# Patient Record
Sex: FEMALE | Race: WHITE | NOT HISPANIC OR LATINO | Employment: OTHER | URBAN - METROPOLITAN AREA
[De-identification: names, ages, dates, MRNs, and addresses within clinical notes are randomized per-mention and may not be internally consistent; named-entity substitution may affect disease eponyms.]

---

## 2017-01-19 ENCOUNTER — PROCEDURE VISIT (OUTPATIENT)
Dept: OBSTETRICS AND GYNECOLOGY | Facility: CLINIC | Age: 67
End: 2017-01-19

## 2017-01-19 ENCOUNTER — OFFICE VISIT (OUTPATIENT)
Dept: OBSTETRICS AND GYNECOLOGY | Facility: CLINIC | Age: 67
End: 2017-01-19

## 2017-01-19 ENCOUNTER — APPOINTMENT (OUTPATIENT)
Dept: MAMMOGRAPHY | Facility: CLINIC | Age: 67
End: 2017-01-19

## 2017-01-19 VITALS
DIASTOLIC BLOOD PRESSURE: 72 MMHG | HEIGHT: 67 IN | BODY MASS INDEX: 19.34 KG/M2 | WEIGHT: 123.2 LBS | SYSTOLIC BLOOD PRESSURE: 116 MMHG

## 2017-01-19 DIAGNOSIS — Z01.419 GYNECOLOGIC EXAM NORMAL: Primary | ICD-10-CM

## 2017-01-19 DIAGNOSIS — M89.9 DISORDER OF BONE: ICD-10-CM

## 2017-01-19 DIAGNOSIS — M85.80 OSTEOPENIA: ICD-10-CM

## 2017-01-19 DIAGNOSIS — Z12.31 VISIT FOR SCREENING MAMMOGRAM: ICD-10-CM

## 2017-01-19 DIAGNOSIS — Z13.820 SCREENING FOR OSTEOPOROSIS: Primary | ICD-10-CM

## 2017-01-19 DIAGNOSIS — Z78.0 POSTMENOPAUSAL: ICD-10-CM

## 2017-01-19 PROCEDURE — G0202 SCR MAMMO BI INCL CAD: HCPCS | Performed by: NURSE PRACTITIONER

## 2017-01-19 PROCEDURE — G0101 CA SCREEN;PELVIC/BREAST EXAM: HCPCS | Performed by: NURSE PRACTITIONER

## 2017-01-19 PROCEDURE — 77080 DXA BONE DENSITY AXIAL: CPT | Performed by: NURSE PRACTITIONER

## 2017-01-19 RX ORDER — ASPIRIN 81 MG/1
81 TABLET ORAL DAILY
COMMUNITY
End: 2017-05-22

## 2017-01-19 RX ORDER — ZOLEDRONIC ACID 5 MG/100ML
1 INJECTION, SOLUTION INTRAVENOUS ONCE
Status: CANCELLED | OUTPATIENT
Start: 2017-01-19 | End: 2017-01-19

## 2017-01-19 NOTE — MR AVS SNAPSHOT
Rosi Vicente   1/19/2017 8:30 AM   Appointment    Dept Phone:  [phone]   Encounter #:  [id number]    Provider:  FRANCIS RICHARDSON   Department:  [facility] OB GYN                Your Full Care Plan              Your Updated Medication List          This list is accurate as of: 1/19/17  9:59 AM.  Always use your most recent med list.                Alpha-Lipoic Acid 300 MG capsule       aspirin 81 MG EC tablet       calcium carbonate 600 MG tablet   Commonly known as:  OS-CARLOS       cholecalciferol 1000 UNITS tablet   Commonly known as:  VITAMIN D3       ferrous sulfate 325 (65 FE) MG tablet       FLUTICASONE PROPIONATE (INHAL) IN       guaiFENesin 600 MG 12 hr tablet   Commonly known as:  MUCINEX       hydrochlorothiazide 12.5 MG tablet   Commonly known as:  HYDRODIURIL   Take 1 tablet by mouth daily.       lisinopril 20 MG tablet   Commonly known as:  PRINIVIL,ZESTRIL   Take 1 tablet by mouth Daily.       meloxicam 15 MG tablet   Commonly known as:  MOBIC   Take 1 tablet by mouth daily.       MethylPREDNISolone 4 MG tablet   Commonly known as:  MEDROL (JUSTINE)   Take as directed on package instructions.       montelukast 10 MG tablet   Commonly known as:  SINGULAIR       polyethyl glycol-propyl glycol 0.4-0.3 % solution ophthalmic solution   Commonly known as:  SYSTANE       Unable to find       valsartan 320 MG tablet   Commonly known as:  DIOVAN   Take 1 tablet by mouth daily. Instead of losartan       vitamin B-12 1000 MCG tablet   Commonly known as:  CYANOCOBALAMIN               Instructions     None    Patient Instructions History      Upcoming Appointments     Visit Type Date Time Department    BONE DENSITY 1/19/2017  8:30 AM [facility]    MAMMO ISSA 1/19/2017  9:00 AM [facility]    WELLNESS 1/19/2017  9:30 AM [facility]    FOLLOW UP 5/23/2017  8:30 AM [facility]      MyCjulio Signup     Our records indicate that you have an active  [facility] mktg account.    You can view your After Visit Summary by going to [employer] and logging in with your SOAK ([employer]) username and password.  If you don't have a SOAK ([employer]) username and password but a parent or guardian has access to your record, the parent or guardian should login with their own SOAK ([employer]) username and password and access your record to view the After Visit Summary.    If you have questions, you can email [email or web address] or call [phone] to talk to our SOAK ([employer]) staff.  Remember, SOAK ([employer] tool[employer]) is NOT to be used for urgent needs.  For medical emergencies, dial 911.               Other Info from Your Visit           Your Appointments     May 23, 2017  8:30 AM EDT   Follow Up with Dionne Fonseca MD   [facility] (--)    [address]  [facility] [address] [phone]           Arrive 15 minutes prior to appointment.              Allergies     No Known Allergies      Vital Signs     Smoking Status                   Never Smoker

## 2017-01-23 LAB
CONV .: NORMAL
CYTOLOGIST CVX/VAG CYTO: NORMAL
CYTOLOGY CVX/VAG DOC THIN PREP: NORMAL
DX ICD CODE: NORMAL
HIV 1 & 2 AB SER-IMP: NORMAL
OTHER STN SPEC: NORMAL
PATH REPORT.FINAL DX SPEC: NORMAL
STAT OF ADQ CVX/VAG CYTO-IMP: NORMAL

## 2017-01-26 ENCOUNTER — TELEPHONE (OUTPATIENT)
Dept: OBSTETRICS AND GYNECOLOGY | Facility: CLINIC | Age: 67
End: 2017-01-26

## 2017-01-30 DIAGNOSIS — G50.0 TRIGEMINAL NEURALGIA: Primary | ICD-10-CM

## 2017-01-30 RX ORDER — GABAPENTIN 100 MG/1
100 CAPSULE ORAL 3 TIMES DAILY
Qty: 90 CAPSULE | Refills: 6 | Status: SHIPPED | OUTPATIENT
Start: 2017-01-30 | End: 2017-11-03 | Stop reason: SDUPTHER

## 2017-02-17 LAB
25(OH)D3+25(OH)D2 SERPL-MCNC: 33.4 NG/ML (ref 30–100)
ALBUMIN SERPL-MCNC: 4.6 G/DL (ref 3.5–5.2)
ALBUMIN/GLOB SERPL: 2.6 G/DL
ALP SERPL-CCNC: 92 U/L (ref 39–117)
ALT SERPL-CCNC: 19 U/L (ref 1–33)
AST SERPL-CCNC: 20 U/L (ref 1–32)
BILIRUB SERPL-MCNC: 0.2 MG/DL (ref 0.1–1.2)
BUN SERPL-MCNC: 22 MG/DL (ref 8–23)
BUN/CREAT SERPL: 26.5 (ref 7–25)
CALCIUM SERPL-MCNC: 10 MG/DL (ref 8.6–10.5)
CHLORIDE SERPL-SCNC: 93 MMOL/L (ref 98–107)
CO2 SERPL-SCNC: 26.1 MMOL/L (ref 22–29)
CREAT SERPL-MCNC: 0.83 MG/DL (ref 0.57–1)
GLOBULIN SER CALC-MCNC: 1.8 GM/DL
GLUCOSE SERPL-MCNC: 100 MG/DL (ref 65–99)
POTASSIUM SERPL-SCNC: 5.4 MMOL/L (ref 3.5–5.2)
PROT SERPL-MCNC: 6.4 G/DL (ref 6–8.5)
SODIUM SERPL-SCNC: 132 MMOL/L (ref 136–145)

## 2017-02-21 ENCOUNTER — CLINICAL SUPPORT (OUTPATIENT)
Dept: OBSTETRICS AND GYNECOLOGY | Facility: CLINIC | Age: 67
End: 2017-02-21

## 2017-02-21 DIAGNOSIS — Z92.29 HISTORY OF DRUG THERAPY: ICD-10-CM

## 2017-02-21 DIAGNOSIS — M81.0 OSTEOPOROSIS: Primary | ICD-10-CM

## 2017-02-21 PROCEDURE — 96372 THER/PROPH/DIAG INJ SC/IM: CPT | Performed by: NURSE PRACTITIONER

## 2017-02-23 DIAGNOSIS — I10 ESSENTIAL HYPERTENSION: Primary | ICD-10-CM

## 2017-02-24 ENCOUNTER — TELEPHONE (OUTPATIENT)
Dept: INTERNAL MEDICINE | Facility: CLINIC | Age: 67
End: 2017-02-24

## 2017-02-24 NOTE — TELEPHONE ENCOUNTER
----- Message from Tiara Shah sent at 2/24/2017  8:56 AM EST -----  Contact: pt - Dr Fonseca's pt - RE: lab appt  [facility] for pt to return call to office to schedule pt's lab appt.

## 2017-03-20 ENCOUNTER — PATIENT MESSAGE (OUTPATIENT)
Dept: INTERNAL MEDICINE | Facility: CLINIC | Age: 67
End: 2017-03-20

## 2017-03-20 NOTE — TELEPHONE ENCOUNTER
From: Rosi Vicente  To: Dionne Fonseca MD  Sent: 3/20/2017 11:41 AM EDT  Subject: Prescription Question    Hi,  I am using up my remaining separate script for HCTZ 12.5 mg. Have 60 tabs left. Would you call a script for Lisinopril 20mg for 60 tabs to [facility] on [address]?     Having some occasional heartburn issues at . Would you consider calling in a script for omeprazole or what ever med you think appropriate to [facility] as well?     Thanks,  Rosi Vicente

## 2017-03-21 DIAGNOSIS — I10 ESSENTIAL HYPERTENSION: ICD-10-CM

## 2017-03-21 DIAGNOSIS — R12 HEARTBURN: ICD-10-CM

## 2017-03-21 DIAGNOSIS — I10 ESSENTIAL HYPERTENSION: Primary | ICD-10-CM

## 2017-03-21 RX ORDER — LISINOPRIL 20 MG/1
20 TABLET ORAL DAILY
Qty: 60 TABLET | Refills: 1 | Status: SHIPPED | OUTPATIENT
Start: 2017-03-21 | End: 2017-03-21 | Stop reason: SDUPTHER

## 2017-03-21 RX ORDER — OMEPRAZOLE 40 MG/1
40 CAPSULE, DELAYED RELEASE ORAL DAILY
Qty: 30 CAPSULE | Refills: 3 | Status: SHIPPED | OUTPATIENT
Start: 2017-03-21 | End: 2017-05-16 | Stop reason: SDUPTHER

## 2017-03-21 RX ORDER — LISINOPRIL 20 MG/1
20 TABLET ORAL DAILY
Qty: 60 TABLET | Refills: 1 | Status: SHIPPED | OUTPATIENT
Start: 2017-03-21 | End: 2017-05-09 | Stop reason: SDUPTHER

## 2017-03-21 RX ORDER — OMEPRAZOLE 40 MG/1
40 CAPSULE, DELAYED RELEASE ORAL DAILY
Qty: 30 CAPSULE | Refills: 3 | Status: SHIPPED | OUTPATIENT
Start: 2017-03-21 | End: 2017-03-21 | Stop reason: SDUPTHER

## 2017-04-13 DIAGNOSIS — J30.2 SEASONAL ALLERGIC RHINITIS, UNSPECIFIED ALLERGIC RHINITIS TRIGGER: Primary | ICD-10-CM

## 2017-04-13 RX ORDER — MONTELUKAST SODIUM 10 MG/1
10 TABLET ORAL DAILY
Qty: 90 TABLET | Refills: 4 | Status: SHIPPED | OUTPATIENT
Start: 2017-04-13 | End: 2018-05-17 | Stop reason: SDUPTHER

## 2017-04-14 ENCOUNTER — LAB (OUTPATIENT)
Dept: INTERNAL MEDICINE | Facility: CLINIC | Age: 67
End: 2017-04-14

## 2017-04-14 DIAGNOSIS — I10 ESSENTIAL HYPERTENSION: ICD-10-CM

## 2017-04-14 LAB
BUN SERPL-MCNC: 20 MG/DL (ref 8–23)
BUN/CREAT SERPL: 28.2 (ref 7–25)
CALCIUM SERPL-MCNC: 9.7 MG/DL (ref 8.6–10.5)
CHLORIDE SERPL-SCNC: 90 MMOL/L (ref 98–107)
CO2 SERPL-SCNC: 26.1 MMOL/L (ref 22–29)
CREAT SERPL-MCNC: 0.71 MG/DL (ref 0.57–1)
GLUCOSE SERPL-MCNC: 105 MG/DL (ref 65–99)
POTASSIUM SERPL-SCNC: 4.2 MMOL/L (ref 3.5–5.2)
SODIUM SERPL-SCNC: 132 MMOL/L (ref 136–145)

## 2017-05-09 DIAGNOSIS — I10 ESSENTIAL HYPERTENSION: Primary | ICD-10-CM

## 2017-05-09 RX ORDER — LISINOPRIL AND HYDROCHLOROTHIAZIDE 20; 12.5 MG/1; MG/1
1 TABLET ORAL DAILY
Qty: 90 TABLET | Refills: 3 | Status: SHIPPED | OUTPATIENT
Start: 2017-05-09 | End: 2017-09-29 | Stop reason: HOSPADM

## 2017-05-16 DIAGNOSIS — R12 HEARTBURN: ICD-10-CM

## 2017-05-16 RX ORDER — OMEPRAZOLE 40 MG/1
40 CAPSULE, DELAYED RELEASE ORAL DAILY
Qty: 90 CAPSULE | Refills: 3 | Status: SHIPPED | OUTPATIENT
Start: 2017-05-16 | End: 2017-06-20 | Stop reason: SDUPTHER

## 2017-05-22 ENCOUNTER — OFFICE VISIT (OUTPATIENT)
Dept: INTERNAL MEDICINE | Facility: CLINIC | Age: 67
End: 2017-05-22

## 2017-05-22 VITALS
WEIGHT: 123 LBS | HEIGHT: 66 IN | SYSTOLIC BLOOD PRESSURE: 110 MMHG | RESPIRATION RATE: 16 BRPM | DIASTOLIC BLOOD PRESSURE: 60 MMHG | TEMPERATURE: 98.2 F | OXYGEN SATURATION: 98 % | BODY MASS INDEX: 19.77 KG/M2 | HEART RATE: 77 BPM

## 2017-05-22 DIAGNOSIS — J06.9 ACUTE URI: Primary | ICD-10-CM

## 2017-05-22 DIAGNOSIS — J04.0 LARYNGITIS: ICD-10-CM

## 2017-05-22 PROCEDURE — 99213 OFFICE O/P EST LOW 20 MIN: CPT | Performed by: NURSE PRACTITIONER

## 2017-05-22 RX ORDER — AZITHROMYCIN 250 MG/1
TABLET, FILM COATED ORAL
COMMUNITY
Start: 2017-05-19 | End: 2017-06-20

## 2017-05-22 RX ORDER — LORATADINE 10 MG/1
1 CAPSULE, LIQUID FILLED ORAL DAILY
Qty: 7 EACH | Refills: 0
Start: 2017-05-22 | End: 2017-05-29

## 2017-05-22 RX ORDER — BENZONATATE 200 MG/1
200 CAPSULE ORAL 3 TIMES DAILY PRN
Qty: 30 CAPSULE | Refills: 0 | Status: SHIPPED | OUTPATIENT
Start: 2017-05-22 | End: 2017-06-20

## 2017-06-18 ENCOUNTER — PATIENT MESSAGE (OUTPATIENT)
Dept: INTERNAL MEDICINE | Facility: CLINIC | Age: 67
End: 2017-06-18

## 2017-06-19 NOTE — TELEPHONE ENCOUNTER
From: Rosi Vicente  To: Dionne Fonseca MD  Sent: 6/18/2017 8:59 PM EDT  Subject: Non-Urgent Medical Question    Will be seeing you this week, but have three questions for our visit. Meloxicam is working very well for my joints. How long may I safely use this drug?    Have been taking D3 for at least two years prior to coming to your practice. Should I have a level checked to see if still needed?     Could we check my iron level?     Thanks,  Rosi

## 2017-06-20 ENCOUNTER — TELEPHONE (OUTPATIENT)
Dept: INTERNAL MEDICINE | Facility: CLINIC | Age: 67
End: 2017-06-20

## 2017-06-20 ENCOUNTER — OFFICE VISIT (OUTPATIENT)
Dept: INTERNAL MEDICINE | Facility: CLINIC | Age: 67
End: 2017-06-20

## 2017-06-20 VITALS
BODY MASS INDEX: 19.93 KG/M2 | DIASTOLIC BLOOD PRESSURE: 74 MMHG | OXYGEN SATURATION: 97 % | HEIGHT: 66 IN | SYSTOLIC BLOOD PRESSURE: 112 MMHG | HEART RATE: 64 BPM | WEIGHT: 124 LBS

## 2017-06-20 DIAGNOSIS — E78.49 OTHER HYPERLIPIDEMIA: ICD-10-CM

## 2017-06-20 DIAGNOSIS — R12 HEARTBURN: ICD-10-CM

## 2017-06-20 DIAGNOSIS — J30.2 SEASONAL ALLERGIC RHINITIS, UNSPECIFIED ALLERGIC RHINITIS TRIGGER: ICD-10-CM

## 2017-06-20 DIAGNOSIS — I10 ESSENTIAL HYPERTENSION: Primary | ICD-10-CM

## 2017-06-20 DIAGNOSIS — G50.0 TRIGEMINAL NEURALGIA: ICD-10-CM

## 2017-06-20 DIAGNOSIS — E55.9 VITAMIN D DEFICIENCY: ICD-10-CM

## 2017-06-20 PROCEDURE — 99214 OFFICE O/P EST MOD 30 MIN: CPT | Performed by: INTERNAL MEDICINE

## 2017-06-20 RX ORDER — FAMOTIDINE 20 MG/1
20 TABLET, FILM COATED ORAL DAILY
Qty: 90 TABLET | Refills: 3 | Status: SHIPPED | OUTPATIENT
Start: 2017-06-20 | End: 2017-12-06

## 2017-06-20 NOTE — PROGRESS NOTES
"Julio C Vicente is a 67 y.o. female here for   Chief Complaint   Patient presents with   • Hypertension   • Hyperlipidemia   .    Vitals:    06/20/17 0811   BP: 112/74   BP Location: Right arm   Patient Position: Sitting   Cuff Size: Adult   Pulse: 64   SpO2: 97%   Weight: 124 lb (56.2 kg)   Height: 66\" (167.6 cm)       Hypertension   This is a chronic problem. The current episode started more than 1 year ago. The problem is unchanged. The problem is controlled. Pertinent negatives include no anxiety, chest pain, malaise/fatigue, palpitations, peripheral edema or shortness of breath. There is no history of chronic renal disease.   Hyperlipidemia   This is a chronic problem. The current episode started more than 1 year ago. The problem is controlled. Recent lipid tests were reviewed and are normal. She has no history of chronic renal disease, diabetes, hypothyroidism, liver disease or obesity. Pertinent negatives include no chest pain or shortness of breath.        Encounter Diagnoses   Name Primary?   • Essential hypertension Yes   • Other hyperlipidemia    • Seasonal allergic rhinitis, unspecified allergic rhinitis trigger    • Trigeminal neuralgia    • Heartburn    • Vitamin D deficiency        The following portions of the patient's history were reviewed and updated as appropriate: allergies, current medications, past social history and problem list.    Review of Systems   Constitutional: Positive for fatigue. Negative for chills, fever and malaise/fatigue.   HENT: Positive for postnasal drip.    Respiratory: Negative for cough, shortness of breath and wheezing.    Cardiovascular: Negative for chest pain, palpitations and leg swelling.   Allergic/Immunologic: Positive for environmental allergies.   Psychiatric/Behavioral: Negative for dysphoric mood and sleep disturbance. The patient is not nervous/anxious.      Heartburn off & on. She wants to stop prilosec.    Objective   Physical Exam "   Constitutional: She appears well-developed and well-nourished. No distress.   Cardiovascular: Normal rate, regular rhythm and normal heart sounds.    Pulmonary/Chest: No respiratory distress. She has no wheezes. She has no rales. She exhibits no tenderness.   Musculoskeletal: She exhibits no edema.   Psychiatric: She has a normal mood and affect. Her behavior is normal.   Nursing note and vitals reviewed.      Assessment/Plan   Problem List Items Addressed This Visit        Unprioritized    Hypertension - Primary    Allergic rhinitis    Trigeminal neuralgia    Hyperlipidemia    Vitamin D deficiency     D=33 while taking 1,000 units/d.           Other Visit Diagnoses     Heartburn        Relevant Medications    famotidine (PEPCID) 20 MG tablet       HTN stable.   High chol mild - rechk several mos.   Chronic low sodium (from hctz?) - recheck sev mos.   She wants to change prilosec to pepcid - sent in today.  Call if problems.

## 2017-06-20 NOTE — TELEPHONE ENCOUNTER
Contacted patient to inform her that her authorization for release of information for [facility] is at the . She stated she would come in sometime this week to sign it.

## 2017-08-23 ENCOUNTER — CLINICAL SUPPORT (OUTPATIENT)
Dept: OBSTETRICS AND GYNECOLOGY | Facility: CLINIC | Age: 67
End: 2017-08-23

## 2017-08-23 DIAGNOSIS — Z92.29 HISTORY OF DRUG THERAPY: ICD-10-CM

## 2017-08-23 DIAGNOSIS — M81.0 OSTEOPOROSIS: Primary | ICD-10-CM

## 2017-08-23 PROCEDURE — 96372 THER/PROPH/DIAG INJ SC/IM: CPT | Performed by: NURSE PRACTITIONER

## 2017-08-28 ENCOUNTER — TELEPHONE (OUTPATIENT)
Dept: INTERNAL MEDICINE | Facility: CLINIC | Age: 67
End: 2017-08-28

## 2017-08-28 DIAGNOSIS — M25.552 ARTHRALGIA OF LEFT HIP: ICD-10-CM

## 2017-08-28 RX ORDER — MELOXICAM 15 MG/1
15 TABLET ORAL DAILY
Qty: 30 TABLET | Refills: 0 | Status: SHIPPED | OUTPATIENT
Start: 2017-08-28 | End: 2017-09-29 | Stop reason: HOSPADM

## 2017-08-28 RX ORDER — MELOXICAM 15 MG/1
TABLET ORAL
Qty: 30 TABLET | Refills: 6 | Status: SHIPPED | OUTPATIENT
Start: 2017-08-28 | End: 2017-08-28 | Stop reason: SDUPTHER

## 2017-08-28 NOTE — TELEPHONE ENCOUNTER
----- Message from Tiara Shah sent at 8/28/2017 11:25 AM EDT -----  Contact: pt - Dr Fonseca's pt - RE: Rx refill  Pt calling and would like a refill on Rx sent to local pharmacy. Pt informs that mail order will not arrive in time before pt leaves to go out of town. Could you please call in Rx to local pharmacy til mail order arrives? Please advise. Thanks.    meloxicam (MOBIC) 15 MG tablet 30 tablet 6 8/26/2016      Sig - Route: Take 1 tablet by mouth daily. - Oral    [facility] - [phone]  - [phone]  [phone] (Phone)  [phone] (Fax)    Pt # [phone]

## 2017-08-28 NOTE — TELEPHONE ENCOUNTER
----- Message from Ina Anderson sent at 8/28/2017 11:09 AM EDT -----  Contact: Patient  Patient called needing refill on     meloxicam (MOBIC) 15 MG tablet, 90-day supply.  Please advise.     Patient:  [phone]    Pharmacy:  [facility] - [facility] [address] - [phone]  - [phone] FX

## 2017-09-13 ENCOUNTER — APPOINTMENT (OUTPATIENT)
Dept: CT IMAGING | Facility: HOSPITAL | Age: 67
End: 2017-09-13

## 2017-09-13 ENCOUNTER — APPOINTMENT (OUTPATIENT)
Dept: GENERAL RADIOLOGY | Facility: HOSPITAL | Age: 67
End: 2017-09-13

## 2017-09-13 ENCOUNTER — APPOINTMENT (OUTPATIENT)
Dept: MRI IMAGING | Facility: HOSPITAL | Age: 67
End: 2017-09-13

## 2017-09-13 ENCOUNTER — HOSPITAL ENCOUNTER (INPATIENT)
Facility: HOSPITAL | Age: 67
LOS: 5 days | End: 2017-09-18
Attending: EMERGENCY MEDICINE | Admitting: HOSPITALIST

## 2017-09-13 DIAGNOSIS — R13.12 OROPHARYNGEAL DYSPHAGIA: ICD-10-CM

## 2017-09-13 DIAGNOSIS — R26.2 DIFFICULTY WALKING: ICD-10-CM

## 2017-09-13 DIAGNOSIS — I63.9 CEREBROVASCULAR ACCIDENT (CVA), UNSPECIFIED MECHANISM (HCC): Primary | ICD-10-CM

## 2017-09-13 PROBLEM — G45.9 TIA (TRANSIENT ISCHEMIC ATTACK): Status: ACTIVE | Noted: 2017-09-13

## 2017-09-13 LAB
ALBUMIN SERPL-MCNC: 4.7 G/DL (ref 3.5–5.2)
ALBUMIN/GLOB SERPL: 1.9 G/DL
ALP SERPL-CCNC: 68 U/L (ref 39–117)
ALT SERPL W P-5'-P-CCNC: 15 U/L (ref 1–33)
ANION GAP SERPL CALCULATED.3IONS-SCNC: 13.2 MMOL/L
AST SERPL-CCNC: 14 U/L (ref 1–32)
BASOPHILS # BLD AUTO: 0.02 10*3/MM3 (ref 0–0.2)
BASOPHILS NFR BLD AUTO: 0.3 % (ref 0–1.5)
BILIRUB SERPL-MCNC: 0.4 MG/DL (ref 0.1–1.2)
BUN BLD-MCNC: 18 MG/DL (ref 8–23)
BUN/CREAT SERPL: 26.9 (ref 7–25)
CALCIUM SPEC-SCNC: 9.3 MG/DL (ref 8.6–10.5)
CHLORIDE SERPL-SCNC: 93 MMOL/L (ref 98–107)
CHOLEST SERPL-MCNC: 219 MG/DL (ref 0–200)
CO2 SERPL-SCNC: 26.8 MMOL/L (ref 22–29)
CREAT BLD-MCNC: 0.67 MG/DL (ref 0.57–1)
DEPRECATED RDW RBC AUTO: 45.7 FL (ref 37–54)
EOSINOPHIL # BLD AUTO: 0.09 10*3/MM3 (ref 0–0.7)
EOSINOPHIL NFR BLD AUTO: 1.6 % (ref 0.3–6.2)
ERYTHROCYTE [DISTWIDTH] IN BLOOD BY AUTOMATED COUNT: 15 % (ref 11.7–13)
FIBRINOGEN PPP-MCNC: 263 MG/DL (ref 219–464)
GFR SERPL CREATININE-BSD FRML MDRD: 88 ML/MIN/1.73
GLOBULIN UR ELPH-MCNC: 2.5 GM/DL
GLUCOSE BLD-MCNC: 98 MG/DL (ref 65–99)
HBA1C MFR BLD: 5.3 % (ref 4.8–5.6)
HCT VFR BLD AUTO: 42.6 % (ref 35.6–45.5)
HDLC SERPL-MCNC: 66 MG/DL (ref 40–60)
HGB BLD-MCNC: 14.1 G/DL (ref 11.9–15.5)
HOLD SPECIMEN: NORMAL
HOLD SPECIMEN: NORMAL
IMM GRANULOCYTES # BLD: 0 10*3/MM3 (ref 0–0.03)
IMM GRANULOCYTES NFR BLD: 0 % (ref 0–0.5)
INR PPP: 0.95 (ref 0.9–1.1)
LDLC SERPL CALC-MCNC: 134 MG/DL (ref 0–100)
LDLC/HDLC SERPL: 2.03 {RATIO}
LYMPHOCYTES # BLD AUTO: 2.08 10*3/MM3 (ref 0.9–4.8)
LYMPHOCYTES NFR BLD AUTO: 36.4 % (ref 19.6–45.3)
MCH RBC QN AUTO: 27.5 PG (ref 26.9–32)
MCHC RBC AUTO-ENTMCNC: 33.1 G/DL (ref 32.4–36.3)
MCV RBC AUTO: 83 FL (ref 80.5–98.2)
MONOCYTES # BLD AUTO: 0.56 10*3/MM3 (ref 0.2–1.2)
MONOCYTES NFR BLD AUTO: 9.8 % (ref 5–12)
NEUTROPHILS # BLD AUTO: 2.97 10*3/MM3 (ref 1.9–8.1)
NEUTROPHILS NFR BLD AUTO: 51.9 % (ref 42.7–76)
PLATELET # BLD AUTO: 358 10*3/MM3 (ref 140–500)
PMV BLD AUTO: 9.7 FL (ref 6–12)
POTASSIUM BLD-SCNC: 3.9 MMOL/L (ref 3.5–5.2)
PROT SERPL-MCNC: 7.2 G/DL (ref 6–8.5)
PROTHROMBIN TIME: 12.3 SECONDS (ref 11.7–14.2)
RBC # BLD AUTO: 5.13 10*6/MM3 (ref 3.9–5.2)
SODIUM BLD-SCNC: 133 MMOL/L (ref 136–145)
TRIGL SERPL-MCNC: 96 MG/DL (ref 0–150)
VLDLC SERPL-MCNC: 19.2 MG/DL (ref 5–40)
WBC NRBC COR # BLD: 5.72 10*3/MM3 (ref 4.5–10.7)
WHOLE BLOOD HOLD SPECIMEN: NORMAL
WHOLE BLOOD HOLD SPECIMEN: NORMAL

## 2017-09-13 PROCEDURE — 63710000001 ASPIRIN 325 MG TABLET: Performed by: EMERGENCY MEDICINE

## 2017-09-13 PROCEDURE — 97162 PT EVAL MOD COMPLEX 30 MIN: CPT

## 2017-09-13 PROCEDURE — 83036 HEMOGLOBIN GLYCOSYLATED A1C: CPT | Performed by: HOSPITALIST

## 2017-09-13 PROCEDURE — 71010 HC CHEST PA OR AP: CPT

## 2017-09-13 PROCEDURE — 0 GADOBENATE DIMEGLUMINE 529 MG/ML SOLUTION: Performed by: HOSPITALIST

## 2017-09-13 PROCEDURE — 93005 ELECTROCARDIOGRAM TRACING: CPT | Performed by: EMERGENCY MEDICINE

## 2017-09-13 PROCEDURE — 99285 EMERGENCY DEPT VISIT HI MDM: CPT

## 2017-09-13 PROCEDURE — 70549 MR ANGIOGRAPH NECK W/O&W/DYE: CPT

## 2017-09-13 PROCEDURE — A9270 NON-COVERED ITEM OR SERVICE: HCPCS | Performed by: EMERGENCY MEDICINE

## 2017-09-13 PROCEDURE — 85384 FIBRINOGEN ACTIVITY: CPT | Performed by: RADIOLOGY

## 2017-09-13 PROCEDURE — G8987 SELF CARE CURRENT STATUS: HCPCS | Performed by: OCCUPATIONAL THERAPIST

## 2017-09-13 PROCEDURE — A9577 INJ MULTIHANCE: HCPCS | Performed by: HOSPITALIST

## 2017-09-13 PROCEDURE — 70450 CT HEAD/BRAIN W/O DYE: CPT

## 2017-09-13 PROCEDURE — 93010 ELECTROCARDIOGRAM REPORT: CPT | Performed by: INTERNAL MEDICINE

## 2017-09-13 PROCEDURE — 80061 LIPID PANEL: CPT | Performed by: HOSPITALIST

## 2017-09-13 PROCEDURE — 85025 COMPLETE CBC W/AUTO DIFF WBC: CPT | Performed by: EMERGENCY MEDICINE

## 2017-09-13 PROCEDURE — 85610 PROTHROMBIN TIME: CPT | Performed by: EMERGENCY MEDICINE

## 2017-09-13 PROCEDURE — 99223 1ST HOSP IP/OBS HIGH 75: CPT | Performed by: RADIOLOGY

## 2017-09-13 PROCEDURE — 80053 COMPREHEN METABOLIC PANEL: CPT | Performed by: EMERGENCY MEDICINE

## 2017-09-13 PROCEDURE — 70553 MRI BRAIN STEM W/O & W/DYE: CPT

## 2017-09-13 PROCEDURE — 97110 THERAPEUTIC EXERCISES: CPT

## 2017-09-13 PROCEDURE — 70544 MR ANGIOGRAPHY HEAD W/O DYE: CPT

## 2017-09-13 PROCEDURE — G8988 SELF CARE GOAL STATUS: HCPCS | Performed by: OCCUPATIONAL THERAPIST

## 2017-09-13 PROCEDURE — 92610 EVALUATE SWALLOWING FUNCTION: CPT | Performed by: SPEECH-LANGUAGE PATHOLOGIST

## 2017-09-13 PROCEDURE — 97166 OT EVAL MOD COMPLEX 45 MIN: CPT | Performed by: OCCUPATIONAL THERAPIST

## 2017-09-13 RX ORDER — ATORVASTATIN CALCIUM 80 MG/1
80 TABLET, FILM COATED ORAL NIGHTLY
Status: DISCONTINUED | OUTPATIENT
Start: 2017-09-13 | End: 2017-09-18 | Stop reason: HOSPADM

## 2017-09-13 RX ORDER — MONTELUKAST SODIUM 10 MG/1
10 TABLET ORAL DAILY
Status: DISCONTINUED | OUTPATIENT
Start: 2017-09-13 | End: 2017-09-18 | Stop reason: HOSPADM

## 2017-09-13 RX ORDER — CLOPIDOGREL BISULFATE 75 MG/1
75 TABLET ORAL DAILY
Status: DISCONTINUED | OUTPATIENT
Start: 2017-09-14 | End: 2017-09-18 | Stop reason: HOSPADM

## 2017-09-13 RX ORDER — SODIUM CHLORIDE 9 MG/ML
50 INJECTION, SOLUTION INTRAVENOUS CONTINUOUS
Status: DISCONTINUED | OUTPATIENT
Start: 2017-09-13 | End: 2017-09-18 | Stop reason: HOSPADM

## 2017-09-13 RX ORDER — ASPIRIN 325 MG
325 TABLET ORAL DAILY
Status: DISCONTINUED | OUTPATIENT
Start: 2017-09-13 | End: 2017-09-18 | Stop reason: HOSPADM

## 2017-09-13 RX ORDER — DIAZEPAM 5 MG/1
5 TABLET ORAL ONCE AS NEEDED
Status: COMPLETED | OUTPATIENT
Start: 2017-09-13 | End: 2017-09-13

## 2017-09-13 RX ORDER — SODIUM CHLORIDE 0.9 % (FLUSH) 0.9 %
10 SYRINGE (ML) INJECTION AS NEEDED
Status: DISCONTINUED | OUTPATIENT
Start: 2017-09-13 | End: 2017-09-18 | Stop reason: HOSPADM

## 2017-09-13 RX ORDER — SODIUM CHLORIDE 0.9 % (FLUSH) 0.9 %
1-10 SYRINGE (ML) INJECTION AS NEEDED
Status: DISCONTINUED | OUTPATIENT
Start: 2017-09-13 | End: 2017-09-18 | Stop reason: HOSPADM

## 2017-09-13 RX ORDER — ASPIRIN 300 MG/1
300 SUPPOSITORY RECTAL DAILY
Status: DISCONTINUED | OUTPATIENT
Start: 2017-09-13 | End: 2017-09-18 | Stop reason: HOSPADM

## 2017-09-13 RX ORDER — ASPIRIN 325 MG
325 TABLET ORAL ONCE
Status: COMPLETED | OUTPATIENT
Start: 2017-09-13 | End: 2017-09-13

## 2017-09-13 RX ORDER — CLOPIDOGREL BISULFATE 75 MG/1
300 TABLET ORAL ONCE
Status: COMPLETED | OUTPATIENT
Start: 2017-09-13 | End: 2017-09-13

## 2017-09-13 RX ADMIN — CLOPIDOGREL 300 MG: 75 TABLET, FILM COATED ORAL at 17:04

## 2017-09-13 RX ADMIN — DIAZEPAM 5 MG: 5 TABLET ORAL at 11:12

## 2017-09-13 RX ADMIN — ATORVASTATIN CALCIUM 80 MG: 80 TABLET, FILM COATED ORAL at 20:12

## 2017-09-13 RX ADMIN — GADOBENATE DIMEGLUMINE 11 ML: 529 INJECTION, SOLUTION INTRAVENOUS at 13:27

## 2017-09-13 RX ADMIN — SODIUM CHLORIDE 100 ML/HR: 9 INJECTION, SOLUTION INTRAVENOUS at 08:32

## 2017-09-13 RX ADMIN — ASPIRIN 325 MG: 325 TABLET ORAL at 08:26

## 2017-09-13 RX ADMIN — SODIUM CHLORIDE 500 ML: 9 INJECTION, SOLUTION INTRAVENOUS at 16:58

## 2017-09-13 RX ADMIN — MONTELUKAST 10 MG: 10 TABLET, FILM COATED ORAL at 13:56

## 2017-09-13 NOTE — CONSULTS
"DOS: 2017  NAME: Rosi Vicente   : 1950  PCP: Dionne Fonseca MD  CC: Stroke  Referring MD: Benjamin Whyte MD    Neurological Problem and Interval History:  67 y.o. RHW female with a Hx of hypertension and ? hyperlipidemia.  The patient is a retired advanced practice nurse who pays close attention to her blood pressures and is a vegetarian and exercises regularly.  She was in her usual state of excellent health until Tuesday in the morning when walking around she noted heaviness in both legs.  She went to bed that night and at 3 AM when she got up to go to the bathroom she noted more heaviness in the left leg.  It when she woke up this morning she had left arm weakness and slurred speech and came to the hospital.  She feels the symptoms had been stable until she received Valium for an MRI scan and is a little bit worse now.  She denies any other stroke or TIA symptoms.  One year ago she had an episode where she had floaters in the right eye and she thinks she lost vision in a field.  She was seen by an ophthalmologist and they were worried about an embolic event and Center for carotid ultrasound and echocardiogram.  She regained her vision fully.  She had been doing had stands during her intense yoga class and it was very hot at the time.  The pressures are usually in the 120s.  She has been taking dietary supplement called \"Protondin\" contains several herbal extracts.  He does not have headaches but has a headache now.  She has baseline numbness on the left side of the face post trigeminal neurovascular decompressive surgery on the left.  She is double-jointed and has stretchy skin.    Past Medical/Surgical Hx:  Past Medical History:   Diagnosis Date   • Allergic rhinitis    • Anemia    • Bronchitis    • FH: colon cancer    • H/O bone density study    • H/O mammogram    • Hypertension     Benign Essential   • Malaise and fatigue    • Nocturia    • TMJ (temporomandibular joint syndrome)  "   • Trigeminal neuralgia     Surgery at [facility] in 2009 repeat in 2015     Past Surgical History:   Procedure Laterality Date   • AUGMENTATION MAMMAPLASTY     • COLONOSCOPY N/A 01/2015    Repeat Q 5 years due to Fhx of Colon Ca-Dr. Polk   • PAP SMEAR N/A 2013    Dr. Burden     Review of Systems:        A complete review of all systems is negative except as described above.    Medications On Admission  Prescriptions Prior to Admission   Medication Sig Dispense Refill Last Dose   • Alpha-Lipoic Acid 300 MG capsule Take 300 mg by mouth daily.   Taking   • calcium carbonate (OS-CARLOS) 600 MG tablet Take 600 mg by mouth daily.   Taking   • cholecalciferol (VITAMIN D3) 1000 UNITS tablet Take 1,000 Units by mouth Daily.   Taking   • denosumab (PROLIA) 60 MG/ML solution syringe Inject  under the skin 1 (One) Time.   Taking   • FLUTICASONE PROPIONATE, INHAL, IN Inhale 2 puffs daily.   Taking   • guaiFENesin (MUCINEX) 600 MG 12 hr tablet Take 1,200 mg by mouth Daily.   Taking   • lisinopril-hydrochlorothiazide (PRINZIDE,ZESTORETIC) 20-12.5 MG per tablet Take 1 tablet by mouth Daily. 90 tablet 3 Taking   • meloxicam (MOBIC) 15 MG tablet Take 1 tablet by mouth Daily. 30 tablet 0    • montelukast (SINGULAIR) 10 MG tablet Take 1 tablet by mouth Daily. 90 tablet 4 Taking   • polyethyl glycol-propyl glycol (SYSTANE) 0.4-0.3 % solution ophthalmic solution Apply 1 drop to eye every 1 (one) hour as needed.   Taking   • vitamin B-12 (CYANOCOBALAMIN) 1000 MCG tablet Take 1,000 mcg by mouth 2 (two) times a day.   Taking   • famotidine (PEPCID) 20 MG tablet Take 1 tablet by mouth Daily. 90 tablet 3    • ferrous sulfate 325 (65 FE) MG tablet Take 325 mg by mouth daily with breakfast.   Taking   • gabapentin (NEURONTIN) 100 MG capsule Take 1 capsule by mouth 3 (Three) Times a Day. 90 capsule 6 Taking   • LYSINE PO Take 1 tablet by mouth Daily.   Taking     Allergies:  No Known Allergies    Social Hx:  Social History     Social  History   • Marital status: Unknown     Spouse name: N/A   • Number of children: N/A   • Years of education: N/A     Occupational History   • Not on file.     Social History Main Topics   • Smoking status: Never Smoker   • Smokeless tobacco: Never Used   • Alcohol use Yes      Comment: moderate   • Drug use: No   • Sexual activity: Defer     Other Topics Concern   • Not on file     Social History Narrative     Family Hx:  Family History   Problem Relation Age of Onset   • Pancreatic cancer Mother    • Hyperlipidemia Mother    • Colon cancer Father 56   • Liver cancer Brother 40     Review of Imaging (Interpretation of images not reports):  CT brain: No acute stroke or hemorrhage or mass, there is an arachnoid cyst involving the cerebellum and there is a slight bony prominence of the petrous ridge which is unchanged  Carotid ultrasound 6/13/16: No significant stenosis  MRI brain from today: Patchy faint acute stroke in the right corona radiata, flair shows mild periventricular and subcortical white matter disease, SWI sequences are negative, post contrast images are negative  MRA [place]: Normal  MRA of the aortic arch with and without contrast: Bovine origin left common carotid artery, codominant vertebral arteries, no stenosis    Laboratory Results:   Lab Results   Component Value Date    GLUCOSE 98 09/13/2017    CALCIUM 9.3 09/13/2017     (L) 09/13/2017    K 3.9 09/13/2017    CO2 26.8 09/13/2017    CL 93 (L) 09/13/2017    BUN 18 09/13/2017    CREATININE 0.67 09/13/2017    EGFRIFAFRI 99 04/14/2017    EGFRIFNONA 88 09/13/2017    BCR 26.9 (H) 09/13/2017    ANIONGAP 13.2 09/13/2017     Lab Results   Component Value Date    WBC 5.72 09/13/2017    HGB 14.1 09/13/2017    HCT 42.6 09/13/2017    MCV 83.0 09/13/2017     09/13/2017     Lab Results   Component Value Date    LDLCALC 134 (H) 09/13/2017     Lab Results   Component Value Date    HGBA1C 5.30 09/13/2017     Lab Results   Component Value Date  "   INR 0.95 09/13/2017    PROTIME 12.3 09/13/2017   EKG- SR    Physical Examination:  /87  Pulse 61  Temp 97 °F (36.1 °C) (Tympanic)   Resp 15  Ht 66\" (167.6 cm)  Wt 120 lb (54.4 kg)  SpO2 96%  BMI 19.37 kg/m2  General Appearance:   Well developed, well nourished, well groomed, alert, and cooperative.  HEENT: Normocephalic.  Normal fundoscopic exam including normal retina, discs are flat with sharp margins, normal vasculature.  Neck and Spine: Normal range of motion.  Normal alignment. No mass or tenderness. No bruits.  Cardiac: Regular rate and rhythm. No murmurs.  Peripheral Vasculature: Radial and pedal pulses are equal and symmetric. No signs of distal embolization.  Extremities:    No edema or deformities. Normal joint ROM. CHIP hoses and SCD's in place  Skin:    No rashes or birth marks.    Neurological examination:  Higher Integrative  Function: Oriented to time, place and person. Normal registration, recall, attention span and concentration. Normal language including comprehension, spontaneous speech, repetition, reading, writing, naming and vocabulary. No neglect with normal visual-spatial function and construction. Normal fund of knowledge and higher integrative function.  CN II: Pupils are equal, round, and reactive to light. Normal visual acuity and visual fields.    CN III IV VI: Extraocular movements are full without nystagmus.   CN V: Mild decreased sensation in the left V2 distribution otherwise normal facial sensation and strength of muscles of mastication.  CN VII: Mild left facial weakness.  CN VIII:   Auditory acuity is normal.  CN IX & X:   Symmetric palatal movement.  Mild dysarthria  CN XI: Sternocleidomastoid and trapezius are normal.  No weakness.  CN XII:   The tongue is midline.  No atrophy or fasciculations.  Motor: Normal muscle strength, bulk and tone in R upper and lower extremities.  4 out of 5 strength in the left arm and 4+/5 left leg with slowness of fine finger " movements.  No fasciculations, rigidity, spasticity, or abnormal movements.  Reflexes: 2+ in the upper and lower extremities. Plantar responses are flexor on the right and extensor on the left.  Sensation: Normal to light touch, temperature, and proprioception in arms and legs. Normal graphesthesia and no extinction on DSS.  Station and Gait: Unable to get up or walk.    Coordination: Finger to nose test shows no dysmetria.  Rapid alternating movements are normal.  Heel to shin normal.  NIHSS: 4    Diagnoses / Discussion:  67 y.o. who presents with Sx of progressive left hemiparesis and dysarthria.  On examination she has mild the deficits and imaging confirms the presence of a patchy deep right basal ganglionic and corona radiata infarct.  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  Therefore to raise possibility that the symptoms can progress to complete hemiplegia.  Therefore I favor aggressive hydration and measures to maintain patency of the perforators.  If the symptoms do not progress then the overall neurological prognosis is good.  If she worsens despite these measures then she may need to be transferred to the ICU for induced hypertension.  She does have significant hyperlipidemia which needs treatment.    Plan:  Aspirin 325mg  Plavix Load now  Plavix 75mg  Hydrate- bolus 500 cc normal saline now then 200 cc/hr  Neurochecks q2hrs  Permissive hypertension-hold all BP meds  HOB <30 degrees  BR  Check fibrinogen  Lipitor 80mg  Non-pharmacological DVT prophylaxis  TTE  EKG Tele  PT/OT/ST  Stroke Education  Blood pressure control to <130/80 eventually  Goal LDL <70-recommend high dose statins-   Serum glucose < 140     Call 911 for stroke any stroke symptoms    I have discussed the above with the patient and family.  Time spent with patient: 60min    MDM  Reviewed: vitals  Reviewed previous: ultrasound  Interpretation: CT scan, MRI, labs and ECG      Dictated using  Jessica dictation.

## 2017-09-13 NOTE — PLAN OF CARE
Problem: Patient Care Overview (Adult)  Goal: Plan of Care Review    09/13/17 1458   Coping/Psychosocial Response Interventions   Plan Of Care Reviewed With patient   Outcome Evaluation   Outcome Summary/Follow up Plan Pt presents with L sided weakness, decreased AROM and coordination of the LUE. Pt appears to have some L neglect/inattention during functional assessment in bathroom. Pt was indep PTA, may benefit from OT to address LUE and ADLs.         Problem: Inpatient Occupational Therapy  Goal: Transfer Training Goal 1 LTG- OT    09/13/17 1458   Transfer Training OT LTG   Transfer Training OT LTG, Date Established 09/13/17   Transfer Training OT LTG, Time to Achieve 1 wk   Transfer Training OT LTG, Activity Type sit to stand/stand to sit;toilet;shower chair   Transfer Training OT LTG, [place] Level conditional independence;supervision required       Goal: Isolated Movement Goal LTG- OT    09/13/17 1458   Isolated Movement OT LTG   Isolated Movement OT LTG, Date Established 09/13/17   Isolated Movement OT LTG, Time to Achieve 1 wk   Isolated Movement OT LTG, Body Area left scapula;left shoulder;left elbow;left forearm;left wrist;left fingers   Isolated Movement OT LTG, Level WFL;facilitate movement       Goal: Coordination Goal LTG- OT    09/13/17 1458   Coordination OT LTG   Coordination OT LTG, Date Established 09/13/17   Coordination OT LTG, Time to Achieve 1 wk   Coordination OT LTG, Activity Type FM written ex program;GM written ex program;FM task;GM task   Coordination OT LTG, [place] Level independent   Coordination OT LTG, Additional Goal LUE       Goal: ADL Goal LTG- OT    09/13/17 1458   ADL OT LTG   ADL OT LTG, Date Established 09/13/17   ADL OT LTG, Time to Achieve 1 wk   ADL OT LTG, Activity Type ADL skills   ADL OT LTG, [place] Level modified independent;standby assist

## 2017-09-13 NOTE — ED NOTES
Sending pt to room per charge RN- attempted to call report and asked for charge RN on 5P     Shelly Denson RN  09/13/17 4250

## 2017-09-13 NOTE — THERAPY EVALUATION
"Acute Care - Physical Therapy Initial Evaluation  [facility]     Patient Name: Rosi Vicente  : 1950  MRN: [id number]  Today's Date: 2017   Onset of Illness/Injury or Date of Surgery Date: 17  Date of Referral to PT: 17  Referring Physician: Benjamin Cardona      Admit Date: 2017     Visit Dx:    ICD-10-CM ICD-9-CM   1. Cerebrovascular accident (CVA), unspecified mechanism I63.9 434.91   2. Oropharyngeal dysphagia R13.12 787.22   3. Difficulty walking R26.2 719.7     Patient Active Problem List   Diagnosis   • Hypertension   • Allergic rhinitis   • Trigeminal neuralgia   • Hyperlipidemia   • New daily persistent headache   • Osteoarthritis of hip   • Vitamin D deficiency   • Cerebrovascular accident (CVA)     Past Medical History:   Diagnosis Date   • Allergic rhinitis    • Anemia    • Bronchitis    • FH: colon cancer    • H/O bone density study    • H/O mammogram    • Hypertension     Benign Essential   • Malaise and fatigue    • Nocturia    • TMJ (temporomandibular joint syndrome)    • Trigeminal neuralgia     Surgery at [facility] in  repeat in      Past Surgical History:   Procedure Laterality Date   • AUGMENTATION MAMMAPLASTY     • COLONOSCOPY N/A 2015    Repeat Q 5 years due to Fhx of Colon Ca-Dr. Polk   • PAP SMEAR N/A     Dr. Burden          PT ASSESSMENT (last 72 hours)      PT Evaluation       17 1525 17 1449    Rehab Evaluation    Document Type evaluation  -MS evaluation  -SO    Subjective Information agree to therapy;complains of;weakness;fatigue  -MS no complaints;agree to therapy  -SO    Patient Effort, Rehab Treatment good  -MS good  -SO    Symptoms Noted During/After Treatment  none  -SO    Symptoms Noted Comment Pt. reports feeling groggy this PM, which she reports could be result of taking a \"Valium\" late this AM.  No c/o pain.  -MS     General Information    Patient Profile Review  yes  -SO    Onset of Illness/Injury " or Date of Surgery Date 09/13/17  -MS     Referring Physician Benjamin Cardona  -MS Pato  -SO    General Observations  Pt supine in bed, spouse present  -SO    Pertinent History Of Current Problem Pt. admitted with Left sided facial droop; Left sided weakness.  -MS Pt with recent L sided weakness, poss CVA  -SO    Precautions/Limitations fall precautions  -MS fall precautions  -SO    Prior Level of Function independent:  -MS independent:;ADL's  -SO    Equipment Currently Used at Home none  -MS none  -SO    Plans/Goals Discussed With patient and family;agreed upon  -MS patient and family;agreed upon  -SO    Risks Reviewed patient and family:  -MS     Benefits Reviewed patient and family:  -MS     Barriers to Rehab none identified  -MS none identified  -SO    Living Environment    Lives With  spouse  -SO    Living Arrangements  house  -SO    Clinical Impression    Date of Referral to PT 09/13/17  -MS     Criteria for Skilled Therapeutic Interventions Met treatment indicated  -MS     Rehab Potential good, to achieve stated therapy goals  -MS     Pain Assessment    Pain Assessment No/denies pain  -MS No/denies pain  -SO    Vision Assessment/Intervention    Visual Impairment visual impairment, left  -MS visual impairment, left  -SO    Visual Impairment Comment  R visual gaze preference, able to scan to the L but seems to have some mild neglect. Runs into bathroom doorway walking into room on L side as well as a table on the L side.  -SO    Cognitive Assessment/Intervention    Current Cognitive/Communication Assessment  impaired  -SO    Orientation Status oriented to;person;place  -MS oriented x 4  -SO    Follows Commands/Answers Questions 100% of the time;able to follow single-step instructions  -% of the time  -SO    Personal Safety  decreased insight to deficits  -SO    Personal Safety Interventions fall prevention program maintained;gait belt;nonskid shoes/slippers when out of bed;supervised activity   -MS gait belt;fall prevention program maintained  -SO    ROM (Range of Motion)    General ROM --   RUE/LE (WFL's); L UE (Imp. 25%); LLE (WFL's)  -MS upper extremity range of motion deficits identified  -SO    General ROM Detail  RUE WFL; LUE sh 3/4 per pt states needs sh replacement, elbow distally 7/8  -SO    MMT (Manual Muscle Testing)    General MMT Assessment --   RUE/LE (4+/5); LUE (3-/5); LLE (3+/5)  -MS upper extremity strength deficits identified  -SO    General MMT Assessment Detail  RUE 4+/5, LUE 3/5   -SO    Bed Mobility, Assessment/Treatment    Bed Mob, Supine to Sit, Routt contact guard assist  -MS minimum assist (75% patient effort);verbal cues required  -SO    Bed Mob, Sit to Supine, Routt contact guard assist  -MS minimum assist (75% patient effort);contact guard assist;verbal cues required  -SO    Bed Mobility, Comment  Some assist with LLE  -SO    Transfer Assessment/Treatment    Transfers, Sit-Stand Routt contact guard assist  -MS contact guard assist;minimum assist (75% patient effort);verbal cues required  -SO    Transfers, Stand-Sit Routt contact guard assist  -MS contact guard assist;minimum assist (75% patient effort);verbal cues required  -SO    Toilet Transfer, Routt  contact guard assist;minimum assist (75% patient effort);verbal cues required  -SO    Toilet Transfer, Assistive Device  --   Grab bar on R  -SO    Gait Assessment/Treatment    Gait, Routt Level contact guard assist;minimum assist (75% patient effort)  -MS     Gait, Distance (Feet) 150  -MS     Gait, Gait Pattern Analysis swing-through gait  -MS     Gait, Gait Deviations decreased heel strike;narrow base;step length decreased  -MS     Gait, Safety Issues balance decreased during turns;step length decreased  -MS     Gait, Comment Min. assist required for Left and Right environment scanning during gait cycle.  Pt. also presents with intermittent Left foot drag during swing phase of gait  "as well as Left knee mild buckling during L stance phase of gait.  -MS     Motor Skills/Interventions    Additional Documentation  Fine Motor Coordination Training (Group)  -SO    Balance Skills Training    Sitting-Level of Assistance Close supervision  -MS     Sitting-Balance Support Feet supported;Right upper extremity supported;Left upper extremity supported  -MS     Standing-Level of Assistance Contact guard  -MS     Static Standing Balance Support --   Gait belt for support  -MS     Standing-Balance Activities Weight Shift R-L;Weight Shift A-P;Single Limb Stance  -MS     Fine Motor Coordination Training    Detail (Fine Motor Coordination Training)  L hand impairment, finger to nose impaired eyes close and open  -SO    Sensory Assessment/Intervention    Sensory Impairment  --   Appear to be WFL, pt states \"feels heavy\"  -SO    Positioning and Restraints    Pre-Treatment Position in bed  -MS in bed  -SO    Post Treatment Position bed  -MS bed  -SO    In Bed notified nsg;supine;call light within reach;encouraged to call for assist;with family/caregiver  -MS supine;call light within reach;encouraged to call for assist;with family/caregiver  -SO      09/13/17 1200 09/13/17 1142    Rehab Evaluation    Document Type evaluation  -JJ     Subjective Information no complaints  -JJ     Patient Effort, Rehab Treatment good  -JJ     Symptoms Noted During/After Treatment none  -JJ     General Information    Equipment Currently Used at Home  none  -PC    Cognitive Assessment/Intervention    Current Cognitive/Communication Assessment functional  -JJ       09/13/17 0984       Living Environment    Lives With spouse  -PC     Living Arrangements house  -PC     Home Accessibility stairs to enter home  -PC     Number of Stairs to Enter Home 3  -PC     Stair Railings at Home outside, present on right side  -     Type of Financial/Environmental Concern none  -PC     Transportation Available car;family or friend will provide  -PC     "   User Key  (r) = Recorded By, (t) = Taken By, (c) = Cosigned By    Initials Name Provider Type    SUE Corbett, MS CCC-SLP Speech and Language Pathologist    EDY Wilson, OTR Occupational Therapist    MS Antonio Berger, PT Physical Therapist    PC Marleni Paredes, RN Registered Nurse          Physical Therapy Education     Title: PT OT SLP Therapies (Active)     Topic: Physical Therapy (Active)     Point: Mobility training (Done)    Learning Progress Summary    Learner Readiness Method Response Comment Documented by Status   Patient Acceptance E,D VU,NR  MS 09/13/17 1532 Done               Point: Body mechanics (Done)    Learning Progress Summary    Learner Readiness Method Response Comment Documented by Status   Patient Acceptance E,D VU,NR  MS 09/13/17 1532 Done               Point: Precautions (Done)    Learning Progress Summary    Learner Readiness Method Response Comment Documented by Status   Patient Acceptance E,D VU,NR  MS 09/13/17 1532 Done                      User Key     Initials Effective Dates Name Provider Type Discipline    MS 12/01/15 -  Antonio Berger, PT Physical Therapist PT                PT Recommendation and Plan  Anticipated Discharge Disposition: home with assist, home with home health  Planned Therapy Interventions: balance training, bed mobility training, gait training, home exercise program, patient/family education, postural re-education, ROM (Range of Motion), strengthening, transfer training  PT Frequency: daily  Plan of Care Review  Plan Of Care Reviewed With: patient  Outcome Summary/Follow up Plan: Pt. will benefit from skilled inpt. P.T. to address her functional deficits and to assist pt. in regaining her independence with functional mobility.          IP PT Goals       09/13/17 1532          Bed Mobility PT LTG    Bed Mobility PT LTG, Date Established 09/13/17  -MS      Bed Mobility PT LTG, Time to Achieve 5 days  -MS      Bed Mobility PT LTG,  Activity Type all bed mobility  -MS      Bed Mobility PT LTG, Doddridge Level independent  -MS      Transfer Training PT LTG    Transfer Training PT LTG, Date Established 09/13/17  -MS      Transfer Training PT LTG, Time to Achieve 5 days  -MS      Transfer Training PT LTG, Activity Type all transfers  -MS      Transfer Training PT LTG, Doddridge Level independent  -MS      Gait Training PT LTG    Gait Training Goal PT LTG, Date Established 09/13/17  -MS      Gait Training Goal PT LTG, Time to Achieve 5 days  -MS      Gait Training Goal PT LTG, Doddridge Level independent  -MS      Gait Training Goal PT LTG, Distance to Achieve 400 feet  -MS        User Key  (r) = Recorded By, (t) = Taken By, (c) = Cosigned By    Initials Name Provider Type    MS Antonio Berger, PT Physical Therapist                Outcome Measures       09/13/17 1600 09/13/17 1400       How much help from another person do you currently need...    Turning from your back to your side while in flat bed without using bedrails? 3  -MS      Moving from lying on back to sitting on the side of a flat bed without bedrails? 3  -MS      Moving to and from a bed to a chair (including a wheelchair)? 3  -MS      Standing up from a chair using your arms (e.g., wheelchair, bedside chair)? 3  -MS      Climbing 3-5 steps with a railing? 3  -MS      To walk in hospital room? 3  -MS      AM-PAC 6 Clicks Score 18  -MS      How much help from another is currently needed...    Putting on and taking off regular lower body clothing?  2  -SO     Bathing (including washing, rinsing, and drying)  3  -SO     Toileting (which includes using toilet bed pan or urinal)  3  -SO     Putting on and taking off regular upper body clothing  3  -SO     Taking care of personal grooming (such as brushing teeth)  3  -SO     Eating meals  3  -SO     Score  17  -SO     Functional Assessment    Outcome Measure Options AM-PAC 6 Clicks Basic Mobility (PT)  -MS AM-PAC 6 Clicks Daily  Activity (OT)  -SO       User Key  (r) = Recorded By, (t) = Taken By, (c) = Cosigned By    Initials Name Provider Type    SO Jenna Wilson, OTR Occupational Therapist    MS Antonio Berger, PT Physical Therapist           Time Calculation:         PT Charges       09/13/17 1608          Time Calculation    Start Time 1538  -MS      Stop Time 1600  -MS      Time Calculation (min) 22 min  -MS      PT Received On 09/13/17  -MS      PT - Next Appointment 09/14/17  -MS      PT Goal Re-Cert Due Date 09/18/17  -MS        User Key  (r) = Recorded By, (t) = Taken By, (c) = Cosigned By    Initials Name Provider Type    MS Antonio Berger, PT Physical Therapist          Therapy Charges for Today     Code Description Service Date Service Provider Modifiers Qty    93265642760 HC PT EVAL MOD COMPLEXITY 2 9/13/2017 Antonio Berger, PT GP 1    52452031407 HC PT THER PROC EA 15 MIN 9/13/2017 Antonio Berger, PT GP 1          PT G-Codes  Outcome Measure Options: AM-PAC 6 Clicks Basic Mobility (PT)      Antonio Berger, PT  9/13/2017

## 2017-09-13 NOTE — THERAPY EVALUATION
Acute Care - Speech Language Pathology   Swallow Initial Evaluation [facility]     Patient Name: Rosi Vicente  : 1950  MRN: [id number]  Today's Date: 2017               Admit Date: 2017    Visit Dx:     ICD-10-CM ICD-9-CM   1. Cerebrovascular accident (CVA), unspecified mechanism I63.9 434.91   2. Oropharyngeal dysphagia R13.12 787.22     Patient Active Problem List   Diagnosis   • Hypertension   • Allergic rhinitis   • Trigeminal neuralgia   • Hyperlipidemia   • New daily persistent headache   • Osteoarthritis of hip   • Vitamin D deficiency   • TIA (transient ischemic attack)   • Cerebrovascular accident (CVA)     Past Medical History:   Diagnosis Date   • Allergic rhinitis    • Anemia    • Bronchitis    • FH: colon cancer    • H/O bone density study    • H/O mammogram    • Hypertension     Benign Essential   • Malaise and fatigue    • Nocturia    • TMJ (temporomandibular joint syndrome)    • Trigeminal neuralgia     Surgery at [facility] in  repeat in      Past Surgical History:   Procedure Laterality Date   • AUGMENTATION MAMMAPLASTY     • COLONOSCOPY N/A 2015    Repeat Q 5 years due to Fhx of Colon Ca-Dr. Polk   • PAP SMEAR N/A     Dr. Burden     SPEECH-LANGUAGE PATHOLOGY EVALUATION - SWALLOW  Subjective: The patient was seen on this date for a Clinical Swallow evaluation.  Patient was alert and cooperative.    The patient's history is significant for stroke. Pt reported that she was having to concentrate harder to articulate her sounds. Intelligibility was good. Left sided droop noted.  reported that patient had coughed the last 2 times she had been given thins to drink.   Objective: Textures given included thin liquid, nectar thick liquid, honey thick liquid, puree consistency, mechanical soft consistency and regular consistency.  Assessment: Difficulties were noted with thin liquid, characterized by coughing with thins. However, mild throat clearing  was noted with all trials. Swallow was also audible, indicating possible mistiming.  SLP Findings:  Patient presents with mild oropharyngeal dysphagia, without esophageal component.   Recommendations: Diet Textures: nectar thick liquid, regular consistency food.  Medications should be taken whole with thickened liquids. May have ice between meals after oral care, under staff or family supervision and with the recommended strategies for safe swallowing.   Recommended Strategies: Upright for PO and no straw. Oral care before breakfast, after all meals and PRN.  Other Recommended Evaluations: VFSS    Dysphagia therapy is recommended. Rationale: Pending results of VFSS.     SWALLOW EVALUATION (last 72 hours)      Swallow Evaluation       09/13/17 1200                Rehab Evaluation    Document Type evaluation  -JJ        Subjective Information no complaints  -JJ        Patient Effort, Rehab Treatment good  -JJ        Symptoms Noted During/After Treatment none  -JJ        General Information    Patient Profile Review yes  -JJ        Subjective Patient Observations Pleasant and cooperative  -JJ        Pertinent History Of Current Problem Stroke   hx of trigeminal neuralgia  -JJ        Current Diet Limitations no diet restrictions  -JJ        Precautions/Limitations, Vision WNL  -JJ        Precautions/Limitations, Hearing WNL  -JJ        Prior Level of Function- Communication functional in all spheres  -JJ        Prior Level of Function- Swallowing no diet consistency restrictions  -JJ        Plans/Goals Discussed With patient;spouse/S.O.  -JJ        Barriers to Rehab none identified  -JJ        Clinical Impression    Patient's Goals For Discharge eat/drink without coughing/choking  -JJ        Family Goals For Discharge patient able to eat/drink without coughing/choking  -JJ        SLP Swallowing Diagnosis mild dysphagia;oral dysfunction;pharyngeal dysfunction  -JJ        Rehab Potential/Prognosis, Swallowing good, to  achieve stated therapy goals  -        Criteria for Skilled Therapeutic Interventions Met skilled criteria for dysphagia intervention met  -        Therapy Frequency PRN  -JJ        Predicted Duration Therapy Interv (days) until discharge  -J        SLP Diet Recommendation regular textures;nectar/syrup-thick liquids  -J        Recommended Diagnostics VFSS (MBS)  -        Recommended Feeding/Eating Techniques maintain upright posture during/after eating for 30 mins  -JJ        SLP Rec. for Method of Medication Administration meds whole with thickened liquid  -J        Monitor For Signs Of Aspiration cough;gurgly voice;throat clearing;pneumonia  -J        Anticipated Discharge Disposition home  -        Cognitive Assessment/Intervention    Current Cognitive/Communication Assessment functional  -J        Oral Motor Structure and Function    Oral Motor Anatomy and Physiology patient demonstrates anatomy and physiology that is WNL  -J        Dentition Assessment present and adequate  -JJ        Secretion Management WNL/WFL  -JJ        Mucosal Quality moist, healthy  -JJ        Volitional Swallow no difficulties initiating volitional swallow  -JJ        Volitional Cough no difficulties initiating volitional cough  -JJ        Oral Motor Assessment Comment mild left sided droop   -JJ          User Key  (r) = Recorded By, (t) = Taken By, (c) = Cosigned By    Initials Name Effective Dates     Pinky Corbett, MS CCC-SLP 04/13/15 -         EDUCATION  The patient has been educated in the following areas:   Dysphagia (Swallowing Impairment) Modified Diet Instruction.    SLP Recommendation and Plan  SLP Swallowing Diagnosis: mild dysphagia, oral dysfunction, pharyngeal dysfunction  SLP Diet Recommendation: regular textures, nectar/syrup-thick liquids  Recommended Feeding/Eating Techniques: maintain upright posture during/after eating for 30 mins  SLP Rec. for Method of Medication Administration: meds  whole with thickened liquid  Monitor For Signs Of Aspiration: cough, gurgly voice, throat clearing, pneumonia  Recommended Diagnostics: VFSS ([facility])  Criteria for Skilled Therapeutic Interventions Met: skilled criteria for dysphagia intervention met  Anticipated Discharge Disposition: home  Rehab Potential/Prognosis, Swallowing: good, to achieve stated therapy goals  Therapy Frequency: PRN                        IP SLP Goals       09/13/17 1208          Safely Consume Diet    Safely Consume Diet- SLP, Outcome goal ongoing  -JJ        User Key  (r) = Recorded By, (t) = Taken By, (c) = Cosigned By    Initials Name Provider Type    SUE Corbett MS CCC-SLP Speech and Language Pathologist             SLP Outcome Measures (last 72 hours)      SLP Outcome Measures       09/13/17 1200          SLP Outcome Measures    Outcome Measure Used? Adult NOMS  -      FCM Scores    FCM Chosen Swallowing  -      Swallowing FCM Score 4  -J        User Key  (r) = Recorded By, (t) = Taken By, (c) = Cosigned By    Initials Name Effective Dates    SUE Corbett MS CCC-SLP 04/13/15 -            Time Calculation:         Time Calculation- SLP       09/13/17 1210          Time Calculation- SLP    SLP Received On 09/13/17  -J        User Key  (r) = Recorded By, (t) = Taken By, (c) = Cosigned By    Initials Name Provider Type    SUE Corbett MS CCC-SLP Speech and Language Pathologist          Therapy Charges for Today     Code Description Service Date Service Provider Modifiers Qty    07636364032 HC ST EVAL ORAL PHARYNG SWALLOW 3 9/13/2017 Pinky Corbett MS CCC-SLP GN 1               Pinky Corbett MS CCC-SLP  9/13/2017

## 2017-09-13 NOTE — ED NOTES
Dr. Park, ER MD, is aware that pt failed swallow screen due to facial droop. Upon MD assessment, MD feels that pt should receive PO aspirin instead of aspirin suppository. Proceeding with PO aspirin per Jack Bill RN and ER MD Shelly Denson, JONATHAN  09/13/17 0695

## 2017-09-13 NOTE — ED TRIAGE NOTES
Pt to rm 11 in WC from triage per [place] ED Tech.  Pt reports feeling weak with heavy arms yesterday, reports she woke up today with left sided facial droop.   reports he noticed slurred speech this morning.

## 2017-09-13 NOTE — ED PROVIDER NOTES
EMERGENCY DEPARTMENT ENCOUNTER    CHIEF COMPLAINT  Chief Complaint: L sided facial droop  History given by: Pt  History limited by: N/A  Room Number: 11/11  PMD: Dionne Fonseca MD      HPI:  Pt is a 67 y.o. female who presents complaining of L sided facial droop since waking up this morning PTA. Pt states all her limbs felt heavy yesterday. She also c/o of nausea, gate problem, and weakness on the L side, but denies trouble swallowing, SOB, vomiting, double vision, or numbness or tingling.  states her speech is a little mumbled.      Duration:  PTA  Onset: gradual  Timing: constant  Location: L face  Radiation: none  Intensity/Severity: moderate  Progression: unchanged  Associated Symptoms: nausea, gate problems, L sided weakness  Aggravating Factors: none  Alleviating Factors: none  Previous Episodes: No history of previous strokes.  Treatment before arrival: No treatment prior to arrival.     PAST MEDICAL HISTORY  Active Ambulatory Problems     Diagnosis Date Noted   • Hypertension 05/16/2016   • Allergic rhinitis 05/16/2016   • Trigeminal neuralgia 05/16/2016   • Hyperlipidemia 05/16/2016   • New daily persistent headache 06/15/2016   • Osteoarthritis of hip 10/03/2016   • Vitamin D deficiency 06/20/2017     Resolved Ambulatory Problems     Diagnosis Date Noted   • No Resolved Ambulatory Problems     Past Medical History:   Diagnosis Date   • Allergic rhinitis    • Anemia    • Bronchitis    • FH: colon cancer    • H/O bone density study 2013   • H/O mammogram 2013   • Hypertension    • Malaise and fatigue    • Nocturia    • TMJ (temporomandibular joint syndrome)    • Trigeminal neuralgia        PAST SURGICAL HISTORY  Past Surgical History:   Procedure Laterality Date   • AUGMENTATION MAMMAPLASTY     • COLONOSCOPY N/A 01/2015    Repeat Q 5 years due to Fhx of Colon Ca-Dr. Polk   • PAP SMEAR N/A 2013    Dr. Burden       FAMILY HISTORY  Family History   Problem Relation Age of Onset   • Pancreatic  cancer Mother    • Hyperlipidemia Mother    • Colon cancer Father 56   • Liver cancer Brother 40       SOCIAL HISTORY  Social History     Social History   • Marital status: Unknown     Spouse name: N/A   • Number of children: N/A   • Years of education: N/A     Occupational History   • Not on file.     Social History Main Topics   • Smoking status: Never Smoker   • Smokeless tobacco: Never Used   • Alcohol use Yes      Comment: moderate   • Drug use: No   • Sexual activity: Defer     Other Topics Concern   • Not on file     Social History Narrative       ALLERGIES  Review of patient's allergies indicates no known allergies.    REVIEW OF SYSTEMS  Review of Systems   Constitutional: Negative for fever.   HENT: Negative for sore throat.    Eyes: Negative.    Respiratory: Negative for cough and shortness of breath.    Cardiovascular: Negative for chest pain.   Gastrointestinal: Positive for nausea. Negative for abdominal pain, diarrhea and vomiting.   Genitourinary: Negative for dysuria.   Musculoskeletal: Positive for gait problem. Negative for neck pain.   Skin: Negative for rash.   Allergic/Immunologic: Negative.    Neurological: Positive for facial asymmetry (L sided facial droop) and weakness (L sided). Negative for numbness and headaches.   Hematological: Negative.    Psychiatric/Behavioral: Negative.    All other systems reviewed and are negative.      PHYSICAL EXAM  ED Triage Vitals   Temp Heart Rate Resp BP SpO2   -- 09/13/17 0649 09/13/17 0649 09/13/17 0649 09/13/17 0649    68 16 145/94 98 %      Temp src Heart Rate Source Patient Position BP Location FiO2 (%)   -- 09/13/17 0649 09/13/17 0649 09/13/17 0649 --    Monitor Sitting Right arm        Physical Exam   Constitutional: She is oriented to person, place, and time and well-developed, well-nourished, and in no distress. No distress.   HENT:   Head: Normocephalic and atraumatic.   Eyes: EOM are normal. Pupils are equal, round, and reactive to light.   Neck:  Normal range of motion. Neck supple.   Cardiovascular: Normal rate, regular rhythm, normal heart sounds and normal pulses.    Pulmonary/Chest: Effort normal and breath sounds normal. No respiratory distress.   Abdominal: Soft. There is no tenderness. There is no rebound and no guarding.   Musculoskeletal: Normal range of motion. She exhibits no edema.   Neurological: She is alert and oriented to person, place, and time. She has normal sensation. She displays weakness (L side) and facial asymmetry (L sided facial droop).   Skin: Skin is warm and dry. No rash noted.   Psychiatric: Mood and affect normal.   Nursing note and vitals reviewed.      LAB RESULTS  Lab Results (last 24 hours)     Procedure Component Value Units Date/Time    CBC & Differential [[id number]] Collected:  09/13/17 0706    Specimen:  Blood Updated:  09/13/17 0715    Narrative:       The following orders were created for panel order CBC & Differential.  Procedure                               Abnormality         Status                     ---------                               -----------         ------                     CBC Auto Differential[[id number]]        Abnormal            Final result                 Please view results for these tests on the individual orders.    Comprehensive Metabolic Panel [[id number]]  (Abnormal) Collected:  09/13/17 0706    Specimen:  Blood Updated:  09/13/17 0738     Glucose 98 mg/dL      BUN 18 mg/dL      Creatinine 0.67 mg/dL      Sodium 133 (L) mmol/L      Potassium 3.9 mmol/L      Chloride 93 (L) mmol/L      CO2 26.8 mmol/L      Calcium 9.3 mg/dL      Total Protein 7.2 g/dL      Albumin 4.70 g/dL      ALT (SGPT) 15 U/L      AST (SGOT) 14 U/L      Alkaline Phosphatase 68 U/L      Total Bilirubin 0.4 mg/dL      eGFR Non African Amer 88 mL/min/1.73      Globulin 2.5 gm/dL      A/G Ratio 1.9 g/dL      BUN/Creatinine Ratio 26.9 (H)     Anion Gap 13.2 mmol/L     Protime-INR [[id number]]  (Normal) Collected:  09/13/17  0706    Specimen:  Blood Updated:  09/13/17 0723     Protime 12.3 Seconds      INR 0.95    CBC Auto Differential [[id number]]  (Abnormal) Collected:  09/13/17 0706    Specimen:  Blood Updated:  09/13/17 0715     WBC 5.72 10*3/mm3      RBC 5.13 10*6/mm3      Hemoglobin 14.1 g/dL      Hematocrit 42.6 %      MCV 83.0 fL      MCH 27.5 pg      MCHC 33.1 g/dL      RDW 15.0 (H) %      RDW-SD 45.7 fl      MPV 9.7 fL      Platelets 358 10*3/mm3      Neutrophil % 51.9 %      Lymphocyte % 36.4 %      Monocyte % 9.8 %      Eosinophil % 1.6 %      Basophil % 0.3 %      Immature Grans % 0.0 %      Neutrophils, Absolute 2.97 10*3/mm3      Lymphocytes, Absolute 2.08 10*3/mm3      Monocytes, Absolute 0.56 10*3/mm3      Eosinophils, Absolute 0.09 10*3/mm3      Basophils, Absolute 0.02 10*3/mm3      Immature Grans, Absolute 0.00 10*3/mm3           I ordered the above labs and reviewed the results    RADIOLOGY  XR Chest 1 View   Final Result   The lungs are well-expanded and appear free of infiltrates. Calcified  granulomas noted in the right lung. There are a few small calcified left  hilar lymph nodes. The heart is normal in size.     IMPRESSIONS: No evidence of active disease within the chest.     This report was finalized on 9/13/2017 7:46 AM by Dr. Bert Caban MD.   CT Head Without Contrast Stroke Protocol    (Results Pending)        CT head showed nothing acute, and a calcified meningioma on the L, that is unchanged from previous. I ordered the above noted radiological studies. Interpreted by radiologist. Discussed with radiologist (Dr. Pastor). Reviewed by me in PACS.       PROCEDURES  Procedures  Interval: baseline  1a. Level Of Consciousness: 0-->Alert: keenly responsive  1b. LOC Questions: 0-->Answers both questions correctly  1c. LOC Commands: 0-->Performs both tasks correctly  2. Best Gaze: 0-->Normal  3. Visual: 0-->No visual loss  4. Facial Palsy: 1-->Minor paralysis (flattened nasolabial fold, asymmetry on smiling)  5a.  Motor Arm, Left: 0-->No drift: limb holds 90 (or 45) degrees for full 10 secs  5b. Motor Arm, Right: 0-->No drift: limb holds 90 (or 45) degrees for full 10 secs  6a. Motor Leg, Left: 0-->No drift: leg holds 30 degree position for full 5 secs  6b. Motor Leg, Right: 0-->No drift: leg holds 30 degree position for full 5 secs  7. Limb Ataxia: 1-->Present in one limb  8. Sensory: 1-->Mild-to-moderate sensory loss: patient feels pinprick is less sharp or is dull on the affected side: or there is a loss of superficial pain with pinprick, but patient is aware of being touched  9. Best Language: 0-->No aphasia: normal  10. Dysarthria: 1-->Mild-to-moderate dysarthria: patient slurs at least some words and, at worst, can be understood with some difficulty  11. Extinction and Inattention (formerly Neglect): 0-->No abnormality    Total (NIH Stroke Scale): 4    EKG           EKG time: 0712  Rhythm/Rate: NSR, 68 BPM  P waves and PA: normal  QRS, axis: normal   ST and T waves: normal     Interpreted Contemporaneously by me, independently viewed  No prior for comparison.       PROGRESS AND CONSULTS  ED Course     0700  Ordered blood work including CBC and CMP, and EKG, XR chest, and CT head for further evaluation. Ordered IVF for hydration, and continuous pulse oximetry, and cardiac monitoring,.  0701   Initial NIH stroke scale - 4.   0715  Ordered SLP consult.   0756  Placed consult to [facility].   0757   Rechecked pt who is resting comfortably. I informed pt and  her blood work was unremarkable, and CT head does not show any strokes, or nothing new and acute at this time. We discussed plan for admission for further evaluation. Pt understands and agrees with plan. All question addressed.    0801  Ordered  mg.   0816   Discussed pt with Dr. Whyte (hospitalist), who will admit pt.      MEDICAL DECISION MAKING  Results were reviewed/discussed with the patient and they were also made aware of online access. Pt also made  aware that some labs, such as cultures, will not be resulted during ER visit and follow up with PMD is necessary.     MDM  Number of Diagnoses or Management Options     Amount and/or Complexity of Data Reviewed  Clinical lab tests: ordered and reviewed (Unremarkable labs. )  Tests in the radiology section of CPT®: reviewed and ordered (XR chest - showed nothing new an acute.  CT head- showed nothing new and acute.)  Tests in the medicine section of CPT®: reviewed and ordered (See note. )  Discussion of test results with the performing providers: yes (Dr. Pastor (radiologist))  Discuss the patient with other providers: yes (Dr. Whyte (hospitalist). )  Independent visualization of images, tracings, or specimens: yes    Patient Progress  Patient progress: stable         DIAGNOSIS  Final diagnoses:   Cerebrovascular accident (CVA), unspecified mechanism       DISPOSITION  ADMISSION    Discussed treatment plan and reason for admission with pt/family and admitting physician.  Pt/family voiced understanding of the plan for admission for further testing/treatment as needed.         Latest Documented Vital Signs:  As of 8:04 AM  BP- 129/93 HR- 63 Temp- 97.3 °F (36.3 °C) (Tympanic) O2 sat- 98%    --  Documentation assistance provided by juana Reddy for Dr. Nolberto Park MD.  Information recorded by the scribe was done at my direction and has been verified and validated by me.     Nisa Pedroza  09/13/17 0812       Nisa Pedroza  09/13/17 0817       Nolberto Park MD  09/13/17 [id number]

## 2017-09-13 NOTE — PLAN OF CARE
Problem: Patient Care Overview (Adult)  Goal: Plan of Care Review    09/13/17 1532   Coping/Psychosocial Response Interventions   Plan Of Care Reviewed With patient   Outcome Evaluation   Outcome Summary/Follow up Plan Pt. will benefit from skilled inpt. P.T. to address her functional deficits and to assist pt. in regaining her independence with functional mobility.         Problem: Inpatient Physical Therapy  Goal: Bed Mobility Goal LTG- PT    09/13/17 1532   Bed Mobility PT LTG   Bed Mobility PT LTG, Date Established 09/13/17   Bed Mobility PT LTG, Time to Achieve 5 days   Bed Mobility PT LTG, Activity Type all bed mobility   Bed Mobility PT LTG, [place] Level independent       Goal: Transfer Training Goal 1 LTG- PT    09/13/17 1532   Transfer Training PT LTG   Transfer Training PT LTG, Date Established 09/13/17   Transfer Training PT LTG, Time to Achieve 5 days   Transfer Training PT LTG, Activity Type all transfers   Transfer Training PT LTG, [place] Level independent       Goal: Gait Training Goal LTG- PT    09/13/17 1532   Gait Training PT LTG   Gait Training Goal PT LTG, Date Established 09/13/17   Gait Training Goal PT LTG, Time to Achieve 5 days   Gait Training Goal PT LTG, [place] Level independent   Gait Training Goal PT LTG, Distance to Achieve 400 feet

## 2017-09-13 NOTE — PLAN OF CARE
Problem: Inpatient SLP  Goal: Dysphagia- Patient will safely consume diet as per recommendation with no signs/symptoms of aspiration  Outcome: Ongoing (interventions implemented as appropriate)    09/13/17 1208   Safely Consume Diet   Safely Consume Diet- SLP, Outcome goal ongoing

## 2017-09-13 NOTE — PLAN OF CARE
Problem: Patient Care Overview (Adult)  Goal: Plan of Care Review  Outcome: Ongoing (interventions implemented as appropriate)    09/13/17 1621   Coping/Psychosocial Response Interventions   Plan Of Care Reviewed With patient;spouse   Outcome Evaluation   Outcome Summary/Follow up Plan Patient alert and verbal with needs. No facial drrop noted. c/o heaviness to left arm and leg. slight aphasia noted. MRI positive for acute infarct. Neurology at the bedside to eval patient at this time.    Patient Care Overview   Progress no change         Problem: Stroke (Ischemic) (Adult)  Goal: Signs and Symptoms of Listed Potential Problems Will be Absent or Manageable (Stroke)  Outcome: Ongoing (interventions implemented as appropriate)    09/13/17 1621   Stroke (Ischemic)   Problems Assessed (Stroke (Ischemic)/TIA) all   Problems Present (Stroke (Ischemic)/TIA) muscle tone abnormal;eating/swallowing impairment;motor/sensory impairment

## 2017-09-13 NOTE — H&P
Name: Rosi Vicente ADMIT: 2017   : 1950  PCP: Dionne Fonseca MD    MRN: [id number] LOS: 0 days   AGE/SEX: 67 y.o. female  ROOM: P580/1     Chief Complaint   Patient presents with   • Facial Droop       Subjective   Patient is a 67 y.o. female who presents to [facility] with the above chief complaint.    She first noticed symptoms last evening.  Really sure when it started and she was walking around her house looking at where she plans to plant some trees over the next few weeks and she started to notice a heaviness in her arms and legs.  Over the evening progressed worse to heaviness on the left side and then weakness in her left arm.  Her  and her did not notice any facial droop or speech issues prior to bed last evening.  However, when she awoke this morning her  immediately noticed a difference with facial droop and she was having some trouble speaking with difficulty articulating her words.  They came to the hospital immediately.  She is already started to have some improvement in symptoms since arriving to the emergency room.  She's never smoked she exercises regularly.  Her blood pressures fairly stable on present doses of lisinopril and HCTZ.  There is no history of diabetes or diabetes related illness.    History of Present Illness    Past Medical History:   Diagnosis Date   • Allergic rhinitis    • Anemia    • Bronchitis    • FH: colon cancer    • H/O bone density study    • H/O mammogram    • Hypertension     Benign Essential   • Malaise and fatigue    • Nocturia    • TMJ (temporomandibular joint syndrome)    • Trigeminal neuralgia     Surgery at [facility] in  repeat in      Past Surgical History:   Procedure Laterality Date   • AUGMENTATION MAMMAPLASTY     • COLONOSCOPY N/A 2015    Repeat Q 5 years due to Fhx of Colon Ca-Dr. Polk   • PAP SMEAR N/A     Dr. Burden     Family History   Problem Relation Age of Onset   •  Pancreatic cancer Mother    • Hyperlipidemia Mother    • Colon cancer Father 56   • Liver cancer Brother 40     Social History   Substance Use Topics   • Smoking status: Never Smoker   • Smokeless tobacco: Never Used   • Alcohol use Yes      Comment: moderate     Prescriptions Prior to Admission   Medication Sig Dispense Refill Last Dose   • Alpha-Lipoic Acid 300 MG capsule Take 300 mg by mouth daily.   Taking   • calcium carbonate (OS-CARLOS) 600 MG tablet Take 600 mg by mouth daily.   Taking   • cholecalciferol (VITAMIN D3) 1000 UNITS tablet Take 1,000 Units by mouth Daily.   Taking   • denosumab (PROLIA) 60 MG/ML solution syringe Inject  under the skin 1 (One) Time.   Taking   • FLUTICASONE PROPIONATE, INHAL, IN Inhale 2 puffs daily.   Taking   • guaiFENesin (MUCINEX) 600 MG 12 hr tablet Take 1,200 mg by mouth Daily.   Taking   • lisinopril-hydrochlorothiazide (PRINZIDE,ZESTORETIC) 20-12.5 MG per tablet Take 1 tablet by mouth Daily. 90 tablet 3 Taking   • meloxicam (MOBIC) 15 MG tablet Take 1 tablet by mouth Daily. 30 tablet 0    • montelukast (SINGULAIR) 10 MG tablet Take 1 tablet by mouth Daily. 90 tablet 4 Taking   • polyethyl glycol-propyl glycol (SYSTANE) 0.4-0.3 % solution ophthalmic solution Apply 1 drop to eye every 1 (one) hour as needed.   Taking   • vitamin B-12 (CYANOCOBALAMIN) 1000 MCG tablet Take 1,000 mcg by mouth 2 (two) times a day.   Taking   • famotidine (PEPCID) 20 MG tablet Take 1 tablet by mouth Daily. 90 tablet 3    • ferrous sulfate 325 (65 FE) MG tablet Take 325 mg by mouth daily with breakfast.   Taking   • gabapentin (NEURONTIN) 100 MG capsule Take 1 capsule by mouth 3 (Three) Times a Day. 90 capsule 6 Taking   • LYSINE PO Take 1 tablet by mouth Daily.   Taking     Allergies:  Review of patient's allergies indicates no known allergies.    Review of Systems   Constitutional: Negative for chills, fatigue and fever.   HENT: Negative for congestion, rhinorrhea and sore throat.    Eyes:  Negative for photophobia, redness and visual disturbance.   Respiratory: Negative for apnea, cough and wheezing.    Cardiovascular: Negative for chest pain and palpitations.   Gastrointestinal: Negative for abdominal distention, blood in stool and nausea.   Endocrine: Negative for polydipsia, polyphagia and polyuria.   Genitourinary: Negative for difficulty urinating, dysuria and urgency.   Musculoskeletal: Negative for arthralgias, back pain and gait problem.   Allergic/Immunologic: Negative for environmental allergies and immunocompromised state.   Neurological: Positive for facial asymmetry, speech difficulty, weakness and numbness. Negative for dizziness, tremors, seizures, syncope, light-headedness and headaches.   Hematological: Negative for adenopathy. Does not bruise/bleed easily.   Psychiatric/Behavioral: Negative for agitation, behavioral problems and confusion.        Objective    Vital Signs  Temp:  [97 °F (36.1 °C)-97.3 °F (36.3 °C)] 97 °F (36.1 °C)  Heart Rate:  [61-71] 61  Resp:  [15-17] 15  BP: (123-145)/(86-94) 123/87  SpO2:  [94 %-100 %] 96 %  on   ;   O2 Device: room air  Body mass index is 19.37 kg/(m^2).    Physical Exam   Constitutional: She is oriented to person, place, and time.   Neck: Normal range of motion. Neck supple. No JVD present.   Cardiovascular: Normal rate, regular rhythm, normal heart sounds and intact distal pulses.    No murmur heard.  Pulmonary/Chest: Effort normal and breath sounds normal. No respiratory distress. She has no wheezes.   Abdominal: Soft. Bowel sounds are normal.   Musculoskeletal: Normal range of motion.   Neurological: She is alert and oriented to person, place, and time. No sensory deficit.   Noted left-sided facial droop and left upper extremity weakness 4 out of 5 left lower extremity 4 out of 5 right side 5 out of 5 in the left arm is worse than the left leg.   Skin: Skin is warm and dry. No rash noted.   Psychiatric: She has a normal mood and affect. Her  behavior is normal.   Nursing note and vitals reviewed.      Results Review:   I reviewed the patient's new clinical results.    Results from last 7 days  Lab Units 09/13/17  0706   WBC 10*3/mm3 5.72   HEMOGLOBIN g/dL 14.1   PLATELETS 10*3/mm3 358     Results from last 7 days  Lab Units 09/13/17  0706   SODIUM mmol/L 133*   POTASSIUM mmol/L 3.9   CHLORIDE mmol/L 93*   CO2 mmol/L 26.8   BUN mg/dL 18   CREATININE mg/dL 0.67   GLUCOSE mg/dL 98   ALBUMIN g/dL 4.70   BILIRUBIN mg/dL 0.4   ALK PHOS U/L 68   AST (SGOT) U/L 14   ALT (SGPT) U/L 15   Estimated Creatinine Clearance: 57.8 mL/min (by C-G formula based on Cr of 0.67).  Results from last 7 days  Lab Units 09/13/17  0706   INR  0.95     Results from last 7 days  Lab Units 09/13/17  0706   CHOLESTEROL mg/dL 219*   TRIGLYCERIDES mg/dL 96   HDL CHOL mg/dL 66*   LDL CHOL mg/dL 134*       MRI Brain With & Without Contrast   Preliminary Result   Area of acute infarction involving the periventricular white   matter of the right frontal lobe extending inferiorly to the posterior   limb of the internal capsule measuring 13-14 mm in AP dimension and   approximately 5 mm in maximum transverse dimension.       MRA OF THE HEAD WITH AND WITHOUT CONTRAST: There is signal present   within the distal aspect of the vertebral arteries bilaterally. The   vertebral arteries are codominant. The basilar artery and the proximal   aspects of the posterior cerebral arteries appear unremarkable.       There is signal present within the distal aspect of the internal carotid   arteries bilaterally. There is a shallow protuberance measuring   approximately 2 x 2.5 mm in size involving the posterior aspect of the   internal carotid artery on the right. This may represent an infundibulum   at the anterior aspect of the posterior communicating artery. A shallow   aneurysm cannot be entirely excluded. The proximal aspects of the   anterior and middle cerebral arteries appear unremarkable.        IMPRESSION: No evidence of focal high-grade stenosis. Some shallow   conical protuberance arising from the right internal carotid artery   posteriorly. This may represent a small infundibulum. A shallow aneurysm   cannot be entirely excluded. A vessel arising from the apex is not   definitively identified. Further evaluation could be performed with a CT   angiogram of the head.       MRA OF THE NECK WITH AND WITHOUT CONTRAST: There is a bovine   configuration of the great vessels. There is mild irregularity but with   0% stenosis using NASCET criteria involving the carotid bifurcations.   Both vertebral arteries demonstrate signal and appear to be codominant.   There is no evidence of stenosis.       IMPRESSION: No evidence of stenosis.       The information regarding the infarct was called to the patient's nurse   at the time of the dictation. The patient's nurse is to immediately   relay the information to the clinical service.                  MRI Angiogram Head Without Contrast   Preliminary Result   Area of acute infarction involving the periventricular white   matter of the right frontal lobe extending inferiorly to the posterior   limb of the internal capsule measuring 13-14 mm in AP dimension and   approximately 5 mm in maximum transverse dimension.       MRA OF THE HEAD WITH AND WITHOUT CONTRAST: There is signal present   within the distal aspect of the vertebral arteries bilaterally. The   vertebral arteries are codominant. The basilar artery and the proximal   aspects of the posterior cerebral arteries appear unremarkable.       There is signal present within the distal aspect of the internal carotid   arteries bilaterally. There is a shallow protuberance measuring   approximately 2 x 2.5 mm in size involving the posterior aspect of the   internal carotid artery on the right. This may represent an infundibulum   at the anterior aspect of the posterior communicating artery. A shallow   aneurysm cannot be  entirely excluded. The proximal aspects of the   anterior and middle cerebral arteries appear unremarkable.       IMPRESSION: No evidence of focal high-grade stenosis. Some shallow   conical protuberance arising from the right internal carotid artery   posteriorly. This may represent a small infundibulum. A shallow aneurysm   cannot be entirely excluded. A vessel arising from the apex is not   definitively identified. Further evaluation could be performed with a CT   angiogram of the head.       MRA OF THE NECK WITH AND WITHOUT CONTRAST: There is a bovine   configuration of the great vessels. There is mild irregularity but with   0% stenosis using NASCET criteria involving the carotid bifurcations.   Both vertebral arteries demonstrate signal and appear to be codominant.   There is no evidence of stenosis.       IMPRESSION: No evidence of stenosis.       The information regarding the infarct was called to the patient's nurse   at the time of the dictation. The patient's nurse is to immediately   relay the information to the clinical service.                  MRI Angiogram Neck With & Without Contrast   Preliminary Result   Area of acute infarction involving the periventricular white   matter of the right frontal lobe extending inferiorly to the posterior   limb of the internal capsule measuring 13-14 mm in AP dimension and   approximately 5 mm in maximum transverse dimension.       MRA OF THE HEAD WITH AND WITHOUT CONTRAST: There is signal present   within the distal aspect of the vertebral arteries bilaterally. The   vertebral arteries are codominant. The basilar artery and the proximal   aspects of the posterior cerebral arteries appear unremarkable.       There is signal present within the distal aspect of the internal carotid   arteries bilaterally. There is a shallow protuberance measuring   approximately 2 x 2.5 mm in size involving the posterior aspect of the   internal carotid artery on the right. This may  represent an infundibulum   at the anterior aspect of the posterior communicating artery. A shallow   aneurysm cannot be entirely excluded. The proximal aspects of the   anterior and middle cerebral arteries appear unremarkable.       IMPRESSION: No evidence of focal high-grade stenosis. Some shallow   conical protuberance arising from the right internal carotid artery   posteriorly. This may represent a small infundibulum. A shallow aneurysm   cannot be entirely excluded. A vessel arising from the apex is not   definitively identified. Further evaluation could be performed with a CT   angiogram of the head.       MRA OF THE NECK WITH AND WITHOUT CONTRAST: There is a bovine   configuration of the great vessels. There is mild irregularity but with   0% stenosis using NASCET criteria involving the carotid bifurcations.   Both vertebral arteries demonstrate signal and appear to be codominant.   There is no evidence of stenosis.       IMPRESSION: No evidence of stenosis.       The information regarding the infarct was called to the patient's nurse   at the time of the dictation. The patient's nurse is to immediately   relay the information to the clinical service.                  CT Head Without Contrast Stroke Protocol   Preliminary Result   1. No acute process is identified. Further evaluation could be performed   with MRI examination of the brain.   2. An 11 x 5 mm area of calcification related to the anterior aspect of   the petrous ridge on the left, unchanged as compared to 06/15/2016. This   may represent a heavily calcified meningioma, unchanged.       The above information was called to and discussed with Dr. Park.          XR Chest 1 View   Final Result        Assessment/Plan     Active Problems:    Hypertension    Hyperlipidemia    Cerebrovascular accident (CVA)      Assessment & Plan  She's had an acute stroke noted on MRI.  We'll follow-up her echocardiogram.  Her LDL is elevated, and she's been  started on atorvastatin 80mg.  She's also been started on aspirin 325 mg daily.  Her A1c is at goal .  Her blood pressures presently stable.  We'll hold her blood pressure medications today and allow permissive hypertension.  We've also started her on IV fluids.  Neurology has been consulted.  We'll follow-up her echocardiogram, but will likely require transesophageal echo to complete a cardioembolic workup given normal vessels noted on MRA.  We'll monitor on telemetry and make further recommendations and she further progresses.  Physical therapy occupational therapy and speech therapy been consulted to evaluate.      I discussed the patients findings and my recommendations with patient, family and nursing staff.          Benjamin Whyte MD  [facility]  09/13/17  3:11 PM

## 2017-09-13 NOTE — THERAPY EVALUATION
Acute Care - Occupational Therapy Initial Evaluation  [facility]     Patient Name: Rosi Vicente  : 1950  MRN: [id number]  Today's Date: 2017     Date of Referral to OT: 17  Referring Physician: Pato    Admit Date: 2017       ICD-10-CM ICD-9-CM   1. Cerebrovascular accident (CVA), unspecified mechanism I63.9 434.91   2. Oropharyngeal dysphagia R13.12 787.22     Patient Active Problem List   Diagnosis   • Hypertension   • Allergic rhinitis   • Trigeminal neuralgia   • Hyperlipidemia   • New daily persistent headache   • Osteoarthritis of hip   • Vitamin D deficiency   • TIA (transient ischemic attack)   • Cerebrovascular accident (CVA)     Past Medical History:   Diagnosis Date   • Allergic rhinitis    • Anemia    • Bronchitis    • FH: colon cancer    • H/O bone density study    • H/O mammogram    • Hypertension     Benign Essential   • Malaise and fatigue    • Nocturia    • TMJ (temporomandibular joint syndrome)    • Trigeminal neuralgia     Surgery at [facility] in  repeat in      Past Surgical History:   Procedure Laterality Date   • AUGMENTATION MAMMAPLASTY     • COLONOSCOPY N/A 2015    Repeat Q 5 years due to Fhx of Colon Ca-Dr. Polk   • PAP SMEAR N/A     Dr. Burden          OT ASSESSMENT FLOWSHEET (last 72 hours)      OT Evaluation       17 1449 17 1200 17 1142 17 0959       Rehab Evaluation    Document Type evaluation  -SO evaluation  -JJ       Subjective Information no complaints;agree to therapy  -SO no complaints  -JJ       Patient Effort, Rehab Treatment good  -SO good  -JJ       Symptoms Noted During/After Treatment none  -SO none  -JJ       General Information    Patient Profile Review yes  -SO        Referring Physician Pato  -SO        General Observations Pt supine in bed, spouse present  -SO        Pertinent History Of Current Problem Pt with recent L sided weakness, poss CVA  -SO        Precautions/Limitations  fall precautions  -SO        Prior Level of Function independent:;ADL's  -SO        Equipment Currently Used at Home none  -SO  none  -PC      Plans/Goals Discussed With patient and family;agreed upon  -SO        Barriers to Rehab none identified  -SO        Living Environment    Lives With spouse  -SO   spouse  -PC     Living Arrangements house  -SO   house  -PC     Home Accessibility    stairs to enter home  -PC     Number of Stairs to Enter Home    3  -PC     Stair Railings at Home    outside, present on right side  -PC     Type of Financial/Environmental Concern    none  -PC     Transportation Available    car;family or friend will provide  -PC     Clinical Impression    Date of Referral to OT 09/13/17  -SO        Criteria for Skilled Therapeutic Interventions Met yes  -SO        Rehab Potential good, to achieve stated therapy goals  -SO        Therapy Frequency 3-5 times/wk  -SO        Anticipated Discharge Disposition inpatient rehabilitation facility  -SO        Functional Level Prior    Ambulation   0-->independent  -PC      Transferring   0-->independent  -PC      Toileting   0-->independent  -PC      Bathing   0-->independent  -PC      Dressing   0-->independent  -PC      Eating   0-->independent  -PC      Communication   0-->understands/communicates without difficulty  -PC      Swallowing   0-->swallows foods/liquids without difficulty  -PC      Pain Assessment    Pain Assessment No/denies pain  -SO        Vision Assessment/Intervention    Visual Impairment visual impairment, left  -SO        Visual Impairment Comment R visual gaze preference, able to scan to the L but seems to have some mild neglect. Runs into bathroom doorway walking into room on L side as well as a table on the L side.  -SO        Cognitive Assessment/Intervention    Current Cognitive/Communication Assessment impaired  -SO functional  -JJ       Orientation Status oriented x 4  -SO        Follows Commands/Answers Questions 100% of the  time  -SO        Personal Safety decreased insight to deficits  -SO        Personal Safety Interventions gait belt;fall prevention program maintained  -SO        ROM (Range of Motion)    General ROM upper extremity range of motion deficits identified  -SO        General ROM Detail RUE WFL; LUE sh 3/4 per pt states needs sh replacement, elbow distally 7/8  -SO        MMT (Manual Muscle Testing)    General MMT Assessment upper extremity strength deficits identified  -SO        General MMT Assessment Detail RUE 4+/5, LUE 3/5   -SO        Bed Mobility, Assessment/Treatment    Bed Mob, Supine to Sit, [place] minimum assist (75% patient effort);verbal cues required  -SO        Bed Mob, Sit to Supine, [place] minimum assist (75% patient effort);contact guard assist;verbal cues required  -SO        Bed Mobility, Comment Some assist with LLE  -SO        Transfer Assessment/Treatment    Transfers, Sit-Stand [place] contact guard assist;minimum assist (75% patient effort);verbal cues required  -SO        Transfers, Stand-Sit [place] contact guard assist;minimum assist (75% patient effort);verbal cues required  -SO        Toilet Transfer, [place] contact guard assist;minimum assist (75% patient effort);verbal cues required  -SO        Toilet Transfer, Assistive Device --   Grab bar on R  -SO        Functional Mobility    Functional Mobility- Ind. Level contact guard assist;minimum assist (75% patient effort);verbal cues required  -SO        Functional Mobility- Device --   No AD  -SO        Functional Mobility-Distance (Feet) 20  -SO        Functional Mobility- Comment L side neglect  -SO        Lower Body Dressing Assessment/Training    LB Dressing Assess/Train, Clothing Type doffing:;donning:;slipper socks  -SO        LB Dressing Assess/Train, Position sitting;edge of bed  -SO        LB Dressing Assess/Train, [place] minimum assist (75% patient effort)  -SO        LB Dressing Assess/Train,  "Comment Poor 2 pt pinch of L hand  -SO        Motor Skills/Interventions    Additional Documentation Fine Motor Coordination Training (Group)  -SO        Fine Motor Coordination Training    Detail (Fine Motor Coordination Training) L hand impairment, finger to nose impaired eyes close and open  -SO        Sensory Assessment/Intervention    Sensory Impairment --   Appear to be WFL, pt states \"feels heavy\"  -SO        Positioning and Restraints    Pre-Treatment Position in bed  -SO        Post Treatment Position bed  -SO        In Bed supine;call light within reach;encouraged to call for assist;with family/caregiver  -SO          User Key  (r) = Recorded By, (t) = Taken By, (c) = Cosigned By    Initials Name Effective Dates    SUE Corbett, MS CCC-SLP 04/13/15 -     SO Jenna Wilson OTR 04/13/15 -     NIGEL Paredes RN 06/27/16 -            Occupational Therapy Education     Title: PT OT SLP Therapies (Active)     Topic: Occupational Therapy (Active)     Point: ADL training (Done)    Description: Instruct learner(s) on proper safety adaptation and remediation techniques during self care or transfers.   Instruct in proper use of assistive devices.    Learning Progress Summary    Learner Readiness Method Response Comment Documented by Status   Patient Acceptance E VU Encouraged spouse to sit on L side and to have pt visually scan to the L to improve vision. Encouraged use of LUE during functional tasks. SO 09/13/17 1456 Done   Significant Other Acceptance E VU Encouraged spouse to sit on L side and to have pt visually scan to the L to improve vision. Encouraged use of LUE during functional tasks. SO 09/13/17 1456 Done                      User Key     Initials Effective Dates Name Provider Type Discipline    SO 04/13/15 -  Jenna Wilson, OTR Occupational Therapist OT                  OT Recommendation and Plan  Anticipated Discharge Disposition: inpatient rehabilitation " facility  Therapy Frequency: 3-5 times/wk  Plan of Care Review  Plan Of Care Reviewed With: patient  Outcome Summary/Follow up Plan: Pt presents with L sided weakness, decreased AROM and coordination of the LUE. Pt appears to have some L neglect/inattention during functional assessment in bathroom. Pt was indep PTA, may benefit from OT to address LUE and ADLs.          OT Goals       09/13/17 1458          Transfer Training OT LTG    Transfer Training OT LTG, Date Established 09/13/17  -SO      Transfer Training OT LTG, Time to Achieve 1 wk  -SO      Transfer Training OT LTG, Activity Type sit to stand/stand to sit;toilet;shower chair  -SO      Transfer Training OT LTG, [place] Level conditional independence;supervision required  -SO      Isolated Movement OT LTG    Isolated Movement OT LTG, Date Established 09/13/17  -SO      Isolated Movement OT LTG, Time to Achieve 1 wk  -SO      Isolated Movement OT LTG, Body Area left scapula;left shoulder;left elbow;left forearm;left wrist;left fingers  -SO      Isolated Movement OT LTG, Level WFL;facilitate movement  -SO      Coordination OT LTG    Coordination OT LTG, Date Established 09/13/17  -SO      Coordination OT LTG, Time to Achieve 1 wk  -SO      Coordination OT LTG, Activity Type FM written ex program;GM written ex program;FM task;GM task  -SO      Coordination OT LTG, [place] Level independent  -SO      Coordination OT LTG, Additional Goal LUE  -SO      ADL OT LTG    ADL OT LTG, Date Established 09/13/17  -SO      ADL OT LTG, Time to Achieve 1 wk  -SO      ADL OT LTG, Activity Type ADL skills  -SO      ADL OT LTG, [place] Level modified independent;standby assist  -SO        User Key  (r) = Recorded By, (t) = Taken By, (c) = Cosigned By    Initials Name Provider Type    SO Jenna Wilson OTMINGO Occupational Therapist                Outcome Measures       09/13/17 1400          How much help from another is currently needed...    Putting on and  taking off regular lower body clothing? 2  -SO      Bathing (including washing, rinsing, and drying) 3  -SO      Toileting (which includes using toilet bed pan or urinal) 3  -SO      Putting on and taking off regular upper body clothing 3  -SO      Taking care of personal grooming (such as brushing teeth) 3  -SO      Eating meals 3  -SO      Score 17  -SO      Functional Assessment    Outcome Measure Options AM-PAC 6 Clicks Daily Activity (OT)  -SO        User Key  (r) = Recorded By, (t) = Taken By, (c) = Cosigned By    Initials Name Provider Type    SO JEY Mohamud Occupational Therapist          Time Calculation:        Therapy Charges for Today     Code Description Service Date Service Provider Modifiers Qty    59571595555  OT SELFCARE CURRENT 9/13/2017 JEY Mohamud CK 1    [id number]  OT SELFCARE PROJECTED 9/13/2017 JEY Mohamud CJ 1    [id number]  OT EVAL MOD COMPLEXITY 2 9/13/2017 JEY Mohamud 1          OT G-codes  Functional Limitation: Self care  Self Care Current Status (): At least 40 percent but less than 60 percent impaired, limited or restricted  Self Care Goal Status (): At least 20 percent but less than 40 percent impaired, limited or restricted    JEY Mohamud  9/13/2017

## 2017-09-14 ENCOUNTER — APPOINTMENT (OUTPATIENT)
Dept: CARDIOLOGY | Facility: HOSPITAL | Age: 67
End: 2017-09-14
Attending: HOSPITALIST

## 2017-09-14 ENCOUNTER — EPISODE CHANGES (OUTPATIENT)
Dept: CASE MANAGEMENT | Facility: OTHER | Age: 67
End: 2017-09-14

## 2017-09-14 ENCOUNTER — APPOINTMENT (OUTPATIENT)
Dept: GENERAL RADIOLOGY | Facility: HOSPITAL | Age: 67
End: 2017-09-14
Attending: HOSPITALIST

## 2017-09-14 LAB
BH CV ECHO MEAS - ACS: 1.8 CM
BH CV ECHO MEAS - AO MEAN PG (FULL): 1 MMHG
BH CV ECHO MEAS - AO MEAN PG: 2 MMHG
BH CV ECHO MEAS - AO ROOT AREA (BSA CORRECTED): 2.4
BH CV ECHO MEAS - AO ROOT AREA: 11.3 CM^2
BH CV ECHO MEAS - AO ROOT DIAM: 3.8 CM
BH CV ECHO MEAS - AO V2 MAX: 106 CM/SEC
BH CV ECHO MEAS - AO V2 MEAN: 73 CM/SEC
BH CV ECHO MEAS - AO V2 VTI: 27.2 CM
BH CV ECHO MEAS - ASC AORTA: 3.1 CM
BH CV ECHO MEAS - AVA(I,A): 2.3 CM^2
BH CV ECHO MEAS - AVA(I,D): 2.3 CM^2
BH CV ECHO MEAS - BSA(HAYCOCK): 1.6 M^2
BH CV ECHO MEAS - BSA: 1.6 M^2
BH CV ECHO MEAS - BZI_BMI: 19.4 KILOGRAMS/M^2
BH CV ECHO MEAS - BZI_METRIC_HEIGHT: 167.6 CM
BH CV ECHO MEAS - BZI_METRIC_WEIGHT: 54.4 KG
BH CV ECHO MEAS - CONTRAST EF (2CH): 61.7 ML/M^2
BH CV ECHO MEAS - CONTRAST EF 4CH: 63.5 ML/M^2
BH CV ECHO MEAS - EDV(CUBED): 68.9 ML
BH CV ECHO MEAS - EDV(MOD-SP2): 81 ML
BH CV ECHO MEAS - EDV(MOD-SP4): 85 ML
BH CV ECHO MEAS - EDV(TEICH): 74.2 ML
BH CV ECHO MEAS - EF(CUBED): 74.5 %
BH CV ECHO MEAS - EF(MOD-SP2): 61.7 %
BH CV ECHO MEAS - EF(MOD-SP4): 63.5 %
BH CV ECHO MEAS - EF(TEICH): 66.8 %
BH CV ECHO MEAS - ESV(CUBED): 17.6 ML
BH CV ECHO MEAS - ESV(MOD-SP2): 31 ML
BH CV ECHO MEAS - ESV(MOD-SP4): 31 ML
BH CV ECHO MEAS - ESV(TEICH): 24.6 ML
BH CV ECHO MEAS - FS: 36.6 %
BH CV ECHO MEAS - IVS/LVPW: 0.78
BH CV ECHO MEAS - IVSD: 0.7 CM
BH CV ECHO MEAS - LAT PEAK E' VEL: 7 CM/SEC
BH CV ECHO MEAS - LV DIASTOLIC VOL/BSA (35-75): 52.8 ML/M^2
BH CV ECHO MEAS - LV MASS(C)D: 97.3 GRAMS
BH CV ECHO MEAS - LV MASS(C)DI: 60.5 GRAMS/M^2
BH CV ECHO MEAS - LV MEAN PG: 1 MMHG
BH CV ECHO MEAS - LV SYSTOLIC VOL/BSA (12-30): 19.3 ML/M^2
BH CV ECHO MEAS - LV V1 MAX: 84 CM/SEC
BH CV ECHO MEAS - LV V1 MEAN: 55.3 CM/SEC
BH CV ECHO MEAS - LV V1 VTI: 20 CM
BH CV ECHO MEAS - LVIDD: 4.1 CM
BH CV ECHO MEAS - LVIDS: 2.6 CM
BH CV ECHO MEAS - LVLD AP2: 7.1 CM
BH CV ECHO MEAS - LVLD AP4: 6.6 CM
BH CV ECHO MEAS - LVLS AP2: 5.1 CM
BH CV ECHO MEAS - LVLS AP4: 5.6 CM
BH CV ECHO MEAS - LVOT AREA (M): 3.1 CM^2
BH CV ECHO MEAS - LVOT AREA: 3.1 CM^2
BH CV ECHO MEAS - LVOT DIAM: 2 CM
BH CV ECHO MEAS - LVPWD: 0.9 CM
BH CV ECHO MEAS - MED PEAK E' VEL: 7 CM/SEC
BH CV ECHO MEAS - MR MAX PG: 51.6 MMHG
BH CV ECHO MEAS - MR MAX VEL: 359 CM/SEC
BH CV ECHO MEAS - MV A DUR: 0.11 SEC
BH CV ECHO MEAS - MV A MAX VEL: 91.3 CM/SEC
BH CV ECHO MEAS - MV DEC SLOPE: 415 CM/SEC^2
BH CV ECHO MEAS - MV DEC TIME: 0.13 SEC
BH CV ECHO MEAS - MV E MAX VEL: 66.1 CM/SEC
BH CV ECHO MEAS - MV E/A: 0.72
BH CV ECHO MEAS - MV MEAN PG: 1 MMHG
BH CV ECHO MEAS - MV P1/2T MAX VEL: 77.6 CM/SEC
BH CV ECHO MEAS - MV P1/2T: 54.8 MSEC
BH CV ECHO MEAS - MV V2 MEAN: 45.8 CM/SEC
BH CV ECHO MEAS - MV V2 VTI: 23.1 CM
BH CV ECHO MEAS - MVA P1/2T LCG: 2.8 CM^2
BH CV ECHO MEAS - MVA(P1/2T): 4 CM^2
BH CV ECHO MEAS - MVA(VTI): 2.7 CM^2
BH CV ECHO MEAS - PA ACC SLOPE: 5.4 CM/SEC^2
BH CV ECHO MEAS - PA ACC TIME: 0.14 SEC
BH CV ECHO MEAS - PA MAX PG: 2.3 MMHG
BH CV ECHO MEAS - PA PR(ACCEL): 14.2 MMHG
BH CV ECHO MEAS - PA V2 MAX: 76 CM/SEC
BH CV ECHO MEAS - PULM A REVS DUR: 0.11 SEC
BH CV ECHO MEAS - PULM A REVS VEL: 23.2 CM/SEC
BH CV ECHO MEAS - PULM DIAS VEL: 34.1 CM/SEC
BH CV ECHO MEAS - PULM S/D: 1.2
BH CV ECHO MEAS - PULM SYS VEL: 40.5 CM/SEC
BH CV ECHO MEAS - QP/QS: 1.2
BH CV ECHO MEAS - RAP SYSTOLE: 3 MMHG
BH CV ECHO MEAS - RV MEAN PG: 1 MMHG
BH CV ECHO MEAS - RV V1 MEAN: 45.8 CM/SEC
BH CV ECHO MEAS - RV V1 VTI: 18.9 CM
BH CV ECHO MEAS - RVOT AREA: 4.2 CM^2
BH CV ECHO MEAS - RVOT DIAM: 2.3 CM
BH CV ECHO MEAS - RVSP: 23 MMHG
BH CV ECHO MEAS - SI(AO): 191.6 ML/M^2
BH CV ECHO MEAS - SI(CUBED): 31.9 ML/M^2
BH CV ECHO MEAS - SI(LVOT): 39 ML/M^2
BH CV ECHO MEAS - SI(MOD-SP2): 31.1 ML/M^2
BH CV ECHO MEAS - SI(MOD-SP4): 33.5 ML/M^2
BH CV ECHO MEAS - SI(TEICH): 30.8 ML/M^2
BH CV ECHO MEAS - SUP REN AO DIAM: 2.14 CM
BH CV ECHO MEAS - SV(AO): 308.5 ML
BH CV ECHO MEAS - SV(CUBED): 51.3 ML
BH CV ECHO MEAS - SV(LVOT): 62.8 ML
BH CV ECHO MEAS - SV(MOD-SP2): 50 ML
BH CV ECHO MEAS - SV(MOD-SP4): 54 ML
BH CV ECHO MEAS - SV(RVOT): 78.5 ML
BH CV ECHO MEAS - SV(TEICH): 49.6 ML
BH CV ECHO MEAS - TAPSE (>1.6): 2.5 CM2
BH CV ECHO MEAS - TR MAX VEL: 224 CM/SEC
BH CV VAS BP RIGHT ARM: NORMAL MMHG
BH CV XLRA - RV BASE: 2.6 CM
BH CV XLRA - TDI S': 12 CM/SEC
E/E' RATIO: 10
LEFT ATRIUM VOLUME INDEX: 15 ML/M2

## 2017-09-14 PROCEDURE — 74230 X-RAY XM SWLNG FUNCJ C+: CPT

## 2017-09-14 PROCEDURE — 97110 THERAPEUTIC EXERCISES: CPT

## 2017-09-14 PROCEDURE — 93306 TTE W/DOPPLER COMPLETE: CPT | Performed by: INTERNAL MEDICINE

## 2017-09-14 PROCEDURE — 92611 MOTION FLUOROSCOPY/SWALLOW: CPT

## 2017-09-14 PROCEDURE — 93306 TTE W/DOPPLER COMPLETE: CPT

## 2017-09-14 PROCEDURE — 99232 SBSQ HOSP IP/OBS MODERATE 35: CPT | Performed by: NURSE PRACTITIONER

## 2017-09-14 RX ORDER — POLYETHYLENE GLYCOL 3350 17 G/17G
17 POWDER, FOR SOLUTION ORAL DAILY PRN
Status: DISCONTINUED | OUTPATIENT
Start: 2017-09-14 | End: 2017-09-18 | Stop reason: HOSPADM

## 2017-09-14 RX ORDER — ACETAMINOPHEN 325 MG/1
325 TABLET ORAL EVERY 6 HOURS PRN
Status: DISCONTINUED | OUTPATIENT
Start: 2017-09-14 | End: 2017-09-18 | Stop reason: HOSPADM

## 2017-09-14 RX ADMIN — ACETAMINOPHEN 325 MG: 325 TABLET ORAL at 09:42

## 2017-09-14 RX ADMIN — ASPIRIN 325 MG: 325 TABLET ORAL at 08:52

## 2017-09-14 RX ADMIN — SODIUM CHLORIDE 200 ML/HR: 9 INJECTION, SOLUTION INTRAVENOUS at 09:00

## 2017-09-14 RX ADMIN — ACETAMINOPHEN 325 MG: 325 TABLET ORAL at 19:20

## 2017-09-14 RX ADMIN — CLOPIDOGREL 75 MG: 75 TABLET, FILM COATED ORAL at 08:52

## 2017-09-14 RX ADMIN — SODIUM CHLORIDE 200 ML/HR: 9 INJECTION, SOLUTION INTRAVENOUS at 15:26

## 2017-09-14 RX ADMIN — MONTELUKAST 10 MG: 10 TABLET, FILM COATED ORAL at 08:52

## 2017-09-14 RX ADMIN — ATORVASTATIN CALCIUM 80 MG: 80 TABLET, FILM COATED ORAL at 20:52

## 2017-09-14 RX ADMIN — SODIUM CHLORIDE 100 ML/HR: 9 INJECTION, SOLUTION INTRAVENOUS at 23:42

## 2017-09-14 NOTE — MBS/VFSS/FEES
Acute Care - Speech Language Pathology   Swallow Initial Evaluation [facility]     Patient Name: Rosi Vicente  : 1950  MRN: [id number]  Today's Date: 2017  Onset of Illness/Injury or Date of Surgery Date: 17            Admit Date: 2017  SPEECH-LANGUAGE PATHOLOGY EVALUTION - VFSS  Subjective: The patient was seen on this date for a VFSS(Videofluoroscopic Swallowing Study).  Patient was alert and cooperative.    The patient's history is significant for progressive left hemiparesis and dysarthria.   Objective: Risks/benefits were reviewed with the patient, and consent was obtained. The study was completed with SLP present and Radiologist review. The patient was seen in lateral view(s). Textures given included thin liquid, puree consistency, mechanical soft consistency and regular consistency.  Assessment: (P)  Pt demonstrated a mild oropharyngeal dysphagia characterized by shallow penetration of thin liquids via cup. Penetration eliminated by use of chin tuck. Pt able to incorporate chin tuck without cues from clinician. No penetration/aspiration present with puree, mixed or solid consistencies; however inability to transfer dry solid bolus from base of tongue valleculae into esophagus due to weakness. Moderate to severe residue present after three swallows of dry solid consistency requiring liquid wash.   SLP Findings: Patient presents with mild oropharyngeal dysphagia.   Recommendations: Diet Textures: thin liquid, regular consistency food. Medications should be taken whole with thin liquids with chin tuck or puree.   Recommended Strategies: Upright for PO and small bites and sips. Oral care before breakfast, after all meals and PRN.   Dysphagia therapy is recommended. Rationale: Ensure safety of swallow by improving laryngeal closure and elevation.    Visit Dx:     ICD-10-CM ICD-9-CM   1. Cerebrovascular accident (CVA), unspecified mechanism I63.9 434.91   2. Oropharyngeal dysphagia R13.12  787.22   3. Difficulty walking R26.2 719.7     Patient Active Problem List   Diagnosis   • Hypertension   • Allergic rhinitis   • Trigeminal neuralgia   • Hyperlipidemia   • New daily persistent headache   • Osteoarthritis of hip   • Vitamin D deficiency   • Cerebrovascular accident (CVA)     Past Medical History:   Diagnosis Date   • Allergic rhinitis    • Anemia    • Bronchitis    • FH: colon cancer    • H/O bone density study 2013   • H/O mammogram 2013   • Hypertension     Benign Essential   • Malaise and fatigue    • Nocturia    • TMJ (temporomandibular joint syndrome)    • Trigeminal neuralgia     Surgery at [facility] in 2009 repeat in 2015     Past Surgical History:   Procedure Laterality Date   • AUGMENTATION MAMMAPLASTY     • COLONOSCOPY N/A 01/2015    Repeat Q 5 years due to Fhx of Colon Ca-Dr. Polk   • PAP SMEAR N/A 2013    Dr. Burden          SWALLOW EVALUATION (last 72 hours)      Swallow Evaluation       09/14/17 1100 09/13/17 1200             Rehab Evaluation    Document Type (P)  evaluation  -HR evaluation  -JJ       Subjective Information (P)  no complaints  -HR no complaints  -JJ       Patient Effort, Rehab Treatment  good  -JJ       Symptoms Noted During/After Treatment (P)  none  -HR none  -JJ       General Information    Patient Profile Review (P)  yes  -HR yes  -JJ       Subjective Patient Observations  Pleasant and cooperative  -JJ       Pertinent History Of Current Problem  Stroke   hx of trigeminal neuralgia  -JJ       Current Diet Limitations (P)  nectar thick liquids;regular solid  -HR no diet restrictions  -JJ       Precautions/Limitations, Vision  WNL  -JJ       Precautions/Limitations, Hearing  WNL  -JJ       Prior Level of Function- Communication (P)  functional in all spheres  -HR functional in all spheres  -JJ       Prior Level of Function- Swallowing (P)  no diet consistency restrictions  -HR no diet consistency restrictions  -JJ       Plans/Goals Discussed With (P)   patient  -HR patient;spouse/S.O.  -       Barriers to Rehab (P)  none identified  -HR none identified  -       Clinical Impression    Patient's Goals For Discharge (P)  return home;return to all previous roles/activities  -HR eat/drink without coughing/choking  -       Family Goals For Discharge  patient able to eat/drink without coughing/choking  -       SLP Swallowing Diagnosis (P)  mild dysphagia  -HR mild dysphagia;oral dysfunction;pharyngeal dysfunction  -       Rehab Potential/Prognosis, Swallowing (P)  good, to achieve stated therapy goals  -HR good, to achieve stated therapy goals  -       Criteria for Skilled Therapeutic Interventions Met  skilled criteria for dysphagia intervention met  -       Therapy Frequency (P)  PRN  -HR PRN  -       Predicted Duration Therapy Interv (days) (P)  until discharge  -HR until discharge  -       SLP Diet Recommendation (P)  regular textures;thin liquids   with chin tuck  -HR regular textures;nectar/syrup-thick liquids  -JJ       Recommended Diagnostics  VFSS (MBS)  -       Recommended Feeding/Eating Techniques (P)  maintain upright posture during/after eating for 30 mins;small sips/bites;tuck chin during every swallow  -HR maintain upright posture during/after eating for 30 mins  -       SLP Rec. for Method of Medication Administration (P)  meds whole with thin liquid;meds whole in pudding/applesauce   chin tuck must be performed when/if done with liquids  -HR meds whole with thickened liquid  -       Monitor For Signs Of Aspiration (P)  cough;gurgly voice;throat clearing;pneumonia;right lower lobe infiltrates  -HR cough;gurgly voice;throat clearing;pneumonia  -       Anticipated Discharge Disposition  home  -       Pain Assessment    Pain Assessment (P)  No/denies pain  -HR        Cognitive Assessment/Intervention    Current Cognitive/Communication Assessment (P)  functional  -HR functional  -       Oral Motor Structure and Function    Oral  Motor Anatomy and Physiology  patient demonstrates anatomy and physiology that is WNL  -JJ       Dentition Assessment  present and adequate  -JJ       Secretion Management  WNL/WFL  -JJ       Mucosal Quality  moist, healthy  -JJ       Volitional Swallow  no difficulties initiating volitional swallow  -JJ       Volitional Cough  no difficulties initiating volitional cough  -JJ       Oral Motor Assessment Comment (P)  left facial droop corrected with active movement  -HR mild left sided droop   -JJ       SLP Communication to Radiology    Summary Statement (P)  Pt demonstrated a mild oropharyngeal dysphagia characterized by shallow penetration of thin liquids via cup. Penetration eliminated by use of chin tuck. No penetration/aspiration present with puree, mixed or solid consistencies; however inability to transfer dry solid bolus from base of tongue valleculae into esophagus due to weakness. Moderate to severe residue present after three swallows of dry solid consistency required liquid wash.   -HR        Dysphagia Treatment Objectives and Progress    Dysphagia Treatment Objectives (P)  Improve laryngeal closure;Improve laryngeal elevation  -HR        Improve laryngeal closure    To improve laryngeal closure, patient will: (P)  Complete supraglottic swallow;80%;without cues  -HR        Improve laryngeal elevation    To improve laryngeal elevation, patient will: (P)  Complete Mendelsohn maneuver;Complete pitch-glide;80%;without cues  -HR          User Key  (r) = Recorded By, (t) = Taken By, (c) = Cosigned By    Initials Name Effective Dates     Pinky Corbett MS CCC-SLP 04/13/15 -     HR Martha Anderson, Speech Therapy Student 09/06/17 -         EDUCATION  The patient has been educated in the following areas:   Dysphagia (Swallowing Impairment).    SLP Recommendation and Plan  SLP Swallowing Diagnosis: (P) mild dysphagia  SLP Diet Recommendation: (P) regular textures, thin liquids (with chin tuck)  Recommended  Feeding/Eating Techniques: (P) maintain upright posture during/after eating for 30 mins, small sips/bites, tuck chin during every swallow  SLP Rec. for Method of Medication Administration: (P) meds whole with thin liquid, meds whole in pudding/applesauce (chin tuck must be performed when/if done with liquids)  Monitor For Signs Of Aspiration: (P) cough, gurgly voice, throat clearing, pneumonia, right lower lobe infiltrates           Rehab Potential/Prognosis, Swallowing: (P) good, to achieve stated therapy goals  Therapy Frequency: (P) PRN                        IP SLP Goals       09/14/17 1100 09/13/17 1208       Safely Consume Diet    Safely Consume Diet- SLP, Time to Achieve (P)  by discharge  -HR      Safely Consume Diet- SLP, Activity Level (P)  Patient will improve timing of pharyngeal response for safer, more efficient swallow;Patient will improve laryngeal closure for safer swallow;Patient will improve laryngeal elevation for safer, more efficient swallow  -HR      Safely Consume Diet- SLP, Outcome (P)  goal partially met  -HR goal ongoing  -JJ       User Key  (r) = Recorded By, (t) = Taken By, (c) = Cosigned By    Initials Name Provider Type    SUE Corbett MS CCC-SLP Speech and Language Pathologist    HR Martha Anderson, Speech Therapy Student Speech Therapy Student             SLP Outcome Measures (last 72 hours)      SLP Outcome Measures       09/14/17 1100 09/13/17 1200       SLP Outcome Measures    Outcome Measure Used? (P)  Adult NOMS  -HR Adult NOMS  -JJ     FCM Scores    FCM Chosen (P)  Swallowing  -HR Swallowing  -JJ     Swallowing FCM Score (P)  6  -HR 4  -JJ       User Key  (r) = Recorded By, (t) = Taken By, (c) = Cosigned By    Initials Name Effective Dates    SUE Corbett MS CCC-SLP 04/13/15 -     HR Martha Anderson, Speech Therapy Student 09/06/17 -            Time Calculation:         Time Calculation- SLP       09/14/17 1100          Time Calculation- SLP    SLP Start  Time (P)  1100  -HR      SLP Stop Time (P)  1200  -HR      SLP Time Calculation (min) (P)  60 min  -HR        User Key  (r) = Recorded By, (t) = Taken By, (c) = Cosigned By    Initials Name Provider Type    HR Martha Anderson Speech Therapy Student Speech Therapy Student          Therapy Charges for Today     Code Description Service Date Service Provider Modifiers Qty    [id number] HC ST MOTION FLUORO EVAL SWALLOW 4 9/14/2017 Martha Anderson Speech Therapy Student GN 1               Martha Anderson Speech Therapy Student  9/14/2017

## 2017-09-14 NOTE — THERAPY TREATMENT NOTE
Acute Care - Physical Therapy Treatment Note  [facility]     Patient Name: Rosi Vicente  : 1950  MRN: [id number]  Today's Date: 2017  Onset of Illness/Injury or Date of Surgery Date: 17  Date of Referral to PT: 17  Referring Physician: Benjamin Cardona    Admit Date: 2017    Visit Dx:    ICD-10-CM ICD-9-CM   1. Cerebrovascular accident (CVA), unspecified mechanism I63.9 434.91   2. Oropharyngeal dysphagia R13.12 787.22   3. Difficulty walking R26.2 719.7     Patient Active Problem List   Diagnosis   • Hypertension   • Allergic rhinitis   • Trigeminal neuralgia   • Hyperlipidemia   • New daily persistent headache   • Osteoarthritis of hip   • Vitamin D deficiency   • Cerebrovascular accident (CVA)               Adult Rehabilitation Note       17 0900          Rehab Assessment/Intervention    Discipline physical therapy assistant  -CW      Document Type therapy note (daily note)  -CW      Subjective Information agree to therapy;complains of;weakness  -CW      Patient Effort, Rehab Treatment good  -CW      Precautions/Limitations fall precautions  -CW      Recorded by [CW] Krystian Tobar      Vital Signs    O2 Delivery Pre Treatment room air  -CW      Recorded by [CW] Krystian Tobar      Pain Assessment    Pain Assessment No/denies pain  -CW      Recorded by [CW] Krystian Tobar      Vision Assessment/Intervention    Visual Impairment visual impairment, left  -CW      Recorded by [CW] Krystian Tobar      Cognitive Assessment/Intervention    Current Cognitive/Communication Assessment functional  -CW      Orientation Status oriented x 4  -CW      Follows Commands/Answers Questions 100% of the time  -CW      Personal Safety mild impairment;decreased awareness, need for safety;decreased insight to deficits  -CW      Personal Safety Interventions fall prevention program maintained;gait belt;muscle strengthening facilitated;nonskid shoes/slippers when out of bed  -CW       Recorded by [CW] Krystian Tobar      Bed Mobility, Assessment/Treatment    Bed Mob, Supine to Sit, Dickens supervision required  -CW      Bed Mob, Sit to Supine, Dickens supervision required  -CW      Recorded by [CW] Krystian Tobar      Transfer Assessment/Treatment    Transfers, Sit-Stand Dickens stand by assist;contact guard assist  -CW      Transfers, Stand-Sit Dickens stand by assist;contact guard assist  -CW      Transfers, Sit-Stand-Sit, Assist Device rolling walker  -CW      Recorded by [CW] Krystian Tobar      Gait Assessment/Treatment    Gait, Dickens Level contact guard assist  -CW      Gait, Assistive Device rolling walker  -CW      Gait, Distance (Feet) 150  -CW      Gait, Gait Pattern Analysis swing-through gait  -CW      Gait, Gait Deviations katja decreased;decreased heel strike;step length decreased;stride length decreased;narrow base  -CW      Recorded by [CW] Krystian Tobar      Positioning and Restraints    Pre-Treatment Position standing in room  -CW      Post Treatment Position bed  -CW      In Bed notified nsg;supine;call light within reach;encouraged to call for assist;exit alarm on;with family/caregiver;with nsg  -CW      Recorded by [CW] Krystian Tobar        User Key  (r) = Recorded By, (t) = Taken By, (c) = Cosigned By    Initials Name Effective Dates    CW Krystian Tobar 12/13/16 -                 IP PT Goals       09/13/17 1532          Bed Mobility PT LTG    Bed Mobility PT LTG, Date Established 09/13/17  -MS      Bed Mobility PT LTG, Time to Achieve 5 days  -MS      Bed Mobility PT LTG, Activity Type all bed mobility  -MS      Bed Mobility PT LTG, Dickens Level independent  -MS      Transfer Training PT LTG    Transfer Training PT LTG, Date Established 09/13/17  -MS      Transfer Training PT LTG, Time to Achieve 5 days  -MS      Transfer Training PT LTG, Activity Type all transfers  -MS      Transfer Training PT LTG,  Dexter Level independent  -MS      Gait Training PT LTG    Gait Training Goal PT LTG, Date Established 09/13/17  -MS      Gait Training Goal PT LTG, Time to Achieve 5 days  -MS      Gait Training Goal PT LTG, Dexter Level independent  -MS      Gait Training Goal PT LTG, Distance to Achieve 400 feet  -MS        User Key  (r) = Recorded By, (t) = Taken By, (c) = Cosigned By    Initials Name Provider Type    MS Antonio Berger, PT Physical Therapist          Physical Therapy Education     Title: PT OT SLP Therapies (Active)     Topic: Physical Therapy (Active)     Point: Mobility training (Done)    Learning Progress Summary    Learner Readiness Method Response Comment Documented by Status   Patient Acceptance E,TB VU,DU   09/14/17 0919 Done    Acceptance E,D VU,NR  MS 09/13/17 1532 Done               Point: Body mechanics (Done)    Learning Progress Summary    Learner Readiness Method Response Comment Documented by Status   Patient Acceptance E,TB VU,DU   09/14/17 0919 Done    Acceptance E,D VU,NR  MS 09/13/17 1532 Done               Point: Precautions (Done)    Learning Progress Summary    Learner Readiness Method Response Comment Documented by Status   Patient Acceptance E,TB VU,DU   09/14/17 0919 Done    Acceptance E,D VU,NR  MS 09/13/17 1532 Done                      User Key     Initials Effective Dates Name Provider Type Discipline    MS 12/01/15 -  Antonio Berger, PT Physical Therapist PT     12/13/16 -  Krystian Tobar Physical Therapy Assistant PT                    PT Recommendation and Plan  Anticipated Discharge Disposition: home with assist, home with home health  Planned Therapy Interventions: balance training, bed mobility training, gait training, home exercise program, patient/family education, postural re-education, ROM (Range of Motion), strengthening, transfer training  PT Frequency: daily  Plan of Care Review  Plan Of Care Reviewed With: patient  Progress:  improving  Outcome Summary/Follow up Plan: Pt increasing with bed mobility andtransfers to RWX          Outcome Measures       09/14/17 0900 09/13/17 1600 09/13/17 1400    How much help from another person do you currently need...    Turning from your back to your side while in flat bed without using bedrails? 3  -CW 3  -MS     Moving from lying on back to sitting on the side of a flat bed without bedrails? 3  -CW 3  -MS     Moving to and from a bed to a chair (including a wheelchair)? 3  -CW 3  -MS     Standing up from a chair using your arms (e.g., wheelchair, bedside chair)? 3  -CW 3  -MS     Climbing 3-5 steps with a railing? 3  -CW 3  -MS     To walk in hospital room? 3  -CW 3  -MS     AM-PAC 6 Clicks Score 18  -CW 18  -MS     How much help from another is currently needed...    Putting on and taking off regular lower body clothing?   2  -SO    Bathing (including washing, rinsing, and drying)   3  -SO    Toileting (which includes using toilet bed pan or urinal)   3  -SO    Putting on and taking off regular upper body clothing   3  -SO    Taking care of personal grooming (such as brushing teeth)   3  -SO    Eating meals   3  -SO    Score   17  -SO    Functional Assessment    Outcome Measure Options AM-PAC 6 Clicks Basic Mobility (PT)  -CW AM-PAC 6 Clicks Basic Mobility (PT)  -MS AM-PAC 6 Clicks Daily Activity (OT)  -SO      User Key  (r) = Recorded By, (t) = Taken By, (c) = Cosigned By    Initials Name Provider Type    SO Jenna Wilson, OTR Occupational Therapist    MS Antonio Berger, PT Physical Therapist    CW Krystian Tobar Physical Therapy Assistant           Time Calculation:         PT Charges       09/14/17 0920          Time Calculation    Start Time 0852  -CW      Stop Time 0909  -CW      Time Calculation (min) 17 min  -CW      PT Received On 09/14/17  -CW      PT - Next Appointment 09/15/17  -CW        User Key  (r) = Recorded By, (t) = Taken By, (c) = Cosigned By    Initials Name  Provider Type    CW Krystian Tobar Physical Therapy Assistant          Therapy Charges for Today     Code Description Service Date Service Provider Modifiers Qty    11499021731 HC PT THER PROC EA 15 MIN 9/14/2017 Krystian Tobar GP 1    52769509520 HC PT THER SUPP EA 15 MIN 9/14/2017 Krystian Tobar GP 1          PT G-Codes  Outcome Measure Options: AM-PAC 6 Clicks Basic Mobility (PT)    Krystian Tobar  9/14/2017

## 2017-09-14 NOTE — SIGNIFICANT NOTE
09/14/17 1404   Rehab Treatment   Discipline occupational therapist   Treatment Not Performed (per RN pt bedrest with head of bed flat.  Will hold OT today, )   Recommendation   OT - Next Appointment 09/15/17

## 2017-09-14 NOTE — DISCHARGE PLACEMENT REQUEST
"Rosi Sofia (67 y.o. Female)     Date of Birth Social Security Number Address Home Phone MRN    1950  [address] [id number] [phone] [id number]    Church Marital Status          None Unknown       Admission Date Admission Type Admitting Provider Attending Provider Department, Room/Bed    9/13/17 Emergency Benjamin Whyte MD Richards, Stephen J, MD [address], P580/1    Discharge Date Discharge Disposition Discharge Destination                      Attending Provider: Benjamin Whyte MD     Allergies:  No Known Allergies    Isolation:  None   Infection:  None   Code Status:  FULL    Ht:  66\" (167.6 cm)   Wt:  120 lb (54.4 kg)    Admission Cmt:  None   Principal Problem:  None                Active Insurance as of 9/13/2017     Primary Coverage     Payor Plan Insurance Group Employer/Plan Group    MEDICARE MEDICARE A & B      Payor Plan Address Payor Plan Phone Number Effective From Effective To    [address] [phone] 3/1/2014     [place] [address]       Subscriber Name Subscriber Birth Date Member ID       ROSI SOFIA 1950 [id number]           Secondary Coverage     Payor Plan Insurance Group Employer/Plan Group    [id number]     Payor Plan Address Payor Plan Phone Number Effective From Effective To    [address]  11/1/2016     [place] [address]       Subscriber Name Subscriber Birth Date Member ID       ROSI SOFIA 1950 [id number]                 Emergency Contacts      (Rel.) Home Phone Work Phone Mobile Phone    Dunia Mccollum (Other) [phone] -- --    Elena Flores (Spouse) [phone] -- [phone]              "

## 2017-09-14 NOTE — PROGRESS NOTES
LOS: 1 day     Name: Rosi Vicente  Age/Sex: 67 y.o. female  :  1950        PCP: Dionne Fonseca MD    Subjective   She feels like her facial droop is worse today she also feels that her left upper extremity weakness is worse as well.  General: No Fever or Chills, Cardiac: No Chest Pain or Palpitations, Resp: No Cough or SOA, GI: No Nausea, Vomiting, or Diarrhea and Other: No bleeding      aspirin 325 mg Oral Daily   Or      aspirin 300 mg Rectal Daily   atorvastatin 80 mg Oral Nightly   clopidogrel 75 mg Oral Daily   montelukast 10 mg Oral Daily       sodium chloride 200 mL/hr Last Rate: 200 mL/hr (17 1704)       Objective   Vital Signs  Temp:  [97 °F (36.1 °C)-98.1 °F (36.7 °C)] 97.8 °F (36.6 °C)  Heart Rate:  [60-78] 60  Resp:  [15-18] 18  BP: (123-135)/(75-89) 135/84  Body mass index is 19.37 kg/(m^2).  No intake or output data in the 24 hours ending 17 0814    Physical Exam   Constitutional: She is oriented to person, place, and time. She appears well-developed and well-nourished.   HENT:   Head: Normocephalic and atraumatic.   His left-sided facial droop   Eyes: EOM are normal. Pupils are equal, round, and reactive to light.   Neck: Neck supple. No JVD present.   Cardiovascular: Normal rate, regular rhythm and normal heart sounds.    Pulmonary/Chest: Effort normal and breath sounds normal.   Abdominal: Soft. Bowel sounds are normal. She exhibits no distension.   Musculoskeletal: Normal range of motion. She exhibits no edema.   Neurological: She is alert and oriented to person, place, and time.   Left-sided facial droop left upper extremity weakness left lower extremity weakness   Skin: Skin is warm and dry. No rash noted. No erythema.   Psychiatric: She has a normal mood and affect. Her behavior is normal.         Results Review:       I reviewed the patient's new clinical results.    Results from last 7 days  Lab Units 17  0706   WBC 10*3/mm3 5.72   HEMOGLOBIN g/dL 14.1    PLATELETS 10*3/mm3 358     Results from last 7 days  Lab Units 09/13/17  0706   SODIUM mmol/L 133*   POTASSIUM mmol/L 3.9   CHLORIDE mmol/L 93*   CO2 mmol/L 26.8   BUN mg/dL 18   CREATININE mg/dL 0.67   CALCIUM mg/dL 9.3   Estimated Creatinine Clearance: 57.8 mL/min (by C-G formula based on Cr of 0.67).    Results from last 7 days  Lab Units 09/13/17  0706   CHOLESTEROL mg/dL 219*   TRIGLYCERIDES mg/dL 96   HDL CHOL mg/dL 66*   HEMOGLOBIN A1C % 5.30   LDL CHOL mg/dL 134*     CT HEAD W/O CONTRAST  IMPRESSION:  1. No acute process is identified. Further evaluation could be performed  with MRI examination of the brain.  2. An 11 x 5 mm area of calcification related to the anterior aspect of  the petrous ridge on the left, unchanged as compared to 06/15/2016. This  may represent a heavily calcified meningioma, unchanged.    MRI BRAIN WITH CONTRAST   Area of acute infarction involving the periventricular white  matter of the right frontal lobe extending inferiorly to the posterior  limb of the internal capsule measuring 13-14 mm in AP dimension and  approximately 5 mm in maximum transverse dimension.    MRA HEAD WITHOUT AND NECK WITH CONTRAST    No evidence of focal high-grade stenosis. Some shallow  conical protuberance arising from the right internal carotid artery  posteriorly. This may represent a small infundibulum. A shallow aneurysm  cannot be entirely excluded. A vessel arising from the apex is not  definitively identified. Further evaluation could be performed with a CT  angiogram of the head. No evidence of stenosis within the neck    2D ECHOCARDIOGRAM    · Calculated EF = 63.5%  · Left ventricular systolic function is normal.  · Mild tricuspid valve regurgitation is present.  · Estimated right ventricular systolic pressure from tricuspid regurgitation is normal (<35 mmHg).  · Saline test results are negative    Assessment/Plan   Active Problems:    Hypertension    Hyperlipidemia    Cerebrovascular accident  (CVA)      PLAN  - Subjectively she feels like her stroke symptoms are worse today.  Objectively I don't see much difference between yesterday and today other than her speech seems a little more fluent.  I have discussed this with MARTÍNEZ Carlos who will evaluate the patient in a few minutes.  Otherwise plan to continue IV fluids.  - Continue to allow permissive hypertension hold blood pressure medications  - Continue aspirin and statin  - PT OT and speech therapy  - No MAYCOL needed per neurology's recommendations.    Disposition        Benjamin Whyte MD  [facility]  09/14/17  8:14 AM

## 2017-09-14 NOTE — PROGRESS NOTES
LOS: 1 day   Patient Care Team:  Dionne Fonseca MD as PCP - General (Internal Medicine)  Dionne Fonseca MD as PCP - Claims Attributed  Christopher Young MD as Consulting Physician (Otolaryngology)  Antonio Davila MD as Consulting Physician (Orthopedic Surgery)  Manny Sexton MD as Consulting Physician (Dermatology)  Víctor Polk MD as Consulting Physician (Gastroenterology)  Jj Burden MD as Consulting Physician (Gynecology)  Kori Vaughn MD as Consulting Physician (Neurosurgery)  Hermes Anne MD as Consulting Physician (Orthopedic Surgery)    Chief Complaint:    Chief Complaint   Patient presents with   • Facial Droop       Subjective     Interval History:     Patient Complaints: headache slightly worse left facial droop and left side weakness  Patient Denies: sudden new stroke symtpoms  History taken from: patient chart family RN    Objective     Vital Signs  Temp:  [97.8 °F (36.6 °C)-98.1 °F (36.7 °C)] 97.8 °F (36.6 °C)  Heart Rate:  [60-78] 60  Resp:  [18] 18  BP: (126-135)/(75-89) 135/84      Physical Examination:  HEENT: Normocephalic, atraumatic   COR: RRR  Resp: Even and unlabored  Extremities: Equal pulses, non distal embolization    Neurological:   MS: AO. Language normal. No neglect. Higher integrative function normal  CN: left facial droop, dysarthria  Motor: 5/5, normal tone on the right. 4/5 on the left arm and leg and hand  Sensory: Intact  Coordination: slow fine motor movements on the left hand  Results Review:     I reviewed the patient's new clinical results.      Results from last 7 days  Lab Units 09/13/17  0706   HEMOGLOBIN A1C % 5.30       Results from last 7 days  Lab Units 09/13/17  0706   WBC 10*3/mm3 5.72   HEMOGLOBIN g/dL 14.1   HEMATOCRIT % 42.6   PLATELETS 10*3/mm3 358       Results from last 7 days  Lab Units 09/13/17  0706   CHOLESTEROL mg/dL 219*     Lab Results   Component Value Date    LDLCALC 134 (H) 09/13/2017       Results from last 7  days  Lab Units 09/13/17  0706   SODIUM mmol/L 133*   POTASSIUM mmol/L 3.9   CHLORIDE mmol/L 93*   CO2 mmol/L 26.8   BUN mg/dL 18   CREATININE mg/dL 0.67   CALCIUM mg/dL 9.3   BILIRUBIN mg/dL 0.4   ALK PHOS U/L 68   ALT (SGPT) U/L 15   AST (SGOT) U/L 14   GLUCOSE mg/dL 98       Medication Review: reviewed, changes made    Assessment/Plan  Ms. Vicente is a 66yo with HTN and HLD who presented on 9/13 with progressive left hemiparesis and dysarthria. On examination she has mild the deficits and imaging confirms the presence of a patchy deep right basal ganglionic and corona radiata infarct. The patietn feels that her left side symptoms wax and wane, her exam is stable. She needs to remain bed rest, HOB <30 degrees except for meals and keep hydrated. If she worsens we will consider transferring her to ICU for induced HTN and could add omega 3 fatty acids. 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 Therefore, symptoms can progress to complete hemiplegia, keep bed rest. If the symptoms do not progress then the overall neurological prognosis is good. She also needs aggressive risk factor control. Consider keeping HOB up tomorrow and out of bed possibly on Saturday if stable. CCP for D/C planning. Please call with any questions or concerns. Add fish oil daily. Family to bring from home     Plan:   - Tylenol 325mg PO every 6 hours PRN for headache    - continue Aspirin 325mg, Plavix 75mg and Lipitor   - fish oil daily     - Neurochecks every 2 hours   - bed rest   - keep well hydrated   - okay for HOB to be up for meals, otherwise <30 degrees    - Non-pharmacological DVT prophylaxis   - EKG Tele   - PT/OT/ST   - Stroke Education   - Blood pressure control to <130/80   - Goal LDL <70-recommend high dose statins-    - Serum glucose < 140   - CCP for D/C planning  Active Problems:    Hypertension    Hyperlipidemia    Cerebrovascular accident (CVA)      I have discussed the above with  the patient, nurse and family.    Sidra Lakhani, JUAN FRANCISCO  09/14/17  9:15 AM

## 2017-09-14 NOTE — PLAN OF CARE
Problem: Patient Care Overview (Adult)  Goal: Plan of Care Review  Outcome: Ongoing (interventions implemented as appropriate)    09/14/17 9257   Coping/Psychosocial Response Interventions   Plan Of Care Reviewed With patient   Outcome Evaluation   Outcome Summary/Follow up Plan Patient rested quietly throughout the night. showing some anxiety toward diagnosis of stroke. using bedpan w/out difficulty and no c/o pain. fluids infusing. NIH=2. vss, slightly john. will continue to monitor   Patient Care Overview   Progress no change

## 2017-09-14 NOTE — SIGNIFICANT NOTE
09/14/17 1051   Rehab Treatment   Discipline occupational therapist   Treatment Not Performed (off floor to swallow study)   Recommendation   OT - Next Appointment 09/15/17

## 2017-09-14 NOTE — PLAN OF CARE
Problem: Patient Care Overview (Adult)  Goal: Plan of Care Review    09/14/17 0920 09/14/17 1802   Coping/Psychosocial Response Interventions   Plan Of Care Reviewed With patient --    Outcome Evaluation   Outcome Summary/Follow up Plan --  NIH 2. vital signs stable. no c/o nausea or vomiting. PT must remain on bedrest and HOB flat however may elevate for meals only. will continue to monitor.   Patient Care Overview   Progress --  progress toward functional goals is gradual         Problem: Stroke (Ischemic) (Adult)  Goal: Signs and Symptoms of Listed Potential Problems Will be Absent or Manageable (Stroke)  Outcome: Ongoing (interventions implemented as appropriate)    09/13/17 1621   Stroke (Ischemic)   Problems Assessed (Stroke (Ischemic)/TIA) all   Problems Present (Stroke (Ischemic)/TIA) muscle tone abnormal;eating/swallowing impairment;motor/sensory impairment

## 2017-09-14 NOTE — PLAN OF CARE
Problem: Inpatient SLP  Goal: Dysphagia- Patient will safely consume diet as per recommendation with no signs/symptoms of aspiration  Outcome: Ongoing (interventions implemented as appropriate)    09/14/17 1100   Safely Consume Diet   Safely Consume Diet- SLP, Time to Achieve by discharge   Safely Consume Diet- SLP, Activity Level Patient will improve timing of pharyngeal response for safer, more efficient swallow;Patient will improve laryngeal closure for safer swallow;Patient will improve laryngeal elevation for safer, more efficient swallow   Safely Consume Diet- SLP, Outcome goal partially met

## 2017-09-14 NOTE — PLAN OF CARE
Problem: Patient Care Overview (Adult)  Goal: Plan of Care Review  Outcome: Ongoing (interventions implemented as appropriate)    09/14/17 0920   Coping/Psychosocial Response Interventions   Plan Of Care Reviewed With patient   Outcome Evaluation   Outcome Summary/Follow up Plan Pt increasing with bed mobility andtransfers to RWX   Patient Care Overview   Progress improving

## 2017-09-14 NOTE — PROGRESS NOTES
Discharge Planning Assessment  [facility]     Patient Name: Rosi Vicente  MRN: [id number]  Today's Date: 9/14/2017    Admit Date: 9/13/2017          Discharge Needs Assessment       09/14/17 1313    Living Environment    Lives With spouse    Living Arrangements house    Home Accessibility bed and bath on same level;stairs to enter home;stairs (1 railing present)    Number of Stairs to Enter Home 3    Stair Railings at Home outside, present at both sides    Type of Financial/Environmental Concern none    Transportation Available car;family or friend will provide    Living Environment    Provides Primary Care For no one    Quality Of Family Relationships supportive    Able to Return to Prior Living Arrangements yes    Discharge Needs Assessment    Concerns To Be Addressed discharge planning concerns    Equipment Currently Used at Home none    Equipment Needed After Discharge walker, rolling    Discharge Facility/Level Of Care Needs home with home health    Discharge Disposition home healthcare service            Discharge Plan       09/14/17 1314    Case Management/Social Work Plan    Plan Home with assistance from her spouse and [facility] and a walker from [employer].    Patient/Family In Agreement With Plan yes    Additional Comments Met with patient at bedside; explained role of CCP, verified facesheet and discussed discharge planning needs.  The patient plans to return home upon d/c with assistance from her spouse and HH from [facility]- referral made to [facility].  The patient has 3 steps to enter her single strory home, PCP is Dionne Fonseca, pharmacy is [facility] on [address] but denies any trouble remembering to take her medication or with affording her medications.  The patient is unsure who she has used in the past for HH but is agreeable to [facility] and denies any SNF history, was provided with a HH/SNF list.  The patient requested a walker and is agreeable to [facility] to assist (order has  already been faxed to [facility]).  CCP will follow to assist with the patient's d/c home with [facility] and a walker from [facility] and will follow for any additional needs (for example Speech therapy recommendations).  JOSH Rice        Discharge Placement     Facility/Agency Request Status Selected? Address Phone Number Fax Number    [facility] Accepted     [address] [place] [id number] [phone]                Demographic Summary       09/14/17 1307    Referral Information    Admission Type observation    Arrived From home or self-care    Referral Source physician;nursing    Reason For Consult discharge planning    Record Reviewed medical record;history and physical    Primary Care Physician Information    Name Dionne Fonseca MD            Functional Status       09/14/17 1308    Functional Status Current    Ambulation 2-->assistive person    Transferring 2-->assistive person    Toileting 2-->assistive person    Bathing 0-->independent    Dressing 0-->independent    Eating 0-->independent    Communication 0-->understands/communicates without difficulty    Swallowing (if score 2 or more for any item, consult Rehab Services) 0-->swallows foods/liquids without difficulty    Functional Status Prior    Ambulation 0-->independent    Transferring 0-->independent    Toileting 0-->independent    Bathing 0-->independent    Dressing 0-->independent    Eating 0-->independent    Communication 0-->understands/communicates without difficulty    Swallowing 0-->swallows foods/liquids without difficulty    IADL    Medications independent    Meal Preparation assistive person    Housekeeping assistive person    Laundry assistive person    Shopping assistive person    Oral Care independent            Psychosocial     None            Abuse/Neglect     None            Legal     None            Substance Abuse     None            Patient Forms       09/14/17 1307    Patient Forms    Provider  Choice List Delivered    Delivered to Patient    Method of delivery In person          Caitlyn Ponce

## 2017-09-15 LAB
ANION GAP SERPL CALCULATED.3IONS-SCNC: 10.1 MMOL/L
ASA PLATELET INHIBITION: 402 ARU
BUN BLD-MCNC: 6 MG/DL (ref 8–23)
BUN/CREAT SERPL: 14.6 (ref 7–25)
CALCIUM SPEC-SCNC: 7.8 MG/DL (ref 8.6–10.5)
CHLORIDE SERPL-SCNC: 107 MMOL/L (ref 98–107)
CO2 SERPL-SCNC: 22.9 MMOL/L (ref 22–29)
CREAT BLD-MCNC: 0.41 MG/DL (ref 0.57–1)
GFR SERPL CREATININE-BSD FRML MDRD: >150 ML/MIN/1.73
GLUCOSE BLD-MCNC: 86 MG/DL (ref 65–99)
PA ADP PRP-ACNC: 126 PRU (ref 194–418)
POTASSIUM BLD-SCNC: 3.4 MMOL/L (ref 3.5–5.2)
SODIUM BLD-SCNC: 140 MMOL/L (ref 136–145)

## 2017-09-15 PROCEDURE — 99233 SBSQ HOSP IP/OBS HIGH 50: CPT | Performed by: NURSE PRACTITIONER

## 2017-09-15 PROCEDURE — 80048 BASIC METABOLIC PNL TOTAL CA: CPT | Performed by: HOSPITALIST

## 2017-09-15 PROCEDURE — 85576 BLOOD PLATELET AGGREGATION: CPT | Performed by: RADIOLOGY

## 2017-09-15 RX ORDER — POTASSIUM CHLORIDE 750 MG/1
40 CAPSULE, EXTENDED RELEASE ORAL ONCE
Status: COMPLETED | OUTPATIENT
Start: 2017-09-15 | End: 2017-09-15

## 2017-09-15 RX ADMIN — ATORVASTATIN CALCIUM 80 MG: 80 TABLET, FILM COATED ORAL at 21:27

## 2017-09-15 RX ADMIN — SODIUM CHLORIDE 500 ML: 9 INJECTION, SOLUTION INTRAVENOUS at 09:27

## 2017-09-15 RX ADMIN — POTASSIUM CHLORIDE 40 MEQ: 750 CAPSULE, EXTENDED RELEASE ORAL at 16:42

## 2017-09-15 RX ADMIN — ACETAMINOPHEN 325 MG: 325 TABLET ORAL at 05:55

## 2017-09-15 RX ADMIN — CLOPIDOGREL 75 MG: 75 TABLET, FILM COATED ORAL at 09:14

## 2017-09-15 RX ADMIN — MONTELUKAST 10 MG: 10 TABLET, FILM COATED ORAL at 09:13

## 2017-09-15 RX ADMIN — SODIUM CHLORIDE 100 ML/HR: 9 INJECTION, SOLUTION INTRAVENOUS at 15:34

## 2017-09-15 RX ADMIN — ASPIRIN 325 MG: 325 TABLET ORAL at 09:14

## 2017-09-15 NOTE — SIGNIFICANT NOTE
09/15/17 0845   Rehab Treatment   Discipline physical therapy assistant   Treatment Not Performed unable to treat, medical status change  (Pt on strict bed rest per MD.  PT to be held until bed rest lifted)   Recommendation   PT - Next Appointment 09/16/17

## 2017-09-15 NOTE — SIGNIFICANT NOTE
09/15/17 1051   Rehab Treatment   Discipline occupational therapist   Treatment Not Performed (RN states continues on bedrest today due to fluxuating stroke symptoms. )   Recommendation   OT - Next Appointment 09/18/17

## 2017-09-15 NOTE — PROGRESS NOTES
"DOS: 9/15/2017  NAME: Rosi Vicente   : 1950  PCP: Dionne Fonseca MD  Chief Complaint   Patient presents with   • Facial Droop     CC: left side weakness, stroke    Subjective: Patient with some worsening of left sided weakness today per RN. She denies any new stroke/TIA symptoms. He spouse is at bedside.     Interval History  Patient Complaints: worsening left sided weakness  Patient Denies:  New stroke/TIA symptoms  History taken from: patient chart family RN    Objective:  Vital signs: /65 (BP Location: Left arm, Patient Position: Lying)  Pulse 54  Temp 97.9 °F (36.6 °C) (Oral)   Resp 18  Ht 66\" (167.6 cm)  Wt 120 lb (54.4 kg)  SpO2 97%  BMI 19.37 kg/m2      Physical Exam:  GENERAL: NAD  HEENT: Normocephalic, atraumatic   COR: RRR  Resp: Even and unlabored  Extremities: No signs of distal embolization. TEDs and SCDs in place.    Neurological:   MS: AO. Language normal. No neglect. Higher integrative function normal  CN: II-XII normal except for left facial droop and mild dysarthria  Motor: Normal strength and tone on the right. Strength at least 4/5 on right with downward drift. Decreased FFM on the left.  Sensory: Intact  Coordination: Normal    Results Review:     I reviewed the patient's new clinical results.    Current Medications:    aspirin 325 mg Oral Daily   Or      aspirin 300 mg Rectal Daily   atorvastatin 80 mg Oral Nightly   clopidogrel 75 mg Oral Daily   montelukast 10 mg Oral Daily       sodium chloride 100 mL/hr Last Rate: 100 mL/hr (17 2342)       Medications: reviewed.     Laboratory results:  Lab Results   Component Value Date    GLUCOSE 86 09/15/2017    CALCIUM 7.8 (L) 09/15/2017     09/15/2017    K 3.4 (L) 09/15/2017    CO2 22.9 09/15/2017     09/15/2017    BUN 6 (L) 09/15/2017    CREATININE 0.41 (L) 09/15/2017    EGFRIFAFRI 99 2017    EGFRIFNONA >150 09/15/2017    BCR 14.6 09/15/2017    ANIONGAP 10.1 09/15/2017     Lab Results   Component Value " Date    WBC 5.72 09/13/2017    HGB 14.1 09/13/2017    HCT 42.6 09/13/2017    MCV 83.0 09/13/2017     09/13/2017        Results from last 7 days  Lab Units 09/13/17  0706   CHOLESTEROL mg/dL 219*     Lab Results   Component Value Date    INR 0.95 09/13/2017    PROTIME 12.3 09/13/2017     Lab Results   Component Value Date    LDLCALC 134 (H) 09/13/2017     Lab Results   Component Value Date    HGBA1C 5.30 09/13/2017       Review and interpretation of imaging: Per Dr. Villafana,  CT brain: No acute stroke or hemorrhage or mass, there is an arachnoid cyst involving the cerebellum and there is a slight bony prominence of the petrous ridge which is unchanged  Carotid ultrasound 6/13/16: No significant stenosis  MRI brain: Patchy faint acute stroke in the right corona radiata, flair shows mild periventricular and subcortical white matter disease, SWI sequences are negative, post contrast images are negative  MRA [place]: Normal  MRA of the aortic arch with and without contrast: Bovine origin left common carotid artery, codominant vertebral arteries, no stenosis    Impression: Ms. Vicente is a 68yo with HTN and HLD who presented on 9/13 with progressive left hemiparesis and dysarthria. I discussed her imaging with Dr. Villafana which shows a patchy deep right basal ganglionic and corona radiata infarct. 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 Her symptoms have been waxing and waning. She had an episode of diplopia yesterday which has resolved and some increase in left arm weakness today. Her BPs were originally reading lower this AM but likely due to oversized BP cuff, with appropriate size, her BPs are better. She will need continued close neurochecks. Continue IVFs and bedrest for now. If symptoms remain stable, we may be able to gradually increase activity, starting with HOB up today and then OOB in chair tomorrow. If her  symptoms were to worsen, would to again place HOB flat and call.     Plan:  Aspirin 325mg  Plavix 75mg  Lipitor 80 mg  Hydrate  Neurochecks  Non-pharmacological DVT prophylaxis  EKG Tele  PT/OT/ST  Stroke Education  Blood pressure control to <130/80  Goal LDL <70-recommend high dose statins-   Serum glucose < 140      I have discussed the above with Dr. Villafana, RN, patientt and family.  Peri James, JUAN FRANCISCO  09/15/17  1:42 PM

## 2017-09-15 NOTE — PROGRESS NOTES
"DAILY PROGRESS NOTE  [facility]    Patient Identification:  Name: Rosi Vicente  Age: 67 y.o.  Sex: female  :  1950  MRN: [id number]         Primary Care Physician: Dionne Fonseca MD      Subjective  Pt believes the lt sided weakness may be a little worse again today.     Objective:  General Appearance:  Comfortable, well-appearing, in no acute distress and not in pain.    Vital signs: (most recent): Blood pressure 120/77, pulse 54, temperature 99.4 °F (37.4 °C), temperature source Oral, resp. rate 18, height 66\" (167.6 cm), weight 120 lb (54.4 kg), SpO2 94 %, not currently breastfeeding.    Lungs:  Normal respiratory rate and normal effort.  Breath sounds clear to auscultation.    Heart: Normal rate.  Regular rhythm.  S1 normal.    Extremities: There is no dependent edema.    Neurological: Patient is alert and oriented to person, place and time.  (Lt sided facial droop and arm weakness. ).    Skin:  Warm and dry.                Vital signs in last 24 hours:  Temp:  [97.8 °F (36.6 °C)-99.4 °F (37.4 °C)] 99.4 °F (37.4 °C)  Heart Rate:  [53-66] 54  Resp:  [18-20] 18  BP: (107-127)/(65-87) 120/77    Intake/Output:    Intake/Output Summary (Last 24 hours) at 09/15/17 1418  Last data filed at 09/15/17 0230   Gross per 24 hour   Intake             2200 ml   Output              750 ml   Net             1450 ml           Results from last 7 days  Lab Units 17  0706   WBC 10*3/mm3 5.72   HEMOGLOBIN g/dL 14.1   PLATELETS 10*3/mm3 358     Results from last 7 days  Lab Units 09/15/17  0508 17  0706   SODIUM mmol/L 140 133*   POTASSIUM mmol/L 3.4* 3.9   CHLORIDE mmol/L 107 93*   CO2 mmol/L 22.9 26.8   BUN mg/dL 6* 18   CREATININE mg/dL 0.41* 0.67   GLUCOSE mg/dL 86 98   Estimated Creatinine Clearance: 57.8 mL/min (by C-G formula based on Cr of 0.41).  Results from last 7 days  Lab Units 09/15/17  0508 17  0706   CALCIUM mg/dL 7.8* 9.3   ALBUMIN g/dL  --  4.70     Results from last 7 " days  Lab Units 09/13/17  0706   ALBUMIN g/dL 4.70   BILIRUBIN mg/dL 0.4   ALK PHOS U/L 68   AST (SGOT) U/L 14   ALT (SGPT) U/L 15       Assessment:  Principal Problem:    Cerebrovascular accident (CVA)  Active Problems:    Hypertension    Hyperlipidemia        Plan:  Cont hydration, ASA and per Neuro.     Kushal Call MD  9/15/2017  2:18 PM

## 2017-09-15 NOTE — PLAN OF CARE
Problem: Patient Care Overview (Adult)  Goal: Plan of Care Review  Outcome: Ongoing (interventions implemented as appropriate)    Problem: Stroke (Ischemic) (Adult)  Goal: Signs and Symptoms of Listed Potential Problems Will be Absent or Manageable (Stroke)  Outcome: Ongoing (interventions implemented as appropriate)

## 2017-09-15 NOTE — PLAN OF CARE
Problem: Patient Care Overview (Adult)  Goal: Plan of Care Review  Outcome: Ongoing (interventions implemented as appropriate)    09/15/17 0536   Coping/Psychosocial Response Interventions   Plan Of Care Reviewed With patient   Outcome Evaluation   Outcome Summary/Follow up Plan Patient has a NIH score of 2. C/O only of left shoulder dicomfort. Plan in the future to have surgery. Pt is to lay flat except for eating. Continues on bedrest. IVF now AT 100CC/HR. No further visual changes or worsening of headache. this shift.   Patient Care Overview   Progress progress toward functional goals is gradual         Problem: Stroke (Ischemic) (Adult)  Goal: Signs and Symptoms of Listed Potential Problems Will be Absent or Manageable (Stroke)  Outcome: Ongoing (interventions implemented as appropriate)    09/15/17 0536   Stroke (Ischemic)   Problems Assessed (Stroke (Ischemic)/TIA) all   Problems Present (Stroke (Ischemic)/TIA) muscle tone abnormal;eating/swallowing impairment;motor/sensory impairment;situational response

## 2017-09-16 LAB
ANION GAP SERPL CALCULATED.3IONS-SCNC: 8.4 MMOL/L
BUN BLD-MCNC: 8 MG/DL (ref 8–23)
BUN/CREAT SERPL: 17.8 (ref 7–25)
CALCIUM SPEC-SCNC: 8 MG/DL (ref 8.6–10.5)
CHLORIDE SERPL-SCNC: 107 MMOL/L (ref 98–107)
CO2 SERPL-SCNC: 23.6 MMOL/L (ref 22–29)
CREAT BLD-MCNC: 0.45 MG/DL (ref 0.57–1)
GFR SERPL CREATININE-BSD FRML MDRD: 139 ML/MIN/1.73
GLUCOSE BLD-MCNC: 90 MG/DL (ref 65–99)
POTASSIUM BLD-SCNC: 4 MMOL/L (ref 3.5–5.2)
SODIUM BLD-SCNC: 139 MMOL/L (ref 136–145)

## 2017-09-16 PROCEDURE — 97110 THERAPEUTIC EXERCISES: CPT

## 2017-09-16 PROCEDURE — 99232 SBSQ HOSP IP/OBS MODERATE 35: CPT | Performed by: NURSE PRACTITIONER

## 2017-09-16 PROCEDURE — 80048 BASIC METABOLIC PNL TOTAL CA: CPT | Performed by: HOSPITALIST

## 2017-09-16 RX ADMIN — CLOPIDOGREL 75 MG: 75 TABLET, FILM COATED ORAL at 08:39

## 2017-09-16 RX ADMIN — ACETAMINOPHEN 325 MG: 325 TABLET ORAL at 12:00

## 2017-09-16 RX ADMIN — SODIUM CHLORIDE 100 ML/HR: 9 INJECTION, SOLUTION INTRAVENOUS at 20:19

## 2017-09-16 RX ADMIN — MONTELUKAST 10 MG: 10 TABLET, FILM COATED ORAL at 08:39

## 2017-09-16 RX ADMIN — ATORVASTATIN CALCIUM 80 MG: 80 TABLET, FILM COATED ORAL at 20:19

## 2017-09-16 RX ADMIN — ASPIRIN 325 MG: 325 TABLET ORAL at 08:39

## 2017-09-16 RX ADMIN — POLYETHYLENE GLYCOL (3350) 17 G: 17 POWDER, FOR SOLUTION ORAL at 08:39

## 2017-09-16 NOTE — PLAN OF CARE
Problem: Patient Care Overview (Adult)  Goal: Plan of Care Review  Outcome: Ongoing (interventions implemented as appropriate)    09/15/17 1716 09/16/17 0839 09/16/17 1555   Coping/Psychosocial Response Interventions   Plan Of Care Reviewed With --  patient;spouse --    Outcome Evaluation   Outcome Summary/Follow up Plan --  --  NIH 3. vital signs stable. no C/O nausea or vomiting. PT up to chair 1X this shift. PT continues to report lack of appetite. PT is requesting acute rehab assessment.    Patient Care Overview   Progress progress toward functional goals is gradual --  --          Problem: Stroke (Ischemic) (Adult)  Goal: Signs and Symptoms of Listed Potential Problems Will be Absent or Manageable (Stroke)  Outcome: Ongoing (interventions implemented as appropriate)    09/15/17 1716   Stroke (Ischemic)   Problems Assessed (Stroke (Ischemic)/TIA) all   Problems Present (Stroke (Ischemic)/TIA) muscle tone abnormal;communication impairment;eating/swallowing impairment;motor/sensory impairment

## 2017-09-16 NOTE — PROGRESS NOTES
"DAILY PROGRESS NOTE  [facility]    Patient Identification:  Name: Rosi Vicente  Age: 67 y.o.  Sex: female  :  1950  MRN: [id number]         Primary Care Physician: Dionne Fonseca MD      Subjective  No new c/o.  Appears to be tolerating increased activity well.     Objective:  General Appearance:  Comfortable, well-appearing, in no acute distress and not in pain.    Vital signs: (most recent): Blood pressure 143/87, pulse 63, temperature 98.4 °F (36.9 °C), temperature source Oral, resp. rate 18, height 66\" (167.6 cm), weight 120 lb (54.4 kg), SpO2 98 %, not currently breastfeeding.    Lungs:  Normal respiratory rate and normal effort.  Breath sounds clear to auscultation.    Heart: Normal rate.  Regular rhythm.    Extremities: There is no dependent edema.    Neurological: Patient is alert and oriented to person, place and time.  (Significant lt sided weakness.  Fascial asymetry a little improved. ).    Skin:  Warm and dry.                Vital signs in last 24 hours:  Temp:  [97.9 °F (36.6 °C)-98.4 °F (36.9 °C)] 98.4 °F (36.9 °C)  Heart Rate:  [54-63] 63  Resp:  [16-18] 18  BP: (129-148)/(76-98) 143/87    Intake/Output:  No intake or output data in the 24 hours ending 17 1653        Results from last 7 days  Lab Units 17  0706   WBC 10*3/mm3 5.72   HEMOGLOBIN g/dL 14.1   PLATELETS 10*3/mm3 358     Results from last 7 days  Lab Units 17  0438 09/15/17  0508 17  0706   SODIUM mmol/L 139 140 133*   POTASSIUM mmol/L 4.0 3.4* 3.9   CHLORIDE mmol/L 107 107 93*   CO2 mmol/L 23.6 22.9 26.8   BUN mg/dL 8 6* 18   CREATININE mg/dL 0.45* 0.41* 0.67   GLUCOSE mg/dL 90 86 98   Estimated Creatinine Clearance: 57.8 mL/min (by C-G formula based on Cr of 0.45).  Results from last 7 days  Lab Units 17  0438 09/15/17  0508 17  0706   CALCIUM mg/dL 8.0* 7.8* 9.3   ALBUMIN g/dL  --   --  4.70     Results from last 7 days  Lab Units 17  0706   ALBUMIN g/dL 4.70 "   BILIRUBIN mg/dL 0.4   ALK PHOS U/L 68   AST (SGOT) U/L 14   ALT (SGPT) U/L 15       Assessment:  Principal Problem:    Cerebrovascular accident (CVA)  Active Problems:    Hypertension    Hyperlipidemia        Plan:  Wean IVF when OK w Neurology.     Kushal Call MD  9/16/2017  4:53 PM

## 2017-09-16 NOTE — PROGRESS NOTES
"DOS: 2017  NAME: Rosi Vicente   : 1950  PCP: Dionne Fonseca MD  Chief Complaint   Patient presents with   • Facial Droop     CC: Left sided weakness, stroke    Subjective: Still with c/o of left sided \"heaviness\". Patient had not been OOB yet. Her spouse is at bedside.     Interval History  Patient Complaints: Left sided-weakness  Patient Denies:  Headache or new stroke/TIA symptoms   History taken from: patient chart family RN    Objective:  Vital signs: /91 (BP Location: Right arm, Patient Position: Lying)  Pulse 58  Temp 98 °F (36.7 °C) (Oral)   Resp 16  Ht 66\" (167.6 cm)  Wt 120 lb (54.4 kg)  SpO2 93%  BMI 19.37 kg/m2      Physical Exam:  GENERAL: NAD  HEENT: Normocephalic, atraumatic   COR: RRR  Resp: Even and unlabored  Extremities: No signs of distal embolization. TEDs and SCDs in place.    Neurological:   MS: AO. Language normal. No neglect. Higher integrative function normal  CN: II-XII normal except for left facial droop and mild dysarthria  Motor: Normal strength and tone on the right. Left strength at least 4-/5 with downward drift. Decreased hand strength and decreased FFM on left  Sensory: Intact  Coordination: Normal    Results Review:     I reviewed the patient's new clinical results.    Current Medications:    aspirin 325 mg Oral Daily   Or      aspirin 300 mg Rectal Daily   atorvastatin 80 mg Oral Nightly   clopidogrel 75 mg Oral Daily   montelukast 10 mg Oral Daily       sodium chloride 100 mL/hr Last Rate: 100 mL/hr (09/15/17 1534)       Medications: Reviewed    Laboratory results:  Lab Results   Component Value Date    GLUCOSE 90 2017    CALCIUM 8.0 (L) 2017     2017    K 4.0 2017    CO2 23.6 2017     2017    BUN 8 2017    CREATININE 0.45 (L) 2017    EGFRIFAFRI 99 2017    EGFRIFNONA 139 2017    BCR 17.8 2017    ANIONGAP 8.4 2017     Lab Results   Component Value Date    WBC 5.72 " 09/13/2017    HGB 14.1 09/13/2017    HCT 42.6 09/13/2017    MCV 83.0 09/13/2017     09/13/2017        Results from last 7 days  Lab Units 09/13/17  0706   CHOLESTEROL mg/dL 219*     Lab Results   Component Value Date    INR 0.95 09/13/2017    PROTIME 12.3 09/13/2017     Lab Results   Component Value Date    LDLCALC 134 (H) 09/13/2017     Lab Results   Component Value Date    HGBA1C 5.30 09/13/2017       Impression: Ms. Vicente is a 68yo with HTN and HLD who presented on 9/13 with progressive left hemiparesis and dysarthria. I discussed her imaging with Dr. Villafana which shows a patchy deep right basal ganglionic and corona radiata infarct. 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 Her symptoms have been waxing and waning. I discussed at length with patient and spouse.  We will gradually increase activity, starting with HOB up and if tolerates, then OOB. Continue GRADUAL increase in activity as long as she remains neurologically stable. If her symptoms were to worsen, would need to place HOB flat and call.     Plan:  Gradual increase in activity as long as neurologically stable.   Aspirin 325mg  Plavix 75mg  Lipitor 80 mg  Hydrate  Neurochecks  Non-pharmacological DVT prophylaxis  EKG Tele  PT/OT/ST  Stroke Education  Blood pressure control to <130/80  Goal LDL <70-recommend high dose statins-                       Serum glucose < 140    I have discussed the above with Dr. Villafana, patient and family.  Peri James, JUAN FRANCISCO  09/16/17  11:07 AM

## 2017-09-16 NOTE — THERAPY TREATMENT NOTE
Acute Care - Physical Therapy Treatment Note  [facility]     Patient Name: Rosi Vicente  : 1950  MRN: [id number]  Today's Date: 2017  Onset of Illness/Injury or Date of Surgery Date: 17  Date of Referral to PT: 17  Referring Physician: Benjamin Cardona    Admit Date: 2017    Visit Dx:    ICD-10-CM ICD-9-CM   1. Cerebrovascular accident (CVA), unspecified mechanism I63.9 434.91   2. Oropharyngeal dysphagia R13.12 787.22   3. Difficulty walking R26.2 719.7     Patient Active Problem List   Diagnosis   • Hypertension   • Allergic rhinitis   • Trigeminal neuralgia   • Hyperlipidemia   • New daily persistent headache   • Osteoarthritis of hip   • Vitamin D deficiency   • Cerebrovascular accident (CVA)               Adult Rehabilitation Note       17 1600 17 0900       Rehab Assessment/Intervention    Discipline physical therapist  -SA physical therapy assistant  -CW     Document Type therapy note (daily note)  -SA therapy note (daily note)  -CW     Subjective Information agree to therapy;no complaints  -SA agree to therapy;complains of;weakness  -CW     Patient Effort, Rehab Treatment good  -SA good  -CW     Precautions/Limitations  fall precautions  -CW     Recorded by [SA] Sharri Rey, PT [CW] Krystian Tobar     Vital Signs    O2 Delivery Pre Treatment  room air  -CW     Recorded by  [CW] Krystian Tobar     Pain Assessment    Pain Assessment No/denies pain  -SA No/denies pain  -CW     Recorded by [SA] Sharri Rey, PT [CW] Krystian Tobar     Vision Assessment/Intervention    Visual Impairment  visual impairment, left  -CW     Recorded by  [CW] Krystian Tobar     Cognitive Assessment/Intervention    Current Cognitive/Communication Assessment functional  -SA functional  -CW     Orientation Status  oriented x 4  -CW     Follows Commands/Answers Questions 100% of the time;able to follow single-step instructions  -% of the time  -CW     Personal Safety  WNL/WFL  -SA mild impairment;decreased awareness, need for safety;decreased insight to deficits  -CW     Personal Safety Interventions fall prevention program maintained;gait belt;nonskid shoes/slippers when out of bed;supervised activity  -SA fall prevention program maintained;gait belt;muscle strengthening facilitated;nonskid shoes/slippers when out of bed  -CW     Recorded by [SA] Sharri Rey, PT [CW] Krystian Tobar     Bed Mobility, Assessment/Treatment    Bed Mobility, Scoot/Bridge, [place] supervision required  -SA      Bed Mob, Supine to Sit, [place] minimum assist (75% patient effort)  -SA supervision required  -CW     Bed Mob, Sit to Supine, [place] supervision required  -SA supervision required  -CW     Recorded by [SA] Sharri Rey, PT [CW] Krystian Tobar     Transfer Assessment/Treatment    Transfers, Sit-Stand [place] contact guard assist  -SA stand by assist;contact guard assist  -CW     Transfers, Stand-Sit [place] contact guard assist  -SA stand by assist;contact guard assist  -CW     Transfers, Sit-Stand-Sit, Assist Device rolling walker  -SA rolling walker  -CW     Toilet Transfer, [place] contact guard assist  -SA      Toilet Transfer, Assistive Device rolling walker  -SA      Transfer, Comment Pt unable to use LUE 2' to weakness  -SA      Recorded by [SA] Sharri Rey, PT [CW] Krystian Tobar     Gait Assessment/Treatment    Gait, [place] Level minimum assist (75% patient effort)  -SA contact guard assist  -CW     Gait, Assistive Device rolling walker  -SA rolling walker  -CW     Gait, Distance (Feet) 20  -  -CW     Gait, Gait Pattern Analysis swing-to gait  -SA swing-through gait  -CW     Gait, Gait Deviations bilateral:;katja decreased;decreased heel strike;step length decreased  -SA katja decreased;decreased heel strike;step length decreased;stride length decreased;narrow base  -CW     Gait, Safety Issues step length  decreased;weight-shifting ability decreased;balance decreased during turns  -SA      Gait, Impairments strength decreased;impaired balance  -SA      Recorded by [SA] Sharri Rey PT [CW] Krystian Tobar     Therapy Exercises    Bilateral Lower Extremities AROM:;5 reps;ankle pumps/circles;hip flexion;LAQ  -SA      Recorded by [SA] Sharri Rey PT      Positioning and Restraints    Pre-Treatment Position in bed  -SA standing in room  -CW     Post Treatment Position bed  -SA bed  -CW     In Bed notified nsg;fowlers;call light within reach;encouraged to call for assist;exit alarm on;side rails up x2;with family/caregiver  -SA notified nsg;supine;call light within reach;encouraged to call for assist;exit alarm on;with family/caregiver;with nsg  -CW     Recorded by [SA] Sharri Rey PT [CW] Krystian Tobar       User Key  (r) = Recorded By, (t) = Taken By, (c) = Cosigned By    Initials Name Effective Dates    CW Krystian Tobar 12/13/16 -     SA Sharri Rey PT 08/03/17 -                 IP PT Goals       09/13/17 1532          Bed Mobility PT LTG    Bed Mobility PT LTG, Date Established 09/13/17  -MS      Bed Mobility PT LTG, Time to Achieve 5 days  -MS      Bed Mobility PT LTG, Activity Type all bed mobility  -MS      Bed Mobility PT LTG, [place] Level independent  -MS      Transfer Training PT LTG    Transfer Training PT LTG, Date Established 09/13/17  -MS      Transfer Training PT LTG, Time to Achieve 5 days  -MS      Transfer Training PT LTG, Activity Type all transfers  -MS      Transfer Training PT LTG, [place] Level independent  -MS      Gait Training PT LTG    Gait Training Goal PT LTG, Date Established 09/13/17  -MS      Gait Training Goal PT LTG, Time to Achieve 5 days  -MS      Gait Training Goal PT LTG, [place] Level independent  -MS      Gait Training Goal PT LTG, Distance to Achieve 400 feet  -MS        User Key  (r) = Recorded By, (t) = Taken By, (c) = Cosigned By    Initials  Name Provider Type    MS Antonio LICO Ab, PT Physical Therapist          Physical Therapy Education     Title: PT OT SLP Therapies (Active)     Topic: Physical Therapy (Done)     Point: Mobility training (Done)    Learning Progress Summary    Learner Readiness Method Response Comment Documented by Status   Patient Acceptance E VU,NR   09/16/17 1639 Done    Acceptance E,TB VU,DU   09/14/17 0919 Done    Acceptance E,D VU,NR  MS 09/13/17 1532 Done               Point: Home exercise program (Done)    Learning Progress Summary    Learner Readiness Method Response Comment Documented by Status   Patient Acceptance E VU,NR   09/16/17 1639 Done               Point: Body mechanics (Done)    Learning Progress Summary    Learner Readiness Method Response Comment Documented by Status   Patient Acceptance E VU,NR   09/16/17 1639 Done    Acceptance E,TB VU,DU   09/14/17 0919 Done    Acceptance E,D VU,NR  MS 09/13/17 1532 Done               Point: Precautions (Done)    Learning Progress Summary    Learner Readiness Method Response Comment Documented by Status   Patient Acceptance E VU,NR   09/16/17 1639 Done    Acceptance E,TB VU,DU   09/14/17 0919 Done    Acceptance E,D VU,NR  MS 09/13/17 1532 Done                      User Key     Initials Effective Dates Name Provider Type Discipline    MS 12/01/15 -  Antonio Berger, PT Physical Therapist PT     12/13/16 -  Krystian Tobar Physical Therapy Assistant PT     08/03/17 -  Sharri Rey PT Physical Therapist PT                    PT Recommendation and Plan  Anticipated Discharge Disposition: home with assist, home with home health  Planned Therapy Interventions: balance training, bed mobility training, gait training, home exercise program, patient/family education, postural re-education, ROM (Range of Motion), strengthening, transfer training  PT Frequency: daily  Plan of Care Review  Plan Of Care Reviewed With: patient  Outcome Summary/Follow up Plan: Pt  tolerated Tx session well and without complaints. LUE significantly weaker than LLE. Pt required Amparo for ambulation with RWx; required assist with AD managment 2' to LUE weakness. Pt ambulated to bathroom and back. Reported sitting up on EOB and in chair several times today. Will benefit from  skilled PT interventions to address functional mobility deficits.           Outcome Measures       09/16/17 1600 09/14/17 0900       How much help from another person do you currently need...    Turning from your back to your side while in flat bed without using bedrails? 4  -SA 3  -CW     Moving from lying on back to sitting on the side of a flat bed without bedrails? 3  -SA 3  -CW     Moving to and from a bed to a chair (including a wheelchair)? 3  -SA 3  -CW     Standing up from a chair using your arms (e.g., wheelchair, bedside chair)? 3  -SA 3  -CW     Climbing 3-5 steps with a railing? 2  -SA 3  -CW     To walk in hospital room? 3  -SA 3  -CW     AM-PAC 6 Clicks Score 18  -SA 18  -CW     Functional Assessment    Outcome Measure Options AM-PAC 6 Clicks Basic Mobility (PT)  -SA AM-PAC 6 Clicks Basic Mobility (PT)  -CW       User Key  (r) = Recorded By, (t) = Taken By, (c) = Cosigned By    Initials Name Provider Type    BRYCE Tobar Physical Therapy Assistant     Sharri Rey PT Physical Therapist           Time Calculation:         PT Charges       09/16/17 1641          Time Calculation    Start Time 1614  -      Stop Time 1631  -      Time Calculation (min) 17 min  -      PT Received On 09/16/17  -      PT - Next Appointment 09/17/17  -        User Key  (r) = Recorded By, (t) = Taken By, (c) = Cosigned By    Initials Name Provider Type     Sharri Rey PT Physical Therapist          Therapy Charges for Today     Code Description Service Date Service Provider Modifiers Qty    43111544787  PT THER PROC EA 15 MIN 9/16/2017 Sharri Rey PT GP 1          PT G-Codes  Outcome Measure Options: AM-PAC  6 Clicks Basic Mobility (PT)    Sharri Rey, PT  9/16/2017

## 2017-09-16 NOTE — PLAN OF CARE
Problem: Patient Care Overview (Adult)  Goal: Plan of Care Review    09/16/17 8941   Coping/Psychosocial Response Interventions   Plan Of Care Reviewed With patient   Outcome Evaluation   Outcome Summary/Follow up Plan Pt tolerated Tx session well and without complaints. LUE significantly weaker than LLE. Pt required Amparo for ambulation with RWx; required assist with AD managment 2' to LUE weakness. Pt ambulated to bathroom and back. Reported sitting up on EOB and in chair several times today. Will benefit from skilled PT interventions to address functional mobility deficits.

## 2017-09-17 LAB
ANION GAP SERPL CALCULATED.3IONS-SCNC: 10.3 MMOL/L
BUN BLD-MCNC: 5 MG/DL (ref 8–23)
BUN/CREAT SERPL: 11.1 (ref 7–25)
CALCIUM SPEC-SCNC: 8.2 MG/DL (ref 8.6–10.5)
CHLORIDE SERPL-SCNC: 105 MMOL/L (ref 98–107)
CO2 SERPL-SCNC: 24.7 MMOL/L (ref 22–29)
CREAT BLD-MCNC: 0.45 MG/DL (ref 0.57–1)
GFR SERPL CREATININE-BSD FRML MDRD: 139 ML/MIN/1.73
GLUCOSE BLD-MCNC: 91 MG/DL (ref 65–99)
POTASSIUM BLD-SCNC: 3.6 MMOL/L (ref 3.5–5.2)
SODIUM BLD-SCNC: 140 MMOL/L (ref 136–145)

## 2017-09-17 PROCEDURE — 80048 BASIC METABOLIC PNL TOTAL CA: CPT | Performed by: HOSPITALIST

## 2017-09-17 PROCEDURE — 97110 THERAPEUTIC EXERCISES: CPT

## 2017-09-17 PROCEDURE — 99231 SBSQ HOSP IP/OBS SF/LOW 25: CPT | Performed by: NURSE PRACTITIONER

## 2017-09-17 RX ADMIN — MONTELUKAST 10 MG: 10 TABLET, FILM COATED ORAL at 08:20

## 2017-09-17 RX ADMIN — CLOPIDOGREL 75 MG: 75 TABLET, FILM COATED ORAL at 08:20

## 2017-09-17 RX ADMIN — ASPIRIN 325 MG: 325 TABLET ORAL at 08:20

## 2017-09-17 RX ADMIN — ACETAMINOPHEN 325 MG: 325 TABLET ORAL at 22:19

## 2017-09-17 RX ADMIN — ACETAMINOPHEN 325 MG: 325 TABLET ORAL at 17:54

## 2017-09-17 RX ADMIN — ATORVASTATIN CALCIUM 80 MG: 80 TABLET, FILM COATED ORAL at 22:10

## 2017-09-17 NOTE — PLAN OF CARE
Problem: Patient Care Overview (Adult)  Goal: Plan of Care Review  Outcome: Ongoing (interventions implemented as appropriate)    09/17/17 6933   Coping/Psychosocial Response Interventions   Plan Of Care Reviewed With patient   Outcome Evaluation   Outcome Summary/Follow up Plan Pt tolerated treatment well. Sat up on EOB on pt's R side, in which she reported being more difficult than L side. Required min A to attain sitting position from supine. Min A x1 also required for gait training, w/ tech support to manage equipment. VCs used for increasing heel strike on L LE. Ambulates w/ narrow ANDRE, no LOBs noted but decreased balance w/ turns. Pt would benefit from rehab upon d/c for strengthening of L UE/LE and for gait and balance training.   Patient Care Overview   Progress progress toward functional goals as expected

## 2017-09-17 NOTE — PROGRESS NOTES
LOS: 4 days   Patient Care Team:  Dionne Fonseca MD as PCP - General (Internal Medicine)  Dionne Fonseca MD as PCP - Claims Attributed  Christopher Young MD as Consulting Physician (Otolaryngology)  Antonio Davila MD as Consulting Physician (Orthopedic Surgery)  Manny Sexton MD as Consulting Physician (Dermatology)  Víctor Polk MD as Consulting Physician (Gastroenterology)  Jj Burden MD as Consulting Physician (Gynecology)  Kori Vaughn MD as Consulting Physician (Neurosurgery)  Hermes Anne MD as Consulting Physician (Orthopedic Surgery)  Félix Fitzpatrick, JONATHAN as Care Coordinator (Population Health)    Chief Complaint:    Chief Complaint   Patient presents with   • Facial Droop       Subjective     Interval History:     Patient Complaints: intermittent headache, improving left side symptoms  Patient Denies: new S/S of stroke   History taken from: patient chart RN    Objective     Vital Signs  Temp:  [98.3 °F (36.8 °C)-98.9 °F (37.2 °C)] 98.9 °F (37.2 °C)  Heart Rate:  [57-70] 70  Resp:  [16-18] 16  BP: (120-165)/() 120/82      Physical Examination:  HEENT: Normocephalic, atraumatic   COR: RRR  Resp: Even and unlabored  Extremities: Equal pulses, non distal embolization    Neurological:   MS: AO. Language normal. No neglect. Higher integrative function normal  CN: mild left facial droop, mild dysarthria  Motor: 5/5, normal tone on the right. 3/5 on the left arm and 4/5 leg and 3/5 hand  Sensory: Intact  Coordination: slow fine motor movements on the left hand    Results Review:     I reviewed the patient's new clinical results.      Results from last 7 days  Lab Units 09/13/17  0706   HEMOGLOBIN A1C % 5.30       Results from last 7 days  Lab Units 09/13/17  0706   WBC 10*3/mm3 5.72   HEMOGLOBIN g/dL 14.1   HEMATOCRIT % 42.6   PLATELETS 10*3/mm3 358       Results from last 7 days  Lab Units 09/13/17  0706   CHOLESTEROL mg/dL 219*     Lab Results   Component Value  Date    LDLCALC 134 (H) 09/13/2017       Results from last 7 days  Lab Units 09/17/17  0617 09/16/17  0438 09/15/17  0508 09/13/17  0706   SODIUM mmol/L 140 139 140 133*   POTASSIUM mmol/L 3.6 4.0 3.4* 3.9   CHLORIDE mmol/L 105 107 107 93*   CO2 mmol/L 24.7 23.6 22.9 26.8   BUN mg/dL 5* 8 6* 18   CREATININE mg/dL 0.45* 0.45* 0.41* 0.67   CALCIUM mg/dL 8.2* 8.0* 7.8* 9.3   BILIRUBIN mg/dL  --   --   --  0.4   ALK PHOS U/L  --   --   --  68   ALT (SGPT) U/L  --   --   --  15   AST (SGOT) U/L  --   --   --  14   GLUCOSE mg/dL 91 90 86 98       Medication Review: reviewed, changes made    Assessment/Plan  Ms. Vicente is a 66yo with HTN and HLD who presented on 9/13 with progressive left hemiparesis and dysarthria. Imaging confirmed the presence of a patchy deep right basal ganglionic and corona radiata infarct. The patient feels that her left side symptoms wax and wane, her exam is stable. She can work with therapy, consult acute rehab. 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 Therefore, symptoms can progress to complete hemiplegia, keep hydrated along with maximal medical therapy. If the symptoms do not progress then the overall neurological prognosis is good. She also needs aggressive risk factor control. Please call with any questions or concerns.     Plan:   - Tylenol 325mg PO every 6 hours PRN for headache    - continue Aspirin 325mg, Plavix 75mg and Lipitor   - fish oil daily     - Neurochecks every 4 hours   - keep well hydrated, NS 50cc/hr   - Non-pharmacological DVT prophylaxis   - EKG Tele   - PT/OT/ST   - Stroke Education   - Blood pressure control to <130/80   - Goal LDL <70-recommend high dose statins-    - Serum glucose < 140   - CCP for D/C planning, acute rehab   Principal Problem:    Cerebrovascular accident (CVA)  Active Problems:    Hypertension    Hyperlipidemia      I have discussed the above with the patient, nurse     Sidra Lakhani, APRN  09/17/17  11:45  AM

## 2017-09-17 NOTE — THERAPY TREATMENT NOTE
Acute Care - Physical Therapy Treatment Note  [facility]     Patient Name: Rosi Vicente  : 1950  MRN: [id number]  Today's Date: 2017  Onset of Illness/Injury or Date of Surgery Date: 17  Date of Referral to PT: 17  Referring Physician: Benjamin Cardona    Admit Date: 2017    Visit Dx:    ICD-10-CM ICD-9-CM   1. Cerebrovascular accident (CVA), unspecified mechanism I63.9 434.91   2. Oropharyngeal dysphagia R13.12 787.22   3. Difficulty walking R26.2 719.7     Patient Active Problem List   Diagnosis   • Hypertension   • Allergic rhinitis   • Trigeminal neuralgia   • Hyperlipidemia   • New daily persistent headache   • Osteoarthritis of hip   • Vitamin D deficiency   • Cerebrovascular accident (CVA)               Adult Rehabilitation Note       17 1515 17 1600       Rehab Assessment/Intervention    Discipline physical therapy assistant  -SD physical therapist  -SA     Document Type therapy note (daily note)  -SD therapy note (daily note)  -SA     Subjective Information agree to therapy  -SD agree to therapy;no complaints  -SA     Patient Effort, Rehab Treatment good  -SD good  -SA     Precautions/Limitations fall precautions  -SD      Recorded by [SD] Shantell Chen PTA [SA] Sharri Rey, PT     Pain Assessment    Pain Assessment No/denies pain  -SD No/denies pain  -SA     Recorded by [SD] Shantell Chen PTA [SA] Sharri Rey PT     Cognitive Assessment/Intervention    Current Cognitive/Communication Assessment functional  -SD functional  -SA     Orientation Status oriented x 4  -SD      Follows Commands/Answers Questions 100% of the time;able to follow single-step instructions  -% of the time;able to follow single-step instructions  -SA     Personal Safety WNL/WFL  -SD WNL/WFL  -SA     Personal Safety Interventions fall prevention program maintained;gait belt;nonskid shoes/slippers when out of bed  -SD fall prevention program maintained;gait  belt;nonskid shoes/slippers when out of bed;supervised activity  -SA     Recorded by [SD] Shantell Chen PTA [SA] Sharri Rey PT     Bed Mobility, Assessment/Treatment    Bed Mobility, Scoot/Bridge, Seale  supervision required  -SA     Bed Mob, Supine to Sit, [place] contact guard assist;minimum assist (75% patient effort)  -SD minimum assist (75% patient effort)  -SA     Bed Mob, Sit to Supine, [place] not tested   up in chair  -SD supervision required  -SA     Bed Mobility, Safety Issues decreased use of arms for pushing/pulling  -SD      Bed Mobility, Impairments strength decreased  -SD      Bed Mobility, Comment increased difficulty getting OOB on R vs L  -SD      Recorded by [SD] Shantell Chen PTA [SA] Sharri Rey PT     Transfer Assessment/Treatment    Transfers, Sit-Stand [place] contact guard assist;verbal cues required  -SD contact guard assist  -SA     Transfers, Stand-Sit [place] contact guard assist;verbal cues required  -SD contact guard assist  -SA     Transfers, Sit-Stand-Sit, Assist Device rolling walker  -SD rolling walker  -SA     Toilet Transfer, [place]  contact guard assist  -SA     Toilet Transfer, Assistive Device  rolling walker  -SA     Transfer, Impairments strength decreased  -SD      Transfer, Comment VCs for attention to L UE  -SD Pt unable to use LUE 2' to weakness  -SA     Recorded by [SD] Shantell Chen PTA [SA] Sharri Rey PT     Gait Assessment/Treatment    Gait, [place] Level minimum assist (75% patient effort);verbal cues required;1 person + 1 person to manage equipment  -SD minimum assist (75% patient effort)  -SA     Gait, Assistive Device rolling walker  -SD rolling walker  -SA     Gait, Distance (Feet) 50  -SD 20  -SA     Gait, Gait Pattern Analysis  swing-to gait  -     Gait, Gait Deviations left:;decreased heel strike;katja decreased;narrow base;step length decreased;toe-to-floor clearance decreased  -SD  bilateral:;katja decreased;decreased heel strike;step length decreased  -SA     Gait, Safety Issues balance decreased during turns;step length decreased;weight-shifting ability decreased  -SD step length decreased;weight-shifting ability decreased;balance decreased during turns  -SA     Gait, Impairments strength decreased;impaired balance  -SD strength decreased;impaired balance  -SA     Gait, Comment VCs for increasing heel strike on L LE; good posture correction  -SD      Recorded by [SD] Shantell Chen PTA [SA] Sharri Rey PT     Therapy Exercises    Bilateral Lower Extremities  AROM:;5 reps;ankle pumps/circles;hip flexion;LAQ  -SA     Recorded by  [SA] Sharri Rey PT     Positioning and Restraints    Pre-Treatment Position in bed  -SD in bed  -SA     Post Treatment Position chair  -SD bed  -SA     In Bed  notified nsg;fowlers;call light within reach;encouraged to call for assist;exit alarm on;side rails up x2;with family/caregiver  -SA     In Chair reclined;call light within reach;encouraged to call for assist;with family/caregiver;with nsg  -SD      Recorded by [SD] Shantell Chen PTA [SA] Sharri Rey PT       User Key  (r) = Recorded By, (t) = Taken By, (c) = Cosigned By    Initials Name Effective Dates    SD Shantell Chen PTA 01/27/16 -     SA Sharri Rey PT 08/03/17 -                 IP PT Goals       09/13/17 1532          Bed Mobility PT LTG    Bed Mobility PT LTG, Date Established 09/13/17  -MS      Bed Mobility PT LTG, Time to Achieve 5 days  -MS      Bed Mobility PT LTG, Activity Type all bed mobility  -MS      Bed Mobility PT LTG, [place] Level independent  -MS      Transfer Training PT LTG    Transfer Training PT LTG, Date Established 09/13/17  -MS      Transfer Training PT LTG, Time to Achieve 5 days  -MS      Transfer Training PT LTG, Activity Type all transfers  -MS      Transfer Training PT LTG, [place] Level independent  -MS      Gait Training PT LTG    Gait  Training Goal PT LTG, Date Established 09/13/17  -MS      Gait Training Goal PT LTG, Time to Achieve 5 days  -MS      Gait Training Goal PT LTG, [place] Level independent  -MS      Gait Training Goal PT LTG, Distance to Achieve 400 feet  -MS        User Key  (r) = Recorded By, (t) = Taken By, (c) = Cosigned By    Initials Name Provider Type    MS Antonio L Ab, PT Physical Therapist          Physical Therapy Education     Title: PT OT SLP Therapies (Active)     Topic: Physical Therapy (Done)     Point: Mobility training (Done)    Learning Progress Summary    Learner Readiness Method Response Comment Documented by Status   Patient Acceptance E,TB VU,NR  SD 09/17/17 1547 Done    Acceptance E VU,NR   09/16/17 1639 Done    Acceptance E,TB VU,DU   09/14/17 0919 Done    Acceptance E,D VU,NR  MS 09/13/17 1532 Done               Point: Home exercise program (Done)    Learning Progress Summary    Learner Readiness Method Response Comment Documented by Status   Patient Acceptance E,TB VU,NR  SD 09/17/17 1547 Done    Acceptance E VU,NR   09/16/17 1639 Done               Point: Body mechanics (Done)    Learning Progress Summary    Learner Readiness Method Response Comment Documented by Status   Patient Acceptance E,TB VU,NR  SD 09/17/17 1547 Done    Acceptance E VU,NR   09/16/17 1639 Done    Acceptance E,TB VU,DU   09/14/17 0919 Done    Acceptance E,D VU,NR  MS 09/13/17 1532 Done               Point: Precautions (Done)    Learning Progress Summary    Learner Readiness Method Response Comment Documented by Status   Patient Acceptance E,TB VU,NR  SD 09/17/17 1547 Done    Acceptance E VU,NR   09/16/17 1639 Done    Acceptance E,TB VU,DU   09/14/17 0919 Done    Acceptance E,D VU,NR  MS 09/13/17 1532 Done                      User Key     Initials Effective Dates Name Provider Type Discipline    MS 12/01/15 -  Antonio Berger, PT Physical Therapist PT    SD 01/27/16 -  Shantell Chen, PTA Technician PT     CW 12/13/16 -  Krystian Tobar Physical Therapy Assistant PT    SA 08/03/17 -  Sharri Rey PT Physical Therapist PT                    PT Recommendation and Plan  Anticipated Discharge Disposition: home with assist, home with home health  Planned Therapy Interventions: balance training, bed mobility training, gait training, home exercise program, patient/family education, postural re-education, ROM (Range of Motion), strengthening, transfer training  PT Frequency: daily  Plan of Care Review  Plan Of Care Reviewed With: patient  Progress: progress toward functional goals as expected  Outcome Summary/Follow up Plan: Pt tolerated treatment well. Sat up on EOB on pt's R side, in which she reported being more difficult than L side. Required min A to attain sitting position from supine. Min A x1 also required for gait training, w/ tech support to manage equipment. VCs used for increasing heel strike on L LE. Ambulates w/ narrow ANDRE, no LOBs noted but decreased balance w/ turns. Pt would benefit from rehab upon d/c for strengthening of L UE/LE and for gait and balance training.          Outcome Measures       09/17/17 1500 09/16/17 1600       How much help from another person do you currently need...    Turning from your back to your side while in flat bed without using bedrails? 3  -SD 4  -SA     Moving from lying on back to sitting on the side of a flat bed without bedrails? 3  -SD 3  -SA     Moving to and from a bed to a chair (including a wheelchair)? 3  -SD 3  -SA     Standing up from a chair using your arms (e.g., wheelchair, bedside chair)? 3  -SD 3  -SA     Climbing 3-5 steps with a railing? 2  -SD 2  -SA     To walk in hospital room? 3  -SD 3  -SA     AM-PAC 6 Clicks Score 17  -SD 18  -SA     Functional Assessment    Outcome Measure Options AM-PAC 6 Clicks Basic Mobility (PT)  -SD AM-PAC 6 Clicks Basic Mobility (PT)  -SA       User Key  (r) = Recorded By, (t) = Taken By, (c) = Cosigned By    Initials Name  Provider Type    HESHAM Chen PTA Technician    SA Sharri Rey, PT Physical Therapist           Time Calculation:         PT Charges       09/17/17 1553          Time Calculation    Start Time 1515  -SD      Stop Time 1530  -SD      Time Calculation (min) 15 min  -SD      PT Received On 09/17/17  -SD      PT - Next Appointment 09/18/17  -SD        User Key  (r) = Recorded By, (t) = Taken By, (c) = Cosigned By    Initials Name Provider Type    HESHAM Chen PTA Technician          Therapy Charges for Today     Code Description Service Date Service Provider Modifiers Qty    95226780321 HC PT THER PROC EA 15 MIN 9/17/2017 Shantell Chen PTA GP 1    58691570404 HC PT THER SUPP EA 15 MIN 9/17/2017 Shantell Chen PTA GP 1          PT G-Codes  Outcome Measure Options: AM-PAC 6 Clicks Basic Mobility (PT)    Shantell Chen PTA  9/17/2017

## 2017-09-17 NOTE — PROGRESS NOTES
"DAILY PROGRESS NOTE  [facility]    Patient Identification:  Name: Rosi Vicente  Age: 67 y.o.  Sex: female  :  1950  MRN: [id number]         Primary Care Physician: Dionne Fonseca MD      Subjective  No new c/o.  Believes she is feeling a little stronger. Ambulated today.     Objective:  General Appearance:  Comfortable, well-appearing, in no acute distress and not in pain.    Vital signs: (most recent): Blood pressure 120/82, pulse 70, temperature 98.9 °F (37.2 °C), temperature source Oral, resp. rate 16, height 66\" (167.6 cm), weight 120 lb (54.4 kg), SpO2 97 %, not currently breastfeeding.    Lungs:  Normal respiratory rate and normal effort.  Breath sounds clear to auscultation.    Heart: Normal rate.  Regular rhythm.    Extremities: There is no dependent edema.    Neurological: Patient is alert and oriented to person, place and time.  (Lt sided weakness. ).    Skin:  Warm and dry.                Vital signs in last 24 hours:  Temp:  [98.3 °F (36.8 °C)-98.9 °F (37.2 °C)] 98.9 °F (37.2 °C)  Heart Rate:  [57-70] 70  Resp:  [16-18] 16  BP: (120-165)/() 120/82    Intake/Output:    Intake/Output Summary (Last 24 hours) at 17 1556  Last data filed at 17 0841   Gross per 24 hour   Intake             1200 ml   Output                0 ml   Net             1200 ml           Results from last 7 days  Lab Units 17  0706   WBC 10*3/mm3 5.72   HEMOGLOBIN g/dL 14.1   PLATELETS 10*3/mm3 358     Results from last 7 days  Lab Units 17  0617 17  0438 09/15/17  0508 17  0706   SODIUM mmol/L 140 139 140 133*   POTASSIUM mmol/L 3.6 4.0 3.4* 3.9   CHLORIDE mmol/L 105 107 107 93*   CO2 mmol/L 24.7 23.6 22.9 26.8   BUN mg/dL 5* 8 6* 18   CREATININE mg/dL 0.45* 0.45* 0.41* 0.67   GLUCOSE mg/dL 91 90 86 98   Estimated Creatinine Clearance: 57.8 mL/min (by C-G formula based on Cr of 0.45).  Results from last 7 days  Lab Units 17  0617 17  0438 09/15/17  0508 " 09/13/17  0706   CALCIUM mg/dL 8.2* 8.0* 7.8* 9.3   ALBUMIN g/dL  --   --   --  4.70     Results from last 7 days  Lab Units 09/13/17  0706   ALBUMIN g/dL 4.70   BILIRUBIN mg/dL 0.4   ALK PHOS U/L 68   AST (SGOT) U/L 14   ALT (SGPT) U/L 15       Assessment:  Principal Problem:    Cerebrovascular accident (CVA)  Active Problems:    Hypertension    Hyperlipidemia        Plan:  Cont supportive tx, close monitoring.   D/c planning.     Kushal Call MD  9/17/2017  3:56 PM

## 2017-09-17 NOTE — PLAN OF CARE
Problem: Patient Care Overview (Adult)  Goal: Plan of Care Review  Outcome: Ongoing (interventions implemented as appropriate)    09/17/17 1547 09/17/17 1615   Coping/Psychosocial Response Interventions   Plan Of Care Reviewed With patient --    Outcome Evaluation   Outcome Summary/Follow up Plan --  NIH 2. vital signs stable.no C/O of nausea or vomiting. PT up to chair for all meals. Per PT report appettite is improved. will continue to monitor.   Patient Care Overview   Progress progress toward functional goals as expected --          Problem: Stroke (Ischemic) (Adult)  Goal: Signs and Symptoms of Listed Potential Problems Will be Absent or Manageable (Stroke)  Outcome: Ongoing (interventions implemented as appropriate)    09/15/17 1716 09/17/17 1615   Stroke (Ischemic)   Problems Assessed (Stroke (Ischemic)/TIA) all --    Problems Present (Stroke (Ischemic)/TIA) --  muscle tone abnormal;eating/swallowing impairment;motor/sensory impairment

## 2017-09-18 ENCOUNTER — HOSPITAL ENCOUNTER (INPATIENT)
Facility: HOSPITAL | Age: 67
LOS: 11 days | Discharge: HOME | End: 2017-09-29
Attending: PHYSICAL MEDICINE & REHABILITATION | Admitting: PHYSICAL MEDICINE & REHABILITATION

## 2017-09-18 VITALS
RESPIRATION RATE: 16 BRPM | TEMPERATURE: 98 F | SYSTOLIC BLOOD PRESSURE: 150 MMHG | HEIGHT: 66 IN | HEART RATE: 57 BPM | WEIGHT: 120 LBS | BODY MASS INDEX: 19.29 KG/M2 | DIASTOLIC BLOOD PRESSURE: 99 MMHG | OXYGEN SATURATION: 96 %

## 2017-09-18 DIAGNOSIS — R13.12 OROPHARYNGEAL DYSPHAGIA: ICD-10-CM

## 2017-09-18 DIAGNOSIS — E78.5 HYPERLIPIDEMIA, UNSPECIFIED HYPERLIPIDEMIA TYPE: ICD-10-CM

## 2017-09-18 DIAGNOSIS — G81.94 LEFT HEMIPARESIS (HCC): Primary | ICD-10-CM

## 2017-09-18 DIAGNOSIS — R41.89 IMPAIRED COGNITION: ICD-10-CM

## 2017-09-18 LAB
ANION GAP SERPL CALCULATED.3IONS-SCNC: 11.2 MMOL/L
BUN BLD-MCNC: 10 MG/DL (ref 8–23)
BUN/CREAT SERPL: 26.3 (ref 7–25)
CALCIUM SPEC-SCNC: 8.2 MG/DL (ref 8.6–10.5)
CHLORIDE SERPL-SCNC: 103 MMOL/L (ref 98–107)
CO2 SERPL-SCNC: 22.8 MMOL/L (ref 22–29)
CREAT BLD-MCNC: 0.38 MG/DL (ref 0.57–1)
GFR SERPL CREATININE-BSD FRML MDRD: >150 ML/MIN/1.73
GLUCOSE BLD-MCNC: 89 MG/DL (ref 65–99)
POTASSIUM BLD-SCNC: 3.7 MMOL/L (ref 3.5–5.2)
SODIUM BLD-SCNC: 137 MMOL/L (ref 136–145)

## 2017-09-18 PROCEDURE — 80048 BASIC METABOLIC PNL TOTAL CA: CPT | Performed by: HOSPITALIST

## 2017-09-18 PROCEDURE — 99231 SBSQ HOSP IP/OBS SF/LOW 25: CPT | Performed by: NURSE PRACTITIONER

## 2017-09-18 PROCEDURE — 97535 SELF CARE MNGMENT TRAINING: CPT

## 2017-09-18 PROCEDURE — 97530 THERAPEUTIC ACTIVITIES: CPT

## 2017-09-18 PROCEDURE — 97112 NEUROMUSCULAR REEDUCATION: CPT

## 2017-09-18 PROCEDURE — 97110 THERAPEUTIC EXERCISES: CPT

## 2017-09-18 RX ORDER — ASPIRIN 300 MG/1
300 SUPPOSITORY RECTAL DAILY
Status: CANCELLED | OUTPATIENT
Start: 2017-09-19

## 2017-09-18 RX ORDER — ATORVASTATIN CALCIUM 80 MG/1
80 TABLET, FILM COATED ORAL NIGHTLY
Status: CANCELLED | OUTPATIENT
Start: 2017-09-18

## 2017-09-18 RX ORDER — POLYETHYLENE GLYCOL 3350 17 G/17G
17 POWDER, FOR SOLUTION ORAL DAILY PRN
Status: CANCELLED | OUTPATIENT
Start: 2017-09-18

## 2017-09-18 RX ORDER — MONTELUKAST SODIUM 10 MG/1
10 TABLET ORAL DAILY
Status: DISCONTINUED | OUTPATIENT
Start: 2017-09-19 | End: 2017-09-29 | Stop reason: HOSPADM

## 2017-09-18 RX ORDER — ACETAMINOPHEN 325 MG/1
325 TABLET ORAL EVERY 6 HOURS PRN
Status: DISCONTINUED | OUTPATIENT
Start: 2017-09-18 | End: 2017-09-29 | Stop reason: HOSPADM

## 2017-09-18 RX ORDER — POLYETHYLENE GLYCOL 3350 17 G/17G
17 POWDER, FOR SOLUTION ORAL DAILY PRN
Status: DISCONTINUED | OUTPATIENT
Start: 2017-09-18 | End: 2017-09-23 | Stop reason: SDUPTHER

## 2017-09-18 RX ORDER — ASPIRIN 325 MG
325 TABLET ORAL DAILY
Status: DISCONTINUED | OUTPATIENT
Start: 2017-09-19 | End: 2017-09-29 | Stop reason: HOSPADM

## 2017-09-18 RX ORDER — ATORVASTATIN CALCIUM 80 MG/1
80 TABLET, FILM COATED ORAL NIGHTLY
Status: DISCONTINUED | OUTPATIENT
Start: 2017-09-18 | End: 2017-09-29 | Stop reason: HOSPADM

## 2017-09-18 RX ORDER — ACETAMINOPHEN 325 MG/1
325 TABLET ORAL EVERY 6 HOURS PRN
Status: CANCELLED | OUTPATIENT
Start: 2017-09-18

## 2017-09-18 RX ORDER — MONTELUKAST SODIUM 10 MG/1
10 TABLET ORAL DAILY
Status: CANCELLED | OUTPATIENT
Start: 2017-09-19

## 2017-09-18 RX ORDER — CLOPIDOGREL BISULFATE 75 MG/1
75 TABLET ORAL DAILY
Status: CANCELLED | OUTPATIENT
Start: 2017-09-19

## 2017-09-18 RX ORDER — ASPIRIN 300 MG/1
300 SUPPOSITORY RECTAL DAILY
Status: DISCONTINUED | OUTPATIENT
Start: 2017-09-19 | End: 2017-09-18

## 2017-09-18 RX ORDER — CLOPIDOGREL BISULFATE 75 MG/1
75 TABLET ORAL DAILY
Status: DISCONTINUED | OUTPATIENT
Start: 2017-09-19 | End: 2017-09-29 | Stop reason: HOSPADM

## 2017-09-18 RX ORDER — ASPIRIN 325 MG
325 TABLET ORAL DAILY
Status: CANCELLED | OUTPATIENT
Start: 2017-09-19

## 2017-09-18 RX ADMIN — CLOPIDOGREL 75 MG: 75 TABLET, FILM COATED ORAL at 08:49

## 2017-09-18 RX ADMIN — ACETAMINOPHEN 325 MG: 325 TABLET ORAL at 08:49

## 2017-09-18 RX ADMIN — ACETAMINOPHEN 325 MG: 325 TABLET ORAL at 18:18

## 2017-09-18 RX ADMIN — MONTELUKAST 10 MG: 10 TABLET, FILM COATED ORAL at 08:49

## 2017-09-18 RX ADMIN — ASPIRIN 325 MG: 325 TABLET ORAL at 08:49

## 2017-09-18 RX ADMIN — ATORVASTATIN CALCIUM 80 MG: 80 TABLET, FILM COATED ORAL at 20:34

## 2017-09-18 NOTE — NURSING NOTE
Noted dc plan for today. Bed is available. WIll need to have home meds clarified as to what MD wants to continue as well as clarify if  mg is to continue (order not carried over to acute rehab)    Thanks  Ambika Kingsley RN  Admission Nurse

## 2017-09-18 NOTE — PLAN OF CARE
Problem: Inpatient Occupational Therapy  Goal: Transfer Training Goal 1 LTG- OT  Outcome: Revised    09/18/17 1119   Transfer Training OT LTG   Transfer Training OT LTG, Date Established 09/18/17   Transfer Training OT LTG, Time to Achieve 1 wk   Transfer Training OT LTG, Activity Type sit to stand/stand to sit;toilet;bed to chair /chair to bed   Transfer Training OT LTG, [place] Level supervision required   Transfer Training OT LTG, Assist Device walker, rolling   Transfer Training OT LTG, Date Goal Reviewed 09/18/17   Transfer Training OT LTG, Outcome goal revised   Transfer Training OT LTG, Reason Goal Not Met unable to make needed progress       Goal: Isolated Movement Goal LTG- OT  Outcome: Ongoing (interventions implemented as appropriate)    09/18/17 1119   Isolated Movement OT LTG   Isolated Movement OT LTG, Date Established 09/18/17   Isolated Movement OT LTG, Time to Achieve 1 wk   Isolated Movement OT LTG, Body Area left shoulder;left elbow;left forearm;left wrist;left fingers   Isolated Movement OT LTG, Level (7 reps of A/AROM L UE for ability to use as gross assist. )   Isolated Movement OT LTG, Date Goal Reviewed 09/18/17   Isolated Movement OT LTG, Outcome goal revised   Isolated Movement OT LTG, Reason Goal Not Met goals revised this date       Goal: Coordination Goal LTG- OT  Outcome: Revised    09/18/17 1119   Coordination OT LTG   Coordination OT LTG, Date Established 09/18/17   Coordination OT LTG, Time to Achieve 1 wk   Coordination OT LTG, [place] Level (Pt to grasp and release items L hand during ADL with min VC)   Coordination OT LTG, Date Goal Reviewed 09/18/17   Coordination OT LTG, Outcome goal revised   Coordination OT LTG, Reason Goal Not Met goals revised this date       Goal: ADL Goal LTG- OT  Outcome: Revised    09/18/17 1119   ADL OT LTG   ADL OT LTG, Date Established 09/18/17   ADL OT LTG, Time to Achieve 1 wk   ADL OT LTG, Activity Type ADL skills   ADL OT LTG,  [place] Level contact guard;assistive device   ADL OT LTG, Date Goal Reviewed 09/18/17   ADL OT LTG, Outcome goal revised   ADL OT LTG, Reason Goal Not Met unable to make needed progress

## 2017-09-18 NOTE — PLAN OF CARE
Problem: Stroke (Ischemic) (Adult)  Goal: Signs and Symptoms of Listed Potential Problems Will be Absent or Manageable (Stroke)  Outcome: Ongoing (interventions implemented as appropriate)    09/18/17 0800   Stroke (Ischemic)   Problems Assessed (Stroke (Ischemic)/TIA) muscle tone abnormal;motor/sensory impairment   Problems Present (Stroke (Ischemic)/TIA) muscle tone abnormal;motor/sensory impairment

## 2017-09-18 NOTE — THERAPY TREATMENT NOTE
Acute Care - Physical Therapy Treatment Note  [facility]     Patient Name: Rosi Vicente  : 1950  MRN: [id number]  Today's Date: 2017  Onset of Illness/Injury or Date of Surgery Date: 17  Date of Referral to PT: 17  Referring Physician: Benjamin Cardona    Admit Date: 2017    Visit Dx:    ICD-10-CM ICD-9-CM   1. Cerebrovascular accident (CVA), unspecified mechanism I63.9 434.91   2. Oropharyngeal dysphagia R13.12 787.22   3. Difficulty walking R26.2 719.7     Patient Active Problem List   Diagnosis   • Hypertension   • Allergic rhinitis   • Trigeminal neuralgia   • Hyperlipidemia   • New daily persistent headache   • Osteoarthritis of hip   • Vitamin D deficiency   • Cerebrovascular accident (CVA)               Adult Rehabilitation Note       17 0800 17 1515 17 1600    Rehab Assessment/Intervention    Discipline physical therapy assistant  -CW physical therapy assistant  -SD physical therapist  -SA    Document Type therapy note (daily note)  -CW therapy note (daily note)  -SD therapy note (daily note)  -SA    Subjective Information agree to therapy;complains of;weakness;fatigue  -CW agree to therapy  -SD agree to therapy;no complaints  -SA    Patient Effort, Rehab Treatment good  -CW good  -SD good  -SA    Precautions/Limitations fall precautions  -CW fall precautions  -SD     Recorded by [CW] Krystian Tobar [SD] Shantell Chen PTA [SA] Sharri Rey, PT    Vital Signs    O2 Delivery Pre Treatment room air  -CW      Recorded by [CW] Krystian Tobar      Pain Assessment    Pain Assessment No/denies pain  -CW No/denies pain  -SD No/denies pain  -SA    Recorded by [CW] Krystian Tobar [SD] Shantell Chen PTA [SA] Sharri Rey, PT    Cognitive Assessment/Intervention    Current Cognitive/Communication Assessment functional  -CW functional  -SD functional  -SA    Orientation Status oriented x 4  -CW oriented x 4  -SD     Follows Commands/Answers  Questions 100% of the time  -% of the time;able to follow single-step instructions  -% of the time;able to follow single-step instructions  -SA    Personal Safety WNL/WFL  -CW WNL/WFL  -SD WNL/WFL  -SA    Personal Safety Interventions fall prevention program maintained;gait belt;muscle strengthening facilitated;nonskid shoes/slippers when out of bed  -CW fall prevention program maintained;gait belt;nonskid shoes/slippers when out of bed  -SD fall prevention program maintained;gait belt;nonskid shoes/slippers when out of bed;supervised activity  -SA    Recorded by [CW] Krystian Tobar [SD] Shantell Chen PTA [SA] Sharri Rey PT    Bed Mobility, Assessment/Treatment    Bed Mobility, Scoot/Bridge, Campbell   supervision required  -SA    Bed Mob, Supine to Sit, [place] supervision required  -CW contact guard assist;minimum assist (75% patient effort)  -SD minimum assist (75% patient effort)  -SA    Bed Mob, Sit to Supine, [place] supervision required  -CW not tested   up in chair  -SD supervision required  -SA    Bed Mobility, Safety Issues  decreased use of arms for pushing/pulling  -SD     Bed Mobility, Impairments  strength decreased  -SD     Bed Mobility, Comment  increased difficulty getting OOB on R vs L  -SD     Recorded by [CW] Krystian Tobar [SD] Shantell Chen PTA [SA] Sharri Rey PT    Transfer Assessment/Treatment    Transfers, Sit-Stand [place] stand by assist  -CW contact guard assist;verbal cues required  -SD contact guard assist  -SA    Transfers, Stand-Sit [place] stand by assist  -CW contact guard assist;verbal cues required  -SD contact guard assist  -SA    Transfers, Sit-Stand-Sit, Assist Device rolling walker  -CW rolling walker  -SD rolling walker  -SA    Toilet Transfer, Campbell   contact guard assist  -SA    Toilet Transfer, Assistive Device   rolling walker  -SA    Transfer, Impairments  strength decreased  -SD     Transfer, Comment   VCs for attention to L UE  -SD Pt unable to use LUE 2' to weakness  -SA    Recorded by [CW] Krystian Tobar [SD] Shantell Chen PTA [SA] Sharri Rey PT    Gait Assessment/Treatment    Gait, [place] Level contact guard assist  -CW minimum assist (75% patient effort);verbal cues required;1 person + 1 person to manage equipment  -SD minimum assist (75% patient effort)  -SA    Gait, Assistive Device rolling walker  -CW rolling walker  -SD rolling walker  -SA    Gait, Distance (Feet) 100  -CW 50  -SD 20  -SA    Gait, Gait Pattern Analysis   swing-to gait  -SA    Gait, Gait Deviations left:;katja decreased;step length decreased;stride length decreased;decreased heel strike  -CW left:;decreased heel strike;katja decreased;narrow base;step length decreased;toe-to-floor clearance decreased  -SD bilateral:;katja decreased;decreased heel strike;step length decreased  -SA    Gait, Safety Issues step length decreased;balance decreased during turns;sequencing ability decreased  -CW balance decreased during turns;step length decreased;weight-shifting ability decreased  -SD step length decreased;weight-shifting ability decreased;balance decreased during turns  -SA    Gait, Impairments strength decreased;impaired balance  -CW strength decreased;impaired balance  -SD strength decreased;impaired balance  -SA    Gait, Comment Pt requires VC's for safety when amb with RWX for foot place,ent  -CW VCs for increasing heel strike on L LE; good posture correction  -SD     Recorded by [CW] Krystian Tobar [SD] Shantell Chen PTA [SA] Sharri Rey PT    Therapy Exercises    Bilateral Lower Extremities   AROM:;5 reps;ankle pumps/circles;hip flexion;LAQ  -SA    Recorded by   [SA] Sharri Rey PT    Positioning and Restraints    Pre-Treatment Position in bed  -CW in bed  -SD in bed  -SA    Post Treatment Position bed  -CW chair  -SD bed  -SA    In Bed notified nsg;supine;call light within reach;encouraged to call  for assist;with nsg  -CW  notified nsg;fowlers;call light within reach;encouraged to call for assist;exit alarm on;side rails up x2;with family/caregiver  -SA    In Chair  reclined;call light within reach;encouraged to call for assist;with family/caregiver;with nsg  -SD     Recorded by [CW] Krystian Tobar [SD] Shantell Burlesonrafael Chen, PTA [SA] Sharri Rey, PT      User Key  (r) = Recorded By, (t) = Taken By, (c) = Cosigned By    Initials Name Effective Dates    SD Shantell Vanessa Chen, PTA 01/27/16 -     CW Krystian Tobar 12/13/16 -     SA Sharri Rey, PT 08/03/17 -                 IP PT Goals       09/13/17 1532          Bed Mobility PT LTG    Bed Mobility PT LTG, Date Established 09/13/17  -MS      Bed Mobility PT LTG, Time to Achieve 5 days  -MS      Bed Mobility PT LTG, Activity Type all bed mobility  -MS      Bed Mobility PT LTG, [place] Level independent  -MS      Transfer Training PT LTG    Transfer Training PT LTG, Date Established 09/13/17  -MS      Transfer Training PT LTG, Time to Achieve 5 days  -MS      Transfer Training PT LTG, Activity Type all transfers  -MS      Transfer Training PT LTG, [place] Level independent  -MS      Gait Training PT LTG    Gait Training Goal PT LTG, Date Established 09/13/17  -MS      Gait Training Goal PT LTG, Time to Achieve 5 days  -MS      Gait Training Goal PT LTG, [place] Level independent  -MS      Gait Training Goal PT LTG, Distance to Achieve 400 feet  -MS        User Key  (r) = Recorded By, (t) = Taken By, (c) = Cosigned By    Initials Name Provider Type    MS Antonio LICO Ab, PT Physical Therapist          Physical Therapy Education     Title: PT OT SLP Therapies (Done)     Topic: Physical Therapy (Done)     Point: Mobility training (Done)    Learning Progress Summary    Learner Readiness Method Response Comment Documented by Status   Patient Acceptance E,TB VU,NR  SD 09/17/17 1547 Done    Acceptance E VU,NR  SA 09/16/17 1639 Done    Acceptance  E,TB VU,DU   09/14/17 0919 Done    Acceptance E,D VU,NR  MS 09/13/17 1532 Done               Point: Home exercise program (Done)    Learning Progress Summary    Learner Readiness Method Response Comment Documented by Status   Patient Acceptance E,TB VU,NR  SD 09/17/17 1547 Done    Acceptance E VU,NR   09/16/17 1639 Done               Point: Body mechanics (Done)    Learning Progress Summary    Learner Readiness Method Response Comment Documented by Status   Patient Acceptance E,TB VU,NR  SD 09/17/17 1547 Done    Acceptance E VU,NR   09/16/17 1639 Done    Acceptance E,TB VU,DU   09/14/17 0919 Done    Acceptance E,D VU,NR  MS 09/13/17 1532 Done               Point: Precautions (Done)    Learning Progress Summary    Learner Readiness Method Response Comment Documented by Status   Patient Acceptance E,TB VU,NR  SD 09/17/17 1547 Done    Acceptance E VU,NR   09/16/17 1639 Done    Acceptance E,TB VU,DU   09/14/17 0919 Done    Acceptance E,D VU,NR  MS 09/13/17 1532 Done                      User Key     Initials Effective Dates Name Provider Type Discipline    MS 12/01/15 -  Antonio Berger, PT Physical Therapist PT    SD 01/27/16 -  Shantell Chen, PTA Technician PT     12/13/16 -  Krystian Tobar Physical Therapy Assistant PT     08/03/17 -  Sharri Rey, CRUZITO Physical Therapist PT                    PT Recommendation and Plan  Anticipated Discharge Disposition: home with assist, home with home health  Planned Therapy Interventions: balance training, bed mobility training, gait training, home exercise program, patient/family education, postural re-education, ROM (Range of Motion), strengthening, transfer training  PT Frequency: daily  Plan of Care Review  Plan Of Care Reviewed With: patient  Progress: progress toward functional goals as expected  Outcome Summary/Follow up Plan: Pt increasing with bed mobility and transfer to [facility]          Outcome Measures       09/18/17 0800 09/17/17 1500 09/16/17  1600    How much help from another person do you currently need...    Turning from your back to your side while in flat bed without using bedrails? 3  -CW 3  -SD 4  -SA    Moving from lying on back to sitting on the side of a flat bed without bedrails? 3  -CW 3  -SD 3  -SA    Moving to and from a bed to a chair (including a wheelchair)? 3  -CW 3  -SD 3  -SA    Standing up from a chair using your arms (e.g., wheelchair, bedside chair)? 3  -CW 3  -SD 3  -SA    Climbing 3-5 steps with a railing? 2  -CW 2  -SD 2  -SA    To walk in hospital room? 3  -CW 3  -SD 3  -SA    AM-PAC 6 Clicks Score 17  -CW 17  -SD 18  -SA    Functional Assessment    Outcome Measure Options AM-PAC 6 Clicks Basic Mobility (PT)  -CW AM-PAC 6 Clicks Basic Mobility (PT)  -SD AM-PAC 6 Clicks Basic Mobility (PT)  -SA      User Key  (r) = Recorded By, (t) = Taken By, (c) = Cosigned By    Initials Name Provider Type    SD Shantell Chen, PTA Technician    CW Krystian Tobar Physical Therapy Assistant    SA Sharri Rey, PT Physical Therapist           Time Calculation:         PT Charges       09/18/17 0852          Time Calculation    Start Time 0826  -CW      Stop Time 0852  -CW      Time Calculation (min) 26 min  -CW      PT Received On 09/18/17  -CW      PT - Next Appointment 09/19/17  -CW        User Key  (r) = Recorded By, (t) = Taken By, (c) = Cosigned By    Initials Name Provider Type    CW Krystian Tobar Physical Therapy Assistant          Therapy Charges for Today     Code Description Service Date Service Provider Modifiers Qty    85227147679 HC PT THER PROC EA 15 MIN 9/18/2017 Krystian Tobar GP 2    15362863169 HC PT THER SUPP EA 15 MIN 9/18/2017 Krystian Tobar GP 2          PT G-Codes  Outcome Measure Options: AM-PAC 6 Clicks Basic Mobility (PT)    Krystian Tobar  9/18/2017

## 2017-09-18 NOTE — PROGRESS NOTES
Case Management Discharge Note    Final Note: acute rehab at [facility]    Discharge Placement     Facility/Agency Request Status Selected? Address Phone Number Fax Number     [facility] Accepted    [facility] [address] [facility] [address] [phone]     [facility] Accepted     [address] [address] [phone]        Other: Other    Discharge Codes: 02  Discharged/transferred to short-term acute care hospital

## 2017-09-18 NOTE — NURSING NOTE
Reviewed with rehab team. Appropriate for our acute inpatient rehab program. Bed available today if medically ready. Pt asleep and did not awaken to update. Continue to follow. Thanks, Sanya RN rehab admission nurse [phone]

## 2017-09-18 NOTE — PROGRESS NOTES
LOS: 5 days   Patient Care Team:  Dionne Fonseca MD as PCP - General (Internal Medicine)  Dionne Fonseca MD as PCP - Claims Attributed  Christopher Young MD as Consulting Physician (Otolaryngology)  Antonio Davila MD as Consulting Physician (Orthopedic Surgery)  Manny Sexton MD as Consulting Physician (Dermatology)  Víctor Polk MD as Consulting Physician (Gastroenterology)  Jj Burden MD as Consulting Physician (Gynecology)  Kori Vaughn MD as Consulting Physician (Neurosurgery)  Hermes Anne MD as Consulting Physician (Orthopedic Surgery)  Félix Fitzpatrick, JONATHAN as Care Coordinator (Population Health)    Chief Complaint:    Chief Complaint   Patient presents with   • Facial Droop       Subjective     Interval History: no new events. She would like to go to acute rehab     Patient Complaints: intermittent headache, improving left side symptoms  Patient Denies: new S/S of stroke   History taken from: patient chart RN    Objective     Vital Signs  Temp:  [98.4 °F (36.9 °C)-98.6 °F (37 °C)] 98.4 °F (36.9 °C)  Heart Rate:  [55-62] 62  Resp:  [15-16] 16  BP: (141-159)/(83-97) 159/97      Physical Examination:  HEENT: Normocephalic, atraumatic   COR: RRR  Resp: Even and unlabored  Extremities: Equal pulses, non distal embolization    Neurological:   MS: AO. Language normal. No neglect. Higher integrative function normal  CN: mild left facial droop, mild dysarthria  Motor: 5/5, normal tone on the right. 3/5 on the left arm and 4/5 leg and 3/5 hand  Sensory: Intact  Coordination: slow fine motor movements on the left hand    Results Review:     I reviewed the patient's new clinical results.      Results from last 7 days  Lab Units 09/13/17  0706   HEMOGLOBIN A1C % 5.30       Results from last 7 days  Lab Units 09/13/17  0706   WBC 10*3/mm3 5.72   HEMOGLOBIN g/dL 14.1   HEMATOCRIT % 42.6   PLATELETS 10*3/mm3 358       Results from last 7 days  Lab Units 09/13/17  0706   CHOLESTEROL  mg/dL 219*     Lab Results   Component Value Date    LDLCALC 134 (H) 09/13/2017       Results from last 7 days  Lab Units 09/18/17  0425 09/17/17  0617 09/16/17  0438  09/13/17  0706   SODIUM mmol/L 137 140 139  < > 133*   POTASSIUM mmol/L 3.7 3.6 4.0  < > 3.9   CHLORIDE mmol/L 103 105 107  < > 93*   CO2 mmol/L 22.8 24.7 23.6  < > 26.8   BUN mg/dL 10 5* 8  < > 18   CREATININE mg/dL 0.38* 0.45* 0.45*  < > 0.67   CALCIUM mg/dL 8.2* 8.2* 8.0*  < > 9.3   BILIRUBIN mg/dL  --   --   --   --  0.4   ALK PHOS U/L  --   --   --   --  68   ALT (SGPT) U/L  --   --   --   --  15   AST (SGOT) U/L  --   --   --   --  14   GLUCOSE mg/dL 89 91 90  < > 98   < > = values in this interval not displayed.    Medication Review: reviewed, changes made    Assessment/Plan  Ms. Vicente is a 68yo with HTN and HLD who presented on 9/13 with progressive left hemiparesis and dysarthria. Imaging confirmed the presence of a patchy deep right basal ganglionic and corona radiata infarct. The patient feels that her left side symptoms wax and wane, her exam is stable. She can work with therapy, consult acute rehab. 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 If the symptoms do not progress then the overall neurological prognosis is good. Continue DAPT and high dose lipitor. She needs aggressive risk factor control. Please call with any questions or concerns. Okay to transfer to rehab. FU in 3 months.     Plan:   - Tylenol 325mg PO every 6 hours PRN for headache    - continue Aspirin 325mg, Plavix 75mg and Lipitor   - fish oil daily     - Neurochecks every 4 hours   - D/C IVF   - Non-pharmacological DVT prophylaxis   - EKG Tele   - PT/OT/ST   - Stroke Education   - Blood pressure control to <130/80   - Goal LDL <70-recommend high dose statins-    - Serum glucose < 140   - CCP for D/C planning, acute rehab    - okay to transfer to rehab   Principal Problem:    Cerebrovascular accident (CVA)  Active Problems:     Hypertension    Hyperlipidemia      I have discussed the above with the patient, nurse and family      JUAN FRANCISCO Carlos  09/18/17  1:23 PM

## 2017-09-18 NOTE — DISCHARGE SUMMARY
PHYSICIAN DISCHARGE SUMMARY                                                                        [facility]    Patient Identification:  Name: Rosi Vicente  Age: 67 y.o.  Sex: female  :  1950  MRN: [id number]  Primary Care Physician: Dionne Fonseca MD    Admit date: 2017  Discharge date and time: 2017     Discharged Condition: good    Discharge Diagnoses:Principal Problem:    Cerebrovascular accident (CVA)  Active Problems:    Hypertension    Hyperlipidemia         Hospital Course:  Pleasant 67-year-old female presenting with left-sided weakness including face arm and leg.  Please see H&P for full details.  Initial CT scan showed no acute pathology.  Follow-up MRI revealed:  Area of acute infarction involving the periventricular white  matter of the right frontal lobe extending inferiorly to the posterior  limb of the internal capsule measuring 13-14 mm in AP dimension and  approximately 5 mm in maximum transverse dimension.  MRI of the head and neck did show evidence of atherosclerosis but no critical stenosis.  Echocardiogram showed no evidence of any embolic source.  She was started on daily aspirin and hydrated.  Initially there is progression of symptoms.  There is concern that this represented lipohyalinosis.  She is initially put at bedrest with caution not to elevate the head of bed more than 30°.  As her symptoms stabilized activity was very cautiously increase.  She tolerated this well.  The last couple days her been some mild improvement in her weakness.  She is now able to sit upright and stand.  Speech therapy evaluation did show mild aspiration risks and she is tolerating the modified diet well.  At this point she will be transferred to acute rehabilitation for the remainder of her treatment and follow-up.    Consults:     Consults     Date and Time Order Name Status Description    2017 1641  Inpatient Consult to Cardiology      9/13/2017 0815 Inpatient Consult to Neurology Completed     9/13/2017 0756 [facility] (on-call MD unless specified) Completed             Discharge Exam:  Physical Exam   Afebrile vital signs stable.  Well-developed well-nourished female in no apparent distress.  Lungs clear to auscultation good air movement.  Heart regular rate and rhythm.  Extremities no clubbing cyanosis or edema.  Alert oriented conversant cooperative and pleasant.  There is still some facial asymmetry but a notable improvement from just 2 days ago.  There is still significant weakness in the left upper and lower extremity.       Disposition:  Rehab    Patient Instructions:    Rosi Vicente   Home Medication Instructions KIM:[id number]    Printed on:09/18/17 1600   Medication Information                      Alpha-Lipoic Acid 300 MG capsule  Take 300 mg by mouth daily.             calcium carbonate (OS-CARLOS) 600 MG tablet  Take 600 mg by mouth daily.             cholecalciferol (VITAMIN D3) 1000 UNITS tablet  Take 1,000 Units by mouth Daily.             denosumab (PROLIA) 60 MG/ML solution syringe  Inject  under the skin 1 (One) Time.             famotidine (PEPCID) 20 MG tablet  Take 1 tablet by mouth Daily.             ferrous sulfate 325 (65 FE) MG tablet  Take 325 mg by mouth daily with breakfast.             FLUTICASONE PROPIONATE, INHAL, IN  Inhale 2 puffs daily.             gabapentin (NEURONTIN) 100 MG capsule  Take 1 capsule by mouth 3 (Three) Times a Day.             guaiFENesin (MUCINEX) 600 MG 12 hr tablet  Take 1,200 mg by mouth Daily.             lisinopril-hydrochlorothiazide (PRINZIDE,ZESTORETIC) 20-12.5 MG per tablet  Take 1 tablet by mouth Daily.             LYSINE PO  Take 1 tablet by mouth Daily.             meloxicam (MOBIC) 15 MG tablet  Take 1 tablet by mouth Daily.             montelukast (SINGULAIR) 10 MG tablet  Take 1 tablet by mouth Daily.             polyethyl glycol-propyl glycol  (SYSTANE) 0.4-0.3 % solution ophthalmic solution  Apply 1 drop to eye every 1 (one) hour as needed.             vitamin B-12 (CYANOCOBALAMIN) 1000 MCG tablet  Take 1,000 mcg by mouth 2 (two) times a day.               Future Appointments  Date Time Provider Department Center   2/20/2018 8:00 AM MD RADAMES Mahoney None     Follow-up Information     Follow up with [facility] .    Specialty:  Physical Medicine and Rehabilitation    Contact information:    [address] [facility] [address] [phone]        Discharge Order     Start     Ordered    09/18/17 1433  Discharge readmit patient  Once     Expected Discharge Date:  09/18/17    Discharge Disposition:  Rehab Facility or Unit ([facility])        09/18/17 1432                Signed:  Kushal Call MD  9/18/2017  4:00 PM    EMR Dragon/Transcription disclaimer:   Much of this encounter note is an electronic transcription/translation of spoken language to printed text. The electronic translation of spoken language may permit erroneous, or at times, nonsensical words or phrases to be inadvertently transcribed; Although I have reviewed the note for such errors, some may still exist.

## 2017-09-18 NOTE — THERAPY TREATMENT NOTE
"Acute Care - Occupational Therapy Progress Note  [facility]     Patient Name: Rosi Vicente  : 1950  MRN: [id number]  Today's Date: 2017  Onset of Illness/Injury or Date of Surgery Date: 17  Date of Referral to OT: 17  Referring Physician: Benjamin Cardona      Admit Date: 2017    Visit Dx:     ICD-10-CM ICD-9-CM   1. Cerebrovascular accident (CVA), unspecified mechanism I63.9 434.91   2. Oropharyngeal dysphagia R13.12 787.22   3. Difficulty walking R26.2 719.7     Patient Active Problem List   Diagnosis   • Hypertension   • Allergic rhinitis   • Trigeminal neuralgia   • Hyperlipidemia   • New daily persistent headache   • Osteoarthritis of hip   • Vitamin D deficiency   • Cerebrovascular accident (CVA)             Adult Rehabilitation Note       17 1022 17 0800 17 1515    Rehab Assessment/Intervention    Discipline occupational therapist  -LE physical therapy assistant  -CW physical therapy assistant  -SD    Document Type therapy note (daily note)  -LE therapy note (daily note)  -CW therapy note (daily note)  -SD    Subjective Information agree to therapy  -LE agree to therapy;complains of;weakness;fatigue  -CW agree to therapy  -SD    Patient Effort, Rehab Treatment excellent  -LE good  -CW good  -SD    Symptoms Noted During/After Treatment fatigue   fatigue  A/AROM L UE \"this is the hardest thing I've done\"  -LE      Precautions/Limitations fall precautions   per RN and HARIS notes pt ok to be OOB  -LE fall precautions  -CW fall precautions  -SD    Specific Treatment Considerations --   return to room per Rehab Admission request for full ADL  -LE      Recorded by [LE] JEY Guerrero [CW] Krystian Tobar [SD] Shantell Chen, PTA    Vital Signs    O2 Delivery Pre Treatment  room air  -CW     Pre Patient Position Supine  -LE      Intra Patient Position Supine  -LE      Post Patient Position Supine  -LE      Activity Duration --   fatigue with L UE A/AROM  " -LE      Recorded by [LE] JEY Guerrero [CW] Krystian Tobar     Pain Assessment    Pain Assessment No/denies pain   no pain at present. h/o L shld pain-has shld sx planned  -LE No/denies pain  -CW No/denies pain  -SD    Recorded by [LE] JEY Guerrero [CW] Krystian Tobar [SD] Shantell Chen, ARUN    Vision Assessment/Intervention    Visual Impairment --   pt denies blurriness or changes.  correct # of fingers to L  -LE      Recorded by [LE] JEY Guerrero      Cognitive Assessment/Intervention    Current Cognitive/Communication Assessment functional  -LE functional  -CW functional  -SD    Orientation Status oriented x 4  -LE oriented x 4  -CW oriented x 4  -SD    Follows Commands/Answers Questions needs cueing;needs increased time   decrease processing, initation during ADL  -% of the time  -% of the time;able to follow single-step instructions  -SD    Personal Safety decreased awareness, need for safety   pt leans back in chair, puts feet on sink during bath.   -LE WNL/WFL  -CW WNL/WFL  -SD    Personal Safety Interventions gait belt;fall prevention program maintained;nonskid shoes/slippers when out of bed   ed concern for tipping back chair, VC wash feet leg cross  -LE fall prevention program maintained;gait belt;muscle strengthening facilitated;nonskid shoes/slippers when out of bed  -CW fall prevention program maintained;gait belt;nonskid shoes/slippers when out of bed  -SD    Recorded by [LE] JEY Guerrero [CW] Krystian Tobar [SD] Shantell Chen, ARUN    ROM (Range of Motion)    General ROM --   L AROM: min shld shrug,1/5 shld ext, 1/2 elbow flex, cont  -LE      General ROM Detail --   cont 1/3 sup/pron, 1/2 finger flex/ext.    -LE      Recorded by [BETY] JEY Guerrero      MMT (Manual Muscle Testing)    General MMT Assessment --   deferred due to impaired ROM  -LE      Recorded by [LE] JEY Guerrero      Bed Mobility, Assessment/Treatment    Bed Mobility, Assistive  Device --   flat bed. no rail  -LE      Bed Mobility, Roll Left, [place] supervision required  -LE      Bed Mobility, Scoot/Bridge, [place] supervision required   grab headboard  -LE      Bed Mob, Supine to Sit, [place] supervision required  -LE supervision required  -CW contact guard assist;minimum assist (75% patient effort)  -SD    Bed Mob, Sit to Supine, [place] supervision required  -LE supervision required  -CW not tested   up in chair  -SD    Bed Mobility, Safety Issues   decreased use of arms for pushing/pulling  -SD    Bed Mobility, Impairments --   increase processing time   -LE  strength decreased  -SD    Bed Mobility, Comment   increased difficulty getting OOB on R vs L  -SD    Recorded by [LE] Peri Betancourt, OTR [CW] Krystian Tobar [SD] Shantell Chen, ARUN    Transfer Assessment/Treatment    Transfers, Sit-Stand [place] minimum assist (75% patient effort)   first stands CGA, with fatigue requires Min A.  L LE weak  -LE stand by assist  -CW contact guard assist;verbal cues required  -SD    Transfers, Stand-Sit [place] minimum assist (75% patient effort)  -LE stand by assist  -CW contact guard assist;verbal cues required  -SD    Transfers, Sit-Stand-Sit, Assist Device rolling walker  -LE rolling walker  -CW rolling walker  -SD    Toilet Transfer, [place] contact guard assist  -LE      Toilet Transfer, Assistive Device rolling walker  -LE      Transfer, Safety Issues balance decreased during turns;sequencing ability decreased;step length decreased   LOB to left side during stand to wash bottom.   -LE      Transfer, Impairments muscle tone abnormal;ROM decreased;strength decreased;impaired balance;motor control impaired  -LE  strength decreased  -SD    Transfer, Comment --   cues for hand placement, positioning, use of walker  -LE  VCs for attention to L UE  -SD    Recorded by [LE] Peri Betancourt, OTR [CW] Krystian Tobar [SD] Shantell Chen PTA    Gait  Assessment/Treatment    Gait, [place] Level  contact guard assist  -CW minimum assist (75% patient effort);verbal cues required;1 person + 1 person to manage equipment  -SD    Gait, Assistive Device  rolling walker  -CW rolling walker  -SD    Gait, Distance (Feet)  100  -CW 50  -SD    Gait, Gait Deviations  left:;katja decreased;step length decreased;stride length decreased;decreased heel strike  -CW left:;decreased heel strike;katja decreased;narrow base;step length decreased;toe-to-floor clearance decreased  -SD    Gait, Safety Issues  step length decreased;balance decreased during turns;sequencing ability decreased  -CW balance decreased during turns;step length decreased;weight-shifting ability decreased  -SD    Gait, Impairments  strength decreased;impaired balance  -CW strength decreased;impaired balance  -SD    Gait, Comment  Pt requires VC's for safety when amb with RWX for foot place,ent  -CW VCs for increasing heel strike on L LE; good posture correction  -SD    Recorded by  [CW] Krystian Tobar [SD] Shantell Chen PTA    Functional Mobility    Functional Mobility- Ind. Level minimum assist (75% patient effort)   CGA to bathroom, Min A out of bathroom due to fatigue   -LE      Functional Mobility- Device rolling walker  -LE      Functional Mobility-Distance (Feet) 20  -LE      Functional Mobility- Safety Issues balance decreased during turns;sequencing ability decreased;step length decreased  -LE      Recorded by [LE] JEY Guerrero      Upper Body Bathing Assessment/Training    UB Bathing Assess/Train, Position sink side;standing  -LE      UB Bathing Assess/Train, [place] Level minimum assist (75% patient effort);stand by assist;supervision required;set up required   assist wash under R UE (unable reach and grasp w/ L hand)  -LE      UB Bathing Assess/Train, Impairments muscle tone abnormal;decreased flexibility;ROM decreased;strength decreased  -LE      Recorded by [BETY] Peri  JEY Betancourt      Lower Body Bathing Assessment/Training    LB Bathing Assess/Train, Position supported sitting;supported standing;sink side  -LE      LB Bathing Assess/Train, [place] Level minimum assist (75% patient effort)   Min balance support. cues to not lean back in chair  -LE      LB Bathing Assess/Train, Impairments ROM decreased;impaired balance;motor control impaired  -LE      Recorded by [BETY] JEY Guerrero      Upper Body Dressing Assessment/Training    UB Dressing Assess/Train, Clothing Type doffing:;donning:;hospital gown  -LE      UB Dressing Assess/Train, Position supported standing;sink side  -LE      UB Dressing Assess/Train, [place] minimum assist (75% patient effort)   A to thread L UE  -LE      UB Dressing Assess/Train, Impairments muscle tone abnormal;ROM decreased;strength decreased;coordination impaired;motor control impaired  -LE      Recorded by [BETY] JEY Guerrero      Lower Body Dressing Assessment/Training    LB Dressing Assess/Train, Clothing Type doffing:;donning:;pants;shoes   dian/doff shoes & slippers, doff socks. dian underwear/pants  -LE      LB Dressing Assess/Train, Position supported sitting;supported standing  -LE      LB Dressing Assess/Train, [place] moderate assist (50% patient effort)   A thread L LE, L shoe, B laces, Balance support to hike.    -LE      LB Dressing Assess/Train, Impairments muscle tone abnormal;decreased flexibility;ROM decreased;sensation decreased;strength decreased;impaired balance;coordination impaired;motor control impaired  -LE      LB Dressing Assess/Train, Comment --   pt thinks underwear on backward, ed look for tag.    -LE      Recorded by [BETY] JEY Guerrero      Toileting Assessment/Training    Toileting Assess/Train, Position supported standing;sitting  -LE      Toileting Assess/Train, [place] Level contact guard assist   balance support  -LE      Recorded by [BETY] JEY Guerrero      Balance Skills Training     Standing-Level of Assistance --   with fatigue balance worsens.  LOB to left while standing  -LE      Recorded by [BETY] JEY Guerrero      Therapy Exercises    Left Upper Extremity AAROM:;AROM:;5 reps;supine;elbow flexion/extension;hand pumps;pronation/supination;shoulder rolls/shrugs;shoulder extension/flexion;shoulder ER/IR   fatigue w/ 3-5 reps. ed pt to complete on own  -LE      Recorded by [BETY] JEY Guerrero      Fine Motor Coordination Training    Detail (Fine Motor Coordination Training) --   Max diff to grasp, release and transfer remote between hands  -LE      Recorded by [BETY] JEY Guerrero      Neuromuscular Re-education    Neuromuscular Re-Ed Techniques Attention to left side during functional activities;Teaching proper positioning of UE with bed mobility;Placement exercises;Gross motor task- grasp/release  -LE      Recorded by [BETY] JEY Guerrero      Positioning and Restraints    Pre-Treatment Position in bed  -LE in bed  -CW in bed  -SD    Post Treatment Position bed  -LE bed  -CW chair  -SD    In Bed notified nsg;supine;call light within reach;encouraged to call for assist;exit alarm on;with family/caregiver;SCD pump applied;heels elevated   with HARIS nurse  -LE notified nsg;supine;call light within reach;encouraged to call for assist;with nsg  -CW     In Chair   reclined;call light within reach;encouraged to call for assist;with family/caregiver;with nsg  -SD    Recorded by [BETY] JEY Guerrero [CW] Krystian Tobar [SD] Shantell Chen, PTA      09/16/17 1600          Rehab Assessment/Intervention    Discipline physical therapist  -SA      Document Type therapy note (daily note)  -SA      Subjective Information agree to therapy;no complaints  -SA      Patient Effort, Rehab Treatment good  -SA      Recorded by [SA] Sharri Rey, PT      Pain Assessment    Pain Assessment No/denies pain  -SA      Recorded by [SA] Sharri Rey PT      Cognitive Assessment/Intervention    Current  Cognitive/Communication Assessment functional  -SA      Follows Commands/Answers Questions 100% of the time;able to follow single-step instructions  -SA      Personal Safety WNL/WFL  -SA      Personal Safety Interventions fall prevention program maintained;gait belt;nonskid shoes/slippers when out of bed;supervised activity  -SA      Recorded by [SA] Sharri Rey PT      Bed Mobility, Assessment/Treatment    Bed Mobility, Scoot/Bridge, [place] supervision required  -SA      Bed Mob, Supine to Sit, [place] minimum assist (75% patient effort)  -SA      Bed Mob, Sit to Supine, [place] supervision required  -SA      Recorded by [SA] Sharri Rey PT      Transfer Assessment/Treatment    Transfers, Sit-Stand [place] contact guard assist  -SA      Transfers, Stand-Sit [place] contact guard assist  -SA      Transfers, Sit-Stand-Sit, Assist Device rolling walker  -SA      Toilet Transfer, [place] contact guard assist  -SA      Toilet Transfer, Assistive Device rolling walker  -SA      Transfer, Comment Pt unable to use LUE 2' to weakness  -SA      Recorded by [SA] Sharri Rey PT      Gait Assessment/Treatment    Gait, [place] Level minimum assist (75% patient effort)  -SA      Gait, Assistive Device rolling walker  -SA      Gait, Distance (Feet) 20  -SA      Gait, Gait Pattern Analysis swing-to gait  -SA      Gait, Gait Deviations bilateral:;katja decreased;decreased heel strike;step length decreased  -SA      Gait, Safety Issues step length decreased;weight-shifting ability decreased;balance decreased during turns  -SA      Gait, Impairments strength decreased;impaired balance  -SA      Recorded by [SA] Sharri Rey PT      Therapy Exercises    Bilateral Lower Extremities AROM:;5 reps;ankle pumps/circles;hip flexion;LAQ  -SA      Recorded by [SA] Sharri Rey PT      Positioning and Restraints    Pre-Treatment Position in bed  -SA      Post Treatment Position bed  -SA      In Bed  notified nsg;fowlers;call light within reach;encouraged to call for assist;exit alarm on;side rails up x2;with family/caregiver  -SA      Recorded by [SA] Sharri Rey, PT        User Key  (r) = Recorded By, (t) = Taken By, (c) = Cosigned By    Initials Name Effective Dates    BETY Peri Betancourt, OTR 04/13/15 -     SD Shantell Chen, PTA 01/27/16 -     CW Krystian Tobar 12/13/16 -     SA Sharri Rey, PT 08/03/17 -                 OT Goals       09/18/17 1119 09/13/17 1458       Transfer Training OT LTG    Transfer Training OT LTG, Date Established 09/18/17  -LE 09/13/17  -SO     Transfer Training OT LTG, Time to Achieve 1 wk  -LE 1 wk  -SO     Transfer Training OT LTG, Activity Type sit to stand/stand to sit;toilet;bed to chair /chair to bed  -LE sit to stand/stand to sit;toilet;shower chair  -SO     Transfer Training OT LTG, [place] Level supervision required  -LE conditional independence;supervision required  -SO     Transfer Training OT LTG, Assist Device walker, rolling  -LE      Transfer Training OT LTG, Date Goal Reviewed 09/18/17  -LE      Transfer Training OT LTG, Outcome goal revised  -LE      Transfer Training OT LTG, Reason Goal Not Met unable to make needed progress  -LE      Isolated Movement OT LTG    Isolated Movement OT LTG, Date Established 09/18/17  -LE 09/13/17  -SO     Isolated Movement OT LTG, Time to Achieve 1 wk  -LE 1 wk  -SO     Isolated Movement OT LTG, Body Area left shoulder;left elbow;left forearm;left wrist;left fingers  -LE left scapula;left shoulder;left elbow;left forearm;left wrist;left fingers  -SO     Isolated Movement OT LTG, Level --   7 reps of A/AROM L UE for ability to use as gross assist.   -LE WFL;facilitate movement  -SO     Isolated Movement OT LTG, Date Goal Reviewed 09/18/17  -LE      Isolated Movement OT LTG, Outcome goal revised  -LE      Isolated Movement OT LTG, Reason Goal Not Met goals revised this date  -LE      Coordination OT LTG    Coordination OT  LTG, Date Established 09/18/17  -LE 09/13/17  -SO     Coordination OT LTG, Time to Achieve 1 wk  -LE 1 wk  -SO     Coordination OT LTG, Activity Type  FM written ex program;GM written ex program;FM task;GM task  -SO     Coordination OT LTG, [place] Level --   Pt to grasp and release items L hand during ADL with min VC  -LE independent  -SO     Coordination OT LTG, Additional Goal  LUE  -SO     Coordination OT LTG, Date Goal Reviewed 09/18/17  -LE      Coordination OT LTG, Outcome goal revised  -LE      Coordination OT LTG, Reason Goal Not Met goals revised this date  -LE      ADL OT LTG    ADL OT LTG, Date Established 09/18/17  -LE 09/13/17  -SO     ADL OT LTG, Time to Achieve 1 wk  -LE 1 wk  -SO     ADL OT LTG, Activity Type ADL skills  -LE ADL skills  -SO     ADL OT LTG, [place] Level contact guard;assistive device  -LE modified independent;standby assist  -SO     ADL OT LTG, Date Goal Reviewed 09/18/17  -LE      ADL OT LTG, Outcome goal revised  -LE      ADL OT LTG, Reason Goal Not Met unable to make needed progress  -LE        User Key  (r) = Recorded By, (t) = Taken By, (c) = Cosigned By    Initials Name Provider Type    BETY Betancourt, OTR Occupational Therapist    SO Jenna Wilson, OTR Occupational Therapist          Occupational Therapy Education     Title: PT OT SLP Therapies (Done)     Topic: Occupational Therapy (Done)     Point: ADL training (Done)    Description: Instruct learner(s) on proper safety adaptation and remediation techniques during self care or transfers.   Instruct in proper use of assistive devices.    Learning Progress Summary    Learner Readiness Method Response Comment Documented by Status   Patient Acceptance E,TB CATARINO,NURYS  CW 09/18/17 0851 Done    Acceptance E VU Encouraged spouse to sit on L side and to have pt visually scan to the L to improve vision. Encouraged use of LUE during functional tasks. SO 09/13/17 1456 Done   Significant Other Acceptance E VU Encouraged  spouse to sit on L side and to have pt visually scan to the L to improve vision. Encouraged use of LUE during functional tasks. SO 09/13/17 1456 Done               Point: Home exercise program (Done)    Description: Instruct learner(s) on appropriate technique for monitoring, assisting and/or progressing therapeutic exercises/activities.    Learning Progress Summary    Learner Readiness Method Response Comment Documented by Status   Patient Acceptance E,TB VU,NURYS  CW 09/18/17 0851 Done               Point: Precautions (Done)    Description: Instruct learner(s) on prescribed precautions during self-care and functional transfers.    Learning Progress Summary    Learner Readiness Method Response Comment Documented by Status   Patient Acceptance E,TB VU,NURYS  CW 09/18/17 0851 Done               Point: Body mechanics (Done)    Description: Instruct learner(s) on proper positioning and spine alignment during self-care, functional mobility activities and/or exercises.    Learning Progress Summary    Learner Readiness Method Response Comment Documented by Status   Patient Acceptance E,TB VU,NURYS  CW 09/18/17 0851 Done                      User Key     Initials Effective Dates Name Provider Type Discipline    SO 04/13/15 -  Jenna Wilson, OTR Occupational Therapist OT     12/13/16 -  Krystian Tobar Physical Therapy Assistant PT                  OT Recommendation and Plan  Anticipated Discharge Disposition: inpatient rehabilitation facility  Therapy Frequency: 3-5 times/wk  Plan of Care Review  Plan Of Care Reviewed With: patient, spouse  Progress: progress toward functional goals as expected  Outcome Summary/Follow up Plan: Pt demo impaired L UE.  Fatigues with both A/AROM L UE and with walk to bathroom for ADL.  With fatigue pt requires increased assist.  Pt with emerging L grasp but not yet able to use as a gross assist for ADL.          Outcome Measures       09/18/17 1122 09/18/17 1100 09/18/17 0800    How much  help from another person do you currently need...    Turning from your back to your side while in flat bed without using bedrails?   3  -CW    Moving from lying on back to sitting on the side of a flat bed without bedrails?   3  -CW    Moving to and from a bed to a chair (including a wheelchair)?   3  -CW    Standing up from a chair using your arms (e.g., wheelchair, bedside chair)?   3  -CW    Climbing 3-5 steps with a railing?   2  -CW    To walk in hospital room?   3  -CW    AM-PAC 6 Clicks Score   17  -CW    How much help from another is currently needed...    Putting on and taking off regular lower body clothing? 2  -LE      Bathing (including washing, rinsing, and drying) 3  -LE      Toileting (which includes using toilet bed pan or urinal) 3  -LE      Putting on and taking off regular upper body clothing 3  -LE      Taking care of personal grooming (such as brushing teeth) 3  -LE      Eating meals 3  -LE      Score 17  -LE      Functional Assessment    Outcome Measure Options  AM-PAC 6 Clicks Daily Activity (OT)  -LE AM-PAC 6 Clicks Basic Mobility (PT)  -CW      09/17/17 1500 09/16/17 1600       How much help from another person do you currently need...    Turning from your back to your side while in flat bed without using bedrails? 3  -SD 4  -SA     Moving from lying on back to sitting on the side of a flat bed without bedrails? 3  -SD 3  -SA     Moving to and from a bed to a chair (including a wheelchair)? 3  -SD 3  -SA     Standing up from a chair using your arms (e.g., wheelchair, bedside chair)? 3  -SD 3  -SA     Climbing 3-5 steps with a railing? 2  -SD 2  -SA     To walk in hospital room? 3  -SD 3  -SA     AM-PAC 6 Clicks Score 17  -SD 18  -SA     Functional Assessment    Outcome Measure Options AM-PAC 6 Clicks Basic Mobility (PT)  -SD AM-PAC 6 Clicks Basic Mobility (PT)  -SA       User Key  (r) = Recorded By, (t) = Taken By, (c) = Cosigned By    Initials Name Provider Type    JEY Dominguez  Occupational Therapist    HESHAM Chen, PTA Technician    CW Krystian Tobar Physical Therapy Assistant    SA Sharri Rey, PT Physical Therapist           Time Calculation:         Time Calculation- OT       09/18/17 1123 09/18/17 1122       Time Calculation- OT    OT Start Time 1025  -LE 0957  -LE     OT Stop Time 1100  -LE 1017  -LE     OT Time Calculation (min) 35 min  -LE 20 min  -LE     OT Received On  09/18/17  -LE     OT Goal Re-Cert Due Date  09/25/17  -LE       User Key  (r) = Recorded By, (t) = Taken By, (c) = Cosigned By    Initials Name Provider Type    JEY Dominguez Occupational Therapist           Therapy Charges for Today     Code Description Service Date Service Provider Modifiers Qty    09069314011 HC OT SELF CARE/MGMT/TRAIN EA 15 MIN 9/18/2017 JEY Guerrero GO 2    24908903575 HC OT THERAPEUTIC ACT EA 15 MIN 9/18/2017 Peri Betancourt OTR GO 1    28540928549 HC OT NEUROMUSC RE EDUCATION EA 15 MIN 9/18/2017 JEY Guerrero GO 1          OT G-codes  Functional Limitation: Self care  Self Care Current Status (): At least 40 percent but less than 60 percent impaired, limited or restricted  Self Care Goal Status (): At least 20 percent but less than 40 percent impaired, limited or restricted    Peri Betancourt OTR  9/18/2017

## 2017-09-18 NOTE — NURSING NOTE
S/w  regarding PTA meds; he states to only go by his dc med red; ASA was re-ordered. Communicated this to Ambika CASTILLO

## 2017-09-18 NOTE — PLAN OF CARE
Problem: Patient Care Overview (Adult)  Goal: Plan of Care Review  Outcome: Ongoing (interventions implemented as appropriate)    09/18/17 1118   Coping/Psychosocial Response Interventions   Plan Of Care Reviewed With patient;spouse   Outcome Evaluation   Outcome Summary/Follow up Plan Pt demo impaired L UE. Fatigues with both A/AROM L UE and with walk to bathroom for ADL. With fatigue pt requires increased assist. Pt with emerging L grasp but not yet able to use as a gross assist for ADL.    Patient Care Overview   Progress progress toward functional goals as expected

## 2017-09-19 PROBLEM — I63.9 STROKE: Status: ACTIVE | Noted: 2017-09-19

## 2017-09-19 PROCEDURE — 97110 THERAPEUTIC EXERCISES: CPT

## 2017-09-19 PROCEDURE — 97166 OT EVAL MOD COMPLEX 45 MIN: CPT

## 2017-09-19 PROCEDURE — 97112 NEUROMUSCULAR REEDUCATION: CPT

## 2017-09-19 PROCEDURE — 96125 COGNITIVE TEST BY HC PRO: CPT

## 2017-09-19 PROCEDURE — 97161 PT EVAL LOW COMPLEX 20 MIN: CPT

## 2017-09-19 RX ORDER — GABAPENTIN 100 MG/1
100 CAPSULE ORAL NIGHTLY PRN
Status: DISCONTINUED | OUTPATIENT
Start: 2017-09-19 | End: 2017-09-29 | Stop reason: HOSPADM

## 2017-09-19 RX ORDER — MELATONIN
1000 2 TIMES DAILY WITH MEALS
Status: DISCONTINUED | OUTPATIENT
Start: 2017-09-19 | End: 2017-09-29 | Stop reason: HOSPADM

## 2017-09-19 RX ORDER — FAMOTIDINE 20 MG/1
20 TABLET, FILM COATED ORAL DAILY
Status: DISCONTINUED | OUTPATIENT
Start: 2017-09-19 | End: 2017-09-29 | Stop reason: HOSPADM

## 2017-09-19 RX ORDER — CHOLECALCIFEROL (VITAMIN D3) 125 MCG
5 CAPSULE ORAL NIGHTLY PRN
Status: DISCONTINUED | OUTPATIENT
Start: 2017-09-19 | End: 2017-09-29 | Stop reason: HOSPADM

## 2017-09-19 RX ADMIN — ASPIRIN 325 MG: 325 TABLET ORAL at 08:28

## 2017-09-19 RX ADMIN — CLOPIDOGREL 75 MG: 75 TABLET, FILM COATED ORAL at 08:28

## 2017-09-19 RX ADMIN — ACETAMINOPHEN 325 MG: 325 TABLET ORAL at 18:46

## 2017-09-19 RX ADMIN — ATORVASTATIN CALCIUM 80 MG: 80 TABLET, FILM COATED ORAL at 20:23

## 2017-09-19 RX ADMIN — VITAMIN D, TAB 1000IU (100/BT) 1000 UNITS: 25 TAB at 18:46

## 2017-09-19 RX ADMIN — MONTELUKAST 10 MG: 10 TABLET, FILM COATED ORAL at 08:28

## 2017-09-19 RX ADMIN — ACETAMINOPHEN 325 MG: 325 TABLET ORAL at 07:17

## 2017-09-19 RX ADMIN — GABAPENTIN 100 MG: 100 CAPSULE ORAL at 20:23

## 2017-09-19 RX ADMIN — Medication 5 MG: at 21:57

## 2017-09-20 PROCEDURE — 97535 SELF CARE MNGMENT TRAINING: CPT

## 2017-09-20 PROCEDURE — 97112 NEUROMUSCULAR REEDUCATION: CPT

## 2017-09-20 PROCEDURE — 97532: CPT

## 2017-09-20 PROCEDURE — 92526 ORAL FUNCTION THERAPY: CPT

## 2017-09-20 PROCEDURE — 97110 THERAPEUTIC EXERCISES: CPT

## 2017-09-20 RX ADMIN — VITAMIN D, TAB 1000IU (100/BT) 1000 UNITS: 25 TAB at 17:34

## 2017-09-20 RX ADMIN — MONTELUKAST 10 MG: 10 TABLET, FILM COATED ORAL at 09:00

## 2017-09-20 RX ADMIN — Medication 5 MG: at 20:12

## 2017-09-20 RX ADMIN — ASPIRIN 325 MG: 325 TABLET ORAL at 08:59

## 2017-09-20 RX ADMIN — VITAMIN D, TAB 1000IU (100/BT) 1000 UNITS: 25 TAB at 08:59

## 2017-09-20 RX ADMIN — ATORVASTATIN CALCIUM 80 MG: 80 TABLET, FILM COATED ORAL at 20:12

## 2017-09-20 RX ADMIN — FAMOTIDINE 20 MG: 20 TABLET, FILM COATED ORAL at 08:59

## 2017-09-20 RX ADMIN — CLOPIDOGREL 75 MG: 75 TABLET, FILM COATED ORAL at 08:59

## 2017-09-20 RX ADMIN — ACETAMINOPHEN 325 MG: 325 TABLET ORAL at 20:12

## 2017-09-20 RX ADMIN — GABAPENTIN 100 MG: 100 CAPSULE ORAL at 20:12

## 2017-09-21 PROCEDURE — 97110 THERAPEUTIC EXERCISES: CPT

## 2017-09-21 PROCEDURE — 97535 SELF CARE MNGMENT TRAINING: CPT

## 2017-09-21 PROCEDURE — 92526 ORAL FUNCTION THERAPY: CPT | Performed by: SPEECH-LANGUAGE PATHOLOGIST

## 2017-09-21 PROCEDURE — 97532: CPT | Performed by: SPEECH-LANGUAGE PATHOLOGIST

## 2017-09-21 PROCEDURE — 97112 NEUROMUSCULAR REEDUCATION: CPT

## 2017-09-21 RX ORDER — ACETAMINOPHEN 325 MG/1
650 TABLET ORAL EVERY 6 HOURS PRN
Status: DISCONTINUED | OUTPATIENT
Start: 2017-09-21 | End: 2017-09-29 | Stop reason: HOSPADM

## 2017-09-21 RX ADMIN — VITAMIN D, TAB 1000IU (100/BT) 1000 UNITS: 25 TAB at 18:46

## 2017-09-21 RX ADMIN — ATORVASTATIN CALCIUM 80 MG: 80 TABLET, FILM COATED ORAL at 20:02

## 2017-09-21 RX ADMIN — ACETAMINOPHEN 325 MG: 325 TABLET ORAL at 06:43

## 2017-09-21 RX ADMIN — ACETAMINOPHEN 650 MG: 325 TABLET ORAL at 18:45

## 2017-09-21 RX ADMIN — FAMOTIDINE 20 MG: 20 TABLET, FILM COATED ORAL at 08:02

## 2017-09-21 RX ADMIN — CLOPIDOGREL 75 MG: 75 TABLET, FILM COATED ORAL at 08:02

## 2017-09-21 RX ADMIN — POLYETHYLENE GLYCOL 3350 17 G: 17 POWDER, FOR SOLUTION ORAL at 08:02

## 2017-09-21 RX ADMIN — ASPIRIN 325 MG: 325 TABLET ORAL at 08:02

## 2017-09-21 RX ADMIN — VITAMIN D, TAB 1000IU (100/BT) 1000 UNITS: 25 TAB at 08:02

## 2017-09-21 RX ADMIN — Medication 5 MG: at 21:37

## 2017-09-21 RX ADMIN — MONTELUKAST 10 MG: 10 TABLET, FILM COATED ORAL at 08:02

## 2017-09-22 PROCEDURE — 97535 SELF CARE MNGMENT TRAINING: CPT

## 2017-09-22 PROCEDURE — 97532: CPT

## 2017-09-22 PROCEDURE — 97110 THERAPEUTIC EXERCISES: CPT

## 2017-09-22 PROCEDURE — 97112 NEUROMUSCULAR REEDUCATION: CPT

## 2017-09-22 RX ADMIN — CLOPIDOGREL 75 MG: 75 TABLET, FILM COATED ORAL at 07:28

## 2017-09-22 RX ADMIN — VITAMIN D, TAB 1000IU (100/BT) 1000 UNITS: 25 TAB at 17:11

## 2017-09-22 RX ADMIN — ATORVASTATIN CALCIUM 80 MG: 80 TABLET, FILM COATED ORAL at 21:47

## 2017-09-22 RX ADMIN — MONTELUKAST 10 MG: 10 TABLET, FILM COATED ORAL at 07:27

## 2017-09-22 RX ADMIN — FAMOTIDINE 20 MG: 20 TABLET, FILM COATED ORAL at 07:28

## 2017-09-22 RX ADMIN — ASPIRIN 325 MG: 325 TABLET ORAL at 07:28

## 2017-09-22 RX ADMIN — POLYETHYLENE GLYCOL 3350 17 G: 17 POWDER, FOR SOLUTION ORAL at 07:27

## 2017-09-22 RX ADMIN — Medication 5 MG: at 21:47

## 2017-09-22 RX ADMIN — ACETAMINOPHEN 650 MG: 325 TABLET ORAL at 10:37

## 2017-09-22 RX ADMIN — VITAMIN D, TAB 1000IU (100/BT) 1000 UNITS: 25 TAB at 07:28

## 2017-09-23 PROCEDURE — 97110 THERAPEUTIC EXERCISES: CPT

## 2017-09-23 PROCEDURE — 97535 SELF CARE MNGMENT TRAINING: CPT

## 2017-09-23 PROCEDURE — 97532: CPT | Performed by: SPEECH-LANGUAGE PATHOLOGIST

## 2017-09-23 PROCEDURE — 97112 NEUROMUSCULAR REEDUCATION: CPT

## 2017-09-23 RX ORDER — POLYETHYLENE GLYCOL 3350 17 G/17G
17 POWDER, FOR SOLUTION ORAL 2 TIMES DAILY PRN
Status: DISCONTINUED | OUTPATIENT
Start: 2017-09-23 | End: 2017-09-29 | Stop reason: HOSPADM

## 2017-09-23 RX ADMIN — POLYETHYLENE GLYCOL 3350 17 G: 17 POWDER, FOR SOLUTION ORAL at 08:21

## 2017-09-23 RX ADMIN — ACETAMINOPHEN 650 MG: 325 TABLET ORAL at 16:28

## 2017-09-23 RX ADMIN — CLOPIDOGREL 75 MG: 75 TABLET, FILM COATED ORAL at 08:21

## 2017-09-23 RX ADMIN — VITAMIN D, TAB 1000IU (100/BT) 1000 UNITS: 25 TAB at 17:22

## 2017-09-23 RX ADMIN — FAMOTIDINE 20 MG: 20 TABLET, FILM COATED ORAL at 08:21

## 2017-09-23 RX ADMIN — ATORVASTATIN CALCIUM 80 MG: 80 TABLET, FILM COATED ORAL at 22:18

## 2017-09-23 RX ADMIN — MONTELUKAST 10 MG: 10 TABLET, FILM COATED ORAL at 08:21

## 2017-09-23 RX ADMIN — ACETAMINOPHEN 650 MG: 325 TABLET ORAL at 22:20

## 2017-09-23 RX ADMIN — ASPIRIN 325 MG: 325 TABLET ORAL at 08:21

## 2017-09-23 RX ADMIN — POLYETHYLENE GLYCOL 3350 17 G: 17 POWDER, FOR SOLUTION ORAL at 16:29

## 2017-09-23 RX ADMIN — VITAMIN D, TAB 1000IU (100/BT) 1000 UNITS: 25 TAB at 08:21

## 2017-09-23 RX ADMIN — Medication 5 MG: at 22:18

## 2017-09-24 RX ORDER — ALPRAZOLAM 0.5 MG/1
0.25 TABLET ORAL 2 TIMES DAILY PRN
Status: DISCONTINUED | OUTPATIENT
Start: 2017-09-24 | End: 2017-09-29 | Stop reason: HOSPADM

## 2017-09-24 RX ADMIN — ALPRAZOLAM 0.25 MG: 0.5 TABLET ORAL at 16:36

## 2017-09-24 RX ADMIN — POLYETHYLENE GLYCOL 3350 17 G: 17 POWDER, FOR SOLUTION ORAL at 17:51

## 2017-09-24 RX ADMIN — POLYETHYLENE GLYCOL 3350 17 G: 17 POWDER, FOR SOLUTION ORAL at 08:07

## 2017-09-24 RX ADMIN — CLOPIDOGREL 75 MG: 75 TABLET, FILM COATED ORAL at 08:08

## 2017-09-24 RX ADMIN — VITAMIN D, TAB 1000IU (100/BT) 1000 UNITS: 25 TAB at 17:48

## 2017-09-24 RX ADMIN — Medication 5 MG: at 20:34

## 2017-09-24 RX ADMIN — ACETAMINOPHEN 650 MG: 325 TABLET ORAL at 14:23

## 2017-09-24 RX ADMIN — ACETAMINOPHEN 650 MG: 325 TABLET ORAL at 20:34

## 2017-09-24 RX ADMIN — ASPIRIN 325 MG: 325 TABLET ORAL at 08:08

## 2017-09-24 RX ADMIN — ATORVASTATIN CALCIUM 80 MG: 80 TABLET, FILM COATED ORAL at 20:33

## 2017-09-24 RX ADMIN — FAMOTIDINE 20 MG: 20 TABLET, FILM COATED ORAL at 08:07

## 2017-09-24 RX ADMIN — ACETAMINOPHEN 650 MG: 325 TABLET ORAL at 06:27

## 2017-09-24 RX ADMIN — VITAMIN D, TAB 1000IU (100/BT) 1000 UNITS: 25 TAB at 08:08

## 2017-09-24 RX ADMIN — MONTELUKAST 10 MG: 10 TABLET, FILM COATED ORAL at 08:08

## 2017-09-25 PROCEDURE — 97532: CPT

## 2017-09-25 PROCEDURE — 97110 THERAPEUTIC EXERCISES: CPT

## 2017-09-25 RX ADMIN — MONTELUKAST 10 MG: 10 TABLET, FILM COATED ORAL at 08:14

## 2017-09-25 RX ADMIN — POLYETHYLENE GLYCOL 3350 17 G: 17 POWDER, FOR SOLUTION ORAL at 18:12

## 2017-09-25 RX ADMIN — FAMOTIDINE 20 MG: 20 TABLET, FILM COATED ORAL at 08:13

## 2017-09-25 RX ADMIN — ATORVASTATIN CALCIUM 80 MG: 80 TABLET, FILM COATED ORAL at 20:27

## 2017-09-25 RX ADMIN — VITAMIN D, TAB 1000IU (100/BT) 1000 UNITS: 25 TAB at 17:10

## 2017-09-25 RX ADMIN — ACETAMINOPHEN 650 MG: 325 TABLET ORAL at 12:18

## 2017-09-25 RX ADMIN — VITAMIN D, TAB 1000IU (100/BT) 1000 UNITS: 25 TAB at 08:14

## 2017-09-25 RX ADMIN — ASPIRIN 325 MG: 325 TABLET ORAL at 08:14

## 2017-09-25 RX ADMIN — CLOPIDOGREL 75 MG: 75 TABLET, FILM COATED ORAL at 08:14

## 2017-09-25 RX ADMIN — GABAPENTIN 100 MG: 100 CAPSULE ORAL at 20:43

## 2017-09-25 RX ADMIN — ACETAMINOPHEN 650 MG: 325 TABLET ORAL at 03:12

## 2017-09-25 RX ADMIN — POLYETHYLENE GLYCOL 3350 17 G: 17 POWDER, FOR SOLUTION ORAL at 08:14

## 2017-09-25 RX ADMIN — ACETAMINOPHEN 650 MG: 325 TABLET ORAL at 18:11

## 2017-09-26 PROCEDURE — 97112 NEUROMUSCULAR REEDUCATION: CPT

## 2017-09-26 PROCEDURE — 97110 THERAPEUTIC EXERCISES: CPT

## 2017-09-26 PROCEDURE — 97535 SELF CARE MNGMENT TRAINING: CPT

## 2017-09-26 PROCEDURE — 97532: CPT

## 2017-09-26 RX ORDER — CHLORAL HYDRATE 500 MG
1000 CAPSULE ORAL DAILY
Status: DISCONTINUED | OUTPATIENT
Start: 2017-09-26 | End: 2017-09-29 | Stop reason: HOSPADM

## 2017-09-26 RX ORDER — LISINOPRIL 5 MG/1
5 TABLET ORAL
Status: DISCONTINUED | OUTPATIENT
Start: 2017-09-26 | End: 2017-09-29

## 2017-09-26 RX ADMIN — ACETAMINOPHEN 325 MG: 325 TABLET ORAL at 13:45

## 2017-09-26 RX ADMIN — ACETAMINOPHEN 650 MG: 325 TABLET ORAL at 03:21

## 2017-09-26 RX ADMIN — CLOPIDOGREL 75 MG: 75 TABLET, FILM COATED ORAL at 08:31

## 2017-09-26 RX ADMIN — LISINOPRIL 5 MG: 5 TABLET ORAL at 12:33

## 2017-09-26 RX ADMIN — POLYETHYLENE GLYCOL 3350 17 G: 17 POWDER, FOR SOLUTION ORAL at 17:33

## 2017-09-26 RX ADMIN — VITAMIN D, TAB 1000IU (100/BT) 1000 UNITS: 25 TAB at 17:10

## 2017-09-26 RX ADMIN — ATORVASTATIN CALCIUM 80 MG: 80 TABLET, FILM COATED ORAL at 21:13

## 2017-09-26 RX ADMIN — FAMOTIDINE 20 MG: 20 TABLET, FILM COATED ORAL at 08:31

## 2017-09-26 RX ADMIN — ASPIRIN 325 MG: 325 TABLET ORAL at 08:31

## 2017-09-26 RX ADMIN — ACETAMINOPHEN 650 MG: 325 TABLET ORAL at 21:13

## 2017-09-26 RX ADMIN — ALPRAZOLAM 0.25 MG: 0.5 TABLET ORAL at 20:37

## 2017-09-26 RX ADMIN — VITAMIN D, TAB 1000IU (100/BT) 1000 UNITS: 25 TAB at 08:31

## 2017-09-26 RX ADMIN — MONTELUKAST 10 MG: 10 TABLET, FILM COATED ORAL at 08:31

## 2017-09-27 PROCEDURE — 97535 SELF CARE MNGMENT TRAINING: CPT

## 2017-09-27 PROCEDURE — 97112 NEUROMUSCULAR REEDUCATION: CPT

## 2017-09-27 PROCEDURE — 97110 THERAPEUTIC EXERCISES: CPT

## 2017-09-27 PROCEDURE — 97532: CPT

## 2017-09-27 RX ADMIN — MONTELUKAST 10 MG: 10 TABLET, FILM COATED ORAL at 08:24

## 2017-09-27 RX ADMIN — ASPIRIN 325 MG: 325 TABLET ORAL at 08:24

## 2017-09-27 RX ADMIN — POLYETHYLENE GLYCOL 3350 17 G: 17 POWDER, FOR SOLUTION ORAL at 08:23

## 2017-09-27 RX ADMIN — ACETAMINOPHEN 650 MG: 325 TABLET ORAL at 08:24

## 2017-09-27 RX ADMIN — LISINOPRIL 5 MG: 5 TABLET ORAL at 08:24

## 2017-09-27 RX ADMIN — GABAPENTIN 100 MG: 100 CAPSULE ORAL at 23:17

## 2017-09-27 RX ADMIN — Medication 5 MG: at 20:17

## 2017-09-27 RX ADMIN — ATORVASTATIN CALCIUM 80 MG: 80 TABLET, FILM COATED ORAL at 20:17

## 2017-09-27 RX ADMIN — ACETAMINOPHEN 650 MG: 325 TABLET ORAL at 18:42

## 2017-09-27 RX ADMIN — VITAMIN D, TAB 1000IU (100/BT) 1000 UNITS: 25 TAB at 17:02

## 2017-09-27 RX ADMIN — POLYETHYLENE GLYCOL 3350 17 G: 17 POWDER, FOR SOLUTION ORAL at 17:03

## 2017-09-27 RX ADMIN — CLOPIDOGREL 75 MG: 75 TABLET, FILM COATED ORAL at 08:24

## 2017-09-27 RX ADMIN — ACETAMINOPHEN 650 MG: 325 TABLET ORAL at 13:27

## 2017-09-27 RX ADMIN — VITAMIN D, TAB 1000IU (100/BT) 1000 UNITS: 25 TAB at 08:24

## 2017-09-27 RX ADMIN — FAMOTIDINE 20 MG: 20 TABLET, FILM COATED ORAL at 08:24

## 2017-09-28 ENCOUNTER — TRANSCRIBE ORDERS (OUTPATIENT)
Dept: PHYSICAL THERAPY | Facility: HOSPITAL | Age: 67
End: 2017-09-28

## 2017-09-28 DIAGNOSIS — I63.9 CEREBROVASCULAR ACCIDENT (CVA), UNSPECIFIED MECHANISM (HCC): Primary | ICD-10-CM

## 2017-09-28 PROCEDURE — 97112 NEUROMUSCULAR REEDUCATION: CPT

## 2017-09-28 PROCEDURE — 97110 THERAPEUTIC EXERCISES: CPT

## 2017-09-28 PROCEDURE — 97532: CPT

## 2017-09-28 PROCEDURE — 97535 SELF CARE MNGMENT TRAINING: CPT

## 2017-09-28 RX ADMIN — MONTELUKAST 10 MG: 10 TABLET, FILM COATED ORAL at 08:49

## 2017-09-28 RX ADMIN — FAMOTIDINE 20 MG: 20 TABLET, FILM COATED ORAL at 08:49

## 2017-09-28 RX ADMIN — ASPIRIN 325 MG: 325 TABLET ORAL at 08:49

## 2017-09-28 RX ADMIN — ALPRAZOLAM 0.25 MG: 0.5 TABLET ORAL at 19:46

## 2017-09-28 RX ADMIN — ACETAMINOPHEN 650 MG: 325 TABLET ORAL at 03:01

## 2017-09-28 RX ADMIN — VITAMIN D, TAB 1000IU (100/BT) 1000 UNITS: 25 TAB at 08:49

## 2017-09-28 RX ADMIN — LISINOPRIL 5 MG: 5 TABLET ORAL at 08:49

## 2017-09-28 RX ADMIN — VITAMIN D, TAB 1000IU (100/BT) 1000 UNITS: 25 TAB at 20:59

## 2017-09-28 RX ADMIN — ATORVASTATIN CALCIUM 80 MG: 80 TABLET, FILM COATED ORAL at 20:58

## 2017-09-28 RX ADMIN — POLYETHYLENE GLYCOL 3350 17 G: 17 POWDER, FOR SOLUTION ORAL at 08:48

## 2017-09-28 RX ADMIN — ACETAMINOPHEN 650 MG: 325 TABLET ORAL at 08:49

## 2017-09-28 RX ADMIN — CLOPIDOGREL 75 MG: 75 TABLET, FILM COATED ORAL at 08:49

## 2017-09-29 ENCOUNTER — EPISODE CHANGES (OUTPATIENT)
Dept: CASE MANAGEMENT | Facility: OTHER | Age: 67
End: 2017-09-29

## 2017-09-29 VITALS
SYSTOLIC BLOOD PRESSURE: 160 MMHG | HEART RATE: 63 BPM | DIASTOLIC BLOOD PRESSURE: 86 MMHG | WEIGHT: 120.5 LBS | HEIGHT: 66 IN | BODY MASS INDEX: 19.37 KG/M2 | TEMPERATURE: 98.8 F | RESPIRATION RATE: 18 BRPM | OXYGEN SATURATION: 96 %

## 2017-09-29 PROCEDURE — G0008 ADMIN INFLUENZA VIRUS VAC: HCPCS | Performed by: PHYSICAL MEDICINE & REHABILITATION

## 2017-09-29 PROCEDURE — 90661 CCIIV3 VAC ABX FR 0.5 ML IM: CPT | Performed by: PHYSICAL MEDICINE & REHABILITATION

## 2017-09-29 PROCEDURE — 25010000002 INFLUENZA VAC SUBUNIT QUAD 0.5 ML SUSPENSION PREFILLED SYRINGE: Performed by: PHYSICAL MEDICINE & REHABILITATION

## 2017-09-29 PROCEDURE — 97532: CPT

## 2017-09-29 PROCEDURE — 97535 SELF CARE MNGMENT TRAINING: CPT

## 2017-09-29 PROCEDURE — 97112 NEUROMUSCULAR REEDUCATION: CPT

## 2017-09-29 PROCEDURE — 97110 THERAPEUTIC EXERCISES: CPT

## 2017-09-29 RX ORDER — CLOPIDOGREL BISULFATE 75 MG/1
75 TABLET ORAL DAILY
Qty: 30 TABLET | Refills: 2 | Status: SHIPPED | OUTPATIENT
Start: 2017-09-30 | End: 2017-10-27 | Stop reason: SDUPTHER

## 2017-09-29 RX ORDER — CHLORAL HYDRATE 500 MG
1000 CAPSULE ORAL DAILY
Start: 2017-09-30 | End: 2018-02-20

## 2017-09-29 RX ORDER — ATORVASTATIN CALCIUM 80 MG/1
80 TABLET, FILM COATED ORAL NIGHTLY
Qty: 30 TABLET | Refills: 2 | Status: SHIPPED | OUTPATIENT
Start: 2017-09-29 | End: 2017-10-27 | Stop reason: SDUPTHER

## 2017-09-29 RX ORDER — LISINOPRIL 5 MG/1
5 TABLET ORAL ONCE
Status: COMPLETED | OUTPATIENT
Start: 2017-09-29 | End: 2017-09-29

## 2017-09-29 RX ORDER — LISINOPRIL 10 MG/1
10 TABLET ORAL
Qty: 30 TABLET | Refills: 0 | Status: SHIPPED | OUTPATIENT
Start: 2017-09-30 | End: 2017-10-03 | Stop reason: DRUGHIGH

## 2017-09-29 RX ORDER — POLYETHYLENE GLYCOL 3350 17 G/17G
17 POWDER, FOR SOLUTION ORAL 2 TIMES DAILY PRN
Start: 2017-09-29 | End: 2017-11-03

## 2017-09-29 RX ORDER — CHOLECALCIFEROL (VITAMIN D3) 125 MCG
5 CAPSULE ORAL NIGHTLY PRN
Start: 2017-09-29 | End: 2017-11-03

## 2017-09-29 RX ORDER — ALPRAZOLAM 0.25 MG/1
0.25 TABLET ORAL 2 TIMES DAILY PRN
Qty: 10 TABLET | Refills: 0 | Status: SHIPPED | OUTPATIENT
Start: 2017-09-29 | End: 2017-10-04

## 2017-09-29 RX ORDER — ASPIRIN 325 MG
325 TABLET ORAL DAILY
Qty: 30 TABLET | Refills: 2 | Status: SHIPPED | OUTPATIENT
Start: 2017-09-30 | End: 2017-11-03 | Stop reason: SDUPTHER

## 2017-09-29 RX ORDER — ACETAMINOPHEN 325 MG/1
325 TABLET ORAL EVERY 6 HOURS PRN
Start: 2017-09-29 | End: 2017-10-03 | Stop reason: SDUPTHER

## 2017-09-29 RX ORDER — ACETAMINOPHEN 325 MG/1
650 TABLET ORAL EVERY 6 HOURS PRN
Start: 2017-09-29 | End: 2018-05-22 | Stop reason: ALTCHOICE

## 2017-09-29 RX ORDER — LISINOPRIL 10 MG/1
10 TABLET ORAL
Status: DISCONTINUED | OUTPATIENT
Start: 2017-09-30 | End: 2017-09-29 | Stop reason: HOSPADM

## 2017-09-29 RX ADMIN — Medication 1000 MG: at 08:13

## 2017-09-29 RX ADMIN — CLOPIDOGREL 75 MG: 75 TABLET, FILM COATED ORAL at 08:12

## 2017-09-29 RX ADMIN — POLYETHYLENE GLYCOL 3350 17 G: 17 POWDER, FOR SOLUTION ORAL at 08:12

## 2017-09-29 RX ADMIN — ACETAMINOPHEN 650 MG: 325 TABLET ORAL at 03:19

## 2017-09-29 RX ADMIN — LISINOPRIL 5 MG: 5 TABLET ORAL at 10:18

## 2017-09-29 RX ADMIN — MONTELUKAST 10 MG: 10 TABLET, FILM COATED ORAL at 08:12

## 2017-09-29 RX ADMIN — A/SINGAPORE/GP1908/2015 IVR-180 (H1N1) (AN A/MICHIGAN/45/2015-LIKE VIRUS), A/SINGAPORE/GP2050/2015 (H3N2) (AN A/HONG KONG/4801/2014 - LIKE VIRUS), B/UTAH/9/2014 (A B/PHUKET/3073/2013-LIKE VIRUS), B/HONG KONG/259/2010 (A B/BRISBANE/60/08-LIKE VIRUS) 0.5 ML: 15; 15; 15; 15 INJECTION, SUSPENSION INTRAMUSCULAR at 10:23

## 2017-09-29 RX ADMIN — ACETAMINOPHEN 650 MG: 325 TABLET ORAL at 10:18

## 2017-09-29 RX ADMIN — FAMOTIDINE 20 MG: 20 TABLET, FILM COATED ORAL at 08:12

## 2017-09-29 RX ADMIN — VITAMIN D, TAB 1000IU (100/BT) 1000 UNITS: 25 TAB at 08:12

## 2017-09-29 RX ADMIN — LISINOPRIL 5 MG: 5 TABLET ORAL at 08:12

## 2017-09-29 RX ADMIN — ASPIRIN 325 MG: 325 TABLET ORAL at 08:12

## 2017-10-02 ENCOUNTER — HOSPITAL ENCOUNTER (OUTPATIENT)
Dept: PHYSICAL THERAPY | Facility: HOSPITAL | Age: 67
Setting detail: THERAPIES SERIES
Discharge: HOME | End: 2017-10-02
Attending: PHYSICAL MEDICINE & REHABILITATION

## 2017-10-02 ENCOUNTER — HOSPITAL ENCOUNTER (OUTPATIENT)
Dept: OCCUPATIONAL THERAPY | Facility: HOSPITAL | Age: 67
Setting detail: THERAPIES SERIES
Discharge: HOME | End: 2017-10-02

## 2017-10-02 DIAGNOSIS — R26.89 FUNCTIONAL GAIT ABNORMALITY: ICD-10-CM

## 2017-10-02 DIAGNOSIS — I69.354 HEMIPLEGIA AND HEMIPARESIS FOLLOWING CEREBRAL INFARCTION AFFECTING LEFT NON-DOMINANT SIDE (HCC): Primary | ICD-10-CM

## 2017-10-02 DIAGNOSIS — R29.898 LUE WEAKNESS: ICD-10-CM

## 2017-10-02 DIAGNOSIS — IMO0002 LACK OF COORDINATION DUE TO STROKE: ICD-10-CM

## 2017-10-02 PROCEDURE — 97165 OT EVAL LOW COMPLEX 30 MIN: CPT

## 2017-10-02 PROCEDURE — G8978 MOBILITY CURRENT STATUS: HCPCS

## 2017-10-02 PROCEDURE — G8979 MOBILITY GOAL STATUS: HCPCS

## 2017-10-02 PROCEDURE — G8984 CARRY CURRENT STATUS: HCPCS

## 2017-10-02 PROCEDURE — G8985 CARRY GOAL STATUS: HCPCS

## 2017-10-02 PROCEDURE — 97162 PT EVAL MOD COMPLEX 30 MIN: CPT

## 2017-10-02 NOTE — THERAPY EVALUATION
Outpatient Physical Therapy Neuro Initial Evaluation  [facility]     Patient Name: Rosi Vicente  : 1950  MRN: [id number]  Today's Date: 10/2/2017      Visit Date: 10/02/2017    Patient Active Problem List   Diagnosis   • Hypertension   • Allergic rhinitis   • Trigeminal neuralgia   • Hyperlipidemia   • New daily persistent headache   • Osteoarthritis of hip   • Vitamin D deficiency   • Cerebrovascular accident (CVA)   • Stroke        Past Medical History:   Diagnosis Date   • Allergic rhinitis    • Anemia    • Bronchitis    • FH: colon cancer    • H/O bone density study    • H/O mammogram    • Hypertension     Benign Essential   • Malaise and fatigue    • Nocturia    • Stroke 2017   • TMJ (temporomandibular joint syndrome)    • Trigeminal neuralgia     Surgery at [facility] in  repeat in         Past Surgical History:   Procedure Laterality Date   • AUGMENTATION MAMMAPLASTY     • COLONOSCOPY N/A 2015    Repeat Q 5 years due to Fhx of Colon Ca-Dr. Polk   • PAP SMEAR N/A     Dr. Burden         Visit Dx:     ICD-10-CM ICD-9-CM   1. Hemiplegia and hemiparesis following cerebral infarction affecting left non-dominant side I69.354 438.22   2. Functional gait abnormality R26.89 781.2             Patient History       10/02/17 1515 10/02/17 1500       History    Chief Complaint Balance Problems;Difficulty with daily activities;Muscle weakness;Pain  -LB Balance Problems;Difficulty with daily activities;Muscle weakness;Pain  -MR     Type of Pain Shoulder pain  -LB Shoulder pain   left  -MR     Date Current Problem(s) Began 17  -LB 17  -MR     Brief Description of Current Complaint 67-year-old female presenting 2017  with left-sided weakness including face arm and leg. Initial CT scan showed no acute pathology. Follow-up MRI Area of acute infarction involving the periventricular white matter of the right frontal lobe extending inferiorly to the posterior  "limb of the internal capsule.  -LB 67-year-old female presenting Sept 13, 2017  with left-sided weakness including face arm and leg. Initial CT scan showed no acute pathology. Follow-up MRI Area of acute infarction involving the periventricular white matter of the right frontal lobe extending inferiorly to the posterior limb of the internal capsule.  -MR     Previous treatment for THIS PROBLEM Rehabilitation  -LB Rehabilitation  -MR     Onset Date- PT 10/02/2017  -LB      Onset Date- OT  10/2/17  -MR     Patient/Caregiver Goals Return to prior level of function   \"Comfortable walking without assistance (no cane)smooth gt.\"  -LB Improve strength;Improve mobility  -MR     Hand Dominance right-handed  -LB right-handed  -MR     Occupation/sports/leisure activities Pt is a retired nurse. Hobbies include gardening, dancing, travel, jazzercise and yoga.  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 gardening, dancing, travel  -MR     Related/Recent Hospitalizations Yes  -LB Yes  -MR     Date of Hospitalization 09/13/17  -LB 09/13/17  -MR     Pain     Pain Location Shoulder   history of left shoulder pain   -LB Shoulder   left- pt with history of arthritis of L shoulder  -MR     Pain at Present 0  -LB 0  -MR     Fall Risk Assessment    Any falls in the past year: No  -LB No  -MR     Previous Functional Level ADLs;Ambulation;IADLs;Leisure Activities  -LB ADLs;Ambulation;IADLs;Leisure Activities  -MR     Level of Assistance: independent  -LB independent  -MR     Services    Prior Rehab/Home Health Experiences Yes  -LB Yes  -MR     When was the prior experience with Rehab/Home Health Acute inpatient rehab at [facility] 9/18/17-9/29/17  -LB Acute inpatient rehab at [facility] 9/18/17-9/29/17  -MR     Where was the prior experience with Rehab/Home Health [facility]  -[facility]  -MR     Are you currently receiving Home Health services No  -LB No  -MR     Daily Activities    Primary Language English  -LB English  -MR     Are you able to read " "Yes  -LB Yes  -MR     Are you able to write Yes  -LB Yes  -MR     How does patient learn best? Listening  -LB Listening  -MR     Teaching needs identified Home Exercise Program;Falls Prevention;Management of Condition;Home Safety  -LB Home Exercise Program;Management of Condition;Falls Prevention;Home Safety  -MR     Does patient have problems with the following? Panic Attack  -LB Panic Attack  -MR     Pt Participated in POC and Goals Yes  -LB Yes  -MR     Safety    Are you being hurt, hit, or frightened by anyone at home or in your life? No  -LB No  -MR     Are you being neglected by a caregiver No  -LB No  -MR       User Key  (r) = Recorded By, (t) = Taken By, (c) = Cosigned By    Initials Name Provider Type    LB Peri Hernandez, PT Physical Therapist    MR Caitlyn Bobby, OT Occupational Therapist             Hand Therapy (last 24 hours)      Hand Eval       10/02/17 1500          Hand  Strength     Strength Affected Side Bilateral  -MR       Strength Right    Right  Test 1 50  -MR      Right  Test 2 50  -MR      Right  Test 3 48  -MR       Strength Average Right 49.33  -MR       Strength Left    Left  Test 1 5  -MR      Left  Test 2 5  -MR      Left  Test 3 4  -MR       Strength Average Left 4.67  -MR      Pinch Strength    Affected Side Bilateral  -MR      Right Hand Strength - Pinch (lbs)    Lateral 12 lbs  -MR      Left Hand Strength - Pinch (lbs)    Lateral 4 lbs  -MR      Therapy Education    Given HEP  -MR      Program New   FM activities for LUE  -MR      How Provided Verbal  -MR      Provided to Patient  -MR      Level of Understanding Verbalized  -MR        User Key  (r) = Recorded By, (t) = Taken By, (c) = Cosigned By    Initials Name Provider Type    MR Caitlyn Benjamin Diamante, OT Occupational Therapist                PT Neuro       10/02/17 9035          Subjective Comments    Subjective Comments \"Can I walk by myself at home with the cane?\" \"My left leg " "feels heavy.\"   -LB      Precautions and Contraindications    Precautions left shoulder arthritis (planned total shoulder replacement  -LB      Pain Assessment    Pain Assessment No/denies pain  -LB      Home Living    Living Arrangements house  -LB      Home Accessibility stairs to enter home  -LB      Number of Stairs to Enter Home 3  -LB      Stair Railings at Home outside, present on left side  -LB      Home Equipment Rolling walker;Other (Comment)   quad tipped cane   -LB      Living Environment Comment pt's  currently off work and home with patient following shoulder surgery  -LB      Vision-Basic Assessment    Current Vision No visual deficits  -LB      Cognition    Overall Cognitive Status WFL  -LB      Orientation Level Oriented X4  -LB      Safety Judgment Good awareness of safety precautions  -LB      Comments Pt asking if she can independently walk withthe cane at home verses the walker.   -LB      Proprioception    Proprioception intact left LE   -LB      Posture/Observations    Posture/Observations Comments When standing without a device minimal trunk hyperextension noted.  -LB      ROM (Range of Motion)    General ROM upper extremity range of motion deficits identified  -LB      General ROM Detail Bilateral LE's WNL   -LB      Left Shoulder    Flexion AROM Deficit 1/2 range   -LB      MMT (Manual Muscle Testing)    General MMT Assessment upper extremity strength deficits identified;lower extremity strength deficits identified  -LB      General MMT Assessment Detail left  fair   -LB      Left Hip    Hip Flexion Gross Movement (4-/5) good minus  -LB      Hip Extension Gluteus Thomas other (see comments)   maximal resistance in sidelying  -LB      Lower Extremity    Lower Ext Manual Muscle Testing left hip strength deficit  -LB      Lower Ext Manual Muscle Testing Detail left knee 5/5 , ankle inversion, dorsiflexion and eversion 5/5 Left Plantarflexion 3+/5   -LB      Bed Mobility, " Assessment/Treatment    Bed Mobility, Roll Left, [place] independent  -LB      Bed Mobility, Roll Right, [place] independent  -LB      Bed Mob, Supine to Sit, [place] independent  -LB      Bed Mob, Sit to Supine, [place] independent  -LB      Transfers    Transfers, Sit-Stand [place] stand by assist;verbal cues required  -LB      Transfers, Stand-Sit [place] stand by assist  -LB      Transfers, Sit-Stand-Sit, Assist Device other (see comments)   quad tipped cane   -LB      Transfer, Safety Issues sequencing ability decreased  -LB      Transfer, Impairments impaired balance;strength decreased  -LB      Transfer, Comment To all fours with CGA to minimal, to tall kneeling with CGA, to 1/2 kneeling with minimal of 1   -LB      Gait Assessment/Treatment    Gait, [place] Level contact guard assist  -LB      Gait, Assistive Device other (see comments)   quad tipped cane   -LB      Gait, Distance (Feet) 150  -LB      Gait, Gait Deviations left:;weight-shifting ability decreased;decreased heel strike;stride length decreased   L decreased knee ext. at swing & stance, L decreased pushoff  -LB      Gait, Impairments strength decreased;impaired balance  -LB      Gait, Comment Pt ambualted ~ 50 without a device with CGA with decreased wt shift to the left and left lateral trunk flexion at stance.   -LB      Stairs Assessment/Treatment    Number of Stairs 4  -LB      Stairs, Handrail Location none  -LB      Stairs, [place] Level minimum assist (75% patient effort)  -LB      Stairs, Assistive Device other (see comments)   none   -LB      Stairs, Technique Used step over step (descending);step over step (ascending)  -LB      Stairs, Safety Issues loses balance backward  -LB      Stairs, Impairments impaired balance;strength decreased  -LB      Stairs, Comment Pt ascended and descended 4 steps with one rail step over steps with CGA.   -LB      Balance Skills Training    Rhomberg 30 seconds with  eyes opened and closed   -LB      Sharpened Rhomberg 15 seconds with eyes opened   -LB        User Key  (r) = Recorded By, (t) = Taken By, (c) = Cosigned By    Initials Name Provider Type    LAVELL Hernandez, PT Physical Therapist                        Therapy Education       10/02/17 1618          Therapy Education    Education Details Advised pt to continue walking with her walker at home by herself and to have CGA with the cane.  -LB      How Provided Verbal  -LB      Provided to Patient;Caregiver  -LB      Level of Understanding Verbalized  -LB        User Key  (r) = Recorded By, (t) = Taken By, (c) = Cosigned By    Initials Name Provider Type    LAVELL Hernandez, PT Physical Therapist                PT OP Goals       10/02/17 1637 10/02/17 1600    PT Short Term Goals    STG Date to Achieve  11/01/17  -LB    STG 1  Pt to be independent with HEP with emphasis on balance and L LE strengthening.   -LB    STG 2  Pt to score a 46/56 on the VIRK to reduce risk of falls.  -LB    STG 3  Pt to score a 18/24 on the Dynamic Gait Index to reduce risk for falls.  -LB    STG 4  Pt to achieve 1/2 kneeling with CGA.  -LB    STG 5  Pt to ambulate 250' x 2 with quad tipped cane conditional independent.    -LB    STG 6  Pt to ambulate with increased knee extension at swing and stance phase.   -LB    Long Term Goals    LTG Date to Achieve  12/01/17  -LB    LTG 1  Pt to score a 51/56 on the VIRK to reduce risk of falls.  -LB    LTG 2  Pt to score a 22/24 on the Dynamic Gait Index to reduce risk for falls  -LB    LTG 3  Pt to increase strength in the left hip flexors to 4+/5.  -LB    LTG 4  Pt to ambulate 600' x 3 without a device conditional independent on level and unlevel surfaces.    -LB    LTG 5  Pt to ascend and descend 4 steps independently step over steps without a rail.   -LB    LTG 6  Pt to perform floor transfers independently.   -LB    LTG 7  Pt to increase strength in the left plantarflexors to 4/5  -LB    Time  "Calculation    PT Goal Re-Cert Due Date 11/01/17   VIRK and Dynamic Gait Index   -LB 11/01/17  -LB      User Key  (r) = Recorded By, (t) = Taken By, (c) = Cosigned By    Initials Name Provider Type    LAVELL Hernandez, PT Physical Therapist                PT Assessment/Plan       10/02/17 1633 10/02/17 1556    PT Assessment    Functional Limitations Impaired gait;Limitation in home management;Performance in leisure activities  -LB     Impairments Balance;Gait;Muscle strength  -LB     Assessment Comments Pt demonstrating decreased in the left UE and LE. Pt has deficits with transfers, gait and balance as inidcated by the VIRK and the Dynamic Gait Index. Pt has mulitple gati deviations.  -LB     Please refer to paper survey for additional self-reported information Yes  -LB     Rehab Potential Excellent  -LB     Patient/caregiver participated in establishment of treatment plan and goals Yes  -LB     Patient would benefit from skilled therapy intervention Yes  -LB     PT Plan    PT Frequency 2x/week  -LB     Predicted Duration of Therapy Intervention (days/wks) 8 weeks   -LB 10-12 weeks  -MR      User Key  (r) = Recorded By, (t) = Taken By, (c) = Cosigned By    Initials Name Provider Type    LAVELL Hernandez, PT Physical Therapist    MR Caitlyn Bobby, OT Occupational Therapist                   Exercises       10/02/17 1515          Subjective Comments    Subjective Comments \"Can I walk by myself at home with the cane?\" \"My left leg feels heavy.\"   -LB        User Key  (r) = Recorded By, (t) = Taken By, (c) = Cosigned By    Initials Name Provider Type    LAVELL Hernandez, PT Physical Therapist                            Outcome Measures       10/02/17 1600 10/02/17 1500       Virk Balance Scale    Sitting to Standing 3  -LB      Standing Unsupported 4  -LB      Sitting with Back Unsupported but Feet Supported on Floor or on Stool 4  -LB      Standing to Sitting 4  -LB      Transfers 4  -LB      Standing Unsupported " with Eyes Closed 3  -LB      Standing Unsupported with Feet Together 3  -LB      Reaching Forward with Outstretched Arm While Standing 4  -LB       Object From the Floor From a Standing Position 3  -LB      Turning to Look Behind Over Left and Right Shoulders While Standing 4  -LB      Turn 360 Degrees 2  -LB      Place Alternate Foot on Step or Stool While Standing Unsupported 1  -LB      Standing Unsupported with One Foot in Front 2  -LB      Standing on One Leg 1  -LB      Monsalve Total Score 42  -LB      DASH    Open a tight or new jar.  4  -MR     Write  1  -MR     Turn a key  1  -MR     Prepare a meal  3  -MR     Push open a heavy door  3  -MR     Place an object on a shelf above your head  3  -MR     Do heavy household chores (e.g., wash walls, wash floors)  5  -MR     Garden or do yard work  5  -MR     Make a bed  4  -MR     Carry a shopping bag or briefcase  4  -MR     Carry a heavy object (over 10 lbs)  5  -MR     Change a lightbulb overhead  4  -MR     Wash or blow dry your hair  4  -MR     Wash your back  2  -MR     Put on a pullover sweater  2  -MR     Use a knife to cut food  3  -MR     Recreational activities in which require little effort (e.g., cardplaying, knitting, etc.)  4  -MR     Recreational activities in which you take some force or impact through your arm, should or hand (e.g. golf, hammering, tennis, etc.)  4  -MR     Recreational Activities in which you move your arm freely (e.g., frisbee, badminton, etc.)  3  -MR     Manage transportation needs (getting from one place to another)  5  -MR     During the past week, to what extent has your arm, shoulder, or hand problem interfered with your normal social activites with family, friends, neighbors or groups?  4  -MR     During the past week, were you limited in your work or other regular daily activities as a result of your arm, shoulder or hand problem?  4  -MR     Arm, Shoulder, or hand pain  1  -MR     Arm, shoulder or hand pain when  you performed any specific activity  3  -MR     Tingling (pins and needles) in your arm, shoulder, or hand  1  -MR     Weakness in your arm, shoulder or hand  4  -MR     Stiffness in your arm, shoulder or hand  2  -MR     During the past week, how much difficulty have you had sleeping because of the pain in your arm, shoulder or hand?  3  -MR     I feel less capable, less confident or less useful because of my arm, shoulder or hand problem  5  -MR     DASH Sum   96  -MR     Number of Questions Answered  29  -MR     DASH Score  57.76  -MR     Dynamic Gait Index (DGI)    Gait Level Surface 2  -LB      Change in Gait Speed 1  -LB      Gait with Horizontal Head Turns 2  -LB      Gait with Vertical Head Turns 2  -LB      Gait and Pivot Turn 2  -LB      Step Over Obstacle 1  -LB      Step Around Obstacles 2  -LB      Steps 2  -LB      Dynamic Gait Index Score 14  -LB      Dynamic Gait Index Comments with quad tipped cane   -LB      Functional Assessment    Outcome Measure Options Monsalve Balance;Dynamic Gait Index  -LB Disabilities of the Arm, Shoulder, and Hand (DASH)  -MR       User Key  (r) = Recorded By, (t) = Taken By, (c) = Cosigned By    Initials Name Provider Type    LB Peri Hernandez, PT Physical Therapist    MR Caitlyn Bobby, OT Occupational Therapist          Time Calculation:   Start Time: 1440  Stop Time: 1530  Time Calculation (min): 50 min     Therapy Charges for Today     Code Description Service Date Service Provider Modifiers Qty    77819528441 HC PT MOBILITY CURRENT 10/2/2017 Peri Hernandez, PT GP, CJ 1    64382699894 HC PT MOBILITY PROJECTED 10/2/2017 Peri Hernandez, PT GP, CI 1    [id number] HC PT EVAL MOD COMPLEXITY 4 10/2/2017 Peri Hernandez, PT GP 1          PT G-Codes  PT Professional Judgement Used?: Yes  Outcome Measure Options: Monsalve Balance, Dynamic Gait Index  Functional Limitation: Mobility: Walking and moving around  Mobility: Walking and Moving Around Current Status (): At least 20  percent but less than 40 percent impaired, limited or restricted  Mobility: Walking and Moving Around Goal Status (): At least 1 percent but less than 20 percent impaired, limited or restricted         Peri Hernandez, PT  10/2/2017

## 2017-10-02 NOTE — THERAPY EVALUATION
Outpatient Occupational Therapy Neuro Initial Evaluation  [facility]     Patient Name: Rosi Vicente  : 1950  MRN: [id number]  Today's Date: 10/2/2017      Visit Date: 10/02/2017    Patient Active Problem List   Diagnosis   • Hypertension   • Allergic rhinitis   • Trigeminal neuralgia   • Hyperlipidemia   • New daily persistent headache   • Osteoarthritis of hip   • Vitamin D deficiency   • Cerebrovascular accident (CVA)   • Stroke        Past Medical History:   Diagnosis Date   • Allergic rhinitis    • Anemia    • Bronchitis    • FH: colon cancer    • H/O bone density study    • H/O mammogram    • Hypertension     Benign Essential   • Malaise and fatigue    • Nocturia    • Stroke 2017   • TMJ (temporomandibular joint syndrome)    • Trigeminal neuralgia     Surgery at [facility] in  repeat in         Past Surgical History:   Procedure Laterality Date   • AUGMENTATION MAMMAPLASTY     • COLONOSCOPY N/A 2015    Repeat Q 5 years due to Fhx of Colon Ca-Dr. Polk   • PAP SMEAR N/A     Dr. Burden         Visit Dx:      ICD-10-CM ICD-9-CM   1. Hemiplegia and hemiparesis following cerebral infarction affecting left non-dominant side I69.354 438.22   2. LUE weakness R29.898 729.89   3. Lack of coordination due to stroke I63.9 434.91    R27.9 781.3             Patient History       10/02/17 1515 10/02/17 1500       History    Chief Complaint Balance Problems;Difficulty with daily activities;Muscle weakness;Pain  -LB Balance Problems;Difficulty with daily activities;Muscle weakness;Pain  -MR     Type of Pain Shoulder pain  -LB Shoulder pain   left  -MR     Date Current Problem(s) Began 17  -LB 17  -MR     Brief Description of Current Complaint 67-year-old female presenting 2017  with left-sided weakness including face arm and leg. Initial CT scan showed no acute pathology. Follow-up MRI Area of acute infarction involving the periventricular white matter of the  "right frontal lobe extending inferiorly to the posterior limb of the internal capsule.  -LB 67-year-old female presenting Sept 13, 2017  with left-sided weakness including face arm and leg. Initial CT scan showed no acute pathology. Follow-up MRI Area of acute infarction involving the periventricular white matter of the right frontal lobe extending inferiorly to the posterior limb of the internal capsule.  -MR     Previous treatment for THIS PROBLEM Rehabilitation  -LB Rehabilitation  -MR     Onset Date- PT 10/02/2017  -LB      Onset Date- OT  10/2/17  -MR     Patient/Caregiver Goals Return to prior level of function   \"Comfortable walking without assistance (no cane)smooth gt.\"  -LB Improve strength;Improve mobility  -MR     Hand Dominance right-handed  -LB right-handed  -MR     Occupation/sports/leisure activities Pt is a retired nurse. Hobbies include gardening, dancing, travel, jazzercise and yoga.  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 gardening, dancing, travel  -MR     Related/Recent Hospitalizations Yes  -LB Yes  -MR     Date of Hospitalization 09/13/17  -LB 09/13/17  -MR     Pain     Pain Location Shoulder   history of left shoulder pain   -LB Shoulder   left- pt with history of arthritis of L shoulder  -MR     Pain at Present 0  -LB 0  -MR     Fall Risk Assessment    Any falls in the past year: No  -LB No  -MR     Previous Functional Level ADLs;Ambulation;IADLs;Leisure Activities  -LB ADLs;Ambulation;IADLs;Leisure Activities  -MR     Level of Assistance: independent  -LB independent  -MR     Services    Prior Rehab/Home Health Experiences Yes  -LB Yes  -MR     When was the prior experience with Rehab/Home Health Acute inpatient rehab at [facility] 9/18/17-9/29/17  -LB Acute inpatient rehab at [facility] 9/18/17-9/29/17  -MR     Where was the prior experience with Rehab/Home Health [facility]  -[facility]  -MR     Are you currently receiving Home Health services No  -LB No  -MR     Daily Activities    Primary " Language English  -LB English  -MR     Are you able to read Yes  -LB Yes  -MR     Are you able to write Yes  -LB Yes  -MR     How does patient learn best? Listening  -LB Listening  -MR     Teaching needs identified Home Exercise Program;Falls Prevention;Management of Condition;Home Safety  -LB Home Exercise Program;Management of Condition;Falls Prevention;Home Safety  -MR     Does patient have problems with the following? Panic Attack  -LB Panic Attack  -MR     Pt Participated in POC and Goals Yes  -LB Yes  -MR     Safety    Are you being hurt, hit, or frightened by anyone at home or in your life? No  -LB No  -MR     Are you being neglected by a caregiver No  -LB No  -MR       User Key  (r) = Recorded By, (t) = Taken By, (c) = Cosigned By    Initials Name Provider Type    LB Peri Hernandez, PT Physical Therapist    MR Caitlyn Benjamin Diamante, OT Occupational Therapist                OT Neuro       10/02/17 1500          Precautions and Contraindications    Precautions/Limitations fall precautions  -MR      Subjective Pain    Able to rate subjective pain? yes  -MR      Pre-Treatment Pain Level 0  -MR      Home Living    Living Arrangements house  -MR      Home Equipment Rolling walker   quad tipped cane, shower chair  -MR      Living Environment Comment pt's  currently off work and home with patient following shoulder surgery  -MR      Coordination    Coordination Tests 9-Hole Peg;Box and Blocks  -MR      Box and Blocks Left 18 blocks  -MR      Box and Blocks Right 52 blocks  -MR      9-Hole Peg Left 3 min 49 sec  -MR      9-Hole Peg Right 28 sec  -MR      ROM (Range of Motion)    General ROM upper extremity range of motion deficits identified  -MR      Left Shoulder    Flexion AROM Deficit 1/2 range  -MR      ABduction AROM Deficit 1/4 range  -MR      General UE Assessment    ROM RUE ROM was WFL;shoulder, left: UE ROM deficit  -MR      ROM Detail LUE AROM with elbow, forearm, wrist, and hand WFL. Pt with history of  arthritis of L shoulder, limited ROM with shoulder prior to CVA  -MR      MMT (Manual Muscle Testing)    General MMT Assessment upper extremity strength deficits identified  -MR      Upper Extremity    Upper Ext Manual Muscle Testing right UE strength is WFL;left elbow/forearm strength deficit  -MR      Upper Ext Manual Muscle Testing Detail L shoulder not tested due to pain, history of L shoulder impairment  -MR      Left Elbow/Forearm    Elbow Flexion Gross Movement (3+/5) fair plus  -MR      Elbow Extension Gross Movement (3+/5) fair plus  -MR      ADL Assessment/Intervention    ADL's Assessed? Upper Body Bathing;Lower Body Bathing;Upper Body Dressing;Lower Body Dressing;Grooming  -MR      Upper Body Bathing Assessment/Training    UB Bathing Assess/Train, [place] Level minimum assist (75% patient effort)  -MR      Lower Body Bathing Assessment/Training    LB Bathing Assess/Train, [place] Level minimum assist (75% patient effort)  -MR      Upper Body Dressing Assessment/Training    UB Dressing Assess/Train, [place] minimum assist (75% patient effort)  -MR      UB Dressing Assess/Train, Comment assist to fasten bra  -MR      Lower Body Dressing Assessment/Training    LB Dressing Assess/Train, [place] minimum assist (75% patient effort)  -MR      Grooming Assessment/Training    Grooming Assess/Train, Indepen Level minimum assist (75% patient effort)  -MR        User Key  (r) = Recorded By, (t) = Taken By, (c) = Cosigned By    Initials Name Provider Type    MR Caitlyn Benjamin Diamante, OT Occupational Therapist             Hand Therapy (last 24 hours)      Hand Eval       10/02/17 1500          Hand  Strength     Strength Affected Side Bilateral  -MR       Strength Right    Right  Test 1 50  -MR      Right  Test 2 50  -MR      Right  Test 3 48  -MR       Strength Average Right 49.33  -MR       Strength Left    Left  Test 1 5  -MR      Left  Test 2 5  -MR      Left   Test 3 4  -MR       Strength Average Left 4.67  -MR      Pinch Strength    Affected Side Bilateral  -MR      Right Hand Strength - Pinch (lbs)    Lateral 12 lbs  -MR      Left Hand Strength - Pinch (lbs)    Lateral 4 lbs  -MR      Therapy Education    Given HEP  -MR      Program New   FM activities for LUE  -MR      How Provided Verbal  -MR      Provided to Patient  -MR      Level of Understanding Verbalized  -MR        User Key  (r) = Recorded By, (t) = Taken By, (c) = Cosigned By    Initials Name Provider Type    MR Caitlyn Bobby, OT Occupational Therapist                        OT Goals       10/02/17 1500       OT Short Term Goals    STG Date to Achieve 11/16/17  -MR     STG 1 Pt to be independent with HEP.  -MR     STG 2 Pt to increase LUE  strength to > or = 10 lbs for improved functional use of L hand.  -MR     STG 3 Pt to demo increase score on Box and Blocks assessment using LUE to > or = 23 to improved FM coordination with L hand for ADL/IADLs.  -MR     Long Term Goals    LTG Date to Achieve 12/16/17  -MR     LTG 1 Pt to increase LUE  strength to > or = 15 lbs for improved functional use of L hand.  -MR     LTG 2 Pt to complete 9 Hole peg test in < or = 3 minutes to improve FM coordination with L hand for performance of ADL/IADLs.  -MR     LTG 3 Pt to increase strength with LUE elbow to > or = 4/5 for improved functional use of extremity.  -MR     LTG 4 Pt to complete UB/LB dressing with MOD I for increased independence with self care tasks.  -MR     LTG 5 Pt to open containers with MOD I using adaptive strategies as needed for increased independence with functional tasks.  -MR     Time Calculation    OT Goal Re-Cert Due Date 11/01/17  -MR       User Key  (r) = Recorded By, (t) = Taken By, (c) = Cosigned By    Initials Name Provider Type    MR Caitlyn Bobby, OT Occupational Therapist                OT Assessment/Plan       10/02/17 1326       OT Assessment    Functional Limitations  Limitation in home management;Limitations in community activities;Limitations in functional capacity and performance;Performance in leisure activities;Performance in self-care ADL  -MR     Impairments Dexterity;Coordination;Range of motion;Pain;Muscle strength  -MR     Assessment Comments Pt is a 67 year old female who sustained a CVA with resulting L sided weakness on 9/13/17. Pt received inpatient acute rehab, recently discharged home with , currently presents for outpatient evaluation. Prior to CVA pt was independent and active, currently pt requires supervision/assistance from  for completion of ADLs. Pt noted with impaired strength, ROM, and FM skills of LUE. Pt reports history of arthritis/impaired ROM with L shoulder prior to CVA, had planned for shoulder replacement surgery before sustaining CVA. Recommend outpatient OT services to address aforementioned deficits for improved safety and independence with ADL/IADLs.  -MR     Please refer to paper survey for additional self-reported information Yes  -MR     OT Diagnosis impaired functional use of LUE  -MR     OT Rehab Potential Good  -MR     Patient/caregiver participated in establishment of treatment plan and goals Yes  -MR     Patient would benefit from skilled therapy intervention Yes  -MR     OT Plan    OT Frequency 2x/week  -MR     Predicted Duration of Therapy Intervention (days/wks) 10-12 weeks  -MR     Planned CPT's? OT EVAL LOW COMPLEXITY: 48859;OT THER ACT EA 15 MIN: 16827FU;OT THER PROC EA 15 MIN: 69142KX;OT NEUROMUSC RE EDUCATION EA 15 MIN: 95393;OT SELF CARE/MGMT/TRAIN 15 MIN: 71598  -MR     Planned Therapy Interventions (Optional Details) home exercise program;motor coordination training;neuromuscular re-education;patient/family education;ROM (Range of Motion);strengthening;stretching  -MR       User Key  (r) = Recorded By, (t) = Taken By, (c) = Cosigned By    Initials Name Provider Type    MR Caitlyn Bobby, OT Occupational  Therapist                        Outcome Measures       10/02/17 1500          DASH    Open a tight or new jar. 4  -MR      Write 1  -MR      Turn a key 1  -MR      Prepare a meal 3  -MR      Push open a heavy door 3  -MR      Place an object on a shelf above your head 3  -MR      Do heavy household chores (e.g., wash walls, wash floors) 5  -MR      Garden or do yard work 5  -MR      Make a bed 4  -MR      Carry a shopping bag or briefcase 4  -MR      Carry a heavy object (over 10 lbs) 5  -MR      Change a lightbulb overhead 4  -MR      Wash or blow dry your hair 4  -MR      Wash your back 2  -MR      Put on a pullover sweater 2  -MR      Use a knife to cut food 3  -MR      Recreational activities in which require little effort (e.g., cardplaying, knitting, etc.) 4  -MR      Recreational activities in which you take some force or impact through your arm, should or hand (e.g. golf, hammering, tennis, etc.) 4  -MR      Recreational Activities in which you move your arm freely (e.g., frisbee, badminton, etc.) 3  -MR      Manage transportation needs (getting from one place to another) 5  -MR      During the past week, to what extent has your arm, shoulder, or hand problem interfered with your normal social activites with family, friends, neighbors or groups? 4  -MR      During the past week, were you limited in your work or other regular daily activities as a result of your arm, shoulder or hand problem? 4  -MR      Arm, Shoulder, or hand pain 1  -MR      Arm, shoulder or hand pain when you performed any specific activity 3  -MR      Tingling (pins and needles) in your arm, shoulder, or hand 1  -MR      Weakness in your arm, shoulder or hand 4  -MR      Stiffness in your arm, shoulder or hand 2  -MR      During the past week, how much difficulty have you had sleeping because of the pain in your arm, shoulder or hand? 3  -MR      I feel less capable, less confident or less useful because of my arm, shoulder or hand  problem 5  -MR      DASH Sum  96  -MR      Number of Questions Answered 29  -MR      DASH Score 57.76  -MR      Functional Assessment    Outcome Measure Options Disabilities of the Arm, Shoulder, and Hand (DASH)  -MR        User Key  (r) = Recorded By, (t) = Taken By, (c) = Cosigned By    Initials Name Provider Type    MR Caitlyn Bobby OT Occupational Therapist            Time Calculation:   OT Start Time: 1345  OT Stop Time: 1430  OT Time Calculation (min): 45 min     Therapy Charges for Today     Code Description Service Date Service Provider Modifiers Qty    63238644593 HC OT CARRY MOV HAND OBJ CURRENT 10/2/2017 Caitlyn Bobby OT GO, CK 1    87670690118 HC OT CARRY MOV HAND OBJ PROJECTED 10/2/2017 Caitlyn Bobby OT GO, CJ 1    [id number]  OT EVAL LOW COMPLEXITY 3 10/2/2017 Caitlyn Bobby OT GO 1          OT G-codes  OT Professional Judgement Used?: Yes  OT Functional Scales Options: Disabilities of the Arm, Shoulder, and Hand (DASH)  Score: sum=96; score=57.76  Functional Limitation: Carrying, moving and handling objects  Carrying, Moving and Handling Objects Current Status (): At least 40 percent but less than 60 percent impaired, limited or restricted  Carrying, Moving and Handling Objects Goal Status (): At least 20 percent but less than 40 percent impaired, limited or restricted          Caitlyn Bobby OT  10/2/2017

## 2017-10-03 ENCOUNTER — APPOINTMENT (OUTPATIENT)
Dept: OCCUPATIONAL THERAPY | Facility: HOSPITAL | Age: 67
End: 2017-10-03

## 2017-10-03 ENCOUNTER — OFFICE VISIT (OUTPATIENT)
Dept: INTERNAL MEDICINE | Facility: CLINIC | Age: 67
End: 2017-10-03

## 2017-10-03 VITALS
BODY MASS INDEX: 19.44 KG/M2 | SYSTOLIC BLOOD PRESSURE: 140 MMHG | HEIGHT: 66 IN | DIASTOLIC BLOOD PRESSURE: 94 MMHG | HEART RATE: 74 BPM | WEIGHT: 121 LBS | OXYGEN SATURATION: 94 %

## 2017-10-03 DIAGNOSIS — K59.00 CONSTIPATION, UNSPECIFIED CONSTIPATION TYPE: ICD-10-CM

## 2017-10-03 DIAGNOSIS — I63.9 CEREBROVASCULAR ACCIDENT (CVA), UNSPECIFIED MECHANISM (HCC): ICD-10-CM

## 2017-10-03 DIAGNOSIS — I10 ESSENTIAL HYPERTENSION: Primary | ICD-10-CM

## 2017-10-03 PROCEDURE — 99213 OFFICE O/P EST LOW 20 MIN: CPT | Performed by: NURSE PRACTITIONER

## 2017-10-03 RX ORDER — LISINOPRIL AND HYDROCHLOROTHIAZIDE 20; 12.5 MG/1; MG/1
1 TABLET ORAL DAILY
Qty: 30 TABLET | Refills: 1
Start: 2017-10-03 | End: 2018-02-20 | Stop reason: SINTOL

## 2017-10-03 NOTE — PATIENT INSTRUCTIONS
Constipation, Adult  Constipation is when a person has fewer than three bowel movements a week, has difficulty having a bowel movement, or has stools that are dry, hard, or larger than normal. As people grow older, constipation is more common. A low-fiber diet, not taking in enough fluids, and taking certain medicines may make constipation worse.   CAUSES   · Certain medicines, such as antidepressants, pain medicine, iron supplements, antacids, and water pills.    · Certain diseases, such as diabetes, irritable bowel syndrome (IBS), thyroid disease, or depression.    · Not drinking enough water.    · Not eating enough fiber-rich foods.    · Stress or travel.    · Lack of physical activity or exercise.    · Ignoring the urge to have a bowel movement.    · Using laxatives too much.    SIGNS AND SYMPTOMS   · Having fewer than three bowel movements a week.    · Straining to have a bowel movement.    · Having stools that are hard, dry, or larger than normal.    · Feeling full or bloated.    · Pain in the lower abdomen.    · Not feeling relief after having a bowel movement.    DIAGNOSIS   Your health care provider will take a medical history and perform a physical exam. Further testing may be done for severe constipation. Some tests may include:  · A barium enema X-ray to examine your rectum, colon, and, sometimes, your small intestine.    · A sigmoidoscopy to examine your lower colon.    · A colonoscopy to examine your entire colon.  TREATMENT   Treatment will depend on the severity of your constipation and what is causing it. Some dietary treatments include drinking more fluids and eating more fiber-rich foods. Lifestyle treatments may include regular exercise. If these diet and lifestyle recommendations do not help, your health care provider may recommend taking over-the-counter laxative medicines to help you have bowel movements. Prescription medicines may be prescribed if over-the-counter medicines do not work.    HOME CARE INSTRUCTIONS   · Eat foods that have a lot of fiber, such as fruits, vegetables, whole grains, and beans.  · Limit foods high in fat and processed sugars, such as french fries, hamburgers, cookies, candies, and soda.    · A fiber supplement may be added to your diet if you cannot get enough fiber from foods.    · Drink enough fluids to keep your urine clear or pale yellow.    · Exercise regularly or as directed by your health care provider.    · Go to the restroom when you have the urge to go. Do not hold it.    · Only take over-the-counter or prescription medicines as directed by your health care provider. Do not take other medicines for constipation without talking to your health care provider first.    SEEK IMMEDIATE MEDICAL CARE IF:   · You have bright red blood in your stool.    · Your constipation lasts for more than 4 days or gets worse.    · You have abdominal or rectal pain.    · You have thin, pencil-like stools.    · You have unexplained weight loss.  MAKE SURE YOU:   · Understand these instructions.  · Will watch your condition.  · Will get help right away if you are not doing well or get worse.     This information is not intended to replace advice given to you by your health care provider. Make sure you discuss any questions you have with your health care provider.     Document Released: 09/15/2005 Document Revised: 01/08/2016 Document Reviewed: 09/29/2014  "[employer] Interactive Patient Education ©2017 "[employer] Inc.

## 2017-10-03 NOTE — PROGRESS NOTES
Subjective   Rosi Vicente is a 67 y.o. female.     HPI Comments: She was hospitalized for cva several weeks ago. She was restarted on lisinopril 5 mg and tapered to 10 mg daily. She has been checking her bp since discharge on Friday and readings 148/91, 148/97, 144/92, 163/92, 136/87, 145/94. Her goal is less than 130/70.He did increase to 20 mg as of yesterday.     Hypertension   This is a chronic problem. The current episode started more than 1 year ago. The problem is unchanged. The problem is uncontrolled. Associated symptoms include anxiety. Pertinent negatives include no blurred vision, chest pain, headaches, palpitations, peripheral edema, PND or shortness of breath. Past treatments include ACE inhibitors. The current treatment provides moderate improvement. Hypertensive end-organ damage includes CVA.        The following portions of the patient's history were reviewed and updated as appropriate: allergies, current medications and problem list.    Review of Systems   Eyes: Negative for blurred vision.   Respiratory: Negative for shortness of breath.    Cardiovascular: Negative for chest pain, palpitations and PND.   Gastrointestinal: Positive for abdominal distention and constipation. Negative for abdominal pain, diarrhea, nausea and vomiting.        Increase gas    Neurological: Positive for weakness. Negative for dizziness and headaches.       Objective   Physical Exam   Constitutional: She is oriented to person, place, and time. She appears well-developed and well-nourished.   HENT:   Head: Normocephalic.   Nose: Nose normal.   Neck: Carotid bruit is not present. No thyroid mass and no thyromegaly present.   Cardiovascular: Regular rhythm and normal heart sounds.  Exam reveals no S3 and no S4.    No murmur heard.  Pulses:       Dorsalis pedis pulses are 2+ on the right side        Posterior tibial pulses are 2+ on the right side   No pedal edema   Repeat bp left arm 132/82  Repeat bp right arm 142/82    Pulmonary/Chest: Effort normal and breath sounds normal. She has no decreased breath sounds. She has no wheezes. She has no rhonchi. She has no rales.   Musculoskeletal: She exhibits no edema.   Neurological: She is alert and oriented to person, place, and time. Gait (unsteady, using gait belt for assistance) abnormal.   Skin: Skin is warm and dry.   Psychiatric: She has a normal mood and affect.       Assessment/Plan   Rosi was seen today for hypertension.    Diagnoses and all orders for this visit:    Essential hypertension  Comments:  goal of bp left arm 130/70; low sodium diet   Orders:  -     lisinopril-hydrochlorothiazide (ZESTORETIC) 20-12.5 MG per tablet; Take 1 tablet by mouth Daily.    Constipation, unspecified constipation type  Comments:  increased water intake; high fiber diet     Cerebrovascular accident (CVA), unspecified mechanism  Comments:  doing well; current with outpatient rehab     Patient has brought forms to be completed for cancellation for upcoming trip to [facility]. I will complete and return to patient.

## 2017-10-04 ENCOUNTER — PATIENT OUTREACH (OUTPATIENT)
Dept: CASE MANAGEMENT | Facility: OTHER | Age: 67
End: 2017-10-04

## 2017-10-04 NOTE — OUTREACH NOTE
Completed hospital follow-up visit with PCP 10/3/17 and initial evaluation for PT and OT pending with outpatient services at [facility].  No needs or concerns voiced.  Following for health care maintenance needs.

## 2017-10-05 ENCOUNTER — HOSPITAL ENCOUNTER (OUTPATIENT)
Dept: PHYSICAL THERAPY | Facility: HOSPITAL | Age: 67
Setting detail: THERAPIES SERIES
Discharge: HOME | End: 2017-10-05
Attending: PHYSICAL MEDICINE & REHABILITATION

## 2017-10-05 ENCOUNTER — HOSPITAL ENCOUNTER (OUTPATIENT)
Dept: OCCUPATIONAL THERAPY | Facility: HOSPITAL | Age: 67
Setting detail: THERAPIES SERIES
Discharge: HOME | End: 2017-10-05

## 2017-10-05 DIAGNOSIS — R29.898 LUE WEAKNESS: ICD-10-CM

## 2017-10-05 DIAGNOSIS — R26.89 FUNCTIONAL GAIT ABNORMALITY: ICD-10-CM

## 2017-10-05 DIAGNOSIS — I69.354 HEMIPLEGIA AND HEMIPARESIS FOLLOWING CEREBRAL INFARCTION AFFECTING LEFT NON-DOMINANT SIDE (HCC): Primary | ICD-10-CM

## 2017-10-05 DIAGNOSIS — IMO0002 LACK OF COORDINATION DUE TO STROKE: ICD-10-CM

## 2017-10-05 PROCEDURE — 97110 THERAPEUTIC EXERCISES: CPT

## 2017-10-05 PROCEDURE — 97112 NEUROMUSCULAR REEDUCATION: CPT

## 2017-10-05 NOTE — THERAPY TREATMENT NOTE
"    Outpatient Physical Therapy Neuro Treatment Note  [facility]     Patient Name: Rosi Vicente  : 1950  MRN: [id number]  Today's Date: 10/5/2017      Visit Date: 10/05/2017    Visit Dx:    ICD-10-CM ICD-9-CM   1. Hemiplegia and hemiparesis following cerebral infarction affecting left non-dominant side I69.354 438.22   2. Functional gait abnormality R26.89 781.2       Patient Active Problem List   Diagnosis   • Hypertension   • Allergic rhinitis   • Trigeminal neuralgia   • Hyperlipidemia   • New daily persistent headache   • Osteoarthritis of hip   • Vitamin D deficiency   • Cerebrovascular accident (CVA)   • Stroke                 PT Neuro       10/05/17 0845          Subjective Comments    Subjective Comments \"I have been walking around the house without my cane.\" \"Is that OK?\" \"I have used the walker some at night.\"   -LB      Precautions and Contraindications    Precautions left shoulder arthritis (planned total shoulder replacement  -LB      Subjective Pain    Able to rate subjective pain? yes  -LB      Pre-Treatment Pain Level 2  -LB      Post-Treatment Pain Level 2  -LB      Left Ankle/Foot    Ankle PF Gross Movement (3/5) fair  -LB      Right Ankle/Foot    Ankle PF Gross Movement (5/5) normal  -LB      Transfers    Transfer, Comment To tall kneeling with CGA, to 1/2 kneeling minimal UE support.   -LB      Gait Assessment/Treatment    Gait, [place] Level minimum assist (75% patient effort)   tactile cues to elongate on the R prior to L swing   -LB      Gait, Assistive Device other (see comments)   quad tipped cane   -LB      Gait, Distance (Feet) 175   x 4   -LB      Gait, Gait Deviations left:;weight-shifting ability decreased;bilateral:;step length decreased   (L decreased knee ext. at swing & stance, L decreased pushof  -LB      Gait, Safety Issues other (see comments)   Reporting walking at home w/o a cane(had been advised to use  -LB      Gait, Impairments strength decreased;impaired " "balance  -LB      Gait, Comment Pt ambulated with quad tipped cane with supervision.   -LB      Stairs Assessment/Treatment    Number of Stairs 4  -LB      Stairs, Handrail Location left side (ascending)   R descending   -LB      Stairs, [place] Level contact guard assist  -LB      Stairs, Assistive Device --   quad tipped cane descending  -LB      Stairs, Technique Used step to step (descending);step over step (ascending)  -LB      Stairs, Impairments impaired balance;strength decreased  -LB      Stairs, Comment Pt ascended a curb with cane and L LE leading with good control over L LE. Pt descending with cane up on step.    -LB        User Key  (r) = Recorded By, (t) = Taken By, (c) = Cosigned By    Initials Name Provider Type    LAVELL Hernandez PT Physical Therapist                        PT Assessment/Plan       10/05/17 1016       PT Assessment    Assessment Comments Pt reporting she has been walking at home without a device. Pt and  had been advised to use a device for fall prevention and for quality of gait. Reviewed in detail with pt the importance of using a device for safety and quality. Pt responding well to tactile cues to elongate on the R prior to swing.   -LB       User Key  (r) = Recorded By, (t) = Taken By, (c) = Cosigned By    Initials Name Provider Type    LAVELL Hernandez PT Physical Therapist                     Exercises       10/05/17 0845          Subjective Comments    Subjective Comments \"I have been walking around the house without my cane.\" \"Is that OK?\" \"I have used the walker some at night.\"   -LB      Subjective Pain    Able to rate subjective pain? yes  -LB      Pre-Treatment Pain Level 2  -LB      Post-Treatment Pain Level 2  -LB      Exercise 1    Exercise Name 1 bilateral plantarflexion in standing on edge of step with bilateral UE support  -LB      Cueing 1 Verbal  -LB      Reps 1 10  -LB      Additional Comments B plantarflexion with LE in standing with UE support  " -LB      Exercise 2    Exercise Name 2 1/2 bridges with verbal and tactile cues to keep pelvis in neutral   -LB      Cueing 2 Tactile;Verbal  -LB      Reps 2 5   each LE   -LB      Additional Comments HEP   -LB      Exercise 3    Exercise Name 3 bridges with LE on therapeutic ball   -LB      Cueing 3 Tactile;Verbal   minimal cues to keep the ball stable  -LB      Reps 3 5  -LB      Exercise 4    Exercise Name 4 wt shifting to L LE with light UE support while lifting R heel   -LB      Cueing 4 Verbal;Tactile  -LB      Reps 4 5  -LB      Additional Comments HEP   -LB        User Key  (r) = Recorded By, (t) = Taken By, (c) = Cosigned By    Initials Name Provider Type    LAVELL Hernandez, PT Physical Therapist                              PT OP Goals       10/05/17 1018       Time Calculation    PT Goal Re-Cert Due Date 11/01/17  -LB       User Key  (r) = Recorded By, (t) = Taken By, (c) = Cosigned By    Initials Name Provider Type    LAVELL Hernandez, PT Physical Therapist                Therapy Education       10/05/17 1014          Therapy Education    Education Details Explained in detail to pt to use a cane or walker. Explained to pt the importance of walking with proper quality verses distance.   -LB      Given Mobility training;HEP   Curb and stairs with various techniques.   -LB      Program New  -LB      How Provided Verbal;Demonstration;Written  -LB      Provided to Patient  -LB      Level of Understanding Verbalized;Demonstrated  -LB        User Key  (r) = Recorded By, (t) = Taken By, (c) = Cosigned By    Initials Name Provider Type    LAVELL Hernandez, PT Physical Therapist                Outcome Measures       10/02/17 1600 10/02/17 1500       Monsalve Balance Scale    Sitting to Standing 3  -LB      Standing Unsupported 4  -LB      Sitting with Back Unsupported but Feet Supported on Floor or on Stool 4  -LB      Standing to Sitting 4  -LB      Transfers 4  -LB      Standing Unsupported with Eyes Closed 3  -LB       Standing Unsupported with Feet Together 3  -LB      Reaching Forward with Outstretched Arm While Standing 4  -LB       Object From the Floor From a Standing Position 3  -LB      Turning to Look Behind Over Left and Right Shoulders While Standing 4  -LB      Turn 360 Degrees 2  -LB      Place Alternate Foot on Step or Stool While Standing Unsupported 1  -LB      Standing Unsupported with One Foot in Front 2  -LB      Standing on One Leg 1  -LB      Monsalve Total Score 42  -LB      DASH    Open a tight or new jar.  4  -MR     Write  1  -MR     Turn a key  1  -MR     Prepare a meal  3  -MR     Push open a heavy door  3  -MR     Place an object on a shelf above your head  3  -MR     Do heavy household chores (e.g., wash walls, wash floors)  5  -MR     Garden or do yard work  5  -MR     Make a bed  4  -MR     Carry a shopping bag or briefcase  4  -MR     Carry a heavy object (over 10 lbs)  5  -MR     Change a lightbulb overhead  4  -MR     Wash or blow dry your hair  4  -MR     Wash your back  2  -MR     Put on a pullover sweater  2  -MR     Use a knife to cut food  3  -MR     Recreational activities in which require little effort (e.g., cardplaying, knitting, etc.)  4  -MR     Recreational activities in which you take some force or impact through your arm, should or hand (e.g. golf, hammering, tennis, etc.)  4  -MR     Recreational Activities in which you move your arm freely (e.g., frisbee, badminton, etc.)  3  -MR     Manage transportation needs (getting from one place to another)  5  -MR     During the past week, to what extent has your arm, shoulder, or hand problem interfered with your normal social activites with family, friends, neighbors or groups?  4  -MR     During the past week, were you limited in your work or other regular daily activities as a result of your arm, shoulder or hand problem?  4  -MR     Arm, Shoulder, or hand pain  1  -MR     Arm, shoulder or hand pain when you performed any specific  activity  3  -MR     Tingling (pins and needles) in your arm, shoulder, or hand  1  -MR     Weakness in your arm, shoulder or hand  4  -MR     Stiffness in your arm, shoulder or hand  2  -MR     During the past week, how much difficulty have you had sleeping because of the pain in your arm, shoulder or hand?  3  -MR     I feel less capable, less confident or less useful because of my arm, shoulder or hand problem  5  -MR     DASH Sum   96  -MR     Number of Questions Answered  29  -MR     DASH Score  57.76  -MR     Dynamic Gait Index (DGI)    Gait Level Surface 2  -LB      Change in Gait Speed 1  -LB      Gait with Horizontal Head Turns 2  -LB      Gait with Vertical Head Turns 2  -LB      Gait and Pivot Turn 2  -LB      Step Over Obstacle 1  -LB      Step Around Obstacles 2  -LB      Steps 2  -LB      Dynamic Gait Index Score 14  -LB      Dynamic Gait Index Comments with quad tipped cane   -LB      Functional Assessment    Outcome Measure Options Monsalve Balance;Dynamic Gait Index  -LB Disabilities of the Arm, Shoulder, and Hand (DASH)  -MR       User Key  (r) = Recorded By, (t) = Taken By, (c) = Cosigned By    Initials Name Provider Type    LB Peri Hernandez, PT Physical Therapist    MR Caitlyn Bobby, OT Occupational Therapist            Time Calculation:   Start Time: 0845  Stop Time: 0930  Time Calculation (min): 45 min     Therapy Charges for Today     Code Description Service Date Service Provider Modifiers Qty    12186640100 HC PT NEUROMUSC RE EDUCATION EA 15 MIN 10/5/2017 Peri Hernandez, PT GP 3                    Peri Hernandez, PT  10/5/2017

## 2017-10-05 NOTE — THERAPY TREATMENT NOTE
Outpatient Occupational Therapy Neuro Treatment Note  [facility]     Patient Name: Rosi Vicente  : 1950  MRN: [id number]  Today's Date: 10/5/2017       Visit Date: 10/05/2017    Patient Active Problem List   Diagnosis   • Hypertension   • Allergic rhinitis   • Trigeminal neuralgia   • Hyperlipidemia   • New daily persistent headache   • Osteoarthritis of hip   • Vitamin D deficiency   • Cerebrovascular accident (CVA)   • Stroke        Past Medical History:   Diagnosis Date   • Allergic rhinitis    • Anemia    • Bronchitis    • FH: colon cancer    • H/O bone density study    • H/O mammogram    • Hypertension     Benign Essential   • Malaise and fatigue    • Nocturia    • Stroke 2017   • TMJ (temporomandibular joint syndrome)    • Trigeminal neuralgia     Surgery at [facility] in  repeat in         Past Surgical History:   Procedure Laterality Date   • AUGMENTATION MAMMAPLASTY     • COLONOSCOPY N/A 2015    Repeat Q 5 years due to Fhx of Colon Ca-Dr. Polk   • PAP SMEAR N/A     Dr. Burden         Visit Dx:    ICD-10-CM ICD-9-CM   1. Hemiplegia and hemiparesis following cerebral infarction affecting left non-dominant side I69.354 438.22   2. LUE weakness R29.898 729.89   3. Lack of coordination due to stroke I63.9 434.91    R27.9 781.3                         OT Assessment/Plan       10/05/17 1031       OT Assessment    Assessment Comments Pt seen for first treatment session following initial evaluation, motivated and cooperative for participation in all OT interventions.  -MR       User Key  (r) = Recorded By, (t) = Taken By, (c) = Cosigned By    Initials Name Provider Type     Caitlyn Benjamin Diamante, OT Occupational Therapist                    Therapy Education       10/05/17 1031 10/05/17 1014       Therapy Education    Education Details  Explained in detail to pt to use a cane or walker. Explained to pt the importance of walking with proper quality verses distance.   -LB   "   Given HEP  -MR Mobility training;HEP   Curb and stairs with various techniques.   -LB     Program New   theraputty exercises for L hand  -MR New  -LB     How Provided Verbal;Demonstration  -MR Verbal;Demonstration;Written  -LB     Provided to Patient  -MR Patient  -LB     Level of Understanding Teach back education performed  -MR Verbalized;Demonstrated  -LB       User Key  (r) = Recorded By, (t) = Taken By, (c) = Cosigned By    Initials Name Provider Type    LB Peri Hernandez, PT Physical Therapist    MR Caitlyn Bobby, OT Occupational Therapist                    OT Exercises       10/05/17 0900          Subjective Comments    Subjective Comments \"I've been trying things at home with my L hand.\"  -MR      Subjective Pain    Able to rate subjective pain? yes  -MR      Pre-Treatment Pain Level 2  -MR      Subjective Pain Comment L shoulder  -MR      Exercise 1    Exercise Name 1 FM activity to increase coordination for improved functional use of L hand. Using small cubes pt uses pincer and tripod prehension patterns to manipulate cubes and place into pegboard one at a time. OT provides demo and cues for technique, as well as cues to reduce L shoulder hiking during activity.   -MR      Cueing 1 Verbal;Demo  -MR      Exercise 2    Exercise Name 2 UE therapeutic exercises to increase strength and endurance for improved functional use of LUE. Using hand weight pt performs bicep curls, forearm pronation/supination, wrist flexion and extension. OT provides demo and cues for proper technique.  -MR      Cueing 2 Verbal;Demo  -MR      Equipment 2 [place]umbell  -MR      Weights/Plates 2 1  -MR      Sets 2 2  -MR      Reps 2 10  -MR      Exercise 3    Exercise Name 3 Activity to increase coordination and strength with LUE for improved functional use. Using theraputty pt performs gross grasp, in hand manipulation, and various pinches following OT demo/cues.  -MR      Cueing 3 Verbal;Demo  -MR      Equipment 3 Theraputty  -MR   "    Resistance 3 Yellow  -MR        User Key  (r) = Recorded By, (t) = Taken By, (c) = Cosigned By    Initials Name Provider Type    MR Caitlyn Bobby, OT Occupational Therapist                    Outcome Measures       10/02/17 1600 10/02/17 1500       Monsalve Balance Scale    Sitting to Standing 3  -LB      Standing Unsupported 4  -LB      Sitting with Back Unsupported but Feet Supported on Floor or on Stool 4  -LB      Standing to Sitting 4  -LB      Transfers 4  -LB      Standing Unsupported with Eyes Closed 3  -LB      Standing Unsupported with Feet Together 3  -LB      Reaching Forward with Outstretched Arm While Standing 4  -LB       Object From the Floor From a Standing Position 3  -LB      Turning to Look Behind Over Left and Right Shoulders While Standing 4  -LB      Turn 360 Degrees 2  -LB      Place Alternate Foot on Step or Stool While Standing Unsupported 1  -LB      Standing Unsupported with One Foot in Front 2  -LB      Standing on One Leg 1  -LB      Monsalve Total Score 42  -LB      DASH    Open a tight or new jar.  4  -MR     Write  1  -MR     Turn a key  1  -MR     Prepare a meal  3  -MR     Push open a heavy door  3  -MR     Place an object on a shelf above your head  3  -MR     Do heavy household chores (e.g., wash walls, wash floors)  5  -MR     Garden or do yard work  5  -MR     Make a bed  4  -MR     Carry a shopping bag or briefcase  4  -MR     Carry a heavy object (over 10 lbs)  5  -MR     Change a lightbulb overhead  4  -MR     Wash or blow dry your hair  4  -MR     Wash your back  2  -MR     Put on a pullover sweater  2  -MR     Use a knife to cut food  3  -MR     Recreational activities in which require little effort (e.g., cardplaying, knitting, etc.)  4  -MR     Recreational activities in which you take some force or impact through your arm, should or hand (e.g. golf, hammering, tennis, etc.)  4  -MR     Recreational Activities in which you move your arm freely (e.g., frisbee,  regina, etc.)  3  -MR     Manage transportation needs (getting from one place to another)  5  -MR     During the past week, to what extent has your arm, shoulder, or hand problem interfered with your normal social activites with family, friends, neighbors or groups?  4  -MR     During the past week, were you limited in your work or other regular daily activities as a result of your arm, shoulder or hand problem?  4  -MR     Arm, Shoulder, or hand pain  1  -MR     Arm, shoulder or hand pain when you performed any specific activity  3  -MR     Tingling (pins and needles) in your arm, shoulder, or hand  1  -MR     Weakness in your arm, shoulder or hand  4  -MR     Stiffness in your arm, shoulder or hand  2  -MR     During the past week, how much difficulty have you had sleeping because of the pain in your arm, shoulder or hand?  3  -MR     I feel less capable, less confident or less useful because of my arm, shoulder or hand problem  5  -MR     DASH Sum   96  -MR     Number of Questions Answered  29  -MR     DASH Score  57.76  -MR     Dynamic Gait Index (DGI)    Gait Level Surface 2  -LB      Change in Gait Speed 1  -LB      Gait with Horizontal Head Turns 2  -LB      Gait with Vertical Head Turns 2  -LB      Gait and Pivot Turn 2  -LB      Step Over Obstacle 1  -LB      Step Around Obstacles 2  -LB      Steps 2  -LB      Dynamic Gait Index Score 14  -LB      Dynamic Gait Index Comments with quad tipped cane   -LB      Functional Assessment    Outcome Measure Options Monsalve Balance;Dynamic Gait Index  -LB Disabilities of the Arm, Shoulder, and Hand (DASH)  -MR       User Key  (r) = Recorded By, (t) = Taken By, (c) = Cosigned By    Initials Name Provider Type    LB Peri Hernandez, PT Physical Therapist    MR Caitlyn Bobby, OT Occupational Therapist            Time Calculation:   OT Start Time: 0931  OT Stop Time: 1015  OT Time Calculation (min): 44 min     Therapy Charges for Today     Code Description Service Date  Service Provider Modifiers Qty    13682162501 HC OT THER PROC EA 15 MIN 10/5/2017 Caitlyn Bobby OT GO 1    25213604969 HC OT NEUROMUSC RE EDUCATION EA 15 MIN 10/5/2017 Caitlyn Bobby OT GO 2                    Caitlyn Bobby OT  10/5/2017

## 2017-10-06 ENCOUNTER — TRANSCRIBE ORDERS (OUTPATIENT)
Dept: PHYSICAL MEDICINE AND REHAB | Facility: HOSPITAL | Age: 67
End: 2017-10-06

## 2017-10-06 DIAGNOSIS — R41.89 COGNITIVE DEFICITS: Primary | ICD-10-CM

## 2017-10-09 ENCOUNTER — HOSPITAL ENCOUNTER (OUTPATIENT)
Dept: PHYSICAL THERAPY | Facility: HOSPITAL | Age: 67
Setting detail: THERAPIES SERIES
Discharge: HOME | End: 2017-10-09
Attending: PHYSICAL MEDICINE & REHABILITATION

## 2017-10-09 ENCOUNTER — HOSPITAL ENCOUNTER (OUTPATIENT)
Dept: OCCUPATIONAL THERAPY | Facility: HOSPITAL | Age: 67
Setting detail: THERAPIES SERIES
Discharge: HOME | End: 2017-10-09

## 2017-10-09 ENCOUNTER — HOSPITAL ENCOUNTER (OUTPATIENT)
Dept: SPEECH THERAPY | Facility: HOSPITAL | Age: 67
Setting detail: THERAPIES SERIES
Discharge: HOME | End: 2017-10-09

## 2017-10-09 ENCOUNTER — TELEPHONE (OUTPATIENT)
Dept: INTERNAL MEDICINE | Facility: CLINIC | Age: 67
End: 2017-10-09

## 2017-10-09 DIAGNOSIS — I69.319 COGNITIVE DEFICIT FOLLOWING CEREBROVASCULAR ACCIDENT (CVA): Primary | ICD-10-CM

## 2017-10-09 DIAGNOSIS — I10 ESSENTIAL HYPERTENSION: Primary | ICD-10-CM

## 2017-10-09 DIAGNOSIS — I69.328 SPEECH OR LANGUAGE DEFICIT FOLLOWING CEREBROVASCULAR ACCIDENT: ICD-10-CM

## 2017-10-09 DIAGNOSIS — I69.354 HEMIPLEGIA AND HEMIPARESIS FOLLOWING CEREBRAL INFARCTION AFFECTING LEFT NON-DOMINANT SIDE (HCC): Primary | ICD-10-CM

## 2017-10-09 DIAGNOSIS — IMO0002 LACK OF COORDINATION DUE TO STROKE: ICD-10-CM

## 2017-10-09 DIAGNOSIS — R29.898 LUE WEAKNESS: ICD-10-CM

## 2017-10-09 DIAGNOSIS — R26.89 FUNCTIONAL GAIT ABNORMALITY: ICD-10-CM

## 2017-10-09 PROCEDURE — 96125 COGNITIVE TEST BY HC PRO: CPT | Performed by: SPEECH-LANGUAGE PATHOLOGIST

## 2017-10-09 PROCEDURE — G9175 SPEECH LANG GOAL STATUS: HCPCS | Performed by: SPEECH-LANGUAGE PATHOLOGIST

## 2017-10-09 PROCEDURE — G9174 SPEECH LANG CURRENT STATUS: HCPCS | Performed by: SPEECH-LANGUAGE PATHOLOGIST

## 2017-10-09 PROCEDURE — 97112 NEUROMUSCULAR REEDUCATION: CPT

## 2017-10-09 PROCEDURE — 97112 NEUROMUSCULAR REEDUCATION: CPT | Performed by: OCCUPATIONAL THERAPIST

## 2017-10-09 PROCEDURE — 97110 THERAPEUTIC EXERCISES: CPT | Performed by: OCCUPATIONAL THERAPIST

## 2017-10-09 RX ORDER — METOPROLOL TARTRATE 50 MG/1
50 TABLET, FILM COATED ORAL 2 TIMES DAILY
Qty: 60 TABLET | Refills: 4 | Status: SHIPPED | OUTPATIENT
Start: 2017-10-09 | End: 2017-11-03 | Stop reason: SDUPTHER

## 2017-10-09 RX ORDER — METOPROLOL TARTRATE 50 MG/1
50 TABLET, FILM COATED ORAL 2 TIMES DAILY
Qty: 60 TABLET | Refills: 4 | Status: SHIPPED | OUTPATIENT
Start: 2017-10-09 | End: 2017-10-09 | Stop reason: SDUPTHER

## 2017-10-09 NOTE — PROGRESS NOTES
Changed to match Epic documentation.  SECTION GG    Self Care Performance:   Oral Hygiene: [place] sets up or cleans up; patient completes activity. [place]  assists only prior to or following the activity.   Toileting Hygiene: [place] does less than half the effort. [place] lifts, holds  or supports trunk or limbs but provides less than half the effort.   Shower/Bathe Self: [place] does less than half the effort. [place] lifts, holds  or supports trunk or limbs but provides less than half the effort.   Upper Body Dressing: [place] does less than half the effort. [place] lifts, holds  or supports trunk or limbs but provides less than half the effort.   Lower Body Dressing: [place] does less than half the effort. [place] lifts, holds  or supports trunk or limbs but provides less than half the effort.   Putting On/Taking Off Footwear: [place] does less than half the effort. [place]  lifts, holds or supports trunk or limbs but provides less than half the effort.    Self Care Discharge Goals: Branch    Mobility Toilet Transfer Performance: [place] does less than half the effort.  [place] lifts, holds or supports trunk or limbs but provides less than half the  effort.    Mobility Toilet Transfer Discharge Goal: Branch    Signed by: Daryl Jesus RN

## 2017-10-09 NOTE — THERAPY TREATMENT NOTE
Outpatient Occupational Therapy Neuro Treatment Note  [facility]     Patient Name: Rosi Vicente  : 1950  MRN: [id number]  Today's Date: 10/9/2017       Visit Date: 10/09/2017    Patient Active Problem List   Diagnosis   • Hypertension   • Allergic rhinitis   • Trigeminal neuralgia   • Hyperlipidemia   • New daily persistent headache   • Osteoarthritis of hip   • Vitamin D deficiency   • Cerebrovascular accident (CVA)   • Stroke        Past Medical History:   Diagnosis Date   • Allergic rhinitis    • Anemia    • Bronchitis    • FH: colon cancer    • H/O bone density study    • H/O mammogram    • Hypertension     Benign Essential   • Malaise and fatigue    • Nocturia    • Stroke 2017   • TMJ (temporomandibular joint syndrome)    • Trigeminal neuralgia     Surgery at [facility] in  repeat in         Past Surgical History:   Procedure Laterality Date   • AUGMENTATION MAMMAPLASTY     • COLONOSCOPY N/A 2015    Repeat Q 5 years due to Fhx of Colon Ca-Dr. Polk   • PAP SMEAR N/A     Dr. Burden         Visit Dx:    ICD-10-CM ICD-9-CM   1. Hemiplegia and hemiparesis following cerebral infarction affecting left non-dominant side I69.354 438.22   2. LUE weakness R29.898 729.89   3. Lack of coordination due to stroke I63.9 434.91    R27.9 781.3             OT Neuro       10/09/17 1500          Subjective Comments    Subjective Comments No new changes, still complains of same L shoulder pain/crepitis  -SO      Precautions and Contraindications    Precautions/Limitations cardiac precautions  -SO      Subjective Pain    Able to rate subjective pain? yes  -SO      Pre-Treatment Pain Level 3  -SO      Subjective Pain Comment L shoulder during active mvt  -SO        User Key  (r) = Recorded By, (t) = Taken By, (c) = Cosigned By    Initials Name Provider Type    EDY Wilsno, OTR Occupational Therapist                        OT Assessment/Plan       10/09/17 1528       OT  Assessment    Assessment Comments Pt with L shoulder pain during active shoulder flexion. Has scapular weakness. Demo fair L hand in hand manipulation.  -SO       User Key  (r) = Recorded By, (t) = Taken By, (c) = Cosigned By    Initials Name Provider Type    JEY Sandoval Occupational Therapist                            OT Exercises       10/09/17 1500          Exercise 1    Exercise Name 1 1# dowel strengtheing exercise: sh flexion to 90', protraction/retraction of BUE  -SO      Cueing 1 Verbal;Demo  -SO      Equipment 1 Dowel  -SO      Weights/Plates 1 1  -SO      Sets 1 2  -SO      Reps 1 10  -SO      Exercise 2    Exercise Name 2 Scapular strengthening with theraband with elbow flexed and protraciton retraction in sitting position and in standing with elbow straight  -SO      Cueing 2 Verbal;Tactile;Demo  -SO      Equipment 2 Theraband  -SO      Resistance 2 Yellow  -SO      Sets 2 2  -SO      Reps 2 10  -SO      Exercise 3    Exercise Name 3 Pt demo fair L hand in hand manipulation with golf tees and chinese checkers. Pt begins to fatigue and needs rest breaks.  -SO      Cueing 3 Verbal;Demo  -SO      Exercise 4    Exercise Name 4 Theraputty wrist flexion/extension, 2 and 3 pt pinch and lateral pinch. Pt with RUE compensation, cues to decrease apraxia.  -SO      Cueing 4 Verbal;Tactile;Demo  -SO      Equipment 4 Theraputty  -SO      Resistance 4 Yellow  -SO        User Key  (r) = Recorded By, (t) = Taken By, (c) = Cosigned By    Initials Name Provider Type    JEY Sandoval Occupational Therapist                    Time Calculation:   OT Start Time: 1345  OT Stop Time: 1428  OT Time Calculation (min): 43 min     Therapy Charges for Today     Code Description Service Date Service Provider Modifiers Qty    32797097856 HC OT NEUROMUSC RE EDUCATION EA 15 MIN 10/9/2017 JEY Mohamud GO 1    93108072809 HC OT THER PROC EA 15 MIN 10/9/2017 JEY Mohamud  2                    Jenna Wilson, OTR  10/9/2017

## 2017-10-09 NOTE — PROGRESS NOTES
PPS CMG Coordinator  Inpatient Rehabilitation Discharge    Mode of Locomotion: Walking.    Discharge Against Medical Advice:  No.  Discharge Information  Patient Discharged Alive:  Yes  Discharge Destination/Living Setting: Home.  At discharge, the patient was discharged to live (with) (02)  Family / Relatives    Diagnosis for Interruption/Death: ICD    Impairment Group: Stroke: 01.1 Left Body Involvement (Right Brain)    Comorbidities: ICD    Complications: ICD      JOESPH Bladder Accidents: 0  - Accidents.  Bladder Score = 5.  One (1) bladder accident.  JOESPH Bowel Accident: 0  - Accidents.  Bowel Score = 6. Patient has no accidents, but uses a device/medications.      QUALITY INDICATORS  Health Conditions: Fall(s) Since Admission:  No  Section M. Skin Conditions Discharge:  Unhealed Pressure Ulcer(s) at Stage 1 or  Higher:  No    . Current Number of Unhealed Pressure Ulcers  Branch    . Worsening in Pressure Ulcer Status Since Admission:  Branch    . Healed Pressure Ulcer(s):    Number of Healed Stage 1: 0  Number of Healed Stage 2: 0  Number of Healed Stage 3: 0  Number of Healed Stage 4: 0    . Influenza Vaccine - Discharge  Received in this facility for this year's influenza vaccination season:  Yes.    Date Influenza Vaccine Received (if applicable)  09/29/2017    Signed by: Daryl Jesus RN

## 2017-10-09 NOTE — THERAPY EVALUATION
"Outpatient Speech Language Pathology   Adult Speech Language Cognitive Initial Evaluation  [facility]     Patient Name: Rosi Vicente  : 1950  MRN: [id number]  Today's Date: 10/9/2017        Visit Date: 10/09/2017   Patient Active Problem List   Diagnosis   • Hypertension   • Allergic rhinitis   • Trigeminal neuralgia   • Hyperlipidemia   • New daily persistent headache   • Osteoarthritis of hip   • Vitamin D deficiency   • Cerebrovascular accident (CVA)   • Stroke        Past Medical History:   Diagnosis Date   • Allergic rhinitis    • Anemia    • Bronchitis    • FH: colon cancer    • H/O bone density study    • H/O mammogram    • Hypertension     Benign Essential   • Malaise and fatigue    • Nocturia    • Stroke 2017   • TMJ (temporomandibular joint syndrome)    • Trigeminal neuralgia     Surgery at [facility] in  repeat in         Past Surgical History:   Procedure Laterality Date   • AUGMENTATION MAMMAPLASTY     • COLONOSCOPY N/A 2015    Repeat Q 5 years due to Fhx of Colon Ca-Dr. Polk   • PAP SMEAR N/A     Dr. Burden         Visit Dx:    ICD-10-CM ICD-9-CM   1. Cognitive deficit following cerebrovascular accident (CVA) I69.319 438.0   2. Speech or language deficit following cerebrovascular accident I69.328 438.10                 Adult Speech Language - 10/09/17 1300     Background and History    Reason for Referral Mrs Vicente is referred to outpatient speech therapy for cognitive communication impairment following recent hospitalization for acute CVA. Mrs Vicente was hospitalized on 17 with left sided weakness and MRI revealing acute infarct of the right frontal lobe extending to the internal capsule. Patient received inpatient rehab from  to  with speech patient targeting patient's cognitive communication skills.   -KA    Stated Goals Mrs Vicente reported her goals are to improve her thinking skills including processing speed, and not feel like \" I am  in " "a  fog.\" She reports some short term memory difficulty, such as keeping track of her medications and appointments. She also reports it is more difficult for her to \"think when fatigued,\" and is slower to make decisions and solve problems.   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   -KA    Informant for the Evaluation Self  -KA    Standardized Tests    Cognitive/Memory Tests CLQT: Cognitive Linguistic Quick Test  -KA    CLQT (The Cognitive Linguistic Quick Test)    Attention Domain Score 197  -KA    Attention Severity Rating 4: WNL  -KA    Memory Domain Score 170  -KA    Memory Severity Rating 4: WNL  -KA    Executive Function Domain Score 29  -KA    Executive Function Severity Rating 4: WNL  -KA    Language Domain Score 32  -KA    Language Severity Rating 4: WNL  -KA    Visuospatial Domain Score 93  -KA    Visuospatial Severity Rating 4: WNL  -KA    Clock Drawing Total Score 13  -KA    Clock Drawing Severity Rating WNL  -KA    Composite Severity Rating 4.0  -KA    Composite Severity Rating Range 4.0 - 3.5: WNL  -KA    CLQT Comments Overall Mrs Vicente did score WNL under each domain of the CLQT. She did demonstrate mild impairment with self monitoring skills during second maze task and increased processing time to complete task. Increased processing time needed for design generation task. During informal assessment of her cognitive communication skills she demonstrated 100% accuracy with functional math word problems, 100% with delayed recall of three unrelated words, auditory comprehension of detailed multi-paragraph level story=80%, and in deductive reasoning task with following detailed written instructions and utilizing " process of eliminiation skills, she required min cues in order to solve with reduced attention to detail, 70% without cues and 100% with min cues. SLP will continue diagnostic assessment of her higher level cognitive communication skills.   -KA      User Key  (r) = Recorded By, (t) = Taken By, (c) = Cosigned By    Initials Name Provider Type    CARLOTA Alatorre MA,CCC-SLP Speech and Language Pathologist                               OP SLP Education       10/09/17 4967    Education    Barriers to Learning No barriers identified  -    Education Provided Described results of evaluation;Patient expressed understanding of evaluation  -KA    Assessed Learning needs;Learning motivation  -KA    Learning Motivation Strong  -KA    Learning Method Explanation  -    Teaching Response Verbalized understanding  -KA      User Key  (r) = Recorded By, (t) = Taken By, (c) = Cosigned By    Initials Name Effective Dates    CARLOTA Alatorre MA,CCC-SLP 04/13/15 -                 SLP OP Goals       10/09/17 1600       Goal Type Needed    Goal Type Needed Written Language Comprehension;Auditory Comprehension;Executive Function;Probelm Solving  -CARLOTA     Subjective Pain    Able to rate subjective pain? yes  -KA     Pre-Treatment Pain Level 0  -KA     Post-Treatment Pain Level 0  -KA     Written Language Comprehension Goals    Written Language Comprehension LTG's Patient will be able to comprehend written material in social/avocational/work setting  -KA     Patient will be able to comprehend written material in social/avocational/work setting 90%:;without cues  -KA     Status: Patient will be able to comprehend written material in social/avocational/work setting New  -KA     Written Language Comprehension STG's Patient will improve comprehension of written language skills by following three-step written directions;Patient will improve comprehension of written language skills by answering written questions related to home  management/social/work tasks (bills, recipes, tv guide, emails, procedures)  -KA     Patient will improve comprehension of written language skills by following three-step written directions 90%:;without cues  -KA     Status: Patient will improve comprehension of written language skills by following three-step written directions New  -KA     Patient will improve comprehension of written language skills by answering written questions related to home management/social/work tasks (bills, recipes, tv guide, emails, procedures) 90%:;without cues  -KA     Status: Patient will improve comprehension of written language skills by answering written questions related to home management/social/work tasks (bills, recipes, tv guide, emails, procedures) New  -KA     Auditory Comprehension Goals    Auditory Comprehension LTG's Patient will comprehend abstract and complex information presented in all social, avocational, or work settings  -KA     Patient will comprehend abstract and complex information presented in all social, avocational, or work settings 90%:;without cues  -KA     Status: Patient will comprehend abstract and complex information presented in all social, avocational, or work settings New  -KA     Auditory Comprehension STG's Patient will demonstrate comprehension of spoken language by answering questions about a multi paragraph length content;Patient will improve comprehension of spoken language by following Multi-step directions  -KA     Patient will improve comprehension of spoken language by following Multi-step directions 90%:;without cues  -KA     Status: Patient will improve comprehension of spoken language by following Multi-step directions New  -KA     Patient will demonstrate comprehension of spoken language by answering questions about a multi paragraph length content 90%:;without cues  -KA     Status: Patient will demonstrate comprehension of spoken language by answering questions about a multi paragraph  length content New  -KA     Executive Function Goals    Executive Function LTG's Patient will be able to engage in avocational activities requiring high level cognitive skills  -KA     Patient will be able to use high level cognitive skills to allow patient to return to work 90%:;without cues  -KA     Status: Patient will be able to use high level cognitive skills to allow patient to return to work New  -KA     Executive Function STG's Patient will improve executive functioning skills by using self-monitoring strategies during functional tasks;Patient will improve executive functioning skills by using planning strategies prior to beginning tasks  -KA     Patient will improve executive functioning skills by using planning strategies prior to beginning tasks 90%:;without cues  -KA     Status: Patient will improve executive functioning skills by using planning strategies prior to beginning tasks New  -KA     Patient will improve executive functioning skills by using self-monitoring strategies during functional tasks 90%:;without cues  -KA     Status: Patient will improve executive functioning skills by using self-monitoring strategies during functional tasks New  -KA     Problem Solving Goals    Problem Solving LTG's Patient will be able to engage in avocational activities requiring high level cognitive skills  -KA     Patient will be able to engage in avocational activities requiring high level cognitive skills Independently  -KA     Status: Patient will be able to engage in avocational activities requiring high level cognitive skills New  -KA     Problem Solving STG's Patient will improve ability to analyze problems and determine solutions by completing a visual representation/filling in a chart by following  written directions;Patient will improve ability to analyze problems and determine solutions by demonstrating ability to complete household/work management tasks in an organized approach  -KA     Patient will  improve ability to analyze problems and determine solutions by demonstrating ability to complete household/work management tasks in an organized approach 90%:;without cues  -KA     Status: Patient will improve ability to analyze problems and determine solutions by demonstrating ability to complete household/work management tasks in an organized approach New  -KA     Patient will improve ability to analyze problems and determine solutions by completing a visual representation/filling in a chart by following  written directions 90%:;without cues  -KA     Status: Patient will improve ability to analyze problems and determine solutions by completing a visual representation/filling in a chart by following  written directions New  -KA       User Key  (r) = Recorded By, (t) = Taken By, (c) = Cosigned By    Initials Name Provider Type    KA Pranav Alatorre MA,CCC-SLP Speech and Language Pathologist                OP SLP Assessment/Plan - 10/09/17 1626     SLP Assessment    Functional Problems Speech Language- Adult/Cognition  -KA    Impact on Function: Adult Speech Language/Cognition Difficulty participating in avocational activities  -KA    Clinical Impression: Speech Language-Adult/Congnition Mild:;Cognitive Communication Impairment  -KA    Please refer to paper survey for additional self-reported information Yes  -KA    Please refer to items scanned into chart for additional diagnostic informaiton and handouts as provided by clinician Yes  -KA    Prognosis Good (comment)  -KA    Patient/caregiver participated in establishment of treatment plan and goals Yes  -KA    Patient would benefit from skilled therapy intervention Yes  -KA    SLP Plan    Frequency --   twice a week   -KA    Duration --   four weeks   -KA    Planned CPT's? SLP INDIVIDUAL SPEECH THERAPY: 49267  -KA    Expected Duration Therapy Session (min) 45-60 minutes  -KA      User Key  (r) = Recorded By, (t) = Taken By, (c) = Cosigned By    Initials Name Provider  Type    KA Pranav Alatorre MA,CCC-SLP Speech and Language Pathologist             SLP Outcome Measures (last 72 hours)      SLP Outcome Measures       10/09/17 1600          SLP Outcome Measures    Outcome Measure Used? Adult NOMS  -KA      FCM Scores    Problem Solving FCM Score 5  -KA      Reading FCM Score 6  -KA        User Key  (r) = Recorded By, (t) = Taken By, (c) = Cosigned By    Initials Name Effective Dates    CARLOTA Alatorre MA,CCC-SLP 04/13/15 -              Time Calculation:   SLP Start Time: 1245  SLP Stop Time: 1345  SLP Time Calculation (min): 60 min    Therapy Charges for Today     Code Description Service Date Service Provider Modifiers Qty    72470423179 HC ST STD COG PERF TEST PER HOUR 10/9/2017 Pranav Alatorre MA,CCC-SLP GN 1    45517372600 HC ST OTHER FUNCT LIMIT CURRENT 10/9/2017 Pranav Alatorre MA,CCC-SLP GN, CJ 1    49874139224 HC ST OTHER FUNCT LIMIT PROJECTED 10/9/2017 Pranav Alatorre MA,CCC-SLP GN, CI 1          SLP G-Codes  SLP NOMS Used?: Yes  Functional Limitations: Other Speech Language Pathology  Other Speech-Language Pathology Functional Limitation Current Status (): At least 20 percent but less than 40 percent impaired, limited or restricted  Other Speech-Language Pathology Functional Limitation Goal Status (): At least 1 percent but less than 20 percent impaired, limited or restricted        Pranav Alatorre MA,CCC-SLP  10/9/2017

## 2017-10-09 NOTE — PROGRESS NOTES
Changed to match Epic documentation.                         Week 1                       Week 2  Physical Therapy  Individual           -                            210  Occupational Therapy  Individual           -                            315    Signed by: Daryl Jesus RN

## 2017-10-09 NOTE — TELEPHONE ENCOUNTER
I accidentally sent to mail order - pls cancel this - I also sent to correct pharmacy [facility] & informed she &

## 2017-10-09 NOTE — TELEPHONE ENCOUNTER
----- Message from Isabelle Drake sent at 10/9/2017  8:53 AM EDT -----  pts  calling. When pt  was discharged recently from hospital for stroke she was  told  BP target should be 130/70.  BP has been running 140/90.  They spoke with you on Saturday and was given Lisinopril/HCTZ 20/12.5 pt has been taking 2 a day and bp still 140/90.  Pt very concerned.  Please advise    #[phone]

## 2017-10-09 NOTE — THERAPY TREATMENT NOTE
"    Outpatient Physical Therapy Neuro Treatment Note  [facility]     Patient Name: Rosi Vicente  : 1950  MRN: [id number]  Today's Date: 10/9/2017      Visit Date: 10/09/2017    Visit Dx:    ICD-10-CM ICD-9-CM   1. Hemiplegia and hemiparesis following cerebral infarction affecting left non-dominant side I69.354 438.22   2. Functional gait abnormality R26.89 781.2       Patient Active Problem List   Diagnosis   • Hypertension   • Allergic rhinitis   • Trigeminal neuralgia   • Hyperlipidemia   • New daily persistent headache   • Osteoarthritis of hip   • Vitamin D deficiency   • Cerebrovascular accident (CVA)   • Stroke                 PT Neuro       10/09/17 1530          Subjective Comments    Subjective Comments \"I am going to have to learn not to do much before I come to therapy in the afternoon.\" \"I feel like my walking is stiff.\"   -LB      Precautions and Contraindications    Precautions left shoulder arthritis (planned total shoulder replacement  -LB      Subjective Pain    Able to rate subjective pain? yes  -LB      Pre-Treatment Pain Level 2  -LB      Post-Treatment Pain Level 2  -LB      Transfers    Transfers, Sit-Stand Rogers supervision required  -LB      Transfers, Stand-Sit Rogers stand by assist  -LB      Transfers, Sit-Stand-Sit, Assist Device --   quad tipped cane   -LB      Transfer, Safety Issues sequencing ability decreased  -LB      Transfer, Impairments impaired balance;strength decreased  -LB      Gait Assessment/Treatment    Gait, Rogers Level contact guard assist;minimum assist (75% patient effort)  -LB      Gait, Assistive Device other (see comments)  -LB      Gait, Distance (Feet) 100   x 2  -LB      Gait, Gait Deviations left:;weight-shifting ability decreased;other (see comments)   L decreased knee ext. at swing & stance, L decreased pushoff  -LB      Gait, Safety Issues other (see comments)   Pt reporting she has been using wx at night &cane during day  -LB   " "   Gait, Impairments strength decreased;impaired balance  -LB      Gait, Comment Pt ambulated wiht quad tipped cane with minimal tactile cues to wt shift to the left fully prior to swing.   -LB      Stairs Assessment/Treatment    Number of Stairs 4  -LB      Stairs, Handrail Location left side (ascending)   R side descending  -LB      Stairs, [place] Level contact guard assist  -LB      Stairs, Technique Used step to step (ascending);step to step (descending);step over step (descending);step over step (ascending)  -LB      Stairs, Impairments impaired balance;strength decreased  -LB      Balance Skills Training    Static Standing Balance Support Right upper extremity supported  -LB      Standing-Balance Activities Trampoline;Reaching for objects   on tramploine feet together, B plantarflexion, 1 foot forwar  -LB      Gait Balance-Level of Assistance Contact guard  -LB      Gait Balance Support nilesh bar  -LB      Gait Balance Activities side-stepping  -LB        User Key  (r) = Recorded By, (t) = Taken By, (c) = Cosigned By    Initials Name Provider Type    LAVELL Hernandez, PT Physical Therapist                        PT Assessment/Plan       10/09/17 1549       PT Assessment    Assessment Comments Pt reporting she has been using her walke at night and cane during the day as recommended by PT. Pt requires tactile cues to elongate on the R pior to swing and wt shift fully to the left LE.  -LB       User Key  (r) = Recorded By, (t) = Taken By, (c) = Cosigned By    Initials Name Provider Type    LAVELL Hernandez, PT Physical Therapist                     Exercises       10/09/17 2770          Subjective Comments    Subjective Comments \"I am going to have to learn not to do much before I come to therapy in the afternoon.\" \"I feel like my walking is stiff.\"   -LB      Subjective Pain    Able to rate subjective pain? yes  -LB      Pre-Treatment Pain Level 2  -LB      Post-Treatment Pain Level 2  -LB      Exercise 5 " "   Exercise Name 5 Balancing with one foot on the 6\" curb with CGA to minimal of 1   -LB      Cueing 5 Tactile  -LB      Sets 5 1   each LE   -LB      Time (Seconds) 5 45  -LB      Exercise 6    Exercise Name 6 Reciprocal stepping   -LB      Cueing 6 Verbal   strong CGA   -LB      Reps 6 8  -LB      Additional Comments Pt lacks control with the L LE.  -LB      Exercise 7    Exercise Name 7 STANDING in a corner with one foot forward and then feet together  -LB      Cueing 7 Verbal  -LB      Reps 7 2  -LB      Time (Seconds) 7 45  -LB        User Key  (r) = Recorded By, (t) = Taken By, (c) = Cosigned By    Initials Name Provider Type    LAVELL Hernandez PT Physical Therapist                                  Therapy Education       10/09/17 4090          Therapy Education    Given Other (comment)   Discussed with pt that 3 therapies together may fatigue he and pt may need to limit activity prior to therapy sessions.   -LB      How Provided Verbal  -LB      Provided to Patient;Caregiver  -LB      Level of Understanding Verbalized  -LB        User Key  (r) = Recorded By, (t) = Taken By, (c) = Cosigned By    Initials Name Provider Type    LAVELL Hernandez PT Physical Therapist                Time Calculation:   Start Time: 1430  Stop Time: 1510  Time Calculation (min): 40 min     Therapy Charges for Today     Code Description Service Date Service Provider Modifiers Qty    [id number]  PT NEUROMUSC RE EDUCATION EA 15 MIN 10/9/2017 Peri Hernandez PT GP 3                    Peri Hernandez PT  10/9/2017       "

## 2017-10-10 ENCOUNTER — APPOINTMENT (OUTPATIENT)
Dept: OCCUPATIONAL THERAPY | Facility: HOSPITAL | Age: 67
End: 2017-10-10

## 2017-10-11 ENCOUNTER — TELEPHONE (OUTPATIENT)
Dept: NEUROLOGY | Facility: CLINIC | Age: 67
End: 2017-10-11

## 2017-10-12 ENCOUNTER — HOSPITAL ENCOUNTER (OUTPATIENT)
Dept: SPEECH THERAPY | Facility: HOSPITAL | Age: 67
Setting detail: THERAPIES SERIES
Discharge: HOME | End: 2017-10-12

## 2017-10-12 ENCOUNTER — HOSPITAL ENCOUNTER (OUTPATIENT)
Dept: PHYSICAL THERAPY | Facility: HOSPITAL | Age: 67
Setting detail: THERAPIES SERIES
Discharge: HOME | End: 2017-10-12
Attending: PHYSICAL MEDICINE & REHABILITATION

## 2017-10-12 ENCOUNTER — HOSPITAL ENCOUNTER (OUTPATIENT)
Dept: OCCUPATIONAL THERAPY | Facility: HOSPITAL | Age: 67
Setting detail: THERAPIES SERIES
Discharge: HOME | End: 2017-10-12

## 2017-10-12 DIAGNOSIS — I69.354 HEMIPLEGIA AND HEMIPARESIS FOLLOWING CEREBRAL INFARCTION AFFECTING LEFT NON-DOMINANT SIDE (HCC): Primary | ICD-10-CM

## 2017-10-12 DIAGNOSIS — R29.898 LUE WEAKNESS: ICD-10-CM

## 2017-10-12 DIAGNOSIS — R26.89 FUNCTIONAL GAIT ABNORMALITY: ICD-10-CM

## 2017-10-12 DIAGNOSIS — IMO0002 LACK OF COORDINATION DUE TO STROKE: ICD-10-CM

## 2017-10-12 DIAGNOSIS — I69.319 COGNITIVE DEFICIT FOLLOWING CEREBROVASCULAR ACCIDENT (CVA): Primary | ICD-10-CM

## 2017-10-12 DIAGNOSIS — I69.328 SPEECH OR LANGUAGE DEFICIT FOLLOWING CEREBROVASCULAR ACCIDENT: ICD-10-CM

## 2017-10-12 PROCEDURE — 97110 THERAPEUTIC EXERCISES: CPT

## 2017-10-12 PROCEDURE — 92507 TX SP LANG VOICE COMM INDIV: CPT

## 2017-10-12 PROCEDURE — 97112 NEUROMUSCULAR REEDUCATION: CPT

## 2017-10-12 NOTE — THERAPY TREATMENT NOTE
"    Outpatient Physical Therapy Neuro Treatment Note  [facility]     Patient Name: Rosi Vicente  : 1950  MRN: [id number]  Today's Date: 10/12/2017      Visit Date: 10/12/2017    Visit Dx:    ICD-10-CM ICD-9-CM   1. Hemiplegia and hemiparesis following cerebral infarction affecting left non-dominant side I69.354 438.22   2. Functional gait abnormality R26.89 781.2       Patient Active Problem List   Diagnosis   • Hypertension   • Allergic rhinitis   • Trigeminal neuralgia   • Hyperlipidemia   • New daily persistent headache   • Osteoarthritis of hip   • Vitamin D deficiency   • Cerebrovascular accident (CVA)   • Stroke                 PT Neuro       10/12/17 0847          Subjective Comments    Subjective Comments \"I woke up this morning and felt like my self for about an hour and a half.\" \"My  goes back to work early November and I need recommendations what to do when he is gone.\"   -LB      Precautions and Contraindications    Precautions left shoulder arthritis (planned total shoulder replacement  -LB      Subjective Pain    Able to rate subjective pain? yes  -LB      Pre-Treatment Pain Level 4  -LB      Post-Treatment Pain Level 4  -LB      Subjective Pain Comment \"My  is gone to get me some Tylenol for my shoulder.\"  -LB      Bed Mobility, Assessment/Treatment    Bed Mobility, Roll Right, [place] independent  -LB      Bed Mob, Supine to Sit, [place] independent  -LB      Bed Mob, Sit to Supine, [place] independent  -LB      Transfers    Transfers, Sit-Stand [place] conditional independence  -LB      Transfers, Stand-Sit [place] conditional independence  -LB      Transfers, Sit-Stand-Sit, Assist Device other (see comments)   no device or quad tipped cane   -LB      Transfer, Impairments impaired balance;strength decreased  -LB      Gait Assessment/Treatment    Gait, [place] Level contact guard assist;minimum assist (75% patient effort)   minimal cues provided " "to promote wt shift L prior to R swing  -LB      Gait, Assistive Device other (see comments)   no device 50% of session, with quad tipped cane 50%  -LB      Gait, Distance (Feet) 150   x 4   -LB      Gait, Gait Deviations left:;weight-shifting ability decreased;other (see comments)   L decreased knee ext. at swing & stance, L decreased pushoff  -LB      Gait, Safety Issues other (see comments)   Pt reports she will continue to use a device.   -LB      Gait, Impairments strength decreased;impaired balance  -LB      Gait, Comment Tactile cues required without a device. With a cane verbal cues.   -LB      Stairs Assessment/Treatment    Number of Stairs curb   -LB      Stairs, [place] Level contact guard assist  -LB      Stairs, Assistive Device --   quad tipped cane   -LB      Stairs, Impairments impaired balance;strength decreased  -LB      Balance Skills Training    Static Standing Balance Support Right upper extremity supported  -LB      Standing-Balance Activities Compliant surfaces;Trampoline   4\" foam and 1/2 styrofoam roll  -LB        User Key  (r) = Recorded By, (t) = Taken By, (c) = Cosigned By    Initials Name Provider Type    LAVELL Hernandez, PT Physical Therapist                        PT Assessment/Plan       10/12/17 1157       PT Assessment    Assessment Comments Pt asking questions regarding how to manage at home when her  returns to work in [facility] [facility]. Pt receptive to ideas to simplify activities at home. Pt eager to start new exercises.   -LB       User Key  (r) = Recorded By, (t) = Taken By, (c) = Cosigned By    Initials Name Provider Type    LAVELL Hernandez, PT Physical Therapist                     Exercises       10/12/17 2675          Subjective Comments    Subjective Comments \"I woke up this morning and felt like my self for about an hour and a half.\" \"My  goes back to work early November and I need recommendations what to do when he is gone.\"   -LB      Subjective Pain    " "Able to rate subjective pain? yes  -LB      Pre-Treatment Pain Level 4  -LB      Post-Treatment Pain Level 4  -LB      Subjective Pain Comment \"My  is gone to get me some Tylenol for my shoulder.\"  -LB        User Key  (r) = Recorded By, (t) = Taken By, (c) = Cosigned By    Initials Name Provider Type    LAVELL Hernandez, PT Physical Therapist                                  Therapy Education       10/12/17 1154 10/12/17 1008       Therapy Education    Education Details Discussed in detail with pt strategies for safety and simplifcation at home when her  returns to work. Stressed to pt to keep her phone on her at all times and to have meals prepped beforehand. Placement of common items in easy access area.   -LB      Given HEP   L Hip ER/abd in sidelying and hooklying  -LB HEP  -MR     Program Progressed  -LB Reinforced   exercises for LUE using 1 lb hand weight  -MR     How Provided Verbal  -LB Verbal;Demonstration  -MR     Provided to Patient  -LB Patient  -MR     Level of Understanding Verbalized  -LB Teach back education performed  -MR       User Key  (r) = Recorded By, (t) = Taken By, (c) = Cosigned By    Initials Name Provider Type    LAVELL Hernandez, PT Physical Therapist    MR Caitlyn Bobby, OT Occupational Therapist                Time Calculation:   Start Time: 0847  Stop Time: 0930  Time Calculation (min): 43 min     Therapy Charges for Today     Code Description Service Date Service Provider Modifiers Qty    [id number]  PT NEUROMUSC RE EDUCATION EA 15 MIN 10/12/2017 Peri Hernandez, PT GP 3                    Peri Hernandez, PT  10/12/2017       "

## 2017-10-12 NOTE — THERAPY TREATMENT NOTE
"Outpatient Speech Language Pathology   Adult Speech Language Cognitive Treatment Note  [facility]     Patient Name: Rosi Vicente  : 1950  MRN: [id number]  Today's Date: 10/12/2017         Visit Date: 10/12/2017   Patient Active Problem List   Diagnosis   • Hypertension   • Allergic rhinitis   • Trigeminal neuralgia   • Hyperlipidemia   • New daily persistent headache   • Osteoarthritis of hip   • Vitamin D deficiency   • Cerebrovascular accident (CVA)   • Stroke          Visit Dx:    ICD-10-CM ICD-9-CM   1. Cognitive deficit following cerebrovascular accident (CVA) I69.319 438.0   2. Speech or language deficit following cerebrovascular accident I69.328 438.10             Adult Speech Language - 10/09/17 1300     Background and History    Reason for Referral Mrs Vicente is referred to outpatient speech therapy for cognitive communication impairment following recent hospitalization for acute CVA. Mrs Vicente was hospitalized on 17 with left sided weakness and MRI revealing acute infarct of the right frontal lobe extending to the internal capsule. Patient received inpatient rehab from  to  with speech patient targeting patient's cognitive communication skills.   -KA    Stated Goals Mrs Vicente reported her goals are to improve her thinking skills including processing speed, and not feel like \" I am  in a  fog.\" She reports some short term memory difficulty, such as keeping track of her medications and appointments. She also reports it is more difficult for her to \"think when fatigued,\" and is slower to make decisions and solve problems.   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   -KA    Informant for the " Evaluation Self  -    Standardized Tests    Cognitive/Memory Tests CLQT: Cognitive Linguistic Quick Test  -    CLQT (The Cognitive Linguistic Quick Test)    Attention Domain Score 197  -KA    Attention Severity Rating 4: WNL  -KA    Memory Domain Score 170  -KA    Memory Severity Rating 4: WNL  -KA    Executive Function Domain Score 29  -KA    Executive Function Severity Rating 4: WNL  -KA    Language Domain Score 32  -KA    Language Severity Rating 4: WNL  -KA    Visuospatial Domain Score 93  -KA    Visuospatial Severity Rating 4: WNL  -KA    Clock Drawing Total Score 13  -KA    Clock Drawing Severity Rating WNL  -KA    Composite Severity Rating 4.0  -KA    Composite Severity Rating Range 4.0 - 3.5: WNL  -KA    CLQT Comments Overall Mrs Vicente did score WNL under each domain of the CLQT. She did demonstrate mild impairment with self monitoring skills during second maze task and increased processing time to complete task. Increased processing time needed for design generation task. During informal assessment of her cognitive communication skills she demonstrated 100% accuracy with functional math word problems, 100% with delayed recall of three unrelated words, auditory comprehension of detailed multi-paragraph level story=80%, and in deductive reasoning task with following detailed written instructions and utilizing process of eliminiation skills, she required min cues in order to solve with reduced attention to detail, 70% without cues and 100% with min cues. SLP will continue diagnostic assessment of her higher level cognitive communication skills.   -KA      User Key  (r) = Recorded By, (t) = Taken By, (c) = Cosigned By    Initials Name Provider Type    CARLOTA Alatorre MA,CCC-SLP Speech and Language Pathologist                              SLP OP Goals       10/12/17 1100       Subjective Comments    Subjective Comments (P)  Mrs. Vicente was pleasant and cooperative throughout therapy. SLP provided deductive  reasoning puzzle to complete at home. Patient reports using iPad therapy apps at home.  -NS     Subjective Pain    Able to rate subjective pain? (P)  yes  -NS     Pre-Treatment Pain Level (P)  0  -NS     Post-Treatment Pain Level (P)  0  -NS     Written Language Comprehension Goals    Written Language Comprehension LTG's (P)  Patient will be able to comprehend written material in social/avocational/work setting  -NS     Patient will be able to comprehend written material in social/avocational/work setting (P)  90%:;without cues  -NS     Status: Patient will be able to comprehend written material in social/avocational/work setting (P)  Progressing as expected  -NS     Status: Patient will improve comprehension of written language skills by following three-step written directions (P)  Progressing as expected  -NS     Comments: Patient will improve comprehension of written language skills by following three-step written directions (P)  Patient completed detailed written instructions with 80% accuracy without cues, 100% accuracy with min cues.  -NS     Auditory Comprehension Goals    Auditory Comprehension LTG's (P)  Patient will comprehend abstract and complex information presented in all social, avocational, or work settings  -NS     Patient will comprehend abstract and complex information presented in all social, avocational, or work settings (P)  90%:;without cues  -NS     Status: Patient will comprehend abstract and complex information presented in all social, avocational, or work settings (P)  Progressing as expected  -NS     Patient will demonstrate comprehension of spoken language by answering questions about a multi paragraph length content (P)  90%:;without cues  -NS     Status: Patient will demonstrate comprehension of spoken language by answering questions about a multi paragraph length content (P)  Progressing as expected  -NS     Comments: Patient will demonstrate comprehension of spoken language by  answering questions about a multi paragraph length content (P)  Clinician read multi-paragraph article aloud and patient summarized article and answered comprehension questiosn with 90% accuracy with min cues.  -NS     Executive Function Goals    Executive Function LTG's (P)  Patient will be able to engage in avocational activities requiring high level cognitive skills  -NS     Patient will be able to engage in avocational activities requiring high level cognitive skills (P)  90%:;without cues  -NS     Status: Patient will be able to engage in avocational activities requiring high level cognitive skills (P)  Progressing as expected  -NS     Patient will be able to use high level cognitive skills to allow patient to return to work (P)  90%:;without cues  -NS     Status: Patient will be able to use high level cognitive skills to allow patient to return to work (P)  Progressing as expected  -NS     Patient will improve executive functioning skills by using planning strategies prior to beginning tasks (P)  90%:;without cues  -NS     Status: Patient will improve executive functioning skills by using planning strategies prior to beginning tasks (P)  Progressing as expected  -NS     Comments: Patient will improve executive functioning skills by using planning strategies prior to beginning tasks (P)  Patient completed deductive reasoning puzzle, requiring planning and organization with 70% accuracy. Patient required some cues for attention to detail and process of elimination.  -NS     Patient will improve executive functioning skills by using self-monitoring strategies during functional tasks (P)  90%:;without cues  -NS     Status: Patient will improve executive functioning skills by using self-monitoring strategies during functional tasks (P)  Progressing as expected  -NS     Comments: Patient will improve executive functioning skills by using self-monitoring strategies during functional tasks (P)  Patient completed  deductive reasoning puzzle with 70% accuracy, requiring cues to check work for details.  -NS     Problem Solving Goals    Patient will improve ability to analyze problems and determine solutions by completing a visual representation/filling in a chart by following  written directions (P)  90%:;without cues  -NS     Status: Patient will improve ability to analyze problems and determine solutions by completing a visual representation/filling in a chart by following  written directions (P)  Progressing as expected  -NS     Comments: Patient will improve ability to analyze problems and determine solutions by completing a visual representation/filling in a chart by following  written directions (P)  Patient completed deductive reasoning puzzle with 70% accuracy. Some slow processing noted and cues needed for self-monitoring.   -NS       User Key  (r) = Recorded By, (t) = Taken By, (c) = Cosigned By    Initials Name Provider Type    NS Pattie Davis, Speech Therapy Student Speech Therapy Student                     SLP Outcome Measures (last 72 hours)      SLP Outcome Measures       10/09/17 1600          SLP Outcome Measures    Outcome Measure Used? Adult NOMS  -KA      FCM Scores    Problem Solving FCM Score 5  -KA      Reading FCM Score 6  -KA        User Key  (r) = Recorded By, (t) = Taken By, (c) = Cosigned By    Initials Name Effective Dates    KA Pranav Alatorre MA,CCC-SLP 04/13/15 -              Time Calculation:   SLP Start Time: (P) 1015  SLP Stop Time: (P) 1100  SLP Time Calculation (min): (P) 45 min    Therapy Charges for Today     Code Description Service Date Service Provider Modifiers Qty    [id number]  ST TREATMENT SPEECH 3 10/12/2017 Pattie Davis, Speech Therapy Student GN 1                   Pattie Davis Speech Therapy Student  10/12/2017

## 2017-10-12 NOTE — THERAPY TREATMENT NOTE
Outpatient Occupational Therapy Neuro Treatment Note  [facility]     Patient Name: Rosi Vicente  : 1950  MRN: [id number]  Today's Date: 10/12/2017       Visit Date: 10/12/2017    Patient Active Problem List   Diagnosis   • Hypertension   • Allergic rhinitis   • Trigeminal neuralgia   • Hyperlipidemia   • New daily persistent headache   • Osteoarthritis of hip   • Vitamin D deficiency   • Cerebrovascular accident (CVA)   • Stroke        Past Medical History:   Diagnosis Date   • Allergic rhinitis    • Anemia    • Bronchitis    • FH: colon cancer    • H/O bone density study    • H/O mammogram    • Hypertension     Benign Essential   • Malaise and fatigue    • Nocturia    • Stroke 2017   • TMJ (temporomandibular joint syndrome)    • Trigeminal neuralgia     Surgery at [facility] in  repeat in         Past Surgical History:   Procedure Laterality Date   • AUGMENTATION MAMMAPLASTY     • COLONOSCOPY N/A 2015    Repeat Q 5 years due to Fhx of Colon Ca-Dr. Polk   • PAP SMEAR N/A     Dr. Burden         Visit Dx:    ICD-10-CM ICD-9-CM   1. Hemiplegia and hemiparesis following cerebral infarction affecting left non-dominant side I69.354 438.22   2. LUE weakness R29.898 729.89   3. Lack of coordination due to stroke I63.9 434.91    R27.9 781.3                         OT Assessment/Plan       10/12/17 1009       OT Assessment    Assessment Comments Pt able to manipulate and attach small clothespins using L hand this date; LUE movement limited by pain in L shoulder.  -MR       User Key  (r) = Recorded By, (t) = Taken By, (c) = Cosigned By    Initials Name Provider Type     Caitlyn Benjamin Diamante, OT Occupational Therapist                    Therapy Education       10/12/17 1008          Therapy Education    Given HEP  -MR      Program Reinforced   exercises for LUE using 1 lb hand weight  -MR      How Provided Verbal;Demonstration  -MR      Provided to Patient  -MR      Level of  "Understanding Teach back education performed  -MR        User Key  (r) = Recorded By, (t) = Taken By, (c) = Cosigned By    Initials Name Provider Type    MR Caitlyn Bobby, OT Occupational Therapist                    OT Exercises       10/12/17 0900          Subjective Comments    Subjective Comments \"I took some tylenol for my shoulder.\"  -MR      Subjective Pain    Able to rate subjective pain? yes  -MR      Pre-Treatment Pain Level 4  -MR      Subjective Pain Comment L shoulder  -MR      Exercise 1    Exercise Name 1 FM activity to increase coordination for functional use of L hand. Using small clothespins pt attaches to board on tabletop following OT demo/cues. Multiple reps completed attaching and removing clothespins, increased time for completion.  -MR      Cueing 1 Verbal;Demo  -MR      Exercise 2    Exercise Name 2 UE therapeutic exercises to increase strength and endurance for improved functional use of LUE. Using hand weight pt performs bicep curls, forearm pronation/supination, wrist flexion and extension. OT provides demo and cues for proper technique.  -MR      Cueing 2 Verbal;Demo  -MR      Equipment 2 Dumbell  -MR      Weights/Plates 2 1  -MR      Sets 2 2  -MR      Reps 2 15  -MR      Exercise 3    Exercise Name 3  strengthening exercises for LUE. Using digiflex pt performs  strengthening exercises following OT demo/cues.  -MR      Cueing 3 Verbal;Demo  -MR      Equipment 3 Other   digiflex  -MR      Resistance 3 Yellow  -MR        User Key  (r) = Recorded By, (t) = Taken By, (c) = Cosigned By    Initials Name Provider Type    MR Caitlyn Benjamin Diamante, OT Occupational Therapist                    Time Calculation:   OT Start Time: 0931  OT Stop Time: 1015  OT Time Calculation (min): 44 min     Therapy Charges for Today     Code Description Service Date Service Provider Modifiers Qty    [id number]  OT THER PROC EA 15 MIN 10/12/2017 Caitlyn Benjamin Diamante, OT GO 2    [id number]  OT NEUROMUSC " RE EDUCATION EA 15 MIN 10/12/2017 Caitlyn Bobby, OT GO 1                    Caitlyn Bobby, OT  10/12/2017

## 2017-10-13 ENCOUNTER — OFFICE VISIT (OUTPATIENT)
Dept: INTERNAL MEDICINE | Facility: CLINIC | Age: 67
End: 2017-10-13

## 2017-10-13 VITALS
SYSTOLIC BLOOD PRESSURE: 110 MMHG | BODY MASS INDEX: 18.96 KG/M2 | HEART RATE: 68 BPM | OXYGEN SATURATION: 98 % | WEIGHT: 118 LBS | RESPIRATION RATE: 17 BRPM | DIASTOLIC BLOOD PRESSURE: 60 MMHG | TEMPERATURE: 97.5 F | HEIGHT: 66 IN

## 2017-10-13 DIAGNOSIS — I63.9 CEREBROVASCULAR ACCIDENT (CVA), UNSPECIFIED MECHANISM (HCC): ICD-10-CM

## 2017-10-13 DIAGNOSIS — I10 ESSENTIAL HYPERTENSION: Primary | ICD-10-CM

## 2017-10-13 DIAGNOSIS — E78.5 HYPERLIPIDEMIA, UNSPECIFIED HYPERLIPIDEMIA TYPE: ICD-10-CM

## 2017-10-13 DIAGNOSIS — R12 HEARTBURN: ICD-10-CM

## 2017-10-13 DIAGNOSIS — F51.01 PRIMARY INSOMNIA: ICD-10-CM

## 2017-10-13 DIAGNOSIS — E55.9 VITAMIN D DEFICIENCY: ICD-10-CM

## 2017-10-13 PROCEDURE — 99214 OFFICE O/P EST MOD 30 MIN: CPT | Performed by: INTERNAL MEDICINE

## 2017-10-13 NOTE — PROGRESS NOTES
"Subjective   Rosi Vicente is a 67 y.o. female here for   Chief Complaint   Patient presents with   • Hyperlipidemia     3 month follow-up   • Hypertension   • Weight Loss   .    Vitals:    10/13/17 1347 10/13/17 1716   BP: 112/82 110/60   BP Location: Left arm    Patient Position: Sitting    Cuff Size: Adult    Pulse: 68    Resp: 17    Temp: 97.5 °F (36.4 °C)    TempSrc: Temporal Artery     SpO2: 98%    Weight: 118 lb (53.5 kg)    Height: 66\" (167.6 cm)        Body mass index is 19.05 kg/(m^2).    Hyperlipidemia   This is a chronic problem. The current episode started more than 1 year ago. The problem is controlled. Recent lipid tests were reviewed and are normal. Pertinent negatives include no chest pain or shortness of breath.   Hypertension   This is a chronic problem. The current episode started more than 1 year ago. The problem is unchanged. The problem is controlled. Associated symptoms include malaise/fatigue. Pertinent negatives include no chest pain, palpitations or shortness of breath.   Weight Loss   This is a new problem. The current episode started 1 to 4 weeks ago. The problem has been unchanged. Associated symptoms include fatigue and weakness (left arm & leg). Pertinent negatives include no chest pain, chills, coughing or fever.        The following portions of the patient's history were reviewed and updated as appropriate: allergies, current medications, past social history and problem list.    Review of Systems   Constitutional: Positive for fatigue, malaise/fatigue and weight loss. Negative for chills and fever.   Respiratory: Negative for cough, shortness of breath and wheezing.    Cardiovascular: Negative for chest pain, palpitations and leg swelling.   Neurological: Positive for weakness (left arm & leg).   Psychiatric/Behavioral: Positive for sleep disturbance. Negative for dysphoric mood. The patient is not nervous/anxious.        Objective   Physical Exam   Constitutional: She appears " well-developed and well-nourished. No distress.   Cardiovascular: Normal rate, regular rhythm and normal heart sounds.    Pulmonary/Chest: No respiratory distress. She has no wheezes. She has no rales. She exhibits no tenderness.   Musculoskeletal: She exhibits no edema.   Psychiatric: She has a normal mood and affect. Her behavior is normal.   Nursing note and vitals reviewed.    +weakness left arm & leg.    Assessment/Plan   Diagnoses and all orders for this visit:    Essential hypertension  Comments:  too low now - stop metoprolol - need to call if bp not 130/70 (call if high or low)  Orders:  -     Lipoprotein NMR; Future  -     Comprehensive Metabolic Panel; Future    Hyperlipidemia, unspecified hyperlipidemia type  Comments:  need to continue healthy diet & exercise  Orders:  -     Lipoprotein NMR; Future  -     Comprehensive Metabolic Panel; Future    Cerebrovascular accident (CVA), unspecified mechanism  Comments:  left sided weakness - f/u with dr can  Orders:  -     Lipoprotein NMR; Future    Vitamin D deficiency  Comments:  need daily vit d    Heartburn  Comments:  ok with prilosec    Primary insomnia  Comments:  need to increase gabapentin from 100 mg to 200 mg qhs - may increase to 300 mg qhs if needed       She needs home health - she cannot feed herself (cannot cut food,etc), bath or dress self.    Form filled out today for vacation planned for early 2018 - she cannot be in secluded area.

## 2017-10-16 ENCOUNTER — HOSPITAL ENCOUNTER (OUTPATIENT)
Dept: SPEECH THERAPY | Facility: HOSPITAL | Age: 67
Setting detail: THERAPIES SERIES
Discharge: HOME | End: 2017-10-16

## 2017-10-16 ENCOUNTER — TELEPHONE (OUTPATIENT)
Dept: NEUROLOGY | Facility: CLINIC | Age: 67
End: 2017-10-16

## 2017-10-16 ENCOUNTER — HOSPITAL ENCOUNTER (OUTPATIENT)
Dept: PHYSICAL THERAPY | Facility: HOSPITAL | Age: 67
Setting detail: THERAPIES SERIES
Discharge: HOME | End: 2017-10-16
Attending: PHYSICAL MEDICINE & REHABILITATION

## 2017-10-16 ENCOUNTER — HOSPITAL ENCOUNTER (OUTPATIENT)
Dept: OCCUPATIONAL THERAPY | Facility: HOSPITAL | Age: 67
Setting detail: THERAPIES SERIES
Discharge: HOME | End: 2017-10-16

## 2017-10-16 DIAGNOSIS — I69.328 SPEECH OR LANGUAGE DEFICIT FOLLOWING CEREBROVASCULAR ACCIDENT: ICD-10-CM

## 2017-10-16 DIAGNOSIS — I69.354 HEMIPLEGIA AND HEMIPARESIS FOLLOWING CEREBRAL INFARCTION AFFECTING LEFT NON-DOMINANT SIDE (HCC): Primary | ICD-10-CM

## 2017-10-16 DIAGNOSIS — R26.89 FUNCTIONAL GAIT ABNORMALITY: ICD-10-CM

## 2017-10-16 DIAGNOSIS — R29.898 LUE WEAKNESS: ICD-10-CM

## 2017-10-16 DIAGNOSIS — I69.319 COGNITIVE DEFICIT FOLLOWING CEREBROVASCULAR ACCIDENT (CVA): Primary | ICD-10-CM

## 2017-10-16 DIAGNOSIS — IMO0002 LACK OF COORDINATION DUE TO STROKE: ICD-10-CM

## 2017-10-16 PROCEDURE — 97112 NEUROMUSCULAR REEDUCATION: CPT

## 2017-10-16 PROCEDURE — 92507 TX SP LANG VOICE COMM INDIV: CPT

## 2017-10-16 PROCEDURE — 97110 THERAPEUTIC EXERCISES: CPT

## 2017-10-16 NOTE — THERAPY TREATMENT NOTE
Outpatient Occupational Therapy Neuro Treatment Note  Psychiatric     Patient Name: Rosi Vicente  : 1950  MRN: [id number]  Today's Date: 10/16/2017       Visit Date: 10/16/2017    Patient Active Problem List   Diagnosis   • Hypertension   • Allergic rhinitis   • Trigeminal neuralgia   • Hyperlipidemia   • New daily persistent headache   • Osteoarthritis of hip   • Vitamin D deficiency   • Cerebrovascular accident (CVA)   • Stroke   • Heartburn   • Primary insomnia        Past Medical History:   Diagnosis Date   • Allergic rhinitis    • Anemia    • Bronchitis    • FH: colon cancer    • H/O bone density study    • H/O mammogram    • Hypertension     Benign Essential   • Malaise and fatigue    • Nocturia    • Stroke 2017   • TMJ (temporomandibular joint syndrome)    • Trigeminal neuralgia     Surgery at [facility] in  repeat in         Past Surgical History:   Procedure Laterality Date   • AUGMENTATION MAMMAPLASTY     • COLONOSCOPY N/A 2015    Repeat Q 5 years due to Fhx of Colon Ca-Dr. Polk   • PAP SMEAR N/A     Dr. Burden         Visit Dx:    ICD-10-CM ICD-9-CM   1. Hemiplegia and hemiparesis following cerebral infarction affecting left non-dominant side I69.354 438.22   2. LUE weakness R29.898 729.89   3. Lack of coordination due to stroke I63.9 434.91    R27.9 781.3                         OT Assessment/Plan       10/16/17 1402       OT Assessment    Assessment Comments Pt reports she is becoming more independent with functional activities in the home, has been performing more steps of meal preparation versus  completing meal prep.  -MR       User Key  (r) = Recorded By, (t) = Taken By, (c) = Cosigned By    Initials Name Provider Type     Caitlyn Benjamin Diamante, OT Occupational Therapist                    Therapy Education       10/16/17 1432          Therapy Education    Given HEP  -MR      Program Progressed   strengthening for LUE  -MR      How Provided  "Verbal;Demonstration  -MR      Provided to Patient  -MR      Level of Understanding Teach back education performed  -MR        User Key  (r) = Recorded By, (t) = Taken By, (c) = Cosigned By    Initials Name Provider Type    MR Caitlyn Bobby, OT Occupational Therapist                    OT Exercises       10/16/17 1300          Subjective Comments    Subjective Comments \"My  is stepping back and I'm doing more on my own.\"  -MR      Subjective Pain    Able to rate subjective pain? yes  -MR      Pre-Treatment Pain Level 2  -MR      Subjective Pain Comment L shoulder  -MR      Exercise 1    Exercise Name 1 FM activity to increase coordination for improved use of LUE. Using small pegboard pt picks up pegs using L hand one at a time and places into pegholes. Occassionally uses compensatory strategy of positioning pegs in L hand using R hand to assist. Multiple reps completed following OT demo/cues.  -MR      Cueing 1 Verbal;Demo  -MR      Exercise 2    Exercise Name 2 UE therapeutic exercises to increase strength for functional use of LUE. Using hand weight pt performs bicep curls, forearm pronation/supination, wrist flexion/extension. Pt able to tolerate increased resistance during bicep curls this date.  -MR      Cueing 2 Verbal;Demo  -MR      Equipment 2 Dumbell  -MR      Weights/Plates 2 --   1lb, 2lb  -MR      Exercise 3    Exercise Name 3 Activity to increase strength and coordination for improved functional use of L hand. Using yellow theraputty, pt removes small pegs embedded in putty by OT with L hand.   -MR      Cueing 3 Verbal;Demo  -MR      Equipment 3 Theraputty  -MR      Resistance 3 Yellow  -MR        User Key  (r) = Recorded By, (t) = Taken By, (c) = Cosigned By    Initials Name Provider Type    MR Caitlyn Benjamin Diamante, OT Occupational Therapist                    Time Calculation:   OT Start Time: 1345  OT Stop Time: 1430  OT Time Calculation (min): 45 min     Therapy Charges for Today     Code " Description Service Date Service Provider Modifiers Qty    61105239146  OT NEUROMUSC RE EDUCATION EA 15 MIN 10/16/2017 Caitlyn Bobby OT GO 2    [id number] HC OT THER PROC EA 15 MIN 10/16/2017 Caitlyn Bobby OT GO 1                    Caitlyn Bobby OT  10/16/2017

## 2017-10-16 NOTE — TELEPHONE ENCOUNTER
----- Message from JUAN FRANCISCO Carlos sent at 10/16/2017 10:04 AM EDT -----  Contact: [phone]  She needs to discuss this with her PCP. We do not treat sleep disturbance. I would recommend melatonin but as she has tried this she should now discuss it with her PCP  ----- Message -----     From: Kristal Felix MA     Sent: 10/16/2017   9:48 AM       To: JUAN FRANCISCO Carlos        ----- Message -----     From: Jenna Aranda     Sent: 10/16/2017   8:50 AM       To: Kristal Felix MA    The patient is having trouble staying asleep, she also sometimes has trouble getting to sleep. She normally does not have any trouble getting to sleep. The patient has tried melatonin but it didn't keep her asleep. She only sleeps in 2-3 hour intervals. If you could talk to Sidra or Peri and give her a call back. She was seen in the hospital but has not been seen in the office yet.

## 2017-10-16 NOTE — THERAPY TREATMENT NOTE
"    Outpatient Physical Therapy Neuro Treatment Note  [facility]     Patient Name: Rosi Vicente  : 1950  MRN: [id number]  Today's Date: 10/16/2017      Visit Date: 10/16/2017    Visit Dx:    ICD-10-CM ICD-9-CM   1. Hemiplegia and hemiparesis following cerebral infarction affecting left non-dominant side I69.354 438.22   2. Functional gait abnormality R26.89 781.2       Patient Active Problem List   Diagnosis   • Hypertension   • Allergic rhinitis   • Trigeminal neuralgia   • Hyperlipidemia   • New daily persistent headache   • Osteoarthritis of hip   • Vitamin D deficiency   • Cerebrovascular accident (CVA)   • Stroke   • Heartburn   • Primary insomnia                 PT Neuro       10/16/17 1430          Subjective Comments    Subjective Comments \"I have had to walk around some in the house without my cane so I can carry my coffee and food.\" \"I find myself leaving my cane.\" \"I know you told me to use the cane.\" \" I have been using the rolling walker at night.\" \"How much should I push myself?\"  -LB      Precautions and Contraindications    Precautions left shoulder arthritis (planned total shoulder replacement  -LB      Subjective Pain    Able to rate subjective pain? yes  -LB      Transfers    Transfers, Sit-Stand Randall conditional independence  -LB      Transfers, Stand-Sit Randall conditional independence  -LB      Transfers, Sit-Stand-Sit, Assist Device other (see comments)   no device or quad tipped cane  -LB      Transfer, Impairments impaired balance;strength decreased  -LB      Gait Assessment/Treatment    Gait, Randall Level contact guard assist;minimum assist (75% patient effort)   minimal tactile cues to promote wt shift forward & to L LE  -LB      Gait, Assistive Device other (see comments)   no device 50% of session, with quad tipped cane 50%)  -LB      Gait, Distance (Feet) 160   x 4  -LB      Gait, Gait Deviations left:;weight-shifting ability decreased;other (see comments) " "  Minimal L lateral trunk flexion when ambulating w/o a device  -LB      Gait, Safety Issues --   Pt reporting she has been walking w/o the cane in the kitche  -LB      Gait, Impairments strength decreased;impaired balance  -LB      Gait, Comment Pt ambulated ~ 100' x 1 without a device with minimal resistance at the pelvis.   -LB      Balance Skills Training    Standing-Level of Assistance Minimum assistance  -LB      Static Standing Balance Support Right upper extremity supported  -LB      Standing-Balance Activities Rocker board   emphasized full wt shift to the L   -LB      Gait Balance-Level of Assistance Minimum assistance  -LB      Gait Balance Support Right upper extremity supported  -LB      Gait Balance Activities braiding / front;grapevine  -LB        User Key  (r) = Recorded By, (t) = Taken By, (c) = Cosigned By    Initials Name Provider Type    LAVELL Hernandez PT Physical Therapist                        PT Assessment/Plan       10/16/17 1640       PT Assessment    Assessment Comments Pt eager to become more independent at home since her  is returning to work. Pt reporting she has had to carry stuff in her right hand and walk without hercane.  Pt view her gait with and without the cane. Pt demonstrates decreased wt shift to the L and  L lateral trunk flexion when ambulating without a device. Discussed options and advised pt to perform as little as possible ambulation without a device at this time.    -LB       User Key  (r) = Recorded By, (t) = Taken By, (c) = Cosigned By    Initials Name Provider Type    LAVELL Hernandez PT Physical Therapist                     Exercises       10/16/17 1430          Subjective Comments    Subjective Comments \"I have had to walk around some in the house without my cane so I can carry my coffee and food.\" \"I find myself leaving my cane.\" \"I know you told me to use the cane.\" \" I have been using the rolling walker at night.\" \"How much should I push myself?\"  " -LB      Subjective Pain    Able to rate subjective pain? yes  -LB      Exercise 8    Exercise Name 8 TREADMILL at .9 mph  -LB      Cueing 8 Verbal  -LB      Time (Minutes) 8 1  -LB      Time (Seconds) 8 40  -LB      Additional Comments Performed near end of session and pt reported fatigue.   -LB        User Key  (r) = Recorded By, (t) = Taken By, (c) = Cosigned By    Initials Name Provider Type    LAVELL Hernandez, PT Physical Therapist                                  Therapy Education       10/16/17 1637 10/16/17 1432       Therapy Education    Education Details Reviewed with pt gait quality and deviations as pt watched in the mirror. PT and pt comparing gait with and without her cane.   -LB      Given HEP   Instructed in wt shift L while stepping forward and back with her R LE with light UE support.   -LB HEP  -MR     Program  Progressed   strengthening for LUE  -MR     How Provided Verbal  -LB Verbal;Demonstration  -MR     Provided to Patient  -LB Patient  -MR     Level of Understanding Verbalized;Teach back education performed;Demonstrated  -LB Teach back education performed  -MR       User Key  (r) = Recorded By, (t) = Taken By, (c) = Cosigned By    Initials Name Provider Type    LAVELL Hernandez, PT Physical Therapist    MR Caitlyn Bobby, OT Occupational Therapist                Time Calculation:   Start Time: 1430  Stop Time: 1515  Time Calculation (min): 45 min     Therapy Charges for Today     Code Description Service Date Service Provider Modifiers Qty    [id number]  PT NEUROMUSC RE EDUCATION EA 15 MIN 10/16/2017 Peri Hernandez, PT GP 3                    Peri Hernandez, PT  10/16/2017

## 2017-10-16 NOTE — THERAPY TREATMENT NOTE
Outpatient Speech Language Pathology   Adult Speech Language Cognitive Treatment Note  [facility]     Patient Name: Rosi Vicente  : 1950  MRN: [id number]  Today's Date: 10/16/2017         Visit Date: 10/16/2017   Patient Active Problem List   Diagnosis   • Hypertension   • Allergic rhinitis   • Trigeminal neuralgia   • Hyperlipidemia   • New daily persistent headache   • Osteoarthritis of hip   • Vitamin D deficiency   • Cerebrovascular accident (CVA)   • Stroke   • Heartburn   • Primary insomnia          Visit Dx:    ICD-10-CM ICD-9-CM   1. Cognitive deficit following cerebrovascular accident (CVA) I69.319 438.0   2. Speech or language deficit following cerebrovascular accident I69.328 438.10                               SLP OP Goals       10/16/17 1300       Subjective Comments    Subjective Comments (P)  Mrs. Vicente brought back her homework but had difficultly completing it independently. Clinician finished homework with her in therapy and provided additonal puzzle to take home.   -NS     Subjective Pain    Able to rate subjective pain? (P)  yes  -NS     Pre-Treatment Pain Level (P)  0  -NS     Written Language Comprehension Goals    Written Language Comprehension LTG's (P)  Patient will be able to comprehend written material in social/avocational/work setting  -NS     Patient will be able to comprehend written material in social/avocational/work setting (P)  90%:;without cues  -NS     Status: Patient will be able to comprehend written material in social/avocational/work setting (P)  Progressing as expected  -NS     Patient will improve comprehension of written language skills by following three-step written directions (P)  90%:;without cues  -NS     Status: Patient will improve comprehension of written language skills by following three-step written directions (P)  Progressing as expected  -NS     Comments: Patient will improve comprehension of written language skills by following three-step written  directions (P)  Patient completed detailed written instructions with 90% accuracy without cues, 100% accuracy with min cues.  -NS     Patient will improve comprehension of written language skills by answering written questions related to home management/social/work tasks (bills, recipes, tv guide, emails, procedures) (P)  90%:;without cues  -NS     Status: Patient will improve comprehension of written language skills by answering written questions related to home management/social/work tasks (bills, recipes, tv guide, emails, procedures) (P)  Progressing as expected  -NS     Comments: Patient will improve comprehension of written language skills by answering written questions related to home management/social/work tasks (bills, recipes, tv guide, emails, procedures) (P)  Map task completed with 90% accuracy without cues. Inferencing task completed with 100% accuracy, no cues needed. Ipad task involving movie theater schedule completed with 90% accuracy without cues.   -NS     Auditory Comprehension Goals    Auditory Comprehension LTG's (P)  Patient will comprehend abstract and complex information presented in all social, avocational, or work settings  -NS     Patient will comprehend abstract and complex information presented in all social, avocational, or work settings (P)  90%:;without cues  -NS     Status: Patient will comprehend abstract and complex information presented in all social, avocational, or work settings (P)  Progressing as expected  -NS     Patient will improve comprehension of spoken language by following Multi-step directions (P)  90%:;without cues  -NS     Status: Patient will improve comprehension of spoken language by following Multi-step directions (P)  Achieved  -NS     Comments: Patient will improve comprehension of spoken language by following Multi-step directions (P)  Ipad task involving 3-step commands with directional terms= 82% accuracy without cues.   -NS     Patient will demonstrate  comprehension of spoken language by answering questions about a multi paragraph length content (P)  90%:;without cues  -NS     Status: Patient will demonstrate comprehension of spoken language by answering questions about a multi paragraph length content (P)  Progressing as expected  -NS     Comments: Patient will demonstrate comprehension of spoken language by answering questions about a multi paragraph length content (P)  Clinician read multi-paragraph article aloud and patient summarized article and answered comprehension questiosn with 100% accuracy wihout cues. Mrs. Vicente was able to recall many specific details.   -NS     Executive Function Goals    Executive Function LTG's (P)  Patient will be able to engage in avocational activities requiring high level cognitive skills  -NS     Patient will be able to engage in avocational activities requiring high level cognitive skills (P)  90%:;without cues  -NS     Status: Patient will be able to engage in avocational activities requiring high level cognitive skills (P)  Progressing as expected  -NS     Patient will be able to use high level cognitive skills to allow patient to return to work (P)  90%:;without cues  -NS     Status: Patient will be able to use high level cognitive skills to allow patient to return to work (P)  Progressing as expected  -NS     Patient will improve executive functioning skills by using planning strategies prior to beginning tasks (P)  90%:;without cues  -NS     Status: Patient will improve executive functioning skills by using planning strategies prior to beginning tasks (P)  Progressing as expected  -NS     Comments: Patient will improve executive functioning skills by using planning strategies prior to beginning tasks (P)  Patient completed 2 deductive reasoning puzzles, requiring planning and organization with 70% accuracy. Mrs. Vicente took notes independently, but required some cueing for efficient planning.   -NS     Patient will  improve executive functioning skills by using self-monitoring strategies during functional tasks (P)  90%:;without cues  -NS     Status: Patient will improve executive functioning skills by using self-monitoring strategies during functional tasks (P)  Progressing as expected  -NS     Comments: Patient will improve executive functioning skills by using self-monitoring strategies during functional tasks (P)  Patient completed deductive reasoning puzzle with 70% accuracy. Mrs. Vicente overlooked details of puzzle clues and sometimes made decisions that contradicted answers already selected.   -NS     Problem Solving Goals    Problem Solving LTG's (P)  Patient will be able to engage in avocational activities requiring high level cognitive skills  -NS     Patient will be able to engage in avocational activities requiring high level cognitive skills (P)  Independently  -NS     Status: Patient will be able to engage in avocational activities requiring high level cognitive skills (P)  Progressing as expected  -NS     Patient will improve ability to analyze problems and determine solutions by demonstrating ability to complete household/work management tasks in an organized approach (P)  90%:;without cues  -NS     Status: Patient will improve ability to analyze problems and determine solutions by demonstrating ability to complete household/work management tasks in an organized approach (P)  Progressing as expected  -NS     Comments: Patient will improve ability to analyze problems and determine solutions by demonstrating ability to complete household/work management tasks in an organized approach (P)  Map activity completed with 90% accuracy without cues. Inferencing activity completed with 100% accuracy without cues. Movie theater schedule task on ipad completed with 90% accuracy without cues.   -NS     Patient will improve ability to analyze problems and determine solutions by completing a visual representation/filling in a  chart by following  written directions (P)  90%:;without cues  -NS     Status: Patient will improve ability to analyze problems and determine solutions by completing a visual representation/filling in a chart by following  written directions (P)  Progressing as expected  -NS     Comments: Patient will improve ability to analyze problems and determine solutions by completing a visual representation/filling in a chart by following  written directions (P)  Patient completed 2 deductive reasoning puzzles with 70% accuracy. Cues needed for self-monitoring and attention to detail. Some slow processing noted.   -NS       User Key  (r) = Recorded By, (t) = Taken By, (c) = Cosigned By    Initials Name Provider Type    NS Pattie Davis, Speech Therapy Student Speech Therapy Student                         Time Calculation:   SLP Start Time: (P) 1245  SLP Stop Time: (P) 1345  SLP Time Calculation (min): (P) 60 min    Therapy Charges for Today     Code Description Service Date Service Provider Modifiers Qty    [id number]  ST TREATMENT SPEECH 4 10/16/2017 Pattie Davis Speech Therapy Student GN 1                   Pattie Davis Speech Therapy Student  10/16/2017

## 2017-10-17 ENCOUNTER — APPOINTMENT (OUTPATIENT)
Dept: OCCUPATIONAL THERAPY | Facility: HOSPITAL | Age: 67
End: 2017-10-17

## 2017-10-19 ENCOUNTER — HOSPITAL ENCOUNTER (OUTPATIENT)
Dept: PHYSICAL THERAPY | Facility: HOSPITAL | Age: 67
Setting detail: THERAPIES SERIES
Discharge: HOME | End: 2017-10-19
Attending: PHYSICAL MEDICINE & REHABILITATION

## 2017-10-19 ENCOUNTER — HOSPITAL ENCOUNTER (OUTPATIENT)
Dept: SPEECH THERAPY | Facility: HOSPITAL | Age: 67
Setting detail: THERAPIES SERIES
Discharge: HOME | End: 2017-10-19

## 2017-10-19 ENCOUNTER — HOSPITAL ENCOUNTER (OUTPATIENT)
Dept: OCCUPATIONAL THERAPY | Facility: HOSPITAL | Age: 67
Setting detail: THERAPIES SERIES
Discharge: HOME | End: 2017-10-19

## 2017-10-19 DIAGNOSIS — I69.354 HEMIPLEGIA AND HEMIPARESIS FOLLOWING CEREBRAL INFARCTION AFFECTING LEFT NON-DOMINANT SIDE (HCC): Primary | ICD-10-CM

## 2017-10-19 DIAGNOSIS — I69.319 COGNITIVE DEFICIT FOLLOWING CEREBROVASCULAR ACCIDENT (CVA): Primary | ICD-10-CM

## 2017-10-19 DIAGNOSIS — R26.89 FUNCTIONAL GAIT ABNORMALITY: ICD-10-CM

## 2017-10-19 DIAGNOSIS — R29.898 LUE WEAKNESS: ICD-10-CM

## 2017-10-19 DIAGNOSIS — IMO0002 LACK OF COORDINATION DUE TO STROKE: ICD-10-CM

## 2017-10-19 DIAGNOSIS — I69.328 SPEECH OR LANGUAGE DEFICIT FOLLOWING CEREBROVASCULAR ACCIDENT: ICD-10-CM

## 2017-10-19 PROCEDURE — 97112 NEUROMUSCULAR REEDUCATION: CPT

## 2017-10-19 PROCEDURE — 97110 THERAPEUTIC EXERCISES: CPT

## 2017-10-19 PROCEDURE — 92507 TX SP LANG VOICE COMM INDIV: CPT

## 2017-10-19 NOTE — THERAPY TREATMENT NOTE
Outpatient Speech Language Pathology   Adult Speech Language Cognitive Treatment Note  [facility]     Patient Name: Rosi Vicente  : 1950  MRN: [id number]  Today's Date: 10/19/2017         Visit Date: 10/19/2017   Patient Active Problem List   Diagnosis   • Hypertension   • Allergic rhinitis   • Trigeminal neuralgia   • Hyperlipidemia   • New daily persistent headache   • Osteoarthritis of hip   • Vitamin D deficiency   • Cerebrovascular accident (CVA)   • Stroke   • Heartburn   • Primary insomnia          Visit Dx:    ICD-10-CM ICD-9-CM   1. Cognitive deficit following cerebrovascular accident (CVA) I69.319 438.0   2. Speech or language deficit following cerebrovascular accident I69.328 438.10                               SLP OP Goals       10/19/17 1200       Subjective Comments    Subjective Comments (P)  Mrs. Vicente had diffiuclty completing her homework independently. Clinician reviewed the deductive reasoning puzle with her at beginning of session.   -NS     Subjective Pain    Able to rate subjective pain? (P)  yes  -NS     Pre-Treatment Pain Level (P)  0  -NS     Post-Treatment Pain Level (P)  0  -NS     Written Language Comprehension Goals    Written Language Comprehension LTG's (P)  Patient will be able to comprehend written material in social/avocational/work setting  -NS     Patient will be able to comprehend written material in social/avocational/work setting (P)  90%:;without cues  -NS     Status: Patient will be able to comprehend written material in social/avocational/work setting (P)  Progressing as expected  -NS     Patient will improve comprehension of written language skills by answering written questions related to home management/social/work tasks (bills, recipes, tv guide, emails, procedures) (P)  90%:;without cues  -NS     Status: Patient will improve comprehension of written language skills by answering written questions related to home management/social/work tasks (bills, recipes,  tv guide, emails, procedures) (P)  Progressing as expected  -NS     Comments: Patient will improve comprehension of written language skills by answering written questions related to home management/social/work tasks (bills, recipes, tv guide, emails, procedures) (P)  Inferencing task completed with 90% accuracy.  -NS     Executive Function Goals    Executive Function LTG's (P)  Patient will be able to engage in avocational activities requiring high level cognitive skills  -NS     Patient will be able to engage in avocational activities requiring high level cognitive skills (P)  90%:;without cues  -NS     Status: Patient will be able to engage in avocational activities requiring high level cognitive skills (P)  Progressing as expected  -NS     Patient will be able to use high level cognitive skills to allow patient to return to work (P)  90%:;without cues  -NS     Status: Patient will be able to use high level cognitive skills to allow patient to return to work (P)  Progressing as expected  -NS     Patient will improve executive functioning skills by using planning strategies prior to beginning tasks (P)  90%:;without cues  -NS     Status: Patient will improve executive functioning skills by using planning strategies prior to beginning tasks (P)  Progressing as expected  -NS     Comments: Patient will improve executive functioning skills by using planning strategies prior to beginning tasks (P)  Patient completed 2 deductive reasoning puzzles today. She showed improvement in note-taking. Cues still needed throughout. Overall 70% accuracy.  -NS     Patient will improve executive functioning skills by using self-monitoring strategies during functional tasks (P)  90%:;without cues  -NS     Status: Patient will improve executive functioning skills by using self-monitoring strategies during functional tasks (P)  Progressing as expected  -NS     Comments: Patient will improve executive functioning skills by using  self-monitoring strategies during functional tasks (P)  Patient completed deductive reasoning puzzle with 70% accuracy. Mrs. Vicente overlooked details of puzzle clues and sometimes misread important words. Cues for self-monitoring needed throughout.   -NS     Problem Solving Goals    Problem Solving LTG's (P)  Patient will be able to engage in avocational activities requiring high level cognitive skills  -NS     Patient will be able to engage in avocational activities requiring high level cognitive skills (P)  Independently  -NS     Status: Patient will be able to engage in avocational activities requiring high level cognitive skills (P)  Progressing as expected  -NS     Patient will improve ability to analyze problems and determine solutions by completing a visual representation/filling in a chart by following  written directions (P)  90%:;without cues  -NS     Status: Patient will improve ability to analyze problems and determine solutions by completing a visual representation/filling in a chart by following  written directions (P)  Progressing as expected  -NS     Comments: Patient will improve ability to analyze problems and determine solutions by completing a visual representation/filling in a chart by following  written directions (P)  Patient completed 2 deductive reasoning puzzles with 70% accuracy. Cues needed for self-monitoring and attention to detail. Some slow processing noted.   -NS       User Key  (r) = Recorded By, (t) = Taken By, (c) = Cosigned By    Initials Name Provider Type    NS Pattie Davis Speech Therapy Student Speech Therapy Student                         Time Calculation:   SLP Start Time: (P) 1015  SLP Stop Time: (P) 1100  SLP Time Calculation (min): (P) 45 min    Therapy Charges for Today     Code Description Service Date Service Provider Modifiers Qty    [id number] [facility] TREATMENT SPEECH 3 10/19/2017 Pattie Davis, Speech Therapy Student GN 1                   Pattie Davis,  Speech Therapy Student  10/19/2017

## 2017-10-19 NOTE — THERAPY TREATMENT NOTE
"Outpatient Occupational Therapy Neuro Treatment Note  [facility]     Patient Name: Rosi Vicente  : 1950  MRN: [id number]  Today's Date: 10/19/2017       Visit Date: 10/19/2017    Patient Active Problem List   Diagnosis   • Hypertension   • Allergic rhinitis   • Trigeminal neuralgia   • Hyperlipidemia   • New daily persistent headache   • Osteoarthritis of hip   • Vitamin D deficiency   • Cerebrovascular accident (CVA)   • Stroke   • Heartburn   • Primary insomnia        Past Medical History:   Diagnosis Date   • Allergic rhinitis    • Anemia    • Bronchitis    • FH: colon cancer    • H/O bone density study    • H/O mammogram    • Hypertension     Benign Essential   • Malaise and fatigue    • Nocturia    • Stroke 2017   • TMJ (temporomandibular joint syndrome)    • Trigeminal neuralgia     Surgery at [facility] in  repeat in         Past Surgical History:   Procedure Laterality Date   • AUGMENTATION MAMMAPLASTY     • COLONOSCOPY N/A 2015    Repeat Q 5 years due to Fhx of Colon Ca-Dr. Polk   • PAP SMEAR N/A     Dr. Burden         Visit Dx:    ICD-10-CM ICD-9-CM   1. Hemiplegia and hemiparesis following cerebral infarction affecting left non-dominant side I69.354 438.22   2. LUE weakness R29.898 729.89   3. Lack of coordination due to stroke I63.9 434.91    R27.9 781.3                         OT Assessment/Plan       10/19/17 1252       OT Assessment    Assessment Comments Pt reports increased strength with L hand pinch, however states that fine motor movements are still difficult, has been performing FM activities at home per OT's recommendations.  -MR       User Key  (r) = Recorded By, (t) = Taken By, (c) = Cosigned By    Initials Name Provider Type     Caitlyn Benjamin Diamante, OT Occupational Therapist                            OT Exercises       10/19/17 0900          Subjective Comments    Subjective Comments \"I slid out of bed last night, my  was there and he " "helped me up.\"  -MR      Subjective Pain    Able to rate subjective pain? yes  -MR      Pre-Treatment Pain Level 3  -MR      Subjective Pain Comment L shoulder  -MR      Exercise 1    Exercise Name 1 FM activity to increase coordination and dexterity for improved functional use of LUE. Using Boggle cubes pt instructed to manipulate cubes in L hand prior to placing on table to spell out various words. Pt performs multiple reps following OT demo/cues.  -MR      Cueing 1 Verbal;Demo  -MR      Exercise 2    Exercise Name 2 Activity to increase coordination and strength for functional use of LUE. Using resistive pegboard pt performs activity placing and removing pegs following OT demo/cues. Pt performs multiple repetitions with minimal difficulty.  -MR      Cueing 2 Verbal;Demo  -MR      Exercise 3    Exercise Name 3  strengthening exercises to increase strength for functional use of L hand. Using slo foam pt perform gripping exercises following OT demo/cues.  -MR      Cueing 3 Verbal;Demo  -MR      Equipment 3 Other   slo foam  -MR      Resistance 3 Red  -MR        User Key  (r) = Recorded By, (t) = Taken By, (c) = Cosigned By    Initials Name Provider Type    MR Velma BROOKLYN Bobby Occupational Therapist                    Time Calculation:   OT Start Time: 0936  OT Stop Time: 1015  OT Time Calculation (min): 39 min     Therapy Charges for Today     Code Description Service Date Service Provider Modifiers Qty    77913088099  OT THER PROC EA 15 MIN 10/19/2017 Caitlyn Bobby OT GO 1    [id number]  OT NEUROMUSC RE EDUCATION EA 15 MIN 10/19/2017 Caitlyn Bobby OT GO 2                    Caitlyn Bobby OT  10/19/2017  "

## 2017-10-19 NOTE — THERAPY TREATMENT NOTE
"    Outpatient Physical Therapy Neuro Treatment Note  [facility]     Patient Name: oRsi Vicente  : 1950  MRN: [id number]  Today's Date: 10/19/2017      Visit Date: 10/19/2017    Visit Dx:    ICD-10-CM ICD-9-CM   1. Hemiplegia and hemiparesis following cerebral infarction affecting left non-dominant side I69.354 438.22   2. Functional gait abnormality R26.89 781.2       Patient Active Problem List   Diagnosis   • Hypertension   • Allergic rhinitis   • Trigeminal neuralgia   • Hyperlipidemia   • New daily persistent headache   • Osteoarthritis of hip   • Vitamin D deficiency   • Cerebrovascular accident (CVA)   • Stroke   • Heartburn   • Primary insomnia                 PT Neuro       10/19/17 1110          Subjective Comments    Subjective Comments \"I slipped off the side of the bed when reaching up for a pillow. I didn't go all the way down to the floor.\" My  helped me up.\" \"I didn't get hurt.\" \"I haven't been sleeping well.\"   -LB      Precautions and Contraindications    Precautions left shoulder arthritis (planned total shoulder replacement  -LB      Subjective Pain    Able to rate subjective pain? yes  -LB      Pre-Treatment Pain Level 3  -LB      Post-Treatment Pain Level 3  -LB      Bed Mobility, Assessment/Treatment    Bed Mobility, Roll Left, [place] independent  -LB      Bed Mobility, Roll Right, [place] independent  -LB      Bed Mob, Supine to Sit, [place] independent  -LB      Bed Mob, Sit to Supine, [place] independent  -LB      Transfers    Transfers, Sit-Stand [place] conditional independence  -LB      Transfers, Stand-Sit [place] conditional independence  -LB      Transfer, Comment From sidelying to knees and R UE with CGA then to tall kneeling with CGA.and 1/2 kneeling with CGA to minimal then to stand with minimal. (to simulate floor transfers) Advised pt to protect L UE when performing this technique. .  -LB      Gait Assessment/Treatment    Gait, " "[place] Level contact guard assist  -LB      Gait, Assistive Device other (see comments)   no device and quad tipped cane  -LB      Gait, Distance (Feet) 140   x 3 with quad tipped cane, x 1 without a device  -LB      Gait, Gait Deviations left:;weight-shifting ability decreased   slight left lateral trunk flexion  -LB      Gait, Safety Issues other (see comments)   Pt reports she has been using cane except some in kitchen.  -LB      Gait, Impairments strength decreased;impaired balance  -LB      Balance Skills Training    Gait Balance-Level of Assistance Contact guard;Minimum assistance  -LB      Gait Balance Support No upper extremity supported  -LB      Gait Balance Activities grape[place]  -LB        User Key  (r) = Recorded By, (t) = Taken By, (c) = Cosigned By    Initials Name Provider Type    LAVELL Hernandez, PT Physical Therapist                        PT Assessment/Plan       10/19/17 1624       PT Assessment    Assessment Comments Pt reporting she slipped off her bed but not completely to the floor and wasn't injured. Pt was instructed in floor transfers with CGAto minimal of 1. Pt tolerated increased time on the treadmill with good technique.   -LB       User Key  (r) = Recorded By, (t) = Taken By, (c) = Cosigned By    Initials Name Provider Type    LAVELL Hernandez, PT Physical Therapist                     Exercises       10/19/17 1110          Subjective Comments    Subjective Comments \"I slipped off the side of the bed when reaching up for a pillow. I didn't go all the way down to the floor.\" My  helped me up.\" \"I didn't get hurt.\" \"I haven't been sleeping well.\"   -LB      Subjective Pain    Able to rate subjective pain? yes  -LB      Pre-Treatment Pain Level 3  -LB      Post-Treatment Pain Level 3  -LB      Exercise 8    Exercise Name 8 TREADMILL at .9 mph  -LB      Cueing 8 Verbal  -LB      Time (Minutes) 8 5  -LB      Exercise 9    Exercise Name 9 bilateral plantarflexion on end of " incline of ll bars  -LB      Cueing 9 Verbal  -LB      Reps 9 10  -LB      Exercise 10    Exercise Name 10 bilateral plantarflexion with L UE support and > wt shifted to the L   -LB      Cueing 10 Tactile   and verbal   -LB      Reps 10 10  -LB      Exercise 11    Exercise Name 11 L terminal knee extension with L LE off edge of bed   -LB      Cueing 11 Tactile  -LB      Reps 11 8  -LB        User Key  (r) = Recorded By, (t) = Taken By, (c) = Cosigned By    Initials Name Provider Type    LAVELL Hernandez, PT Physical Therapist                                  Therapy Education       10/19/17 1623          Therapy Education    Education Details Reviewed floor transfer technique.  -LB      How Provided Verbal;Demonstration  -LB      Provided to Patient  -LB      Level of Understanding Verbalized;Teach back education performed;Demonstrated  -LB        User Key  (r) = Recorded By, (t) = Taken By, (c) = Cosigned By    Initials Name Provider Type    LVAELL Hernandez PT Physical Therapist                Time Calculation:   Start Time: 1110  Stop Time: 1200  Time Calculation (min): 50 min     Therapy Charges for Today     Code Description Service Date Service Provider Modifiers Qty    [id number]  PT NEUROMUSC RE EDUCATION EA 15 MIN 10/19/2017 Peri Hernandez, PT GP 3                    Peri Hernandez PT  10/19/2017

## 2017-10-23 ENCOUNTER — HOSPITAL ENCOUNTER (OUTPATIENT)
Dept: OCCUPATIONAL THERAPY | Facility: HOSPITAL | Age: 67
Setting detail: THERAPIES SERIES
Discharge: HOME | End: 2017-10-23

## 2017-10-23 ENCOUNTER — HOSPITAL ENCOUNTER (OUTPATIENT)
Dept: PHYSICAL THERAPY | Facility: HOSPITAL | Age: 67
Setting detail: THERAPIES SERIES
Discharge: HOME | End: 2017-10-23
Attending: PHYSICAL MEDICINE & REHABILITATION

## 2017-10-23 ENCOUNTER — HOSPITAL ENCOUNTER (OUTPATIENT)
Dept: SPEECH THERAPY | Facility: HOSPITAL | Age: 67
Setting detail: THERAPIES SERIES
Discharge: HOME | End: 2017-10-23

## 2017-10-23 DIAGNOSIS — I69.328 SPEECH OR LANGUAGE DEFICIT FOLLOWING CEREBROVASCULAR ACCIDENT: ICD-10-CM

## 2017-10-23 DIAGNOSIS — R29.898 LUE WEAKNESS: ICD-10-CM

## 2017-10-23 DIAGNOSIS — I69.319 COGNITIVE DEFICIT FOLLOWING CEREBROVASCULAR ACCIDENT (CVA): Primary | ICD-10-CM

## 2017-10-23 DIAGNOSIS — IMO0002 LACK OF COORDINATION DUE TO STROKE: ICD-10-CM

## 2017-10-23 DIAGNOSIS — I69.354 HEMIPLEGIA AND HEMIPARESIS FOLLOWING CEREBRAL INFARCTION AFFECTING LEFT NON-DOMINANT SIDE (HCC): Primary | ICD-10-CM

## 2017-10-23 DIAGNOSIS — R26.89 FUNCTIONAL GAIT ABNORMALITY: ICD-10-CM

## 2017-10-23 PROCEDURE — 97110 THERAPEUTIC EXERCISES: CPT

## 2017-10-23 PROCEDURE — 97112 NEUROMUSCULAR REEDUCATION: CPT

## 2017-10-23 PROCEDURE — 92507 TX SP LANG VOICE COMM INDIV: CPT

## 2017-10-23 NOTE — THERAPY TREATMENT NOTE
Outpatient Speech Language Pathology   Adult Speech Language Cognitive Treatment Note  [facility]     Patient Name: Rosi Vicente  : 1950  MRN: [id number]  Today's Date: 10/23/2017         Visit Date: 10/23/2017   Patient Active Problem List   Diagnosis   • Hypertension   • Allergic rhinitis   • Trigeminal neuralgia   • Hyperlipidemia   • New daily persistent headache   • Osteoarthritis of hip   • Vitamin D deficiency   • Cerebrovascular accident (CVA)   • Stroke   • Heartburn   • Primary insomnia          Visit Dx:    ICD-10-CM ICD-9-CM   1. Cognitive deficit following cerebrovascular accident (CVA) I69.319 438.0   2. Speech or language deficit following cerebrovascular accident I69.328 438.10                               SLP OP Goals       10/23/17 1300       Subjective Comments    Subjective Comments (P)  Mrs. Vicente was pleasant and cooperative. She misplaced homework this week and was unable to complete it. SLP provided new deductive reasoning worksheet to be completed at home.   -NS     Subjective Pain    Able to rate subjective pain? (P)  yes  -NS     Pre-Treatment Pain Level (P)  0  -NS     Post-Treatment Pain Level (P)  0  -NS     Written Language Comprehension Goals    Written Language Comprehension LTG's (P)  Patient will be able to comprehend written material in social/avocational/work setting  -NS     Patient will be able to comprehend written material in social/avocational/work setting (P)  90%:;without cues  -NS     Status: Patient will be able to comprehend written material in social/avocational/work setting (P)  Progressing as expected  -NS     Patient will improve comprehension of written language skills by answering written questions related to home management/social/work tasks (bills, recipes, tv guide, emails, procedures) (P)  90%:;without cues  -NS     Status: Patient will improve comprehension of written language skills by answering written questions related to home  management/social/work tasks (bills, recipes, tv guide, emails, procedures) (P)  Progressing as expected  -NS     Comments: Patient will improve comprehension of written language skills by answering written questions related to home management/social/work tasks (bills, recipes, tv guide, emails, procedures) (P)  Clinician read paragraph to patient and she completed inferencing questions with with 90% accuracy without cues, 100% with min cue.   -NS     Auditory Comprehension Goals    Auditory Comprehension LTG's (P)  Patient will comprehend abstract and complex information presented in all social, avocational, or work settings  -NS     Patient will comprehend abstract and complex information presented in all social, avocational, or work settings (P)  90%:;without cues  -NS     Status: Patient will comprehend abstract and complex information presented in all social, avocational, or work settings (P)  Progressing as expected  -NS     Patient will improve comprehension of spoken language by following Multi-step directions (P)  90%:;without cues  -NS     Status: Patient will improve comprehension of spoken language by following Multi-step directions (P)  Progressing as expected  -NS     Comments: Patient will improve comprehension of spoken language by following Multi-step directions (P)  Ipad task involving 3-step commands with directioanl terms= 87% accuracy without cues.  -NS     Executive Function Goals    Executive Function LTG's (P)  Patient will be able to engage in avocational activities requiring high level cognitive skills  -NS     Patient will be able to engage in avocational activities requiring high level cognitive skills (P)  90%:;without cues  -NS     Status: Patient will be able to engage in avocational activities requiring high level cognitive skills (P)  Progressing as expected  -NS     Patient will be able to use high level cognitive skills to allow patient to return to work (P)  90%:;without cues  -NS      Status: Patient will be able to use high level cognitive skills to allow patient to return to work (P)  Progressing as expected  -NS     Patient will improve executive functioning skills by using planning strategies prior to beginning tasks (P)  90%:;without cues  -NS     Status: Patient will improve executive functioning skills by using planning strategies prior to beginning tasks (P)  Progressing as expected  -NS     Comments: Patient will improve executive functioning skills by using planning strategies prior to beginning tasks (P)  Patient completed 2 deductive reasoning puzzles using planning strategies with 80% accuracy.   -NS     Patient will improve executive functioning skills by using self-monitoring strategies during functional tasks (P)  90%:;without cues  -NS     Status: Patient will improve executive functioning skills by using self-monitoring strategies during functional tasks (P)  Progressing as expected  -NS     Comments: Patient will improve executive functioning skills by using self-monitoring strategies during functional tasks (P)  Patient completed 2 deductive reasoning puzzles with 80% accuracy using self-monitoring. Patient self-corrected mistakes and required less cueing this date.   -NS     Problem Solving Goals    Problem Solving LTG's (P)  Patient will be able to engage in avocational activities requiring high level cognitive skills  -NS     Patient will be able to engage in avocational activities requiring high level cognitive skills (P)  Independently  -NS     Status: Patient will be able to engage in avocational activities requiring high level cognitive skills (P)  Progressing as expected  -NS     Patient will improve ability to analyze problems and determine solutions by demonstrating ability to complete household/work management tasks in an organized approach (P)  90%:;without cues  -NS     Status: Patient will improve ability to analyze problems and determine solutions by  demonstrating ability to complete household/work management tasks in an organized approach (P)  Progressing as expected  -NS     Comments: Patient will improve ability to analyze problems and determine solutions by demonstrating ability to complete household/work management tasks in an organized approach (P)  Movie theater schedule task on Ipad completed with 90% accuracy without cues. Mrs. Vicente also demonstrated improvement in organization during deductive reasoning puzzles.   -NS     Patient will improve ability to analyze problems and determine solutions by completing a visual representation/filling in a chart by following  written directions (P)  90%:;without cues  -NS     Status: Patient will improve ability to analyze problems and determine solutions by completing a visual representation/filling in a chart by following  written directions (P)  Progressing as expected  -NS     Comments: Patient will improve ability to analyze problems and determine solutions by completing a visual representation/filling in a chart by following  written directions (P)  Patient completed 2 deductive reasoning puzzles with 80% accuracy. Less cueing needed this date with overall improvement noted  -NS       User Key  (r) = Recorded By, (t) = Taken By, (c) = Cosigned By    Initials Name Provider Type    NS Pattie Davis Speech Therapy Student Speech Therapy Student                         Time Calculation:   SLP Start Time: (P) 1245  SLP Stop Time: (P) 1345  SLP Time Calculation (min): (P) 60 min    Therapy Charges for Today     Code Description Service Date Service Provider Modifiers Qty    [id number]  ST TREATMENT SPEECH 4 10/23/2017 Pattie Davis Speech Therapy Student GN 1                   Pattie Davis Speech Therapy Student  10/23/2017

## 2017-10-23 NOTE — THERAPY TREATMENT NOTE
Outpatient Occupational Therapy Neuro Treatment Note  [facility]     Patient Name: Rosi Vicente  : 1950  MRN: [id number]  Today's Date: 10/23/2017       Visit Date: 10/23/2017    Patient Active Problem List   Diagnosis   • Hypertension   • Allergic rhinitis   • Trigeminal neuralgia   • Hyperlipidemia   • New daily persistent headache   • Osteoarthritis of hip   • Vitamin D deficiency   • Cerebrovascular accident (CVA)   • Stroke   • Heartburn   • Primary insomnia        Past Medical History:   Diagnosis Date   • Allergic rhinitis    • Anemia    • Bronchitis    • FH: colon cancer    • H/O bone density study    • H/O mammogram    • Hypertension     Benign Essential   • Malaise and fatigue    • Nocturia    • Stroke 2017   • TMJ (temporomandibular joint syndrome)    • Trigeminal neuralgia     Surgery at [facility] in  repeat in         Past Surgical History:   Procedure Laterality Date   • AUGMENTATION MAMMAPLASTY     • COLONOSCOPY N/A 2015    Repeat Q 5 years due to Fhx of Colon Ca-Dr. Polk   • PAP SMEAR N/A     Dr. Burden         Visit Dx:    ICD-10-CM ICD-9-CM   1. Hemiplegia and hemiparesis following cerebral infarction affecting left non-dominant side I69.354 438.22   2. LUE weakness R29.898 729.89   3. Lack of coordination due to stroke I63.9 434.91    R27.9 781.3                         OT Assessment/Plan       10/23/17 1432       OT Assessment    Assessment Comments Pt able to attach and remove standard clothespins with L hand, unable to previously manage standard clothespins with L hand.  -MR       User Key  (r) = Recorded By, (t) = Taken By, (c) = Cosigned By    Initials Name Provider Type    MR Caitlyn Arriolas Diamante, OT Occupational Therapist                    Therapy Education       10/23/17 1432          Therapy Education    Given HEP  -MR      Program Reinforced  -MR      How Provided Verbal  -MR      Provided to Patient  -MR      Level of Understanding  "Verbalized  -MR        User Key  (r) = Recorded By, (t) = Taken By, (c) = Cosigned By    Initials Name Provider Type    MR Caitlyn Bobby, OT Occupational Therapist                    OT Exercises       10/23/17 1400          Subjective Comments    Subjective Comments \"I had a busy weekend.\"  -MR      Subjective Pain    Able to rate subjective pain? yes  -MR      Pre-Treatment Pain Level 2  -MR      Subjective Pain Comment L shoulder  -MR      Exercise 1    Exercise Name 1 FM activity to increase coordination and dexterity for improved functional use of L hand. Using coins pt picks up coins from table top using pincer grasp and places into coinslot of container.  Pt performs with moderate difficulty, multiple reps completed.  -MR      Cueing 1 Verbal;Demo  -MR      Exercise 2    Exercise Name 2 Therapeutic exercises to increase strength for functional use of LUE. Using theraband pt performs scapular retraction and shoulder extension exercises. OT provides demo and cues for proper technique.  -MR      Cueing 2 Verbal;Demo  -MR      Equipment 2 Theraband  -MR      Resistance 2 Red  -MR      Sets 2 3  -MR      Reps 2 15  -MR      Exercise 3    Exercise Name 3 Activity to increase coordination and strength with L hand for improved functional use. Using clothespins pt attaches to rim of bucket using L hand. Pt then removes clothespins, OT provides demo and cues for proper technique, multiple reps completed.  -MR      Cueing 3 Verbal;Demo  -MR        User Key  (r) = Recorded By, (t) = Taken By, (c) = Cosigned By    Initials Name Provider Type    MR Caitlyn Benjamin Diamante, OT Occupational Therapist                    Time Calculation:   OT Start Time: 1350  OT Stop Time: 1430  OT Time Calculation (min): 40 min     Therapy Charges for Today     Code Description Service Date Service Provider Modifiers Qty    27846153784  OT THER PROC EA 15 MIN 10/23/2017 Caitlyn Benjamin Diamante, OT GO 1    [id number]  OT NEUROMUSC RE EDUCATION " EA 15 MIN 10/23/2017 Caitlyn Bobby, OT GO 2                    Caityln Bobby, OT  10/23/2017

## 2017-10-23 NOTE — THERAPY TREATMENT NOTE
"    Outpatient Physical Therapy Neuro Treatment Note  [facility]     Patient Name: Rosi Vicente  : 1950  MRN: [id number]  Today's Date: 10/23/2017      Visit Date: 10/23/2017    Visit Dx:    ICD-10-CM ICD-9-CM   1. Hemiplegia and hemiparesis following cerebral infarction affecting left non-dominant side I69.354 438.22   2. Functional gait abnormality R26.89 781.2       Patient Active Problem List   Diagnosis   • Hypertension   • Allergic rhinitis   • Trigeminal neuralgia   • Hyperlipidemia   • New daily persistent headache   • Osteoarthritis of hip   • Vitamin D deficiency   • Cerebrovascular accident (CVA)   • Stroke   • Heartburn   • Primary insomnia                 PT Neuro       10/23/17 1440          Subjective Comments    Subjective Comments \"I have been trying to push myself a little. I went to my class reunion.\" \"I am feeling better about my  going back to work. We are going to prepare some meals the night before.\"   -LB      Precautions and Contraindications    Precautions left shoulder arthritis (planned total shoulder replacement  -LB      Subjective Pain    Able to rate subjective pain? yes  -LB      Pre-Treatment Pain Level 3  -LB      Post-Treatment Pain Level 3  -LB      Transfers    Transfers, Sit-Stand [place] conditional independence  -LB      Transfers, Stand-Sit [place] conditional independence  -LB      Transfer, Comment To tall kneeling with CGA , to 1/2 kneeling with CGA. Maintained 1/2 kneeling with CGA for 1   -LB      Gait Assessment/Treatment    Gait, [place] Level contact guard assist  -LB      Gait, Assistive Device other (see comments)   no device and quad tipped cane   -LB      Gait, Distance (Feet) 160   x 3 with quad tipped cane, 80' x 2 w/o a device  -LB      Gait, Gait Deviations left:;weight-shifting ability decreased   slight left lateral trunk flexion,   -LB      Gait, Safety Issues --   Pt reporting she uses the rolling walker at night for safety " " -LB      Gait, Impairments strength decreased;impaired balance  -LB      Gait, Comment Worked with pt on varied katja. Increased tone L UE witha faster katja.  -LB      Balance Skills Training    Standing-Level of Assistance Contact guard  -LB      Static Standing Balance Support No upper extremity supported  -LB      Standing-Balance Activities Weight Shift R-L   with feet ~ 20\" apart simulating a yoga stance   -LB      Gait Balance-Level of Assistance Contact guard  -LB      Gait Balance Support No upper extremity supported  -LB      Gait Balance Activities grapevine  -LB        User Key  (r) = Recorded By, (t) = Taken By, (c) = Cosigned By    Initials Name Provider Type    LAVELL Hernandez PT Physical Therapist                        PT Assessment/Plan       10/23/17 1716       PT Assessment    Assessment Comments Pt demonstrating improved balance with grapevine and 1/2 kneeling. Pt reporting she has been increasing her activity at home. Pt demonstrated increased tone in her L UE when ambulating at an increased katja.   -LB       User Key  (r) = Recorded By, (t) = Taken By, (c) = Cosigned By    Initials Name Provider Type    LAVELL Hernandez, CRUZTIO Physical Therapist                     Exercises       10/23/17 1440          Subjective Comments    Subjective Comments \"I have been trying to push myself a little. I went to my class reunion.\" \"I am feeling better about my  going back to work. We are going to prepare some meals the night before.\"   -LB      Subjective Pain    Able to rate subjective pain? yes  -LB      Pre-Treatment Pain Level 3  -LB      Post-Treatment Pain Level 3  -LB      Exercise 8    Exercise Name 8 TREADMILL at 1.0 mph  -LB      Time (Minutes) 8 5  -LB      Time (Seconds) 8 10  -LB        User Key  (r) = Recorded By, (t) = Taken By, (c) = Cosigned By    Initials Name Provider Type    LAVELL Hernandez PT Physical Therapist                                  Therapy Education       " 10/23/17 1432          Therapy Education    Given HEP  -MR      Program Reinforced  -MR      How Provided Verbal  -MR      Provided to Patient  -MR      Level of Understanding Verbalized  -MR        User Key  (r) = Recorded By, (t) = Taken By, (c) = Cosigned By    Initials Name Provider Type    MR Caitlyn Benjamin Diamante, OT Occupational Therapist                Time Calculation:   Start Time: 1440  Stop Time: 1523  Time Calculation (min): 43 min     Therapy Charges for Today     Code Description Service Date Service Provider Modifiers Qty    94561218085 HC PT NEUROMUSC RE EDUCATION EA 15 MIN 10/23/2017 Peri Hernandez, PT GP 3                    Peri Hernandez, PT  10/23/2017

## 2017-10-24 ENCOUNTER — APPOINTMENT (OUTPATIENT)
Dept: OCCUPATIONAL THERAPY | Facility: HOSPITAL | Age: 67
End: 2017-10-24

## 2017-10-25 ENCOUNTER — LAB (OUTPATIENT)
Dept: INTERNAL MEDICINE | Facility: CLINIC | Age: 67
End: 2017-10-25

## 2017-10-25 DIAGNOSIS — I10 ESSENTIAL HYPERTENSION: Primary | ICD-10-CM

## 2017-10-25 DIAGNOSIS — I63.9 CEREBROVASCULAR ACCIDENT (CVA), UNSPECIFIED MECHANISM (HCC): ICD-10-CM

## 2017-10-25 DIAGNOSIS — R35.0 URINARY FREQUENCY: ICD-10-CM

## 2017-10-25 DIAGNOSIS — E78.5 HYPERLIPIDEMIA, UNSPECIFIED HYPERLIPIDEMIA TYPE: ICD-10-CM

## 2017-10-25 LAB
ALBUMIN SERPL-MCNC: 5.1 G/DL (ref 3.5–5.2)
ALBUMIN/GLOB SERPL: 2.2 G/DL
ALP SERPL-CCNC: 84 U/L (ref 39–117)
ALT SERPL-CCNC: 22 U/L (ref 1–33)
AST SERPL-CCNC: 9 U/L (ref 1–32)
BILIRUB SERPL-MCNC: 0.5 MG/DL (ref 0.1–1.2)
BILIRUB UR QL STRIP: NEGATIVE
BUN SERPL-MCNC: 15 MG/DL (ref 8–23)
BUN/CREAT SERPL: 24.2 (ref 7–25)
CALCIUM SERPL-MCNC: 10.1 MG/DL (ref 8.6–10.5)
CHLORIDE SERPL-SCNC: 92 MMOL/L (ref 98–107)
CLARITY UR: CLEAR
CO2 SERPL-SCNC: 32.7 MMOL/L (ref 22–29)
COLOR UR: YELLOW
CREAT SERPL-MCNC: 0.62 MG/DL (ref 0.57–1)
GLOBULIN SER CALC-MCNC: 2.3 GM/DL
GLUCOSE SERPL-MCNC: 87 MG/DL (ref 65–99)
GLUCOSE UR STRIP-MCNC: NEGATIVE MG/DL
HGB UR QL STRIP.AUTO: NEGATIVE
KETONES UR QL STRIP: NEGATIVE
LEUKOCYTE ESTERASE UR QL STRIP.AUTO: NEGATIVE
NITRITE UR QL STRIP: NEGATIVE
PH UR STRIP.AUTO: 7 [PH] (ref 5–8)
POTASSIUM SERPL-SCNC: 4.7 MMOL/L (ref 3.5–5.2)
PROT SERPL-MCNC: 7.4 G/DL (ref 6–8.5)
PROT UR QL STRIP: NEGATIVE
SODIUM SERPL-SCNC: 134 MMOL/L (ref 136–145)
SP GR UR STRIP: 1.01 (ref 1–1.03)
UROBILINOGEN UR QL STRIP: NORMAL

## 2017-10-25 PROCEDURE — 81003 URINALYSIS AUTO W/O SCOPE: CPT | Performed by: INTERNAL MEDICINE

## 2017-10-26 ENCOUNTER — HOSPITAL ENCOUNTER (OUTPATIENT)
Dept: PHYSICAL THERAPY | Facility: HOSPITAL | Age: 67
Setting detail: THERAPIES SERIES
Discharge: HOME | End: 2017-10-26
Attending: PHYSICAL MEDICINE & REHABILITATION

## 2017-10-26 ENCOUNTER — HOSPITAL ENCOUNTER (OUTPATIENT)
Dept: OCCUPATIONAL THERAPY | Facility: HOSPITAL | Age: 67
Setting detail: THERAPIES SERIES
Discharge: HOME | End: 2017-10-26

## 2017-10-26 ENCOUNTER — HOSPITAL ENCOUNTER (OUTPATIENT)
Dept: SPEECH THERAPY | Facility: HOSPITAL | Age: 67
Setting detail: THERAPIES SERIES
Discharge: HOME | End: 2017-10-26

## 2017-10-26 DIAGNOSIS — I69.354 HEMIPLEGIA AND HEMIPARESIS FOLLOWING CEREBRAL INFARCTION AFFECTING LEFT NON-DOMINANT SIDE (HCC): Primary | ICD-10-CM

## 2017-10-26 DIAGNOSIS — I69.319 COGNITIVE DEFICIT FOLLOWING CEREBROVASCULAR ACCIDENT (CVA): Primary | ICD-10-CM

## 2017-10-26 DIAGNOSIS — I69.328 SPEECH OR LANGUAGE DEFICIT FOLLOWING CEREBROVASCULAR ACCIDENT: ICD-10-CM

## 2017-10-26 DIAGNOSIS — R29.898 LUE WEAKNESS: ICD-10-CM

## 2017-10-26 DIAGNOSIS — R26.89 FUNCTIONAL GAIT ABNORMALITY: ICD-10-CM

## 2017-10-26 DIAGNOSIS — IMO0002 LACK OF COORDINATION DUE TO STROKE: ICD-10-CM

## 2017-10-26 LAB
CHOLEST SERPL-MCNC: 123 MG/DL (ref 100–199)
HDL SERPL-SCNC: 36.1 UMOL/L
HDLC SERPL-MCNC: 48 MG/DL
LDL-P: 704 NMOL/L
LDLC REAL SIZE PAT SERPL: 20.3 NM
LDLC SERPL CALC-MCNC: 58 MG/DL (ref 0–99)
LP-IR SCORE**: <25
SMALL LDL-P: 437 NMOL/L
TRIGL SERPL-MCNC: 86 MG/DL (ref 0–149)

## 2017-10-26 PROCEDURE — 92507 TX SP LANG VOICE COMM INDIV: CPT

## 2017-10-26 PROCEDURE — 97112 NEUROMUSCULAR REEDUCATION: CPT

## 2017-10-26 PROCEDURE — 97110 THERAPEUTIC EXERCISES: CPT

## 2017-10-26 NOTE — THERAPY TREATMENT NOTE
"    Outpatient Physical Therapy Neuro Treatment Note  [facility]     Patient Name: Rosi Vicente  : 1950  MRN: [id number]  Today's Date: 10/26/2017      Visit Date: 10/26/2017    Visit Dx:    ICD-10-CM ICD-9-CM   1. Hemiplegia and hemiparesis following cerebral infarction affecting left non-dominant side I69.354 438.22   2. Functional gait abnormality R26.89 781.2       Patient Active Problem List   Diagnosis   • Hypertension   • Allergic rhinitis   • Trigeminal neuralgia   • Hyperlipidemia   • New daily persistent headache   • Osteoarthritis of hip   • Vitamin D deficiency   • Cerebrovascular accident (CVA)   • Stroke   • Heartburn   • Primary insomnia                 PT Neuro       10/26/17 0845          Subjective Comments    Subjective Comments \"I want you to be picky about my walking.\"   -LB      Precautions and Contraindications    Precautions left shoulder arthritis (planned total shoulder replacement  -LB      Subjective Pain    Able to rate subjective pain? yes  -LB      Pre-Treatment Pain Level 3  -LB      Post-Treatment Pain Level 3  -LB      Subjective Pain Comment \"My left shoulder is always a little painful.\"   -LB      Bed Mobility, Assessment/Treatment    Bed Mob, Supine to Sit, [place] independent  -LB      Bed Mob, Sit to Supine, [place] independent  -LB      Transfers    Transfers, Sit-Stand [place] conditional independence  -LB      Transfers, Stand-Sit [place] conditional independence  -LB      Transfers, Sit-Stand-Sit, Assist Device other (see comments)   no device or quad tipped cane   -LB      Transfer, Impairments impaired balance;strength decreased  -LB      Transfer, Comment Pt consistently places = weight on both LE's.   -LB      Gait Assessment/Treatment    Gait, [place] Level contact guard assist  -LB      Gait, Assistive Device other (see comments)   no device 3 walks  and quad tipped cane 2 walks   -LB      Gait, Distance (Feet) 120   x 5  -LB      " "Gait, Gait Deviations left:;weight-shifting ability decreased   slight left lateral trunk flexion  -LB      Gait, Impairments strength decreased;impaired balance  -LB      Gait, Comment Pt ambulated with various techniques without the cane. ! st technique with pt tapping her R hand on lateral thigh to prevent L lateral trunk flexion. 2nd technique with pt pushing a bumpy ball against abdominals 100' x 2 each technique.    -LB      Stairs Assessment/Treatment    Number of Stairs ramp   -LB      Stairs, [place] Level --   SBA   -LB      Stairs, Assistive Device --   quad tipped cane   -LB      Stairs, Technique Used step over step (ascending);step over step (descending)  -LB      Stairs, Impairments impaired balance;strength decreased  -LB      Balance Skills Training    Standing-Level of Assistance Contact guard  -LB      Static Standing Balance Support Right upper extremity supported;Left upper extremity supported  -LB      Standing-Balance Activities Weight Shift R-L;Single Limb Stance  -LB        User Key  (r) = Recorded By, (t) = Taken By, (c) = Cosigned By    Initials Name Provider Type    LAVELL Hernandez PT Physical Therapist                        PT Assessment/Plan       10/26/17 0994       PT Assessment    Assessment Comments Pt was able to advance to advanced exercises with emphasis on L LE and trunk stability. Pt motivated to work on gait quality to reduce left lateral trunk flexion.   -LB       User Key  (r) = Recorded By, (t) = Taken By, (c) = Cosigned By    Initials Name Provider Type    LAVELL Hernandez PT Physical Therapist                     Exercises       10/26/17 0900 10/26/17 0867       Subjective Comments    Subjective Comments  \"I want you to be picky about my walking.\"   -LB     Subjective Pain    Able to rate subjective pain?  yes  -LB     Pre-Treatment Pain Level  3  -LB     Post-Treatment Pain Level  3  -LB     Subjective Pain Comment  \"My left shoulder is always a little painful.\" "   -LB     Exercise 8    Exercise Name 8 TREADMILL at 1.1 mph  -LB      Cueing 8 Verbal  -LB      Time (Minutes) 8 5  -LB      Time (Seconds) 8 20  -LB      Exercise 12    Exercise Name 12 1/2 bridges with opposite LE extended in SLR with pelvis in neutral  -LB      Cueing 12 Tactile  -LB      Reps 12 5  -LB      Exercise 13    Exercise Name 13 Pelvis in neutral with minimal UE support with with knee flexion each LE x 5  -LB      Cueing 13 Verbal  -LB      Reps 13 5  -LB        User Key  (r) = Recorded By, (t) = Taken By, (c) = Cosigned By    Initials Name Provider Type    LAVELL Hernandez, PT Physical Therapist                                  Therapy Education       10/26/17 0957          Therapy Education    Given HEP   Added 1/2 bridges with opposite LE SLR  -LB      Program Progressed  -LB      How Provided Verbal  -LB      Provided to Patient  -LB      Level of Understanding Verbalized;Demonstrated;Teach back education performed  -LB        User Key  (r) = Recorded By, (t) = Taken By, (c) = Cosigned By    Initials Name Provider Type    LAVELL Hernandez PT Physical Therapist                Time Calculation:   Start Time: 0845  Stop Time: 0930  Time Calculation (min): 45 min     Therapy Charges for Today     Code Description Service Date Service Provider Modifiers Qty    08195176854 HC PT NEUROMUSC RE EDUCATION EA 15 MIN 10/26/2017 Peri Hernandez, PT GP 3                    Peri Hernandez PT  10/26/2017

## 2017-10-26 NOTE — THERAPY TREATMENT NOTE
Outpatient Speech Language Pathology   Adult Speech Language Cognitive Treatment Note  [facility]     Patient Name: Rosi Vicente  : 1950  MRN: [id number]  Today's Date: 10/26/2017         Visit Date: 10/26/2017   Patient Active Problem List   Diagnosis   • Hypertension   • Allergic rhinitis   • Trigeminal neuralgia   • Hyperlipidemia   • New daily persistent headache   • Osteoarthritis of hip   • Vitamin D deficiency   • Cerebrovascular accident (CVA)   • Stroke   • Heartburn   • Primary insomnia          Visit Dx:    ICD-10-CM ICD-9-CM   1. Cognitive deficit following cerebrovascular accident (CVA) I69.319 438.0   2. Speech or language deficit following cerebrovascular accident I69.328 438.10                               SLP OP Goals       10/26/17 1100       Subjective Comments    Subjective Comments (P)  Mrs. Vicente is showing improvement in therapy activities. She is motivated and continues to do tasks for homework.  -NS     Subjective Pain    Able to rate subjective pain? (P)  yes  -NS     Pre-Treatment Pain Level (P)  0  -NS     Post-Treatment Pain Level (P)  0  -NS     Written Language Comprehension Goals    Written Language Comprehension LTG's (P)  Patient will be able to comprehend written material in social/avocational/work setting  -NS     Patient will be able to comprehend written material in social/avocational/work setting (P)  90%:;without cues  -NS     Status: Patient will be able to comprehend written material in social/avocational/work setting (P)  Progressing as expected  -NS     Patient will improve comprehension of written language skills by answering written questions related to home management/social/work tasks (bills, recipes, tv guide, emails, procedures) (P)  90%:;without cues  -NS     Status: Patient will improve comprehension of written language skills by answering written questions related to home management/social/work tasks (bills, recipes, tv guide, emails, procedures) (P)   Progressing as expected  -NS     Comments: Patient will improve comprehension of written language skills by answering written questions related to home management/social/work tasks (bills, recipes, tv guide, emails, procedures) (P)  Inferencing questions completed with 100% accuracy without cues.   -NS     Auditory Comprehension Goals    Auditory Comprehension LTG's (P)  Patient will comprehend abstract and complex information presented in all social, avocational, or work settings  -NS     Patient will comprehend abstract and complex information presented in all social, avocational, or work settings (P)  90%:;without cues  -NS     Status: Patient will comprehend abstract and complex information presented in all social, avocational, or work settings (P)  Progressing as expected  -NS     Patient will improve comprehension of spoken language by following Multi-step directions (P)  90%:;without cues  -NS     Status: Patient will improve comprehension of spoken language by following Multi-step directions (P)  Progressing as expected  -NS     Comments: Patient will improve comprehension of spoken language by following Multi-step directions (P)  Ipad task with 3-step commands completed with 80% accuracy without cues.  -NS     Patient will demonstrate comprehension of spoken language by answering questions about a multi paragraph length content (P)  90%:;without cues  -NS     Status: Patient will demonstrate comprehension of spoken language by answering questions about a multi paragraph length content (P)  Progressing as expected  -NS     Comments: Patient will demonstrate comprehension of spoken language by answering questions about a multi paragraph length content (P)  Clinician read multi-paragraph article and patient summarized and answered comprehension questiosn with 95% accuracy. No cues needed.  -NS     Executive Function Goals    Executive Function LTG's (P)  Patient will be able to engage in avocational activities  requiring high level cognitive skills  -NS     Patient will be able to engage in avocational activities requiring high level cognitive skills (P)  90%:;without cues  -NS     Status: Patient will be able to engage in avocational activities requiring high level cognitive skills (P)  Progressing as expected  -NS     Patient will be able to use high level cognitive skills to allow patient to return to work (P)  90%:;without cues  -NS     Status: Patient will be able to use high level cognitive skills to allow patient to return to work (P)  Progressing as expected  -NS     Patient will improve executive functioning skills by using planning strategies prior to beginning tasks (P)  90%:;without cues  -NS     Status: Patient will improve executive functioning skills by using planning strategies prior to beginning tasks (P)  Progressing as expected  -NS     Comments: Patient will improve executive functioning skills by using planning strategies prior to beginning tasks (P)  Patient completed 2 deductive reasoning puzles with 90% accuracy on aplanning.   -NS     Patient will improve executive functioning skills by using self-monitoring strategies during functional tasks (P)  90%:;without cues  -NS     Status: Patient will improve executive functioning skills by using self-monitoring strategies during functional tasks (P)  Progressing as expected  -NS     Comments: Patient will improve executive functioning skills by using self-monitoring strategies during functional tasks (P)  Patient completed 2 deductive reasoning puzzles, showing improvement in self-monitoring. Patient independently caught errors and checked work with fewer cues.  -NS     Problem Solving Goals    Problem Solving LTG's (P)  Patient will be able to engage in avocational activities requiring high level cognitive skills  -NS     Patient will be able to engage in avocational activities requiring high level cognitive skills (P)  Independently  -NS     Status:  Patient will be able to engage in avocational activities requiring high level cognitive skills (P)  Progressing as expected  -NS     Patient will improve ability to analyze problems and determine solutions by demonstrating ability to complete household/work management tasks in an organized approach (P)  90%:;without cues  -NS     Status: Patient will improve ability to analyze problems and determine solutions by demonstrating ability to complete household/work management tasks in an organized approach (P)  Progressing as expected  -NS     Comments: Patient will improve ability to analyze problems and determine solutions by demonstrating ability to complete household/work management tasks in an organized approach (P)  Inferencing task completed with 100% accuracy. Deductive reasoning puzzles completed with 90% accuracy. Patient required fewer cues for organization.  -NS     Patient will improve ability to analyze problems and determine solutions by completing a visual representation/filling in a chart by following  written directions (P)  90%:;without cues  -NS     Status: Patient will improve ability to analyze problems and determine solutions by completing a visual representation/filling in a chart by following  written directions (P)  Progressing as expected  -NS     Comments: Patient will improve ability to analyze problems and determine solutions by completing a visual representation/filling in a chart by following  written directions (P)  Patient completed 2 deductive raesoning puzzles with 90% accuracy. Clinician cued patient on checking work on first puzzle and patient demonstrated carry over when completing next task.   -NS       User Key  (r) = Recorded By, (t) = Taken By, (c) = Cosigned By    Initials Name Provider Type    MARY Davis, Speech Therapy Student Speech Therapy Student                         Time Calculation:   SLP Start Time: (P) 1015  SLP Stop Time: (P) 1100  SLP Time Calculation  (min): (P) 45 min    Therapy Charges for Today     Code Description Service Date Service Provider Modifiers Qty    07206707272  ST TREATMENT SPEECH 3 10/26/2017 Pattie Davis, Speech Therapy Student GN 1                   Pattie Davis, Speech Therapy Student  10/26/2017

## 2017-10-26 NOTE — THERAPY TREATMENT NOTE
Outpatient Occupational Therapy Neuro Treatment Note  [facility]     Patient Name: Rosi Vicente  : 1950  MRN: [id number]  Today's Date: 10/26/2017       Visit Date: 10/26/2017    Patient Active Problem List   Diagnosis   • Hypertension   • Allergic rhinitis   • Trigeminal neuralgia   • Hyperlipidemia   • New daily persistent headache   • Osteoarthritis of hip   • Vitamin D deficiency   • Cerebrovascular accident (CVA)   • Stroke   • Heartburn   • Primary insomnia        Past Medical History:   Diagnosis Date   • Allergic rhinitis    • Anemia    • Bronchitis    • FH: colon cancer    • H/O bone density study    • H/O mammogram    • Hypertension     Benign Essential   • Malaise and fatigue    • Nocturia    • Stroke 2017   • TMJ (temporomandibular joint syndrome)    • Trigeminal neuralgia     Surgery at [facility] in  repeat in         Past Surgical History:   Procedure Laterality Date   • AUGMENTATION MAMMAPLASTY     • COLONOSCOPY N/A 2015    Repeat Q 5 years due to Fhx of Colon Ca-Dr. Polk   • PAP SMEAR N/A     Dr. Burden         Visit Dx:    ICD-10-CM ICD-9-CM   1. Hemiplegia and hemiparesis following cerebral infarction affecting left non-dominant side I69.354 438.22   2. LUE weakness R29.898 729.89   3. Lack of coordination due to stroke I63.9 434.91    R27.9 781.3                         OT Assessment/Plan       10/26/17 1008       OT Assessment    Assessment Comments Pt remains motivated and cooperative for participation with OT intervention, continue outpatient OT to increase functional use of LUE.  -MR       User Key  (r) = Recorded By, (t) = Taken By, (c) = Cosigned By    Initials Name Provider Type     Velma Diamante, OT Occupational Therapist                    Therapy Education       10/26/17 1009 10/26/17 0957       Therapy Education    Given HEP  -MR HEP   Added 1/2 bridges with opposite LE SLR  -LB     Program Reinforced   theraputty exercises for L  "hand  -MR Progressed  -LB     How Provided Verbal;Demonstration  -MR Verbal  -LB     Provided to Patient  -MR Patient  -LB     Level of Understanding Teach back education performed  -MR Verbalized;Demonstrated;Teach back education performed  -LB       User Key  (r) = Recorded By, (t) = Taken By, (c) = Cosigned By    Initials Name Provider Type    LAVELL Hernandez, PT Physical Therapist    MR Caitlyn Bobby, OT Occupational Therapist                    OT Exercises       10/26/17 0900          Subjective Comments    Subjective Comments \"You push me to do things I don't do at home.\"  -MR      Subjective Pain    Pre-Treatment Pain Level 3  -MR      Subjective Pain Comment L shoulder  -MR      Exercise 1    Exercise Name 1 FM activity to increase coordination and dexterity for improved functional use of LUE. Using tweezers pt picks up beads from table surface following OT demo/cues. Multiple reps completed following OT demo/cues.  -MR      Cueing 1 Verbal;Demo  -MR      Exercise 2    Exercise Name 2 UE therapeutic exercises to increase strength and ROM for functional use of LUE. On tabletop pt performs towel slides with LUE following OT demo/cues. Pt performs slides for shoulder flexion, horizontal alise/adduction, and on diagonal reaching to opposite corners.   -MR      Cueing 2 Verbal;Demo  -MR      Exercise 3    Exercise Name 3 Activity to increase strength and coordination with L hand for improved functional use. Using theraputty pt performs in hand manipulation, gross grasp, and various pinches following OT demo/cues.  -MR      Cueing 3 Verbal;Demo  -MR      Equipment 3 Theraputty  -MR      Resistance 3 Yellow  -MR        User Key  (r) = Recorded By, (t) = Taken By, (c) = Cosigned By    Initials Name Provider Type    MR Caitlyn Bobby, OT Occupational Therapist                    Time Calculation:   OT Start Time: 0932  OT Stop Time: 1015  OT Time Calculation (min): 43 min     Therapy Charges for Today     Code " Description Service Date Service Provider Modifiers Qty    65225715213 HC OT THER PROC EA 15 MIN 10/26/2017 Caitlyn Bobby, OT GO 1    00125990109 HC OT NEUROMUSC RE EDUCATION EA 15 MIN 10/26/2017 Caitlyn Bobby OT GO 2                    Caitlyn Bobby OT  10/26/2017

## 2017-10-27 ENCOUNTER — TELEPHONE (OUTPATIENT)
Dept: INTERNAL MEDICINE | Facility: CLINIC | Age: 67
End: 2017-10-27

## 2017-10-27 DIAGNOSIS — E78.5 HYPERLIPIDEMIA, UNSPECIFIED HYPERLIPIDEMIA TYPE: ICD-10-CM

## 2017-10-27 DIAGNOSIS — I63.9 CEREBROVASCULAR ACCIDENT (CVA), UNSPECIFIED MECHANISM (HCC): Primary | ICD-10-CM

## 2017-10-27 RX ORDER — ATORVASTATIN CALCIUM 80 MG/1
80 TABLET, FILM COATED ORAL NIGHTLY
Qty: 90 TABLET | Refills: 2 | Status: SHIPPED | OUTPATIENT
Start: 2017-10-27 | End: 2017-12-15 | Stop reason: SINTOL

## 2017-10-27 RX ORDER — CLOPIDOGREL BISULFATE 75 MG/1
75 TABLET ORAL DAILY
Qty: 90 TABLET | Refills: 2 | Status: SHIPPED | OUTPATIENT
Start: 2017-10-27 | End: 2018-05-17 | Stop reason: SDUPTHER

## 2017-10-30 ENCOUNTER — APPOINTMENT (OUTPATIENT)
Dept: SPEECH THERAPY | Facility: HOSPITAL | Age: 67
End: 2017-10-30

## 2017-10-30 ENCOUNTER — APPOINTMENT (OUTPATIENT)
Dept: OCCUPATIONAL THERAPY | Facility: HOSPITAL | Age: 67
End: 2017-10-30

## 2017-10-30 ENCOUNTER — APPOINTMENT (OUTPATIENT)
Dept: PHYSICAL THERAPY | Facility: HOSPITAL | Age: 67
End: 2017-10-30
Attending: PHYSICAL MEDICINE & REHABILITATION

## 2017-10-30 ENCOUNTER — TELEPHONE (OUTPATIENT)
Dept: INTERNAL MEDICINE | Facility: CLINIC | Age: 67
End: 2017-10-30

## 2017-10-30 DIAGNOSIS — N39.46 MIXED STRESS AND URGE URINARY INCONTINENCE: Primary | ICD-10-CM

## 2017-10-31 ENCOUNTER — APPOINTMENT (OUTPATIENT)
Dept: OCCUPATIONAL THERAPY | Facility: HOSPITAL | Age: 67
End: 2017-10-31

## 2017-11-02 ENCOUNTER — TELEPHONE (OUTPATIENT)
Dept: INTERNAL MEDICINE | Facility: CLINIC | Age: 67
End: 2017-11-02

## 2017-11-02 ENCOUNTER — HOSPITAL ENCOUNTER (OUTPATIENT)
Dept: PHYSICAL THERAPY | Facility: HOSPITAL | Age: 67
Setting detail: THERAPIES SERIES
Discharge: HOME | End: 2017-11-02
Attending: PHYSICAL MEDICINE & REHABILITATION

## 2017-11-02 ENCOUNTER — HOSPITAL ENCOUNTER (OUTPATIENT)
Dept: OCCUPATIONAL THERAPY | Facility: HOSPITAL | Age: 67
Setting detail: THERAPIES SERIES
Discharge: HOME | End: 2017-11-02

## 2017-11-02 ENCOUNTER — APPOINTMENT (OUTPATIENT)
Dept: SPEECH THERAPY | Facility: HOSPITAL | Age: 67
End: 2017-11-02

## 2017-11-02 DIAGNOSIS — R26.89 FUNCTIONAL GAIT ABNORMALITY: ICD-10-CM

## 2017-11-02 DIAGNOSIS — N39.46 MIXED STRESS AND URGE URINARY INCONTINENCE: ICD-10-CM

## 2017-11-02 DIAGNOSIS — I69.354 HEMIPLEGIA AND HEMIPARESIS FOLLOWING CEREBRAL INFARCTION AFFECTING LEFT NON-DOMINANT SIDE (HCC): Primary | ICD-10-CM

## 2017-11-02 DIAGNOSIS — R29.898 LUE WEAKNESS: ICD-10-CM

## 2017-11-02 DIAGNOSIS — IMO0002 LACK OF COORDINATION DUE TO STROKE: ICD-10-CM

## 2017-11-02 PROCEDURE — G8979 MOBILITY GOAL STATUS: HCPCS

## 2017-11-02 PROCEDURE — 97112 NEUROMUSCULAR REEDUCATION: CPT

## 2017-11-02 PROCEDURE — G8978 MOBILITY CURRENT STATUS: HCPCS

## 2017-11-02 PROCEDURE — 97110 THERAPEUTIC EXERCISES: CPT

## 2017-11-02 NOTE — TELEPHONE ENCOUNTER
Patient would like a prescription of Myrbetriq 25 mg. She would like this sent in to her local [facility] pharmacy.    [address] [place] [address] [place] - [phone]  - [phone]  [phone] (Phone)  [phone] (Fax)         Thank you.

## 2017-11-02 NOTE — THERAPY RE-EVALUATION
"    Outpatient Physical Therapy Neuro Re-Evaluation  [facility]     Patient Name: Rosi Vicente  : 1950  MRN: [id number]  Today's Date: 2017      Visit Date: 2017    Patient Active Problem List   Diagnosis   • Hypertension   • Allergic rhinitis   • Trigeminal neuralgia   • Hyperlipidemia   • New daily persistent headache   • Osteoarthritis of hip   • Vitamin D deficiency   • Cerebrovascular accident (CVA)   • Stroke   • Heartburn   • Primary insomnia        Past Medical History:   Diagnosis Date   • Allergic rhinitis    • Anemia    • Bronchitis    • FH: colon cancer    • H/O bone density study    • H/O mammogram    • Hypertension     Benign Essential   • Malaise and fatigue    • Nocturia    • Stroke 2017   • TMJ (temporomandibular joint syndrome)    • Trigeminal neuralgia     Surgery at [facility] in  repeat in         Past Surgical History:   Procedure Laterality Date   • AUGMENTATION MAMMAPLASTY     • COLONOSCOPY N/A 2015    Repeat Q 5 years due to Fhx of Colon Ca-Dr. Polk   • PAP SMEAR N/A     Dr. Burden         Visit Dx:     ICD-10-CM ICD-9-CM   1. Hemiplegia and hemiparesis following cerebral infarction affecting left non-dominant side I69.354 438.22   2. Functional gait abnormality R26.89 781.2                     PT Neuro       17 0845          Subjective Comments    Subjective Comments \"Want to move forward.\" \"I went to Dr. Kruse's yesterday and he was discouraging. He said it would take 3-6 months to get things back. It hasn't been 3 months yet.\" (pt began to cry) \"I become emotionally labile at times.\"  -LB      Precautions and Contraindications    Precautions left shoulder arthritis (planned total shoulder replacement  -LB      Subjective Pain    Able to rate subjective pain? yes  -LB      Pre-Treatment Pain Level 2  -LB      Post-Treatment Pain Level 2  -LB      Subjective Pain Comment \"My left shoulder always hurts.\"   -LB      Left Hip "    Hip Flexion Gross Movement (4+/5) good plus  -LB      Hip Extension Gluteus Thomas (5/5) normal  -LB      Hip ABduction Gross Movement (4/5) good  -LB      Left Knee    Knee Extension Gross Movement (5/5) normal  -LB      Knee Flexion Gross Movement (5/5) normal  -LB      Left Ankle/Foot    Ankle PF Gross Movement (4-/5) good minus  -LB      Transfers    Transfers, Sit-Stand [place] independent   from armless chair without use of UE's   -LB      Transfers, Stand-Sit [place] independent   to armless chair without use of UE's   -LB      Transfer, Impairments impaired balance;strength decreased  -LB      Transfer, Comment Floor transfers with CGA and verbal cues.   -LB      Gait Assessment/Treatment    Gait, [place] Level stand by assist;contact guard assist  -LB      Gait, Assistive Device other (see comments)   no device  -LB      Gait, Distance (Feet) 150  -LB      Gait, Gait Deviations left:   w/o device slight to min. L lateral trunk flexion at stance  -LB      Gait, Safety Issues --   Reporting using the cane during the day &rolling wx at night  -LB      Gait, Impairments strength decreased;impaired balance  -LB      Gait, Comment Pt ambulates with ~ 200' x 3 with quad tipped  cane conditional independent.   -LB      Stairs Assessment/Treatment    Number of Stairs 4  -LB      Stairs, Handrail Location right side (ascending)  -LB      Stairs, [place] Level conditional independence  -LB      Stairs, Technique Used step over step (ascending);step over step (descending)  -LB      Stairs, Impairments impaired balance;strength decreased  -LB      Stairs, Comment Pt ascended and descended a curb and a ramp with quad tipped cane conditional independent.   -LB      Balance Skills Training    Gait Balance-Level of Assistance Contact guard  -LB      Gait Balance Support No upper extremity supported  -LB      Gait Balance Activities grapevine;stepping over object  -LB      Sharpened [place] 15  seconds with eyes opened  with L foot forward   -LB        User Key  (r) = Recorded By, (t) = Taken By, (c) = Cosigned By    Initials Name Provider Type    LAVELL Hernandez, PT Physical Therapist                        Therapy Education       11/02/17 1138 11/02/17 1014       Therapy Education    Education Details Reviewed with pt the imporved scores on the outcome measures and discussed how her gait quality has improved signifcantly. Stressed to pt to continue to carolyn her cane as best quality noted.   -LB      Given HEP   Added unilateral plantarflexion in stnading with UE support, STanding in a corner with one foot forward and reciprocal stepping with Ue support. .   -LB HEP  -MR     Program  Progressed   therapeutic exercises for LUE using hand weights  -MR     How Provided Verbal;Written  -LB Verbal;Demonstration  -MR     Provided to Patient  -LB Patient  -MR     Level of Understanding Teach back education performed;Verbalized;Demonstrated  -LB Teach back education performed  -MR       User Key  (r) = Recorded By, (t) = Taken By, (c) = Cosigned By    Initials Name Provider Type    LAVELL Hernandez, PT Physical Therapist    MR Caitlyn Bobby, OT Occupational Therapist                PT OP Goals       11/02/17 1100       PT Short Term Goals    STG Date to Achieve 11/16/17  -LB     STG 1 Pt to be independent with HEP with emphasis on balance and L LE strengthening.   -LB     STG 1 Progress Met  -LB     STG 2 Pt to score a 46/56 on the VIRK to reduce risk of falls.  -LB     STG 2 Progress Met  -LB     STG 2 Progress Comments Pt scored a 50/56.   -LB     STG 3 Pt to score a 18/24 on the Dynamic Gait Index to reduce risk for falls.  -LB     STG 3 Progress Met  -LB     STG 3 Progress Comments Pt scored a 19/24.  -LB     STG 4 Pt to achieve 1/2 kneeling with CGA.  -LB     STG 4 Progress Ongoing;Progressing  -LB     STG 5 Pt to ambulate 250' x 2 with quad tipped cane conditional independent.    -LB     STG 5 Progress  Met  -LB     STG 6 Pt to ambulate with increased knee extension at swing and stance phase.   -LB     STG 6 Progress Met  -LB     Long Term Goals    LTG Date to Achieve 12/01/17  -LB     LTG 1 Pt to score a 51/56 on the VIRK to reduce risk of falls.  -LB     LTG 1 Progress Ongoing;Progressing  -LB     LTG 2 Pt to score a 22/24 on the Dynamic Gait Index to reduce risk for falls  -LB     LTG 2 Progress Ongoing;Progressing  -LB     LTG 3 Pt to increase strength in the left hip flexors to 4+/5.  -LB     LTG 3 Progress Ongoing;Progressing  -LB     LTG 4 Pt to ambulate 600' x 3 without a device conditional independent on level and unlevel surfaces.    -LB     LTG 4 Progress Ongoing;Progressing  -LB     LTG 5 Pt to ascend and descend 4 steps independently step over steps without a rail.   -LB     LTG 5 Progress Ongoing;Progressing  -LB     LTG 6 Pt to perform floor transfers independently.   -LB     LTG 6 Progress Ongoing;Progressing  -LB     LTG 6 Progress Comments Pt required CGA and verbal cues.  -LB     LTG 7 Pt to increase strength in the left plantarflexors to 4/5  -LB     LTG 7 Progress Ongoing;Progressing  -LB       User Key  (r) = Recorded By, (t) = Taken By, (c) = Cosigned By    Initials Name Provider Type    LVAELL Hernandez, PT Physical Therapist                PT Assessment/Plan       11/02/17 1143       PT Assessment    Assessment Comments Pt arrived in PT frustrated floowing her appt with Dr. Kruse due to the length of time it may take for improvement. Reassured pt that she has been making steady progress and disucssed her improvements with L LE strength. gait quality and scores on the VIRK and Dynamic Gait. Index. Pt is very eager to begin her Yoga and Jazzercise. Pt would benefit from continued PT.   -LB       User Key  (r) = Recorded By, (t) = Taken By, (c) = Cosigned By    Initials Name Provider Type    LAVELL Hernandez PT Physical Therapist                   Exercises       11/02/17 9464           "Subjective Comments    Subjective Comments \"Want to move forward.\" \"I went to Dr. Kruse's yesterday and he was discouraging. He said it would take 3-6 months to get things back. It hasn't been 3 months yet.\" (pt began to cry) \"I become emotionally labile at times.\"  -LB      Subjective Pain    Able to rate subjective pain? yes  -LB      Pre-Treatment Pain Level 2  -LB      Post-Treatment Pain Level 2  -LB      Subjective Pain Comment \"My left shoulder always hurts.\"   -LB        User Key  (r) = Recorded By, (t) = Taken By, (c) = Cosigned By    Initials Name Provider Type    LB Peri Hernandez PT Physical Therapist                            Outcome Measures       11/02/17 0845          Monsalve Balance Scale    Sitting to Standing 4  -LB      Standing Unsupported 4  -LB      Sitting with Back Unsupported but Feet Supported on Floor or on Stool 4  -LB      Standing to Sitting 4  -LB      Transfers 4  -LB      Standing Unsupported with Eyes Closed 4  -LB      Standing Unsupported with Feet Together 4  -LB      Reaching Forward with Outstretched Arm While Standing 4  -LB       Object From the Floor From a Standing Position 4  -LB      Turning to Look Behind Over Left and Right Shoulders While Standing 4  -LB      Turn 360 Degrees 4  -LB      Place Alternate Foot on Step or Stool While Standing Unsupported 2  -LB      Standing Unsupported with One Foot in Front 3  -LB      Standing on One Leg 1  -LB      Monsalve Total Score 50  -LB      Dynamic Gait Index (DGI)    Gait Level Surface 2  -LB      Change in Gait Speed 2  -LB      Gait with Horizontal Head Turns 2  -LB      Gait with Vertical Head Turns 2  -LB      Gait and Pivot Turn 3  -LB      Step Over Obstacle 3  -LB      Step Around Obstacles 3  -LB      Steps 2  -LB      Dynamic Gait Index Score 19  -LB      Dynamic Gait Index Comments with quad tipped cane   -LB        User Key  (r) = Recorded By, (t) = Taken By, (c) = Cosigned By    Initials Name Provider Type    " LB Peri Hernandez, PT Physical Therapist          Time Calculation:   Start Time: 0845  Stop Time: 0930  Time Calculation (min): 45 min     Therapy Charges for Today     Code Description Service Date Service Provider Modifiers Qty    29498211629 HC PT MOBILITY CURRENT 11/2/2017 Peri Hernandez, PT GP, CI 1    53104353226 HC PT MOBILITY PROJECTED 11/2/2017 Peri Hernandez, PT GP, CI 1    74001822758 HC PT NEUROMUSC RE EDUCATION EA 15 MIN 11/2/2017 Peri Hernandez, PT GP 3          PT G-Codes  PT Professional Judgement Used?: Yes  Outcome Measure Options: [place]alve Balance, Dynamic Gait Index  Mobility: Walking and Moving Around Current Status (): At least 1 percent but less than 20 percent impaired, limited or restricted  Mobility: Walking and Moving Around Goal Status (): At least 1 percent but less than 20 percent impaired, limited or restricted         Peri Hernandez, PT  11/2/2017

## 2017-11-02 NOTE — THERAPY RE-EVALUATION
Outpatient Occupational Therapy Neuro Re-Evaluation  [facility]     Patient Name: Rosi Vicente  : 1950  MRN: [id number]  Today's Date: 2017      Visit Date: 2017    Patient Active Problem List   Diagnosis   • Hypertension   • Allergic rhinitis   • Trigeminal neuralgia   • Hyperlipidemia   • New daily persistent headache   • Osteoarthritis of hip   • Vitamin D deficiency   • Cerebrovascular accident (CVA)   • Stroke   • Heartburn   • Primary insomnia        Past Medical History:   Diagnosis Date   • Allergic rhinitis    • Anemia    • Bronchitis    • FH: colon cancer    • H/O bone density study    • H/O mammogram    • Hypertension     Benign Essential   • Malaise and fatigue    • Nocturia    • Stroke 2017   • TMJ (temporomandibular joint syndrome)    • Trigeminal neuralgia     Surgery at [facility] in  repeat in         Past Surgical History:   Procedure Laterality Date   • AUGMENTATION MAMMAPLASTY     • COLONOSCOPY N/A 2015    Repeat Q 5 years due to Fhx of Colon Ca-Dr. Polk   • PAP SMEAR N/A     Dr. Burden         Visit Dx:      ICD-10-CM ICD-9-CM   1. Hemiplegia and hemiparesis following cerebral infarction affecting left non-dominant side I69.354 438.22   2. LUE weakness R29.898 729.89   3. Lack of coordination due to stroke I63.9 434.91    R27.9 781.3                 OT Neuro       17 1000          Coordination    Box and Blocks Left 25 blocks  -MR      9-Hole Peg Left 50 sec  -MR      ROM (Range of Motion)    General ROM upper extremity range of motion deficits identified  -MR      Left Shoulder    Flexion AROM Deficit 1/2 range  -MR      ABduction AROM Deficit 1/4 range  -MR      General UE Assessment    ROM RUE ROM was WFL  -MR      ROM Detail LUE AROM with elbow, forearm, wrist, and hand WFL. Pt with history of arthritis of L shoulder, limited ROM with shoulder prior to CVA  -MR      MMT (Manual Muscle Testing)    General MMT Assessment upper  extremity strength deficits identified  -MR      Upper Extremity    Upper Ext Manual Muscle Testing right UE strength is WFL  -MR      Upper Ext Manual Muscle Testing Detail L shoulder not tested due to pain, history of L shoulder impairment  -MR      Left Elbow/Forearm    Elbow Flexion Gross Movement (4-/5) good minus  -MR      Elbow Extension Gross Movement (4-/5) good minus  -MR      Upper Body Bathing Assessment/Training    UB Bathing Assess/Train, Prowers Level supervision required  -MR      Lower Body Bathing Assessment/Training    LB Bathing Assess/Train, Prowers Level supervision required  -MR      Upper Body Dressing Assessment/Training    UB Dressing Assess/Train, Prowers minimum assist (75% patient effort)  -MR      Lower Body Dressing Assessment/Training    LB Dressing Assess/Train, Prowers supervision required  -MR      Grooming Assessment/Training    Grooming Assess/Train, Indepen Level minimum assist (75% patient effort)  -MR        User Key  (r) = Recorded By, (t) = Taken By, (c) = Cosigned By    Initials Name Provider Type    MR Caitlyn Bobby, OT Occupational Therapist             Hand Therapy (last 24 hours)      Hand Eval       11/02/17 1014           Strength Left    Left  Test 1 15  -MR      Left  Test 2 14  -MR      Left  Test 3 10  -MR       Strength Average Left 13  -MR      Left Hand Strength - Pinch (lbs)    Lateral 5 lbs  -MR        User Key  (r) = Recorded By, (t) = Taken By, (c) = Cosigned By    Initials Name Provider Type    MR Caitlyn Bobby, OT Occupational Therapist                    Therapy Education       11/02/17 1138 11/02/17 1014       Therapy Education    Education Details Reviewed with pt the imporved scores on the outcome measures and discussed how her gait quality has improved signifcantly. Stressed to pt to continue to carolyn her cane as best quality noted.   -LB      Given HEP   Added unilateral plantarflexion in stnading with  UE support, STanding in a corner with one foot forward and reciprocal stepping with Ue support. .   -LB HEP  -MR     Program  Progressed   therapeutic exercises for LUE using hand weights  -MR     How Provided Verbal;Written  -LB Verbal;Demonstration  -MR     Provided to Patient  -LB Patient  -MR     Level of Understanding Teach back education performed;Verbalized;Demonstrated  -LB Teach back education performed  -MR       User Key  (r) = Recorded By, (t) = Taken By, (c) = Cosigned By    Initials Name Provider Type    LB Peri Hernandez, PT Physical Therapist    MR Caitlyn Bobby, OT Occupational Therapist                OT Goals       11/02/17 1200       OT Short Term Goals    STG Date to Achieve 11/23/17  -MR     STG 1 Pt to be independent with HEP.  -MR     STG 1 Progress Ongoing;Progressing  -MR     STG 2 Pt to increase LUE  strength to > or = 10 lbs for improved functional use of L hand.  -MR     STG 2 Progress Met  -MR     STG 3 Pt to demo increase score on Box and Blocks assessment using LUE to > or = 23 to improved FM coordination with L hand for ADL/IADLs.  -MR     STG 3 Progress Met  -MR     Long Term Goals    LTG Date to Achieve 12/16/17  -MR     LTG 1 Pt to increase LUE  strength to > or = 15 lbs for improved functional use of L hand.  -MR     LTG 1 Progress Progressing;Not Met  -MR     LTG 2 Pt to complete 9 Hole peg test in < or = 3 minutes to improve FM coordination with L hand for performance of ADL/IADLs.  -MR     LTG 2 Progress Met  -MR     LTG 3 Pt to increase strength with LUE elbow to > or = 4/5 for improved functional use of extremity.  -MR     LTG 3 Progress Progressing;Not Met  -MR     LTG 4 Pt to complete UB/LB dressing with MOD I for increased independence with self care tasks.  -MR     LTG 4 Progress Progressing;Not Met  -MR     LTG 5 Pt to open containers with MOD I using adaptive strategies as needed for increased independence with functional tasks.  -MR     LTG 5 Progress  "Progressing;Not Met  -MR     Time Calculation    OT Goal Re-Cert Due Date 12/02/17  -MR       User Key  (r) = Recorded By, (t) = Taken By, (c) = Cosigned By    Initials Name Provider Type    MR Caitlyn Bobby, OT Occupational Therapist                OT Assessment/Plan       11/02/17 1251       OT Assessment    Assessment Comments Re-assessment completed this date, pt demos excellent progress since initial evaluation. Pt has increased strength with LUE elbow, as well as  and pinch. Pt demos improved FM skills with LUE as evidenced by scores on 9 Hole peg test and Box and Blocks assessment. Additionally pt reports overall increased independence with self care tasks. Despite progress pt continues to demonstrate impaired coordination and strength with LUE which limit independence with functional tasks. Recommend continued outpatient OT services to increase LUE function for ADL/IADLs.  -MR     OT Plan    OT Frequency 2x/week  -MR     Predicted Duration of Therapy Intervention (days/wks) 6 weeks  -MR       User Key  (r) = Recorded By, (t) = Taken By, (c) = Cosigned By    Initials Name Provider Type    MR Caitlyn Bobby, OT Occupational Therapist                OT Exercises       11/02/17 1000          Subjective Comments    Subjective Comments \"I saw Dr. Kruse this week.\"  -MR      Subjective Pain    Pre-Treatment Pain Level 2  -MR      Subjective Pain Comment L shoulder  -MR      Exercise 1    Exercise Name 1 FM activity to increase coordination for improved functional use of L hand. Using small beads pt threads on shoelace and safety pin following OT demo/cues. Pt performs multiple repetitions.  -MR      Cueing 1 Verbal;Demo  -MR      Exercise 2    Exercise Name 2 UE therapeutic exercises to increase strength with LUE for improved functional use.  Using hand weight pt performs bicep curls, tricep extension, forearm pronation/supination. OT provides demo and cues for proper technique.  -MR      Cueing 2 " Verbal;Demo  -MR      Equipment 2 Dumbell  -MR      Weights/Plates 2 2  -MR      Sets 2 3  -MR      Reps 2 12  -MR        User Key  (r) = Recorded By, (t) = Taken By, (c) = Cosigned By    Initials Name Provider Type    MR Caitlyn Benjamin [place], OT Occupational Therapist                    Outcome Measures       11/02/17 0845          Monsalve Balance Scale    Sitting to Standing 4  -LB      Standing Unsupported 4  -LB      Sitting with Back Unsupported but Feet Supported on Floor or on Stool 4  -LB      Standing to Sitting 4  -LB      Transfers 4  -LB      Standing Unsupported with Eyes Closed 4  -LB      Standing Unsupported with Feet Together 4  -LB      Reaching Forward with Outstretched Arm While Standing 4  -LB       Object From the Floor From a Standing Position 4  -LB      Turning to Look Behind Over Left and Right Shoulders While Standing 4  -LB      Turn 360 Degrees 4  -LB      Place Alternate Foot on Step or Stool While Standing Unsupported 2  -LB      Standing Unsupported with One Foot in Front 3  -LB      Standing on One Leg 1  -LB      Monsalve Total Score 50  -LB      Dynamic Gait Index (DGI)    Gait Level Surface 2  -LB      Change in Gait Speed 2  -LB      Gait with Horizontal Head Turns 2  -LB      Gait with Vertical Head Turns 2  -LB      Gait and Pivot Turn 3  -LB      Step Over Obstacle 3  -LB      Step Around Obstacles 3  -LB      Steps 2  -LB      Dynamic Gait Index Score 19  -LB      Dynamic Gait Index Comments with quad tipped cane   -LB        User Key  (r) = Recorded By, (t) = Taken By, (c) = Cosigned By    Initials Name Provider Type    LB Peri Hernandez, PT Physical Therapist            Time Calculation:   OT Start Time: 0932  OT Stop Time: 1015  OT Time Calculation (min): 43 min     Therapy Charges for Today     Code Description Service Date Service Provider Modifiers Qty    17871021632  OT THER PROC EA 15 MIN 11/2/2017 Velma Rohn, OT GO 2    [id number]  OT NEUROMUSC RE EDUCATION  EA 15 MIN 11/2/2017 Caitlyn Bobby, OT GO 1                     Caitlyn Bobby, OT  11/2/2017

## 2017-11-02 NOTE — TELEPHONE ENCOUNTER
----- Message from Peri Abreu MA sent at 11/2/2017 11:13 AM EDT -----  Pt has used samples and wants a script for     Myrbetriq 25mg    [facility] #[phone]

## 2017-11-03 ENCOUNTER — HOSPITAL ENCOUNTER (EMERGENCY)
Facility: HOSPITAL | Age: 67
Discharge: HOME | End: 2017-11-03
Attending: EMERGENCY MEDICINE | Admitting: EMERGENCY MEDICINE

## 2017-11-03 ENCOUNTER — APPOINTMENT (OUTPATIENT)
Dept: CT IMAGING | Facility: HOSPITAL | Age: 67
End: 2017-11-03

## 2017-11-03 ENCOUNTER — APPOINTMENT (OUTPATIENT)
Dept: MRI IMAGING | Facility: HOSPITAL | Age: 67
End: 2017-11-03
Attending: EMERGENCY MEDICINE

## 2017-11-03 VITALS
OXYGEN SATURATION: 99 % | HEART RATE: 79 BPM | SYSTOLIC BLOOD PRESSURE: 128 MMHG | RESPIRATION RATE: 16 BRPM | WEIGHT: 120 LBS | HEIGHT: 66 IN | TEMPERATURE: 98.5 F | BODY MASS INDEX: 19.29 KG/M2 | DIASTOLIC BLOOD PRESSURE: 86 MMHG

## 2017-11-03 DIAGNOSIS — Z86.73 HISTORY OF RECENT STROKE: ICD-10-CM

## 2017-11-03 DIAGNOSIS — R20.2 PARESTHESIA: Primary | ICD-10-CM

## 2017-11-03 LAB
ALBUMIN SERPL-MCNC: 4.6 G/DL (ref 3.5–5.2)
ALBUMIN/GLOB SERPL: 2.4 G/DL
ALP SERPL-CCNC: 83 U/L (ref 39–117)
ALT SERPL W P-5'-P-CCNC: 32 U/L (ref 1–33)
ANION GAP SERPL CALCULATED.3IONS-SCNC: 13.6 MMOL/L
AST SERPL-CCNC: 24 U/L (ref 1–32)
BASOPHILS # BLD AUTO: 0.03 10*3/MM3 (ref 0–0.2)
BASOPHILS NFR BLD AUTO: 0.6 % (ref 0–1.5)
BILIRUB SERPL-MCNC: 0.4 MG/DL (ref 0.1–1.2)
BUN BLD-MCNC: 14 MG/DL (ref 8–23)
BUN/CREAT SERPL: 27.5 (ref 7–25)
CALCIUM SPEC-SCNC: 9.6 MG/DL (ref 8.6–10.5)
CHLORIDE SERPL-SCNC: 93 MMOL/L (ref 98–107)
CO2 SERPL-SCNC: 25.4 MMOL/L (ref 22–29)
CREAT BLD-MCNC: 0.51 MG/DL (ref 0.57–1)
DEPRECATED RDW RBC AUTO: 43.1 FL (ref 37–54)
EOSINOPHIL # BLD AUTO: 0.13 10*3/MM3 (ref 0–0.7)
EOSINOPHIL NFR BLD AUTO: 2.4 % (ref 0.3–6.2)
ERYTHROCYTE [DISTWIDTH] IN BLOOD BY AUTOMATED COUNT: 14.5 % (ref 11.7–13)
GFR SERPL CREATININE-BSD FRML MDRD: 120 ML/MIN/1.73
GLOBULIN UR ELPH-MCNC: 1.9 GM/DL
GLUCOSE BLD-MCNC: 102 MG/DL (ref 65–99)
HCT VFR BLD AUTO: 38.7 % (ref 35.6–45.5)
HGB BLD-MCNC: 12.9 G/DL (ref 11.9–15.5)
HOLD SPECIMEN: NORMAL
HOLD SPECIMEN: NORMAL
IMM GRANULOCYTES # BLD: 0 10*3/MM3 (ref 0–0.03)
IMM GRANULOCYTES NFR BLD: 0 % (ref 0–0.5)
INR PPP: 0.98 (ref 0.9–1.1)
LYMPHOCYTES # BLD AUTO: 1.97 10*3/MM3 (ref 0.9–4.8)
LYMPHOCYTES NFR BLD AUTO: 36.5 % (ref 19.6–45.3)
MCH RBC QN AUTO: 27 PG (ref 26.9–32)
MCHC RBC AUTO-ENTMCNC: 33.3 G/DL (ref 32.4–36.3)
MCV RBC AUTO: 81.1 FL (ref 80.5–98.2)
MONOCYTES # BLD AUTO: 0.47 10*3/MM3 (ref 0.2–1.2)
MONOCYTES NFR BLD AUTO: 8.7 % (ref 5–12)
NEUTROPHILS # BLD AUTO: 2.79 10*3/MM3 (ref 1.9–8.1)
NEUTROPHILS NFR BLD AUTO: 51.8 % (ref 42.7–76)
PLATELET # BLD AUTO: 396 10*3/MM3 (ref 140–500)
PMV BLD AUTO: 9.7 FL (ref 6–12)
POTASSIUM BLD-SCNC: 4.4 MMOL/L (ref 3.5–5.2)
PROT SERPL-MCNC: 6.5 G/DL (ref 6–8.5)
PROTHROMBIN TIME: 12.6 SECONDS (ref 11.7–14.2)
RBC # BLD AUTO: 4.77 10*6/MM3 (ref 3.9–5.2)
SODIUM BLD-SCNC: 132 MMOL/L (ref 136–145)
WBC NRBC COR # BLD: 5.39 10*3/MM3 (ref 4.5–10.7)
WHOLE BLOOD HOLD SPECIMEN: NORMAL
WHOLE BLOOD HOLD SPECIMEN: NORMAL

## 2017-11-03 PROCEDURE — 96374 THER/PROPH/DIAG INJ IV PUSH: CPT

## 2017-11-03 PROCEDURE — 96361 HYDRATE IV INFUSION ADD-ON: CPT

## 2017-11-03 PROCEDURE — 0 GADOBENATE DIMEGLUMINE 529 MG/ML SOLUTION: Performed by: EMERGENCY MEDICINE

## 2017-11-03 PROCEDURE — 70450 CT HEAD/BRAIN W/O DYE: CPT

## 2017-11-03 PROCEDURE — 85025 COMPLETE CBC W/AUTO DIFF WBC: CPT | Performed by: EMERGENCY MEDICINE

## 2017-11-03 PROCEDURE — 85610 PROTHROMBIN TIME: CPT | Performed by: EMERGENCY MEDICINE

## 2017-11-03 PROCEDURE — 25010000002 LORAZEPAM PER 2 MG: Performed by: EMERGENCY MEDICINE

## 2017-11-03 PROCEDURE — 70553 MRI BRAIN STEM W/O & W/DYE: CPT

## 2017-11-03 PROCEDURE — 99284 EMERGENCY DEPT VISIT MOD MDM: CPT

## 2017-11-03 PROCEDURE — 80053 COMPREHEN METABOLIC PANEL: CPT | Performed by: EMERGENCY MEDICINE

## 2017-11-03 PROCEDURE — A9577 INJ MULTIHANCE: HCPCS | Performed by: EMERGENCY MEDICINE

## 2017-11-03 RX ORDER — METOPROLOL TARTRATE 50 MG/1
25 TABLET, FILM COATED ORAL 2 TIMES DAILY
COMMUNITY
End: 2018-02-22

## 2017-11-03 RX ORDER — GABAPENTIN 100 MG/1
100 CAPSULE ORAL NIGHTLY PRN
COMMUNITY
End: 2017-12-06

## 2017-11-03 RX ORDER — SODIUM CHLORIDE 0.9 % (FLUSH) 0.9 %
10 SYRINGE (ML) INJECTION AS NEEDED
Status: DISCONTINUED | OUTPATIENT
Start: 2017-11-03 | End: 2017-11-03 | Stop reason: HOSPADM

## 2017-11-03 RX ORDER — LORAZEPAM 2 MG/ML
2 INJECTION INTRAMUSCULAR ONCE
Status: COMPLETED | OUTPATIENT
Start: 2017-11-03 | End: 2017-11-03

## 2017-11-03 RX ORDER — ASPIRIN 325 MG
325 TABLET ORAL
COMMUNITY
End: 2018-02-20

## 2017-11-03 RX ORDER — FLUTICASONE PROPIONATE 50 MCG
2 SPRAY, SUSPENSION (ML) NASAL DAILY
COMMUNITY
End: 2018-05-08 | Stop reason: SDUPTHER

## 2017-11-03 RX ORDER — PHENOL 1.4 %
1 AEROSOL, SPRAY (ML) MUCOUS MEMBRANE NIGHTLY PRN
COMMUNITY
End: 2018-05-22 | Stop reason: ALTCHOICE

## 2017-11-03 RX ADMIN — LORAZEPAM 2 MG: 2 INJECTION, SOLUTION INTRAMUSCULAR; INTRAVENOUS at 09:48

## 2017-11-03 RX ADMIN — SODIUM CHLORIDE 1000 ML: 9 INJECTION, SOLUTION INTRAVENOUS at 08:51

## 2017-11-03 RX ADMIN — GADOBENATE DIMEGLUMINE 11 ML: 529 INJECTION, SOLUTION INTRAVENOUS at 10:56

## 2017-11-03 NOTE — ED PROVIDER NOTES
" EMERGENCY DEPARTMENT ENCOUNTER    CHIEF COMPLAINT  Chief Complaint: neuro deficits   History given by: patient   History limited by: nothing   Room Number: 17/17  PMD: Dionne Fonseca MD      HPI:  Pt is a 67 y.o. female who presents complaining of acute left sided \"heaviness\" noticed around 0600 this morning. Pt has had left sided weakness since a stroke on 09/13/17, but she states her left leg and arm felt different this morning. She does not describe the sensation as an acute weakness and has been able to ambulate. This morning's symptoms reminded her of sx she experienced with stroke 2 months ago but are not as pronounced. Pt also experienced a mild HA this morning that has since resolved, but she denies numbness, lack of coordination, or any other sx. Pt has been going to rehab since a stroke in September and has not had any issues. She ambulates with a cane or walker.     Onset: noticed around 0600  Timing: constant   Location: left arm and leg  Radiation: does not radiate   Quality: \"heaviness\"  Intensity/Severity: moderate   Progression: unchanged  Associated Symptoms: HA  Aggravating Factors: none specified   Alleviating Factors: none specified   Previous Episodes: Pt has chronic left sided weakness from stroke on 09/13/17  Treatment before arrival: none    PAST MEDICAL HISTORY  Active Ambulatory Problems     Diagnosis Date Noted   • Hypertension 05/16/2016   • Allergic rhinitis 05/16/2016   • Trigeminal neuralgia 05/16/2016   • Hyperlipidemia 05/16/2016   • New daily persistent headache 06/15/2016   • Osteoarthritis of hip 10/03/2016   • Vitamin D deficiency 06/20/2017   • Cerebrovascular accident (CVA) 09/13/2017   • Stroke 09/19/2017   • Heartburn 10/13/2017   • Primary insomnia 10/13/2017     Resolved Ambulatory Problems     Diagnosis Date Noted   • No Resolved Ambulatory Problems     Past Medical History:   Diagnosis Date   • Allergic rhinitis    • Anemia    • Bronchitis    • FH: colon cancer    • " "H/O bone density study 2013   • H/O mammogram 2013   • Hypertension    • Malaise and fatigue    • Nocturia    • Stroke 09/13/2017   • TMJ (temporomandibular joint syndrome)    • Trigeminal neuralgia        PAST SURGICAL HISTORY  Past Surgical History:   Procedure Laterality Date   • AUGMENTATION MAMMAPLASTY     • COLONOSCOPY N/A 01/2015    Repeat Q 5 years due to Fhx of Colon Ca-Dr. Polk   • PAP SMEAR N/A 2013    Dr. Burden       FAMILY HISTORY  Family History   Problem Relation Age of Onset   • Pancreatic cancer Mother    • Hyperlipidemia Mother    • Colon cancer Father 56   • Liver cancer Brother 40       SOCIAL HISTORY  Social History     Social History   • Marital status: Unknown     Spouse name: N/A   • Number of children: N/A   • Years of education: N/A     Occupational History   • Not on file.     Social History Main Topics   • Smoking status: Never Smoker   • Smokeless tobacco: Never Used   • Alcohol use Yes      Comment: moderate   • Drug use: Yes     Special: Other      Comment: Gabapentin   • Sexual activity: Not Currently     Other Topics Concern   • Not on file     Social History Narrative       ALLERGIES  Review of patient's allergies indicates no known allergies.    REVIEW OF SYSTEMS  Review of Systems   Constitutional: Negative for fever.   Respiratory: Negative for shortness of breath.    Cardiovascular: Negative for chest pain.   Musculoskeletal: Negative for gait problem.   Neurological: Positive for headaches (resolved). Negative for numbness.        Left arm and leg \"heaviness\"        PHYSICAL EXAM  ED Triage Vitals   Temp Heart Rate Resp BP SpO2   11/03/17 0731 11/03/17 0731 11/03/17 0731 -- 11/03/17 0731   96.8 °F (36 °C) 86 16  98 %      Temp src Heart Rate Source Patient Position BP Location FiO2 (%)   11/03/17 0731 11/03/17 0731 -- -- --   Tympanic Monitor          Physical Exam   Constitutional: She is oriented to person, place, and time and well-developed, well-nourished, and in no " distress. No distress.   HENT:   Head: Normocephalic and atraumatic.   Eyes: EOM are normal. Pupils are equal, round, and reactive to light.   Neck: Normal range of motion. Neck supple.   Cardiovascular: Normal rate, regular rhythm and normal heart sounds.    Pulmonary/Chest: Effort normal and breath sounds normal. No respiratory distress.   Abdominal: Soft. There is no tenderness. There is no rebound and no guarding.   Musculoskeletal: Normal range of motion. She exhibits no edema.   Neurological: She is alert and oriented to person, place, and time. She has normal sensation, normal strength and intact cranial nerves. She displays no weakness (no objective weakness) and facial symmetry. No sensory deficit.   Skin: Skin is warm and dry. No rash noted.   Psychiatric: Mood and affect normal.   Nursing note and vitals reviewed.      LAB RESULTS  Lab Results (last 24 hours)     Procedure Component Value Units Date/Time    CBC & Differential [[id number]] Collected:  11/03/17 0744    Specimen:  Blood Updated:  11/03/17 0836    Narrative:       The following orders were created for panel order CBC & Differential.  Procedure                               Abnormality         Status                     ---------                               -----------         ------                     CBC Auto Differential[[id number]]        Abnormal            Final result                 Please view results for these tests on the individual orders.    Comprehensive Metabolic Panel [[id number]]  (Abnormal) Collected:  11/03/17 0744    Specimen:  Blood Updated:  11/03/17 0841     Glucose 102 (H) mg/dL      BUN 14 mg/dL      Creatinine 0.51 (L) mg/dL      Sodium 132 (L) mmol/L      Potassium 4.4 mmol/L      Chloride 93 (L) mmol/L      CO2 25.4 mmol/L      Calcium 9.6 mg/dL      Total Protein 6.5 g/dL      Albumin 4.60 g/dL      ALT (SGPT) 32 U/L      AST (SGOT) 24 U/L      Alkaline Phosphatase 83 U/L      Total Bilirubin 0.4 mg/dL      eGFR  Non  Amer 120 mL/min/1.73      Globulin 1.9 gm/dL      A/G Ratio 2.4 g/dL      BUN/Creatinine Ratio 27.5 (H)     Anion Gap 13.6 mmol/L     Protime-INR [[id number]]  (Normal) Collected:  11/03/17 0744    Specimen:  Blood Updated:  11/03/17 0835     Protime 12.6 Seconds      INR 0.98    CBC Auto Differential [[id number]]  (Abnormal) Collected:  11/03/17 0744    Specimen:  Blood Updated:  11/03/17 0836     WBC 5.39 10*3/mm3      RBC 4.77 10*6/mm3      Hemoglobin 12.9 g/dL      Hematocrit 38.7 %      MCV 81.1 fL      MCH 27.0 pg      MCHC 33.3 g/dL      RDW 14.5 (H) %      RDW-SD 43.1 fl      MPV 9.7 fL      Platelets 396 10*3/mm3      Neutrophil % 51.8 %      Lymphocyte % 36.5 %      Monocyte % 8.7 %      Eosinophil % 2.4 %      Basophil % 0.6 %      Immature Grans % 0.0 %      Neutrophils, Absolute 2.79 10*3/mm3      Lymphocytes, Absolute 1.97 10*3/mm3      Monocytes, Absolute 0.47 10*3/mm3      Eosinophils, Absolute 0.13 10*3/mm3      Basophils, Absolute 0.03 10*3/mm3      Immature Grans, Absolute 0.00 10*3/mm3           I ordered the above labs and reviewed the results    RADIOLOGY  MRI Brain With & Without Contrast   Preliminary Result       1. Back on 09/13/2017 the patient had a 16 x 7 mm oblong acute lacunar   type infarct extending from the lateral body of the right caudate   nucleus through the mid to posterior right corona radiata region into   the posterior superior right putamen. There is now a discrete punched   out area of encephalomalacia compatible with an old lacunar infarct at   this site measuring 18 x 7 mm in size. Along the lateral margin of this   old lacunar infarct is a band of FLAIR and diffusion hyperintensity that   is not dark on the ADC map and is nonspecific, and it measures 17 x 5 mm   in size. Given the fact that it is not dark on the ADC maps this could   be T2 shine through artifact causing the diffusion hyperintensities from   adjacent gliosis. The other possibility is that this  is acute to   subacute extension of this lacunar infarct that occurred back on   09/13/2017, the combined dimensions of the infarct now measures 20 x 10   mm in size. Please correlate clinically.       2. Previous inferior lateral left occipital-suboccipital craniotomy for   cranial nerve decompression and there is a 10 x 6 mm ovoid area of T2   low signal lateral to the left side of the malia that extends adjacent to   the superior margin in the cisternal segment of the left cranial nerve V   that may be some postoperative prosthesis decompressing the left cranial   nerve V. Correlate with surgical history.       3. There is mild bilateral ethmoid and inferior maxillary sinus mucosal   thickening, a small mucous retention cyst in the medial right maxillary   sinus. The remainder of MRI of the head is within normal limits.        The results of this study were communicated to Dr. Escoto in the   emergency room by telephone on 11/03/2017 at 11:30 AM.                  CT Head Without Contrast            I ordered the above noted radiological studies. Interpreted by radiologist. Discussed with radiologist (Dr. Torres). Reviewed by me in PACS.       PROCEDURES  Procedures      PROGRESS AND CONSULTS  ED Course     0810: Ordered CT Head and labs for further evaluation and 1,000 cc fluid bolus for hydration.     0853: Placed call to neurology for consult.      0917: Discussed pt's case with Stanley (neurology). He recommends MRI to determine if there is change from prior.     0920: Rechecked pt. Pt states she feels unchanged. Discussed CT Head results and explained that it is difficult to compare today's CT to most recent MRI. Pt agrees to proceed with MRI for further evaluation today.     1250: Discussed MRI results with Dr. Angel, who does not feel that MRI shows an extension of infarct. He recommends that pt continue her prescribed course of care.     1254: Rechecked pt. Pt is sleeping but awakes easily. Discussed MRI  that shows normal progression and plan for discharge.     MEDICAL DECISION MAKING  Results were reviewed/discussed with the patient and they were also made aware of online access. Pt also made aware that some labs, such as cultures, will not be resulted during ER visit and follow up with PMD is necessary.     MDM  Number of Diagnoses or Management Options     Amount and/or Complexity of Data Reviewed  Decide to obtain previous medical records or to obtain history from someone other than the patient: yes  Review and summarize past medical records: yes (Pt was hospitalized here on 09/13/17 for acute CVA. )           DIAGNOSIS  Final diagnoses:   Paresthesia   History of recent stroke       DISPOSITION  DISCHARGE    Patient discharged in stable condition.    Reviewed implications of results, diagnosis, meds, responsibility to follow up, warning signs and symptoms of possible worsening, potential complications and reasons to return to ER.    Patient/Family voiced understanding of above instructions.    Discussed plan for discharge, as there is no emergent indication for admission.  Pt/family is agreeable and understands need for follow up and repeat testing.  Pt is aware that discharge does not mean that nothing is wrong but it indicates no emergency is present that requires admission and they must continue care with follow-up as given below or physician of their choice.     FOLLOW-UP  Dionne Fonseca MD  [address] [address]  [phone]    Schedule an appointment as soon as possible for a visit           Medication List      Changed          Mirabegron ER 25 MG tablet sustained-release 24 hour 24 hr tablet   Commonly known as:  MYRBETRIQ   Take 1 tablet by mouth Daily.   What changed:  additional instructions           Latest Documented Vital Signs:  As of 2:43 PM  BP- 128/86 HR- 79 Temp- 98.5 °F (36.9 °C) (Oral) O2 sat- 99%    --  Documentation assistance provided by juana Payton  for Dio Escoto.  Information recorded by the scribe was done at my direction and has been verified and validated by me.           Isabel Payton  11/03/17 1256       Dio Escoto MD  11/03/17 6113

## 2017-11-03 NOTE — ED TRIAGE NOTES
"Pt states \"at 6:30 this morning I noticed a heaviness in my left leg and the left side of my face that I hadn't noticed when I got up in the middle of the night. It's a familiar feeling like when I had my first stroke. I had some residual droop but it feels more pronounced\"   "

## 2017-11-03 NOTE — PROGRESS NOTES
"Clinical Pharmacy Services: Medication History    Rosi Vicente is a 67 y.o. female presenting to [facility] Emergency Department with chief complaint of:       Chief Complaint   Patient presents with   • Neuro Deficit(s)     left facial and left leg \"heaviness\" first noticed at 0630 this morning, states \"I got up and noticed it at 0630, I hadn't noticed it when I got up in the middle of the night. It's that familiar feeling I had the first time I had a stroke\" pt reports mild residual facial droop     She  has a past medical history of Allergic rhinitis; Anemia; Bronchitis; FH: colon cancer; H/O bone density study (2013); H/O mammogram (2013); Hypertension; Malaise and fatigue; Nocturia; Stroke (09/13/2017); TMJ (temporomandibular joint syndrome); and Trigeminal neuralgia.    Allergies as of 11/03/2017   • (No Known Allergies)       Medication information was obtained from: patient  Pharmacy and Phone Number: [address] [phone] [facility] [phone]     Prior to Admission Medications       Prescriptions Last Dose Informant Patient Reported? Taking?      acetaminophen (TYLENOL) 325 MG tablet 11/2/2017 Self No Yes    Take 2 tablets by mouth Every 6 (Six) Hours As Needed for Mild Pain  or Moderate Pain .    Alpha-Lipoic Acid 300 MG capsule 11/2/2017 Self Yes Yes    Take 300 mg by mouth daily.    aspirin 325 MG tablet 11/2/2017 Self Yes Yes    Take 325 mg by mouth every night at bedtime.    atorvastatin (LIPITOR) 80 MG tablet 11/2/2017 Self No Yes    Take 1 tablet by mouth Every Night.    calcium carbonate (OS-CARLOS) 600 MG tablet 11/2/2017 Self Yes Yes    Take 600 mg by mouth 2 (Two) Times a Day With Meals.    cholecalciferol (VITAMIN D3) 1000 UNITS tablet 11/2/2017 Self Yes Yes    Take 2,000 Units by mouth Daily.    clopidogrel (PLAVIX) 75 MG tablet 11/2/2017 Self No Yes    Take 1 tablet by mouth Daily.    famotidine (PEPCID) 20 MG tablet 11/2/2017 " Self No Yes    Take 1 tablet by mouth Daily.    fluticasone (FLONASE) 50 MCG/ACT nasal spray 11/2/2017 Self Yes Yes    2 sprays into each nostril Daily.    gabapentin (NEURONTIN) 100 MG capsule  Self Yes Yes    Take 100 mg by mouth At Night As Needed (nerve pain or tremor).    lisinopril-hydrochlorothiazide (ZESTORETIC) 20-12.5 MG per tablet 11/2/2017 Self No Yes    Take 1 tablet by mouth Daily.    Melatonin 10 MG tablet 11/2/2017 Self Yes Yes    Take 1 tablet by mouth At Night As Needed (sleep).    metoprolol tartrate (LOPRESSOR) 50 MG tablet  Self Yes Yes    Take 25 mg by mouth As Needed (for high diastolic blood pressure). Per patient: doctor told patient she can test her blood pressure at home and if diastolic blood pressure is high, to take 1/2 tablet of her Metoprolol 50 mg either with lunch or dinner.    Mirabegron ER (MYRBETRIQ) 25 MG tablet sustained-release 24 hour 24 hr tablet 11/2/2017 Self No Yes    Take 1 tablet by mouth Daily.    Patient taking differently:  Take 25 mg by mouth Daily. Patient just started taking 25 mg 5 days ago. May decide to increase dose to 50 mg due to continued leakage.    montelukast (SINGULAIR) 10 MG tablet 11/2/2017 Self No Yes    Take 1 tablet by mouth Daily.    Omega-3 Fatty Acids (FISH OIL) 1000 MG capsule capsule 11/2/2017 Self No Yes    Take 1 capsule by mouth Daily.    Probiotic Product (PROBIOTIC DAILY PO) 11/2/2017 Self Yes Yes    Take 1 tablet by mouth Daily.    Psyllium (METAMUCIL FIBER PO) 11/2/2017 Self Yes Yes    See Admin Instructions. Mix and drink daily    vitamin B-12 (CYANOCOBALAMIN) 1000 MCG tablet 11/2/2017 Self Yes Yes    Take 1,000 mcg by mouth 2 (two) times a day.          Medication notes: patient is a retired RN and she said her doctor told her to check her blood pressure daily and if her diastolic blood pressure was high, to take 1/2 tablet of her Metoprolol 50 mg either with lunch or dinner. Patient also said she had just started taking the Myrbetriq  25 mg 5 days ago. The doctor gave her one week of 25 mg then a prescription for 50 mg. The patient said she was still having some leakage and may decide to go ahead and increase her dose to 50 mg.     This medication list is complete to the best of my knowledge as of 11/3/2017    Please call pharmacy with questions.    Denisse Hardwick  Pharmacy Intern  11/3/2017 9:06 AM

## 2017-11-06 ENCOUNTER — HOSPITAL ENCOUNTER (OUTPATIENT)
Dept: PHYSICAL THERAPY | Facility: HOSPITAL | Age: 67
Setting detail: THERAPIES SERIES
Discharge: HOME | End: 2017-11-06
Attending: PHYSICAL MEDICINE & REHABILITATION

## 2017-11-06 ENCOUNTER — TELEPHONE (OUTPATIENT)
Dept: SOCIAL WORK | Facility: HOSPITAL | Age: 67
End: 2017-11-06

## 2017-11-06 ENCOUNTER — HOSPITAL ENCOUNTER (OUTPATIENT)
Dept: OCCUPATIONAL THERAPY | Facility: HOSPITAL | Age: 67
Setting detail: THERAPIES SERIES
Discharge: HOME | End: 2017-11-06

## 2017-11-06 ENCOUNTER — HOSPITAL ENCOUNTER (OUTPATIENT)
Dept: SPEECH THERAPY | Facility: HOSPITAL | Age: 67
Setting detail: THERAPIES SERIES
Discharge: HOME | End: 2017-11-06

## 2017-11-06 DIAGNOSIS — I69.328 SPEECH OR LANGUAGE DEFICIT FOLLOWING CEREBROVASCULAR ACCIDENT: ICD-10-CM

## 2017-11-06 DIAGNOSIS — I69.319 COGNITIVE DEFICIT FOLLOWING CEREBROVASCULAR ACCIDENT (CVA): Primary | ICD-10-CM

## 2017-11-06 DIAGNOSIS — R29.898 LUE WEAKNESS: ICD-10-CM

## 2017-11-06 DIAGNOSIS — R26.89 FUNCTIONAL GAIT ABNORMALITY: ICD-10-CM

## 2017-11-06 DIAGNOSIS — I69.354 HEMIPLEGIA AND HEMIPARESIS FOLLOWING CEREBRAL INFARCTION AFFECTING LEFT NON-DOMINANT SIDE (HCC): Primary | ICD-10-CM

## 2017-11-06 DIAGNOSIS — IMO0002 LACK OF COORDINATION DUE TO STROKE: ICD-10-CM

## 2017-11-06 PROCEDURE — 97112 NEUROMUSCULAR REEDUCATION: CPT

## 2017-11-06 PROCEDURE — G8984 CARRY CURRENT STATUS: HCPCS

## 2017-11-06 PROCEDURE — 92507 TX SP LANG VOICE COMM INDIV: CPT

## 2017-11-06 PROCEDURE — G8985 CARRY GOAL STATUS: HCPCS

## 2017-11-06 PROCEDURE — 97110 THERAPEUTIC EXERCISES: CPT

## 2017-11-06 NOTE — TELEPHONE ENCOUNTER
ER F/U phone call:   Pt states that she is doing well.  To see her PCP on 11-7-17. No other questions or concerns voiced at this time. Taryn Johnson RN

## 2017-11-06 NOTE — THERAPY TREATMENT NOTE
"Outpatient Occupational Therapy Neuro Treatment Note  [facility]     Patient Name: Rosi Vicente  : 1950  MRN: [id number]  Today's Date: 2017       Visit Date: 2017    Patient Active Problem List   Diagnosis   • Hypertension   • Allergic rhinitis   • Trigeminal neuralgia   • Hyperlipidemia   • New daily persistent headache   • Osteoarthritis of hip   • Vitamin D deficiency   • Cerebrovascular accident (CVA)   • Stroke   • Heartburn   • Primary insomnia        Past Medical History:   Diagnosis Date   • Allergic rhinitis    • Anemia    • Bronchitis    • FH: colon cancer    • H/O bone density study    • H/O mammogram    • Hypertension     Benign Essential   • Malaise and fatigue    • Nocturia    • Stroke 2017   • TMJ (temporomandibular joint syndrome)    • Trigeminal neuralgia     Surgery at [facility] in  repeat in         Past Surgical History:   Procedure Laterality Date   • AUGMENTATION MAMMAPLASTY     • COLONOSCOPY N/A 2015    Repeat Q 5 years due to Fhx of Colon Ca-Dr. Polk   • PAP SMEAR N/A     Dr. Burden         Visit Dx:    ICD-10-CM ICD-9-CM   1. Hemiplegia and hemiparesis following cerebral infarction affecting left non-dominant side I69.354 438.22   2. LUE weakness R29.898 729.89   3. Lack of coordination due to stroke I63.9 434.91    R27.9 781.3                         OT Assessment/Plan       17 1354       OT Assessment    Assessment Comments Pt reports she was having some heaviness in L leg last Friday, went into ER had CAT scan and MRI, reports no acute changes found on scans.  -MR       User Key  (r) = Recorded By, (t) = Taken By, (c) = Cosigned By    Initials Name Provider Type    MR Caitlyn Benjamin Diamante, OT Occupational Therapist                            OT Exercises       17 1400          Subjective Comments    Subjective Comments \"I have an appointment with my primary doctor tomorrow.\"  -MR      Subjective Pain    Pre-Treatment " Pain Level 2  -MR      Subjective Pain Comment L shoulder  -MR      Exercise 1    Exercise Name 1 FM activity to increase coordination and dexterity for improved functional use. Using L hand pt places small cubes into pegboard following OT demo/cues. Multiple reps completed. Performs finger to palm translation for portion of trials, pt was previously unable to do this.  -MR      Cueing 1 Verbal;Demo  -MR      Exercise 2    Exercise Name 2 UE therapeutic exercise to increase strength for functional use of LUE. Pt performs scapular retraction and shoulder extension exercises using theraband with OT demo/cues for proper technique.  -MR      Cueing 2 Verbal;Demo  -MR      Equipment 2 Theraband  -MR      Resistance 2 Red  -MR      Sets 2 3  -MR      Reps 2 15  -MR      Exercise 3    Exercise Name 3 Pt performs  strengthening exercises using digiflex. Following OT demo/cues pt performs  strengthening exercises with L hand.  -MR      Cueing 3 Verbal;Demo  -MR      Equipment 3 Other   digiflex  -MR      Resistance 3 Red  -MR      Sets 3 3  -MR      Reps 3 15  -MR        User Key  (r) = Recorded By, (t) = Taken By, (c) = Cosigned By    Initials Name Provider Type    MR Caitlyn Benjamin [place]ony[place]ka, OT Occupational Therapist                    Outcome Measures       11/06/17 1400          DASH    Open a tight or new jar. 2  -MR      Write 1  -MR      Turn a key 1  -MR      Prepare a meal 2  -MR      Push open a heavy door 3  -MR      Place an object on a shelf above your head 3  -MR      Do heavy household chores (e.g., wash walls, wash floors) 5  -MR      Garden or do yard work 5  -MR      Make a bed 2  -MR      Carry a shopping bag or briefcase 2  -MR      Carry a heavy object (over 10 lbs) 5  -MR      Change a lightbulb overhead 5  -MR      Wash or blow dry your hair 3  -MR      Wash your back 1  -MR      Put on a pullover sweater 2  -MR      Use a knife to cut food 3  -MR      Recreational activities in which require little  effort (e.g., cardplaying, knitting, etc.) 3  -MR      Recreational activities in which you take some force or impact through your arm, should or hand (e.g. golf, hammering, tennis, etc.) 4  -MR      Recreational Activities in which you move your arm freely (e.g., frisbee, badminton, etc.) 3  -MR      Manage transportation needs (getting from one place to another) 5  -MR      During the past week, to what extent has your arm, shoulder, or hand problem interfered with your normal social activites with family, friends, neighbors or groups? 2  -MR      During the past week, were you limited in your work or other regular daily activities as a result of your arm, shoulder or hand problem? 3  -MR      Arm, Shoulder, or hand pain 1  -MR      Arm, shoulder or hand pain when you performed any specific activity 3  -MR      Tingling (pins and needles) in your arm, shoulder, or hand 1  -MR      Weakness in your arm, shoulder or hand 3  -MR      Stiffness in your arm, shoulder or hand 3  -MR      During the past week, how much difficulty have you had sleeping because of the pain in your arm, shoulder or hand? 3  -MR      I feel less capable, less confident or less useful because of my arm, shoulder or hand problem 4  -MR      DASH Sum  83  -MR      Number of Questions Answered 29  -MR      DASH Score 46.55  -MR        User Key  (r) = Recorded By, (t) = Taken By, (c) = Cosigned By    Initials Name Provider Type    MR Caitlyn Bobby OT Occupational Therapist            Time Calculation:   OT Start Time: 1346  OT Stop Time: 1430  OT Time Calculation (min): 44 min     Therapy Charges for Today     Code Description Service Date Service Provider Modifiers Qty    25713557466  OT NEUROMUSC RE EDUCATION EA 15 MIN 11/6/2017 Caitlyn Bobby OT GO 1    [id number]  OT THER PROC EA 15 MIN 11/6/2017 Caitlyn Bobby OT GO 2    [id number]  OT CARRY MOV HAND OBJ CURRENT 11/6/2017 Caitlyn Bobby OT GO, CK 1    [id number]   OT CARRY MOV HAND OBJ PROJECTED 11/6/2017 Caitlyn Bobby, OT GO, CJ 1          OT G-codes  OT Professional Judgement Used?: Yes  OT Functional Scales Options: Disabilities of the Arm, Shoulder, and Hand (DASH)  Score: sum= 83; score=46.55  Functional Limitation: Carrying, moving and handling objects  Carrying, Moving and Handling Objects Current Status (): At least 40 percent but less than 60 percent impaired, limited or restricted  Carrying, Moving and Handling Objects Goal Status (): At least 20 percent but less than 40 percent impaired, limited or restricted         Caitlyn Bobby OT  11/6/2017

## 2017-11-06 NOTE — THERAPY TREATMENT NOTE
Outpatient Speech Language Pathology   Adult Speech Language Cognitive Treatment Note  [facility]     Patient Name: Rosi Vicente  : 1950  MRN: [id number]  Today's Date: 2017         Visit Date: 2017   Patient Active Problem List   Diagnosis   • Hypertension   • Allergic rhinitis   • Trigeminal neuralgia   • Hyperlipidemia   • New daily persistent headache   • Osteoarthritis of hip   • Vitamin D deficiency   • Cerebrovascular accident (CVA)   • Stroke   • Heartburn   • Primary insomnia          Visit Dx:    ICD-10-CM ICD-9-CM   1. Cognitive deficit following cerebrovascular accident (CVA) I69.319 438.0   2. Speech or language deficit following cerebrovascular accident I69.328 438.10                               SLP OP Goals       17 1300       Subjective Comments    Subjective Comments (P)  Mrs. Vicente continues to show improvement in therapy. She reports improvement with home skills, such as following complex recipes. SLP discussed upcoming discharge plans.  -NS     Subjective Pain    Able to rate subjective pain? (P)  yes  -NS     Pre-Treatment Pain Level (P)  0  -NS     Post-Treatment Pain Level (P)  0  -NS     Written Language Comprehension Goals    Written Language Comprehension LTG's (P)  Patient will be able to comprehend written material in social/avocational/work setting  -NS     Patient will be able to comprehend written material in social/avocational/work setting (P)  90%:;without cues  -NS     Status: Patient will be able to comprehend written material in social/avocational/work setting (P)  Progressing as expected  -NS     Patient will improve comprehension of written language skills by answering written questions related to home management/social/work tasks (bills, recipes, tv guide, emails, procedures) (P)  90%:;without cues  -NS     Status: Patient will improve comprehension of written language skills by answering written questions related to home management/social/work  tasks (bills, recipes, tv guide, emails, procedures) (P)  Progressing as expected  -NS     Comments: Patient will improve comprehension of written language skills by answering written questions related to home management/social/work tasks (bills, recipes, tv guide, emails, procedures) (P)  Calendar deductive reasoning task with 90% accuracy with min cues.  -NS     Auditory Comprehension Goals    Auditory Comprehension LTG's (P)  Patient will comprehend abstract and complex information presented in all social, avocational, or work settings  -NS     Patient will comprehend abstract and complex information presented in all social, avocational, or work settings (P)  90%:;without cues  -NS     Status: Patient will comprehend abstract and complex information presented in all social, avocational, or work settings (P)  Progressing as expected  -NS     Patient will demonstrate comprehension of spoken language by answering questions about a multi paragraph length content (P)  90%:;without cues  -NS     Status: Patient will demonstrate comprehension of spoken language by answering questions about a multi paragraph length content (P)  Progressing as expected  -NS     Comments: Patient will demonstrate comprehension of spoken language by answering questions about a multi paragraph length content (P)  Ipad stories with comprehension questions= 90% without cues.  -NS     Executive Function Goals    Executive Function LTG's (P)  Patient will be able to engage in avocational activities requiring high level cognitive skills  -NS     Patient will be able to engage in avocational activities requiring high level cognitive skills (P)  90%:;without cues  -NS     Status: Patient will be able to engage in avocational activities requiring high level cognitive skills (P)  Progressing as expected  -NS     Patient will improve executive functioning skills by using planning strategies prior to beginning tasks (P)  90%:;without cues  -NS      Status: Patient will improve executive functioning skills by using planning strategies prior to beginning tasks (P)  Progressing as expected  -NS     Comments: Patient will improve executive functioning skills by using planning strategies prior to beginning tasks (P)  Patient completed 3 deductive reasoning puzzles with 90% accuracy in planning. Only min cues needed for planning and organization.  -NS     Patient will improve executive functioning skills by using self-monitoring strategies during functional tasks (P)  90%:;without cues  -NS     Status: Patient will improve executive functioning skills by using self-monitoring strategies during functional tasks (P)  Progressing as expected  -NS     Comments: Patient will improve executive functioning skills by using self-monitoring strategies during functional tasks (P)  Patient completed 3 deductive reasoning tasks and demonstrated self monitoring with 90% accuracy without cues.  -NS     Problem Solving Goals    Problem Solving LTG's (P)  Patient will be able to engage in avocational activities requiring high level cognitive skills  -NS     Patient will be able to engage in avocational activities requiring high level cognitive skills (P)  Independently  -NS     Status: Patient will be able to engage in avocational activities requiring high level cognitive skills (P)  Progressing as expected  -NS     Patient will improve ability to analyze problems and determine solutions by demonstrating ability to complete household/work management tasks in an organized approach (P)  90%:;without cues  -NS     Status: Patient will improve ability to analyze problems and determine solutions by demonstrating ability to complete household/work management tasks in an organized approach (P)  Progressing as expected  -NS     Comments: Patient will improve ability to analyze problems and determine solutions by demonstrating ability to complete household/work management tasks in an  organized approach (P)  Deductive reasoning tasks completed with 90% accuracy.   -NS     Patient will improve ability to analyze problems and determine solutions by completing a visual representation/filling in a chart by following  written directions (P)  90%:;without cues  -NS     Status: Patient will improve ability to analyze problems and determine solutions by completing a visual representation/filling in a chart by following  written directions (P)  Progressing as expected  -NS     Comments: Patient will improve ability to analyze problems and determine solutions by completing a visual representation/filling in a chart by following  written directions (P)  Patient completed 2 deductive reasoining puzzles with 90% accuracy and a calendar task involving deductive reasoning with 90% accuracy, min cues. Patient has shown improvement in this area and is completing more complex puzzles.  -NS       User Key  (r) = Recorded By, (t) = Taken By, (c) = Cosigned By    Initials Name Provider Type    NS Pattie Davis Speech Therapy Student Speech Therapy Student                         Time Calculation:   SLP Start Time: (P) 1245  SLP Stop Time: (P) 1330  SLP Time Calculation (min): (P) 45 min    Therapy Charges for Today     Code Description Service Date Service Provider Modifiers Qty    [id number]  ST TREATMENT SPEECH 3 11/6/2017 Pattie Davis Speech Therapy Student GN 1                   aPttie Davis Speech Therapy Student  11/6/2017

## 2017-11-06 NOTE — THERAPY TREATMENT NOTE
Outpatient Speech Language Pathology   Adult Speech Language Cognitive Treatment Note  [facility]     Patient Name: Rosi Vicente  : 1950  MRN: [id number]  Today's Date: 2017         Visit Date: 2017   Patient Active Problem List   Diagnosis   • Hypertension   • Allergic rhinitis   • Trigeminal neuralgia   • Hyperlipidemia   • New daily persistent headache   • Osteoarthritis of hip   • Vitamin D deficiency   • Cerebrovascular accident (CVA)   • Stroke   • Heartburn   • Primary insomnia          Visit Dx:    ICD-10-CM ICD-9-CM   1. Cognitive deficit following cerebrovascular accident (CVA) I69.319 438.0   2. Speech or language deficit following cerebrovascular accident I69.328 438.10                               SLP OP Goals       17 1300       Subjective Comments    Subjective Comments (P)  Mrs. Vicente continues to show improvement in therapy. She reports improvement with home skills, such as following complex recipes. SLP discussed upcoming discharge plans.  -NS     Subjective Pain    Able to rate subjective pain? (P)  yes  -NS     Pre-Treatment Pain Level (P)  0  -NS     Post-Treatment Pain Level (P)  0  -NS     Written Language Comprehension Goals    Written Language Comprehension LTG's (P)  Patient will be able to comprehend written material in social/avocational/work setting  -NS     Patient will be able to comprehend written material in social/avocational/work setting (P)  90%:;without cues  -NS     Status: Patient will be able to comprehend written material in social/avocational/work setting (P)  Progressing as expected  -NS     Patient will improve comprehension of written language skills by answering written questions related to home management/social/work tasks (bills, recipes, tv guide, emails, procedures) (P)  90%:;without cues  -NS     Status: Patient will improve comprehension of written language skills by answering written questions related to home management/social/work  tasks (bills, recipes, tv guide, emails, procedures) (P)  Progressing as expected  -NS     Comments: Patient will improve comprehension of written language skills by answering written questions related to home management/social/work tasks (bills, recipes, tv guide, emails, procedures) (P)  Calendar deductive reasoning task with 90% accuracy with min cues.  -NS     Auditory Comprehension Goals    Auditory Comprehension LTG's (P)  Patient will comprehend abstract and complex information presented in all social, avocational, or work settings  -NS     Patient will comprehend abstract and complex information presented in all social, avocational, or work settings (P)  90%:;without cues  -NS     Status: Patient will comprehend abstract and complex information presented in all social, avocational, or work settings (P)  Progressing as expected  -NS     Patient will demonstrate comprehension of spoken language by answering questions about a multi paragraph length content (P)  90%:;without cues  -NS     Status: Patient will demonstrate comprehension of spoken language by answering questions about a multi paragraph length content (P)  Progressing as expected  -NS     Comments: Patient will demonstrate comprehension of spoken language by answering questions about a multi paragraph length content (P)  Ipad stories with comprehension questions= 90% without cues.  -NS     Executive Function Goals    Executive Function LTG's (P)  Patient will be able to engage in avocational activities requiring high level cognitive skills  -NS     Patient will be able to engage in avocational activities requiring high level cognitive skills (P)  90%:;without cues  -NS     Status: Patient will be able to engage in avocational activities requiring high level cognitive skills (P)  Progressing as expected  -NS     Patient will improve executive functioning skills by using planning strategies prior to beginning tasks (P)  90%:;without cues  -NS      Status: Patient will improve executive functioning skills by using planning strategies prior to beginning tasks (P)  Progressing as expected  -NS     Comments: Patient will improve executive functioning skills by using planning strategies prior to beginning tasks (P)  Patient completed 3 deductive reasoning puzzles with 90% accuracy in planning. Only min cues needed for planning and organization.  -NS     Patient will improve executive functioning skills by using self-monitoring strategies during functional tasks (P)  90%:;without cues  -NS     Status: Patient will improve executive functioning skills by using self-monitoring strategies during functional tasks (P)  Progressing as expected  -NS     Comments: Patient will improve executive functioning skills by using self-monitoring strategies during functional tasks (P)  Patient completed 3 deductive reasoning tasks and demonstrated self monitoring with 90% accuracy without cues.  -NS     Problem Solving Goals    Problem Solving LTG's (P)  Patient will be able to engage in avocational activities requiring high level cognitive skills  -NS     Patient will be able to engage in avocational activities requiring high level cognitive skills (P)  Independently  -NS     Status: Patient will be able to engage in avocational activities requiring high level cognitive skills (P)  Progressing as expected  -NS     Patient will improve ability to analyze problems and determine solutions by demonstrating ability to complete household/work management tasks in an organized approach (P)  90%:;without cues  -NS     Status: Patient will improve ability to analyze problems and determine solutions by demonstrating ability to complete household/work management tasks in an organized approach (P)  Progressing as expected  -NS     Comments: Patient will improve ability to analyze problems and determine solutions by demonstrating ability to complete household/work management tasks in an  organized approach (P)  Deductive reasoning tasks completed with 90% accuracy.   -NS     Patient will improve ability to analyze problems and determine solutions by completing a visual representation/filling in a chart by following  written directions (P)  90%:;without cues  -NS     Status: Patient will improve ability to analyze problems and determine solutions by completing a visual representation/filling in a chart by following  written directions (P)  Progressing as expected  -NS     Comments: Patient will improve ability to analyze problems and determine solutions by completing a visual representation/filling in a chart by following  written directions (P)  Patient completed 2 deductive reasoining puzzles with 90% accuracy and a calendar task involving deductive reasoning with 90% accuracy, min cues. Patient has shown improvement in this area and is completing more complex puzzles.  -NS       User Key  (r) = Recorded By, (t) = Taken By, (c) = Cosigned By    Initials Name Provider Type    NS Pattie Davis Speech Therapy Student Speech Therapy Student                         Time Calculation:   SLP Start Time: (P) 1245  SLP Stop Time: (P) 1330  SLP Time Calculation (min): (P) 45 min    Therapy Charges for Today     Code Description Service Date Service Provider Modifiers Qty    48970103418  ST TREATMENT SPEECH 4 11/6/2017 Pattie Davis Speech Therapy Student GN 1                   Pattie Davis Speech Therapy Student  11/6/2017

## 2017-11-06 NOTE — THERAPY TREATMENT NOTE
"    Outpatient Physical Therapy Neuro Treatment Note  [facility]     Patient Name: Rosi Vicente  : 1950  MRN: [id number]  Today's Date: 2017      Visit Date: 2017    Visit Dx:    ICD-10-CM ICD-9-CM   1. Hemiplegia and hemiparesis following cerebral infarction affecting left non-dominant side I69.354 438.22   2. Functional gait abnormality R26.89 781.2       Patient Active Problem List   Diagnosis   • Hypertension   • Allergic rhinitis   • Trigeminal neuralgia   • Hyperlipidemia   • New daily persistent headache   • Osteoarthritis of hip   • Vitamin D deficiency   • Cerebrovascular accident (CVA)   • Stroke   • Heartburn   • Primary insomnia                 PT Neuro       17 [id number]          Subjective Comments    Subjective Comments \"I ended up at the ER on Friday. I had the heaviness in my left leg like I did when I had the stroke. I had been out alot on Thursday. The MRI and CT scan were unchanged.\"   -LB      Precautions and Contraindications    Precautions left shoulder arthritis (planned total shoulder replacement  -LB      Subjective Pain    Able to rate subjective pain? yes  -LB      Pre-Treatment Pain Level 2  -LB      Post-Treatment Pain Level 2  -LB      Subjective Pain Comment left shoulder   -LB      Bed Mobility, Assessment/Treatment    Bed Mobility, Comment deferred   -LB      Transfers    Transfers, Sit-Stand Bracken independent  -LB      Transfers, Stand-Sit Bracken independent  -LB      Gait Assessment/Treatment    Gait, Bracken Level contact guard assist;minimum assist (75% patient effort)   minimal cueing at R shoulder to promote L  trunk elongation   -LB      Gait, Assistive Device other (see comments)   no device   -LB      Gait, Distance (Feet) 120   x 3 with varied speeds  -LB      Gait, Gait Deviations --   w/o device L trunk shortens minimally at stance   -LB      Gait, Impairments strength decreased;impaired balance  -LB      Gait, Comment Pt ambulated " "~ 200' x 2 at  fast katja with tactile cues to weight shift forward over LE's.   -LB      Stairs Assessment/Treatment    Number of Stairs 4  -LB      Stairs, Handrail Location none  -LB      Stairs, Daggett Level supervision required  -LB      Stairs, Assistive Device straight cane  -LB      Stairs, Technique Used step to step (ascending);step to step (descending)  -LB      Stairs, Impairments impaired balance;strength decreased  -LB      Stairs, Comment Pt ascended and descended 4 steps conditional independent with one rail step over step.   -LB      Balance Skills Training    Standing-Level of Assistance Contact guard  -LB      Static Standing Balance Support Right upper extremity supported;Left upper extremity supported  -LB      Standing-Balance Activities Rocker board   side to side with trunk elongation  -LB      Gait Balance-Level of Assistance Contact guard  -LB      Gait Balance Support No upper extremity supported  -LB      Gait Balance Activities grapevine  -LB        User Key  (r) = Recorded By, (t) = Taken By, (c) = Cosigned By    Initials Name Provider Type    LB Peri Hernandez, PT Physical Therapist                        PT Assessment/Plan       11/06/17 1721       PT Assessment    Impairments Gait  -LB     Assessment Comments Pt reporting going to ER on 10/3/17 with increased heaviness in her left LE. Pt reported the MRI and CT scan were unchanged. Pt's functional level was unchanged.  Discussed with pt to avoid too much activity and to take frequent rest breaks. Emphasiszed trunk stability on therapeutic ball during session.  -LB       User Key  (r) = Recorded By, (t) = Taken By, (c) = Cosigned By    Initials Name Provider Type    LAVELL Hernandez, PT Physical Therapist                     Exercises       11/06/17 1437          Subjective Comments    Subjective Comments \"I ended up at the ER on Friday. I had the heaviness in my left leg like I did when I had the stroke. I had been out alot on " "Thursday. The MRI and CT scan were unchanged.\"   -LB      Subjective Pain    Able to rate subjective pain? yes  -LB      Pre-Treatment Pain Level 2  -LB      Post-Treatment Pain Level 2  -LB      Subjective Pain Comment left shoulder   -LB      Exercise 14    Exercise Name 14 Walking on toes 20' x 1 with CGA   -LB      Cueing 14 Verbal  -LB      Exercise 15    Exercise Name 15 Theraopeutic ball- trunk elongation with light R UE support and strong CGA of PT  -LB      Cueing 15 Tactile  -LB      Reps 15 8  -LB      Exercise 16    Exercise Name 16 Therapeutic ball -trunk in neutral and then flexing each hip slightly with R light UE support  -LB      Cueing 16 Tactile  -LB      Reps 16 5   each LE   -LB        User Key  (r) = Recorded By, (t) = Taken By, (c) = Cosigned By    Initials Name Provider Type    LAVELL Hernandez PT Physical Therapist                                  Therapy Education       11/06/17 1720          Therapy Education    Given Mobility training   Stressed to pt the importance of trunk elongation and shortening with ambulation.   -LB      How Provided Verbal;Demonstration  -LB      Provided to Patient  -LB      Level of Understanding Verbalized;Demonstrated  -LB        User Key  (r) = Recorded By, (t) = Taken By, (c) = Cosigned By    Initials Name Provider Type    LAVELL Hernandez PT Physical Therapist                Outcome Measures       11/06/17 1400          DASH    Open a tight or new jar. 2  -MR      Write 1  -MR      Turn a key 1  -MR      Prepare a meal 2  -MR      Push open a heavy door 3  -MR      Place an object on a shelf above your head 3  -MR      Do heavy household chores (e.g., wash walls, wash floors) 5  -MR      Garden or do yard work 5  -MR      Make a bed 2  -MR      Carry a shopping bag or briefcase 2  -MR      Carry a heavy object (over 10 lbs) 5  -MR      Change a lightbulb overhead 5  -MR      Wash or blow dry your hair 3  -MR      Wash your back 1  -MR      Put on a " pullover sweater 2  -MR      Use a knife to cut food 3  -MR      Recreational activities in which require little effort (e.g., cardplaying, knitting, etc.) 3  -MR      Recreational activities in which you take some force or impact through your arm, should or hand (e.g. golf, hammering, tennis, etc.) 4  -MR      Recreational Activities in which you move your arm freely (e.g., frisbee, badminton, etc.) 3  -MR      Manage transportation needs (getting from one place to another) 5  -MR      During the past week, to what extent has your arm, shoulder, or hand problem interfered with your normal social activites with family, friends, neighbors or groups? 2  -MR      During the past week, were you limited in your work or other regular daily activities as a result of your arm, shoulder or hand problem? 3  -MR      Arm, Shoulder, or hand pain 1  -MR      Arm, shoulder or hand pain when you performed any specific activity 3  -MR      Tingling (pins and needles) in your arm, shoulder, or hand 1  -MR      Weakness in your arm, shoulder or hand 3  -MR      Stiffness in your arm, shoulder or hand 3  -MR      During the past week, how much difficulty have you had sleeping because of the pain in your arm, shoulder or hand? 3  -MR      I feel less capable, less confident or less useful because of my arm, shoulder or hand problem 4  -MR      DASH Sum  83  -MR      Number of Questions Answered 29  -MR      DASH Score 46.55  -MR        User Key  (r) = Recorded By, (t) = Taken By, (c) = Cosigned By    Initials Name Provider Type    MR Caitlyn Benjamin Diamante, OT Occupational Therapist            Time Calculation:   Start Time: 1437  Stop Time: 1521  Time Calculation (min): 44 min     Therapy Charges for Today     Code Description Service Date Service Provider Modifiers Qty    64406660053 HC PT NEUROMUSC RE EDUCATION EA 15 MIN 11/6/2017 Peri Hernandez, PT GP 3                    Peri Hernandez, PT  11/6/2017

## 2017-11-06 NOTE — THERAPY TREATMENT NOTE
Outpatient Speech Language Pathology   Adult Speech Language Cognitive Treatment Note  [facility]     Patient Name: Rosi Vicente  : 1950  MRN: [id number]  Today's Date: 2017         Visit Date: 2017   Patient Active Problem List   Diagnosis   • Hypertension   • Allergic rhinitis   • Trigeminal neuralgia   • Hyperlipidemia   • New daily persistent headache   • Osteoarthritis of hip   • Vitamin D deficiency   • Cerebrovascular accident (CVA)   • Stroke   • Heartburn   • Primary insomnia          Visit Dx:    ICD-10-CM ICD-9-CM   1. Cognitive deficit following cerebrovascular accident (CVA) I69.319 438.0   2. Speech or language deficit following cerebrovascular accident I69.328 438.10                               SLP OP Goals       17 1300       Subjective Comments    Subjective Comments (P)  Mrs. Vicente continues to show improvement in therapy. She reports improvement with home skills, such as following complex recipes. SLP discussed upcoming discharge plans.  -NS     Subjective Pain    Able to rate subjective pain? (P)  yes  -NS     Pre-Treatment Pain Level (P)  0  -NS     Post-Treatment Pain Level (P)  0  -NS     Written Language Comprehension Goals    Written Language Comprehension LTG's (P)  Patient will be able to comprehend written material in social/avocational/work setting  -NS     Patient will be able to comprehend written material in social/avocational/work setting (P)  90%:;without cues  -NS     Status: Patient will be able to comprehend written material in social/avocational/work setting (P)  Progressing as expected  -NS     Patient will improve comprehension of written language skills by answering written questions related to home management/social/work tasks (bills, recipes, tv guide, emails, procedures) (P)  90%:;without cues  -NS     Status: Patient will improve comprehension of written language skills by answering written questions related to home management/social/work  tasks (bills, recipes, tv guide, emails, procedures) (P)  Progressing as expected  -NS     Comments: Patient will improve comprehension of written language skills by answering written questions related to home management/social/work tasks (bills, recipes, tv guide, emails, procedures) (P)  Calendar deductive reasoning task with 90% accuracy with min cues.  -NS     Auditory Comprehension Goals    Auditory Comprehension LTG's (P)  Patient will comprehend abstract and complex information presented in all social, avocational, or work settings  -NS     Patient will comprehend abstract and complex information presented in all social, avocational, or work settings (P)  90%:;without cues  -NS     Status: Patient will comprehend abstract and complex information presented in all social, avocational, or work settings (P)  Progressing as expected  -NS     Patient will demonstrate comprehension of spoken language by answering questions about a multi paragraph length content (P)  90%:;without cues  -NS     Status: Patient will demonstrate comprehension of spoken language by answering questions about a multi paragraph length content (P)  Progressing as expected  -NS     Comments: Patient will demonstrate comprehension of spoken language by answering questions about a multi paragraph length content (P)  Ipad stories with comprehension questions= 90% without cues.  -NS     Executive Function Goals    Executive Function LTG's (P)  Patient will be able to engage in avocational activities requiring high level cognitive skills  -NS     Patient will be able to engage in avocational activities requiring high level cognitive skills (P)  90%:;without cues  -NS     Status: Patient will be able to engage in avocational activities requiring high level cognitive skills (P)  Progressing as expected  -NS     Patient will improve executive functioning skills by using planning strategies prior to beginning tasks (P)  90%:;without cues  -NS      Status: Patient will improve executive functioning skills by using planning strategies prior to beginning tasks (P)  Progressing as expected  -NS     Comments: Patient will improve executive functioning skills by using planning strategies prior to beginning tasks (P)  Patient completed 3 deductive reasoning puzzles with 90% accuracy in planning. Only min cues needed for planning and organization.  -NS     Patient will improve executive functioning skills by using self-monitoring strategies during functional tasks (P)  90%:;without cues  -NS     Status: Patient will improve executive functioning skills by using self-monitoring strategies during functional tasks (P)  Progressing as expected  -NS     Comments: Patient will improve executive functioning skills by using self-monitoring strategies during functional tasks (P)  Patient completed 3 deductive reasoning tasks and demonstrated self monitoring with 90% accuracy without cues.  -NS     Problem Solving Goals    Problem Solving LTG's (P)  Patient will be able to engage in avocational activities requiring high level cognitive skills  -NS     Patient will be able to engage in avocational activities requiring high level cognitive skills (P)  Independently  -NS     Status: Patient will be able to engage in avocational activities requiring high level cognitive skills (P)  Progressing as expected  -NS     Patient will improve ability to analyze problems and determine solutions by demonstrating ability to complete household/work management tasks in an organized approach (P)  90%:;without cues  -NS     Status: Patient will improve ability to analyze problems and determine solutions by demonstrating ability to complete household/work management tasks in an organized approach (P)  Progressing as expected  -NS     Comments: Patient will improve ability to analyze problems and determine solutions by demonstrating ability to complete household/work management tasks in an  organized approach (P)  Deductive reasoning tasks completed with 90% accuracy.   -NS     Patient will improve ability to analyze problems and determine solutions by completing a visual representation/filling in a chart by following  written directions (P)  90%:;without cues  -NS     Status: Patient will improve ability to analyze problems and determine solutions by completing a visual representation/filling in a chart by following  written directions (P)  Progressing as expected  -NS     Comments: Patient will improve ability to analyze problems and determine solutions by completing a visual representation/filling in a chart by following  written directions (P)  Patient completed 2 deductive reasoining puzzles with 90% accuracy and a calendar task involving deductive reasoning with 90% accuracy, min cues. Patient has shown improvement in this area and is completing more complex puzzles.  -NS       User Key  (r) = Recorded By, (t) = Taken By, (c) = Cosigned By    Initials Name Provider Type    NS Pattie Davis Speech Therapy Student Speech Therapy Student                         Time Calculation:   SLP Start Time: (P) 1245  SLP Stop Time: (P) 1345  SLP Time Calculation (min): (P) 60 min    Therapy Charges for Today     Code Description Service Date Service Provider Modifiers Qty    [id number]  ST TREATMENT SPEECH 4 11/6/2017 Pattie Davis Speech Therapy Student GN 1                   Pattie Davis Speech Therapy Student  11/6/2017

## 2017-11-07 ENCOUNTER — APPOINTMENT (OUTPATIENT)
Dept: OCCUPATIONAL THERAPY | Facility: HOSPITAL | Age: 67
End: 2017-11-07

## 2017-11-07 ENCOUNTER — OFFICE VISIT (OUTPATIENT)
Dept: INTERNAL MEDICINE | Facility: CLINIC | Age: 67
End: 2017-11-07

## 2017-11-07 VITALS
HEIGHT: 66 IN | DIASTOLIC BLOOD PRESSURE: 70 MMHG | SYSTOLIC BLOOD PRESSURE: 100 MMHG | WEIGHT: 121.2 LBS | BODY MASS INDEX: 19.48 KG/M2

## 2017-11-07 DIAGNOSIS — G89.29 CHRONIC LEFT SHOULDER PAIN: ICD-10-CM

## 2017-11-07 DIAGNOSIS — I10 ESSENTIAL HYPERTENSION: Primary | ICD-10-CM

## 2017-11-07 DIAGNOSIS — Z86.73 HISTORY OF CVA (CEREBROVASCULAR ACCIDENT): ICD-10-CM

## 2017-11-07 DIAGNOSIS — E78.5 HYPERLIPIDEMIA, UNSPECIFIED HYPERLIPIDEMIA TYPE: ICD-10-CM

## 2017-11-07 DIAGNOSIS — M25.512 CHRONIC LEFT SHOULDER PAIN: ICD-10-CM

## 2017-11-07 DIAGNOSIS — R35.0 FREQUENT URINATION: ICD-10-CM

## 2017-11-07 PROCEDURE — 99214 OFFICE O/P EST MOD 30 MIN: CPT | Performed by: INTERNAL MEDICINE

## 2017-11-07 NOTE — PROGRESS NOTES
"Subjective   Rosi Vicente is a 67 y.o. female here for   Chief Complaint   Patient presents with   • Fatigue     4 week follow-up   • Hypertension   • Hyperlipidemia   .    Vitals:    11/07/17 1502   BP: 100/70   BP Location: Left arm   Patient Position: Sitting   Cuff Size: Adult   Weight: 121 lb 3.2 oz (55 kg)   Height: 66\" (167.6 cm)       Body mass index is 19.56 kg/(m^2).    Fatigue   This is a chronic problem. The current episode started 1 to 4 weeks ago. The problem occurs constantly. The problem has been unchanged. Associated symptoms include fatigue. Pertinent negatives include no chest pain, chills, coughing or fever.   Hypertension   This is a chronic problem. The current episode started more than 1 year ago. The problem is unchanged. The problem is controlled. Pertinent negatives include no chest pain, palpitations, peripheral edema or shortness of breath.   Hyperlipidemia   This is a chronic problem. The current episode started more than 1 year ago. The problem is controlled. She has no history of diabetes. Pertinent negatives include no chest pain or shortness of breath.        The following portions of the patient's history were reviewed and updated as appropriate: allergies, current medications, past social history and problem list.    Review of Systems   Constitutional: Positive for fatigue. Negative for chills and fever.   Respiratory: Negative for cough, shortness of breath and wheezing.    Cardiovascular: Negative for chest pain, palpitations and leg swelling.   Psychiatric/Behavioral: Negative for dysphoric mood and sleep disturbance. The patient is not nervous/anxious.      //95    Objective   Physical Exam   Constitutional: She appears well-developed and well-nourished. No distress.   Cardiovascular: Normal rate, regular rhythm and normal heart sounds.    Pulmonary/Chest: No respiratory distress. She has no wheezes. She has no rales. She exhibits no tenderness.   Musculoskeletal: " She exhibits no edema.   Psychiatric: She has a normal mood and affect. Her behavior is normal.   Nursing note and vitals reviewed.      Assessment/Plan   Diagnoses and all orders for this visit:    Essential hypertension  Comments:  stable with lisin/hct & 1/2 toprol (most days) - call if bp too high or low     Hyperlipidemia, unspecified hyperlipidemia type  Comments:  borderline low LDL particle size - continue statin, diet & exercise    History of CVA (cerebrovascular accident)  Comments:  continue PT & OT    Frequent urination  Comments:  +slt better with low dose myrbetriq - trial of increased myrbetriq (from 25mg to 50mg/d) - call if problems    Chronic left shoulder pain  Comments:  no surgery for now - f/u with ortho

## 2017-11-09 ENCOUNTER — HOSPITAL ENCOUNTER (OUTPATIENT)
Dept: PHYSICAL THERAPY | Facility: HOSPITAL | Age: 67
Setting detail: THERAPIES SERIES
Discharge: HOME | End: 2017-11-09
Attending: PHYSICAL MEDICINE & REHABILITATION

## 2017-11-09 ENCOUNTER — HOSPITAL ENCOUNTER (OUTPATIENT)
Dept: SPEECH THERAPY | Facility: HOSPITAL | Age: 67
Setting detail: THERAPIES SERIES
Discharge: HOME | End: 2017-11-09

## 2017-11-09 ENCOUNTER — HOSPITAL ENCOUNTER (OUTPATIENT)
Dept: OCCUPATIONAL THERAPY | Facility: HOSPITAL | Age: 67
Setting detail: THERAPIES SERIES
Discharge: HOME | End: 2017-11-09

## 2017-11-09 DIAGNOSIS — I69.354 HEMIPLEGIA AND HEMIPARESIS FOLLOWING CEREBRAL INFARCTION AFFECTING LEFT NON-DOMINANT SIDE (HCC): Primary | ICD-10-CM

## 2017-11-09 DIAGNOSIS — IMO0002 LACK OF COORDINATION DUE TO STROKE: ICD-10-CM

## 2017-11-09 DIAGNOSIS — I69.328 SPEECH OR LANGUAGE DEFICIT FOLLOWING CEREBROVASCULAR ACCIDENT: ICD-10-CM

## 2017-11-09 DIAGNOSIS — R29.898 LUE WEAKNESS: ICD-10-CM

## 2017-11-09 DIAGNOSIS — I69.319 COGNITIVE DEFICIT FOLLOWING CEREBROVASCULAR ACCIDENT (CVA): Primary | ICD-10-CM

## 2017-11-09 DIAGNOSIS — R26.89 FUNCTIONAL GAIT ABNORMALITY: ICD-10-CM

## 2017-11-09 PROCEDURE — 97112 NEUROMUSCULAR REEDUCATION: CPT

## 2017-11-09 PROCEDURE — 97110 THERAPEUTIC EXERCISES: CPT

## 2017-11-09 PROCEDURE — 92507 TX SP LANG VOICE COMM INDIV: CPT | Performed by: SPEECH-LANGUAGE PATHOLOGIST

## 2017-11-09 PROCEDURE — 92507 TX SP LANG VOICE COMM INDIV: CPT

## 2017-11-09 NOTE — THERAPY TREATMENT NOTE
Outpatient Speech Language Pathology   Adult Speech Language Cognitive Treatment Note  [facility]     Patient Name: Rosi Vicente  : 1950  MRN: [id number]  Today's Date: 2017         Visit Date: 2017   Patient Active Problem List   Diagnosis   • Hypertension   • Allergic rhinitis   • Trigeminal neuralgia   • Hyperlipidemia   • New daily persistent headache   • Osteoarthritis of hip   • Vitamin D deficiency   • History of CVA (cerebrovascular accident)   • Stroke   • Heartburn   • Primary insomnia   • Frequent urination   • Chronic left shoulder pain          Visit Dx:    ICD-10-CM ICD-9-CM   1. Cognitive deficit following cerebrovascular accident (CVA) I69.319 438.0   2. Speech or language deficit following cerebrovascular accident I69.328 438.10                               SLP OP Goals       17 1100       Subjective Comments    Subjective Comments Mrs. Vicente has showed consistent performance. SLP discussed upcoming discharge.  -KA (r) NS (t) KA (c)     Subjective Pain    Able to rate subjective pain? yes  -KA (r) NS (t) KA (c)     Pre-Treatment Pain Level 0  -KA (r) NS (t) KA (c)     Post-Treatment Pain Level 0  -KA (r) NS (t) KA (c)     Written Language Comprehension Goals    Written Language Comprehension LTG's Patient will be able to comprehend written material in social/avocational/work setting  -KA (r) NS (t) KA (c)     Patient will be able to comprehend written material in social/avocational/work setting 90%:;without cues  -KA (r) NS (t) KA (c)     Status: Patient will be able to comprehend written material in social/avocational/work setting Progressing as expected  -KA (r) NS (t) KA (c)     Patient will improve comprehension of written language skills by answering written questions related to home management/social/work tasks (bills, recipes, tv guide, emails, procedures) 90%:;without cues  -KA (r) NS (t) KA (c)     Status: Patient will improve comprehension of written language  skills by answering written questions related to home management/social/work tasks (bills, recipes, tv guide, emails, procedures) Progressing as expected  -KA (r) NS (t) KA (c)     Comments: Patient will improve comprehension of written language skills by answering written questions related to home management/social/work tasks (bills, recipes, tv guide, emails, procedures) Calendar deductive reasoning task with 80% accuracy, some reduced attention to detail. Min-mod cues given.  -KA (r) NS (t) KA (c)     Executive Function Goals    Executive Function LTG's Patient will be able to engage in avocational activities requiring high level cognitive skills  -KA (r) NS (t) KA (c)     Patient will be able to engage in avocational activities requiring high level cognitive skills 90%:;without cues  -KA (r) NS (t) KA (c)     Status: Patient will be able to engage in avocational activities requiring high level cognitive skills Progressing as expected  -KA (r) NS (t) KA (c)     Patient will improve executive functioning skills by using planning strategies prior to beginning tasks 90%:;without cues  -KA (r) NS (t) KA (c)     Status: Patient will improve executive functioning skills by using planning strategies prior to beginning tasks Progressing as expected  -KA (r) NS (t) KA (c)     Comments: Patient will improve executive functioning skills by using planning strategies prior to beginning tasks Patient completed 2 deductive reasoning tasks with 80% accuracy in planning. More cues needed today for attention to detail.  -KA (r) NS (t) KA (c)     Patient will improve executive functioning skills by using self-monitoring strategies during functional tasks 90%:;without cues  -KA (r) NS (t) KA (c)     Status: Patient will improve executive functioning skills by using self-monitoring strategies during functional tasks Progressing as expected  -KA (r) NS (t) KA (c)     Comments: Patient will improve executive functioning skills by using  self-monitoring strategies during functional tasks Patient completed 2 deductive reasoning tasks with some reduced self monitoring this date at 80%. Cues needed to point out errors to patient.  -KA (r) NS (t) KA (c)     Problem Solving Goals    Problem Solving LTG's Patient will be able to engage in avocational activities requiring high level cognitive skills  -KA (r) NS (t) KA (c)     Patient will be able to engage in avocational activities requiring high level cognitive skills Independently  -KA (r) NS (t) KA (c)     Status: Patient will be able to engage in avocational activities requiring high level cognitive skills Progressing as expected  -KA (r) NS (t) KA (c)     Patient will improve ability to analyze problems and determine solutions by demonstrating ability to complete household/work management tasks in an organized approach 90%:;without cues  -KA (r) NS (t) KA (c)     Status: Patient will improve ability to analyze problems and determine solutions by demonstrating ability to complete household/work management tasks in an organized approach Progressing as expected  -KA (r) NS (t) KA (c)     Comments: Patient will improve ability to analyze problems and determine solutions by demonstrating ability to complete household/work management tasks in an organized approach Deductive reasoning tasks completed with 80% accuracy.  -KA (r) NS (t) KA (c)     Patient will improve ability to analyze problems and determine solutions by completing a visual representation/filling in a chart by following  written directions 90%:;without cues  -KA (r) NS (t) KA (c)     Status: Patient will improve ability to analyze problems and determine solutions by completing a visual representation/filling in a chart by following  written directions Progressing as expected  -KA (r) NS (t) KA (c)     Comments: Patient will improve ability to analyze problems and determine solutions by completing a visual representation/filling in a chart by  following  written directions Patient completed deductive reasoning puzzle. Difficulty level of puzzle increased this date. Patient performed at 80%, with some reduced attention of detail. Calendar task completed with 80%, min-mod cues.   -CARLOTA (r) NS (t) CARLOTA (c)       User Key  (r) = Recorded By, (t) = Taken By, (c) = Cosigned By    Initials Name Provider Type    CARLOTA Alatorre MA,CCC-SLP Speech and Language Pathologist    MARY Davis, Speech Therapy Student Speech Therapy Student                         Time Calculation:   SLP Start Time: 1015  SLP Stop Time: 1100  SLP Time Calculation (min): 45 min    Therapy Charges for Today     Code Description Service Date Service Provider Modifiers Qty    [id number]  ST TREATMENT SPEECH 3 11/9/2017 Pranav Alatorre MA,CCC-SLP [place] 1                   Pranav Alatorre MA,CCC-SLP  11/9/2017

## 2017-11-09 NOTE — THERAPY TREATMENT NOTE
Outpatient Speech Language Pathology   Adult Speech Language Cognitive Treatment Note  [facility]     Patient Name: Rosi Vicente  : 1950  MRN: [id number]  Today's Date: 2017         Visit Date: 2017   Patient Active Problem List   Diagnosis   • Hypertension   • Allergic rhinitis   • Trigeminal neuralgia   • Hyperlipidemia   • New daily persistent headache   • Osteoarthritis of hip   • Vitamin D deficiency   • History of CVA (cerebrovascular accident)   • Stroke   • Heartburn   • Primary insomnia   • Frequent urination   • Chronic left shoulder pain          Visit Dx:    ICD-10-CM ICD-9-CM   1. Cognitive deficit following cerebrovascular accident (CVA) I69.319 438.0   2. Speech or language deficit following cerebrovascular accident I69.328 438.10                               SLP OP Goals       17 1100       Subjective Comments    Subjective Comments (P)  Mrs. Vicente has showed consistent performance. SLP discussed upcoming discharge.  -NS     Subjective Pain    Able to rate subjective pain? (P)  yes  -NS     Pre-Treatment Pain Level (P)  0  -NS     Post-Treatment Pain Level (P)  0  -NS     Written Language Comprehension Goals    Written Language Comprehension LTG's (P)  Patient will be able to comprehend written material in social/avocational/work setting  -NS     Patient will be able to comprehend written material in social/avocational/work setting (P)  90%:;without cues  -NS     Status: Patient will be able to comprehend written material in social/avocational/work setting (P)  Progressing as expected  -NS     Patient will improve comprehension of written language skills by answering written questions related to home management/social/work tasks (bills, recipes, tv guide, emails, procedures) (P)  90%:;without cues  -NS     Status: Patient will improve comprehension of written language skills by answering written questions related to home management/social/work tasks (bills, recipes, tv  guide, emails, procedures) (P)  Progressing as expected  -NS     Comments: Patient will improve comprehension of written language skills by answering written questions related to home management/social/work tasks (bills, recipes, tv guide, emails, procedures) (P)  Calendar deductive reasoning task with 80% accuracy, some reduced attention to detail. Min-mod cues given.  -NS     Executive Function Goals    Executive Function LTG's (P)  Patient will be able to engage in avocational activities requiring high level cognitive skills  -NS     Patient will be able to engage in avocational activities requiring high level cognitive skills (P)  90%:;without cues  -NS     Status: Patient will be able to engage in avocational activities requiring high level cognitive skills (P)  Progressing as expected  -NS     Patient will improve executive functioning skills by using planning strategies prior to beginning tasks (P)  90%:;without cues  -NS     Status: Patient will improve executive functioning skills by using planning strategies prior to beginning tasks (P)  Progressing as expected  -NS     Comments: Patient will improve executive functioning skills by using planning strategies prior to beginning tasks (P)  Patient completed 2 deductive reasoning tasks with 80% accuracy in planning. More cues needed today for attention to detail.  -NS     Patient will improve executive functioning skills by using self-monitoring strategies during functional tasks (P)  90%:;without cues  -NS     Status: Patient will improve executive functioning skills by using self-monitoring strategies during functional tasks (P)  Progressing as expected  -NS     Comments: Patient will improve executive functioning skills by using self-monitoring strategies during functional tasks (P)  Patient completed 2 deductive reasoning tasks with some reduced self monitoring this date at 80%. Cues needed to point out errors to patient.  -NS     Problem Solving Goals     Problem Solving LTG's (P)  Patient will be able to engage in avocational activities requiring high level cognitive skills  -NS     Patient will be able to engage in avocational activities requiring high level cognitive skills (P)  Independently  -NS     Status: Patient will be able to engage in avocational activities requiring high level cognitive skills (P)  Progressing as expected  -NS     Patient will improve ability to analyze problems and determine solutions by demonstrating ability to complete household/work management tasks in an organized approach (P)  90%:;without cues  -NS     Status: Patient will improve ability to analyze problems and determine solutions by demonstrating ability to complete household/work management tasks in an organized approach (P)  Progressing as expected  -NS     Comments: Patient will improve ability to analyze problems and determine solutions by demonstrating ability to complete household/work management tasks in an organized approach (P)  Deductive reasoning tasks completed with 80% accuracy.  -NS     Patient will improve ability to analyze problems and determine solutions by completing a visual representation/filling in a chart by following  written directions (P)  90%:;without cues  -NS     Status: Patient will improve ability to analyze problems and determine solutions by completing a visual representation/filling in a chart by following  written directions (P)  Progressing as expected  -NS     Comments: Patient will improve ability to analyze problems and determine solutions by completing a visual representation/filling in a chart by following  written directions (P)  Patient completed deductive reasoning puzzle. Difficulty level of puzzle increased this date. Patient performed at 80%, with some reduced attention of detail. Calendar task completed with 80%, min-mod cues.   -NS       User Key  (r) = Recorded By, (t) = Taken By, (c) = Cosigned By    Initials Name Provider Type     MARY Davis, Speech Therapy Student Speech Therapy Student                         Time Calculation:   SLP Start Time: (P) 1015  SLP Stop Time: (P) 1100  SLP Time Calculation (min): (P) 45 min    Therapy Charges for Today     Code Description Service Date Service Provider Modifiers Qty    [id number]  ST TREATMENT SPEECH 3 11/9/2017 Pattie Davis, Speech Therapy Student [place] 1                   Pattie Davis Speech Therapy Student  11/9/2017

## 2017-11-09 NOTE — THERAPY TREATMENT NOTE
Outpatient Occupational Therapy Neuro Treatment Note  [facility]     Patient Name: Rosi Vicente  : 1950  MRN: [id number]  Today's Date: 2017       Visit Date: 2017    Patient Active Problem List   Diagnosis   • Hypertension   • Allergic rhinitis   • Trigeminal neuralgia   • Hyperlipidemia   • New daily persistent headache   • Osteoarthritis of hip   • Vitamin D deficiency   • History of CVA (cerebrovascular accident)   • Stroke   • Heartburn   • Primary insomnia   • Frequent urination   • Chronic left shoulder pain        Past Medical History:   Diagnosis Date   • Allergic rhinitis    • Anemia    • Bronchitis    • FH: colon cancer    • H/O bone density study    • H/O mammogram    • Hypertension     Benign Essential   • Malaise and fatigue    • Nocturia    • Stroke 2017   • TMJ (temporomandibular joint syndrome)    • Trigeminal neuralgia     Surgery at [facility] in  repeat in         Past Surgical History:   Procedure Laterality Date   • AUGMENTATION MAMMAPLASTY     • COLONOSCOPY N/A 2015    Repeat Q 5 years due to Fhx of Colon Ca-Dr. Polk   • PAP SMEAR N/A     Dr. Burden         Visit Dx:    ICD-10-CM ICD-9-CM   1. Hemiplegia and hemiparesis following cerebral infarction affecting left non-dominant side I69.354 438.22   2. LUE weakness R29.898 729.89   3. Lack of coordination due to stroke I63.9 434.91    R27.9 781.3                         OT Assessment/Plan       17 1004       OT Assessment    Assessment Comments Pt reports increased functional use with LUE, states she has been filling pill box with L hand.   -MR       User Key  (r) = Recorded By, (t) = Taken By, (c) = Cosigned By    Initials Name Provider Type     Caitlyn Benjamin Diamante, OT Occupational Therapist                    Therapy Education       17 1120          Therapy Education    Given HEP  -MR      Program Progressed   theraband exercises for LUE, gave yellow theraband for use at  "home  -MR      How Provided Verbal;Demonstration  -MR      Provided to Patient  -MR      Level of Understanding Teach back education performed  -MR        User Key  (r) = Recorded By, (t) = Taken By, (c) = Cosigned By    Initials Name Provider Type    MR Caitlyn Bobby, OT Occupational Therapist                    OT Exercises       11/09/17 0900          Subjective Comments    Subjective Comments \"My friends have told me my L hand looks less spastic.\"  -MR      Subjective Pain    Pre-Treatment Pain Level 2  -MR      Subjective Pain Comment L shoulder  -MR      Exercise 1    Exercise Name 1 UE therapeutic exercises to increase strength and endurance for functional use of LUE. Using theraband pt performs bicep curls, forward reach, shoulder extension. OT provides demo and cues for proper technique.  -MR      Cueing 1 Verbal;Demo  -MR      Equipment 1 Theraband  -MR      Resistance 1 Yellow  -MR      Sets 1 3  -MR      Reps 1 10  -MR      Exercise 2    Exercise Name 2 FM activity to increase coordination and dexterity for functional use of LUE. Using small beads pt picks up from tabletop and places onto pegboard with LUE. Multiple reps completed, increased time for completion.  -MR      Cueing 2 Verbal;Demo  -MR      Exercise 3    Exercise Name 3 Activity to increase strength and coordination for functional use of L hand. Using clothespins pt attaches and removes from horizontal bar following OT demo/cues. Multiple reps completed.  -MR      Cueing 3 Verbal;Demo  -MR        User Key  (r) = Recorded By, (t) = Taken By, (c) = Cosigned By    Initials Name Provider Type    MR Caitlyn Bobby, OT Occupational Therapist                    Outcome Measures       11/06/17 1400          DASH    Open a tight or new jar. 2  -MR      Write 1  -MR      Turn a key 1  -MR      Prepare a meal 2  -MR      Push open a heavy door 3  -MR      Place an object on a shelf above your head 3  -MR      Do heavy household chores (e.g., wash " walls, wash floors) 5  -MR      Garden or do yard work 5  -MR      Make a bed 2  -MR      Carry a shopping bag or briefcase 2  -MR      Carry a heavy object (over 10 lbs) 5  -MR      Change a lightbulb overhead 5  -MR      Wash or blow dry your hair 3  -MR      Wash your back 1  -MR      Put on a pullover sweater 2  -MR      Use a knife to cut food 3  -MR      Recreational activities in which require little effort (e.g., cardplaying, knitting, etc.) 3  -MR      Recreational activities in which you take some force or impact through your arm, should or hand (e.g. golf, hammering, tennis, etc.) 4  -MR      Recreational Activities in which you move your arm freely (e.g., frisbee, badminton, etc.) 3  -MR      Manage transportation needs (getting from one place to another) 5  -MR      During the past week, to what extent has your arm, shoulder, or hand problem interfered with your normal social activites with family, friends, neighbors or groups? 2  -MR      During the past week, were you limited in your work or other regular daily activities as a result of your arm, shoulder or hand problem? 3  -MR      Arm, Shoulder, or hand pain 1  -MR      Arm, shoulder or hand pain when you performed any specific activity 3  -MR      Tingling (pins and needles) in your arm, shoulder, or hand 1  -MR      Weakness in your arm, shoulder or hand 3  -MR      Stiffness in your arm, shoulder or hand 3  -MR      During the past week, how much difficulty have you had sleeping because of the pain in your arm, shoulder or hand? 3  -MR      I feel less capable, less confident or less useful because of my arm, shoulder or hand problem 4  -MR      DASH Sum  83  -MR      Number of Questions Answered 29  -MR      DASH Score 46.55  -MR        User Key  (r) = Recorded By, (t) = Taken By, (c) = Cosigned By    Initials Name Provider Type     Caitlyn Benjamin Diamante, OT Occupational Therapist            Time Calculation:   OT Start Time: 0932  OT Stop Time:  1015  OT Time Calculation (min): 43 min     Therapy Charges for Today     Code Description Service Date Service Provider Modifiers Qty    [id number]  OT NEUROMUSC RE EDUCATION EA 15 MIN 11/9/2017 Caitlyn Bobby, OT GO 2    [id number] HC OT THER PROC EA 15 MIN 11/9/2017 Caitlyn Bobby OT GO 1                    Caitlyn Bobby OT  11/9/2017

## 2017-11-09 NOTE — THERAPY TREATMENT NOTE
"    Outpatient Physical Therapy Neuro Treatment Note  [facility]     Patient Name: Rosi Vicente  : 1950  MRN: [id number]  Today's Date: 2017      Visit Date: 2017    Visit Dx:    ICD-10-CM ICD-9-CM   1. Hemiplegia and hemiparesis following cerebral infarction affecting left non-dominant side I69.354 438.22   2. Functional gait abnormality R26.89 781.2       Patient Active Problem List   Diagnosis   • Hypertension   • Allergic rhinitis   • Trigeminal neuralgia   • Hyperlipidemia   • New daily persistent headache   • Osteoarthritis of hip   • Vitamin D deficiency   • History of CVA (cerebrovascular accident)   • Stroke   • Heartburn   • Primary insomnia   • Frequent urination   • Chronic left shoulder pain                 PT Neuro       17 0833          Subjective Comments    Subjective Comments \"I feel like I am walking slow.\" (on the treadmill at 1.5 mph) \"Can i use the stationary bike at home?\"  -LB      Transfers    Transfers, Sit-Stand [place] verbal cues required   to place feet evenly   -LB      Transfers, Stand-Sit [place] independent  -LB      Transfers, Sit-Stand-Sit, Assist Device other (see comments);straight cane   or no device   -LB      Transfer, Safety Issues sequencing ability decreased  -LB      Transfer, Impairments strength decreased  -LB      Transfer, Comment Pt coming to stand 3 x with pt placing the right LE behind the left   -LB      Gait Assessment/Treatment    Gait, [place] Level contact guard assist;minimum assist (75% patient effort)   minimal tactile cues to R pelvis ~ 33%   -LB      Gait, Assistive Device other (see comments)   no device   -LB      Gait, Distance (Feet) 150   x 4  -LB      Gait, Gait Deviations other (see comments)   w/o device L trunk shortens minimally at stance )  -LB      Gait, Impairments strength decreased;impaired balance  -LB      Gait, Comment Worked with pt on trunk stability and allowing her UE's to swing lightly.   " "-LB      Stairs Assessment/Treatment    Number of Stairs curb and ramp   -LB      Stairs, Handrail Location none  -LB      Stairs, [place] Level supervision required   SBA   -LB      Stairs, Impairments strength decreased  -LB      Stairs, Comment Pt reporting she is ascending and descending the basement steps at home.   -LB      Balance Skills Training    Standing-Level of Assistance Contact guard  -LB      Static Standing Balance Support No upper extremity supported  -LB      Standing-Balance Activities Tandem Stance;Compliant surfaces  -LB      Gait Balance-Level of Assistance Contact guard  -LB      Gait Balance Support No upper extremity supported  -LB      Gait Balance Activities tandem;[place]  -LB        User Key  (r) = Recorded By, (t) = Taken By, (c) = Cosigned By    Initials Name Provider Type    LAVELL Hernandez, PT Physical Therapist                        PT Assessment/Plan       11/09/17 1129       PT Assessment    Assessment Comments Pt was able to control her left LE with good pushoff and knee flexion on the treadmill at increased speed of 1.5 mph and increased time of 6 minutes.   -LB       User Key  (r) = Recorded By, (t) = Taken By, (c) = Cosigned By    Initials Name Provider Type    LAVELL Hernandez, PT Physical Therapist                     Exercises       11/09/17 0861          Subjective Comments    Subjective Comments \"I feel like I am walking slow.\" (on the treadmill at 1.5 mph) \"Can i use the stationary bike at home?\"  -LB      Subjective Pain    Able to rate subjective pain? yes  -LB      Pre-Treatment Pain Level 1  -LB      Post-Treatment Pain Level 1  -LB      Subjective Pain Comment left shoulder  -LB      Exercise 1    Exercise Name 1 bilateral plantarflexion in standing on incline at end of ll bars   -LB      Cueing 1 Verbal  -LB      Reps 1 15  -LB      Exercise 4    Exercise Name 4 wt shift L LE with trunk in neutral , R knee flexion   -LB      Cueing 4 Tactile;Verbal  -LB   " "   Reps 4 10  -LB      Exercise 5    Exercise Name 5 Balancing with one foot on the 6\" curb with CGA   -LB      Cueing 5 Verbal   to prevent excessive wt shift to the L  -LB      Reps 5 8  -LB      Exercise 6    Exercise Name 6 Reciprocal stepping   -LB      Cueing 6 Verbal   CGA   -LB      Reps 6 12  -LB      Additional Comments good control L LE  -LB      Exercise 7    Exercise Name 7 Walking with bumpy ball pushed against abdominals ~ 80' with CGA   -LB      Cueing 7 Verbal  -LB      Exercise 8    Exercise Name 8 TREADMILL at 1.5 mph  -LB      Cueing 8 Verbal  -LB      Time (Minutes) 8 6  -LB      Time (Seconds) 8 5  -LB      Exercise 14    Exercise Name 14 Walking on toes 30' x 1 with CGA   -LB      Cueing 14 Verbal  -LB        User Key  (r) = Recorded By, (t) = Taken By, (c) = Cosigned By    Initials Name Provider Type    LAVELL Hernandez, PT Physical Therapist                                  Therapy Education       11/09/17 1128 11/09/17 1120       Therapy Education    Education Details Advised pt she would be OK to use her stationary bike for short periods ~ 5-7 minutes.  -LB      Given  HEP  -MR     Program  Progressed   theraband exercises for LUE, gave yellow theraband for use at home  -MR     How Provided Verbal  -LB Verbal;Demonstration  -MR     Provided to Patient  -LB Patient  -MR     Level of Understanding Verbalized  -LB Teach back education performed  -MR       User Key  (r) = Recorded By, (t) = Taken By, (c) = Cosigned By    Initials Name Provider Type    LAVELL Hernandez, PT Physical Therapist    MR Caitlyn Benjamin Diamante, OT Occupational Therapist                Outcome Measures       11/06/17 1400          DASH    Open a tight or new jar. 2  -MR      Write 1  -MR      Turn a key 1  -MR      Prepare a meal 2  -MR      Push open a heavy door 3  -MR      Place an object on a shelf above your head 3  -MR      Do heavy household chores (e.g., wash walls, wash floors) 5  -MR      Garden or do yard work 5  " -MR      Make a bed 2  -MR      Carry a shopping bag or briefcase 2  -MR      Carry a heavy object (over 10 lbs) 5  -MR      Change a lightbulb overhead 5  -MR      Wash or blow dry your hair 3  -MR      Wash your back 1  -MR      Put on a pullover sweater 2  -MR      Use a knife to cut food 3  -MR      Recreational activities in which require little effort (e.g., cardplaying, knitting, etc.) 3  -MR      Recreational activities in which you take some force or impact through your arm, should or hand (e.g. golf, hammering, tennis, etc.) 4  -MR      Recreational Activities in which you move your arm freely (e.g., frisbee, badminton, etc.) 3  -MR      Manage transportation needs (getting from one place to another) 5  -MR      During the past week, to what extent has your arm, shoulder, or hand problem interfered with your normal social activites with family, friends, neighbors or groups? 2  -MR      During the past week, were you limited in your work or other regular daily activities as a result of your arm, shoulder or hand problem? 3  -MR      Arm, Shoulder, or hand pain 1  -MR      Arm, shoulder or hand pain when you performed any specific activity 3  -MR      Tingling (pins and needles) in your arm, shoulder, or hand 1  -MR      Weakness in your arm, shoulder or hand 3  -MR      Stiffness in your arm, shoulder or hand 3  -MR      During the past week, how much difficulty have you had sleeping because of the pain in your arm, shoulder or hand? 3  -MR      I feel less capable, less confident or less useful because of my arm, shoulder or hand problem 4  -MR      DASH Sum  83  -MR      Number of Questions Answered 29  -MR      DASH Score 46.55  -MR        User Key  (r) = Recorded By, (t) = Taken By, (c) = Cosigned By    Initials Name Provider Type    MR Caitlyn Bobby OT Occupational Therapist            Time Calculation:   Start Time: 0845  Stop Time: 0930  Time Calculation (min): 45 min     Therapy Charges for  Today     Code Description Service Date Service Provider Modifiers Qty    16627163644 HC PT NEUROMUSC RE EDUCATION EA 15 MIN 11/9/2017 Peri Hernandez, PT GP 3                    Peri Hernandez, PT  11/9/2017

## 2017-11-13 ENCOUNTER — APPOINTMENT (OUTPATIENT)
Dept: SPEECH THERAPY | Facility: HOSPITAL | Age: 67
End: 2017-11-13

## 2017-11-13 ENCOUNTER — APPOINTMENT (OUTPATIENT)
Dept: PHYSICAL THERAPY | Facility: HOSPITAL | Age: 67
End: 2017-11-13
Attending: PHYSICAL MEDICINE & REHABILITATION

## 2017-11-13 ENCOUNTER — APPOINTMENT (OUTPATIENT)
Dept: OCCUPATIONAL THERAPY | Facility: HOSPITAL | Age: 67
End: 2017-11-13

## 2017-11-14 ENCOUNTER — APPOINTMENT (OUTPATIENT)
Dept: OCCUPATIONAL THERAPY | Facility: HOSPITAL | Age: 67
End: 2017-11-14

## 2017-11-16 ENCOUNTER — HOSPITAL ENCOUNTER (OUTPATIENT)
Dept: OCCUPATIONAL THERAPY | Facility: HOSPITAL | Age: 67
Setting detail: THERAPIES SERIES
Discharge: HOME | End: 2017-11-16

## 2017-11-16 ENCOUNTER — HOSPITAL ENCOUNTER (OUTPATIENT)
Dept: SPEECH THERAPY | Facility: HOSPITAL | Age: 67
Setting detail: THERAPIES SERIES
Discharge: HOME | End: 2017-11-16

## 2017-11-16 ENCOUNTER — HOSPITAL ENCOUNTER (OUTPATIENT)
Dept: PHYSICAL THERAPY | Facility: HOSPITAL | Age: 67
Setting detail: THERAPIES SERIES
Discharge: HOME | End: 2017-11-16
Attending: PHYSICAL MEDICINE & REHABILITATION

## 2017-11-16 DIAGNOSIS — R29.898 LUE WEAKNESS: ICD-10-CM

## 2017-11-16 DIAGNOSIS — R26.89 FUNCTIONAL GAIT ABNORMALITY: ICD-10-CM

## 2017-11-16 DIAGNOSIS — I69.354 HEMIPLEGIA AND HEMIPARESIS FOLLOWING CEREBRAL INFARCTION AFFECTING LEFT NON-DOMINANT SIDE (HCC): Primary | ICD-10-CM

## 2017-11-16 DIAGNOSIS — IMO0002 LACK OF COORDINATION DUE TO STROKE: ICD-10-CM

## 2017-11-16 DIAGNOSIS — I69.319 COGNITIVE DEFICIT FOLLOWING CEREBROVASCULAR ACCIDENT (CVA): Primary | ICD-10-CM

## 2017-11-16 DIAGNOSIS — I69.328 SPEECH OR LANGUAGE DEFICIT FOLLOWING CEREBROVASCULAR ACCIDENT: ICD-10-CM

## 2017-11-16 PROCEDURE — G9175 SPEECH LANG GOAL STATUS: HCPCS | Performed by: SPEECH-LANGUAGE PATHOLOGIST

## 2017-11-16 PROCEDURE — 97112 NEUROMUSCULAR REEDUCATION: CPT

## 2017-11-16 PROCEDURE — 92507 TX SP LANG VOICE COMM INDIV: CPT

## 2017-11-16 PROCEDURE — G9176 SPEECH LANG D/C STATUS: HCPCS | Performed by: SPEECH-LANGUAGE PATHOLOGIST

## 2017-11-16 PROCEDURE — 97110 THERAPEUTIC EXERCISES: CPT

## 2017-11-16 PROCEDURE — 92507 TX SP LANG VOICE COMM INDIV: CPT | Performed by: SPEECH-LANGUAGE PATHOLOGIST

## 2017-11-16 NOTE — THERAPY TREATMENT NOTE
Outpatient Occupational Therapy Neuro Treatment Note  [facility]     Patient Name: Rosi Vicente  : 1950  MRN: [id number]  Today's Date: 2017       Visit Date: 2017    Patient Active Problem List   Diagnosis   • Hypertension   • Allergic rhinitis   • Trigeminal neuralgia   • Hyperlipidemia   • New daily persistent headache   • Osteoarthritis of hip   • Vitamin D deficiency   • History of CVA (cerebrovascular accident)   • Stroke   • Heartburn   • Primary insomnia   • Frequent urination   • Chronic left shoulder pain        Past Medical History:   Diagnosis Date   • Allergic rhinitis    • Anemia    • Bronchitis    • FH: colon cancer    • H/O bone density study    • H/O mammogram    • Hypertension     Benign Essential   • Malaise and fatigue    • Nocturia    • Stroke 2017   • TMJ (temporomandibular joint syndrome)    • Trigeminal neuralgia     Surgery at [facility] in  repeat in         Past Surgical History:   Procedure Laterality Date   • AUGMENTATION MAMMAPLASTY     • COLONOSCOPY N/A 2015    Repeat Q 5 years due to Fhx of Colon Ca-Dr. Polk   • PAP SMEAR N/A     Dr. Burden         Visit Dx:    ICD-10-CM ICD-9-CM   1. Hemiplegia and hemiparesis following cerebral infarction affecting left non-dominant side I69.354 438.22   2. LUE weakness R29.898 729.89   3. Lack of coordination due to stroke I63.9 434.91    R27.9 781.3                         OT Assessment/Plan       17 1021       OT Assessment    Assessment Comments Pt reports pain in L shoulder has been limiting use of LUE (shoulder problems present prior to CVA), is going to look into injection for shoulder to help with pain. Pt demos increased strength with L hand, able to tolerate increased resistance to red theraputty this date.  -MR       User Key  (r) = Recorded By, (t) = Taken By, (c) = Cosigned By    Initials Name Provider Type    MR Caitlyn Bobby, OT Occupational Therapist                "     Therapy Education       11/16/17 1020 11/16/17 1009       Therapy Education    Education Details  Floor transfers perfromed with pt with R UE on a mat and then with PT providing minimal HHA.   -LB     Given HEP  -MR      Program Progressed   pt progressed to red theraputty this date with L hand  -MR      How Provided Verbal;Demonstration  -MR Verbal  -LB     Provided to Patient  -MR Patient  -LB     Level of Understanding Teach back education performed  -MR Verbalized;Demonstrated;Teach back education performed  -LB       User Key  (r) = Recorded By, (t) = Taken By, (c) = Cosigned By    Initials Name Provider Type    LB Peri Hernandez, PT Physical Therapist    MR Caitlyn Bobby, OT Occupational Therapist                    OT Exercises       11/16/17 0900          Subjective Comments    Subjective Comments \"I'm thinking about getting an injection in my shoulder.\"  -MR      Subjective Pain    Pre-Treatment Pain Level 2  -MR      Subjective Pain Comment L shoulder  -MR      Exercise 1    Exercise Name 1 Pt performs activity to increase strength and coordination with L hand for improved functional use. Using red theraputty pt performs in hand manipulation/gross grasp, then removes small beads after embedded by OT  -MR      Cueing 1 Verbal;Demo  -MR      Equipment 1 Theraputty  -MR      Resistance 1 Red  -MR      Exercise 2    Exercise Name 2 UE therapeutic exercise to increase strength for improved use of LUE. Using hand weight pt performs bicep curls, forearm pronation/supination, and wrist flexion/extension.   -MR      Cueing 2 Verbal;Demo  -MR      Equipment 2 [place]  -MR      Weights/Plates 2 --   1#, 2#  -MR      Exercise 3    Exercise Name 3 FM activity to increase coordination for functional use of LUE. Using Purdue pegboard pt assembles small rods, washers, and cylinders following OT demo/cues. Multiple reps completed.  -MR      Cueing 3 Verbal;Demo  -MR        User Key  (r) = Recorded By, (t) = Taken " By, (c) = Cosigned By    Initials Name Provider Type    MR Velma Rohn, OT Occupational Therapist                    Time Calculation:   OT Start Time: 0933  OT Stop Time: 1015  OT Time Calculation (min): 42 min     Therapy Charges for Today     Code Description Service Date Service Provider Modifiers Qty    47044529205  OT THER PROC EA 15 MIN 11/16/2017 Caitlyn Bobby, OT GO 1    [id number]  OT NEUROMUSC RE EDUCATION EA 15 MIN 11/16/2017 Caitlyn Bobby OT GO 2                    Caitlyn Bobby OT  11/16/2017

## 2017-11-16 NOTE — THERAPY TREATMENT NOTE
"    Outpatient Physical Therapy Neuro Treatment Note  [facility]     Patient Name: Rosi Vicente  : 1950  MRN: [id number]  Today's Date: 2017      Visit Date: 2017    Visit Dx:    ICD-10-CM ICD-9-CM   1. Hemiplegia and hemiparesis following cerebral infarction affecting left non-dominant side I69.354 438.22   2. Functional gait abnormality R26.89 781.2       Patient Active Problem List   Diagnosis   • Hypertension   • Allergic rhinitis   • Trigeminal neuralgia   • Hyperlipidemia   • New daily persistent headache   • Osteoarthritis of hip   • Vitamin D deficiency   • History of CVA (cerebrovascular accident)   • Stroke   • Heartburn   • Primary insomnia   • Frequent urination   • Chronic left shoulder pain                 PT Neuro       17 0900          Subjective Comments    Subjective Comments \"I am thinking about possibly getting an injection in my shoulder.\" \"I wasreaching for something under the bed and ended up on the floor. I called my  to come help me up.\" \"It was near the end of the day.\"  -LB      Precautions and Contraindications    Precautions left shoulder arthritis (planned total shoulder replacement  -LB      Subjective Pain    Able to rate subjective pain? yes  -LB      Pre-Treatment Pain Level 3  -LB      Post-Treatment Pain Level 3  -LB      Subjective Pain Comment left shoulder  -LB      Transfers    Transfers, Sit-Stand [place] independent   from armless chair without use of UE's   -LB      Transfers, Stand-Sit [place] independent   to armless chair w/o use of UE's   -LB      Transfer, Impairments strength decreased  -LB      Transfer, Comment Standing to 1/2 kneeling with R UE support on a mat with CGA. 1/2 kneeling to all fours and vice vers with SBA. 1/2 kneeling to standing with minimal HHA or minimal wt on the mat with CGA with good trunk control.   -LB      Gait Assessment/Treatment    Gait, [place] Level contact guard assist;verbal cues " "required;minimum assist (75% patient effort)   minimal tactile cues to the L upper thoracic region ~ 20%   -LB      Gait, Assistive Device other (see comments)   no device  -LB      Gait, Distance (Feet) 175   x 3   -LB      Gait, Gait Deviations other (see comments)   w/o device L trunk shortens minimally at stance   -LB      Gait, Safety Issues other (see comments)   Pt reporting using her cane at night.   -LB      Gait, Impairments strength decreased;impaired balance  -LB      Gait, Comment Pt's lateral trunk flexion noted in the upper thoraicic region.   -LB      Balance Skills Training    Standing-Level of Assistance Contact guard  -LB      Static Standing Balance Support No upper extremity supported  -LB      Standing-Balance Activities Weight Shift A-P  -LB      Gait Balance-Level of Assistance Contact guard  -LB      Gait Balance Support No upper extremity supported  -LB      Gait Balance Activities side-stepping;backwards  -LB        User Key  (r) = Recorded By, (t) = Taken By, (c) = Cosigned By    Initials Name Provider Type    LAVELL Hernandez PT Physical Therapist                        PT Assessment/Plan       11/16/17 1012       PT Assessment    Assessment Comments Pt demonstrating decreased lateral trunk flexion with ambulation without a device. Pt improved with floor transfers.   -LB       User Key  (r) = Recorded By, (t) = Taken By, (c) = Cosigned By    Initials Name Provider Type    LAVELL Hernandez PT Physical Therapist                     Exercises       11/16/17 0900 11/16/17 0850       Subjective Comments    Subjective Comments \"I am thinking about possibly getting an injection in my shoulder.\" \"I wasreaching for something under the bed and ended up on the floor. I called my  to come help me up.\" \"It was near the end of the day.\"  -LB      Subjective Pain    Able to rate subjective pain? yes  -LB      Pre-Treatment Pain Level 3  -LB      Post-Treatment Pain Level 3  -LB      Subjective " "Pain Comment left shoulder  -LB      Exercise 8    Exercise Name 8  TREADMILL at 1.7 mph  -LB     Cueing 8  Verbal  -LB     Time (Minutes) 8  7  -LB     Time (Seconds) 8  10  -LB     Exercise 13    Exercise Name 13  Pelvis in neutral with minimal UE support with with knee flexion each LE   -LB     Cueing 13  Tactile  -LB     Reps 13  10  -LB     Exercise 17    Exercise Name 17  STANDING against a wall with knees flexed ~ 30 degrees with abdominal contraction while lifting the R LE  -LB     Cueing 17  Verbal   to keep the trunk in neutral   -LB     Reps 17  5  -LB     Exercise 18    Exercise Name 18  STANDING with trunk and left hip in neutral with pt stepping up onto 6\" step with R LE with bilateral UE support.  -LB     Cueing 18  Tactile  -LB     Exercise 19    Exercise Name 19  STEPUPS L LE  forward with bilateral UE support   -LB     Cueing 19  Verbal  -LB     Reps 19  10  -LB     Exercise 20    Exercise Name 20  STEPUPS L LE lateral onto 6\" step with bilateral UE support  -LB     Cueing 20  Verbal  -LB     Reps 20  10  -LB       User Key  (r) = Recorded By, (t) = Taken By, (c) = Cosigned By    Initials Name Provider Type    LAVELL Hernandez, PT Physical Therapist                              PT OP Goals       11/16/17 1000       PT Short Term Goals    STG 1 Pt to be independent with HEP with emphasis on balance and L LE strengthening.   -LB     STG 1 Progress Met  -LB     STG 2 Pt to score a 46/56 on the VIRK to reduce risk of falls.  -LB     STG 2 Progress Met  -LB     STG 3 Pt to score a 18/24 on the Dynamic Gait Index to reduce risk for falls.  -LB     STG 3 Progress Met  -LB     STG 4 Pt to achieve 1/2 kneeling with CGA.  -LB     STG 4 Progress Met  -LB     STG 5 Pt to ambulate 250' x 2 with quad tipped cane conditional independent.    -LB     STG 5 Progress Met  -LB     STG 6 Pt to ambulate with increased knee extension at swing and stance phase.   -LB     STG 6 Progress Met  -LB       User Key  (r) = " Recorded By, (t) = Taken By, (c) = Cosigned By    Initials Name Provider Type    LAVELL Hernandez PT Physical Therapist                Therapy Education       11/16/17 1009          Therapy Education    Education Details Floor transfers perfromed with pt with R UE on a mat and then with PT providing minimal HHA.   -LB      How Provided Verbal  -LB      Provided to Patient  -LB      Level of Understanding Verbalized;Demonstrated;Teach back education performed  -LB        User Key  (r) = Recorded By, (t) = Taken By, (c) = Cosigned By    Initials Name Provider Type    LAVELL Hernandez PT Physical Therapist                Time Calculation:   Start Time: 0850  Stop Time: 0930  Time Calculation (min): 40 min     Therapy Charges for Today     Code Description Service Date Service Provider Modifiers Qty    [id number]  PT NEUROMUSC RE EDUCATION EA 15 MIN 11/16/2017 Peri Hernandez PT GP 3                    Peri Hernandez PT  11/16/2017

## 2017-11-16 NOTE — THERAPY DISCHARGE NOTE
Outpatient Speech Language Pathology   Adult Speech Language Cognitive Treatment Note/Discharge Summary  [facility]     Patient Name: Rosi Vicente  : 1950  MRN: [id number]  Today's Date: 2017         Visit Date: 2017   Patient Active Problem List   Diagnosis   • Hypertension   • Allergic rhinitis   • Trigeminal neuralgia   • Hyperlipidemia   • New daily persistent headache   • Osteoarthritis of hip   • Vitamin D deficiency   • History of CVA (cerebrovascular accident)   • Stroke   • Heartburn   • Primary insomnia   • Frequent urination   • Chronic left shoulder pain          Visit Dx:    ICD-10-CM ICD-9-CM   1. Cognitive deficit following cerebrovascular accident (CVA) I69.319 438.0   2. Speech or language deficit following cerebrovascular accident I69.328 438.10                             SLP OP Goals       17 1000       Subjective Comments    Subjective Comments (P)  Mrs. Vicente has shown continued improvement in therapy and has been completing homework assignments independently.   -NS     Subjective Pain    Able to rate subjective pain? (P)  yes  -NS     Pre-Treatment Pain Level (P)  0  -NS     Post-Treatment Pain Level (P)  0  -NS     Written Language Comprehension Goals    Written Language Comprehension LTG's (P)  Patient will be able to comprehend written material in social/avocational/work setting  -NS     Patient will be able to comprehend written material in social/avocational/work setting (P)  90%:;without cues  -NS     Status: Patient will be able to comprehend written material in social/avocational/work setting (P)  Achieved  -NS     Comments: Patient will be able to comprehend written material in social/avocational/work setting (P)  Mrs. Vicente has improved in attention to detail and is able to self correct mistakes and check work during comprehensiotn tasks.   -NS     Patient will improve comprehension of written language skills by following three-step written directions (P)   90%:;without cues  -NS     Status: Patient will improve comprehension of written language skills by following three-step written directions (P)  Achieved  -NS     Comments: Patient will improve comprehension of written language skills by following three-step written directions (P)  Patient able to follow three step written instructions without cues.   -NS     Patient will improve comprehension of written language skills by answering written questions related to home management/social/work tasks (bills, recipes, tv guide, emails, procedures) (P)  90%:;without cues  -NS     Status: Patient will improve comprehension of written language skills by answering written questions related to home management/social/work tasks (bills, recipes, tv guide, emails, procedures) (P)  Achieved  -NS     Comments: Patient will improve comprehension of written language skills by answering written questions related to home management/social/work tasks (bills, recipes, tv guide, emails, procedures) (P)  Mrs. Vicente completed an infrencing task with 90% accuracy without cues, 100% with min cues.   -NS     Auditory Comprehension Goals    Auditory Comprehension LTG's (P)  Patient will comprehend abstract and complex information presented in all social, avocational, or work settings  -NS     Patient will comprehend abstract and complex information presented in all social, avocational, or work settings (P)  90%:;without cues  -NS     Status: Patient will comprehend abstract and complex information presented in all social, avocational, or work settings (P)  Achieved  -NS     Comments: Patient will comprehend abstract and complex information presented in all social, avocational, or work settings (P)  Mrs. Vicente is able to comprehend complex information on deductive reasoning tasks without cues.   -NS     Patient will improve comprehension of spoken language by following Multi-step directions (P)  90%:;without cues  -NS     Status: Patient will  improve comprehension of spoken language by following Multi-step directions (P)  Achieved  -NS     Comments: Patient will improve comprehension of spoken language by following Multi-step directions (P)  Completed ipad task with 3-steps commands with 92% accuracy without cues.   -NS     Patient will demonstrate comprehension of spoken language by answering questions about a multi paragraph length content (P)  90%:;without cues  -NS     Status: Patient will demonstrate comprehension of spoken language by answering questions about a multi paragraph length content (P)  Achieved  -NS     Comments: Patient will demonstrate comprehension of spoken language by answering questions about a multi paragraph length content (P)  Mrs. Vicente has demonstrated ability to comprehend and remember details from multi-paragraph stories.  -NS     Executive Function Goals    Patient will be able to engage in avocational activities requiring high level cognitive skills (P)  90%:;without cues  -NS     Status: Patient will be able to engage in avocational activities requiring high level cognitive skills (P)  Achieved  -NS     Comments: Patient will be able to engage in avocational activities requiring high level cognitive skills (P)  Mrs. Vicente completes high level cognitive tasks with 90% accuracy without cues, including deductive reasoning, problem solving, and inferencing.   -NS     Patient will be able to use high level cognitive skills to allow patient to return to work (P)  90%:;without cues  -NS     Status: Patient will be able to use high level cognitive skills to allow patient to return to work (P)  Achieved  -NS     Comments: Patient will be able to use high level cognitive skills to allow patient to return to work (P)  Mrs. Vicente demonstrates skils needed to participate in work if she choses to return.   -NS     Patient will improve executive functioning skills by using planning strategies prior to beginning tasks (P)  90%:;without  cues  -NS     Status: Patient will improve executive functioning skills by using planning strategies prior to beginning tasks (P)  Achieved  -NS     Comments: Patient will improve executive functioning skills by using planning strategies prior to beginning tasks (P)  Mrs. Vicente completed 2 deductive reasoning tasks with 90% accuracy without cues. Planning strategies used without cueing.  -NS     Patient will improve executive functioning skills by using self-monitoring strategies during functional tasks (P)  90%:;without cues  -NS     Status: Patient will improve executive functioning skills by using self-monitoring strategies during functional tasks (P)  Achieved  -NS     Comments: Patient will improve executive functioning skills by using self-monitoring strategies during functional tasks (P)  Mrs. Vicente has improved in self monitoring skills throughout course of therapy. She checks work indepdently and notices any mistakes. Overall 90% accuracy without cues, 100% with min cues.  -NS     Problem Solving Goals    Problem Solving LTG's (P)  Patient will be able to engage in avocational activities requiring high level cognitive skills  -NS     Patient will be able to engage in avocational activities requiring high level cognitive skills (P)  Independently  -NS     Status: Patient will be able to engage in avocational activities requiring high level cognitive skills (P)  Achieved  -NS     Comments: Patient will be able to engage in avocational activities requiring high level cognitive skills (P)  Patient completes deductive reasoning puzzles with 90% accuracy without cues.  -NS     Patient will improve ability to analyze problems and determine solutions by demonstrating ability to complete household/work management tasks in an organized approach (P)  90%:;without cues  -NS     Status: Patient will improve ability to analyze problems and determine solutions by demonstrating ability to complete household/work management  tasks in an organized approach (P)  Achieved  -NS     Comments: Patient will improve ability to analyze problems and determine solutions by demonstrating ability to complete household/work management tasks in an organized approach (P)  Deductive reasoning tasks and inferencing completed with 90% accuracy without cues.  -NS     Patient will improve ability to analyze problems and determine solutions by completing a visual representation/filling in a chart by following  written directions (P)  90%:;without cues  -NS     Status: Patient will improve ability to analyze problems and determine solutions by completing a visual representation/filling in a chart by following  written directions (P)  Achieved  -NS     Comments: Patient will improve ability to analyze problems and determine solutions by completing a visual representation/filling in a chart by following  written directions (P)  Mrs. Vicente completes complex puzzles with charts at 90% accuracy without cues.   -NS       User Key  (r) = Recorded By, (t) = Taken By, (c) = Cosigned By    Initials Name Provider Type    MARY Davis, Speech Therapy Student Speech Therapy Student                     SLP Outcome Measures (last 72 hours)      SLP Outcome Measures       11/16/17 1100          OTHER    FCM Chosen (P)  Problem Solving;Reading  -NS      Problem Solving FCM Score (P)  7  -NS      Reading FCM Score (P)  7  -NS        User Key  (r) = Recorded By, (t) = Taken By, (c) = Cosigned By    Initials Name Effective Dates    MARY Davis, Speech Therapy Student 08/21/17 -                Time Calculation:   SLP Start Time: (P) 1015  SLP Stop Time: (P) 1100  SLP Time Calculation (min): (P) 45 min    Therapy Charges for Today     Code Description Service Date Service Provider Modifiers Qty    [id number]  ST TREATMENT SPEECH 3 11/16/2017 Pranav Alatorre MA,CCC-SLP SHIMA, GN 1          SLP G-Codes  SLP NOMS Used?: (P) Yes  Functional Limitations: (P) Other Speech  Language Pathology  Other Speech-Language Pathology Functional Limitation Goal Status (): (P) At least 1 percent but less than 20 percent impaired, limited or restricted  Other Speech-Language Pathology Functional Limitation Discharge Status (): (P) At least 1 percent but less than 20 percent impaired, limited or restricted      OP SLP Discharge Summary  Date of Discharge: 11/16/17  Reason for Discharge: All goals achieved  Outcomes Achieved: Able to achieve all goals within established timeline  Discharge Destination: Home with home program      Pranav Alatorre MA,CCC-SLP  11/16/2017

## 2017-11-17 ENCOUNTER — TELEPHONE (OUTPATIENT)
Dept: INTERNAL MEDICINE | Facility: CLINIC | Age: 67
End: 2017-11-17

## 2017-11-17 DIAGNOSIS — K21.9 GASTROESOPHAGEAL REFLUX DISEASE, ESOPHAGITIS PRESENCE NOT SPECIFIED: Primary | ICD-10-CM

## 2017-11-17 RX ORDER — OMEPRAZOLE 40 MG/1
40 CAPSULE, DELAYED RELEASE ORAL DAILY
Qty: 90 CAPSULE | Refills: 1 | Status: SHIPPED | OUTPATIENT
Start: 2017-11-17 | End: 2017-12-06

## 2017-11-17 NOTE — TELEPHONE ENCOUNTER
----- Message from Peri Mijares sent at 11/17/2017  9:19 AM EST -----  Contact: Patient  Patient called requesting Rx for omeprazole 40 mg. States she has been on this in the past.  Please advise.    [facility] - [place], [place] - [address] - [phone]  - [phone] FX    Pt# [phone]

## 2017-11-20 ENCOUNTER — HOSPITAL ENCOUNTER (OUTPATIENT)
Dept: PHYSICAL THERAPY | Facility: HOSPITAL | Age: 67
Setting detail: THERAPIES SERIES
Discharge: HOME | End: 2017-11-20

## 2017-11-20 ENCOUNTER — APPOINTMENT (OUTPATIENT)
Dept: SPEECH THERAPY | Facility: HOSPITAL | Age: 67
End: 2017-11-20

## 2017-11-20 ENCOUNTER — APPOINTMENT (OUTPATIENT)
Dept: OCCUPATIONAL THERAPY | Facility: HOSPITAL | Age: 67
End: 2017-11-20

## 2017-11-20 DIAGNOSIS — I69.354 HEMIPLEGIA AND HEMIPARESIS FOLLOWING CEREBRAL INFARCTION AFFECTING LEFT NON-DOMINANT SIDE (HCC): Primary | ICD-10-CM

## 2017-11-20 DIAGNOSIS — R26.89 FUNCTIONAL GAIT ABNORMALITY: ICD-10-CM

## 2017-11-20 PROCEDURE — 97112 NEUROMUSCULAR REEDUCATION: CPT

## 2017-11-20 NOTE — THERAPY TREATMENT NOTE
"    Outpatient Physical Therapy Neuro Treatment Note  [facility]     Patient Name: Rosi Vicente  : 1950  MRN: [id number]  Today's Date: 2017      Visit Date: 2017    Visit Dx:    ICD-10-CM ICD-9-CM   1. Hemiplegia and hemiparesis following cerebral infarction affecting left non-dominant side I69.354 438.22   2. Functional gait abnormality R26.89 781.2       Patient Active Problem List   Diagnosis   • Hypertension   • Allergic rhinitis   • Trigeminal neuralgia   • Hyperlipidemia   • New daily persistent headache   • Osteoarthritis of hip   • Vitamin D deficiency   • History of CVA (cerebrovascular accident)   • Stroke   • Heartburn   • Primary insomnia   • Frequent urination   • Chronic left shoulder pain                 PT Neuro       17 0993          Subjective Comments    Subjective Comments \"I have 3 questions.\" \"What parameters do I have to meet to drive?\" \"Do i need to keep using the cane?\" \"When can I start Jazzercise?\"  -LB      Precautions and Contraindications    Precautions left shoulder arthritis (planned total shoulder replacement  -LB      Subjective Pain    Able to rate subjective pain? yes  -LB      Pre-Treatment Pain Level 3  -LB      Post-Treatment Pain Level 3  -LB      Transfers    Transfers, Sit-Stand [place] independent  -LB      Transfers, Stand-Sit [place] independent  -LB      Transfers, Sit-Stand-Sit, Assist Device other (see comments)   no device   -LB      Gait Assessment/Treatment    Gait, [place] Level contact guard assist;verbal cues required  -LB      Gait, Assistive Device other (see comments)   no device   -LB      Gait, Distance (Feet) 150   x 3  -LB      Gait, Gait Deviations other (see comments)   w/o device L trunk shortens minimally at stance )  -LB      Gait, Safety Issues other (see comments)   no loss of balance without cane   -LB      Gait, Impairments strength decreased;impaired balance  -LB      Gait, Comment Pt was advised to " "continue with cane except for short distances in the home during daytime hours.   -LB      Stairs Assessment/Treatment    Number of Stairs four 7\" and seven 4\"   -LB      Stairs, Handrail Location none  -LB      Stairs, [place] Level --   SBA to CGA with emphasis on balance   -LB      Stairs, Technique Used step over step (ascending);step over step (descending)  -LB      Stairs, Impairments strength decreased  -LB      Stairs, Comment Pt strongly advised to always use a rail when possible. Pt ascended and descended four 7\" steps with quadtipped cane with supervision.   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 Pt's quality of gait improving but still has slight lateral trunk flexion to the left when ambulating without the cane.   -LB       User Key  (r) = Recorded By, (t) = Taken By, (c) = Cosigned By    Initials Name Provider Type    LAVELL Hernandez, PT Physical Therapist                     Exercises       11/20/17 0926          Subjective Comments    Subjective Comments \"I have 3 questions.\" \"What parameters do I have to meet to drive?\" \"Do i need to keep using the cane?\" \"When can I start Jazzercise?\"  -LB      Subjective Pain    Able to rate subjective pain? yes  -LB      Pre-Treatment Pain Level 3  -LB      Post-Treatment Pain Level 3  -LB      Exercise 9    Exercise Name 9 TRAMPOLINE -unilateral standing with minimal UEsupport   -LB      Cueing " 9 Tactile  -LB      Reps 9 5  -LB      Exercise 10    Exercise Name 10 bilateral plantarflexion over edge of step with bilateral UE support   -LB      Cueing 10 Verbal  -LB      Reps 10 12  -LB      Exercise 11    Exercise Name 11 LE jumping jacks with light UE support  -LB      Cueing 11 Verbal  -LB      Reps 11 8  -LB      Exercise 13    Exercise Name 13 Pelvis in neutral with minimal UE support with with knee flexion each LE   -LB      Cueing 13 Tactile  -LB      Reps 13 12   each LE   -LB      Exercise 14    Exercise Name 14 Walking on toes 30' x 1 with CGA   -LB      Cueing 14 Verbal  -LB      Exercise 15    Exercise Name 15 Therapeutic ball- trunk elongation with light R UE support and strong CGA of PT  -LB      Reps 15 5  -LB      Exercise 16    Exercise Name 16 Therapeutic ball -trunk in neutral and then flexing each hip slightly with R light UE support  -LB      Cueing 16 Tactile  -LB      Reps 16 10  -LB        User Key  (r) = Recorded By, (t) = Taken By, (c) = Cosigned By    Initials Name Provider Type    LAVELL Hernandez PT Physical Therapist                                  Therapy Education       11/20/17 1651          Therapy Education    Education Details Discussed with pt the need to continue with use of the cane to prevent permanent gait deviations. Pt askin several questions regarding returning to jazzercise. Advised pt to start at the lowest level possible and to emphasis core stability.   -LB      Given Mobility training;Other (comment)  -LB      How Provided Verbal  -LB      Provided to Patient  -LB      Level of Understanding Verbalized  -LB        User Key  (r) = Recorded By, (t) = Taken By, (c) = Cosigned By    Initials Name Provider Type    LAVELL Hernandez PT Physical Therapist                Time Calculation:   Start Time: 0941  Stop Time: 1023  Time Calculation (min): 42 min     Therapy Charges for Today     Code Description Service Date Service Provider Modifiers Qty    [id number]   PT NEUROMUSC RE EDUCATION EA 15 MIN 11/20/2017 Peri Hernandez, PT GP 3                    Peri Hernandez, PT  11/20/2017

## 2017-11-20 NOTE — THERAPY TREATMENT NOTE
"    Outpatient Physical Therapy Neuro Treatment Note  [facility]     Patient Name: Rosi Vicente  : 1950  MRN: [id number]  Today's Date: 2017      Visit Date: 2017    Visit Dx:    ICD-10-CM ICD-9-CM   1. Hemiplegia and hemiparesis following cerebral infarction affecting left non-dominant side I69.354 438.22   2. Functional gait abnormality R26.89 781.2       Patient Active Problem List   Diagnosis   • Hypertension   • Allergic rhinitis   • Trigeminal neuralgia   • Hyperlipidemia   • New daily persistent headache   • Osteoarthritis of hip   • Vitamin D deficiency   • History of CVA (cerebrovascular accident)   • Stroke   • Heartburn   • Primary insomnia   • Frequent urination   • Chronic left shoulder pain                 PT Neuro       17 0959          Subjective Comments    Subjective Comments \"I have 3 questions.\" \"What parameters do I have to meet to drive?\" \"Do i need to keep using the cane?\" \"When can I start Jazzercise?\"  -LB      Precautions and Contraindications    Precautions left shoulder arthritis (planned total shoulder replacement  -LB      Subjective Pain    Able to rate subjective pain? yes  -LB      Pre-Treatment Pain Level 3  -LB      Post-Treatment Pain Level 3  -LB      Transfers    Transfers, Sit-Stand [place] independent  -LB      Transfers, Stand-Sit [place] independent  -LB      Transfers, Sit-Stand-Sit, Assist Device other (see comments)   no device   -LB      Gait Assessment/Treatment    Gait, [place] Level contact guard assist;verbal cues required  -LB      Gait, Assistive Device other (see comments)   no device   -LB      Gait, Distance (Feet) 150   x 3  -LB      Gait, Gait Deviations other (see comments)   w/o device L trunk shortens minimally at stance )  -LB      Gait, Safety Issues other (see comments)   no loss of balance without cane   -LB      Gait, Impairments strength decreased;impaired balance  -LB      Gait, Comment Pt was advised to " "continue with cane except for short distances in the home during daytime hours.   -LB      Stairs Assessment/Treatment    Number of Stairs four 7\" and seven 4\"   -LB      Stairs, Handrail Location none  -LB      Stairs, [place] Level --   SBA to CGA with emphasis on balance   -LB      Stairs, Technique Used step over step (ascending);step over step (descending)  -LB      Stairs, Impairments strength decreased  -LB      Stairs, Comment Pt strongly advised to always use a rail when possible. Pt ascended and descended four 7\" steps with quadtipped cane with supervision.   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 Pt's quality of gait improving but still has slight lateral trunk flexion to the left when ambulating without the cane.   -LB       User Key  (r) = Recorded By, (t) = Taken By, (c) = Cosigned By    Initials Name Provider Type    LAVELL Hernandez, PT Physical Therapist                     Exercises       11/20/17 0952          Subjective Comments    Subjective Comments \"I have 3 questions.\" \"What parameters do I have to meet to drive?\" \"Do i need to keep using the cane?\" \"When can I start Jazzercise?\"  -LB      Subjective Pain    Able to rate subjective pain? yes  -LB      Pre-Treatment Pain Level 3  -LB      Post-Treatment Pain Level 3  -LB      Exercise 9    Exercise Name 9 TRAMPOLINE -unilateral standing with minimal UEsupport   -LB      Cueing " 9 Tactile  -LB      Reps 9 5  -LB      Exercise 10    Exercise Name 10 bilateral plantarflexion over edge of step with bilateral UE support   -LB      Cueing 10 Verbal  -LB      Reps 10 12  -LB      Exercise 11    Exercise Name 11 LE jumping jacks with light UE support  -LB      Cueing 11 Verbal  -LB      Reps 11 8  -LB      Exercise 13    Exercise Name 13 Pelvis in neutral with minimal UE support with with knee flexion each LE   -LB      Cueing 13 Tactile  -LB      Reps 13 12   each LE   -LB      Exercise 14    Exercise Name 14 Walking on toes 30' x 1 with CGA   -LB      Cueing 14 Verbal  -LB      Exercise 15    Exercise Name 15 Therapeutic ball- trunk elongation with light R UE support and strong CGA of PT  -LB      Reps 15 5  -LB      Exercise 16    Exercise Name 16 Therapeutic ball -trunk in neutral and then flexing each hip slightly with R light UE support  -LB      Cueing 16 Tactile  -LB      Reps 16 10  -LB        User Key  (r) = Recorded By, (t) = Taken By, (c) = Cosigned By    Initials Name Provider Type    LAVELL Hernandez PT Physical Therapist                                  Therapy Education       11/20/17 1651          Therapy Education    Education Details Discussed with pt the need to continue with use of the cane to prevent permanent gait deviations. Pt askin several questions regarding returning to jazzercise. Advised pt to start at the lowest level possible and to emphasis core stability.   -LB      Given Mobility training;Other (comment)  -LB      How Provided Verbal  -LB      Provided to Patient  -LB      Level of Understanding Verbalized  -LB        User Key  (r) = Recorded By, (t) = Taken By, (c) = Cosigned By    Initials Name Provider Type    LAVELL Hernandez PT Physical Therapist                Time Calculation:   Start Time: 0941  Stop Time: 1023  Time Calculation (min): 42 min     Therapy Charges for Today     Code Description Service Date Service Provider Modifiers Qty    [id number]   PT NEUROMUSC RE EDUCATION EA 15 MIN 11/20/2017 Peri Hernandez, PT KX, GP 3                    Peri Hernandez, PT  11/20/2017

## 2017-11-21 ENCOUNTER — OFFICE VISIT (OUTPATIENT)
Dept: INTERNAL MEDICINE | Facility: CLINIC | Age: 67
End: 2017-11-21

## 2017-11-21 ENCOUNTER — APPOINTMENT (OUTPATIENT)
Dept: OCCUPATIONAL THERAPY | Facility: HOSPITAL | Age: 67
End: 2017-11-21

## 2017-11-21 VITALS
TEMPERATURE: 98.1 F | HEART RATE: 60 BPM | SYSTOLIC BLOOD PRESSURE: 114 MMHG | DIASTOLIC BLOOD PRESSURE: 74 MMHG | OXYGEN SATURATION: 98 % | BODY MASS INDEX: 19.93 KG/M2 | WEIGHT: 124 LBS | HEIGHT: 66 IN

## 2017-11-21 DIAGNOSIS — R35.0 FREQUENT URINATION: ICD-10-CM

## 2017-11-21 DIAGNOSIS — I10 ESSENTIAL HYPERTENSION: Primary | ICD-10-CM

## 2017-11-21 PROCEDURE — 99213 OFFICE O/P EST LOW 20 MIN: CPT | Performed by: NURSE PRACTITIONER

## 2017-11-22 NOTE — PROGRESS NOTES
Subjective   Rosi Vicente is a 67 y.o. female who presents for f/u regarding elevated blood pressure.    HPI Comments: She has noticed a sharp increase in her BP readings since her visit earlier this month, has started Lopressor 25mg and ultimately increased to 50mg due to elevated readings. She reports feeling well with no chest pain or shortness of breath. Denies visual changes, no headaches. She was started on Myrbetrig which has been somewhat helpful for urinary frequency.    Hypertension   This is a chronic problem. The current episode started more than 1 year ago. The problem has been gradually worsening since onset. Pertinent negatives include no chest pain, headaches, palpitations, peripheral edema or shortness of breath. Past treatments include beta blockers, ACE inhibitors and diuretics. The current treatment provides moderate improvement. There are no compliance problems.  Hypertensive end-organ damage includes CVA.        The following portions of the patient's history were reviewed and updated as appropriate: allergies, current medications, past social history and problem list.    Past Medical History:   Diagnosis Date   • Allergic rhinitis    • Anemia    • Bronchitis    • FH: colon cancer    • H/O bone density study 2013   • H/O mammogram 2013   • Hypertension     Benign Essential   • Malaise and fatigue    • Nocturia    • Stroke 09/13/2017   • TMJ (temporomandibular joint syndrome)    • Trigeminal neuralgia     [facility]gery at [facility] in 2009 repeat in 2015         Current Outpatient Prescriptions:   •  acetaminophen (TYLENOL) 325 MG tablet, Take 2 tablets by mouth Every 6 (Six) Hours As Needed for Mild Pain  or Moderate Pain ., Disp: , Rfl:   •  Alpha-Lipoic Acid 300 MG capsule, Take 300 mg by mouth daily., Disp: , Rfl:   •  aspirin 325 MG tablet, Take 325 mg by mouth every night at bedtime., Disp: , Rfl:   •  atorvastatin (LIPITOR) 80 MG tablet, Take 1 tablet by mouth Every Night., Disp: 90 tablet,  Rfl: 2  •  calcium carbonate (OS-CARLOS) 600 MG tablet, Take 600 mg by mouth 2 (Two) Times a Day With Meals., Disp: , Rfl:   •  cholecalciferol (VITAMIN D3) 1000 UNITS tablet, Take 2,000 Units by mouth Daily., Disp: , Rfl:   •  clopidogrel (PLAVIX) 75 MG tablet, Take 1 tablet by mouth Daily., Disp: 90 tablet, Rfl: 2  •  famotidine (PEPCID) 20 MG tablet, Take 1 tablet by mouth Daily., Disp: 90 tablet, Rfl: 3  •  fluticasone (FLONASE) 50 MCG/ACT nasal spray, 2 sprays into each nostril Daily., Disp: , Rfl:   •  gabapentin (NEURONTIN) 100 MG capsule, Take 100 mg by mouth At Night As Needed (nerve pain or tremor)., Disp: , Rfl:   •  lisinopril-hydrochlorothiazide (ZESTORETIC) 20-12.5 MG per tablet, Take 1 tablet by mouth Daily., Disp: 30 tablet, Rfl: 1  •  Melatonin 10 MG tablet, Take 1 tablet by mouth At Night As Needed (sleep)., Disp: , Rfl:   •  metoprolol tartrate (LOPRESSOR) 50 MG tablet, Take 25 mg by mouth 2 (Two) Times a Day. Per patient: doctor told patient she can test her blood pressure at home and if diastolic blood pressure is high, to take 1/2 tablet of her Metoprolol 50 mg either with lunch or dinner. , Disp: , Rfl:   •  montelukast (SINGULAIR) 10 MG tablet, Take 1 tablet by mouth Daily., Disp: 90 tablet, Rfl: 4  •  Omega-3 Fatty Acids (FISH OIL) 1000 MG capsule capsule, Take 1 capsule by mouth Daily., Disp: , Rfl:   •  omeprazole (priLOSEC) 40 MG capsule, Take 1 capsule by mouth Daily., Disp: 90 capsule, Rfl: 1  •  Probiotic Product (PROBIOTIC DAILY PO), Take 1 tablet by mouth Daily., Disp: , Rfl:   •  Psyllium (METAMUCIL FIBER PO), See Admin Instructions. Mix and drink daily, Disp: , Rfl:   •  vitamin B-12 (CYANOCOBALAMIN) 1000 MCG tablet, Take 1,000 mcg by mouth 2 (two) times a day., Disp: , Rfl:     No Known Allergies    Review of Systems   Constitutional: Positive for fatigue. Negative for chills, fever and unexpected weight change.   HENT: Negative for congestion, ear pain, postnasal drip, sinus  "pressure, sore throat and trouble swallowing.    Eyes: Negative for visual disturbance.   Respiratory: Negative for cough, chest tightness, shortness of breath and wheezing.    Cardiovascular: Negative for chest pain, palpitations and leg swelling.   Gastrointestinal: Negative for abdominal pain, blood in stool, nausea and vomiting.   Genitourinary: Positive for frequency and urgency. Negative for dysuria.   Musculoskeletal: Negative for arthralgias and joint swelling.   Skin: Negative for color change.   Neurological: Negative for syncope, weakness and headaches.   Hematological: Does not bruise/bleed easily.       Objective   Vitals:    11/21/17 1435 11/21/17 1457   BP: 122/78 114/74   BP Location: Left arm Right arm   Patient Position: Sitting    Cuff Size: Adult    Pulse: 60    Temp: 98.1 °F (36.7 °C)    TempSrc: Oral    SpO2: 98%    Weight: 124 lb (56.2 kg)    Height: 66\" (167.6 cm)      Physical Exam   Constitutional: She is oriented to person, place, and time. She appears well-developed and well-nourished. No distress.   HENT:   Head: Normocephalic and atraumatic.   Right Ear: External ear normal.   Left Ear: External ear normal.   Eyes: Conjunctivae are normal.   Neck: Normal range of motion. Neck supple.   Cardiovascular: Normal rate, regular rhythm, normal heart sounds and intact distal pulses.  Exam reveals no gallop and no friction rub.    No murmur heard.  Pulmonary/Chest: Effort normal and breath sounds normal. No respiratory distress.   Lymphadenopathy:     She has no cervical adenopathy.   Neurological: She is alert and oriented to person, place, and time.   Skin: Skin is warm and dry. No rash noted. No erythema.   Psychiatric: She has a normal mood and affect. Her behavior is normal.   Nursing note and vitals reviewed.      Assessment/Plan   Rosi was seen today for hypertension.    Diagnoses and all orders for this visit:    Essential hypertension  Comments:  continue Lisinopril HCTZ & change " Metoprolol to 25mg twice a day    Frequent urination  Comments:  rise in BP due to Myrbetriq? hold medication for now and email BP readings in 1-2 weeks

## 2017-11-27 ENCOUNTER — HOSPITAL ENCOUNTER (OUTPATIENT)
Dept: OCCUPATIONAL THERAPY | Facility: HOSPITAL | Age: 67
Setting detail: THERAPIES SERIES
Discharge: HOME | End: 2017-11-27

## 2017-11-27 ENCOUNTER — HOSPITAL ENCOUNTER (OUTPATIENT)
Dept: PHYSICAL THERAPY | Facility: HOSPITAL | Age: 67
Setting detail: THERAPIES SERIES
Discharge: HOME | End: 2017-11-27

## 2017-11-27 ENCOUNTER — APPOINTMENT (OUTPATIENT)
Dept: SPEECH THERAPY | Facility: HOSPITAL | Age: 67
End: 2017-11-27

## 2017-11-27 DIAGNOSIS — IMO0002 LACK OF COORDINATION DUE TO STROKE: ICD-10-CM

## 2017-11-27 DIAGNOSIS — R26.89 FUNCTIONAL GAIT ABNORMALITY: ICD-10-CM

## 2017-11-27 DIAGNOSIS — R29.898 LUE WEAKNESS: ICD-10-CM

## 2017-11-27 DIAGNOSIS — I69.354 HEMIPLEGIA AND HEMIPARESIS FOLLOWING CEREBRAL INFARCTION AFFECTING LEFT NON-DOMINANT SIDE (HCC): Primary | ICD-10-CM

## 2017-11-27 PROCEDURE — 97112 NEUROMUSCULAR REEDUCATION: CPT

## 2017-11-27 PROCEDURE — 97110 THERAPEUTIC EXERCISES: CPT

## 2017-11-27 NOTE — THERAPY TREATMENT NOTE
"    Outpatient Physical Therapy Neuro Treatment Note  [facility]     Patient Name: Rosi Vicente  : 1950  MRN: [id number]  Today's Date: 2017      Visit Date: 2017    Visit Dx:    ICD-10-CM ICD-9-CM   1. Hemiplegia and hemiparesis following cerebral infarction affecting left non-dominant side I69.354 438.22   2. Functional gait abnormality R26.89 781.2       Patient Active Problem List   Diagnosis   • Hypertension   • Allergic rhinitis   • Trigeminal neuralgia   • Hyperlipidemia   • New daily persistent headache   • Osteoarthritis of hip   • Vitamin D deficiency   • History of CVA (cerebrovascular accident)   • Stroke   • Heartburn   • Primary insomnia   • Frequent urination   • Chronic left shoulder pain                 PT Neuro       17 1300          Subjective Comments    Subjective Comments \"I went back to my jazzercise class and it felt great. I didn't do the jumping activities.\" \"I want to practice getting up and down from the floor.\"   -LB      Precautions and Contraindications    Precautions left shoulder arthritis (planned total shoulder replacement  -LB      Subjective Pain    Able to rate subjective pain? yes  -LB      Pre-Treatment Pain Level 2  -LB      Post-Treatment Pain Level 2  -LB      Left Hip    Hip Flexion Gross Movement (4+/5) good plus  -LB      Transfers    Transfers, Sit-Stand Golva independent  -LB      Transfers, Stand-Sit Golva independent  -LB      Transfers, Sit-Stand-Sit, Assist Device other (see comments)   no device without UE support   -LB      Transfer, Safety Issues other (see comments)   w/ floor transfers pt unable to come to stand w/o UE support  -LB      Transfer, Impairments strength decreased  -LB      Transfer, Comment To stand to R 1/2 kneeling and vice versa with R hand on a chair with CGA and verbal cues 1st and 2nd time. 3rd time pt was conditional independent   -LB      Gait Assessment/Treatment    Gait, Golva Level stand " "by assist;minimum assist (75% patient effort)   minimal tactile cues to L gluts at swing & stance ~33%  -LB      Gait, Assistive Device other (see comments)   no device   -LB      Gait, Distance (Feet) 200   x 3   -LB      Gait, Gait Deviations other (see comments)   w/o device L trunk shortens minimally at stance   -LB      Gait, Impairments strength decreased;impaired balance  -LB      Stairs Assessment/Treatment    Number of Stairs 14  -LB      Stairs, Handrail Location left side (ascending)  -LB      Stairs, Hope Level supervision required;verbal cues required   to hold cane up   -LB      Stairs, Technique Used step over step (ascending);step over step (descending)  -LB      Stairs, Impairments strength decreased  -LB      Stairs, Comment 3 steps with cane with supervision (step to step)   -LB      Balance Skills Training    Standing-Level of Assistance Contact guard  -LB      Static Standing Balance Support No upper extremity supported  -LB      Standing-Balance Activities Trampoline   jumping 8 x, unilateral standing & bilateral plantarflexion  -LB        User Key  (r) = Recorded By, (t) = Taken By, (c) = Cosigned By    Initials Name Provider Type    LAVELL Hernandez, PT Physical Therapist                        PT Assessment/Plan       11/27/17 1413       PT Assessment    Assessment Comments Pt was very excited she was able to return to jazzercise class doing only low impact. (pt has been performing jazzercise for over 37 years). Pt was able to perform floor transfers with R UE on a chiar conditional independent.   -LB       User Key  (r) = Recorded By, (t) = Taken By, (c) = Cosigned By    Initials Name Provider Type    LAVELL Hernandez, CRUZITO Physical Therapist                     Exercises       11/27/17 1300          Subjective Comments    Subjective Comments \"I went back to my jazzercise class and it felt great. I didn't do the jumping activities.\" \"I want to practice getting up and down from the " "floor.\"   -LB      Subjective Pain    Able to rate subjective pain? yes  -LB      Pre-Treatment Pain Level 2  -LB      Post-Treatment Pain Level 2  -LB      Exercise 9    Exercise Name 9 TRAMPOLINE -unilateral standing with minimal UEsupport   -LB      Cueing 9 Tactile  -LB      Reps 9 5  -LB        User Key  (r) = Recorded By, (t) = Taken By, (c) = Cosigned By    Initials Name Provider Type    LAVELL Hernandez, PT Physical Therapist                                  Therapy Education       11/27/17 1411          Therapy Education    Given Symptoms/condition management;Mobility training   Floor transfers with one UE on a chair. Advised pt to begin jazzercise at a low impact.   -LB      How Provided Verbal;Demonstration  -LB      Provided to Patient  -LB      Level of Understanding Verbalized;Teach back education performed;Demonstrated  -LB        User Key  (r) = Recorded By, (t) = Taken By, (c) = Cosigned By    Initials Name Provider Type    LAVELL Hernandez PT Physical Therapist                Time Calculation:   Start Time: 1300  Stop Time: 1345  Time Calculation (min): 45 min     Therapy Charges for Today     Code Description Service Date Service Provider Modifiers Qty    86547193440  PT NEUROMUSC RE EDUCATION EA 15 MIN 11/27/2017 Peri Hernandez, PT GP 3                    Peri Hernandez PT  11/27/2017       "

## 2017-11-27 NOTE — THERAPY TREATMENT NOTE
Outpatient Occupational Therapy Neuro Treatment Note  [facility]     Patient Name: Rosi Vicente  : 1950  MRN: [id number]  Today's Date: 2017       Visit Date: 2017    Patient Active Problem List   Diagnosis   • Hypertension   • Allergic rhinitis   • Trigeminal neuralgia   • Hyperlipidemia   • New daily persistent headache   • Osteoarthritis of hip   • Vitamin D deficiency   • History of CVA (cerebrovascular accident)   • Stroke   • Heartburn   • Primary insomnia   • Frequent urination   • Chronic left shoulder pain        Past Medical History:   Diagnosis Date   • Allergic rhinitis    • Anemia    • Bronchitis    • FH: colon cancer    • H/O bone density study    • H/O mammogram    • Hypertension     Benign Essential   • Malaise and fatigue    • Nocturia    • Stroke 2017   • TMJ (temporomandibular joint syndrome)    • Trigeminal neuralgia     Surgery at [facility] in  repeat in         Past Surgical History:   Procedure Laterality Date   • AUGMENTATION MAMMAPLASTY     • COLONOSCOPY N/A 2015    Repeat Q 5 years due to Fhx of Colon Ca-Dr. Polk   • PAP SMEAR N/A     Dr. Burden         Visit Dx:    ICD-10-CM ICD-9-CM   1. Hemiplegia and hemiparesis following cerebral infarction affecting left non-dominant side I69.354 438.22   2. LUE weakness R29.898 729.89   3. Lack of coordination due to stroke I63.9 434.91    R27.9 781.3                         OT Assessment/Plan       17 1457       OT Assessment    Assessment Comments Pt reports increased functional use of LUE, states she has been able to perform more cooking tasks recently. Pt demos improved strength, able to tolerate increased resistance with exercises using digiflex with LUE this date.  -MR       User Key  (r) = Recorded By, (t) = Taken By, (c) = Cosigned By    Initials Name Provider Type     Caitlyn Benjamin Diamante, OT Occupational Therapist                    Therapy Education       17 4161  "11/27/17 1411       Therapy Education    Given Other (comment)  -MR Symptoms/condition management;Mobility training   Floor transfers with one UE on a chair. Advised pt to begin jazzercise at a low impact.   -LB     Program New   Pt given written brochure with information for [facility], as pt with questions regarding this  -MR      How Provided Verbal;Written  -MR Verbal;Demonstration  -LB     Provided to Patient  -MR Patient  -LB     Level of Understanding Verbalized  -MR Verbalized;Teach back education performed;Demonstrated  -LB       User Key  (r) = Recorded By, (t) = Taken By, (c) = Cosigned By    Initials Name Provider Type    LB Peri Hernandez, PT Physical Therapist    MR Caitlyn Bobby, OT Occupational Therapist                    OT Exercises       11/27/17 1400          Subjective Comments    Subjective Comments \"My next goal is driving.\"  -MR      Subjective Pain    Pre-Treatment Pain Level 2  -MR      Subjective Pain Comment L shoulder  -MR      Exercise 1    Exercise Name 1 Pt performs towel slides to increase strength and ROM for improved functional use of LUE. Following OT demo/cues pt performs towel slides in various movement patterns including shoulder flexion, horizontal ab/adduction, and diagonally across table.  -MR      Cueing 1 Verbal;Demo  -MR      Exercise 2    Exercise Name 2 Pt performs activity to increase coordination with LUE for improved functional use. Presented with board of golf tees, pt instructed to balance small rubber balls on tees. After completing first task, pt attempts to balance ping pong balls on golf tees, able to partially complete with moderate difficulty.  -MR      Cueing 2 Verbal;Demo  -MR      Exercise 3    Exercise Name 3  strengthening with LUE. Using digiflex pt performs  strengthening exercises following OT demo/cues.  -MR      Cueing 3 Verbal;Demo  -MR      Equipment 3 Other   digiflex  -MR      Resistance 3 Green  -MR        User " Key  (r) = Recorded By, (t) = Taken By, (c) = Cosigned By    Initials Name Provider Type    MR Caitlyn Bobby, OT Occupational Therapist                    Time Calculation:   OT Start Time: 1350  OT Stop Time: 1432  OT Time Calculation (min): 42 min     Therapy Charges for Today     Code Description Service Date Service Provider Modifiers Qty    54073898138  OT NEUROMUSC RE EDUCATION EA 15 MIN 11/27/2017 Caitlyn Bobby, OT GO 1    [id number]  OT THER PROC EA 15 MIN 11/27/2017 Caitlyn Bobby OT GO 2                    Caitlyn Bobby OT  11/27/2017

## 2017-11-30 ENCOUNTER — HOSPITAL ENCOUNTER (OUTPATIENT)
Dept: PHYSICAL THERAPY | Facility: HOSPITAL | Age: 67
Setting detail: THERAPIES SERIES
Discharge: HOME | End: 2017-11-30

## 2017-11-30 ENCOUNTER — APPOINTMENT (OUTPATIENT)
Dept: SPEECH THERAPY | Facility: HOSPITAL | Age: 67
End: 2017-11-30

## 2017-11-30 ENCOUNTER — HOSPITAL ENCOUNTER (OUTPATIENT)
Dept: OCCUPATIONAL THERAPY | Facility: HOSPITAL | Age: 67
Setting detail: THERAPIES SERIES
Discharge: HOME | End: 2017-11-30

## 2017-11-30 ENCOUNTER — TELEPHONE (OUTPATIENT)
Dept: INTERNAL MEDICINE | Facility: CLINIC | Age: 67
End: 2017-11-30

## 2017-11-30 DIAGNOSIS — R29.898 LUE WEAKNESS: ICD-10-CM

## 2017-11-30 DIAGNOSIS — I69.354 HEMIPLEGIA AND HEMIPARESIS FOLLOWING CEREBRAL INFARCTION AFFECTING LEFT NON-DOMINANT SIDE (HCC): Primary | ICD-10-CM

## 2017-11-30 DIAGNOSIS — R26.89 FUNCTIONAL GAIT ABNORMALITY: ICD-10-CM

## 2017-11-30 DIAGNOSIS — IMO0002 LACK OF COORDINATION DUE TO STROKE: ICD-10-CM

## 2017-11-30 PROCEDURE — 97112 NEUROMUSCULAR REEDUCATION: CPT

## 2017-11-30 PROCEDURE — 97110 THERAPEUTIC EXERCISES: CPT

## 2017-11-30 PROCEDURE — G8978 MOBILITY CURRENT STATUS: HCPCS

## 2017-11-30 PROCEDURE — G8979 MOBILITY GOAL STATUS: HCPCS

## 2017-12-04 ENCOUNTER — APPOINTMENT (OUTPATIENT)
Dept: PHYSICAL THERAPY | Facility: HOSPITAL | Age: 67
End: 2017-12-04

## 2017-12-04 ENCOUNTER — HOSPITAL ENCOUNTER (OUTPATIENT)
Dept: OCCUPATIONAL THERAPY | Facility: HOSPITAL | Age: 67
Setting detail: THERAPIES SERIES
Discharge: HOME | End: 2017-12-04

## 2017-12-04 ENCOUNTER — APPOINTMENT (OUTPATIENT)
Dept: SPEECH THERAPY | Facility: HOSPITAL | Age: 67
End: 2017-12-04

## 2017-12-04 DIAGNOSIS — I69.354 HEMIPLEGIA AND HEMIPARESIS FOLLOWING CEREBRAL INFARCTION AFFECTING LEFT NON-DOMINANT SIDE (HCC): Primary | ICD-10-CM

## 2017-12-04 DIAGNOSIS — IMO0002 LACK OF COORDINATION DUE TO STROKE: ICD-10-CM

## 2017-12-04 DIAGNOSIS — R29.898 LUE WEAKNESS: ICD-10-CM

## 2017-12-04 PROCEDURE — 97110 THERAPEUTIC EXERCISES: CPT

## 2017-12-04 PROCEDURE — 97112 NEUROMUSCULAR REEDUCATION: CPT

## 2017-12-04 NOTE — THERAPY DISCHARGE NOTE
Outpatient Occupational Therapy Neuro Progress Note/Discharge Summary  [facility]     Patient Name: Rosi Vicente  : 1950  MRN: [id number]  Today's Date: 2017      Visit Date: 2017    Patient Active Problem List   Diagnosis   • Hypertension   • Allergic rhinitis   • Trigeminal neuralgia   • Hyperlipidemia   • New daily persistent headache   • Osteoarthritis of hip   • Vitamin D deficiency   • History of CVA (cerebrovascular accident)   • Stroke   • Heartburn   • Primary insomnia   • Frequent urination   • Chronic left shoulder pain        Past Medical History:   Diagnosis Date   • Allergic rhinitis    • Anemia    • Bronchitis    • FH: colon cancer    • H/O bone density study    • H/O mammogram    • Hypertension     Benign Essential   • Malaise and fatigue    • Nocturia    • Stroke 2017   • TMJ (temporomandibular joint syndrome)    • Trigeminal neuralgia     Surgery at [facility] in  repeat in         Past Surgical History:   Procedure Laterality Date   • AUGMENTATION MAMMAPLASTY     • COLONOSCOPY N/A 2015    Repeat Q 5 years due to Fhx of Colon Ca-Dr. Polk   • PAP SMEAR N/A     Dr. Burden         Visit Dx:    ICD-10-CM ICD-9-CM   1. Hemiplegia and hemiparesis following cerebral infarction affecting left non-dominant side I69.354 438.22   2. LUE weakness R29.898 729.89   3. Lack of coordination due to stroke I63.9 434.91    R27.9 781.3             OT Neuro       17 1400          Coordination    Box and Blocks Left 42 blocks  -MR      9-Hole Peg Left 35 sec  -MR      ROM (Range of Motion)    General ROM upper extremity range of motion deficits identified  -MR      Left Shoulder    Flexion AROM Deficit 1/2 range  -MR      ABduction AROM Deficit 1/4 to 1/2 range  -MR      General UE Assessment    ROM RUE ROM was WFL  -MR      ROM Detail LUE AROM with elbow, forearm, wrist, and hand WFL. Pt with history of arthritis of L shoulder, limited ROM with shoulder  prior to CVA  -MR      MMT (Manual Muscle Testing)    General MMT Assessment upper extremity strength deficits identified  -MR      Upper Extremity    Upper Ext Manual Muscle Testing right UE strength is WFL  -MR      Upper Ext Manual Muscle Testing Detail L shoulder not tested due to pain/limited ROM, history of L shoulder impairment  -MR      Left Elbow/Forearm    Elbow Flexion Gross Movement (4/5) good  -MR      Elbow Extension Gross Movement (4/5) good  -MR      Upper Body Bathing Assessment/Training    UB Bathing Assess/Train, [place] Level conditional independence  -MR      Lower Body Bathing Assessment/Training    LB Bathing Assess/Train, [place] Level conditional independence  -MR      Upper Body Dressing Assessment/Training    UB Dressing Assess/Train, [place] conditional independence  -MR      Lower Body Dressing Assessment/Training    LB Dressing Assess/Train, [place] conditional independence  -MR        User Key  (r) = Recorded By, (t) = Taken By, (c) = Cosigned By    Initials Name Provider Type    MR Caitlyn Bobby, OT Occupational Therapist             Hand Therapy (last 24 hours)      Hand Eval       12/04/17 1500           Strength Left    Left  Test 1 26  -MR      Left  Test 2 26  -MR      Left  Test 3 25  -MR       Strength Average Left 25.67  -MR      Left Hand Strength - Pinch (lbs)    Lateral 8 lbs  -MR        User Key  (r) = Recorded By, (t) = Taken By, (c) = Cosigned By    Initials Name Provider Type    MR Caitlyn Bobby, OT Occupational Therapist                    OT Assessment/Plan       12/04/17 1551       OT Assessment    Assessment Comments Re-assessment completed this date, pt continues to demonstrate excellent progress with LUE. Pt has increased LUE  strength as well as LUE elbow strength. Pt demos improved coordination with LUE as evidenced by scores on 9 Hole peg test and Box and Blocks assessments. Pt reports increased independence  "with self care skills. Pt has met all outpatient OT goals and is ready for discharge at this time.  -MR       User Key  (r) = Recorded By, (t) = Taken By, (c) = Cosigned By    Initials Name Provider Type    MR Caitlyn Bobby, OT Occupational Therapist                 OT Goals       12/04/17 1500       OT Short Term Goals    STG Date to Achieve 11/23/17  -MR     STG 1 Pt to be independent with HEP.  -MR     STG 1 Progress Met  -MR     STG 2 Pt to increase LUE  strength to > or = 10 lbs for improved functional use of L hand.  -MR     STG 2 Progress Met  -MR     STG 3 Pt to demo increase score on Box and Blocks assessment using LUE to > or = 23 to improved FM coordination with L hand for ADL/IADLs.  -MR     STG 3 Progress Met  -MR     Long Term Goals    LTG Date to Achieve 12/16/17  -MR     LTG 1 Pt to increase LUE  strength to > or = 15 lbs for improved functional use of L hand.  -MR     LTG 1 Progress Met  -MR     LTG 2 Pt to complete 9 Hole peg test in < or = 3 minutes to improve FM coordination with L hand for performance of ADL/IADLs.  -MR     LTG 2 Progress Met  -MR     LTG 3 Pt to increase strength with LUE elbow to > or = 4/5 for improved functional use of extremity.  -MR     LTG 3 Progress Met  -MR     LTG 4 Pt to complete UB/LB dressing with MOD I for increased independence with self care tasks.  -MR     LTG 4 Progress Met  -MR     LTG 5 Pt to open containers with MOD I using adaptive strategies as needed for increased independence with functional tasks.  -MR     LTG 5 Progress Met  -MR       User Key  (r) = Recorded By, (t) = Taken By, (c) = Cosigned By    Initials Name Provider Type    MR Caitlyn Bobby, OT Occupational Therapist                        OT Exercises       12/04/17 1400          Subjective Comments    Subjective Comments \"I'm scheduled for an injection for my shoulder on the 20th.\"  -MR      Subjective Pain    Pre-Treatment Pain Level 2  -MR      Subjective Pain Comment L " shoulder  -MR      Exercise 1    Exercise Name 1 FM activity to increase coordiantion for functional use of LUE. Using tweezers pt picks up small beads and places onto small pegboard. Multiple reps completed following OT demo/cues.  -MR      Cueing 1 Verbal;Demo  -MR      Exercise 2    Exercise Name 2  strengthening to increase hand strength for functional use of LUE. Using digiflex pt performs  strengthening exercises following OT demo/cues.  -MR      Cueing 2 Verbal;Demo  -MR      Equipment 2 Other   digiflex  -MR      Resistance 2 Green  -MR        User Key  (r) = Recorded By, (t) = Taken By, (c) = Cosigned By    Initials Name Provider Type    MR Caitlyn Bobby, OT Occupational Therapist                    Outcome Measures       12/04/17 1500          DASH    Open a tight or new jar. 1  -MR      Write 1  -MR      Turn a key 1  -MR      Prepare a meal 1  -MR      Push open a heavy door 2  -MR      Place an object on a shelf above your head 2  -MR      Do heavy household chores (e.g., wash walls, wash floors) 3  -MR      Garden or do yard work 3  -MR      Make a bed 2  -MR      Carry a shopping bag or briefcase 1  -MR      Carry a heavy object (over 10 lbs) 3  -MR      Change a lightbulb overhead 3  -MR      Wash or blow dry your hair 2  -MR      Wash your back 1  -MR      Put on a pullover sweater 1  -MR      Use a knife to cut food 2  -MR      Recreational activities in which require little effort (e.g., cardplaying, knitting, etc.) 2  -MR      Recreational activities in which you take some force or impact through your arm, should or hand (e.g. golf, hammering, tennis, etc.) 2  -MR      Recreational Activities in which you move your arm freely (e.g., frisbee, badminton, etc.) 3  -MR      Manage transportation needs (getting from one place to another) 5  -MR      During the past week, to what extent has your arm, shoulder, or hand problem interfered with your normal social activites with family,  friends, neighbors or groups? 1  -MR      During the past week, were you limited in your work or other regular daily activities as a result of your arm, shoulder or hand problem? 1  -MR      Arm, Shoulder, or hand pain 1  -MR      Arm, shoulder or hand pain when you performed any specific activity 3  -MR      Tingling (pins and needles) in your arm, shoulder, or hand 1  -MR      Weakness in your arm, shoulder or hand 2  -MR      Stiffness in your arm, shoulder or hand 2  -MR      During the past week, how much difficulty have you had sleeping because of the pain in your arm, shoulder or hand? 2  -MR      I feel less capable, less confident or less useful because of my arm, shoulder or hand problem 2  -MR      DASH Sum  56  -MR      Number of Questions Answered 29  -MR      DASH Score 23.28  -MR        User Key  (r) = Recorded By, (t) = Taken By, (c) = Cosigned By    Initials Name Provider Type    MR Caitlyn Bobby OT Occupational Therapist            Time Calculation:   OT Start Time: 1348  OT Stop Time: 1430  OT Time Calculation (min): 42 min     Therapy Charges for Today     Code Description Service Date Service Provider Modifiers Qty    14882690845  OT THER PROC EA 15 MIN 12/4/2017 Caitlyn Bobby OT GO 2    [id number] HC OT NEUROMUSC RE EDUCATION EA 15 MIN 12/4/2017 Caitlyn Bobby OT GO 1                OP OT Discharge Summary  Date of Discharge: 12/04/17  Reason for Discharge: All goals achieved  Outcomes Achieved: Able to achieve all goals within established timeline  Discharge Destination: Home with home program        Caitlyn Bobby OT  12/4/2017

## 2017-12-06 ENCOUNTER — OFFICE VISIT (OUTPATIENT)
Dept: NEUROLOGY | Facility: CLINIC | Age: 67
End: 2017-12-06

## 2017-12-06 ENCOUNTER — PATIENT MESSAGE (OUTPATIENT)
Dept: INTERNAL MEDICINE | Facility: CLINIC | Age: 67
End: 2017-12-06

## 2017-12-06 VITALS
BODY MASS INDEX: 20.57 KG/M2 | DIASTOLIC BLOOD PRESSURE: 66 MMHG | WEIGHT: 128 LBS | HEIGHT: 66 IN | SYSTOLIC BLOOD PRESSURE: 122 MMHG

## 2017-12-06 DIAGNOSIS — E78.5 HYPERLIPIDEMIA, UNSPECIFIED HYPERLIPIDEMIA TYPE: ICD-10-CM

## 2017-12-06 DIAGNOSIS — M25.512 CHRONIC LEFT SHOULDER PAIN: ICD-10-CM

## 2017-12-06 DIAGNOSIS — I63.9 CEREBROVASCULAR ACCIDENT (CVA), UNSPECIFIED MECHANISM (HCC): Primary | ICD-10-CM

## 2017-12-06 DIAGNOSIS — G89.29 CHRONIC LEFT SHOULDER PAIN: ICD-10-CM

## 2017-12-06 DIAGNOSIS — I10 ESSENTIAL HYPERTENSION: ICD-10-CM

## 2017-12-06 PROCEDURE — 99213 OFFICE O/P EST LOW 20 MIN: CPT | Performed by: NURSE PRACTITIONER

## 2017-12-06 NOTE — PROGRESS NOTES
DOS: 2017  NAME: Rosi Vicente   : 1950  PCP: Dionne Fonseca MD    Chief Complaint   Patient presents with   • Stroke      Neurological Problem and Interval History:  67 y.o.RHW female with HTN and HLD who presents today for stroke follow-up.     Ms. Vicente presented to [facility] on 17 with progressive left hemiparesis and dysarthria.  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 Kruse yesterday.  k factors.  She had waxing and waning of her symptoms during her hospitalization.  She was eventually discharged to our acute rehabilitation where she has made significant improvement. 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  She is independent of all her daily living activities however reports that she has some difficulty using her left shoulder due to comminution of the stroke and left rotator cuff problems.  She denies any worsening of symptoms or any new stroke/TIA symptoms.  She started driving yesterday. She denies any cognitive changes, anxiety, depression. She takes her BP regularly and states it is well controlled. She has been a pescatarian for years.  She denies smoking. Very rare alcohol use.     Review of Systems:        Review of Systems   Constitutional: Positive for fatigue. Negative for chills and fever.   HENT: Negative for hearing loss, tinnitus and trouble swallowing.    Eyes: Negative for pain, redness, itching and visual disturbance.   Respiratory: Negative for cough, shortness of breath and wheezing.    Cardiovascular: Negative for chest pain, palpitations and leg swelling.   Gastrointestinal: Negative for constipation, diarrhea, nausea and vomiting.   Endocrine: Negative for cold intolerance, heat intolerance and polydipsia.   Genitourinary: Positive for enuresis.  "Negative for decreased urine volume, difficulty urinating and urgency.   Musculoskeletal: Positive for arthralgias. Negative for back pain, gait problem, neck pain and neck stiffness.   Skin: Negative for color change, rash and wound.   Allergic/Immunologic: Positive for environmental allergies. Negative for food allergies and immunocompromised state.   Neurological: Negative for dizziness, tremors, seizures, syncope, facial asymmetry, speech difficulty, weakness, light-headedness, numbness and headaches.   Hematological: Negative for adenopathy. Bruises/bleeds easily.   Psychiatric/Behavioral: Negative for agitation, behavioral problems, confusion, decreased concentration, dysphoric mood, hallucinations, self-injury, sleep disturbance and suicidal ideas. The patient is not nervous/anxious and is not hyperactive.        \"The following portions of the patient's history were reviewed and updated as appropriate: allergies, current medications, past family history, past medical history, past social history, past surgical history and problem list.\"  Review and Interpretation of Imaging: Per Dr. Villafana,  CT brain: No acute stroke or hemorrhage or mass, there is an arachnoid cyst involving the cerebellum and there is a slight bony prominence of the petrous ridge which is unchanged  Carotid ultrasound 6/13/16: No significant stenosis  MRI brain: Patchy faint acute stroke in the right corona radiata, flair shows mild periventricular and subcortical white matter disease, SWI sequences are negative, post contrast images are negative  MRA [place]: Normal  MRA of the aortic arch with and without contrast: Bovine origin left common carotid artery, codominant vertebral arteries, no stenosis    EKG 9/13/17: SR  TTE 9/14/17: Interpretation Summary   · Calculated EF = 63.5%  · Left ventricular systolic function is normal.  · Mild tricuspid valve regurgitation is present.  · Estimated right ventricular systolic pressure " from tricuspid regurgitation is normal (<35 mmHg).  · Saline test results are negative     Laboratory Results:             Lab Results   Component Value Date    HGBA1C 5.30 09/13/2017     Lab Results   Component Value Date    CHOL 219 (H) 09/13/2017    CHOL 195 11/17/2016     Lab Results   Component Value Date    HDL 66 (H) 09/13/2017    HDL 65 11/17/2016     No results found for: LDL  Lab Results   Component Value Date    TRIG 86 10/25/2017    TRIG 96 09/13/2017    TRIG 61 11/17/2016     No components found for: CHOLHDL  No results found for: RPR  Lab Results   Component Value Date    TSH 1.410 11/17/2016     Lab Results   Component Value Date    CCHYVNDB65 1773 (H) 11/17/2016     Lab Results   Component Value Date    LDLCALC 134 (H) 09/13/2017           Ref Range & Units 1mo ago     LDL-P <1000 nmol/L 704   Comments:                           Low                   < 1000                             Moderate         1000 - 1299                             Borderline-High  1300 - 1599                             High             1600 - 2000                             Very High             > 2000   LDL-C 0 - 99 mg/dL 58   Comments:                           Optimal               <  100                             Above optimal     100 -  129                             Borderline        130 -  159                             High              160 -  189                             Very high             >  189   LDL-C is inaccurate if patient is non-fasting.   HDL-C >39 mg/dL 48   Triglycerides 0 - 149 mg/dL 86   Total Cholesterol 100 - 199 mg/dL 123   HDL-P (Total) >=30.5 umol/L 36.1   Small LDL-P <=527 nmol/L 437   LDL Size >20.5 nm 20.3   Comments:  ----------------------------------------------------------               Physical Examination: NIHSS: 1 mRS: 1  General Appearance:   Well developed, well nourished, well groomed, alert, and cooperative.  HEENT: Normocephalic.    Neck and Spine: Normal range of motion.   Normal alignment. No mass or tenderness.   Cardiac: Regular rate and rhythm. No murmurs.  Peripheral Vasculature: Radial pulses are equal and symmetric. No signs of distal embolization.  Extremities:    No edema or deformities. Decreased ROM of left shoulder.   Skin:    No rashes or birth marks.    Neurological examination:  Higher Integrative  Function: Oriented to time, place and person. Normal registration, recall (3/3), attention span and concentration. Normal language including comprehension, spontaneous speech, repetition, reading, writing, naming and vocabulary. No neglect with normal visual-spatial function and construction. Normal fund of knowledge and higher integrative function.  CN II: Pupils are equal, round, and reactive to light. Normal visual acuity and visual fields.    CN III IV VI: Extraocular movements are full without nystagmus.   CN V: Normal facial sensation and strength of muscles of mastication.  CN VII: Facial movements are symmetric. No weakness.  CN VIII:   Auditory acuity is normal.  CN IX & X:   Symmetric palatal movement.  CN XI: Sternocleidomastoid and trapezius are normal.  No weakness.  CN XII:   The tongue is midline.  No atrophy or fasciculations.  Motor: Normal muscle strength, bulk and tone in upper and lower extremities except for mildly decreased strength on the left with orbiting about LUE, strength at least 4/5. LLE at least 4+/5. Some decreased FFM on left.  No fasciculations, rigidity, spasticity, or abnormal movements.  Sensation: Normal to light touch in arms and legs. Normal graphesthesia and no extinction on DSS.  Station and Gait: Normal gait and station.    Coordination: Finger to nose test shows no dysmetria.  Heel to shin normal.    Diagnoses / Discussion:  Ms. 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 She has recently completed rehab and on  "exam she has made significant improvement with only minimal left sided deficits but she feels she is only \"50%\" back to her physical baseline of which she was very active.  She denies any worsening of symptoms or any new stroke/TIA symptoms.  She continues on aspirin and Plavix.  Of note, she is also on Prilosec which interferes with Plavix before she should discontinue.  We discussed possible upcoming surgery in January for her left rotator cuff repair.  I reviewed our general recommendations for elective procedures following stroke and gave her written copy as well.  She voiced understanding. Recommend continued DAPT until January and then monotherapy with ASA or Plavix. Discussed importance of risk factor control for stroke prevention, including BP and cholesterol control.  We discussed signs and symptoms of stroke and the importance of calling 911 if she were to discontinue these.  Follow-up in 9 months, sooner symptoms warrant.    Plan:   Continue DAPT for now --> monotherapy with ASA or Plavix in Jan.   Discontinue omperazole - famotidine okay   Continue to monitor BP regularly and record   Blood pressure control to <130/80   Continue to F/U with PCP for cholesterol surveillance   Goal LDL <70-recommend high dose statins-    Serum glucose < 140     Call 911 for stroke any stroke symptoms   Follow-up in 9 months  Rosi was seen today for stroke.    Diagnoses and all orders for this visit:    Cerebrovascular accident (CVA), unspecified mechanism    Hyperlipidemia, unspecified hyperlipidemia type    Essential hypertension    Chronic left shoulder pain      Coding       "

## 2017-12-07 ENCOUNTER — APPOINTMENT (OUTPATIENT)
Dept: PHYSICAL THERAPY | Facility: HOSPITAL | Age: 67
End: 2017-12-07

## 2017-12-07 ENCOUNTER — APPOINTMENT (OUTPATIENT)
Dept: OCCUPATIONAL THERAPY | Facility: HOSPITAL | Age: 67
End: 2017-12-07

## 2017-12-07 ENCOUNTER — APPOINTMENT (OUTPATIENT)
Dept: SPEECH THERAPY | Facility: HOSPITAL | Age: 67
End: 2017-12-07

## 2017-12-11 ENCOUNTER — APPOINTMENT (OUTPATIENT)
Dept: OCCUPATIONAL THERAPY | Facility: HOSPITAL | Age: 67
End: 2017-12-11

## 2017-12-11 ENCOUNTER — APPOINTMENT (OUTPATIENT)
Dept: PHYSICAL THERAPY | Facility: HOSPITAL | Age: 67
End: 2017-12-11

## 2017-12-11 ENCOUNTER — APPOINTMENT (OUTPATIENT)
Dept: SPEECH THERAPY | Facility: HOSPITAL | Age: 67
End: 2017-12-11

## 2017-12-11 NOTE — TELEPHONE ENCOUNTER
I called and S/W Mr. Vicente concerning his wife, Rosi.  He told me she is doing well.  Fatigue is a big factor and she tends to sleep a lot.  She has PT/OT 2 days a week for 3 hours.  She is not able to drive and her spouse has taken a SEYMOUR to be home with her.  She will F/U with Dr. Fonseca 10/13 and Dr. Kruse 10/30.  Our office making an appt for her and I will call when completed.  Mr. Vicente told me Rosi's BP was an issue when she got home.  Her meds include Lisinopril 10 mg daily, HCTZ 12.5 mg daily, Lopressor 50mg 2 times a day, Lipitor 80mg at night, ASA 325mg daily, Plavix 75mg daily.  We went over S/S of stroke and to call 911 immediately.  mRS 3.  Mr. Vicente told me he is not comfortable leaving her alone yet.  ELMER Garcia RN  
English

## 2017-12-14 ENCOUNTER — APPOINTMENT (OUTPATIENT)
Dept: OCCUPATIONAL THERAPY | Facility: HOSPITAL | Age: 67
End: 2017-12-14

## 2017-12-14 ENCOUNTER — APPOINTMENT (OUTPATIENT)
Dept: PHYSICAL THERAPY | Facility: HOSPITAL | Age: 67
End: 2017-12-14

## 2017-12-14 ENCOUNTER — TELEPHONE (OUTPATIENT)
Dept: INTERNAL MEDICINE | Facility: CLINIC | Age: 67
End: 2017-12-14

## 2017-12-14 ENCOUNTER — APPOINTMENT (OUTPATIENT)
Dept: SPEECH THERAPY | Facility: HOSPITAL | Age: 67
End: 2017-12-14

## 2017-12-14 NOTE — TELEPHONE ENCOUNTER
----- Message from Shanta Ayala sent at 12/14/2017  8:26 AM EST -----  Contact: Pt  Pt would like to speak with MD has been on Lipitor and experiencing joint soreness    Every 3 months pt gets her teeth cleaned with the dentist and they ask that before each visit she take, amoxicillin 1 gram  She is out and has a cleaning in a few weeks.    [place] [address] [address] [facility] [phone] [facility] [phone]     Pt#[phone]

## 2017-12-15 DIAGNOSIS — Z29.8 NEED FOR SUBACUTE BACTERIAL ENDOCARDITIS PROPHYLAXIS: Primary | ICD-10-CM

## 2017-12-15 DIAGNOSIS — E78.49 OTHER HYPERLIPIDEMIA: ICD-10-CM

## 2017-12-15 RX ORDER — AMOXICILLIN 500 MG/1
TABLET, FILM COATED ORAL
Qty: 12 TABLET | Refills: 6 | Status: SHIPPED | OUTPATIENT
Start: 2017-12-15 | End: 2018-03-27

## 2017-12-15 NOTE — TELEPHONE ENCOUNTER
Discussed - stop lipitor.  If muscle pain resolves, then start livalo (sent in) & call if problems.  She had joint replacement & needs amoxil for prophylaxis (sent in today).

## 2017-12-15 NOTE — TELEPHONE ENCOUNTER
From: Rosi Vicente  Sent: 12/15/2017 7:18 AM EST  To: Katelyn [facility]  Subject: RE: Prescription Question    Update as of 12/15: B/p back to great levels 120's over 80's. Taking Metoptolol 50 mg qd.  Thanks  ----- Message -----  From: Dionne Fonseca MD  Sent: 12/6/2017 12:40 PM EST  To: Rosi Vicente  Subject: RE: Prescription Question    Please take metoprolol 3x daily - may increase to 100 mg bid if needed (total = 200 mg daily) - call if pulse lower than 50      ----- Message -----   From: Rosi Vicente   Sent: 12/6/2017 7:59 AM EST   To: Dionne Fonseca MD  Subject: Prescription Question    HI,  Taking lisinopril 20/hctz 12.5qd as before and metoprorol 50 md bid. Diastolic ranges 82-89. systolic 128-160's.  Concerned I am not meeting B/p goals.  What to do?

## 2017-12-18 ENCOUNTER — APPOINTMENT (OUTPATIENT)
Dept: PHYSICAL THERAPY | Facility: HOSPITAL | Age: 67
End: 2017-12-18

## 2017-12-18 ENCOUNTER — APPOINTMENT (OUTPATIENT)
Dept: OCCUPATIONAL THERAPY | Facility: HOSPITAL | Age: 67
End: 2017-12-18

## 2017-12-18 ENCOUNTER — APPOINTMENT (OUTPATIENT)
Dept: SPEECH THERAPY | Facility: HOSPITAL | Age: 67
End: 2017-12-18

## 2017-12-19 ENCOUNTER — TELEPHONE (OUTPATIENT)
Dept: INTERNAL MEDICINE | Facility: CLINIC | Age: 67
End: 2017-12-19

## 2017-12-19 DIAGNOSIS — K21.9 GASTROESOPHAGEAL REFLUX DISEASE, ESOPHAGITIS PRESENCE NOT SPECIFIED: Primary | ICD-10-CM

## 2017-12-19 RX ORDER — PANTOPRAZOLE SODIUM 40 MG/1
40 TABLET, DELAYED RELEASE ORAL DAILY
Qty: 30 TABLET | Refills: 4 | Status: SHIPPED | OUTPATIENT
Start: 2017-12-19 | End: 2017-12-19 | Stop reason: SDUPTHER

## 2017-12-19 RX ORDER — PANTOPRAZOLE SODIUM 40 MG/1
40 TABLET, DELAYED RELEASE ORAL DAILY
Qty: 30 TABLET | Refills: 4 | Status: SHIPPED | OUTPATIENT
Start: 2017-12-19 | End: 2018-02-20

## 2017-12-19 NOTE — TELEPHONE ENCOUNTER
Ms. Vicente ask that she have a prescription sent in to her local [facility] pharmacy of Protonix as the Famotidine and Omeprazole doesn't work well with her taking the Plavix.    Pharmacy: [place] [address] [address] [facility] [phone] [facility] [phone] FX    Please advise.    Thank you,    Sav

## 2017-12-19 NOTE — TELEPHONE ENCOUNTER
----- Message from Shanta Ayala sent at 12/18/2017 12:41 PM EST -----  Contact: pt  Pt went to  medication.   meds need a PA  Pitavastatin Calcium 4 MG tablet    [place] [address] [address] [facility] [phone] [facility] [phone] FX    Pt#[phone]

## 2017-12-19 NOTE — TELEPHONE ENCOUNTER
Prior authorization was approved, a message was left for Ms. Vicente letting her know of this and the pharmacy was informed of this approval as well.

## 2017-12-19 NOTE — TELEPHONE ENCOUNTER
I accidentally sent protonix to mail order - she may cancel this.  I also sent to [facility].  pls see if she wants to try crestor or samples of livalo while waiting for PA on livalo

## 2017-12-19 NOTE — TELEPHONE ENCOUNTER
----- Message from Ina Anderson sent at 12/19/2017 10:19 AM EST -----  Contact: Patient  Patient called.  She is newly post stroke, and needs a statin.  She did not take anything for 3 days and then started Lipitor again which caused her to have joint soreness.  She needs something to be called in, and is wondering if Crestor is a good choice.  Please advise.      Patient:  [phone]    [address]  [address] [facility] [phone] [facility] [phone] FX

## 2017-12-19 NOTE — TELEPHONE ENCOUNTER
----- Message from Ina Anderson sent at 12/19/2017 10:19 AM EST -----  Contact: Patient  Patient called.  She is newly post stroke, and needs a statin.  She did not take anything for 3 days and then started Lipitor again which caused her to have joint soreness.  She needs something to be called in, and is wondering if Crestor is a good choice.  Please advise.      Patient:  [phone]    [address] [facility] [phone] [facility] [phone] FX

## 2017-12-19 NOTE — TELEPHONE ENCOUNTER
Call made to Ms. Vicente to see what was her [employer] member ID in order to start her prior authorization on her Livalo 4 mg.     She stated it was [id number].    I told her that once a decision came back the pharmacy would ne notified as well as her.

## 2017-12-20 ENCOUNTER — TELEPHONE (OUTPATIENT)
Dept: INTERNAL MEDICINE | Facility: CLINIC | Age: 67
End: 2017-12-20

## 2017-12-20 NOTE — TELEPHONE ENCOUNTER
MsReina Jules would like to have a different medication sent in to her pharmacy that is less expensive.       Thank you,    Sav

## 2017-12-20 NOTE — TELEPHONE ENCOUNTER
----- Message from Shanta Ayala sent at 12/20/2017 11:42 AM EST -----  Contact: Pt  Pt would like something less expensive than the livalo.  She states its way too expensive.      [address], [place] [address] [phone] [facility] [phone] FX    PT# [phone]

## 2017-12-21 ENCOUNTER — APPOINTMENT (OUTPATIENT)
Dept: SPEECH THERAPY | Facility: HOSPITAL | Age: 67
End: 2017-12-21

## 2017-12-21 ENCOUNTER — TELEPHONE (OUTPATIENT)
Dept: INTERNAL MEDICINE | Facility: CLINIC | Age: 67
End: 2017-12-21

## 2017-12-21 ENCOUNTER — APPOINTMENT (OUTPATIENT)
Dept: PHYSICAL THERAPY | Facility: HOSPITAL | Age: 67
End: 2017-12-21

## 2017-12-21 ENCOUNTER — APPOINTMENT (OUTPATIENT)
Dept: OCCUPATIONAL THERAPY | Facility: HOSPITAL | Age: 67
End: 2017-12-21

## 2017-12-21 DIAGNOSIS — E78.49 OTHER HYPERLIPIDEMIA: ICD-10-CM

## 2017-12-21 DIAGNOSIS — E78.49 OTHER HYPERLIPIDEMIA: Primary | ICD-10-CM

## 2017-12-21 RX ORDER — PRAVASTATIN SODIUM 20 MG
20 TABLET ORAL DAILY
Qty: 30 TABLET | Refills: 5 | Status: SHIPPED | OUTPATIENT
Start: 2017-12-21 | End: 2017-12-28 | Stop reason: SINTOL

## 2017-12-21 RX ORDER — PRAVASTATIN SODIUM 20 MG
20 TABLET ORAL DAILY
Qty: 30 TABLET | Refills: 5 | Status: SHIPPED | OUTPATIENT
Start: 2017-12-21 | End: 2017-12-21 | Stop reason: SDUPTHER

## 2017-12-21 RX ORDER — PRAVASTATIN SODIUM 20 MG
20 TABLET ORAL DAILY
Qty: 30 TABLET | Refills: 6 | Status: SHIPPED | OUTPATIENT
Start: 2017-12-21 | End: 2017-12-21 | Stop reason: SDUPTHER

## 2017-12-21 NOTE — TELEPHONE ENCOUNTER
Script for Pravastatin sent to local pharmacy.  Pt states she will try this for several weeks, if she has aches states she will pay $100 copay for the Livalo.

## 2017-12-21 NOTE — TELEPHONE ENCOUNTER
----- Message from Ina Anderson sent at 12/21/2017 12:06 PM EST -----  [facility] was called in this morning to mail order for patient that needs to go to local pharmacy.  Patient wants to start medication tonight.  Please advise.      Pharmacy:  [address] [phone] [facility] [phone]

## 2017-12-28 ENCOUNTER — TELEPHONE (OUTPATIENT)
Dept: INTERNAL MEDICINE | Facility: CLINIC | Age: 67
End: 2017-12-28

## 2017-12-28 DIAGNOSIS — E78.49 OTHER HYPERLIPIDEMIA: Primary | ICD-10-CM

## 2017-12-28 NOTE — TELEPHONE ENCOUNTER
----- Message from Tiarayamilka Shah sent at 12/28/2017  8:10 AM EST -----  Contact: pt - Dr Fonseca's pt - RE: Rx / medication  Pt calling and would like a return call regarding statin drug. Pt informs is still have same muscle pains and aches. Pt would like to know if ok to move on to other Rx's and would like to to know if pt should go of prevastatin a couple of days before starting another statin. Could you please call pt to discuss? Please advise. Thanks      [address], [place] [address] [facility] [phone] [facility] [phone] FX    Pt # [phone]

## 2017-12-28 NOTE — TELEPHONE ENCOUNTER
pls give samples of livalo for 1 mo - she will need fasting LP,CMP in 2-3 mos (I also sent in script)

## 2018-01-03 ENCOUNTER — TELEPHONE (OUTPATIENT)
Dept: NEUROLOGY | Facility: CLINIC | Age: 68
End: 2018-01-03

## 2018-01-03 NOTE — TELEPHONE ENCOUNTER
I called and talked at length with Ms. Vicente.  She was concerned about overdoing some activities.  She says she gets so tired and was wondering how much is too much.   How fast should she be progressing.  Her daily routine is very full.  I told her she needs to moderate her activities, and needs to rest in between activities.  ELMER Garcia RN

## 2018-01-08 ENCOUNTER — TELEPHONE (OUTPATIENT)
Dept: INTERNAL MEDICINE | Facility: CLINIC | Age: 68
End: 2018-01-08

## 2018-01-08 NOTE — TELEPHONE ENCOUNTER
Message left on v/m that a PA was approved on 12/19/17 through 12/19/18, and her local [facility] pharmacy was informed of this back at that time. She will have to pay her co-pay amount.

## 2018-01-10 ENCOUNTER — TELEPHONE (OUTPATIENT)
Dept: INTERNAL MEDICINE | Facility: CLINIC | Age: 68
End: 2018-01-10

## 2018-01-10 NOTE — TELEPHONE ENCOUNTER
Call made to Ms. Vicente to inform her that I spoke to a representative at [facility] mail order to check on the status of her Livalo 4 mg being sent out to her. I informed her that they were holding it because they needed clarification if she was taking pravastatin and if they could d/c this prior to Livalo being shipped out. I advised them to d/c the pravastatin as she was not taking this  And to ship out the Lialo 4 mg    She will have this in a few days.     Ms. Vicente stated she understood and thank you for getting this taken care of.

## 2018-01-10 NOTE — TELEPHONE ENCOUNTER
----- Message from Isabelle Drake sent at 1/10/2018  1:47 PM EST -----  Pt having trouble with an Rx for Livalo.  She explains things are going in circles. Pt went to  Rx for Livalo at [facility] and they told her they did not have it and she would need to get it from [facility] mail order.  She called [facility] mail order and they did not have Rx.  Please send Rx to [facility] mail order for Livalo 2mg.  Pt says it works well for her.  She has about 4-5 days left.  Please check to see if we have samples to get her thru until mail order processes and delivers.

## 2018-01-15 DIAGNOSIS — E78.5 HYPERLIPIDEMIA, UNSPECIFIED HYPERLIPIDEMIA TYPE: Primary | ICD-10-CM

## 2018-01-15 DIAGNOSIS — I10 ESSENTIAL HYPERTENSION: ICD-10-CM

## 2018-01-29 ENCOUNTER — TELEPHONE (OUTPATIENT)
Dept: INTERNAL MEDICINE | Facility: CLINIC | Age: 68
End: 2018-01-29

## 2018-01-29 ENCOUNTER — PROCEDURE VISIT (OUTPATIENT)
Dept: OBSTETRICS AND GYNECOLOGY | Facility: CLINIC | Age: 68
End: 2018-01-29

## 2018-01-29 ENCOUNTER — TELEPHONE (OUTPATIENT)
Dept: OBSTETRICS AND GYNECOLOGY | Facility: CLINIC | Age: 68
End: 2018-01-29

## 2018-01-29 DIAGNOSIS — Z13.820 SCREENING FOR OSTEOPOROSIS: Primary | ICD-10-CM

## 2018-01-29 DIAGNOSIS — M85.88 OSTEOPENIA OF SPINE: ICD-10-CM

## 2018-01-29 DIAGNOSIS — E78.5 HYPERLIPIDEMIA LDL GOAL <70: Primary | ICD-10-CM

## 2018-01-29 DIAGNOSIS — M85.88 OSTEOPENIA OF OTHER SITE: ICD-10-CM

## 2018-01-29 DIAGNOSIS — Z78.0 POSTMENOPAUSAL: ICD-10-CM

## 2018-01-29 PROCEDURE — 77080 DXA BONE DENSITY AXIAL: CPT | Performed by: OBSTETRICS & GYNECOLOGY

## 2018-01-29 NOTE — TELEPHONE ENCOUNTER
Future orders already in chart for LP & CMP - come fasting.  I have never heard of prolia causing stroke - she may confirm with her neuro.  pls keep f/u appt here

## 2018-01-29 NOTE — TELEPHONE ENCOUNTER
----- Message from Tiara Shah sent at 1/29/2018  2:37 PM EST -----  Contact: pt - Dr Fonseca's pt- RE: call / labs  Pt calling and would ronit a return call regarding:    Pt would like a consult about doing Prolia again, should pt take, or if this is what caused pt to have stroke?    Pt has appt 02/20/2018 and would like to know if Lipids should be checked. Pt is on new statin Rx and if so, pt would like to have labs drawn prior to appt?       Pt # [phone]

## 2018-01-30 ENCOUNTER — LAB (OUTPATIENT)
Dept: LAB | Facility: HOSPITAL | Age: 68
End: 2018-01-30

## 2018-01-30 DIAGNOSIS — E78.5 HYPERLIPIDEMIA LDL GOAL <70: ICD-10-CM

## 2018-01-30 LAB
CHOLEST SERPL-MCNC: 156 MG/DL (ref 0–200)
HDLC SERPL-MCNC: 58 MG/DL (ref 40–60)
LDLC SERPL CALC-MCNC: 76 MG/DL (ref 0–100)
LDLC/HDLC SERPL: 1.31 {RATIO}
TRIGL SERPL-MCNC: 111 MG/DL (ref 0–150)
VLDLC SERPL-MCNC: 22.2 MG/DL (ref 5–40)

## 2018-01-30 PROCEDURE — 80061 LIPID PANEL: CPT

## 2018-01-30 PROCEDURE — 36415 COLL VENOUS BLD VENIPUNCTURE: CPT

## 2018-01-30 NOTE — TELEPHONE ENCOUNTER
Discussed with patient and both are negative mammogram and the apparent lack of any association between Prolia and strokes.

## 2018-02-01 ENCOUNTER — TELEPHONE (OUTPATIENT)
Dept: NEUROLOGY | Facility: CLINIC | Age: 68
End: 2018-02-01

## 2018-02-01 NOTE — TELEPHONE ENCOUNTER
I called and spoke with Ms. Jules.  I let her know that Peri CHICAS reviewed her recent Lipid Panel and the LDL (bad cholesterol) is slightly over the goal of 40-70.  Peri recommended she increase the Pitivastatin from 2 mg to 4 mg. We will get a repeat Lipid Panel in 6-8 weeks.  ELMER Garcia RN

## 2018-02-01 NOTE — TELEPHONE ENCOUNTER
----- Message from JUAN FRANCISCO Singh sent at 1/31/2018  9:27 PM EST -----  Please notify patient that I have reviewed her most recent lipid panel and that her LDL is 76, just slightly over goal of < 70. I recommend that she increase her pitavastatin from 2 mg to 4 mg followed by another repeat lipid panel in 6-8 weeks. Thanks.

## 2018-02-06 ENCOUNTER — TELEPHONE (OUTPATIENT)
Dept: NEUROLOGY | Facility: CLINIC | Age: 68
End: 2018-02-06

## 2018-02-06 NOTE — TELEPHONE ENCOUNTER
----- Message from JUAN FRANCISCO Singh sent at 2/6/2018  8:15 AM EST -----  Contact: Patient [phone]  It was a list of our general recommendations regarding elective procedures. You can just fax the patient the recommendation sheet. Thanks.   ----- Message -----     From: Kristal Felix MA     Sent: 2/5/2018   1:24 PM       To: JUAN FRANCISCO Singh    Do you have the paper she is talking about by chance?    ----- Message -----     From: Daryl Calix     Sent: 2/5/2018  11:04 AM       To: Kristal Felix MA    Patient is looking for the stroke paper that Peri ANDREW had given her a while back. Please email her that paper as soon as you can.    Email - [email or web address]

## 2018-02-08 ENCOUNTER — LAB (OUTPATIENT)
Dept: INTERNAL MEDICINE | Facility: CLINIC | Age: 68
End: 2018-02-08

## 2018-02-08 DIAGNOSIS — I10 ESSENTIAL HYPERTENSION: ICD-10-CM

## 2018-02-08 DIAGNOSIS — E78.5 HYPERLIPIDEMIA, UNSPECIFIED HYPERLIPIDEMIA TYPE: ICD-10-CM

## 2018-02-08 LAB
ALBUMIN SERPL-MCNC: 5 G/DL (ref 3.5–5.2)
ALBUMIN/GLOB SERPL: 2.6 G/DL
ALP SERPL-CCNC: 64 U/L (ref 39–117)
ALT SERPL-CCNC: 15 U/L (ref 1–33)
AST SERPL-CCNC: 15 U/L (ref 1–32)
BILIRUB SERPL-MCNC: 0.4 MG/DL (ref 0.1–1.2)
BUN SERPL-MCNC: 14 MG/DL (ref 8–23)
BUN/CREAT SERPL: 20.3 (ref 7–25)
CALCIUM SERPL-MCNC: 9.9 MG/DL (ref 8.6–10.5)
CHLORIDE SERPL-SCNC: 97 MMOL/L (ref 98–107)
CO2 SERPL-SCNC: 28.9 MMOL/L (ref 22–29)
CREAT SERPL-MCNC: 0.69 MG/DL (ref 0.57–1)
GLOBULIN SER CALC-MCNC: 1.9 GM/DL
GLUCOSE SERPL-MCNC: 84 MG/DL (ref 65–99)
POTASSIUM SERPL-SCNC: 5.7 MMOL/L (ref 3.5–5.2)
PROT SERPL-MCNC: 6.9 G/DL (ref 6–8.5)
SODIUM SERPL-SCNC: 139 MMOL/L (ref 136–145)

## 2018-02-09 DIAGNOSIS — E87.5 INCREASED POTASSIUM IN THE BLOOD: ICD-10-CM

## 2018-02-09 DIAGNOSIS — E87.5 POTASSIUM (K) EXCESS: Primary | ICD-10-CM

## 2018-02-19 DIAGNOSIS — M81.0 AGE-RELATED OSTEOPOROSIS WITHOUT CURRENT PATHOLOGICAL FRACTURE: Primary | ICD-10-CM

## 2018-02-20 ENCOUNTER — OFFICE VISIT (OUTPATIENT)
Dept: INTERNAL MEDICINE | Facility: CLINIC | Age: 68
End: 2018-02-20

## 2018-02-20 VITALS
HEIGHT: 66 IN | WEIGHT: 122 LBS | BODY MASS INDEX: 19.61 KG/M2 | RESPIRATION RATE: 14 BRPM | DIASTOLIC BLOOD PRESSURE: 72 MMHG | SYSTOLIC BLOOD PRESSURE: 122 MMHG

## 2018-02-20 DIAGNOSIS — R05.9 COUGH: ICD-10-CM

## 2018-02-20 DIAGNOSIS — I10 ESSENTIAL HYPERTENSION: Primary | ICD-10-CM

## 2018-02-20 DIAGNOSIS — E87.5 HIGH POTASSIUM: ICD-10-CM

## 2018-02-20 DIAGNOSIS — E78.5 HYPERLIPIDEMIA, UNSPECIFIED HYPERLIPIDEMIA TYPE: ICD-10-CM

## 2018-02-20 DIAGNOSIS — R35.0 FREQUENT URINATION: ICD-10-CM

## 2018-02-20 DIAGNOSIS — Z86.73 HISTORY OF CVA (CEREBROVASCULAR ACCIDENT): ICD-10-CM

## 2018-02-20 PROCEDURE — 99214 OFFICE O/P EST MOD 30 MIN: CPT | Performed by: INTERNAL MEDICINE

## 2018-02-20 RX ORDER — FAMOTIDINE 20 MG/1
20 TABLET, FILM COATED ORAL DAILY
COMMUNITY
End: 2018-05-29

## 2018-02-20 RX ORDER — LOSARTAN POTASSIUM AND HYDROCHLOROTHIAZIDE 12.5; 1 MG/1; MG/1
1 TABLET ORAL DAILY
Qty: 30 TABLET | Refills: 1 | Status: SHIPPED | OUTPATIENT
Start: 2018-02-20 | End: 2018-04-03 | Stop reason: SDUPTHER

## 2018-02-20 NOTE — PROGRESS NOTES
"Subjective   Rosi Vicente is a 67 y.o. female here for   Chief Complaint   Patient presents with   • Hyperlipidemia   • Hypertension   • Cough     for 3-4 wks   .    Vitals:    02/20/18 0811   BP: 122/72   BP Location: Right arm   Patient Position: Sitting   Cuff Size: Adult   Resp: 14   Weight: 55.3 kg (122 lb)   Height: 167.6 cm (66\")       Body mass index is 19.69 kg/(m^2).    Hyperlipidemia   This is a chronic problem. The current episode started more than 1 year ago. The problem is controlled. Recent lipid tests were reviewed and are normal. She has no history of diabetes. Pertinent negatives include no chest pain or shortness of breath.   Hypertension   This is a chronic problem. The current episode started more than 1 year ago. The problem is unchanged. The problem is controlled. Pertinent negatives include no chest pain, palpitations or shortness of breath.   Cough   This is a chronic problem. The current episode started 1 to 4 weeks ago. The problem has been unchanged. Pertinent negatives include no chest pain, chills, fever, shortness of breath or wheezing.        The following portions of the patient's history were reviewed and updated as appropriate: allergies, current medications, past social history and problem list.    Review of Systems   Constitutional: Positive for fatigue. Negative for chills and fever.   Respiratory: Positive for cough. Negative for shortness of breath and wheezing.    Cardiovascular: Negative for chest pain, palpitations and leg swelling.   Psychiatric/Behavioral: Negative for dysphoric mood and sleep disturbance. The patient is not nervous/anxious.        Objective   Physical Exam   Constitutional: She appears well-developed and well-nourished. No distress.   Cardiovascular: Normal rate, regular rhythm and normal heart sounds.    Pulmonary/Chest: No respiratory distress. She has no wheezes. She has no rales. She exhibits no tenderness.   Musculoskeletal: She exhibits no edema. "   Psychiatric: She has a normal mood and affect. Her behavior is normal.   Nursing note and vitals reviewed.      Assessment/Plan   Diagnoses and all orders for this visit:    Essential hypertension  Comments:  stable - change lisin to losartan - call if bp over 130/80  Orders:  -     losartan-hydrochlorothiazide (HYZAAR) 100-12.5 MG per tablet; Take 1 tablet by mouth Daily. Instead of lisinopril/hctz  -     Comprehensive Metabolic Panel; Future  -     Lipid Panel; Future    Hyperlipidemia, unspecified hyperlipidemia type  Comments:  need diet/ex  Orders:  -     Comprehensive Metabolic Panel; Future  -     Lipid Panel; Future  -     CK; Future    High potassium  Comments:  need rechk  Orders:  -     Comprehensive Metabolic Panel; Future    History of CVA (cerebrovascular accident)  Comments:  continue home PT    Frequent urination  Comments:  off myrbetriq - to see urologist today    Cough  Comments:  likely from lisinopril - change to losartan & call if cough persists    Other orders  -     Probiotic Product (PROBIOTIC ADVANCED PO); Take  by mouth.  -     famotidine (PEPCID) 20 MG tablet; Take 20 mg by mouth Daily.

## 2018-02-21 ENCOUNTER — TELEPHONE (OUTPATIENT)
Dept: INTERNAL MEDICINE | Facility: CLINIC | Age: 68
End: 2018-02-21

## 2018-02-21 DIAGNOSIS — I10 ESSENTIAL HYPERTENSION: Primary | ICD-10-CM

## 2018-02-21 RX ORDER — AMLODIPINE BESYLATE 5 MG/1
5 TABLET ORAL DAILY
Qty: 30 TABLET | Refills: 4 | Status: SHIPPED | OUTPATIENT
Start: 2018-02-21 | End: 2018-02-22 | Stop reason: SDUPTHER

## 2018-02-21 NOTE — TELEPHONE ENCOUNTER
----- Message from Tiara Shah sent at 2/21/2018  8:27 AM EST -----  Contact: pt - Dr Fonseca's pt - RE: med  Pt calling and would like to know if Rx - Levatol. Pt informs is really making pt tired. Pt informs has been increased and would like to know if this is a tributeing factor. If not, can you discuss what pt needs to do at this point? Could you please call pt to discuss? Please advise. Thanks      Pt # [phone]

## 2018-02-21 NOTE — TELEPHONE ENCOUNTER
Discussed - she is only taking 1/2 metoprolol at night.  She will stop this & use amlodipine 5mg qhs instead.  She will use 1/2 amlodipine if bp too low & go up to 10mg daily if bp over 135/85.

## 2018-02-21 NOTE — TELEPHONE ENCOUNTER
pls call - I left message for her to call back - she needs to wean off metoprolol slowly -go from one bid to 1/2 bid for 1 wk, then 1/2 daily for 2 days then stop. Start new bp med (amlodipine) 1/2 daily, then 1 daily as metoprolol decreases - call if bp over 140/90 (will likely need highest dose amlodipine 10mg/d)

## 2018-02-21 NOTE — TELEPHONE ENCOUNTER
Patient would like to speak to you further in regards to a medication she is currently taking that is making her fatigue.    Thank you

## 2018-02-21 NOTE — TELEPHONE ENCOUNTER
----- Message from Shanta Ayala sent at 2/21/2018 12:12 PM EST -----  Contact: pt  Pt returned phone call.      Phone:  M:[phone]

## 2018-02-22 ENCOUNTER — OFFICE VISIT (OUTPATIENT)
Dept: INTERNAL MEDICINE | Facility: CLINIC | Age: 68
End: 2018-02-22

## 2018-02-22 VITALS
HEIGHT: 66 IN | DIASTOLIC BLOOD PRESSURE: 80 MMHG | SYSTOLIC BLOOD PRESSURE: 126 MMHG | WEIGHT: 123 LBS | BODY MASS INDEX: 19.77 KG/M2

## 2018-02-22 DIAGNOSIS — I10 ESSENTIAL HYPERTENSION: ICD-10-CM

## 2018-02-22 DIAGNOSIS — R53.82 CHRONIC FATIGUE: ICD-10-CM

## 2018-02-22 DIAGNOSIS — M79.10 MUSCLE PAIN: Primary | ICD-10-CM

## 2018-02-22 DIAGNOSIS — Z86.73 HISTORY OF CVA (CEREBROVASCULAR ACCIDENT): ICD-10-CM

## 2018-02-22 DIAGNOSIS — E78.5 HYPERLIPIDEMIA, UNSPECIFIED HYPERLIPIDEMIA TYPE: ICD-10-CM

## 2018-02-22 PROCEDURE — 99213 OFFICE O/P EST LOW 20 MIN: CPT | Performed by: INTERNAL MEDICINE

## 2018-02-22 RX ORDER — AMLODIPINE BESYLATE 5 MG/1
5 TABLET ORAL DAILY
Qty: 30 TABLET | Refills: 4 | Status: SHIPPED | OUTPATIENT
Start: 2018-02-22 | End: 2018-04-03 | Stop reason: SDUPTHER

## 2018-02-22 NOTE — PROGRESS NOTES
"Subjective   Rosi Vicente is a 67 y.o. female here for   Chief Complaint   Patient presents with   • Fatigue   • Hypertension   • Muscle Pain   .    Vitals:    02/22/18 1349 02/22/18 1444   BP: 124/72 126/80   BP Location: Left arm    Patient Position: Sitting    Cuff Size: Adult    Weight: 55.8 kg (123 lb)    Height: 167.6 cm (66\")        Body mass index is 19.85 kg/(m^2).    Fatigue   This is a chronic problem. The current episode started more than 1 month ago. The problem occurs constantly. The problem has been gradually worsening. Associated symptoms include fatigue and myalgias. Pertinent negatives include no chest pain, chills, coughing or fever.   Muscle Pain   This is a new problem. The current episode started more than 1 month ago. The problem occurs constantly. The problem has been gradually worsening since onset. The pain is medium. Associated symptoms include fatigue. Pertinent negatives include no chest pain, fever, shortness of breath or wheezing.   Hypertension   This is a chronic problem. The current episode started more than 1 year ago. The problem is unchanged. The problem is controlled. Pertinent negatives include no chest pain, palpitations, peripheral edema or shortness of breath.   Hyperlipidemia   This is a chronic problem. The current episode started more than 1 year ago. The problem is controlled. Recent lipid tests were reviewed and are normal. She has no history of diabetes. Associated symptoms include myalgias. Pertinent negatives include no chest pain or shortness of breath.        The following portions of the patient's history were reviewed and updated as appropriate: allergies, current medications, past social history and problem list.    Review of Systems   Constitutional: Positive for fatigue. Negative for chills and fever.   Respiratory: Negative for cough, shortness of breath and wheezing.    Cardiovascular: Negative for chest pain, palpitations and leg swelling.   Musculoskeletal: " Positive for myalgias.   Psychiatric/Behavioral: Negative for dysphoric mood and sleep disturbance. The patient is not nervous/anxious.        Objective   Physical Exam   Constitutional: She appears well-developed and well-nourished. No distress.   Cardiovascular: Normal rate, regular rhythm and normal heart sounds.    Pulmonary/Chest: No respiratory distress. She has no wheezes. She has no rales. She exhibits no tenderness.   Musculoskeletal: She exhibits no edema.   Psychiatric: She has a normal mood and affect. Her behavior is normal.   Nursing note and vitals reviewed.      Assessment/Plan   Diagnoses and all orders for this visit:    Muscle pain  Comments:  likely from livalo - cut this in half & call if sx persist    Essential hypertension  Comments:  stable- she may stay off low dose toprol & try amlodipine instead (fatigue with toprol?)  Orders:  -     amLODIPine (NORVASC) 5 MG tablet; Take 1 tablet by mouth Daily. Take 1/2 pill daily while decreasing metoprolol to 1/2 bid, then 1daily as stop metoprolol    Hyperlipidemia, unspecified hyperlipidemia type  Comments:  refer to cardiol - trial of repatha b/c can't tolerate statins?  Orders:  -     Ambulatory Referral to Cardiology    Chronic fatigue  Comments:  from livalo? - cut livalo in half daily - ok to stay off toprol    History of CVA (cerebrovascular accident)  Comments:  need to keep LDL less than 70 per neuro, but she cannot tolerate high dose livalo - decr livalo to 2mg/d - refer to cardiology (?repatha)  Orders:  -     Ambulatory Referral to Cardiology

## 2018-02-24 ENCOUNTER — RESULTS ENCOUNTER (OUTPATIENT)
Dept: OBSTETRICS AND GYNECOLOGY | Facility: CLINIC | Age: 68
End: 2018-02-24

## 2018-02-24 DIAGNOSIS — M81.0 AGE-RELATED OSTEOPOROSIS WITHOUT CURRENT PATHOLOGICAL FRACTURE: ICD-10-CM

## 2018-02-26 ENCOUNTER — TELEPHONE (OUTPATIENT)
Dept: INTERNAL MEDICINE | Facility: CLINIC | Age: 68
End: 2018-02-26

## 2018-02-26 DIAGNOSIS — E78.49 OTHER HYPERLIPIDEMIA: Primary | ICD-10-CM

## 2018-02-26 NOTE — TELEPHONE ENCOUNTER
Discussed - she has extreme fatigue (improved in past off statin) - stay off all statins b/c side effects.  pls try to get repatha covered.  She is to get on cancellation list to see cardiologist sooner (appt in 1 mo).  Will need repeat labs including thyroid.

## 2018-02-26 NOTE — TELEPHONE ENCOUNTER
----- Message from Tiara Shah sent at 2/26/2018  1:27 PM EST -----  Contact: pt - Dr Fonseca's pt - RE: Rx  Pt calling and would like a return call regarding pt's Rx - from livalo? - cut livalo in half daily - ok to stay off Toprol. Pt would like to come off this for now as pt is still feeling tired & fatigued. Pt would like to re-start back on higher dosage of Livalo. Could you please call pt to discuss? Please advise. Thanks    from livalo? - cut livalo in half daily - ok to stay off Toprol    Pt would like to know if pt should start on another type of statin? Please advise.     [address] [place], [place] - [address] [facility] [phone] [facility] [phone] FX    Pt - [phone]

## 2018-02-28 DIAGNOSIS — E78.49 OTHER HYPERLIPIDEMIA: ICD-10-CM

## 2018-03-01 ENCOUNTER — TELEPHONE (OUTPATIENT)
Dept: INTERNAL MEDICINE | Facility: CLINIC | Age: 68
End: 2018-03-01

## 2018-03-01 ENCOUNTER — HOSPITAL ENCOUNTER (OUTPATIENT)
Dept: INFUSION THERAPY | Facility: HOSPITAL | Age: 68
Discharge: HOME | End: 2018-03-01
Admitting: NURSE PRACTITIONER

## 2018-03-01 VITALS
SYSTOLIC BLOOD PRESSURE: 128 MMHG | RESPIRATION RATE: 16 BRPM | OXYGEN SATURATION: 99 % | HEART RATE: 70 BPM | HEIGHT: 66 IN | TEMPERATURE: 97.8 F | DIASTOLIC BLOOD PRESSURE: 89 MMHG | BODY MASS INDEX: 19.44 KG/M2 | WEIGHT: 121 LBS

## 2018-03-01 DIAGNOSIS — M81.0 AGE-RELATED OSTEOPOROSIS WITHOUT CURRENT PATHOLOGICAL FRACTURE: ICD-10-CM

## 2018-03-01 PROCEDURE — 96372 THER/PROPH/DIAG INJ SC/IM: CPT

## 2018-03-01 PROCEDURE — 25010000002 DENOSUMAB 60 MG/ML SOLUTION: Performed by: NURSE PRACTITIONER

## 2018-03-01 RX ADMIN — DENOSUMAB 60 MG: 60 INJECTION SUBCUTANEOUS at 11:07

## 2018-03-01 NOTE — PATIENT INSTRUCTIONS
Denosumab injection  What is this medicine?  DENOSUMAB (den oh carolyn mab) slows bone breakdown. Prolia is used to treat osteoporosis in women after menopause and in men. Xgeva is used to treat a high calcium level due to cancer and to prevent bone fractures and other bone problems caused by multiple myeloma or cancer bone metastases. Xgeva is also used to treat giant cell tumor of the bone.  This medicine may be used for other purposes; ask your health care provider or pharmacist if you have questions.  COMMON BRAND NAME(S): Prolia, XGEVA  What should I tell my health care provider before I take this medicine?  They need to know if you have any of these conditions:  -dental disease  -having surgery or tooth extraction  -infection  -kidney disease  -low levels of calcium or Vitamin D in the blood  -malnutrition  -on hemodialysis  -skin conditions or sensitivity  -thyroid or parathyroid disease  -an unusual reaction to denosumab, other medicines, foods, dyes, or preservatives  -pregnant or trying to get pregnant  -breast-feeding  How should I use this medicine?  This medicine is for injection under the skin. It is given by a health care professional in a hospital or clinic setting.  If you are getting Prolia, a special MedGuide will be given to you by the pharmacist with each prescription and refill. Be sure to read this information carefully each time.  For Prolia, talk to your pediatrician regarding the use of this medicine in children. Special care may be needed. For Xgeva, talk to your pediatrician regarding the use of this medicine in children. While this drug may be prescribed for children as young as 13 years for selected conditions, precautions do apply.  Overdosage: If you think you have taken too much of this medicine contact a poison control center or emergency room at once.  NOTE: This medicine is only for you. Do not share this medicine with others.  What if I miss a dose?  It is important not to miss your  dose. Call your doctor or health care professional if you are unable to keep an appointment.  What may interact with this medicine?  Do not take this medicine with any of the following medications:  -other medicines containing denosumab  This medicine may also interact with the following medications:  -medicines that lower your chance of fighting infection  -steroid medicines like prednisone or cortisone  This list may not describe all possible interactions. Give your health care provider a list of all the medicines, herbs, non-prescription drugs, or dietary supplements you use. Also tell them if you smoke, drink alcohol, or use illegal drugs. Some items may interact with your medicine.  What should I watch for while using this medicine?  Visit your doctor or health care professional for regular checks on your progress. Your doctor or health care professional may order blood tests and other tests to see how you are doing.  Call your doctor or health care professional for advice if you get a fever, chills or sore throat, or other symptoms of a cold or flu. Do not treat yourself. This drug may decrease your body's ability to fight infection. Try to avoid being around people who are sick.  You should make sure you get enough calcium and vitamin D while you are taking this medicine, unless your doctor tells you not to. Discuss the foods you eat and the vitamins you take with your health care professional.  See your dentist regularly. Brush and floss your teeth as directed. Before you have any dental work done, tell your dentist you are receiving this medicine.  Do not become pregnant while taking this medicine or for 5 months after stopping it. Talk with your doctor or health care professional about your birth control options while taking this medicine. Women should inform their doctor if they wish to become pregnant or think they might be pregnant. There is a potential for serious side effects to an unborn child. Talk  to your health care professional or pharmacist for more information.  What side effects may I notice from receiving this medicine?  Side effects that you should report to your doctor or health care professional as soon as possible:  -allergic reactions like skin rash, itching or hives, swelling of the face, lips, or tongue  -bone pain  -breathing problems  -dizziness  -jaw pain, especially after dental work  -redness, blistering, peeling of the skin  -signs and symptoms of infection like fever or chills; cough; sore throat; pain or trouble passing urine  -signs of low calcium like fast heartbeat, muscle cramps or muscle pain; pain, tingling, numbness in the hands or feet; seizures  -unusual bleeding or bruising  -unusually weak or tired  Side effects that usually do not require medical attention (report to your doctor or health care professional if they continue or are bothersome):  -constipation  -diarrhea  -headache  -joint pain  -loss of appetite  -muscle pain  -runny nose  -tiredness  -upset stomach  This list may not describe all possible side effects. Call your doctor for medical advice about side effects. You may report side effects to FDA at 1-100-FDA-8009.  Where should I keep my medicine?  This medicine is only given in a clinic, doctor's office, or other health care setting and will not be stored at home.  NOTE: This sheet is a summary. It may not cover all possible information. If you have questions about this medicine, talk to your doctor, pharmacist, or health care provider.  © 2018 [employer] (2018-01-09 19:17:21)

## 2018-03-01 NOTE — TELEPHONE ENCOUNTER
"----- Message from Shanta Ayala sent at 3/1/2018  8:41 AM EST -----  Contact: Pt  Pt would like to speak with MD to see if she should take another \"statin\"    Please advise,      Please call  Pt# [phone]  "

## 2018-03-05 ENCOUNTER — TELEPHONE (OUTPATIENT)
Dept: INTERNAL MEDICINE | Facility: CLINIC | Age: 68
End: 2018-03-05

## 2018-03-05 ENCOUNTER — TELEPHONE (OUTPATIENT)
Dept: NEUROLOGY | Facility: CLINIC | Age: 68
End: 2018-03-05

## 2018-03-05 DIAGNOSIS — I10 ESSENTIAL HYPERTENSION: ICD-10-CM

## 2018-03-05 NOTE — TELEPHONE ENCOUNTER
----- Message from Shanta Ayala sent at 3/5/2018  1:27 PM EST -----  Contact: Leti with [facility]  [facility]armacy     is calling to get clarification on or to change  RX:amLODIPine (NORVASC) 5 MG tablet     Directions need clarification.     Caller#1 [phone]

## 2018-03-05 NOTE — TELEPHONE ENCOUNTER
Rosi Vicente called me.  She told me she is scheduled for shoulder surgery with Dr. Hermes Anne at [facility] on April 17th, 2018.  I called and spoke with Alec GORE for Dr. Anne and I faxed a copy of our recommendations for stroke patients prior to surgery, stressing the need to be off blood thinners the least amount of time possible.  Acknowledgement per fax that it was received.  ELMER Garcia RN

## 2018-03-05 NOTE — TELEPHONE ENCOUNTER
Message left for Ms. Vicente letting her know that her prior authorization for Repatha was approved until 9/1/18 and what the price would be.     I have informed [facility] pharmacy and they stated that the price would be 269.52

## 2018-03-06 NOTE — TELEPHONE ENCOUNTER
Call made to [facility] pharmacy the pharmacist informed me that they had already had medication clarification back on 2/22/18, and she has been given this medication.

## 2018-03-09 ENCOUNTER — TELEPHONE (OUTPATIENT)
Dept: INTERNAL MEDICINE | Facility: CLINIC | Age: 68
End: 2018-03-09

## 2018-03-09 NOTE — TELEPHONE ENCOUNTER
----- Message from Ina Anderson sent at 3/9/2018  9:58 AM EST -----  FYI:  Patient has labs scheduled here on 03/30/2018, but has preop labs scheduled 03/28/2018 at [facility] for total shoulder surgery.  Patient only wanted to be stuck once so I sent Dr. Fonseca's orders to [facility], after speaking with Kori, and asked they send us results.  Thanks.

## 2018-03-26 ENCOUNTER — TELEPHONE (OUTPATIENT)
Dept: NEUROLOGY | Facility: CLINIC | Age: 68
End: 2018-03-26

## 2018-03-26 NOTE — TELEPHONE ENCOUNTER
----- Message from JUAN FRANCISCO Singh sent at 3/25/2018  3:31 PM EDT -----  Contact: pt [phone]  Yes, he event was over 3 months ago, so long as she has not had any recurrence, the usual rules apply. She should be off plavix the minimum amount of time possible (usually no more than 5 days) but her surgeon will likely tell her how long he wants her off it.     ----- Message -----  From: Kristal Felix MA  Sent: 3/23/2018   8:23 AM  To: JUAN FRANCISCO Singh        ----- Message -----  From: Daryl Reese  Sent: 3/21/2018   9:28 AM  To: Kristal Felix MA    Pt is having total shoulder surgery 4/17/18 and needs to know if/when she can stop her Plavix.  She has seen Smita

## 2018-03-26 NOTE — TELEPHONE ENCOUNTER
I s/w Jordana at Dr. Hermes Anne office and let her know recommendations per JUAN FRANCISCO Rossi. I also left patient a voicemail and let her know and if she had any questions to give me a call.

## 2018-03-27 ENCOUNTER — OFFICE VISIT (OUTPATIENT)
Dept: CARDIOLOGY | Facility: CLINIC | Age: 68
End: 2018-03-27

## 2018-03-27 VITALS
HEART RATE: 71 BPM | HEIGHT: 66 IN | WEIGHT: 127.8 LBS | DIASTOLIC BLOOD PRESSURE: 80 MMHG | RESPIRATION RATE: 16 BRPM | SYSTOLIC BLOOD PRESSURE: 128 MMHG | BODY MASS INDEX: 20.54 KG/M2

## 2018-03-27 DIAGNOSIS — E78.5 HYPERLIPIDEMIA, UNSPECIFIED HYPERLIPIDEMIA TYPE: ICD-10-CM

## 2018-03-27 DIAGNOSIS — I10 ESSENTIAL HYPERTENSION: ICD-10-CM

## 2018-03-27 DIAGNOSIS — Z86.73 HISTORY OF CVA (CEREBROVASCULAR ACCIDENT): Primary | ICD-10-CM

## 2018-03-27 DIAGNOSIS — M25.512 CHRONIC LEFT SHOULDER PAIN: ICD-10-CM

## 2018-03-27 DIAGNOSIS — Z01.810 PRE-OPERATIVE CARDIOVASCULAR EXAMINATION: ICD-10-CM

## 2018-03-27 DIAGNOSIS — G89.29 CHRONIC LEFT SHOULDER PAIN: ICD-10-CM

## 2018-03-27 PROCEDURE — 93000 ELECTROCARDIOGRAM COMPLETE: CPT | Performed by: INTERNAL MEDICINE

## 2018-03-27 PROCEDURE — 99204 OFFICE O/P NEW MOD 45 MIN: CPT | Performed by: INTERNAL MEDICINE

## 2018-03-27 RX ORDER — ROSUVASTATIN CALCIUM 5 MG/1
5 TABLET, COATED ORAL DAILY
COMMUNITY
End: 2018-05-22 | Stop reason: SINTOL

## 2018-03-27 NOTE — PROGRESS NOTES
PATIENTINFORMATION    Date of Office Visit: 2018  Encounter Provider: Pinky Gee MD  Place of Service: [facility] CARDIOLOGY  Patient Name: Rosi Vicente  : 1950    Subjective:     Encounter Date:2018      Patient ID: Rosi Vicente is a 68 y.o. female.      History of Present Illness    This is a nice lady who has a history of hypertension and hyperlipidemia.  She has not tolerated Lipitor, pravastatin, or Livalo.  She is currently on Crestor 5 mg a day but says she feels tired and fatigued.  She was prescribed Evolocumab but was worried about the side effects and did not start taking it.  Unfortunately, she suffered a stroke in 2013, which has left her with some residual left-sided weakness.  While hospitalized for the stroke, she had a carotid Doppler, which was normal.  Echocardiogram showed normal left ventricular function with an ejection fraction of 63% and mild tricuspid regurgitation with a normal right ventricular systolic pressure and a normal saline study.  She followed up with Peri James with neurology in 2017.  Her note mentions that she had patchy deep right basal ganglia and corona radiata infarct.  This did not appear to be embolic.  Peri does not mention the need for a transesophageal echocardiogram in her note.  The patient has been feeling well.  She is getting back to Jazzercise.  She denies palpitations, shortness of breath, or chest pain.  She does get a little lightheaded and thinks it is due to her statin.  She does feel fatigued and also attributes that to her statin.  She has to have surgery on her left shoulder and this is planned for a couple of weeks.      Review of Systems   Constitution: Positive for malaise/fatigue. Negative for fever, weight gain and weight loss.   HENT: Negative for ear pain, hearing loss, nosebleeds and sore throat.    Eyes: Negative for double vision, pain, vision loss in left eye and vision loss in  "right eye.   Cardiovascular:        See history of present illness.   Respiratory: Negative for cough, shortness of breath, sleep disturbances due to breathing, snoring and wheezing.    Endocrine: Negative for cold intolerance, heat intolerance and polyuria.   Skin: Negative for itching, poor wound healing and rash.   Musculoskeletal: Negative for joint pain, joint swelling and myalgias.   Gastrointestinal: Negative for abdominal pain, diarrhea, hematochezia, nausea and vomiting.   Genitourinary: Negative for hematuria and hesitancy.   Neurological: Negative for numbness, paresthesias and seizures.   Psychiatric/Behavioral: Negative for depression. The patient is not nervous/anxious.            ECG 12 Lead  Date/Time: 3/27/2018 11:38 AM  Performed by: LEIA WEEKS  Authorized by: LEIA WEEKS   Comparison: compared with previous ECG from 9/13/2018  Similar to previous ECG  Rhythm: sinus rhythm  BPM: 71  Conduction: conduction normal  ST Segments: ST segments normal  Clinical impression: normal ECG               Objective:     /80 (BP Location: Right arm, Patient Position: Sitting, Cuff Size: Adult)   Pulse 71   Resp 16   Ht 167.6 cm (66\")   Wt 58 kg (127 lb 12.8 oz)   BMI 20.63 kg/m²  Body mass index is 20.63 kg/m².     Physical Exam   Constitutional: She appears well-developed.   HENT:   Head: Normocephalic and atraumatic.   Eyes: Conjunctivae and lids are normal. Pupils are equal, round, and reactive to light. Lids are everted and swept, no foreign bodies found.   Neck: Normal range of motion. No JVD present. Carotid bruit is not present. No tracheal deviation present. No thyroid mass present.   Cardiovascular: Normal rate, regular rhythm and normal heart sounds.    Pulses:       Dorsalis pedis pulses are 2+ on the right side, and 2+ on the left side.   Pulmonary/Chest: Effort normal and breath sounds normal.   Abdominal: Normal appearance and bowel sounds are normal.   Musculoskeletal: Normal " range of motion.   Neurological: She is alert. She has normal strength.   Skin: Skin is warm, dry and intact.   Psychiatric: She has a normal mood and affect. Her behavior is normal.   Vitals reviewed.      Lab Review: I reviewed her recent lipid panels      Assessment/Plan:       1. History of stroke.  This was not felt to be embolic.  Transthoracic echocardiogram was unremarkable.    2. Hypertension controlled on her current medications.  3. Hyperlipidemia.  She has tried Lipitor, pravastatin, and Livalo and had symptoms with all of them.  She is currently on Crestor but having symptoms with the Crestor too.  We talked about Coenzyme Q-10.  She was given a prescription for Evolocumab but did not take it because she was concerned about the side effects.  4. I encouraged her to continue the Crestor.  She is thinking about stopping it after surgery to see if she feels any better off of it.  If she does, I suggested red yeast rice, as I do not think any further statin therapy will be tolerated.    5. Preoperative evaluation.  She is low risk for cardiovascular event with her shoulder surgery.  I do not recommend any further studies prior to the shoulder study.      I will see her back as needed.      Orders Placed This Encounter   Procedures   • ECG 12 Lead     This order was created via procedure documentation      Rosi Vicente   [place] Medication Instructions KIM:    Printed on:03/27/18 7664   Medication Information                      acetaminophen (TYLENOL) 325 MG tablet  Take 2 tablets by mouth Every 6 (Six) Hours As Needed for Mild Pain  or Moderate Pain .             amLODIPine (NORVASC) 5 MG tablet  Take 1 tablet by mouth Daily. Take 1/2 pill daily while decreasing metoprolol to 1/2 bid, then 1daily as stop metoprolol             calcium carbonate (OS-CARLOS) 600 MG tablet  Take 600 mg by mouth 2 (Two) Times a Day With Meals.             cholecalciferol (VITAMIN D3) 1000 UNITS tablet  Take 2,000 Units by mouth  Daily.             clopidogrel (PLAVIX) 75 MG tablet  Take 1 tablet by mouth Daily.             famotidine (PEPCID) 20 MG tablet  Take 20 mg by mouth Daily.             fluticasone (FLONASE) 50 MCG/ACT nasal spray  2 sprays into each nostril Daily.             losartan-hydrochlorothiazide (HYZAAR) 100-12.5 MG per tablet  Take 1 tablet by mouth Daily. Instead of lisinopril/hctz             Melatonin 10 MG tablet  Take 1 tablet by mouth At Night As Needed (sleep).             montelukast (SINGULAIR) 10 MG tablet  Take 1 tablet by mouth Daily.             Probiotic Product (PROBIOTIC ADVANCED PO)  Take  by mouth.             rosuvastatin (CRESTOR) 5 MG tablet  Take 5 mg by mouth Daily.             vitamin B-12 (CYANOCOBALAMIN) 1000 MCG tablet  Take 1,000 mcg by mouth 2 (two) times a day.                        Pinky Gee MD  03/27/18  2:04 PM

## 2018-04-02 ENCOUNTER — TELEPHONE (OUTPATIENT)
Dept: INTERNAL MEDICINE | Facility: CLINIC | Age: 68
End: 2018-04-02

## 2018-04-02 NOTE — TELEPHONE ENCOUNTER
----- Message from Sybil Rosenthal sent at 4/2/2018  8:44 AM EDT -----  Contact: Patient  Patient would like to talk to Dr. Fonseca about her statins again, has surgery in 2weeks,, had blood work done at [facility] has question about one of the test.      Patient:[phone]

## 2018-04-03 ENCOUNTER — TELEPHONE (OUTPATIENT)
Dept: INTERNAL MEDICINE | Facility: CLINIC | Age: 68
End: 2018-04-03

## 2018-04-03 DIAGNOSIS — I10 ESSENTIAL HYPERTENSION: ICD-10-CM

## 2018-04-03 RX ORDER — LOSARTAN POTASSIUM AND HYDROCHLOROTHIAZIDE 12.5; 1 MG/1; MG/1
1 TABLET ORAL DAILY
Qty: 90 TABLET | Refills: 1 | Status: SHIPPED | OUTPATIENT
Start: 2018-04-03 | End: 2018-05-22 | Stop reason: ALTCHOICE

## 2018-04-03 RX ORDER — AMLODIPINE BESYLATE 5 MG/1
5 TABLET ORAL DAILY
Qty: 90 TABLET | Refills: 1 | Status: SHIPPED | OUTPATIENT
Start: 2018-04-03 | End: 2018-05-29 | Stop reason: SDUPTHER

## 2018-04-03 NOTE — TELEPHONE ENCOUNTER
----- Message from Sybil Rosenthal sent at 4/3/2018 12:24 PM EDT -----  Contact: Patient  Patient requesting refill on    losartan-hydrochlorothiazide (HYZAAR) 100-12.5 MG per tablet  amLODIPine (NORVASC) 5 MG tablet    Patient:[id number]    Pharmacy:[facility] - [address] - [phone] [facility] [phone] FX

## 2018-04-12 ENCOUNTER — TELEPHONE (OUTPATIENT)
Dept: NEUROLOGY | Facility: CLINIC | Age: 68
End: 2018-04-12

## 2018-04-12 NOTE — TELEPHONE ENCOUNTER
----- Message from JUAN FRANCISCO Singh sent at 4/11/2018 11:23 PM EDT -----  Contact: patient [phone]  Please let patient know that protonix is an acceptable substitute, no real evidence that it interacts with plavix. Omeperazole is the one that evidence shows interacts with plavix. Thanks.   ----- Message -----  From: Kristal Felix MA  Sent: 4/10/2018  12:45 PM  To: JUAN FRANCISCO Singh        ----- Message -----  From: Saadia Salazar  Sent: 4/10/2018  11:07 AM  To: Kristal Felix MA    PT HAS BEEN TAKING PEPCID WOULD PROTONIX BE OKAY? WITH HER STROKE MEDS? PLEASE ADVISE . ITS THIS AN EXCEPTABLE SUBSTITUTE? PLEASE ADVISE

## 2018-05-02 ENCOUNTER — TELEPHONE (OUTPATIENT)
Dept: INTERNAL MEDICINE | Facility: CLINIC | Age: 68
End: 2018-05-02

## 2018-05-02 NOTE — TELEPHONE ENCOUNTER
----- Message from Sahnta Ayala sent at 5/2/2018  8:49 AM EDT -----  Contact: pt  Pt is returning phone call to Sav.    Pt# [phone]

## 2018-05-08 DIAGNOSIS — J30.2 SEASONAL ALLERGIC RHINITIS, UNSPECIFIED CHRONICITY, UNSPECIFIED TRIGGER: Primary | ICD-10-CM

## 2018-05-08 DIAGNOSIS — N32.81 OAB (OVERACTIVE BLADDER): ICD-10-CM

## 2018-05-08 RX ORDER — FLUTICASONE PROPIONATE 50 MCG
2 SPRAY, SUSPENSION (ML) NASAL DAILY
Qty: 3 BOTTLE | Refills: 1 | Status: SHIPPED | OUTPATIENT
Start: 2018-05-08 | End: 2018-05-16 | Stop reason: SDUPTHER

## 2018-05-08 NOTE — TELEPHONE ENCOUNTER
Fax refill request received for refill request on Fluticasone 50 mcg nasal spry and Myrbetriq 50 mg tablet ER from [facility].     Requests were sent in.

## 2018-05-10 ENCOUNTER — LAB (OUTPATIENT)
Dept: INTERNAL MEDICINE | Facility: CLINIC | Age: 68
End: 2018-05-10

## 2018-05-10 DIAGNOSIS — I10 ESSENTIAL HYPERTENSION: ICD-10-CM

## 2018-05-10 DIAGNOSIS — E78.5 HYPERLIPIDEMIA, UNSPECIFIED HYPERLIPIDEMIA TYPE: ICD-10-CM

## 2018-05-10 DIAGNOSIS — E87.5 HIGH POTASSIUM: ICD-10-CM

## 2018-05-10 LAB
ALBUMIN SERPL-MCNC: 4.4 G/DL (ref 3.5–5.2)
ALBUMIN/GLOB SERPL: 1.8 G/DL
ALP SERPL-CCNC: 75 U/L (ref 39–117)
ALT SERPL W P-5'-P-CCNC: 12 U/L (ref 1–33)
ANION GAP SERPL CALCULATED.3IONS-SCNC: 13.6 MMOL/L
AST SERPL-CCNC: 12 U/L (ref 1–32)
BILIRUB SERPL-MCNC: 0.6 MG/DL (ref 0.1–1.2)
BUN BLD-MCNC: 14 MG/DL (ref 8–23)
BUN/CREAT SERPL: 21.9 (ref 7–25)
CALCIUM SPEC-SCNC: 9.7 MG/DL (ref 8.6–10.5)
CHLORIDE SERPL-SCNC: 98 MMOL/L (ref 98–107)
CHOLEST SERPL-MCNC: 164 MG/DL (ref 0–200)
CK SERPL-CCNC: 61 U/L (ref 20–180)
CO2 SERPL-SCNC: 26.4 MMOL/L (ref 22–29)
CREAT BLD-MCNC: 0.64 MG/DL (ref 0.57–1)
GFR SERPL CREATININE-BSD FRML MDRD: 92 ML/MIN/1.73
GLOBULIN UR ELPH-MCNC: 2.5 GM/DL
GLUCOSE BLD-MCNC: 79 MG/DL (ref 65–99)
HDLC SERPL-MCNC: 52 MG/DL (ref 40–60)
LDLC SERPL CALC-MCNC: 97 MG/DL (ref 0–100)
LDLC/HDLC SERPL: 1.86 {RATIO}
POTASSIUM BLD-SCNC: 4.6 MMOL/L (ref 3.5–5.2)
PROT SERPL-MCNC: 6.9 G/DL (ref 6–8.5)
SODIUM BLD-SCNC: 138 MMOL/L (ref 136–145)
TRIGL SERPL-MCNC: 76 MG/DL (ref 0–150)
VLDLC SERPL-MCNC: 15.2 MG/DL (ref 5–40)

## 2018-05-10 PROCEDURE — 80061 LIPID PANEL: CPT | Performed by: INTERNAL MEDICINE

## 2018-05-10 PROCEDURE — 80053 COMPREHEN METABOLIC PANEL: CPT | Performed by: INTERNAL MEDICINE

## 2018-05-11 ENCOUNTER — PATIENT MESSAGE (OUTPATIENT)
Dept: INTERNAL MEDICINE | Facility: CLINIC | Age: 68
End: 2018-05-11

## 2018-05-14 NOTE — TELEPHONE ENCOUNTER
From: Rosi Vicente  Sent: 5/14/2018 11:54 AM EDT  To: [facility]  Subject: RE: Test Results Question    terrific  ----- Message -----  From: Dionne Fonseca MD  Sent: 5/14/2018 8:22 AM EDT  To: Rosi Jules  Subject: RE: Test Results Question  Low dose livalo is a great idea - try this!    ----- Message -----   From: Rosi Yiy   Sent: 5/12/2018 10:13 AM EDT   To: Dionne Fonseca MD  Subject: RE: Test Results Question    Hi,  Cardiologist was pleased with results seen pre op 4/17 blood work. Did not specify number for chol. She suggested I try Red Yeast   Rice for chol post- op. Was on it for last 3 weeks. As you saw, it did not work on LDL or total chol.   The neuro group wanted LDL < 70, but satisfied with previous 76.   Repatha is over $500 / month, even with discount!!! Trying Livalo again. May I take 5mg every other dAY? Currently taking it daily since Friday.Must get that LDL down.   Any other thoughts prior to 5/22 visit?  ----- Message -----  From: Dionne Fonseca MD  Sent: 5/11/2018 4:54 PM EDT  To: Rosi Yiy  Subject: RE: Test Results Question  Your chol was only borderline (LDL not over 100) - did the cardiologist want your chol lower than normal?    ----- Message -----   From: Rosi Vicente   Sent: 5/11/2018 6:35 AM EDT   To: Dionne Fonseca MD  Subject: Test Results Question    Morning,  Last night checked to see if lipid results were available. I was freaked out to see the LDL. Obviously Red Yeast Rice has not been effective post-op. IT was 76 pre-op.   I will start Repatha TODAY and hope for the best.   See you 5/22/18 at 8:45.

## 2018-05-16 DIAGNOSIS — J30.2 SEASONAL ALLERGIC RHINITIS, UNSPECIFIED CHRONICITY, UNSPECIFIED TRIGGER: ICD-10-CM

## 2018-05-16 DIAGNOSIS — N32.81 OAB (OVERACTIVE BLADDER): ICD-10-CM

## 2018-05-16 RX ORDER — FLUTICASONE PROPIONATE 50 MCG
2 SPRAY, SUSPENSION (ML) NASAL DAILY
Qty: 3 BOTTLE | Refills: 1 | Status: SHIPPED | OUTPATIENT
Start: 2018-05-16 | End: 2018-12-27

## 2018-05-17 DIAGNOSIS — I63.9 CEREBROVASCULAR ACCIDENT (CVA), UNSPECIFIED MECHANISM (HCC): ICD-10-CM

## 2018-05-17 RX ORDER — MONTELUKAST SODIUM 10 MG/1
10 TABLET ORAL DAILY
Qty: 90 TABLET | Refills: 1 | Status: SHIPPED | OUTPATIENT
Start: 2018-05-17 | End: 2018-10-12 | Stop reason: SDUPTHER

## 2018-05-17 RX ORDER — CLOPIDOGREL BISULFATE 75 MG/1
75 TABLET ORAL DAILY
Qty: 90 TABLET | Refills: 1 | Status: SHIPPED | OUTPATIENT
Start: 2018-05-17 | End: 2018-10-12 | Stop reason: SDUPTHER

## 2018-05-22 ENCOUNTER — OFFICE VISIT (OUTPATIENT)
Dept: INTERNAL MEDICINE | Facility: CLINIC | Age: 68
End: 2018-05-22

## 2018-05-22 VITALS
DIASTOLIC BLOOD PRESSURE: 82 MMHG | SYSTOLIC BLOOD PRESSURE: 116 MMHG | OXYGEN SATURATION: 94 % | WEIGHT: 120 LBS | HEIGHT: 66 IN | BODY MASS INDEX: 19.29 KG/M2 | HEART RATE: 67 BPM

## 2018-05-22 DIAGNOSIS — E78.49 OTHER HYPERLIPIDEMIA: Primary | ICD-10-CM

## 2018-05-22 DIAGNOSIS — Z86.73 HISTORY OF CVA (CEREBROVASCULAR ACCIDENT): ICD-10-CM

## 2018-05-22 DIAGNOSIS — I10 ESSENTIAL HYPERTENSION: ICD-10-CM

## 2018-05-22 DIAGNOSIS — R53.82 CHRONIC FATIGUE: ICD-10-CM

## 2018-05-22 DIAGNOSIS — E55.9 VITAMIN D DEFICIENCY: ICD-10-CM

## 2018-05-22 PROBLEM — M19.012 OSTEOARTHRITIS OF LEFT SHOULDER: Status: ACTIVE | Noted: 2018-04-17

## 2018-05-22 PROBLEM — M19.012 ARTHRITIS OF LEFT SHOULDER REGION: Status: ACTIVE | Noted: 2018-03-07

## 2018-05-22 PROBLEM — E87.1 HYPONATREMIA: Status: ACTIVE | Noted: 2018-04-19

## 2018-05-22 PROCEDURE — 99214 OFFICE O/P EST MOD 30 MIN: CPT | Performed by: INTERNAL MEDICINE

## 2018-05-22 RX ORDER — IRBESARTAN 300 MG/1
300 TABLET ORAL NIGHTLY
Qty: 30 TABLET | Refills: 6 | Status: SHIPPED | OUTPATIENT
Start: 2018-05-22 | End: 2018-07-17

## 2018-05-22 RX ORDER — CYANOCOBALAMIN (VITAMIN B-12) 2000 MCG
1000 TABLET ORAL DAILY
COMMUNITY
End: 2018-09-26

## 2018-05-22 RX ORDER — LOSARTAN POTASSIUM 100 MG/1
100 TABLET ORAL DAILY
COMMUNITY
Start: 2018-04-19 | End: 2018-05-22 | Stop reason: SDUPTHER

## 2018-05-22 RX ORDER — SENNOSIDES 8.6 MG
TABLET ORAL DAILY
COMMUNITY

## 2018-05-22 RX ORDER — PHENOL 1.4 %
20 AEROSOL, SPRAY (ML) MUCOUS MEMBRANE DAILY
COMMUNITY
End: 2019-06-13 | Stop reason: ALTCHOICE

## 2018-05-22 NOTE — PROGRESS NOTES
"Subjective   Rosi Vicente is a 68 y.o. female here for   Chief Complaint   Patient presents with   • Hyperlipidemia   • Hypertension   • Fatigue   .    Vitals:    05/22/18 0853   BP: 116/82   BP Location: Right arm   Patient Position: Sitting   Cuff Size: Adult   Pulse: 67   SpO2: 94%   Weight: 54.4 kg (120 lb)   Height: 167.6 cm (66\")       Body mass index is 19.37 kg/m².    Hyperlipidemia   This is a chronic problem. The current episode started more than 1 year ago. The problem is controlled. Recent lipid tests were reviewed and are normal. She has no history of diabetes. Pertinent negatives include no chest pain or shortness of breath.   Hypertension   This is a chronic problem. The current episode started more than 1 year ago. The problem is unchanged. The problem is controlled. Pertinent negatives include no chest pain, palpitations or shortness of breath.   Fatigue   This is a recurrent problem. The problem occurs intermittently. The problem has been waxing and waning. Associated symptoms include fatigue (since CVA- worse when taking livalo). Pertinent negatives include no chest pain, chills, coughing or fever.        The following portions of the patient's history were reviewed and updated as appropriate: allergies, current medications, past social history and problem list.    Review of Systems   Constitutional: Positive for fatigue (since CVA- worse when taking livalo). Negative for chills and fever.   Respiratory: Negative for cough, shortness of breath and wheezing.    Cardiovascular: Negative for chest pain, palpitations and leg swelling.   Genitourinary: Positive for frequency (she wants to stop HCTZ).   Psychiatric/Behavioral: Negative for dysphoric mood and sleep disturbance. The patient is not nervous/anxious.        Objective   Physical Exam   Constitutional: She appears well-developed and well-nourished. No distress.   Cardiovascular: Normal rate, regular rhythm and normal heart sounds.  "   Pulmonary/Chest: No respiratory distress. She has no wheezes. She has no rales. She exhibits no tenderness.   Musculoskeletal: She exhibits no edema.   Psychiatric: She has a normal mood and affect. Her behavior is normal.   Nursing note and vitals reviewed.      Assessment/Plan   Diagnoses and all orders for this visit:    Other hyperlipidemia  Comments:  too fatigued with livalo - trial again of otc red yeast rice but a diff brand (no better with 3 wks of red yeast rice in past)  Orders:  -     Comprehensive Metabolic Panel; Future  -     Lipid Panel; Future    History of CVA (cerebrovascular accident)  Comments:  improving    Essential hypertension  Comments:  stable- but she wants to stop HCTZ - will change losartan to irbesartan (stronger) - call if bp over 140/90  Orders:  -     irbesartan (AVAPRO) 300 MG tablet; Take 1 tablet by mouth Every Night. Instead of losartan  -     Comprehensive Metabolic Panel; Future  -     Lipid Panel; Future    Chronic fatigue  Comments:  since CVA - worse with livalo - stop livalo - continue exercise & call if problems    Vitamin D deficiency  Comments:  continue 2,000 units daily & recheck with next lab  Orders:  -     Vitamin D 25 Hydroxy; Future    Other orders  -     Discontinue: losartan (COZAAR) 100 MG tablet; Take 100 mg by mouth Daily.  -     cyanocobalamin (VITAMIN B-12) 2000 MCG tablet tablet; Take 1,000 mcg by mouth Daily.  -     Melatonin 10 MG tablet; Take 20 mg by mouth Daily.  -     Coenzyme Q10 (COQ-10) 400 MG capsule; Take  by mouth Daily.  -     Discontinue: Pitavastatin Calcium (LIVALO) 4 MG tablet; Take  by mouth Every Other Day.  -     Denosumab (PROLIA SC); Inject  under the skin Every 6 (Six) Months.

## 2018-05-29 ENCOUNTER — OFFICE VISIT (OUTPATIENT)
Dept: INTERNAL MEDICINE | Facility: CLINIC | Age: 68
End: 2018-05-29

## 2018-05-29 VITALS
BODY MASS INDEX: 19.69 KG/M2 | WEIGHT: 122 LBS | TEMPERATURE: 98.4 F | DIASTOLIC BLOOD PRESSURE: 82 MMHG | SYSTOLIC BLOOD PRESSURE: 120 MMHG

## 2018-05-29 DIAGNOSIS — I10 ESSENTIAL HYPERTENSION: ICD-10-CM

## 2018-05-29 DIAGNOSIS — L72.3 SEBACEOUS CYST: Primary | ICD-10-CM

## 2018-05-29 PROCEDURE — 99212 OFFICE O/P EST SF 10 MIN: CPT | Performed by: NURSE PRACTITIONER

## 2018-05-29 RX ORDER — PANTOPRAZOLE SODIUM 40 MG/1
40 TABLET, DELAYED RELEASE ORAL DAILY
COMMUNITY
End: 2018-08-28 | Stop reason: SDUPTHER

## 2018-05-29 RX ORDER — AMLODIPINE BESYLATE 5 MG/1
5 TABLET ORAL DAILY
Qty: 30 TABLET | Refills: 0 | Status: SHIPPED | OUTPATIENT
Start: 2018-05-29 | End: 2018-07-17 | Stop reason: SINTOL

## 2018-05-29 RX ORDER — UBIDECARENONE 50 MG
CAPSULE ORAL 2 TIMES DAILY
COMMUNITY
End: 2018-08-06

## 2018-05-29 RX ORDER — AMOXICILLIN 500 MG/1
CAPSULE ORAL
COMMUNITY
Start: 2018-05-26 | End: 2018-09-07

## 2018-05-29 NOTE — PROGRESS NOTES
Subjective   Rosi Vicente is a 68 y.o. female.     Cyst   This is a new problem. The current episode started in the past 7 days. Pertinent negatives include no chest pain, coughing, fatigue or fever. She has tried nothing for the symptoms.        The following portions of the patient's history were reviewed and updated as appropriate: allergies, current medications and problem list.    Review of Systems   Constitutional: Negative for activity change, appetite change, fatigue and fever.   Respiratory: Negative for cough, shortness of breath and wheezing.    Cardiovascular: Negative for chest pain, palpitations and leg swelling.       Objective   Physical Exam   Constitutional: She is oriented to person, place, and time. She appears well-developed and well-nourished.   HENT:   Head: Normocephalic.   Nose: Nose normal.   Cardiovascular: Regular rhythm and normal heart sounds.  Exam reveals no S3 and no S4.    No murmur heard.  Pulmonary/Chest: Effort normal and breath sounds normal. She has no decreased breath sounds. She has no wheezes. She has no rhonchi. She has no rales.   Musculoskeletal: She exhibits no edema.   Neurological: She is alert and oriented to person, place, and time. Gait normal.   Skin: Skin is warm and dry.   sebaceous cyst of right medial back, measuring .05 cm x 0.3 cm, expelled white discharge, no warmth, redness or streaking note.d   Psychiatric: She has a normal mood and affect.       Assessment/Plan   Rosi was seen today for cyst.    Diagnoses and all orders for this visit:    Sebaceous cyst  Comments:  of right back; if returns will remove or refer to derm

## 2018-05-29 NOTE — PATIENT INSTRUCTIONS
Skin Abscess  A skin abscess is an infected area on or under your skin that contains a collection of pus and other material. An abscess may also be called a furuncle, carbuncle, or boil. An abscess can occur in or on almost any part of your body.  Some abscesses break open (rupture) on their own. Most continue to get worse unless they are treated. The infection can spread deeper into the body and eventually into your blood, which can make you feel ill. Treatment usually involves draining the abscess.  What are the causes?  An abscess occurs when germs, often bacteria, pass through your skin and cause an infection. This may be caused by:  · A scrape or cut on your skin.  · A puncture wound through your skin, including a needle injection.  · Blocked oil or sweat glands.  · Blocked and infected hair follicles.  · A cyst that forms beneath your skin (sebaceous cyst) and becomes infected.  What increases the risk?  This condition is more likely to develop in people who:  · Have a weak body defense system (immune system).  · Have diabetes.  · Have dry and irritated skin.  · Get frequent injections or use illegal IV drugs.  · Have a foreign body in a wound, such as a splinter.  · Have problems with their lymph system or veins.  What are the signs or symptoms?  An abscess may start as a painful, firm bump under the skin. Over time, the abscess may get larger or become softer. Pus may appear at the top of the abscess, causing pressure and pain. It may eventually break through the skin and drain. Other symptoms include:  · Redness.  · Warmth.  · Swelling.  · Tenderness.  · A sore on the skin.  How is this diagnosed?  This condition is diagnosed based on your medical history and a physical exam. A sample of pus may be taken from the abscess to find out what is causing the infection and what antibiotics can be used to treat it. You also may have:  · Blood tests to look for signs of infection or spread of an infection to your  blood.  · Imaging studies such as ultrasound, CT scan, or MRI if the abscess is deep.  How is this treated?  Small abscesses that drain on their own may not need treatment. Treatment for an abscess that does not rupture on its own may include:  · Warm compresses applied to the area several times per day.  · Incision and drainage. Your health care provider will make an incision to open the abscess and will remove pus and any foreign body or dead tissue. The incision area may be packed with gauze to keep it open for a few days while it heals.  · Antibiotic medicines to treat infection. For a severe abscess, you may first get antibiotics through an IV and then change to oral antibiotics.  Follow these instructions at home:  Abscess Care   · If you have an abscess that has not drained, place a warm, clean, wet washcloth over the abscess several times a day. Do this as told by your health care provider.  · Follow instructions from your health care provider about how to take care of your abscess. Make sure you:  ¨ Cover the abscess with a bandage (dressing).  ¨ Change your dressing or gauze as told by your health care provider.  ¨ Wash your hands with soap and water before you change the dressing or gauze. If soap and water are not available, use hand .  · Check your abscess every day for signs of a worsening infection. Check for:  ¨ More redness, swelling, or pain.  ¨ More fluid or blood.  ¨ Warmth.  ¨ More pus or a bad smell.  Medicines   · Take over-the-counter and prescription medicines only as told by your health care provider.  · If you were prescribed an antibiotic medicine, take it as told by your health care provider. Do not stop taking the antibiotic even if you start to feel better.  General instructions   · To avoid spreading the infection:  ¨ Do not share personal care items, towels, or hot tubs with others.  ¨ Avoid making skin contact with other people.  · Keep all follow-up visits as told by your  health care provider. This is important.  Contact a health care provider if:  · You have more redness, swelling, or pain around your abscess.  · You have more fluid or blood coming from your abscess.  · Your abscess feels warm to the touch.  · You have more pus or a bad smell coming from your abscess.  · You have a fever.  · You have muscle aches.  · You have chills or a general ill feeling.  Get help right away if:  · You have severe pain.  · You see red streaks on your skin spreading away from the abscess.  This information is not intended to replace advice given to you by your health care provider. Make sure you discuss any questions you have with your health care provider.  Document Released: 09/27/2006 Document Revised: 08/13/2017 Document Reviewed: 10/26/2016  [employer] Interactive Patient Education © 2017 [employer]

## 2018-06-12 ENCOUNTER — PATIENT MESSAGE (OUTPATIENT)
Dept: INTERNAL MEDICINE | Facility: CLINIC | Age: 68
End: 2018-06-12

## 2018-06-12 DIAGNOSIS — I10 BENIGN ESSENTIAL HTN: Primary | ICD-10-CM

## 2018-06-12 NOTE — TELEPHONE ENCOUNTER
From: Rosi Vicente  Sent: 6/12/2018 2:06 PM EDT  To: Katelyn Christianson [facility]  Subject: RE: Prescription Question    awaiting [facility] saying it is ready. Have fair amount of 5mg. Will need more in 2 weeks.  ----- Message -----  From: Dionne Fonseca MD  Sent: 6/12/2018 12:18 PM EDT  To: Rosi Vicente  Subject: RE: Prescription Question  Great - do you have enough of the amlodipine 2.5mg?      ----- Message -----   From: Rosi Vicente   Sent: 6/12/2018 9:02 AM EDT   To: Dionne Fonseca MD  Subject: Prescription Question    Morning Dionne,  YOu are incredible. The Amlodopine is effective. Taking Losartin/HCTZ in am . midday 2.5 Amlodopine and 5mg at HS.   B/p in 120 range and low 80's to high 70's diastolic.   The 5mg tablet would crumble when cut. That's why I asked for 2.5 mg tablet. I am keeping daily dose below max of 10mg.  Thanks. Now if Red Yeast Rice works, would be great.

## 2018-06-14 RX ORDER — AMLODIPINE BESYLATE 2.5 MG/1
2.5 TABLET ORAL DAILY
Qty: 90 TABLET | Refills: 1 | Status: SHIPPED | OUTPATIENT
Start: 2018-06-14 | End: 2018-07-17 | Stop reason: SINTOL

## 2018-07-17 ENCOUNTER — TELEPHONE (OUTPATIENT)
Dept: INTERNAL MEDICINE | Facility: CLINIC | Age: 68
End: 2018-07-17

## 2018-07-17 RX ORDER — METOPROLOL TARTRATE 50 MG/1
50 TABLET, FILM COATED ORAL EVERY 12 HOURS SCHEDULED
Qty: 60 TABLET | Refills: 4 | Status: SHIPPED | OUTPATIENT
Start: 2018-07-17 | End: 2018-09-26 | Stop reason: CLARIF

## 2018-07-17 RX ORDER — LOSARTAN POTASSIUM AND HYDROCHLOROTHIAZIDE 12.5; 1 MG/1; MG/1
1 TABLET ORAL DAILY
Qty: 30 TABLET | Refills: 0
Start: 2018-07-17 | End: 2019-02-18 | Stop reason: SDUPTHER

## 2018-07-17 NOTE — TELEPHONE ENCOUNTER
Discussed - gums are sensitive b/c amlodipine - stop amlodipine - trial of metoprolol - call if problems

## 2018-07-17 NOTE — TELEPHONE ENCOUNTER
----- Message from Shanta Ayala sent at 7/17/2018  8:08 AM EDT -----  Contact: Pt   Pt would like to receive a phone call regarding her   amLODIPine (NORVASC) 2.5 MG tablet    amLODIPine (NORVASC) 5 MG tablet     She doesn't feel its working.  Pt# Phone:  [phone]

## 2018-07-24 ENCOUNTER — LAB (OUTPATIENT)
Dept: INTERNAL MEDICINE | Facility: CLINIC | Age: 68
End: 2018-07-24

## 2018-07-24 DIAGNOSIS — E78.49 OTHER HYPERLIPIDEMIA: ICD-10-CM

## 2018-07-24 DIAGNOSIS — E55.9 VITAMIN D DEFICIENCY: ICD-10-CM

## 2018-07-24 DIAGNOSIS — I10 ESSENTIAL HYPERTENSION: ICD-10-CM

## 2018-07-24 LAB
25(OH)D3 SERPL-MCNC: 33.4 NG/ML (ref 30–100)
ALBUMIN SERPL-MCNC: 4.8 G/DL (ref 3.5–5.2)
ALBUMIN/GLOB SERPL: 2.1 G/DL
ALP SERPL-CCNC: 63 U/L (ref 39–117)
ALT SERPL W P-5'-P-CCNC: 13 U/L (ref 1–33)
ANION GAP SERPL CALCULATED.3IONS-SCNC: 12.1 MMOL/L
AST SERPL-CCNC: 10 U/L (ref 1–32)
BILIRUB SERPL-MCNC: 0.3 MG/DL (ref 0.1–1.2)
BUN BLD-MCNC: 14 MG/DL (ref 8–23)
BUN/CREAT SERPL: 20.3 (ref 7–25)
CALCIUM SPEC-SCNC: 10.1 MG/DL (ref 8.6–10.5)
CHLORIDE SERPL-SCNC: 97 MMOL/L (ref 98–107)
CHOLEST SERPL-MCNC: 185 MG/DL (ref 0–200)
CO2 SERPL-SCNC: 27.9 MMOL/L (ref 22–29)
CREAT BLD-MCNC: 0.69 MG/DL (ref 0.57–1)
GFR SERPL CREATININE-BSD FRML MDRD: 84 ML/MIN/1.73
GLOBULIN UR ELPH-MCNC: 2.3 GM/DL
GLUCOSE BLD-MCNC: 88 MG/DL (ref 65–99)
HDLC SERPL-MCNC: 52 MG/DL (ref 40–60)
LDLC SERPL CALC-MCNC: 119 MG/DL (ref 0–100)
LDLC/HDLC SERPL: 2.29 {RATIO}
POTASSIUM BLD-SCNC: 4.8 MMOL/L (ref 3.5–5.2)
PROT SERPL-MCNC: 7.1 G/DL (ref 6–8.5)
SODIUM BLD-SCNC: 137 MMOL/L (ref 136–145)
TRIGL SERPL-MCNC: 69 MG/DL (ref 0–150)
VLDLC SERPL-MCNC: 13.8 MG/DL (ref 5–40)

## 2018-07-24 PROCEDURE — 82306 VITAMIN D 25 HYDROXY: CPT | Performed by: INTERNAL MEDICINE

## 2018-07-24 PROCEDURE — 80061 LIPID PANEL: CPT | Performed by: INTERNAL MEDICINE

## 2018-07-24 PROCEDURE — 80053 COMPREHEN METABOLIC PANEL: CPT | Performed by: INTERNAL MEDICINE

## 2018-07-24 PROCEDURE — 36415 COLL VENOUS BLD VENIPUNCTURE: CPT | Performed by: INTERNAL MEDICINE

## 2018-07-30 ENCOUNTER — TELEPHONE (OUTPATIENT)
Dept: INTERNAL MEDICINE | Facility: CLINIC | Age: 68
End: 2018-07-30

## 2018-07-30 NOTE — TELEPHONE ENCOUNTER
----- Message from Dionne Fonseca MD sent at 7/30/2018  8:08 AM EDT -----  pls tell her that we will discuss getting labs in future - not now b/c she just had labs last wk  ----- Message -----  From: Abdullahi Morales MA  Sent: 7/26/2018  12:50 PM  To: Dionne Fonseca MD        ----- Message -----  From: Ina Anderson  Sent: 7/26/2018  12:06 PM  To: Abdullahi Morales MA    Patient scheduled for labs 08/06/18.  Need orders please.  Thanks.

## 2018-08-06 ENCOUNTER — OFFICE VISIT (OUTPATIENT)
Dept: INTERNAL MEDICINE | Facility: CLINIC | Age: 68
End: 2018-08-06

## 2018-08-06 ENCOUNTER — TELEPHONE (OUTPATIENT)
Dept: NEUROLOGY | Facility: CLINIC | Age: 68
End: 2018-08-06

## 2018-08-06 VITALS
HEIGHT: 66 IN | SYSTOLIC BLOOD PRESSURE: 124 MMHG | TEMPERATURE: 96.3 F | HEART RATE: 90 BPM | BODY MASS INDEX: 19.67 KG/M2 | WEIGHT: 122.4 LBS | OXYGEN SATURATION: 97 % | RESPIRATION RATE: 17 BRPM | DIASTOLIC BLOOD PRESSURE: 72 MMHG

## 2018-08-06 DIAGNOSIS — E78.49 OTHER HYPERLIPIDEMIA: ICD-10-CM

## 2018-08-06 DIAGNOSIS — Z00.00 MEDICARE ANNUAL WELLNESS VISIT, SUBSEQUENT: ICD-10-CM

## 2018-08-06 DIAGNOSIS — R53.82 CHRONIC FATIGUE: ICD-10-CM

## 2018-08-06 DIAGNOSIS — E55.9 VITAMIN D DEFICIENCY: ICD-10-CM

## 2018-08-06 DIAGNOSIS — I10 ESSENTIAL HYPERTENSION: Primary | ICD-10-CM

## 2018-08-06 DIAGNOSIS — R35.0 FREQUENT URINATION: ICD-10-CM

## 2018-08-06 DIAGNOSIS — Z86.73 HISTORY OF CVA (CEREBROVASCULAR ACCIDENT): ICD-10-CM

## 2018-08-06 PROBLEM — F51.01 PRIMARY INSOMNIA: Status: RESOLVED | Noted: 2017-10-13 | Resolved: 2018-08-06

## 2018-08-06 PROBLEM — E87.1 HYPONATREMIA: Status: RESOLVED | Noted: 2018-04-19 | Resolved: 2018-08-06

## 2018-08-06 PROBLEM — M19.012 OSTEOARTHRITIS OF LEFT SHOULDER: Status: RESOLVED | Noted: 2018-04-17 | Resolved: 2018-08-06

## 2018-08-06 PROBLEM — M19.012 ARTHRITIS OF LEFT SHOULDER REGION: Status: RESOLVED | Noted: 2018-03-07 | Resolved: 2018-08-06

## 2018-08-06 PROCEDURE — G0439 PPPS, SUBSEQ VISIT: HCPCS | Performed by: INTERNAL MEDICINE

## 2018-08-06 PROCEDURE — 99213 OFFICE O/P EST LOW 20 MIN: CPT | Performed by: INTERNAL MEDICINE

## 2018-08-06 RX ORDER — AMLODIPINE BESYLATE 5 MG/1
1 TABLET ORAL DAILY
COMMUNITY
Start: 2018-07-24 | End: 2018-08-28 | Stop reason: SDUPTHER

## 2018-08-06 NOTE — TELEPHONE ENCOUNTER
----- Message from JUAN FRANCISCO Singh sent at 8/6/2018  8:51 AM EDT -----  Contact: [phone]  Just an FYI that I s/w this patient regarding her questions.   ----- Message -----  From: Kristal Felix MA  Sent: 7/26/2018  10:18 AM  To: JUAN FRANCISCO Singh    I let the patient know your recommendations and she will contact her PCP. She stated if you have a moment she would still like to speak with you.    ----- Message -----  From: JUAN FRANCISCO Singh  Sent: 7/25/2018   4:40 PM  To: Kristal Felix MA    Patient can ask her PCP about the new cholesterol lowering medications called PCSK9 inhibitors.  I have not put anyone on these but her PCP should know about them and if patient's fail other statins, insurance supposedly covers this.     ----- Message -----  From: Kristal Felix MA  Sent: 7/25/2018   4:08 PM  To: JUAN FRANCISCO Singh    I called pt and she wanted to know if there was someone in [place], [place] and anyone you know we could refer her to for her cholesterol. She has tried Lipitor, Crestor, red rice and Livalo. She has watched her diet, etc and is very worried because she cannot get her LDL down in June it was 119.     ----- Message -----  From: Daryl Kaplan  Sent: 7/25/2018   8:39 AM  To: Kristal Felix MA    PT CALLING WITH A QUESTION FOR LIZ ABOUT LIPID CONTROL. PLEASE ADVISE AND CALL PT [phone]

## 2018-08-06 NOTE — PROGRESS NOTES
QUICK REFERENCE INFORMATION:  The ABCs of the Annual Wellness Visit    Initial Medicare Wellness Visit    HEALTH RISK ASSESSMENT    1950    Recent Hospitalizations:  Recently treated at the following:  [facility].        Current Medical Providers:  Patient Care Team:  Dionne Fonseca MD as PCP - General (Internal Medicine)  Dionne Fonseca MD as PCP - Claims Attributed  Christopher Young MD as Consulting Physician (Otolaryngology)  Antonio Davila MD as Consulting Physician (Orthopedic Surgery)  Manny Sexton MD as Consulting Physician (Dermatology)  Víctor Polk MD as Consulting Physician (Gastroenterology)  Jj Burden MD as Consulting Physician (Gynecology)  Kori Vaughn MD as Consulting Physician (Neurosurgery)  Hermes Anne MD as Consulting Physician (Orthopedic Surgery)  Félix Fitzpatrick RN as Care Coordinator (Population Health)        Smoking Status:  History   Smoking Status   • Never Smoker   Smokeless Tobacco   • Never Used       Alcohol Consumption:  History   Alcohol Use   • Yes   • 3 Glasses of wine per week     Comment: moderate       Depression Screen:   PHQ-2/PHQ-9 Depression Screening 8/6/2018   Little interest or pleasure in doing things 0   Feeling down, depressed, or hopeless 0   Trouble falling or staying asleep, or sleeping too much 0   Feeling tired or having little energy 0   Poor appetite or overeating 0   Feeling bad about yourself - or that you are a failure or have let yourself or your family down 0   Trouble concentrating on things, such as reading the newspaper or watching television 0   Moving or speaking so slowly that other people could have noticed. Or the opposite - being so fidgety or restless that you have been moving around a lot more than usual 0   Thoughts that you would be better off dead, or of hurting yourself in some way 0   Total Score 0       Health Habits and Functional and Cognitive  Screening:  Functional & Cognitive Status 8/6/2018   Do you have difficulty preparing food and eating? No   Do you have difficulty bathing yourself, getting dressed or grooming yourself? No   Do you have difficulty using the toilet? No   Do you have difficulty moving around from place to place? No   Do you have trouble with steps or getting out of a bed or a chair? No   In the past year have you fallen or experienced a near fall? Yes   Do you need help using the phone?  No   Are you deaf or do you have serious difficulty hearing?  No   Do you need help with transportation? No   Do you need help shopping? No   Do you need help preparing meals?  No   Do you need help with housework?  Yes   Do you need help with laundry? No   Do you need help taking your medications? No   Do you need help managing money? No   Do you ever drive or ride in a car without wearing a seat belt? No   Do you have difficulty concentrating, remembering or making decisions? No           Does the patient have evidence of cognitive impairment? No    Asiprin use counseling: Does not need ASA (and currently is not on it)      Recent Lab Results:    Visual Acuity:  No exam data present    Age-appropriate Screening Schedule:  Refer to the list below for future screening recommendations based on patient's age, sex and/or medical conditions. Orders for these recommended tests are listed in the plan section. The patient has been provided with a written plan.    Health Maintenance   Topic Date Due   • INFLUENZA VACCINE  08/01/2018   • LIPID PANEL  07/24/2019   • MAMMOGRAM  01/29/2020   • DXA SCAN  01/29/2020   • TDAP/TD VACCINES (2 - Td) 12/17/2023   • COLONOSCOPY  01/06/2025   • PNEUMOCOCCAL VACCINES (65+ LOW/MEDIUM RISK)  Completed   • ZOSTER VACCINE  Completed        Subjective   History of Present Illness    Rosi Vicente is a 68 y.o. female who presents for an Annual Wellness Visit.    The following portions of the patient's history were reviewed and  updated as appropriate: allergies, current medications, past family history, past medical history, past social history, past surgical history and problem list.    Outpatient Medications Prior to Visit   Medication Sig Dispense Refill   • amoxicillin (AMOXIL) 500 MG capsule Before teeth cleaning     • calcium carbonate (OS-ACRLOS) 600 MG tablet Take 600 mg by mouth 2 (Two) Times a Day With Meals.     • cholecalciferol (VITAMIN D3) 1000 UNITS tablet Take 2,000 Units by mouth Daily.     • clopidogrel (PLAVIX) 75 MG tablet Take 1 tablet by mouth Daily. 90 tablet 1   • Coenzyme Q10 (COQ-10) 400 MG capsule Take  by mouth Daily.     • cyanocobalamin (VITAMIN B-12) 2000 MCG tablet tablet Take 1,000 mcg by mouth Daily.     • Denosumab (PROLIA SC) Inject  under the skin Every 6 (Six) Months.     • fluticasone (FLONASE) 50 MCG/ACT nasal spray 2 sprays into each nostril Daily. 3 bottle 1   • losartan-hydrochlorothiazide (HYZAAR) 100-12.5 MG per tablet Take 1 tablet by mouth Daily. 30 tablet 0   • Melatonin 10 MG tablet Take 20 mg by mouth Daily.     • metoprolol tartrate (LOPRESSOR) 50 MG tablet Take 1 tablet by mouth Every 12 (Twelve) Hours. 60 tablet 4   • Mirabegron ER (MYRBETRIQ) 50 MG tablet sustained-release 24 hour 24 hr tablet Take 50 mg by mouth Daily. 90 tablet 1   • montelukast (SINGULAIR) 10 MG tablet Take 1 tablet by mouth Daily. 90 tablet 1   • pantoprazole (PROTONIX) 40 MG EC tablet Take 40 mg by mouth Daily.     • Probiotic Product (PROBIOTIC ADVANCED PO) Take  by mouth.     • vitamin B-12 (CYANOCOBALAMIN) 1000 MCG tablet Take 1,000 mcg by mouth 2 (two) times a day.     • Red Yeast Rice 600 MG tablet Take  by mouth 2 (Two) Times a Day.       No facility-administered medications prior to visit.        Patient Active Problem List   Diagnosis   • Hypertension   • Allergic rhinitis   • Trigeminal neuralgia   • Hyperlipidemia   • New daily persistent headache   • Osteoarthritis of hip   • Vitamin D deficiency   •  "History of CVA (cerebrovascular accident)   • Stroke (CMS/[facility])   • Heartburn   • Primary insomnia   • Frequent urination   • Left shoulder pain   • Age-related osteoporosis without current pathological fracture   • Chronic fatigue   • Hyponatremia   • Osteoarthritis of left shoulder   • Arthritis of left shoulder region       Advance Care Planning:  has an advance directive - a copy has been provided and is in file    Identification of Risk Factors:  Risk factors include: incontinence.    Review of Systems    Compared to one year ago, the patient feels her physical health is better.  Compared to one year ago, the patient feels her mental health is better.    Objective     Physical Exam    Vitals:    08/06/18 1405   BP: 124/72   BP Location: Right arm   Patient Position: Sitting   Cuff Size: Adult   Pulse: 90   Resp: 17   Temp: 96.3 °F (35.7 °C)   TempSrc: Temporal Artery    SpO2: 97%   Weight: 55.5 kg (122 lb 6.4 oz)   Height: 166.4 cm (65.5\")   PainSc: 0-No pain       Patient's Body mass index is 20.06 kg/m². BMI is within normal parameters. No follow-up required.      Assessment/Plan   Patient Self-Management and Personalized Health Advice  The patient has been provided with information about: prevention of cardiac or vascular disease and designing advance directives and preventive services including:   · Advance directive, Counseling for cardiovascular disease risk reduction, Diabetes screening, see lab orders, Exercise counseling provided, Fall Risk assessment done, Fall Risk plan of care done, Nutrition counseling provided, Urinary Incontinence assessment done.    Visit Diagnoses:  No diagnosis found.    No orders of the defined types were placed in this encounter.      Outpatient Encounter Prescriptions as of 8/6/2018   Medication Sig Dispense Refill   • amLODIPine (NORVASC) 5 MG tablet Take 1 tablet by mouth Daily.     • amoxicillin (AMOXIL) 500 MG capsule Before teeth cleaning     • calcium carbonate " (OS-CARLOS) 600 MG tablet Take 600 mg by mouth 2 (Two) Times a Day With Meals.     • cholecalciferol (VITAMIN D3) 1000 UNITS tablet Take 2,000 Units by mouth Daily.     • clopidogrel (PLAVIX) 75 MG tablet Take 1 tablet by mouth Daily. 90 tablet 1   • Coenzyme Q10 (COQ-10) 400 MG capsule Take  by mouth Daily.     • cyanocobalamin (VITAMIN B-12) 2000 MCG tablet tablet Take 1,000 mcg by mouth Daily.     • Denosumab (PROLIA SC) Inject  under the skin Every 6 (Six) Months.     • fluticasone (FLONASE) 50 MCG/ACT nasal spray 2 sprays into each nostril Daily. 3 bottle 1   • losartan-hydrochlorothiazide (HYZAAR) 100-12.5 MG per tablet Take 1 tablet by mouth Daily. 30 tablet 0   • Melatonin 10 MG tablet Take 20 mg by mouth Daily.     • metoprolol tartrate (LOPRESSOR) 50 MG tablet Take 1 tablet by mouth Every 12 (Twelve) Hours. 60 tablet 4   • Mirabegron ER (MYRBETRIQ) 50 MG tablet sustained-release 24 hour 24 hr tablet Take 50 mg by mouth Daily. 90 tablet 1   • montelukast (SINGULAIR) 10 MG tablet Take 1 tablet by mouth Daily. 90 tablet 1   • pantoprazole (PROTONIX) 40 MG EC tablet Take 40 mg by mouth Daily.     • Probiotic Product (PROBIOTIC ADVANCED PO) Take  by mouth.     • vitamin B-12 (CYANOCOBALAMIN) 1000 MCG tablet Take 1,000 mcg by mouth 2 (two) times a day.     • [DISCONTINUED] Red Yeast Rice 600 MG tablet Take  by mouth 2 (Two) Times a Day.       No facility-administered encounter medications on file as of 8/6/2018.        Reviewed use of high risk medication in the elderly: not applicable  Reviewed for potential of harmful drug interactions in the elderly: not applicable    Follow Up:  No Follow-up on file.     An After Visit Summary and PPPS with all of these plans were given to the patient.

## 2018-08-06 NOTE — PROGRESS NOTES
"Subjective   Rosi Vicente is a 68 y.o. female here for   Chief Complaint   Patient presents with   • Initial Medicare Wellness   • Hyperlipidemia   • Hypertension   .    Vitals:    08/06/18 1405   BP: 124/72   BP Location: Right arm   Patient Position: Sitting   Cuff Size: Adult   Pulse: 90   Resp: 17   Temp: 96.3 °F (35.7 °C)   TempSrc: Temporal Artery    SpO2: 97%   Weight: 55.5 kg (122 lb 6.4 oz)   Height: 166.4 cm (65.5\")       Body mass index is 20.06 kg/m².    Hyperlipidemia   This is a chronic problem. The current episode started more than 1 year ago. The problem is controlled. Recent lipid tests were reviewed and are normal. She has no history of diabetes. Pertinent negatives include no chest pain or shortness of breath.   Hypertension   This is a chronic problem. The current episode started more than 1 year ago. The problem is unchanged. The problem is controlled. Pertinent negatives include no anxiety, chest pain, palpitations, peripheral edema or shortness of breath.        The following portions of the patient's history were reviewed and updated as appropriate: allergies, current medications, past social history and problem list.    Review of Systems   Constitutional: Positive for fatigue (mild). Negative for chills and fever.   Respiratory: Negative for cough, shortness of breath and wheezing.    Cardiovascular: Negative for chest pain, palpitations and leg swelling.   Psychiatric/Behavioral: Negative for dysphoric mood and sleep disturbance. The patient is not nervous/anxious.      livalo 4 mg caused blurry vision.    Objective   Physical Exam   Constitutional: She appears well-developed and well-nourished. No distress.   Cardiovascular: Normal rate, regular rhythm and normal heart sounds.    Pulmonary/Chest: No respiratory distress. She has no wheezes. She has no rales. She exhibits no tenderness.   Musculoskeletal: She exhibits no edema.   Psychiatric: She has a normal mood and affect. Her behavior is " normal.   Nursing note and vitals reviewed.      Assessment/Plan   Diagnoses and all orders for this visit:    Essential hypertension  Comments:  controlled - call if bp over 130/80    Other hyperlipidemia  Comments:  need LDL less than 70 - ok to try low dose livalo again (1/2 of the 2mg pill) - call if problems  Orders:  -     Comprehensive Metabolic Panel; Future  -     Lipid Panel; Future    Vitamin D deficiency  Comments:  continue daily vit d    Chronic fatigue  Comments:  mild - call if worse    History of CVA (cerebrovascular accident)  Comments:  9/2017 - from high chol (FH) - continue daily exercise    Frequent urination  Comments:  better with myrbetriq    Medicare annual wellness visit, subsequent    Other orders  -     amLODIPine (NORVASC) 5 MG tablet; Take 1 tablet by mouth Daily.  -     pitavastatin calcium (LIVALO) 2 MG tablet tablet; Take 1 tablet by mouth Every Night.       Wellness today.   Need daily strengthening & balance exercises (shown today).   No need for screening for carotid disease (no stenosis per MRA of neck 9/2017).       Need hep A & shingrix vaccines.

## 2018-08-14 ENCOUNTER — TELEPHONE (OUTPATIENT)
Dept: INTERNAL MEDICINE | Facility: CLINIC | Age: 68
End: 2018-08-14

## 2018-08-14 NOTE — TELEPHONE ENCOUNTER
----- Message from Shanta Ayala sent at 8/14/2018  8:44 AM EDT -----  Contact: Pt   Pt would like to stop livalo but start repatha.  She would like to chat with MD.    Pt# [phone]

## 2018-08-15 DIAGNOSIS — M81.0 AGE-RELATED OSTEOPOROSIS WITHOUT CURRENT PATHOLOGICAL FRACTURE: Primary | ICD-10-CM

## 2018-08-15 NOTE — TELEPHONE ENCOUNTER
I spoke to Ms. Vicente and I informed her she was improved for her Repatha until 9/1/18. She stated she forgot and will go and  a refill before the authorization is over.    She stated she will let the Livalo get out of her system first.

## 2018-08-20 ENCOUNTER — RESULTS ENCOUNTER (OUTPATIENT)
Dept: OBSTETRICS AND GYNECOLOGY | Facility: CLINIC | Age: 68
End: 2018-08-20

## 2018-08-20 DIAGNOSIS — M81.0 AGE-RELATED OSTEOPOROSIS WITHOUT CURRENT PATHOLOGICAL FRACTURE: ICD-10-CM

## 2018-08-28 ENCOUNTER — TELEPHONE (OUTPATIENT)
Dept: INTERNAL MEDICINE | Facility: CLINIC | Age: 68
End: 2018-08-28

## 2018-08-28 ENCOUNTER — OFFICE VISIT (OUTPATIENT)
Dept: NEUROLOGY | Facility: CLINIC | Age: 68
End: 2018-08-28

## 2018-08-28 VITALS
DIASTOLIC BLOOD PRESSURE: 76 MMHG | WEIGHT: 121 LBS | HEIGHT: 66 IN | OXYGEN SATURATION: 98 % | SYSTOLIC BLOOD PRESSURE: 128 MMHG | HEART RATE: 73 BPM | BODY MASS INDEX: 19.44 KG/M2

## 2018-08-28 DIAGNOSIS — I63.50 CEREBROVASCULAR ACCIDENT (CVA) DUE TO STENOSIS OF CEREBRAL ARTERY (HCC): Primary | ICD-10-CM

## 2018-08-28 DIAGNOSIS — E78.49 OTHER HYPERLIPIDEMIA: ICD-10-CM

## 2018-08-28 DIAGNOSIS — I10 ESSENTIAL HYPERTENSION: ICD-10-CM

## 2018-08-28 DIAGNOSIS — K21.00 GASTROESOPHAGEAL REFLUX DISEASE WITH ESOPHAGITIS: ICD-10-CM

## 2018-08-28 DIAGNOSIS — Z78.9 STATIN INTOLERANCE: ICD-10-CM

## 2018-08-28 DIAGNOSIS — I10 ESSENTIAL HYPERTENSION: Primary | ICD-10-CM

## 2018-08-28 PROCEDURE — 99214 OFFICE O/P EST MOD 30 MIN: CPT | Performed by: NURSE PRACTITIONER

## 2018-08-28 RX ORDER — AMLODIPINE BESYLATE 5 MG/1
5 TABLET ORAL DAILY
Qty: 90 TABLET | Refills: 2 | Status: SHIPPED | OUTPATIENT
Start: 2018-08-28 | End: 2019-01-04 | Stop reason: SINTOL

## 2018-08-28 RX ORDER — PANTOPRAZOLE SODIUM 40 MG/1
40 TABLET, DELAYED RELEASE ORAL DAILY
Qty: 90 TABLET | Refills: 2 | Status: SHIPPED | OUTPATIENT
Start: 2018-08-28 | End: 2018-08-29 | Stop reason: SDUPTHER

## 2018-08-28 RX ORDER — EVOLOCUMAB 140 MG/ML
INJECTION, SOLUTION SUBCUTANEOUS
COMMUNITY
Start: 2018-08-16 | End: 2018-09-26

## 2018-08-28 NOTE — TELEPHONE ENCOUNTER
----- Message from Shanta Ayala sent at 8/28/2018 10:55 AM EDT -----  Contact: mally chen   Pt is calling for a refill     FOR  pantoprazole (PROTONIX) 40 MG EC tablet  amLODIPine (NORVASC) 5 MG tablet-- not ready until 9-22     Patient requests RX SENT TO   [facility] - [facility] [address] - [phone] [facility] [phone] FX      Please and thank you.

## 2018-08-28 NOTE — PROGRESS NOTES
"DOS: 2018  NAME: Rosi Vicente   : 1950  PCP: Dionne Fonseca MD    Chief Complaint   Patient presents with   • Cerebrovascular Accident      Neurological Problem and Interval History:  68 y.o. RHW female with HTN, HLD and h/o of trigeminal neuralgia who presents today for stroke follow-up. She is unaccompanied.     Ms. Vicente initially presented to [facility] on 17 with progressive left hemiparesis and dysarthria.  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  She had waxing and waning of her symptoms during her hospitalization.  She was eventually discharged to our acute rehabilitation where she has made significant improvement. She was discharged on ASA, Plavix and Lipitor.    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 She discontinued her DAPT therapy in January of this year. She presents today on Plavix, but she is currently not on any statins due to intolerance with trials of multiple agents (Liptior, crestor, pravastatin...). She will be starting Repatha next week. Since her last visit she had left shoulder arthroplasty in 2018 at [facility]. She reports having residual left-sided weakness due to her stroke and that she is about \"75% back to normal\". She continues to get PT every other week for her shoulder. She reports having continued fatigue, balance issues, reduced left shoulder strength (since surgery), She also c/o continued bladders with overactivity. She denies any other changes in her health since her last visit. She denies any vision changes, headaches, veriigo or any new stroke/TIA symptoms. She is back to most of her regular activities but states she has not yet gotten back to riding her bike. She has been doing hypnotherapy with Arabella Puri that she reports has " been helpful. She takes her BP regularly and states it is well-controlled, she continues on amlodipine. She denies smoking, very rare alcohol use.     Review of Systems:        Review of Systems   Constitutional: Positive for fatigue. Negative for activity change and appetite change.   HENT: Negative for facial swelling, hearing loss and trouble swallowing.    Eyes: Negative for photophobia, pain and visual disturbance.   Respiratory: Negative for choking, chest tightness and shortness of breath.    Cardiovascular: Negative for chest pain, palpitations and leg swelling.   Gastrointestinal: Negative for abdominal pain, diarrhea and vomiting.   Endocrine: Negative for polydipsia and polyphagia.   Musculoskeletal: Positive for gait problem. Negative for back pain and neck pain.   Neurological: Positive for weakness (left side ). Negative for dizziness, tremors, seizures, syncope, facial asymmetry, speech difficulty, light-headedness, numbness and headaches.   Hematological: Negative for adenopathy. Bruises/bleeds easily.   Psychiatric/Behavioral: Negative for agitation, behavioral problems, confusion, decreased concentration, dysphoric mood, hallucinations, self-injury, sleep disturbance and suicidal ideas. The patient is not nervous/anxious and is not hyperactive.          Current Outpatient Prescriptions:   •  amLODIPine (NORVASC) 5 MG tablet, Take 1 tablet by mouth Daily., Disp: , Rfl:   •  amoxicillin (AMOXIL) 500 MG capsule, Before teeth cleaning, Disp: , Rfl:   •  calcium carbonate (OS-CARLOS) 600 MG tablet, Take 600 mg by mouth 2 (Two) Times a Day With Meals., Disp: , Rfl:   •  cholecalciferol (VITAMIN D3) 1000 UNITS tablet, Take 2,000 Units by mouth Daily., Disp: , Rfl:   •  clopidogrel (PLAVIX) 75 MG tablet, Take 1 tablet by mouth Daily., Disp: 90 tablet, Rfl: 1  •  Coenzyme Q10 (COQ-10) 400 MG capsule, Take  by mouth Daily., Disp: , Rfl:   •  cyanocobalamin (VITAMIN B-12) 2000 MCG tablet tablet, Take 1,000 mcg  "by mouth Daily., Disp: , Rfl:   •  Denosumab (PROLIA SC), Inject  under the skin Every 6 (Six) Months., Disp: , Rfl:   •  fluticasone (FLONASE) 50 MCG/ACT nasal spray, 2 sprays into each nostril Daily., Disp: 3 bottle, Rfl: 1  •  losartan-hydrochlorothiazide (HYZAAR) 100-12.5 MG per tablet, Take 1 tablet by mouth Daily., Disp: 30 tablet, Rfl: 0  •  Melatonin 10 MG tablet, Take 20 mg by mouth Daily., Disp: , Rfl:   •  metoprolol tartrate (LOPRESSOR) 50 MG tablet, Take 1 tablet by mouth Every 12 (Twelve) Hours., Disp: 60 tablet, Rfl: 4  •  Mirabegron ER (MYRBETRIQ) 50 MG tablet sustained-release 24 hour 24 hr tablet, Take 50 mg by mouth Daily., Disp: 90 tablet, Rfl: 1  •  montelukast (SINGULAIR) 10 MG tablet, Take 1 tablet by mouth Daily., Disp: 90 tablet, Rfl: 1  •  pantoprazole (PROTONIX) 40 MG EC tablet, Take 40 mg by mouth Daily., Disp: , Rfl:   •  Probiotic Product (PROBIOTIC ADVANCED PO), Take  by mouth., Disp: , Rfl:   •  REPATHA SURECLICK 140 MG/ML solution auto-injector, , Disp: , Rfl:   •  vitamin B-12 (CYANOCOBALAMIN) 1000 MCG tablet, Take 1,000 mcg by mouth 2 (two) times a day., Disp: , Rfl:     \"The following portions of the patient's history were reviewed and updated as appropriate: allergies, current medications, past family history, past medical history, past social history, past surgical history and problem list.\"  Review and Interpretation of Imaging:    Laboratory Results:             Lab Results   Component Value Date    WBC 5.39 11/03/2017    HGB 12.9 11/03/2017    HCT 38.7 11/03/2017    MCV 81.1 11/03/2017     11/03/2017     Lab Results   Component Value Date    GLUCOSE 88 07/24/2018    BUN 14 07/24/2018    CREATININE 0.69 07/24/2018    EGFRIFNONA 84 07/24/2018    EGFRIFAFRI 103 02/08/2018    BCR 20.3 07/24/2018    K 4.8 07/24/2018    CO2 27.9 07/24/2018    CALCIUM 10.1 07/24/2018    PROTENTOTREF 6.9 02/08/2018    ALBUMIN 4.80 07/24/2018    LABIL2 2.6 02/08/2018    AST 10 07/24/2018    ALT " 13 07/24/2018       Lab Results   Component Value Date    HGBA1C 5.30 09/13/2017         Lab Results   Component Value Date    CHOL 185 07/24/2018    CHOL 164 05/10/2018    CHOL 156 01/30/2018         Lab Results   Component Value Date    HDL 52 07/24/2018    HDL 52 05/10/2018    HDL 58 01/30/2018         Lab Results   Component Value Date     (H) 07/24/2018    LDL 97 05/10/2018    LDL 76 01/30/2018         Lab Results   Component Value Date    TRIG 69 07/24/2018    TRIG 76 05/10/2018    TRIG 111 01/30/2018     No results found for: RPR  Lab Results   Component Value Date    TSH 1.410 11/17/2016     Lab Results   Component Value Date    ZNANRDKA51 1773 (H) 11/17/2016       Physical Examination: NIHSS: 0 mRS: 0  General Appearance:   Well developed, well nourished, well groomed, alert, and cooperative.  HEENT: Normocephalic.    Neck and Spine: Normal range of motion.    Peripheral Vasculature: Radial pulses are equal and symmetric. No signs of distal embolization.  Extremities:    No edema or deformities. Mildly decreased joint ROM on the left.   Skin:    No rashes or birth marks.    Neurological examination:  Higher Integrative  Function: Oriented to time, place and person. Normal registration, recall (3/3 with 1 clue), attention span and concentration. Normal language including comprehension, spontaneous speech, repetition, reading, writing, naming and vocabulary. No neglect with normal visual-spatial function and construction. Normal fund of knowledge and higher integrative function.  CN II: Pupils are equal, round, and reactive to light. Normal visual acuity and visual fields.    CN III IV VI: Extraocular movements are full without nystagmus.   CN V: Normal facial sensation and strength of muscles of mastication.  CN VII: Facial movements are symmetric. No weakness.  CN VIII:   Auditory acuity is normal.  CN IX & X:   Symmetric palatal movement.  CN XI: Sternocleidomastoid and trapezius are normal.  No  weakness.  CN XII:   The tongue is midline.  No atrophy or fasciculations.  Motor: Normal muscle strength, bulk and tone in upper and lower extremities except for orbiting about left arm.  No fasciculations, rigidity, spasticity, or abnormal movements.  Sensation: Normal to light touch, temperature, and proprioception in arms and legs. Normal graphesthesia and no extinction on DSS.  Station and Gait: Normal gait and station.    Coordination: Finger to nose test shows no dysmetria. Heel to shin normal.    Diagnoses / Discussion:  Ms. Vicente continues to do well following her right hemispheric stroke she suffered in September 2017 and is nearly back to her neurologic baseline with c/o residual fatigue, balance issues and left-side weakness but with a normal neurologic exam. We discussed possible referral for balance training; however, given her elevated baseline physical abilities PT may not add much, she will consider in the future. She is continues on Plavix but is not currently on a statin due to intolerance. Her most recent LDL is elevated and she is going to start Repatha next week. We discussed importance of risk factor control for stroke prevention, including BP and cholesterol control. She will continue to monitor her BP regularly. Continue Plavix. We discussed importance of calling 911 for any signs/symptoms of stroke. Follow-up in one year, sooner if symptoms warrant.     Plan:   Continue Plavix, starting Repatha next week   Monitor BP regularly and record   Blood pressure control to <130/80   Goal LDL <70    Serum glucose < 140   Consider referral to PT for balance training      Call 911 for stroke any stroke symptoms   Follow-up in one year    I spent 30 minutes face to face with patient, with  > 50% spent for counseling and coordination of care  Rosi was seen today for cerebrovascular accident.    Diagnoses and all orders for this visit:    Cerebrovascular accident (CVA) due to stenosis of cerebral artery  (CMS/HCC)    Other hyperlipidemia    Essential hypertension    Statin intolerance        Coding

## 2018-08-28 NOTE — PROGRESS NOTES
Subjective:     Patient ID: Rosi Vicente is a 68 y.o. female.    Cerebrovascular Accident   Associated symptoms include fatigue and weakness (left side). Pertinent negatives include no abdominal pain, chest pain, headaches, nausea, neck pain, numbness or vomiting.     {Common ambulatory SmartLinks:17019}    Review of Systems   Constitutional: Positive for fatigue. Negative for activity change and appetite change.   HENT: Negative for facial swelling, hearing loss and trouble swallowing.    Eyes: Negative for photophobia, pain and visual disturbance.   Respiratory: Negative for choking, chest tightness and shortness of breath.    Cardiovascular: Negative for chest pain, palpitations and leg swelling.   Gastrointestinal: Negative for abdominal pain, nausea and vomiting.   Endocrine: Negative for polydipsia and polyphagia.   Musculoskeletal: Positive for gait problem. Negative for back pain and neck pain.   Neurological: Positive for weakness (left side). Negative for dizziness, tremors, seizures, syncope, facial asymmetry, speech difficulty, light-headedness, numbness and headaches.   Hematological: Negative for adenopathy. Bruises/bleeds easily.   Psychiatric/Behavioral: Negative for agitation, behavioral problems, confusion, decreased concentration, dysphoric mood, hallucinations, self-injury, sleep disturbance and suicidal ideas. The patient is not nervous/anxious and is not hyperactive.         Objective:    Neurologic Exam    Physical Exam    Assessment/Plan:     {Assess/PlanSmartLinks:87868}

## 2018-08-29 ENCOUNTER — TELEPHONE (OUTPATIENT)
Dept: INTERNAL MEDICINE | Facility: CLINIC | Age: 68
End: 2018-08-29

## 2018-08-29 DIAGNOSIS — K21.00 GASTROESOPHAGEAL REFLUX DISEASE WITH ESOPHAGITIS: ICD-10-CM

## 2018-08-29 RX ORDER — PANTOPRAZOLE SODIUM 40 MG/1
40 TABLET, DELAYED RELEASE ORAL DAILY
Qty: 10 TABLET | Refills: 0 | Status: SHIPPED | OUTPATIENT
Start: 2018-08-29 | End: 2019-06-13 | Stop reason: ALTCHOICE

## 2018-08-29 NOTE — TELEPHONE ENCOUNTER
----- Message from Tiara Shah sent at 8/29/2018  8:09 AM EDT -----  Contact: pt - Dr Fonseca's pt - RE: Rx refill  Pt calling and would like a 10 day supply to local pharmacy.    Pt informs also sent thru [employer]. - Pt is also wanting sent to mail order. Could you please check [employer] to see which order that is for? Local or mail order pharmacy?    pantoprazole (PROTONIX) 40 MG EC tablet 90 tablet    [place] [phone] [facility] [phone] FX    Pt # [phone]

## 2018-09-07 ENCOUNTER — OFFICE VISIT (OUTPATIENT)
Dept: INTERNAL MEDICINE | Facility: CLINIC | Age: 68
End: 2018-09-07

## 2018-09-07 VITALS
HEART RATE: 74 BPM | SYSTOLIC BLOOD PRESSURE: 122 MMHG | WEIGHT: 124 LBS | BODY MASS INDEX: 19.93 KG/M2 | DIASTOLIC BLOOD PRESSURE: 74 MMHG | HEIGHT: 66 IN | OXYGEN SATURATION: 99 %

## 2018-09-07 DIAGNOSIS — E78.49 OTHER HYPERLIPIDEMIA: ICD-10-CM

## 2018-09-07 DIAGNOSIS — D48.7 NEOPLASM OF UNCERTAIN BEHAVIOR OF BACK: Primary | ICD-10-CM

## 2018-09-07 PROCEDURE — 99213 OFFICE O/P EST LOW 20 MIN: CPT | Performed by: NURSE PRACTITIONER

## 2018-09-07 NOTE — PROGRESS NOTES
Subjective   Rosi Vicente is a 68 y.o. female.     Cyst   This is a new problem. The current episode started in the past 7 days. The problem has been gradually worsening. Pertinent negatives include no chest pain, coughing, fatigue or fever. Treatments tried: her spouse attempted expel. The treatment provided mild relief.        The following portions of the patient's history were reviewed and updated as appropriate: allergies, current medications, past family history, past medical history, past social history, past surgical history and problem list.    Review of Systems   Constitutional: Negative for activity change, appetite change, fatigue and fever.   Respiratory: Negative for cough, shortness of breath and wheezing.    Cardiovascular: Negative for chest pain, palpitations and leg swelling.   Skin:        Lesion to back        Objective   Physical Exam   Constitutional: She is oriented to person, place, and time. She appears well-developed and well-nourished.   HENT:   Head: Normocephalic.   Nose: Nose normal.   Cardiovascular: Regular rhythm and normal heart sounds.  Exam reveals no S3 and no S4.    No murmur heard.  Pulmonary/Chest: Effort normal and breath sounds normal. She has no decreased breath sounds. She has no wheezes. She has no rhonchi. She has no rales.   Musculoskeletal: She exhibits no edema.   Neurological: She is alert and oriented to person, place, and time. Gait normal.   Skin: Skin is warm and dry.   0.4 cm x 0.3 cm raised lesion with brown surface   Psychiatric: She has a normal mood and affect.       Assessment/Plan   Rosi was seen today for cyst.    Diagnoses and all orders for this visit:    Neoplasm of uncertain behavior of back  Comments:  she will her dermatologist per her request     Other hyperlipidemia  Comments:  started repatha on 9/1/2018 will recheck lipids 4 weeks

## 2018-09-11 ENCOUNTER — APPOINTMENT (OUTPATIENT)
Dept: ONCOLOGY | Facility: HOSPITAL | Age: 68
End: 2018-09-11

## 2018-09-14 ENCOUNTER — TELEPHONE (OUTPATIENT)
Dept: INTERNAL MEDICINE | Facility: CLINIC | Age: 68
End: 2018-09-14

## 2018-09-14 RX ORDER — SOLIFENACIN SUCCINATE 10 MG/1
10 TABLET, FILM COATED ORAL DAILY
Qty: 30 TABLET | Refills: 6 | Status: SHIPPED | OUTPATIENT
Start: 2018-09-14 | End: 2018-09-19

## 2018-09-14 NOTE — TELEPHONE ENCOUNTER
----- Message from Tiara Shah sent at 9/14/2018  3:00 PM EDT -----  Contact: pt - Dr Fonseca's pt - RE: new Rx refill  Pt calling and would like a return call regarding Rx - Mirabegron ER (MYRBETRIQ) 50 MG. She informs that Rx is not working for her. She informs that there is another Rx out there that she would like to try that she has had previously. She informs that it did cause her to have dry mouth but she is willing to have the dry mouth then the bladder licks. Could you please call that to the local pharmay? Please advise. Thanks    She informs that Urologist prescribed Rx not Dr Fonseca.    [address] [phone] [facility] [phone] FX    Pt # [phone]

## 2018-09-17 ENCOUNTER — TELEPHONE (OUTPATIENT)
Dept: NEUROLOGY | Facility: CLINIC | Age: 68
End: 2018-09-17

## 2018-09-17 NOTE — TELEPHONE ENCOUNTER
----- Message from JUAN FRANCISCO Singh sent at 9/14/2018  4:02 PM EDT -----  Contact: [phone]  Just an FYI that I called the patient back to discuss the fact that she is doing well on Repatha.   ----- Message -----  From: Kristal Felix MA  Sent: 9/11/2018   4:23 PM  To: JUAN FRANCISCO Singh        ----- Message -----  From: Nevaeh Hernandez RegSched Rep  Sent: 9/11/2018   4:06 PM  To: Kristal Felix MA    PT CALLING TO TELL LIZ THAT SHE IS DOING WELL WITH REPATHA. PLEASE ADVISE

## 2018-09-19 DIAGNOSIS — N32.81 OAB (OVERACTIVE BLADDER): Primary | ICD-10-CM

## 2018-09-19 RX ORDER — FESOTERODINE FUMARATE 4 MG/1
4 TABLET, EXTENDED RELEASE ORAL
Qty: 60 TABLET | Refills: 2 | Status: SHIPPED | OUTPATIENT
Start: 2018-09-19 | End: 2018-09-21

## 2018-09-19 NOTE — TELEPHONE ENCOUNTER
Vesicare was sent in and its not a covered drug. So Dr. Fonseca approved for Toviaz 4 mg to be sent in Si tablet po QD; may increase to 8 mg (2 tabsdaily if needed)

## 2018-09-19 NOTE — PROGRESS NOTES
Sent in a new rx to patients local pharmacy for Toviaz 4 mg Si tablet po qd;may increase to 8 mg daily per Dr. Fonseca.

## 2018-09-21 ENCOUNTER — TELEPHONE (OUTPATIENT)
Dept: INTERNAL MEDICINE | Facility: CLINIC | Age: 68
End: 2018-09-21

## 2018-09-21 DIAGNOSIS — R35.0 URINARY FREQUENCY: Primary | ICD-10-CM

## 2018-09-21 RX ORDER — OXYBUTYNIN CHLORIDE 10 MG/1
10 TABLET, EXTENDED RELEASE ORAL DAILY
Qty: 30 TABLET | Refills: 12 | Status: SHIPPED | OUTPATIENT
Start: 2018-09-21 | End: 2018-10-03

## 2018-09-21 NOTE — TELEPHONE ENCOUNTER
----- Message from Tiara Shah sent at 9/21/2018 11:06 AM EDT -----  Contact: pt - Dr Fonseca's pt - RE: new Rx refill  Pt calling and would like a return call regarding Rx for OAB. She informs that both Rx's pt was given are both over $100.00. She would like to know if Rx: Oxybutynin. She spoke w/ [employer] and has suggested pt try. Could you please call Rx to pt's pharmacy? Please advise. Thanks    Pt informs is up for any other suggestion if cheaper and pt is willing to try what you feel will work.    [place] [id number] [address] [facility] [phone] [facility] [phone] FX    Pt # [phone]

## 2018-09-25 ENCOUNTER — TELEPHONE (OUTPATIENT)
Dept: OBSTETRICS AND GYNECOLOGY | Facility: CLINIC | Age: 68
End: 2018-09-25

## 2018-09-25 DIAGNOSIS — M81.0 AGE-RELATED OSTEOPOROSIS WITHOUT CURRENT PATHOLOGICAL FRACTURE: Primary | ICD-10-CM

## 2018-09-25 NOTE — TELEPHONE ENCOUNTER
PT CALLED.  HAD 3RD PROLIA INJECTION.  BELIEVES SHE IS HAVING SOME SIDE EFFECTS BUT NOT SURE, AND SHE WOULD LIKE TO SPEAK WITH YOU AT YOUR CONVENIENCE.  [phone]

## 2018-09-25 NOTE — TELEPHONE ENCOUNTER
Patient had a question about some soreness after her last Prolia injection and also wanted to get a bone density done before January for follow-up.

## 2018-09-26 ENCOUNTER — OFFICE VISIT (OUTPATIENT)
Dept: INTERNAL MEDICINE | Facility: CLINIC | Age: 68
End: 2018-09-26

## 2018-09-26 VITALS
BODY MASS INDEX: 19.93 KG/M2 | DIASTOLIC BLOOD PRESSURE: 80 MMHG | HEIGHT: 66 IN | WEIGHT: 124 LBS | SYSTOLIC BLOOD PRESSURE: 118 MMHG

## 2018-09-26 DIAGNOSIS — E78.49 OTHER HYPERLIPIDEMIA: ICD-10-CM

## 2018-09-26 DIAGNOSIS — I10 ESSENTIAL HYPERTENSION: Primary | ICD-10-CM

## 2018-09-26 DIAGNOSIS — N32.81 OVERACTIVE BLADDER: ICD-10-CM

## 2018-09-26 DIAGNOSIS — Z78.9 STATIN INTOLERANCE: ICD-10-CM

## 2018-09-26 DIAGNOSIS — Z86.73 HISTORY OF CVA (CEREBROVASCULAR ACCIDENT): ICD-10-CM

## 2018-09-26 DIAGNOSIS — R53.82 CHRONIC FATIGUE: ICD-10-CM

## 2018-09-26 PROCEDURE — 99214 OFFICE O/P EST MOD 30 MIN: CPT | Performed by: INTERNAL MEDICINE

## 2018-09-26 RX ORDER — ICOSAPENT ETHYL 1000 MG/1
2 CAPSULE ORAL 2 TIMES DAILY WITH MEALS
Qty: 120 CAPSULE | Refills: 12 | Status: SHIPPED | OUTPATIENT
Start: 2018-09-26 | End: 2019-01-15 | Stop reason: SDUPTHER

## 2018-09-26 RX ORDER — CHLORAL HYDRATE 500 MG
1000 CAPSULE ORAL 2 TIMES DAILY WITH MEALS
COMMUNITY
End: 2018-11-27

## 2018-09-26 NOTE — PROGRESS NOTES
"Subjective   Rosi Vicente is a 68 y.o. female here for   Chief Complaint   Patient presents with   • Medication Problem     Having problems with Repatha, injection caused fatigue   • Hyperlipidemia   • Bladder Problem     OAB   .    Vitals:    09/26/18 1031 09/26/18 1330   BP: 118/78 118/80   BP Location: Left arm    Patient Position: Sitting    Cuff Size: Adult    Weight: 56.2 kg (124 lb)    Height: 166.4 cm (65.5\")        Body mass index is 20.32 kg/m².    Hyperlipidemia   This is a chronic problem. The current episode started more than 1 year ago. The problem is controlled. Recent lipid tests were reviewed and are normal. She has no history of diabetes. Pertinent negatives include no chest pain or shortness of breath.   Hypertension   This is a chronic problem. The current episode started more than 1 year ago. The problem is unchanged. The problem is controlled. Pertinent negatives include no chest pain, palpitations or shortness of breath.        The following portions of the patient's history were reviewed and updated as appropriate: allergies, current medications, past social history and problem list.    Review of Systems   Constitutional: Positive for fatigue. Negative for chills and fever.   Respiratory: Negative for cough, shortness of breath and wheezing.    Cardiovascular: Negative for chest pain, palpitations and leg swelling.   Psychiatric/Behavioral: Negative for dysphoric mood and sleep disturbance. The patient is not nervous/anxious.        Objective   Physical Exam   Constitutional: She appears well-developed and well-nourished. No distress.   Cardiovascular: Normal rate, regular rhythm and normal heart sounds.    Pulmonary/Chest: No respiratory distress. She has no wheezes. She has no rales. She exhibits no tenderness.   Musculoskeletal: She exhibits no edema.   Psychiatric: She has a normal mood and affect. Her behavior is normal.   Nursing note and vitals reviewed.      Assessment/Plan   Diagnoses " and all orders for this visit:    Essential hypertension  Comments:  stable - call if bp over 130/80    Other hyperlipidemia  Comments:  trial of vascepa   Orders:  -     icosapent ethyl (VASCEPA) 1 g capsule capsule; Take 2 g by mouth 2 (Two) Times a Day With Meals.    Chronic fatigue  Comments:  stay off repatha & call if sx persist    Statin intolerance  Comments:  trial of vascepa    History of CVA (cerebrovascular accident)  Comments:  continue plavix & bp meds - trial of vascepa - need f/u with neuro  Orders:  -     icosapent ethyl (VASCEPA) 1 g capsule capsule; Take 2 g by mouth 2 (Two) Times a Day With Meals.    Overactive bladder  Comments:  trial of detrol LA    Other orders  -     Omega-3 Fatty Acids (FISH OIL) 1000 MG capsule capsule; Take 1,000 mg by mouth 2 (Two) Times a Day With Meals.

## 2018-09-27 ENCOUNTER — TELEPHONE (OUTPATIENT)
Dept: NEUROLOGY | Facility: CLINIC | Age: 68
End: 2018-09-27

## 2018-09-27 DIAGNOSIS — M81.0 AGE-RELATED OSTEOPOROSIS WITHOUT CURRENT PATHOLOGICAL FRACTURE: Primary | ICD-10-CM

## 2018-09-27 NOTE — TELEPHONE ENCOUNTER
----- Message from JUAN FRANCISCO Singh sent at 9/24/2018  5:12 PM EDT -----  Contact: [phone]  Her most recent LDL that I see was 119, so she really does need to be on some kind of cholesterol lowering medication. Has she failed all the statins (crestor, pitivastatin?)  Not sure what to do if she can't tolerate any of it.   ----- Message -----  From: Kristal Felix MA  Sent: 9/24/2018   3:52 PM  To: JUAN FRANCISCO Singh        ----- Message -----  From: Nevaeh Hernandez RegSched Rep  Sent: 9/24/2018  12:46 PM  To: Kristal Felix MA    PT CALLING BECAUSE SHE TOOK HER SECOND DOSE OF REPATHA, PT SAYS THAT SHE EXPERIENCING SYMPTOMS. SHE SAYS THAT HER LEFT SHOULDER IS VERY SORE. HER MUSCLES ARE WEAK. PT PLANS ON GOING OFF REPATHA AND SPEAKING WITH HER PRIMARY CARE DOCTOR ABOUT GOING OFF OF REPATHA. PLEASE ADVISE.

## 2018-10-02 ENCOUNTER — RESULTS ENCOUNTER (OUTPATIENT)
Dept: OBSTETRICS AND GYNECOLOGY | Facility: CLINIC | Age: 68
End: 2018-10-02

## 2018-10-02 DIAGNOSIS — M81.0 AGE-RELATED OSTEOPOROSIS WITHOUT CURRENT PATHOLOGICAL FRACTURE: ICD-10-CM

## 2018-10-03 ENCOUNTER — TELEPHONE (OUTPATIENT)
Dept: INTERNAL MEDICINE | Facility: CLINIC | Age: 68
End: 2018-10-03

## 2018-10-03 RX ORDER — OXYBUTYNIN CHLORIDE 5 MG/1
5 TABLET ORAL 3 TIMES DAILY
Qty: 270 TABLET | Refills: 2 | Status: SHIPPED | OUTPATIENT
Start: 2018-10-03 | End: 2018-11-27

## 2018-10-03 RX ORDER — OXYBUTYNIN CHLORIDE 5 MG/1
5 TABLET ORAL 3 TIMES DAILY
Qty: 30 TABLET | Refills: 1 | Status: SHIPPED | OUTPATIENT
Start: 2018-10-03 | End: 2018-10-03 | Stop reason: SDUPTHER

## 2018-10-09 ENCOUNTER — EPISODE CHANGES (OUTPATIENT)
Dept: CASE MANAGEMENT | Facility: OTHER | Age: 68
End: 2018-10-09

## 2018-10-12 ENCOUNTER — TELEPHONE (OUTPATIENT)
Dept: NEUROLOGY | Facility: CLINIC | Age: 68
End: 2018-10-12

## 2018-10-12 DIAGNOSIS — I63.9 CEREBROVASCULAR ACCIDENT (CVA), UNSPECIFIED MECHANISM (HCC): ICD-10-CM

## 2018-10-12 DIAGNOSIS — I10 ESSENTIAL HYPERTENSION: ICD-10-CM

## 2018-10-12 RX ORDER — MONTELUKAST SODIUM 10 MG/1
TABLET ORAL
Qty: 90 TABLET | Refills: 1 | Status: SHIPPED | OUTPATIENT
Start: 2018-10-12 | End: 2018-12-27

## 2018-10-12 RX ORDER — CLOPIDOGREL BISULFATE 75 MG/1
TABLET ORAL
Qty: 90 TABLET | Refills: 1 | Status: SHIPPED | OUTPATIENT
Start: 2018-10-12 | End: 2019-06-13 | Stop reason: SDUPTHER

## 2018-10-12 RX ORDER — LOSARTAN POTASSIUM AND HYDROCHLOROTHIAZIDE 12.5; 1 MG/1; MG/1
TABLET ORAL
Qty: 90 TABLET | Refills: 1 | Status: SHIPPED | OUTPATIENT
Start: 2018-10-12 | End: 2018-11-27

## 2018-10-12 NOTE — TELEPHONE ENCOUNTER
----- Message from JUAN FRANCISCO Singh sent at 10/11/2018  6:06 PM EDT -----  Contact: [phone]  Kristal or Kenneth Can we get some more information on her symptoms please. If she is having stroke symptoms she needs to go to ER. Thanks.    ----- Message -----  From: Kristal Feilx MA  Sent: 10/11/2018   8:26 AM  To: JUAN FRANCISCO Singh        ----- Message -----  From: Nevaeh Hernandez RegSched Rep  Sent: 10/10/2018   4:09 PM  To: Kristal Felix MA    PT WOULD LIKE LIZ TO CALL HER ABOUT SOME CHANGES THAT SHE HAS BEEN HAVING. PLEASE ADVISE AND CALL PT -[phone]

## 2018-10-12 NOTE — TELEPHONE ENCOUNTER
I s/w pt and she wanted me to let Peri know that she was just tired but the repatha is leaving her system. She wanted her to know she is seeing a lipidologist and her goals are same as Peri's as for BP and LDL. She will have her send results to Peri as well.

## 2018-11-08 ENCOUNTER — TELEPHONE (OUTPATIENT)
Dept: CARDIOLOGY | Facility: CLINIC | Age: 68
End: 2018-11-08

## 2018-11-08 NOTE — TELEPHONE ENCOUNTER
----- Message from Haim Burroughs MD sent at 11/7/2018  4:36 PM EST -----  I am fine with this.     ----- Message -----  From: Pinky Gee MD  Sent: 11/7/2018   3:41 PM  To: Dio Bolivar MD, Haim Burroughs MD, #    It is ok with me. It is up to WD and CS.  JL    ----- Message -----  From: Caitlyn Dukes  Sent: 11/7/2018   1:11 PM  To: Dio Bolivar MD, Pinky Gee MD, #    Dr Gee,   You saw Ms Vicente on 3/27/18.  Ms Jules was very satisfied with your care. She stated that she has friends that see Dr Bolivar and Dr Burroughs and she would like to switch to one of those providers.  Is that acceptable?  Thank you,  Caitlyn

## 2018-11-20 ENCOUNTER — TELEPHONE (OUTPATIENT)
Dept: NEUROLOGY | Facility: CLINIC | Age: 68
End: 2018-11-20

## 2018-11-20 NOTE — TELEPHONE ENCOUNTER
----- Message from Daryl Kaplan sent at 11/19/2018  3:50 PM EST -----  Contact: [phone]  PT CALLING BECAUSE SHE WOULD LIKE TO SPEAK TO LIZ BECAUSE SHE HAS BEEN NOTICING SOME INBALNACE IN HER GAIT. PLEASE ADVISE AND CALL PT [phone]

## 2018-11-21 NOTE — TELEPHONE ENCOUNTER
Spoke with patient.   She states that her balance has been just a bit off, she says that it is not sudden. I notified her that if she is having stroke symptoms she needs to go to the ER or call 911. She states understanding    She is wondering if she should do vestibular therapy?    She also wants to notify Peri that she has been taking Crestor 10mg weekly because that is all she can tolerate.     She has been going to a lipid specialist and today they have suggested the patient to start on a low dose aspirin along with her plavix.     She wants to know what Peir's thoughts are on starting on aspirin?

## 2018-11-27 ENCOUNTER — OFFICE VISIT (OUTPATIENT)
Dept: CARDIOLOGY | Facility: CLINIC | Age: 68
End: 2018-11-27

## 2018-11-27 VITALS
BODY MASS INDEX: 20.83 KG/M2 | RESPIRATION RATE: 16 BRPM | HEIGHT: 65 IN | WEIGHT: 125 LBS | HEART RATE: 73 BPM | DIASTOLIC BLOOD PRESSURE: 88 MMHG | SYSTOLIC BLOOD PRESSURE: 130 MMHG

## 2018-11-27 DIAGNOSIS — E78.49 OTHER HYPERLIPIDEMIA: Primary | ICD-10-CM

## 2018-11-27 DIAGNOSIS — R93.1 HIGH CORONARY ARTERY CALCIUM SCORE: ICD-10-CM

## 2018-11-27 DIAGNOSIS — I10 ESSENTIAL HYPERTENSION: ICD-10-CM

## 2018-11-27 PROCEDURE — 99214 OFFICE O/P EST MOD 30 MIN: CPT | Performed by: INTERNAL MEDICINE

## 2018-11-27 PROCEDURE — 93000 ELECTROCARDIOGRAM COMPLETE: CPT | Performed by: INTERNAL MEDICINE

## 2018-11-27 RX ORDER — ROSUVASTATIN CALCIUM 10 MG/1
10 TABLET, COATED ORAL WEEKLY
COMMUNITY
End: 2019-03-20

## 2018-11-27 RX ORDER — ASPIRIN 325 MG
325 TABLET ORAL DAILY
COMMUNITY
End: 2019-01-04 | Stop reason: DRUGHIGH

## 2018-11-27 NOTE — PROGRESS NOTES
PATIENTINFORMATION    Date of Office Visit: 2018  Encounter Provider: Pinky Gee MD  Place of Service: [facility] CARDIOLOGY  Patient Name: Rosi Vicente  : 1950    Subjective:     Encounter Date:2018      Patient ID: Rosi Vicente is a 68 y.o. female.      History of Present Illness    This is a lady with history of hypertension and hyperlipidemia.  She has not tolerated Lipitor, pravastatin, or Livalo.  She tells me she tried Repatha and, after the second shot, she had no energy and lost her balance.  She was seeing a nurse practitioner in [place] who did some extensive lipid testing on her.  At that time, she believes she was off all medications.  Her total cholesterol is 149, LDL 85, triglycerides 81, and HDL was 57.  She had a calcium score performed at [facility] on 10/30/2018, which came back with a calcium score total of 200 with most of the calcium being located in the left anterior descending artery with some in the circumflex branch and none in the right coronary artery.  She comes in for further evaluation.  She is active and does Jazzercise about five days a week.  She eats a very heart-healthy diet and has her whole life.  She has not had any exertional symptoms.  She has not noticed chest pain or shortness of breath.  She has been slow to recover from shoulder surgery.      Review of Systems   Constitution: Negative for fever, malaise/fatigue, weight gain and weight loss.   HENT: Negative for ear pain, hearing loss, nosebleeds and sore throat.    Eyes: Negative for double vision, pain, vision loss in left eye and vision loss in right eye.   Cardiovascular:        See history of present illness.   Respiratory: Negative for cough, shortness of breath, sleep disturbances due to breathing, snoring and wheezing.    Endocrine: Negative for cold intolerance, heat intolerance and polyuria.   Skin: Negative for itching, poor wound healing and  "rash.   Musculoskeletal: Negative for joint pain, joint swelling and myalgias.   Gastrointestinal: Negative for abdominal pain, diarrhea, hematochezia, nausea and vomiting.   Genitourinary: Negative for hematuria and hesitancy.   Neurological: Negative for numbness, paresthesias and seizures.   Psychiatric/Behavioral: Negative for depression. The patient is not nervous/anxious.            ECG 12 Lead  Date/Time: 11/27/2018 3:21 PM  Performed by: Pinky Gee MD  Authorized by: Pinky Gee MD   Previous ECG: no previous ECG available  Rhythm: sinus rhythm  BPM: 73  Conduction: conduction normal  ST Segments: ST segments normal  T Waves: T waves normal                 Objective:     /88 (BP Location: Right arm, Patient Position: Sitting, Cuff Size: Adult)   Pulse 73   Resp 16   Ht 165.1 cm (65\")   Wt 56.7 kg (125 lb)   LMP  (LMP Unknown)   BMI 20.80 kg/m²  Body mass index is 20.8 kg/m².     Physical Exam   Constitutional: She appears well-developed.   HENT:   Head: Normocephalic and atraumatic.   Eyes: Conjunctivae and lids are normal. Pupils are equal, round, and reactive to light. Lids are everted and swept, no foreign bodies found.   Neck: Normal range of motion. No JVD present. Carotid bruit is not present. No tracheal deviation present. No thyroid mass present.   Cardiovascular: Normal rate, regular rhythm and normal heart sounds.   Pulses:       Dorsalis pedis pulses are 2+ on the right side, and 2+ on the left side.   Pulmonary/Chest: Effort normal and breath sounds normal.   Abdominal: Normal appearance and bowel sounds are normal.   Musculoskeletal: Normal range of motion.   Neurological: She is alert. She has normal strength.   Skin: Skin is warm, dry and intact.   Psychiatric: She has a normal mood and affect. Her behavior is normal.   Vitals reviewed.            Assessment/Plan:       1. History of stroke.   this was lacunar and she had a normal echocardiogram    2. Hypertension.  " Has had some slightly elevated blood pressures at home.  She is going to try amlodipine 5 mg twice a day in addition to the losartan hydrochlorothiazide she is already taking.  3. Hyperlipidemia.  She has tried Lipitor, pravastatin, and Livalo and had symptoms with all of them.  She did not tolerate Repatha.  So far she is doing okay on Crestor 1 pill a week.  She has been doing this about 3 weeks.  I told her to do it for 6 weeks and if she feels okay on this regimen, try to increase to twice a week.  I told her she could try Q enzyme 10 if she feels like she has been having muscle aches  4. Usually saw her for preoperative evaluation for shoulder surgery.  She tolerated it well from a cardiac standpoint  5. Elevated calcium score 200 Agatston units.  She eats healthy, she exercises, we've got her cholesterol being treated,  6. Get a little more aggressive with her blood pressure.  She is already on aspirin and Plavix. Outside of that, I don't feel like we have any great options to further reduce her risk of heart disease at this time.    Orders Placed This Encounter   Procedures   • ECG 12 Lead     This order was created via procedure documentation        Discharge Medications           Accurate as of 11/27/18  4:16 PM. If you have any questions, ask your nurse or doctor.               Changes to Medications      Instructions Start Date   losartan-hydrochlorothiazide 100-12.5 MG per tablet  Commonly known as:  HYZAAR  What changed:  Another medication with the same name was removed. Continue taking this medication, and follow the directions you see here.  Changed by:  Pinky Gee MD   1 tablet, Oral, Daily         Continue These Medications      Instructions Start Date   amLODIPine 5 MG tablet  Commonly known as:  NORVASC   5 mg, Oral, Daily      aspirin 325 MG tablet   325 mg, Oral, Daily      calcium carbonate 600 MG tablet  Commonly known as:  OS-CARLOS   600 mg, Oral, 2 Times Daily With Meals       cholecalciferol 1000 units tablet  Commonly known as:  VITAMIN D3   2,000 Units, Oral, 2 Times Daily      clopidogrel 75 MG tablet  Commonly known as:  PLAVIX   TAKE 1 TABLET EVERY DAY      CoQ-10 400 MG capsule   Oral, Daily      fluticasone 50 MCG/ACT nasal spray  Commonly known as:  FLONASE   2 sprays, Nasal, Daily      icosapent ethyl 1 g capsule capsule  Commonly known as:  VASCEPA   2 g, Oral, 2 Times Daily With Meals      Melatonin 10 MG tablet   20 mg, Oral, Daily      Mirabegron ER 50 MG tablet sustained-release 24 hour 24 hr tablet  Commonly known as:  MYRBETRIQ   50 mg, Oral, Daily      montelukast 10 MG tablet  Commonly known as:  SINGULAIR   TAKE 1 TABLET EVERY DAY      pantoprazole 40 MG EC tablet  Commonly known as:  PROTONIX   40 mg, Oral, Daily      PROBIOTIC ADVANCED PO   Oral      PROLIA SC   Subcutaneous, Every 6 Months      rosuvastatin 10 MG tablet  Commonly known as:  CRESTOR   10 mg, Oral, Weekly      vitamin B-12 1000 MCG tablet  Commonly known as:  CYANOCOBALAMIN   1,000 mcg, Oral, 2 Times Daily         Stop These Medications    fish oil 1000 MG capsule capsule  Stopped by:  Pinky Gee MD     oxybutynin 5 MG tablet  Commonly known as:  DITROPAN  Stopped by:  MD Pinky Hines MD  11/27/18  4:16 PM

## 2018-11-30 ENCOUNTER — TELEPHONE (OUTPATIENT)
Dept: INTERNAL MEDICINE | Facility: CLINIC | Age: 68
End: 2018-11-30

## 2018-11-30 ENCOUNTER — TELEPHONE (OUTPATIENT)
Dept: NEUROLOGY | Facility: CLINIC | Age: 68
End: 2018-11-30

## 2018-11-30 NOTE — TELEPHONE ENCOUNTER
S/W patient via phone regarding her [place] cardiac testing and also regarding her cholesterol medications. She could not tolerate Repatha and is currently taking Crestor 10 mg weekly. She is also been taking Vascepa. We discussed possible interactions with vascepa and antiplatelet medications (Plavix). She reports that she is also taking ASA with her Plavix. We discussed that from a neurological standpoint, a single antiplatelet medication was sufficient. Will defer to cardiology whether she needs dual antiplatelet therapy. Encouraged her continued aggressive control of stroke risk factors and to call 911 for any new stroke/TIA symptoms.

## 2018-12-17 ENCOUNTER — DOCUMENTATION (OUTPATIENT)
Dept: INTERNAL MEDICINE | Facility: CLINIC | Age: 68
End: 2018-12-17

## 2018-12-17 ENCOUNTER — LAB (OUTPATIENT)
Dept: INTERNAL MEDICINE | Facility: CLINIC | Age: 68
End: 2018-12-17

## 2018-12-17 DIAGNOSIS — E78.49 OTHER HYPERLIPIDEMIA: ICD-10-CM

## 2018-12-17 LAB
ALBUMIN SERPL-MCNC: 4.1 G/DL (ref 3.5–5.2)
ALBUMIN/GLOB SERPL: 1.6 G/DL
ALP SERPL-CCNC: 63 U/L (ref 39–117)
ALT SERPL W P-5'-P-CCNC: 17 U/L (ref 1–33)
ANION GAP SERPL CALCULATED.3IONS-SCNC: 11.4 MMOL/L
AST SERPL-CCNC: 15 U/L (ref 1–32)
BILIRUB SERPL-MCNC: 0.3 MG/DL (ref 0.1–1.2)
BUN BLD-MCNC: 12 MG/DL (ref 8–23)
BUN/CREAT SERPL: 18.5 (ref 7–25)
CALCIUM SPEC-SCNC: 9.4 MG/DL (ref 8.6–10.5)
CHLORIDE SERPL-SCNC: 91 MMOL/L (ref 98–107)
CHOLEST SERPL-MCNC: 118 MG/DL (ref 0–200)
CO2 SERPL-SCNC: 25.6 MMOL/L (ref 22–29)
CREAT BLD-MCNC: 0.65 MG/DL (ref 0.57–1)
GFR SERPL CREATININE-BSD FRML MDRD: 90 ML/MIN/1.73
GLOBULIN UR ELPH-MCNC: 2.6 GM/DL
GLUCOSE BLD-MCNC: 86 MG/DL (ref 65–99)
HDLC SERPL-MCNC: 46 MG/DL (ref 40–60)
LDLC SERPL CALC-MCNC: 62 MG/DL (ref 0–100)
LDLC/HDLC SERPL: 1.34 {RATIO}
POTASSIUM BLD-SCNC: 5.4 MMOL/L (ref 3.5–5.2)
PROT SERPL-MCNC: 6.7 G/DL (ref 6–8.5)
SODIUM BLD-SCNC: 128 MMOL/L (ref 136–145)
TRIGL SERPL-MCNC: 52 MG/DL (ref 0–150)
VLDLC SERPL-MCNC: 10.4 MG/DL (ref 5–40)

## 2018-12-17 PROCEDURE — 80061 LIPID PANEL: CPT | Performed by: INTERNAL MEDICINE

## 2018-12-17 PROCEDURE — 36415 COLL VENOUS BLD VENIPUNCTURE: CPT | Performed by: INTERNAL MEDICINE

## 2018-12-17 PROCEDURE — 80053 COMPREHEN METABOLIC PANEL: CPT | Performed by: INTERNAL MEDICINE

## 2018-12-17 NOTE — PROGRESS NOTES
Patient was in the office today getting labs and stated she needed samples of Vascepa 1 g. Per provider approval from Dr. Fonseca. Patient was given 5 sample boxes of Vascepa 1 g to be taken 4 capsules daily.    Patient was also reminded that she had refills that was sent in to her [facility] mail order pharmacy on 9/26/18.

## 2018-12-26 ENCOUNTER — TELEPHONE (OUTPATIENT)
Dept: INTERNAL MEDICINE | Facility: CLINIC | Age: 68
End: 2018-12-26

## 2018-12-26 NOTE — TELEPHONE ENCOUNTER
Discussed - she can't decrease bp meds, so only stop singulair & myrbetriq - need to see cardiologist again b/c not tolerating bp meds but needing low bp

## 2018-12-26 NOTE — TELEPHONE ENCOUNTER
----- Message from Sybil Rosenthal sent at 12/26/2018  9:32 AM EST -----  Contact: Patient  Patient calling is having issues keeping her B/P down and would like to talk to Dr. Fonseca about this and medications. She is having dry mouth and sores due to amLODIPine (NORVASC) 5 MG tablet and has been to the dentists. Please advise     Patient:[phone]

## 2018-12-27 ENCOUNTER — OFFICE VISIT (OUTPATIENT)
Dept: CARDIOLOGY | Facility: CLINIC | Age: 68
End: 2018-12-27

## 2018-12-27 VITALS
HEART RATE: 68 BPM | HEIGHT: 65 IN | WEIGHT: 124.2 LBS | BODY MASS INDEX: 20.69 KG/M2 | DIASTOLIC BLOOD PRESSURE: 82 MMHG | SYSTOLIC BLOOD PRESSURE: 120 MMHG

## 2018-12-27 DIAGNOSIS — I10 ESSENTIAL HYPERTENSION: Primary | ICD-10-CM

## 2018-12-27 DIAGNOSIS — E78.49 OTHER HYPERLIPIDEMIA: ICD-10-CM

## 2018-12-27 DIAGNOSIS — I63.50 CEREBROVASCULAR ACCIDENT (CVA) DUE TO STENOSIS OF CEREBRAL ARTERY (HCC): ICD-10-CM

## 2018-12-27 DIAGNOSIS — Z78.9 STATIN INTOLERANCE: ICD-10-CM

## 2018-12-27 DIAGNOSIS — R53.82 CHRONIC FATIGUE: ICD-10-CM

## 2018-12-27 PROCEDURE — 99214 OFFICE O/P EST MOD 30 MIN: CPT | Performed by: NURSE PRACTITIONER

## 2018-12-27 RX ORDER — METOPROLOL SUCCINATE 25 MG/1
25 TABLET, EXTENDED RELEASE ORAL
Qty: 30 TABLET | Refills: 0 | Status: SHIPPED | OUTPATIENT
Start: 2018-12-27 | End: 2019-01-04

## 2018-12-27 RX ORDER — NEBIVOLOL 10 MG/1
10 TABLET ORAL DAILY
COMMUNITY
End: 2018-12-27

## 2018-12-27 NOTE — PROGRESS NOTES
Date of Office Visit: 2018  Encounter Provider: Fidelina Edwards, MANAS, APRN  Place of Service: [facility] CARDIOLOGY  Patient Name: Rosi Vicente  :1950      Subjective:     Chief Complaint:  Follow-up, hypertension, hyperlipidemia, elevated coronary artery score.     History of Present Illness:  Rosi Vicente is a 68 y.o. female patient of Dr. Gee.  This is my first time seeing this patient in the office and I have reviewed her records.    Patient has a history of hypertension and hyperlipidemia.  In the past she has not tolerated Lipitor, pravastatin, Livalo, or repatha.  She had a coronary calcium score checked 10/30/18 at [facility] showing a total score of 200, with most of the calcium being in the left anterior descending artery with some in the circumflex branch and none in the RCA.    Patient was last seen in office by Dr. Gee 18.  At this point she was very active doing Jazzercise about 5 days a week.  She was eating a very healthy diet, as she has been doing her whole life.  She denied any exertional symptoms.  She denied any chest pain or shortness of breath.  She had been slow to recover from her shoulder surgery.    Patient had screening tests done 18 showing total cholesterol of 149, LDL of 85, triglycerides of 81, HDL of 57.  CRP was considered order line at 1.2.  MPO and LpPLA2 considered good at 212 and 93 respectively.  Hgb A1C 5.4%.  ProBNP 102.  Cr 0.51 and eGFR 98.  CK 85.  TSH 2.35 (WNL).      Patient had repeat cholesterol panel done 18 through primary care provider.  Total cholesterol was 118, triglycerides 52, HDL 46, LDL 62.     Patient presents to office today to discuss some medication pricing.  She reports that the bystolic is very expensive and not a sustainable option for her, therefore she would like to switch to a different beta blocker.  We will start her on metoprolol XL 25 mg at bedtime.  She also reports  that the vascepa is very expensive and she has spoken with the drug company and can not get it a any reduced prices.  She will see how expensive it is when her supplemental insurance changes in January.  If it is still too expensive then she would like to try a high dose of omega-3s in it's place.  I think this is reasonable if the vascepa can not be afforded (omega 3 fatty acid, 4000mg daily).  I did recommend that if this change were to be made that her cholesterol be rechecked through PCP 3 months after switching.  She will discuss this with neurology and her PCP (who prescribes this) prior to making any changes to see if they are comfortable with this as well.  She will continue to watch heart rate and blood pressure and call the office if either become elevated.  Goal blood pressure 130 or less systolic, and 80 or less diastolic  (100-130/60-80).  Patient reports that she has been feeling slightly more fatigued since her blood pressure has been under better control.  We discussed that it can take several weeks to get use to some lower blood pressure readings.  She will continue to monitor blood pressure daily and keep a log.  She will schedule blood pressure check appointment to make sure that her home cuff is accurate.  She reports that she drinks plenty of water but no an excessive amount.  Her last CMP showed some hyponatremia.  We discussed slight liberalization of her salt intake.  Patient reports that she thinks that this will be easy to do.  We did discuss not going overboard with the salt though.  She'll monitor blood pressure closely.  She reports she has had some blurred vision, and she saw her eye doctor and they did not see an issue.  She also feels like her balance is off slightly, though this has been a more gradual onset.  She denies any strokelike symptoms or acute changes.  She will, 911/go to the ER if any new or worsening symptoms or strokelike symptoms develop.  She will call to schedule  follow-up appointment with neurology to be seen within the next month.  Patient verbalized understanding.  She does report that some of theses symptoms have improved since stopping the myrbetriq (which she did under supervision from urogynecologist).  She denies any chest pain, shortness of breath, shortness of breath with exertion, palpitations, racing heartbeat sensation, edema, syncope, near-syncope, falls, or snoring.  Patient continues to be very active and denies any exertional symptoms or limitation in her exercises or changes from previous exercise tolerance..  She reports that she did try to increase the Crestor to twice a week, however she did not tolerate this.  She will continue with the Crestor once a week.    Patient reports that she would like to come back in about a month for her blood pressure check appointment.  She will bring blood pressure cuff to double check accuracy.        Past Medical History:   Diagnosis Date   • Allergic rhinitis    • Anemia    • Arthritis 2016   • Bronchitis    • CVA (cerebral vascular accident) (CMS/HCC)    • Fatigue    • FH: colon cancer    • H/O bone density study 2013   • H/O mammogram 2013   • Hyperlipidemia    • Hypertension     Benign Essential   • Malaise and fatigue    • Muscle pain    • Muscle pain    • Nocturia    • Osteoporosis    • Stroke (CMS/HCC) 09/13/2017   • TMJ (temporomandibular joint syndrome)    • Trigeminal neuralgia     Surgery at [facility] in 2009 repeat in 2015     Past Surgical History:   Procedure Laterality Date   • ADENOIDECTOMY     • AUGMENTATION MAMMAPLASTY     • BRAIN SURGERY  12/8/2005    MVD   • COLONOSCOPY N/A 01/2015    Repeat Q 5 years due to Fhx of Colon Ca-Dr. Polk   • COSMETIC SURGERY  2015    augmentation   • EXTERNAL EAR SURGERY     • HIP SURGERY     • JOINT REPLACEMENT  10/3/16    left hip replacement   • PAP SMEAR N/A 2013    Dr. Burden   • RHINOPLASTY     • SEPTOPLASTY  2000    SEPTO/RHINOPLASTY POST TRAUMA   • SINUS  SURGERY  1980'S   • TONSILLECTOMY  AGE 5     Outpatient Medications Prior to Visit   Medication Sig Dispense Refill   • ALPHA LIPOIC ACID PO Take  by mouth Daily.     • amLODIPine (NORVASC) 5 MG tablet Take 1 tablet by mouth Daily. 90 tablet 2   • aspirin 325 MG tablet Take 325 mg by mouth Daily.     • calcium carbonate (OS-CARLOS) 600 MG tablet Take 600 mg by mouth 2 (Two) Times a Day With Meals.     • cholecalciferol (VITAMIN D3) 1000 UNITS tablet Take 2,000 Units by mouth 2 (Two) Times a Day.     • clopidogrel (PLAVIX) 75 MG tablet TAKE 1 TABLET EVERY DAY 90 tablet 1   • Coenzyme Q10 (COQ-10) 400 MG capsule Take  by mouth Daily.     • Denosumab (PROLIA SC) Inject  under the skin Every 6 (Six) Months.     • icosapent ethyl (VASCEPA) 1 g capsule capsule Take 2 g by mouth 2 (Two) Times a Day With Meals. 120 capsule 12   • losartan-hydrochlorothiazide (HYZAAR) 100-12.5 MG per tablet Take 1 tablet by mouth Daily. 30 tablet 0   • Melatonin 10 MG tablet Take 20 mg by mouth Daily.     • pantoprazole (PROTONIX) 40 MG EC tablet Take 1 tablet by mouth Daily. 10 tablet 0   • Probiotic Product (PROBIOTIC ADVANCED PO) Take  by mouth Daily.     • rosuvastatin (CRESTOR) 10 MG tablet Take 10 mg by mouth 1 (One) Time Per Week.     • vitamin B-12 (CYANOCOBALAMIN) 1000 MCG tablet 5,000 mcg Daily.     • nebivolol (BYSTOLIC) 10 MG tablet Take 10 mg by mouth Daily.     • fluticasone (FLONASE) 50 MCG/ACT nasal spray 2 sprays into each nostril Daily. 3 bottle 1   • Mirabegron ER (MYRBETRIQ) 50 MG tablet sustained-release 24 hour 24 hr tablet Take 50 mg by mouth Daily.     • montelukast (SINGULAIR) 10 MG tablet TAKE 1 TABLET EVERY DAY 90 tablet 1     No facility-administered medications prior to visit.        Allergies as of 12/27/2018 - Reviewed 12/27/2018   Allergen Reaction Noted   • Lisinopril Other (See Comments) 03/28/2018     Social History     Socioeconomic History   • Marital status:      Spouse name: Not on file   • Number  of children: Not on file   • Years of education: Not on file   • Highest education level: Not on file   Social Needs   • Financial resource strain: Not on file   • Food insecurity - worry: Not on file   • Food insecurity - inability: Not on file   • Transportation needs - medical: Not on file   • Transportation needs - non-medical: Not on file   Occupational History   • Not on file   Tobacco Use   • Smoking status: Never Smoker   • Smokeless tobacco: Never Used   • Tobacco comment: daily caffiene   Substance and Sexual Activity   • Alcohol use: Yes     Types: 3 Glasses of wine per week     Comment: moderate   • Drug use: No   • Sexual activity: Yes     Partners: Male     Birth control/protection: Post-menopausal, None   Other Topics Concern   • Not on file   Social History Narrative   • Not on file     Family History   Problem Relation Age of Onset   • Pancreatic cancer Mother    • Hyperlipidemia Mother    • Cancer Mother            • Hypertension Mother    • Miscarriages / Stillbirths Mother    • Colon cancer Father 56   • Arthritis Father    • Cancer Father            • Hearing loss Father         WAR INJURY   • Vision loss Father         dry glaucoma   • Liver cancer Brother 40   • Cancer Brother                Review of Systems   Constitution: Positive for malaise/fatigue. Negative for chills and fever.   Eyes: Positive for blurred vision. Negative for redness.        Saw eye doctor;  Will follow-up with neurology as well.   Cardiovascular: Negative for chest pain, dyspnea on exertion, leg swelling, near-syncope, palpitations and syncope.   Respiratory: Negative for shortness of breath and snoring.    Hematologic/Lymphatic: Negative for bleeding problem.   Skin: Negative for rash.   Musculoskeletal: Negative for falls.   Gastrointestinal: Negative for jaundice.   Neurological: Positive for light-headedness (intermittent; not new or worsening). Negative for focal weakness, numbness,  "paresthesias and seizures.        Denies stroke-like symptoms.    Psychiatric/Behavioral: Negative for altered mental status.          Objective:     Vitals:    12/27/18 1123   BP: 120/82   BP Location: Right arm   Pulse: 68   Weight: 56.3 kg (124 lb 3.2 oz)   Height: 165.1 cm (65\")     Body mass index is 20.67 kg/m².       PHYSICAL EXAM:  Physical Exam   Constitutional: She is oriented to person, place, and time. She appears well-developed and well-nourished. No distress.   HENT:   Head: Normocephalic and atraumatic.   Eyes: Pupils are equal, round, and reactive to light.   Neck: Neck supple. No JVD present. Carotid bruit is not present. No tracheal deviation present.   Cardiovascular: Normal rate, regular rhythm, normal heart sounds and intact distal pulses. Exam reveals no gallop and no friction rub.   No murmur heard.  Pulses:       Radial pulses are 2+ on the right side, and 2+ on the left side.        Posterior tibial pulses are 2+ on the right side, and 2+ on the left side.   Pulmonary/Chest: Effort normal and breath sounds normal. No respiratory distress. She has no wheezes. She has no rales.   Abdominal: Soft. Bowel sounds are normal. She exhibits no distension. There is no tenderness.   Musculoskeletal: She exhibits no edema, tenderness or deformity.   Neurological: She is alert and oriented to person, place, and time.   Skin: Skin is warm and dry. No rash noted. She is not diaphoretic. No erythema.   Psychiatric: She has a normal mood and affect. Her behavior is normal. Judgment normal.         Procedures  PATIENT DECLINED ECG IN THE OFFICE TODAY.        Assessment:       Diagnosis Plan   1. Essential hypertension     2. Cerebrovascular accident (CVA) due to stenosis of cerebral artery (CMS/HCC)     3. Other hyperlipidemia     4. Statin intolerance     5. Chronic fatigue           Plan:     1. Hypertension: Blood pressure well controlled in office today at 120/82 mmHg.  Patient has blood pressure log with " her in the office today showing systolic blood pressure staying 100s-130/70s-low 80s on average, with 1 lower reading of 93/52 and a couple higher readings of upper 130s/80s.   2. History of stroke: Lacunar stroke and had normal echo.  Patient on aspirin 81mg daily and Plavix.  3. Hyperlipidemia: Patient doing okay on Crestor.  She was started on one pill a week and was told to try to increase it to twice a week if tolerated and try co-Q 10 if muscle aches occurred.  Patient reports that she did not tolerate the twice a week Crestor so she is back to taking it once a week.   4. Elevated coronary calcium score: calcium score performed at [facility] on 10/30/2018, which came back with a calcium score total of 200 with most of the calcium being located in the left anterior descending artery with some in the circumflex branch and none in the right coronary artery.  Patient continues to exercise vigorously without any symptoms or concerns.  She is having a hard time affording the bystolic so we will switch her to metoprolol 25 mg XL daily.  She will monitor blood pressure and heart rate carefully and call with updated readings 2 weeks after stopping the Bystolic and starting the metoprolol, or sooner for any abnormals or symptoms or concerns.  5. Blurry vision: Gradual onset.  Patient reports she saw her eye doctor for this and no issues were seen.  Encouraged her to follow-up with her neurologist, and be seen within the next month.  She will go to the ER if any new, recurrent, worsening symptoms or strokelike symptoms occur.      Follow-up in 3-6 months or sooner if needed for any new, recurrent, or worsening symptoms or other problems/ concerns.              Your medication list           Accurate as of 12/27/18  1:08 PM. If you have any questions, ask your nurse or doctor.               START taking these medications      Instructions Last Dose Given Next Dose Due   metoprolol succinate XL 25 MG 24 hr  tablet  Commonly known as:  TOPROL-XL  Started by:  Fidelina Edwards DNP, APRN      Take 1 tablet by mouth every night at bedtime.          CONTINUE taking these medications      Instructions Last Dose Given Next Dose Due   ALPHA LIPOIC ACID PO      Take  by mouth Daily.       amLODIPine 5 MG tablet  Commonly known as:  NORVASC      Take 1 tablet by mouth Daily.       aspirin 325 MG tablet      Take 325 mg by mouth Daily.       calcium carbonate 600 MG tablet  Commonly known as:  OS-CARLOS      Take 600 mg by mouth 2 (Two) Times a Day With Meals.       cholecalciferol 1000 units tablet  Commonly known as:  VITAMIN D3      Take 2,000 Units by mouth 2 (Two) Times a Day.       clopidogrel 75 MG tablet  Commonly known as:  PLAVIX      TAKE 1 TABLET EVERY DAY       CoQ-10 400 MG capsule      Take  by mouth Daily.       icosapent ethyl 1 g capsule capsule  Commonly known as:  VASCEPA      Take 2 g by mouth 2 (Two) Times a Day With Meals.       losartan-hydrochlorothiazide 100-12.5 MG per tablet  Commonly known as:  HYZAAR      Take 1 tablet by mouth Daily.       Melatonin 10 MG tablet      Take 20 mg by mouth Daily.       pantoprazole 40 MG EC tablet  Commonly known as:  PROTONIX      Take 1 tablet by mouth Daily.       PROBIOTIC ADVANCED PO      Take  by mouth Daily.       PROLIA SC      Inject  under the skin Every 6 (Six) Months.       rosuvastatin 10 MG tablet  Commonly known as:  CRESTOR      Take 10 mg by mouth 1 (One) Time Per Week.       vitamin B-12 1000 MCG tablet  Commonly known as:  CYANOCOBALAMIN      5,000 mcg Daily.          STOP taking these medications    fluticasone 50 MCG/ACT nasal spray  Commonly known as:  FLONASE  Stopped by:  Fidelina Edwards DNP, APRN        Mirabegron ER 50 MG tablet sustained-release 24 hour 24 hr tablet  Commonly known as:  MYRBETRIQ  Stopped by:  Fidelina Edwards DNP, APRN        montelukast 10 MG tablet  Commonly known as:  SINGULAIR  Stopped by:  Fidelina Edwards DNP, APRN         nebivolol 10 MG tablet  Commonly known as:  BYSTOLIC  Stopped by:  Fidelina Edwards DNP, APRN              Where to Get Your Medications      These medications were sent to [facility] [id number] [place] [address] - [phone]  - [phone] FX  [address] [place] [address]    Phone:  [phone]   · metoprolol succinate XL 25 MG 24 hr tablet     I did not stop or change the above medications with the exception of stopping nebivolol and starting metoprolol in it's place due to affordability issues.  Patient's medication list was updated to reflect medications she is currently taking including medication changes made by other providers.           Thanks,    Fidelina Edwards DNP, APRN  12/27/2018           Dictated utilizing Dragon dictation

## 2019-01-03 ENCOUNTER — TELEPHONE (OUTPATIENT)
Dept: CARDIOLOGY | Facility: CLINIC | Age: 69
End: 2019-01-03

## 2019-01-03 NOTE — TELEPHONE ENCOUNTER
Pt calls to report that she has just left the dentist and was informed that she has amlodipine induced gingivitis. She needs to discuss with you an alternative medication.    She also reports that since starting the metoprolol she has become increasingly fatigued. She would like to discuss this with you as well.     You just saw this Pt on 12/27/18      Assessment:        Diagnosis Plan   1. Essential hypertension      2. Cerebrovascular accident (CVA) due to stenosis of cerebral artery (CMS/HCC)      3. Other hyperlipidemia      4. Statin intolerance      5. Chronic fatigue               Plan:      1. Hypertension: Blood pressure well controlled in office today at 120/82 mmHg.  Patient has blood pressure log with her in the office today showing systolic blood pressure staying 100s-130/70s-low 80s on average, with 1 lower reading of 93/52 and a couple higher readings of upper 130s/80s.   2. History of stroke: Lacunar stroke and had normal echo.  Patient on aspirin 81mg daily and Plavix.  3. Hyperlipidemia: Patient doing okay on Crestor.  She was started on one pill a week and was told to try to increase it to twice a week if tolerated and try co-Q 10 if muscle aches occurred.  Patient reports that she did not tolerate the twice a week Crestor so she is back to taking it once a week.   4. Elevated coronary calcium score: calcium score performed at [facility] on 10/30/2018, which came back with a calcium score total of 200 with most of the calcium being located in the left anterior descending artery with some in the circumflex branch and none in the right coronary artery.  Patient continues to exercise vigorously without any symptoms or concerns.  She is having a hard time affording the bystolic so we will switch her to metoprolol 25 mg XL daily.  She will monitor blood pressure and heart rate carefully and call with updated readings 2 weeks after stopping the Bystolic and starting the metoprolol, or  sooner for any abnormals or symptoms or concerns.  5. Blurry vision: Gradual onset.  Patient reports she saw her eye doctor for this and no issues were seen.  Encouraged her to follow-up with her neurologist, and be seen within the next month.  She will go to the ER if any new, recurrent, worsening symptoms or strokelike symptoms occur.        Follow-up in 3-6 months or sooner if needed for any new, recurrent, or worsening symptoms or other problems/ concerns.

## 2019-01-04 ENCOUNTER — OFFICE VISIT (OUTPATIENT)
Dept: INTERNAL MEDICINE | Facility: CLINIC | Age: 69
End: 2019-01-04

## 2019-01-04 VITALS
BODY MASS INDEX: 20.66 KG/M2 | WEIGHT: 124 LBS | HEART RATE: 97 BPM | DIASTOLIC BLOOD PRESSURE: 70 MMHG | HEIGHT: 65 IN | SYSTOLIC BLOOD PRESSURE: 120 MMHG | OXYGEN SATURATION: 97 %

## 2019-01-04 DIAGNOSIS — I10 ESSENTIAL HYPERTENSION: ICD-10-CM

## 2019-01-04 DIAGNOSIS — Z86.73 HISTORY OF CVA (CEREBROVASCULAR ACCIDENT): ICD-10-CM

## 2019-01-04 DIAGNOSIS — H53.9 VISION DISTURBANCE: ICD-10-CM

## 2019-01-04 DIAGNOSIS — R42 INTERMITTENT LIGHTHEADEDNESS: ICD-10-CM

## 2019-01-04 DIAGNOSIS — E55.9 VITAMIN D DEFICIENCY: ICD-10-CM

## 2019-01-04 DIAGNOSIS — E87.1 SODIUM (NA) DEFICIENCY: ICD-10-CM

## 2019-01-04 DIAGNOSIS — E78.49 OTHER HYPERLIPIDEMIA: ICD-10-CM

## 2019-01-04 DIAGNOSIS — R53.82 CHRONIC FATIGUE: Primary | ICD-10-CM

## 2019-01-04 LAB
BUN SERPL-MCNC: 14 MG/DL (ref 8–23)
BUN/CREAT SERPL: 25.5 (ref 7–25)
CALCIUM SERPL-MCNC: 9.9 MG/DL (ref 8.6–10.5)
CHLORIDE SERPL-SCNC: 95 MMOL/L (ref 98–107)
CO2 SERPL-SCNC: 28.9 MMOL/L (ref 22–29)
CREAT SERPL-MCNC: 0.55 MG/DL (ref 0.57–1)
GLUCOSE SERPL-MCNC: 78 MG/DL (ref 65–99)
POTASSIUM SERPL-SCNC: 4.6 MMOL/L (ref 3.5–5.2)
SODIUM SERPL-SCNC: 135 MMOL/L (ref 136–145)

## 2019-01-04 PROCEDURE — 99214 OFFICE O/P EST MOD 30 MIN: CPT | Performed by: INTERNAL MEDICINE

## 2019-01-04 RX ORDER — ASPIRIN 81 MG/1
81 TABLET ORAL DAILY
COMMUNITY
End: 2019-08-07

## 2019-01-04 NOTE — PROGRESS NOTES
"Subjective   Rosi Vicente is a 68 y.o. female here for   Chief Complaint   Patient presents with   • Dizziness   • Blurred Vision   • Hypertension   .    Vitals:    01/04/19 0854 01/04/19 1143   BP: 110/72 120/70   BP Location: Right arm    Patient Position: Sitting    Pulse: 97    SpO2: 97%    Weight: 56.2 kg (124 lb)    Height: 165.1 cm (65\")        Body mass index is 20.63 kg/m².    Dizziness   This is a new problem. The current episode started 1 to 4 weeks ago. The problem occurs intermittently. The problem has been gradually worsening. Associated symptoms include fatigue and weakness (muscles felt weak with crestor - stopped 1 wk ago). Pertinent negatives include no chest pain, chills, coughing, fever, nausea or vomiting. Nothing aggravates the symptoms. She has tried nothing for the symptoms. The treatment provided no relief.   Fatigue   This is a recurrent problem. The current episode started more than 1 month ago. The problem occurs intermittently. The problem has been unchanged. Associated symptoms include fatigue and weakness (muscles felt weak with crestor - stopped 1 wk ago). Pertinent negatives include no chest pain, chills, coughing, fever, nausea or vomiting. Nothing aggravates the symptoms. She has tried nothing for the symptoms.   Eye Problem    Both eyes are affected.This is a recurrent problem. The current episode started more than 1 month ago. The problem occurs intermittently. The problem has been gradually worsening. There was no injury mechanism. The patient is experiencing no pain. There is no known exposure to pink eye. She does not wear contacts. Associated symptoms include blurred vision and weakness (muscles felt weak with crestor - stopped 1 wk ago). Pertinent negatives include no eye discharge, double vision, eye redness, fever, itching, nausea, photophobia, recent URI or vomiting. She has tried nothing for the symptoms.   Hypertension   This is a chronic problem. The current episode " started more than 1 year ago. The problem is unchanged. The problem is controlled. Associated symptoms include blurred vision. Pertinent negatives include no chest pain, palpitations or shortness of breath.        The following portions of the patient's history were reviewed and updated as appropriate: allergies, current medications, past social history and problem list.    Review of Systems   Constitutional: Positive for fatigue. Negative for chills and fever.   Eyes: Positive for blurred vision and visual disturbance (blurred vision). Negative for double vision, photophobia, discharge, redness and itching.   Respiratory: Negative for cough, shortness of breath and wheezing.    Cardiovascular: Negative for chest pain, palpitations and leg swelling.   Gastrointestinal: Negative for nausea and vomiting.   Neurological: Positive for dizziness, weakness (muscles felt weak with crestor - stopped 1 wk ago) and light-headedness.   Psychiatric/Behavioral: Positive for dysphoric mood (feel discouraged about multiple sx). Negative for sleep disturbance. The patient is nervous/anxious.        Objective   Physical Exam   Constitutional: She appears well-developed and well-nourished. No distress.   HENT:   Head: Normocephalic.   Right Ear: External ear normal. Tympanic membrane is bulging. Tympanic membrane is not erythematous.   Left Ear: External ear normal. Tympanic membrane is bulging. Tympanic membrane is not erythematous.   Nose: Right sinus exhibits no frontal sinus tenderness. Left sinus exhibits no frontal sinus tenderness.   Mouth/Throat: Oropharynx is clear and moist. No oropharyngeal exudate.   Neck: Normal range of motion. Neck supple.   Cardiovascular: Normal rate, regular rhythm and normal heart sounds.   Pulmonary/Chest: Effort normal and breath sounds normal. No respiratory distress. She has no wheezes. She has no rales. She exhibits no tenderness.   Musculoskeletal: She exhibits no edema.   Lymphadenopathy:      She has no cervical adenopathy.   Psychiatric: She has a normal mood and affect. Her behavior is normal.   Nursing note and vitals reviewed.      Assessment/Plan   Diagnoses and all orders for this visit:    Chronic fatigue  Comments:  worse for sev wks - from low sodium? - rechk BMP today    Essential hypertension  Comments:  controlled (but stopped amlodipine 1 d ago & stopped bystolic 2 d ago) - need to discuss with cardiol    Other hyperlipidemia  Comments:  continue diet/ex - she stopped crestor b/c she felt muscles were weak - need to discuss with cardiol    Intermittent lightheadedness  Comments:  in spite of stopping bystolic & amlodipine - need to see cardiol & neuro again    Vision disturbance  Comments:  need to keep appt with eye doctor today    Vitamin D deficiency    History of CVA (cerebrovascular accident)  Comments:  no recurrence but need to see cardiol again b/c not tolerating amlodipine & bystolic    Sodium (Na) deficiency  Comments:  rechk today  Orders:  -     Basic Metabolic Panel; Future  -     Basic Metabolic Panel    Other orders  -     aspirin 81 MG EC tablet; Take 81 mg by mouth Daily.

## 2019-01-07 NOTE — TELEPHONE ENCOUNTER
Dr. 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    Her bystolic was too expensive to afford and she was changed to metoprolol xl last visit, however she feels like this is causing increasing fatigue.    I tried to research amlodipine gingivitis and it looks like the calcium channel blocker med class in general has a rare side effect of this gingivitis occurring.  For this reason I was going to try to avoid another calcium channel blocker at this time, if possible.  If not then maybe try cautiously to see if the different calcium channel blocker would cause the same side effect or not.  Not sure if you're familiar with this condition.    At this point I was considering stopping her metoprolol xl (25mg daily), stopping her amlodipine (5mg daily), and starting her on carvedilol, which would provide beta blocker benefits as well as blood pressure control.  I wanted to check with you to see if you had any other ideas.  It looks like she has an allergy to lisinopril.  She is currently on losartan HCTZ 100/12.5 mg.  She has a history of stroke, hypertension, chronic fatigue, and elevated coronary calcium score.        Thanks,  JUAN FRANCISCO Garnica

## 2019-01-08 ENCOUNTER — TELEPHONE (OUTPATIENT)
Dept: INTERNAL MEDICINE | Facility: CLINIC | Age: 69
End: 2019-01-08

## 2019-01-08 NOTE — TELEPHONE ENCOUNTER
A prior authorization form has been received for Livalo 4 mg and a tier exception for Vascepa 1 gm w/ patient to try alternatives.     I have placed the tier excepetion for in Dr. Fonseca's tray for review for alternative medication.

## 2019-01-08 NOTE — TELEPHONE ENCOUNTER
----- Message from Shanta Ayala sent at 1/7/2019  8:32 AM EST -----  Contact: Pt   Pt is calling for a tier change with MD help. [facility] should be calling our office to start forms.    FOR- tier change per MD.  icosapent ethyl (VASCEPA) 1 g capsule capsule      Patient requests RX SENT TO   [facility] - [address] [phone] [facility] [phone] [facility]      Caller# [phone] - thank you for working me in last week.

## 2019-01-14 ENCOUNTER — OFFICE VISIT (OUTPATIENT)
Dept: CARDIOLOGY | Facility: CLINIC | Age: 69
End: 2019-01-14

## 2019-01-14 ENCOUNTER — TELEPHONE (OUTPATIENT)
Dept: CARDIOLOGY | Facility: CLINIC | Age: 69
End: 2019-01-14

## 2019-01-14 VITALS
WEIGHT: 125 LBS | SYSTOLIC BLOOD PRESSURE: 141 MMHG | DIASTOLIC BLOOD PRESSURE: 91 MMHG | HEART RATE: 72 BPM | HEIGHT: 65 IN | BODY MASS INDEX: 20.83 KG/M2

## 2019-01-14 DIAGNOSIS — I10 ESSENTIAL HYPERTENSION: Primary | ICD-10-CM

## 2019-01-14 DIAGNOSIS — R93.1 ELEVATED CORONARY ARTERY CALCIUM SCORE: ICD-10-CM

## 2019-01-14 DIAGNOSIS — R42 INTERMITTENT LIGHTHEADEDNESS: ICD-10-CM

## 2019-01-14 DIAGNOSIS — Z86.73 HISTORY OF CVA (CEREBROVASCULAR ACCIDENT): ICD-10-CM

## 2019-01-14 PROCEDURE — 99213 OFFICE O/P EST LOW 20 MIN: CPT | Performed by: NURSE PRACTITIONER

## 2019-01-14 RX ORDER — METOPROLOL SUCCINATE 25 MG/1
12.5 TABLET, EXTENDED RELEASE ORAL DAILY
Qty: 15 TABLET | Refills: 0 | Status: SHIPPED | OUTPATIENT
Start: 2019-01-14 | End: 2019-01-15 | Stop reason: SDUPTHER

## 2019-01-14 NOTE — TELEPHONE ENCOUNTER
Never tried the metoprolol.     Fatigue with bystolic.    Will notify office if fatigue reocurrs.

## 2019-01-14 NOTE — PROGRESS NOTES
Date of Office Visit: 2019  Encounter Provider: Fidelina Edwards, MANAS, APRN  Place of Service: [facility] CARDIOLOG[facility]  Patient Name: Rosi Vicente  :1950        Subjective:     Chief Complaint:  Follow-up, hypertension, elevated coronary artery calcium score, follow-up on medication changes.      History of Present Illness:  Rosi Vicente is a 68 y.o. female patient of Dr. Gee.    Patient has a history of hypertension and hyperlipidemia.  In the past she has not tolerated Lipitor, pravastatin, Livalo, or repatha.  She had a coronary calcium score checked 10/30/18 at [facility] showing a total score of 200, with most of the calcium being in the left anterior descending artery with some in the circumflex branch and none in the RCA.     Patient was last seen in office by Dr. Gee 18.  At this point she was very active doing Jazzercise about 5 days a week.  She was eating a very healthy diet, as she has been doing her whole life.  She denied any exertional symptoms.  She denied any chest pain or shortness of breath.  She had been slow to recover from her shoulder surgery.     Patient had screening tests done 18 showing total cholesterol of 149, LDL of 85, triglycerides of 81, HDL of 57.  CRP was considered order line at 1.2.  MPO and LpPLA2 considered good at 212 and 93 respectively.  Hgb A1C 5.4%.  ProBNP 102.  Cr 0.51 and eGFR 98.  CK 85.  TSH 2.35 (WNL).       Patient had repeat cholesterol panel done 18 through primary care provider.  Total cholesterol was 118, triglycerides 52, HDL 46, LDL 62.     Patient was seen in office 18 for follow-up visit.  She wanted to switch to a different beta blocker due to bystolic being too expensive.  She was changed to metoprolol XL.  She was considering stopping vascepa if new supplemental insurance would not cover it.  If she would not be able to afford it and recommendation would be to start omega-3  fatty acids, 4000 mg daily.  She would follow-up with PCP within 3 months after switching Chantel check cholesterol.  She was advised to discuss this with primary care and neurology prior to making any changes, to insure that they were comfortable with this if it came to this.  We discussed slight liberalization of salt intake, due to previous hyponatremia.  Patient was very active and denied any exertional symptoms or limitation of exercise or having to decreased exercise for malaise previously tolerated.  She could not tolerate taking Crestor more than once a week.  She denied chest pain, shortness of breath, palpitations, shortness of breath with exertion, racing heartbeat, edema, syncope, near-syncope, falls, or snoring.  She reported some chronic fatigue.  She is instructed to schedule blood pressure check appointment for about one month and bring her blood pressure cuff and log with her at this time.    Patient called the office reporting that her dentist said that she was having amlodipine-induced gingivitis.  She was also having fatigue with the metoprolol.  Plan was to stop metoprolol, stop the amlodipine, with plans to try carvedilol  If BP elevated which would hopefully provide beta blocker effects as well as blood pressure control.  Patient was asked to monitor blood pressure and heart rate, keep a log, and call if systolic blood pressure staying greater than 130 or diastolic greater than 80.      Patient resents to office today for follow-up visit.  Patient reports she never tried the metoprolol, she was having fatigue with the bystolic.  She has been off of the amlodipine and is feeling well.  She is also off of the bystolic and feeling well.  BP has actually been fairly well controlled outside the office since last visit.  Average blood pressure outside the office 127/79 mmHg.  Per neurology, goal for blood pressure is less than 130 systolic.  Blood pressure well controlled in office today at 112/76.   Most blood pressures outside the office 110s-120s/70s-low 80s.  Not all of these blood pressure readings and after taking her medication, some were before.  At this time we are going to start a very low-dose of metoprolol, 12.5 mg daily.  If she has recurrence of any lightheadedness or fatigue then she will notify the office.  If symptoms are not manageable and we will likely stop this medication.  Patient is going to take this at bedtime.  She is going to check blood pressure and heart rate at least one to 2 hours after morning medications.  If blood pressure remains elevated we may need to adjust medications further.  She will call the office in 3 weeks with updated readings.  She denies any chest pain, shortness of breath, shortness of breath with exertion, palpitations, edema, dizziness, syncope, near-syncope, falls, fatigue, abnormal bleeding.    Patient to follow-up with Dr. Gee in 6 months or sooner if needed for any new, recurrent, or worsening symptoms or other problems/concerns.        Past Medical History:   Diagnosis Date   • Allergic rhinitis    • Anemia    • Arthritis 2016   • Bronchitis    • CVA (cerebral vascular accident) (CMS/HCC)    • Fatigue    • FH: colon cancer    • H/O bone density study 2013   • H/O mammogram 2013   • Hyperlipidemia    • Hypertension     Benign Essential   • Malaise and fatigue    • Muscle pain    • Nocturia    • Osteoporosis    • Stroke (CMS/HCC) 09/13/2017   • TMJ (temporomandibular joint syndrome)    • Trigeminal neuralgia     [facility]gery at [facility] in 2009 repeat in 2015     Past Surgical History:   Procedure Laterality Date   • ADENOIDECTOMY     • AUGMENTATION MAMMAPLASTY     • BRAIN SURGERY  12/8/2005    MVD   • COLONOSCOPY N/A 01/2015    Repeat Q 5 years due to Fhx of Colon Ca-Dr. Polk   • COSMETIC SURGERY  2015    augmentation   • EXTERNAL EAR SURGERY     • HIP SURGERY     • JOINT REPLACEMENT  10/3/16    left hip replacement   • PAP SMEAR N/A 2013      [facility]   • RHINOPLASTY     • SEPTOPLASTY  2000    SEPTO/RHINOPLASTY POST TRAUMA   • SINUS SURGERY  1980'S   • TONSILLECTOMY  AGE 5     Outpatient Medications Prior to Visit   Medication Sig Dispense Refill   • ALPHA LIPOIC ACID PO Take  by mouth Daily.     • aspirin 81 MG EC tablet Take 81 mg by mouth Daily.     • calcium carbonate (OS-CARLOS) 600 MG tablet Take 600 mg by mouth 2 (Two) Times a Day With Meals.     • cholecalciferol (VITAMIN D3) 1000 UNITS tablet Take 2,000 Units by mouth 2 (Two) Times a Day.     • clopidogrel (PLAVIX) 75 MG tablet TAKE 1 TABLET EVERY DAY 90 tablet 1   • Coenzyme Q10 (COQ-10) 400 MG capsule Take  by mouth Daily.     • Denosumab (PROLIA SC) Inject  under the skin Every 6 (Six) Months.     • icosapent ethyl (VASCEPA) 1 g capsule capsule Take 2 g by mouth 2 (Two) Times a Day With Meals. 120 capsule 12   • losartan-hydrochlorothiazide (HYZAAR) 100-12.5 MG per tablet Take 1 tablet by mouth Daily. 30 tablet 0   • Melatonin 10 MG tablet Take 20 mg by mouth Daily.     • pantoprazole (PROTONIX) 40 MG EC tablet Take 1 tablet by mouth Daily. 10 tablet 0   • Probiotic Product (PROBIOTIC ADVANCED PO) Take  by mouth Daily.     • rosuvastatin (CRESTOR) 10 MG tablet Take 10 mg by mouth 1 (One) Time Per Week.     • vitamin B-12 (CYANOCOBALAMIN) 1000 MCG tablet 5,000 mcg Daily.       No facility-administered medications prior to visit.        Allergies as of 01/14/2019 - Reviewed 01/14/2019   Allergen Reaction Noted   • Amlodipine Other (See Comments) 01/14/2019   • Bystolic [nebivolol hcl] Other (See Comments) 01/14/2019   • Lisinopril Other (See Comments) 03/28/2018     Social History     Socioeconomic History   • Marital status:      Spouse name: Not on file   • Number of children: Not on file   • Years of education: Not on file   • Highest education level: Not on file   Social Needs   • Financial resource strain: Not on file   • Food insecurity - worry: Not on file   • Food insecurity -  inability: Not on file   • Transportation needs - medical: Not on file   • Transportation needs - non-medical: Not on file   Occupational History   • Not on file   Tobacco Use   • Smoking status: Never Smoker   • Smokeless tobacco: Never Used   • Tobacco comment: daily caffiene   Substance and Sexual Activity   • Alcohol use: Yes     Types: 3 Glasses of wine per week     Comment: moderate   • Drug use: No   • Sexual activity: Yes     Partners: Male     Birth control/protection: Post-menopausal, None   Other Topics Concern   • Not on file   Social History Narrative   • Not on file     Family History   Problem Relation Age of Onset   • Pancreatic cancer Mother    • Hyperlipidemia Mother    • Cancer Mother            • Hypertension Mother    • Miscarriages / Stillbirths Mother    • Colon cancer Father 56   • Arthritis Father    • Cancer Father            • Hearing loss Father         WAR INJURY   • Vision loss Father         dry glaucoma   • Liver cancer Brother 40   • Cancer Brother                Review of Systems   Constitution: Negative for chills, fever, malaise/fatigue, weight gain and weight loss.   HENT: Negative for ear pain, hearing loss, nosebleeds and sore throat.    Eyes: Positive for blurred vision (seeing ophtalmology). Negative for double vision, redness, vision loss in left eye, vision loss in right eye and visual disturbance.   Cardiovascular: Negative for chest pain, dyspnea on exertion, leg swelling, near-syncope, palpitations and syncope.   Respiratory: Negative for cough, hemoptysis, shortness of breath, snoring and wheezing.    Endocrine: Negative for cold intolerance and heat intolerance.   Skin: Negative for itching, rash and suspicious lesions.   Musculoskeletal: Negative for joint pain, joint swelling and myalgias.   Gastrointestinal: Negative for abdominal pain, diarrhea, hematemesis, melena, nausea and vomiting.   Genitourinary: Negative for dysuria, frequency and  "hematuria.   Neurological: Negative for dizziness, headaches, numbness, paresthesias and seizures.   Psychiatric/Behavioral: Negative for altered mental status and depression. The patient is not nervous/anxious.           Objective:     Vitals:    01/14/19 1402 01/14/19 1406   BP: 112/76 141/91   BP Location: Right arm Right arm   Pulse:  72   Weight: 56.7 kg (125 lb)    Height: 165.1 cm (65\")      Body mass index is 20.8 kg/m².      PHYSICAL EXAM:  Physical Exam   Constitutional: She is oriented to person, place, and time. She appears well-developed and well-nourished. No distress.   HENT:   Head: Normocephalic and atraumatic.   Eyes: Pupils are equal, round, and reactive to light.   Neck: Neck supple. No JVD present. Carotid bruit is not present. No tracheal deviation present.   Cardiovascular: Normal rate, regular rhythm, normal heart sounds and intact distal pulses. Exam reveals no gallop and no friction rub.   No murmur heard.  Pulses:       Radial pulses are 2+ on the right side, and 2+ on the left side.        Posterior tibial pulses are 2+ on the right side, and 2+ on the left side.   Pulmonary/Chest: Effort normal and breath sounds normal. No respiratory distress. She has no wheezes. She has no rales.   Abdominal: Soft. Bowel sounds are normal. She exhibits no distension. There is no tenderness.   Musculoskeletal: She exhibits no edema.   Neurological: She is alert and oriented to person, place, and time.   Skin: Skin is warm and dry. She is not diaphoretic.   Psychiatric: She has a normal mood and affect.         Procedures  NO ECG NEEDED TODAY.       Assessment:       Diagnosis Plan   1. Essential hypertension     2. History of CVA (cerebrovascular accident)     3. Intermittent lightheadedness     4. Elevated coronary artery calcium score           Plan:     1. Hypertension: Patient's blood pressure well controlled in office today at 112/76 mmHg.  We checked her blood pressure machine, and it does not " appear to be accurate.  Blood pressure machine was about 20 points higher than our manual reading.  We recommended new blood pressure machine, Omron arm cuff.  At this point we are going to add a very low-dose of metoprolol in the evening.  If she develops any recurrence of fatigue or dizziness she will notify the office.  She is going to monitor blood pressure and heart rate at least one to 2 hours after taking medications in the morning.  She will keep a log and call the office and 3 weeks.  2. Hx calcium channel blocker induced gingivitis: Amlodipine has been stopped.  We will avoid future calcium channel blocker.  Followed by periodontal specialist.  3. Elevated coronary calcium score: We will try another beta blocker to see if patient may be able to tolerate it.  If not then we may have to go without a beta blocker.  We discussed benefits versus risks of beta blocker in coronary artery disease.  Patient verbalized understanding.  She is on board to try the metoprolol and is hoping that she can tolerate it.  Her last cholesterol level was good and she is very active.  No exertional symptoms or activity intolerance.  4. Dyslipidemia: Patient reports that she is going to stay on her Vascepa.  Her last cholesterol was improved, with LDL less than 70.  She reports that she was not able to tolerate the Crestor.  Her PCP took her off of this, per patient.  I recommended that she schedule next available follow-up with neurology.  5. History of dizziness: Resolved after stopping diastolic and amlodipine.  I'm wondering if her blood pressure was getting low, as patient's home machine was not accurate.  I did recommend that patient follow-up with neurology if she has any recurrence of symptoms.    Patient to follow-up Dr. Gee in 6 months or sooner if needed for any new, recurrent, or worsening symptoms or other problems/concerns.           Your medication list           Accurate as of 1/14/19  4:08 PM. If you have any  questions, ask your nurse or doctor.               START taking these medications      Instructions Last Dose Given Next Dose Due   metoprolol succinate XL 25 MG 24 hr tablet  Commonly known as:  TOPROL-XL  Started by:  Fidelina Edwards DNP, JUAN FRANCISCO      Take 0.5 tablets by mouth Daily.          CONTINUE taking these medications      Instructions Last Dose Given Next Dose Due   ALPHA LIPOIC ACID PO      Take  by mouth Daily.       aspirin 81 MG EC tablet      Take 81 mg by mouth Daily.       calcium carbonate 600 MG tablet  Commonly known as:  OS-CARLOS      Take 600 mg by mouth 2 (Two) Times a Day With Meals.       cholecalciferol 1000 units tablet  Commonly known as:  VITAMIN D3      Take 2,000 Units by mouth 2 (Two) Times a Day.       clopidogrel 75 MG tablet  Commonly known as:  PLAVIX      TAKE 1 TABLET EVERY DAY       CoQ-10 400 MG capsule      Take  by mouth Daily.       icosapent ethyl 1 g capsule capsule  Commonly known as:  VASCEPA      Take 2 g by mouth 2 (Two) Times a Day With Meals.       losartan-hydrochlorothiazide 100-12.5 MG per tablet  Commonly known as:  HYZAAR      Take 1 tablet by mouth Daily.       Melatonin 10 MG tablet      Take 20 mg by mouth Daily.       pantoprazole 40 MG EC tablet  Commonly known as:  PROTONIX      Take 1 tablet by mouth Daily.       PROBIOTIC ADVANCED PO      Take  by mouth Daily.       PROLIA SC      Inject  under the skin Every 6 (Six) Months.       rosuvastatin 10 MG tablet  Commonly known as:  CRESTOR      Take 10 mg by mouth 1 (One) Time Per Week.       vitamin B-12 1000 MCG tablet  Commonly known as:  CYANOCOBALAMIN      5,000 mcg Daily.             Where to Get Your Medications      These medications were sent to [facility] [address] [place] [address] [address] [place] [address] AT SEC OF [address] - [phone]  - [phone] [address] [address]    Phone:  [phone]   · metoprolol succinate XL 25 MG 24 hr tablet     The above  medication changes may not have been made by this provider.  Patient's medication list was updated to reflect medications they are currently taking including medication changes made by other providers.          Thanks,    Fidelina Edwards, MANAS, APRN  01/14/2019             Dictated utilizing Dragon dictation

## 2019-01-14 NOTE — TELEPHONE ENCOUNTER
Form that was received from patients insurance for a tier exception was filled out and faxed back to [phone].

## 2019-01-15 ENCOUNTER — TELEPHONE (OUTPATIENT)
Dept: OBSTETRICS AND GYNECOLOGY | Facility: CLINIC | Age: 69
End: 2019-01-15

## 2019-01-15 ENCOUNTER — TELEPHONE (OUTPATIENT)
Dept: INTERNAL MEDICINE | Facility: CLINIC | Age: 69
End: 2019-01-15

## 2019-01-15 DIAGNOSIS — I10 ESSENTIAL HYPERTENSION: ICD-10-CM

## 2019-01-15 DIAGNOSIS — E78.49 OTHER HYPERLIPIDEMIA: ICD-10-CM

## 2019-01-15 DIAGNOSIS — Z78.0 POST-MENOPAUSAL: ICD-10-CM

## 2019-01-15 DIAGNOSIS — M81.0 AGE-RELATED OSTEOPOROSIS WITHOUT CURRENT PATHOLOGICAL FRACTURE: Primary | ICD-10-CM

## 2019-01-15 DIAGNOSIS — Z86.73 HISTORY OF CVA (CEREBROVASCULAR ACCIDENT): ICD-10-CM

## 2019-01-15 RX ORDER — METOPROLOL SUCCINATE 25 MG/1
12.5 TABLET, EXTENDED RELEASE ORAL DAILY
Qty: 15 TABLET | Refills: 0 | Status: SHIPPED | OUTPATIENT
Start: 2019-01-15 | End: 2019-03-20

## 2019-01-15 RX ORDER — ICOSAPENT ETHYL 1000 MG/1
2 CAPSULE ORAL 2 TIMES DAILY WITH MEALS
Qty: 360 CAPSULE | Refills: 1 | Status: SHIPPED | OUTPATIENT
Start: 2019-01-15 | End: 2019-07-12 | Stop reason: SDUPTHER

## 2019-01-15 NOTE — TELEPHONE ENCOUNTER
1/15/19  Pt called - states she [facility] is the incorrect  Pharmacy - should go to [facility]  [phone].  I confirmed with pt. Which pharmacy to use.  She had verbally given [facility] [employer] info, but it should have gone to [facility].  She cancelled the Rx at [facility].  I sent through a new Rx for #30 to [facility] [phone] ph. She does not want any other Rx sent through until she sees how she does on this one./graeme

## 2019-01-15 NOTE — TELEPHONE ENCOUNTER
----- Message from Sybil Rosenthal sent at 1/15/2019  9:40 AM EST -----  Contact: Patient  Patient calling states Her [facility] will be faxing us for a prescription for     icosapent ethyl (VASCEPA) 1 g capsule capsule    She would like a 90 day supply. Please advise    Patient:[phone]    Pharmacy:[facility] - [address] [phone] [facility] [phone]

## 2019-01-18 ENCOUNTER — OFFICE VISIT (OUTPATIENT)
Dept: OBSTETRICS AND GYNECOLOGY | Facility: CLINIC | Age: 69
End: 2019-01-18

## 2019-01-18 VITALS
BODY MASS INDEX: 20.83 KG/M2 | SYSTOLIC BLOOD PRESSURE: 130 MMHG | DIASTOLIC BLOOD PRESSURE: 80 MMHG | HEIGHT: 65 IN | WEIGHT: 125 LBS

## 2019-01-18 DIAGNOSIS — IMO0002 HORMONE DEFICIENCY: Primary | ICD-10-CM

## 2019-01-18 DIAGNOSIS — Z79.890 HORMONE REPLACEMENT THERAPY: ICD-10-CM

## 2019-01-18 PROCEDURE — 99213 OFFICE O/P EST LOW 20 MIN: CPT | Performed by: OBSTETRICS & GYNECOLOGY

## 2019-01-18 NOTE — PROGRESS NOTES
Subjective:    Patient Rosi Vicente is a 68 y.o. female.   Chief Complaint   Patient presents with   • Gynecologic Exam     AE   CC  Patient desires to quit the Prolia the last several bone densities are reviewed and patient has very mild osteopenia now and I think because she can safely stop the Prolia she is to continue her vitamins and calcium and continue doing the light weights with both the arms and walking no other issues on the chief complaint    HPI      The following portions of the patient's history were reviewed and updated as appropriate: allergies, current medications, past family history, past medical history, past social history, past surgical history and problem list.      Review of Systems   Constitutional: Negative.    HENT: Negative.    Eyes: Negative.    Respiratory: Negative.    Cardiovascular: Negative.    Gastrointestinal: Negative for abdominal distention, abdominal pain, anal bleeding, blood in stool, constipation, diarrhea, nausea, rectal pain and vomiting.   Endocrine: Negative for cold intolerance, heat intolerance, polydipsia, polyphagia and polyuria.   Genitourinary: Negative.  Negative for decreased urine volume, dyspareunia, dysuria, enuresis, flank pain, frequency, genital sores, hematuria, menstrual problem, pelvic pain, urgency, vaginal bleeding, vaginal discharge and vaginal pain.   Musculoskeletal: Negative.    Skin: Negative.    Allergic/Immunologic: Negative.    Neurological: Negative.    Hematological: Negative for adenopathy. Does not bruise/bleed easily.   Psychiatric/Behavioral: Negative for agitation, confusion and sleep disturbance. The patient is not nervous/anxious.          Objective:      Physical Exam   Constitutional: She appears well-developed and well-nourished. She is not intubated.   HENT:   Head: Hair is normal.   Nose: Nose normal.   Mouth/Throat: Oropharynx is clear and moist.   Eyes: Conjunctivae are normal.   Neck: Normal carotid pulses and no JVD  present. No tracheal tenderness, no spinous process tenderness and no muscular tenderness present. Carotid bruit is not present. No neck rigidity. No edema, no erythema and normal range of motion present. No thyroid mass and no thyromegaly present.   Cardiovascular: Normal rate, regular rhythm, S1 normal and normal heart sounds. Exam reveals no gallop.   No murmur heard.  Pulmonary/Chest: Effort normal. No accessory muscle usage or stridor. No apnea, no tachypnea and no bradypnea. She is not intubated. No respiratory distress. She has no wheezes. She has no rales. She exhibits no tenderness. Right breast exhibits no inverted nipple, no mass, no nipple discharge, no skin change and no tenderness. Left breast exhibits no inverted nipple, no mass, no nipple discharge, no skin change and no tenderness.   Abdominal: Soft. Bowel sounds are normal. She exhibits no distension and no mass. There is no tenderness. There is no rebound and no guarding. No hernia.   Genitourinary: Vagina normal and uterus normal. Rectal exam shows no external hemorrhoid, no internal hemorrhoid, no fissure, no mass, no tenderness and anal tone normal. There is no rash, tenderness, lesion or injury on the right labia. There is no rash, tenderness, lesion or injury on the left labia. Uterus is not deviated, not enlarged, not fixed and not tender. Cervix exhibits no motion tenderness, no discharge and no friability. Right adnexum displays no mass and no tenderness. Left adnexum displays no mass and no tenderness. No erythema, tenderness or bleeding in the vagina. No foreign body in the vagina. No signs of injury around the vagina. No vaginal discharge found.   Musculoskeletal: She exhibits no edema or tenderness.        Right shoulder: She exhibits no tenderness, no swelling, no pain and no spasm.   Lymphadenopathy:        Head (right side): No submental, no submandibular, no tonsillar, no preauricular, no posterior auricular and no occipital  adenopathy present.        Head (left side): No submental, no submandibular, no tonsillar, no preauricular, no posterior auricular and no occipital adenopathy present.     She has no cervical adenopathy.        Right cervical: No superficial cervical, no deep cervical and no posterior cervical adenopathy present.       Left cervical: No superficial cervical, no deep cervical and no posterior cervical adenopathy present.        Right axillary: No pectoral and no lateral adenopathy present.        Left axillary: No pectoral and no lateral adenopathy present.       Right: No inguinal, no supraclavicular and no epitrochlear adenopathy present.        Left: No inguinal, no supraclavicular and no epitrochlear adenopathy present.   Neurological: No cranial nerve deficit. Coordination normal.   Skin: Skin is warm and dry. No abrasion, no bruising, no burn, no lesion, no petechiae, no purpura and no rash noted. Rash is not macular, not maculopapular, not nodular and not urticarial. No cyanosis or erythema. No pallor. Nails show no clubbing.   Psychiatric: She has a normal mood and affect. Her behavior is normal.         Assessment and Plan: Continue exercises continue vitamin D and calcium does not need Prolia annual exam normal no other issues    Patient has been instructed to perform a self breast exam on a weekly basis, a yearly mammogram, pap smear yearly unless instructed otherwise and bone density every 2 years.  I recommended that the patient not smoke, and discussed smoking cessation when appropriate.     There are no diagnoses linked to this encounter.

## 2019-01-28 ENCOUNTER — TELEPHONE (OUTPATIENT)
Dept: CARDIOLOGY | Facility: CLINIC | Age: 69
End: 2019-01-28

## 2019-01-28 ENCOUNTER — CLINICAL SUPPORT (OUTPATIENT)
Dept: CARDIOLOGY | Facility: CLINIC | Age: 69
End: 2019-01-28

## 2019-01-28 NOTE — PROGRESS NOTES
Informed patient to increase her Metoprolol to 25mg qhs  Patient is to call if her BP are over 130/80  Patient to keep tract of her BP and call us with the readings

## 2019-01-28 NOTE — PROGRESS NOTES
Patient here for a BP follow up   She is taking Metoprolol Succ 25mg 1/2 tablet qhs  And Losartan Potassium/Hctz 100/12.5 1 po q am  /86  Her machine 129/91 pulse 83    BP readings at home   1/17/19 121/74  1/25/19 135/89 pulse 71  1/26/19 135/78 pulse 74 & 132/84 pulse 73  Evening 126/80 pulse 75  1/27/19 132/86 pulse 68  1/28/19 130/82 pulse 70

## 2019-01-28 NOTE — TELEPHONE ENCOUNTER
Patient wanting to know if she can download her BP reading from Bluetooth or somehow into the computer  Please call patient with this information [phone]

## 2019-01-28 NOTE — PROGRESS NOTES
Patient was asked to increase metoprolol XL to 25 mg in the evening.  Was previously taking 12.5mg.  She'll continue to keep a blood pressure and heart rate log and call with updated readings in approximately 2 weeks.  Goal for blood pressure less than 130/80.

## 2019-01-29 NOTE — TELEPHONE ENCOUNTER
LM on pt VM per [facility]EN release. Informed pt that this is not a process that is currently in use at [facility]. She should submit her BP logs via fax (main # given), and call the office with any questions or concerns. Thank you. Shelley CHRISTIANSON RN

## 2019-01-31 DIAGNOSIS — M81.0 AGE-RELATED OSTEOPOROSIS WITHOUT CURRENT PATHOLOGICAL FRACTURE: Primary | ICD-10-CM

## 2019-02-01 ENCOUNTER — TELEPHONE (OUTPATIENT)
Dept: CARDIOLOGY | Facility: CLINIC | Age: 69
End: 2019-02-01

## 2019-02-01 NOTE — TELEPHONE ENCOUNTER
I do not see any evidence showing that metoprolol by itself can cause gingivitis.  I'm not sure if this is contributing with her symptoms.  I think that she should speak with her oral surgeon about this.    If she wants to try to hold it for a couple of weeks that is fine, however I think that within a couple weeks it will probably improve by itself so it may not necessarily mean that the metoprolol was the cause.    If she is going to hold it then she is to watch her heart rate and blood pressure and call us if heart rate gets above 100 or blood pressure greater than 130/80.    I am running out of ideas for medications to manage HR and BP if she calls back with elevated readings then please cc Dr. Gee on the message as well.

## 2019-02-01 NOTE — TELEPHONE ENCOUNTER
2/1/19  Pt left vmsg - patient is asking if she can hold the metoprolol for a two week period.  Since being on it, her gingivitis that had been caused by amlodipine has flared up again causing her gums to swell again and her gum specialist is wanting her to do the trial off to see if it will clear up with the treatments she is getting.  Pt's ph [phone]/graeme

## 2019-02-01 NOTE — TELEPHONE ENCOUNTER
2/1/19  I left [facility] for patient with this information.  Advised her to call us with update if bp/hr increases/graeme    2/5/19  Pt left [facility] - states the mouth sores cleared up about 3 days after and that there is a listing in the rare side effects that mouth sores can occur.  BP readings when on the metoprolol in January was 121/79 with HR of 71.  She had bp readings off the metoprolol of 124/85; 119/79; 126/82; 128/78; and HR 78-80.  Patient's ph [phone]/graeme

## 2019-02-05 ENCOUNTER — RESULTS ENCOUNTER (OUTPATIENT)
Dept: OBSTETRICS AND GYNECOLOGY | Facility: CLINIC | Age: 69
End: 2019-02-05

## 2019-02-05 DIAGNOSIS — M81.0 AGE-RELATED OSTEOPOROSIS WITHOUT CURRENT PATHOLOGICAL FRACTURE: ICD-10-CM

## 2019-02-11 DIAGNOSIS — I10 ESSENTIAL HYPERTENSION: ICD-10-CM

## 2019-02-11 NOTE — TELEPHONE ENCOUNTER
I previously discussed with patient that beta-blocker therapy is recommended, if she can tolerate it, due to elevated coronary artery calcium score.  However she is not able to tolerate beta-blockers due to fatigue and thinking that it causes sores in her mouth.      Just FYI.

## 2019-02-11 NOTE — TELEPHONE ENCOUNTER
2/11/19  I left [facility] [place] asking if she restarted taking the metoprolol after holding for a few days for the sores in her mouth.  She called back and left [facility] [place] states she has not, and will not be restarting the metoprolol as her bp/hr are doing ok on the losartan/hctz.  Of note, there was a request to refill the metoprolol that had been sent through on the refill line - apparently from the pharmacy - confirmed with pt that she did not ask for the refill./graeme

## 2019-02-11 NOTE — TELEPHONE ENCOUNTER
----- Message from Fidelina Edwards, MANAS, APRN sent at 1/28/2019  9:58 AM EST -----  Please call patient for updated blood pressure heart rate readings since increasing metoprolol xl to 25 mg in the evening.

## 2019-02-12 RX ORDER — METOPROLOL SUCCINATE 25 MG/1
12.5 TABLET, EXTENDED RELEASE ORAL DAILY
Qty: 15 TABLET | Refills: 0 | OUTPATIENT
Start: 2019-02-12

## 2019-02-18 ENCOUNTER — PROCEDURE VISIT (OUTPATIENT)
Dept: OBSTETRICS AND GYNECOLOGY | Facility: CLINIC | Age: 69
End: 2019-02-18

## 2019-02-18 ENCOUNTER — APPOINTMENT (OUTPATIENT)
Dept: WOMENS IMAGING | Facility: HOSPITAL | Age: 69
End: 2019-02-18

## 2019-02-18 ENCOUNTER — TELEPHONE (OUTPATIENT)
Dept: INTERNAL MEDICINE | Facility: CLINIC | Age: 69
End: 2019-02-18

## 2019-02-18 DIAGNOSIS — I10 BENIGN ESSENTIAL HTN: Primary | ICD-10-CM

## 2019-02-18 DIAGNOSIS — Z12.31 VISIT FOR SCREENING MAMMOGRAM: Primary | ICD-10-CM

## 2019-02-18 PROCEDURE — 77067 SCR MAMMO BI INCL CAD: CPT | Performed by: RADIOLOGY

## 2019-02-18 PROCEDURE — 77067 SCR MAMMO BI INCL CAD: CPT | Performed by: OBSTETRICS & GYNECOLOGY

## 2019-02-18 RX ORDER — LOSARTAN POTASSIUM AND HYDROCHLOROTHIAZIDE 12.5; 1 MG/1; MG/1
1 TABLET ORAL DAILY
Qty: 90 TABLET | Refills: 0 | Status: SHIPPED | OUTPATIENT
Start: 2019-02-18 | End: 2019-05-17 | Stop reason: SDUPTHER

## 2019-02-18 NOTE — TELEPHONE ENCOUNTER
----- Message from Shanta Ayala sent at 2/18/2019  9:32 AM EST -----  Contact: Pt  Pt is calling for a refill     FOR  losartan-hydrochlorothiazide (HYZAAR) 100-12.5 MG per tablet    Patient requests RX SENT TO   [facility] - [address] [phone] [facility] [phone] FX      Caller# [phone]     Please and thank you.

## 2019-02-21 ENCOUNTER — PROCEDURE VISIT (OUTPATIENT)
Dept: OBSTETRICS AND GYNECOLOGY | Facility: CLINIC | Age: 69
End: 2019-02-21

## 2019-02-21 DIAGNOSIS — Z78.0 POST-MENOPAUSAL: ICD-10-CM

## 2019-02-21 DIAGNOSIS — Z87.39 HISTORY OF OSTEOPOROSIS: ICD-10-CM

## 2019-02-21 DIAGNOSIS — Z79.899 LONG-TERM USE OF HIGH-RISK MEDICATION: Primary | ICD-10-CM

## 2019-02-21 PROCEDURE — 77080 DXA BONE DENSITY AXIAL: CPT | Performed by: OBSTETRICS & GYNECOLOGY

## 2019-02-25 DIAGNOSIS — Z78.0 POST-MENOPAUSAL: ICD-10-CM

## 2019-02-25 DIAGNOSIS — M81.0 AGE-RELATED OSTEOPOROSIS WITHOUT CURRENT PATHOLOGICAL FRACTURE: ICD-10-CM

## 2019-02-27 ENCOUNTER — TELEPHONE (OUTPATIENT)
Dept: INTERNAL MEDICINE | Facility: CLINIC | Age: 69
End: 2019-02-27

## 2019-02-27 NOTE — TELEPHONE ENCOUNTER
----- Message from Kaylin Crandall sent at 2/27/2019  1:03 PM EST -----  Regarding: Mammogram and bone density results  Contact: [phone]  Patient came into the office today wanting to know if Dr. Fonseca would order a 3-D mammogram for her.  Her OB/GYN is retiring. She had a mammogram at [facility] on 2/18/2019. (the report is in chart) She also would like the results of her bone density that was done recently. Dr. Fonseca did not order the bone density.

## 2019-02-27 NOTE — TELEPHONE ENCOUNTER
Ms. Vicente had a mammogram on 2/18/19 along w/ a Bone Density ordered by Dr. Dmitriy Sofia.     She is requesting that you order her another Mammogram w/ tomosynthesis (3-D). Both reports are in her chart for review.     I have called Dr. Sofia's office and confirmed with their office that he will be retiring in June 2019.    Please further advise.    Thank you

## 2019-02-28 NOTE — TELEPHONE ENCOUNTER
She had normal mammo - insurance won't pay for 2nd mammo.  She may see breast surgeon if she has concerns about her breasts (pain or lumps)

## 2019-03-01 ENCOUNTER — TELEPHONE (OUTPATIENT)
Dept: OBSTETRICS AND GYNECOLOGY | Facility: CLINIC | Age: 69
End: 2019-03-01

## 2019-03-01 NOTE — TELEPHONE ENCOUNTER
----- Message from Taryn Zambrano sent at 2/28/2019 11:50 AM EST -----  Result bone density. Pt Ph [phone]

## 2019-03-13 ENCOUNTER — TELEPHONE (OUTPATIENT)
Dept: INTERNAL MEDICINE | Facility: CLINIC | Age: 69
End: 2019-03-13

## 2019-03-13 DIAGNOSIS — E78.49 OTHER HYPERLIPIDEMIA: Primary | ICD-10-CM

## 2019-03-13 DIAGNOSIS — R53.82 CHRONIC FATIGUE: ICD-10-CM

## 2019-03-13 DIAGNOSIS — E55.9 VITAMIN D DEFICIENCY: ICD-10-CM

## 2019-03-13 NOTE — TELEPHONE ENCOUNTER
----- Message from Shanta Ayala sent at 3/13/2019 10:33 AM EDT -----  Contact: Pt   Pt sees cardio 4/5 and Dr Fonseca 4/9.  She wanted to get labs done before her appointments, and also to have vitamin D.    I advised MD might want to see her first but given the situation we are checking.    Pt# [phone]

## 2019-03-18 ENCOUNTER — TELEPHONE (OUTPATIENT)
Dept: INTERNAL MEDICINE | Facility: CLINIC | Age: 69
End: 2019-03-18

## 2019-03-18 DIAGNOSIS — R53.82 CHRONIC FATIGUE: Primary | ICD-10-CM

## 2019-03-18 NOTE — TELEPHONE ENCOUNTER
Patient had to reschedule appointments and make one for lab as did not receive call back. Needs for cardiology appointment. Orders are there but patient states needs B12 and folate as well secondary to being over a year. Appt. Tomorrow morning in lab.

## 2019-03-19 ENCOUNTER — LAB (OUTPATIENT)
Dept: INTERNAL MEDICINE | Facility: CLINIC | Age: 69
End: 2019-03-19

## 2019-03-19 ENCOUNTER — OFFICE VISIT (OUTPATIENT)
Dept: INTERNAL MEDICINE | Facility: CLINIC | Age: 69
End: 2019-03-19

## 2019-03-19 ENCOUNTER — TELEPHONE (OUTPATIENT)
Dept: NEUROLOGY | Facility: CLINIC | Age: 69
End: 2019-03-19

## 2019-03-19 VITALS
SYSTOLIC BLOOD PRESSURE: 120 MMHG | HEART RATE: 67 BPM | BODY MASS INDEX: 21.33 KG/M2 | DIASTOLIC BLOOD PRESSURE: 82 MMHG | WEIGHT: 128 LBS | HEIGHT: 65 IN | TEMPERATURE: 97.9 F | OXYGEN SATURATION: 99 %

## 2019-03-19 DIAGNOSIS — H93.13 TINNITUS OF BOTH EARS: ICD-10-CM

## 2019-03-19 DIAGNOSIS — E78.49 OTHER HYPERLIPIDEMIA: ICD-10-CM

## 2019-03-19 DIAGNOSIS — E55.9 VITAMIN D DEFICIENCY: ICD-10-CM

## 2019-03-19 DIAGNOSIS — R42 LIGHTHEADEDNESS: ICD-10-CM

## 2019-03-19 DIAGNOSIS — D64.9 ANEMIA, UNSPECIFIED TYPE: ICD-10-CM

## 2019-03-19 DIAGNOSIS — R53.1 LEFT-SIDED WEAKNESS: Primary | ICD-10-CM

## 2019-03-19 DIAGNOSIS — R53.82 CHRONIC FATIGUE: ICD-10-CM

## 2019-03-19 LAB
ALBUMIN SERPL-MCNC: 4.2 G/DL (ref 3.5–5.2)
ALBUMIN/GLOB SERPL: 1.8 G/DL
ALP SERPL-CCNC: 59 U/L (ref 39–117)
ALT SERPL W P-5'-P-CCNC: 16 U/L (ref 1–33)
ANION GAP SERPL CALCULATED.3IONS-SCNC: 12.2 MMOL/L
AST SERPL-CCNC: 13 U/L (ref 1–32)
BASOPHILS # BLD AUTO: 0.03 10*3/MM3 (ref 0–0.2)
BASOPHILS NFR BLD AUTO: 0.4 % (ref 0–1.5)
BILIRUB SERPL-MCNC: 0.3 MG/DL (ref 0.2–1.2)
BUN BLD-MCNC: 11 MG/DL (ref 8–23)
BUN/CREAT SERPL: 21.2 (ref 7–25)
CALCIUM SPEC-SCNC: 9.3 MG/DL (ref 8.6–10.5)
CHLORIDE SERPL-SCNC: 91 MMOL/L (ref 98–107)
CHOLEST SERPL-MCNC: 158 MG/DL (ref 0–200)
CO2 SERPL-SCNC: 26.8 MMOL/L (ref 22–29)
CREAT BLD-MCNC: 0.52 MG/DL (ref 0.57–1)
CRP SERPL-MCNC: 0.08 MG/DL (ref 0–0.5)
DEPRECATED RDW RBC AUTO: 42.8 FL (ref 37–54)
EOSINOPHIL # BLD AUTO: 0.07 10*3/MM3 (ref 0–0.4)
EOSINOPHIL NFR BLD AUTO: 1 % (ref 0.3–6.2)
ERYTHROCYTE [DISTWIDTH] IN BLOOD BY AUTOMATED COUNT: 14.7 % (ref 12.3–15.4)
FOLATE SERPL-MCNC: >20 NG/ML (ref 4.78–24.2)
GFR SERPL CREATININE-BSD FRML MDRD: 117 ML/MIN/1.73
GLOBULIN UR ELPH-MCNC: 2.4 GM/DL
GLUCOSE BLD-MCNC: 84 MG/DL (ref 65–99)
HCT VFR BLD AUTO: 34.7 % (ref 34–46.6)
HDLC SERPL-MCNC: 44 MG/DL (ref 40–60)
HGB BLD-MCNC: 11.4 G/DL (ref 12–15.9)
LDLC SERPL CALC-MCNC: 101 MG/DL (ref 0–100)
LDLC/HDLC SERPL: 2.29 {RATIO}
LYMPHOCYTES # BLD AUTO: 1.62 10*3/MM3 (ref 0.7–3.1)
LYMPHOCYTES NFR BLD AUTO: 22.8 % (ref 19.6–45.3)
MCH RBC QN AUTO: 26.7 PG (ref 26.6–33)
MCHC RBC AUTO-ENTMCNC: 32.9 G/DL (ref 31.5–35.7)
MCV RBC AUTO: 81.3 FL (ref 79–97)
MONOCYTES # BLD AUTO: 0.62 10*3/MM3 (ref 0.1–0.9)
MONOCYTES NFR BLD AUTO: 8.7 % (ref 5–12)
NEUTROPHILS # BLD AUTO: 4.75 10*3/MM3 (ref 1.4–7)
NEUTROPHILS NFR BLD AUTO: 67.1 % (ref 42.7–76)
PLATELET # BLD AUTO: 412 10*3/MM3 (ref 140–450)
PMV BLD AUTO: 9.4 FL (ref 6–12)
POTASSIUM BLD-SCNC: 3.7 MMOL/L (ref 3.5–5.2)
PROT SERPL-MCNC: 6.6 G/DL (ref 6–8.5)
RBC # BLD AUTO: 4.27 10*6/MM3 (ref 3.77–5.28)
SODIUM BLD-SCNC: 130 MMOL/L (ref 136–145)
TRIGL SERPL-MCNC: 66 MG/DL (ref 0–150)
VIT B12 SERPL-MCNC: >2000 PG/ML (ref 211–946)
VLDLC SERPL-MCNC: 13.2 MG/DL (ref 5–40)
WBC NRBC COR # BLD: 7.09 10*3/MM3 (ref 3.4–10.8)

## 2019-03-19 PROCEDURE — 85025 COMPLETE CBC W/AUTO DIFF WBC: CPT | Performed by: INTERNAL MEDICINE

## 2019-03-19 PROCEDURE — 80061 LIPID PANEL: CPT | Performed by: INTERNAL MEDICINE

## 2019-03-19 PROCEDURE — 83540 ASSAY OF IRON: CPT | Performed by: INTERNAL MEDICINE

## 2019-03-19 PROCEDURE — 99214 OFFICE O/P EST MOD 30 MIN: CPT | Performed by: NURSE PRACTITIONER

## 2019-03-19 PROCEDURE — 80053 COMPREHEN METABOLIC PANEL: CPT | Performed by: INTERNAL MEDICINE

## 2019-03-19 PROCEDURE — 82728 ASSAY OF FERRITIN: CPT | Performed by: INTERNAL MEDICINE

## 2019-03-19 PROCEDURE — 84466 ASSAY OF TRANSFERRIN: CPT | Performed by: INTERNAL MEDICINE

## 2019-03-19 NOTE — TELEPHONE ENCOUNTER
Called and spoke with patient's . Notified him of all  Dr. Meeks's recommendations.  Per patients , the patient's PCP, Dr. Valles, had ordered an MRI today to be done STAT at [facility]. He will ask the results to be sent to Dr. Fonseca and Dr. Meeks as soon as they are read. As of now he will keep the apt with Peri for tomorrow at 230pm, depending on the results of the MRI.

## 2019-03-19 NOTE — TELEPHONE ENCOUNTER
Patient  recalled stating that the patient had the MRI done and the results will be faxed to our office.  Results faxed, per that impression nothing acute.  Dr. Meeks notified.  Patient to follow up with Peri CHICAS tomorrow at 2:30pm. They are to bring the disc to review imaging for apt.  Patient and  understand to keep the patient hydrated and rest laying down as much as possible.  Patient  states he will try to bring the CD by today.

## 2019-03-19 NOTE — TELEPHONE ENCOUNTER
----- Message from Manny Meeks MD sent at 3/19/2019  9:53 AM EDT -----  Regarding: RE: pt has new symtpoms   In this situation we would usually do an mri. If they insist on doing it in the outpatient setting I would get the mri done tomorrow am then have her keep the appointment with Peri. In the meantime she should hydrate with 2 L of water today and rest laying down as much as possible.  ----- Message -----  From: Brigida Schrader RN  Sent: 3/19/2019   9:37 AM  To: Manny Meeks MD  Subject: pt has new symtpoms                              Dr. Meeks,  This patient is seen by Peri for right hemispheric stroke the patient had September 2017  This patient called stating that she has awoken today with sudden dizziness and left side weakness or heaviness. Patient states that the dizziness is new, she did have some residue left side weakness from the previous stroke but this morning her left side is feeling heavier than before. They are requesting a head ct today and to be seen by peri today or tomorrow.  I advised them to go to the ER for the quickest diagnostic testing and treatment.   They requested to be scheduled tomorrow with Peri , and to ask a provider what they should do.  I scheduled her with Peri tomorrow at 230 per pt request but again suggested not to wait. They again asked me to ask a provider what they should do.  What are you recommendations that I can communicate to the patient?    Thank you

## 2019-03-20 ENCOUNTER — TELEPHONE (OUTPATIENT)
Dept: NEUROLOGY | Facility: CLINIC | Age: 69
End: 2019-03-20

## 2019-03-20 ENCOUNTER — OFFICE VISIT (OUTPATIENT)
Dept: NEUROLOGY | Facility: CLINIC | Age: 69
End: 2019-03-20

## 2019-03-20 VITALS
BODY MASS INDEX: 19.3 KG/M2 | SYSTOLIC BLOOD PRESSURE: 128 MMHG | WEIGHT: 123 LBS | DIASTOLIC BLOOD PRESSURE: 60 MMHG | OXYGEN SATURATION: 93 % | HEIGHT: 67 IN | HEART RATE: 73 BPM

## 2019-03-20 DIAGNOSIS — I69.30 SEQUELAE, POST-STROKE: ICD-10-CM

## 2019-03-20 DIAGNOSIS — I10 ESSENTIAL HYPERTENSION: ICD-10-CM

## 2019-03-20 DIAGNOSIS — R42 LIGHTHEADEDNESS: ICD-10-CM

## 2019-03-20 DIAGNOSIS — R53.1 LEFT-SIDED WEAKNESS: ICD-10-CM

## 2019-03-20 DIAGNOSIS — Z86.73 HISTORY OF CVA (CEREBROVASCULAR ACCIDENT): Primary | ICD-10-CM

## 2019-03-20 DIAGNOSIS — E78.2 MIXED HYPERLIPIDEMIA: ICD-10-CM

## 2019-03-20 DIAGNOSIS — Z78.9 STATIN INTOLERANCE: ICD-10-CM

## 2019-03-20 DIAGNOSIS — H93.13 TINNITUS OF BOTH EARS: ICD-10-CM

## 2019-03-20 LAB
25(OH)D3 SERPL-MCNC: 38.7 NG/ML (ref 30–100)
T4 FREE SERPL-MCNC: NORMAL NG/DL
TSH SERPL-ACNC: 2.86 MIU/ML (ref 0.27–4.2)

## 2019-03-20 PROCEDURE — 99214 OFFICE O/P EST MOD 30 MIN: CPT | Performed by: NURSE PRACTITIONER

## 2019-03-20 NOTE — PROGRESS NOTES
"Subjective   Rosi Vicente is a 69 y.o. female who presents due to lightheadedness and left-sided weakness.    She presents due to lightheadedness which is continuous, sx began approximately 6:00 this morning. She also c/o left-sided \"heaviness\" without numbness/tingling. She does c/o tinnitus with pressure in her left ear.  She has a hx of a patchy deep right basal ganglia and corona radiata infarct noted on MRI 9/13/17. She has been maintained on ASA, Plavix and Lipitor.      Dizziness   This is a new problem. The current episode started today. The problem occurs constantly. The problem has been unchanged. Associated symptoms include arthralgias (chronic left shoulder pain), fatigue ( reports increased over the past few weeks) and weakness. Pertinent negatives include no abdominal pain, chest pain, chills, congestion, coughing, fever, headaches, joint swelling, nausea, numbness, sore throat, urinary symptoms, visual change or vomiting. Nothing aggravates the symptoms. She has tried nothing for the symptoms.        The following portions of the patient's history were reviewed and updated as appropriate: allergies, current medications, past social history and problem list.    Past Medical History:   Diagnosis Date   • Allergic rhinitis    • Anemia    • Arthritis 2016   • Bronchitis    • CVA (cerebral vascular accident) (CMS/[facility]ly [facility])    • Fatigue    • FH: colon cancer    • H/O bone density study 2013   • H/O mammogram 2013   • Hyperlipidemia    • Hypertension     Benign Essential   • Malaise and fatigue    • Muscle pain    • Nocturia    • Osteoporosis    • Stroke (CMS/HCC) 09/13/2017   • TMJ (temporomandibular joint syndrome)    • Trigeminal neuralgia     Surgery at [facility] in 2009 repeat in 2015         Current Outpatient Medications:   •  ALPHA LIPOIC ACID PO, Take  by mouth Daily., Disp: , Rfl:   •  aspirin 81 MG EC tablet, Take 81 mg by mouth Daily., Disp: , Rfl:   •  calcium carbonate (OS-CARLOS) 600 MG " tablet, Take 600 mg by mouth 2 (Two) Times a Day With Meals., Disp: , Rfl:   •  cholecalciferol (VITAMIN D3) 1000 UNITS tablet, Take 2,000 Units by mouth 2 (Two) Times a Day., Disp: , Rfl:   •  clopidogrel (PLAVIX) 75 MG tablet, TAKE 1 TABLET EVERY DAY, Disp: 90 tablet, Rfl: 1  •  Coenzyme Q10 (COQ-10) 400 MG capsule, Take  by mouth Daily., Disp: , Rfl:   •  icosapent ethyl (VASCEPA) 1 g capsule capsule, Take 2 g by mouth 2 (Two) Times a Day With Meals., Disp: 360 capsule, Rfl: 1  •  losartan-hydrochlorothiazide (HYZAAR) 100-12.5 MG per tablet, Take 1 tablet by mouth Daily., Disp: 90 tablet, Rfl: 0  •  Melatonin 10 MG tablet, Take 20 mg by mouth Daily., Disp: , Rfl:   •  metoprolol succinate XL (TOPROL-XL) 25 MG 24 hr tablet, Take 0.5 tablets by mouth Daily., Disp: 15 tablet, Rfl: 0  •  pantoprazole (PROTONIX) 40 MG EC tablet, Take 1 tablet by mouth Daily., Disp: 10 tablet, Rfl: 0  •  Probiotic Product (PROBIOTIC ADVANCED PO), Take  by mouth Daily., Disp: , Rfl:   •  rosuvastatin (CRESTOR) 10 MG tablet, Take 10 mg by mouth 1 (One) Time Per Week., Disp: , Rfl:   •  vitamin B-12 (CYANOCOBALAMIN) 1000 MCG tablet, 5,000 mcg Daily., Disp: , Rfl:     Allergies   Allergen Reactions   • Amlodipine Other (See Comments)     GINGIVITIS- amlodipine induced.    • Bystolic [Nebivolol Hcl] Other (See Comments)     Extreme fatigue, dizziness   • Lisinopril Other (See Comments)     COUGH       Review of Systems   Constitutional: Positive for fatigue ( reports increased over the past few weeks). Negative for chills, fever and unexpected weight change.   HENT: Negative for congestion, ear pain, postnasal drip, sinus pressure, sore throat and trouble swallowing.    Eyes: Negative for visual disturbance.   Respiratory: Negative for cough, chest tightness and wheezing.    Cardiovascular: Negative for chest pain, palpitations and leg swelling.   Gastrointestinal: Negative for abdominal pain, blood in stool, nausea and vomiting.  "  Genitourinary: Negative for dysuria, frequency and urgency.   Musculoskeletal: Positive for arthralgias (chronic left shoulder pain). Negative for joint swelling.   Skin: Negative for color change.   Neurological: Positive for dizziness, weakness and light-headedness. Negative for syncope, numbness and headaches.   Hematological: Does not bruise/bleed easily.       Objective   Vitals:    03/19/19 0832   BP: 120/82   BP Location: Left arm   Patient Position: Sitting   Cuff Size: Adult   Pulse: 67   Temp: 97.9 °F (36.6 °C)   TempSrc: Oral   SpO2: 99%   Weight: 58.1 kg (128 lb)   Height: 165.1 cm (65\")     Physical Exam   Constitutional: She appears well-developed and well-nourished. She is cooperative. She does not have a sickly appearance. She does not appear ill.   HENT:   Head: Normocephalic.   Right Ear: Hearing, tympanic membrane and external ear normal.   Left Ear: Hearing, tympanic membrane and external ear normal.   Nose: Nose normal. No mucosal edema, rhinorrhea, sinus tenderness or nasal deformity. Right sinus exhibits no maxillary sinus tenderness and no frontal sinus tenderness. Left sinus exhibits no maxillary sinus tenderness and no frontal sinus tenderness.   Mouth/Throat: Oropharynx is clear and moist and mucous membranes are normal. Normal dentition.   Eyes: Conjunctivae and lids are normal. Right eye exhibits no discharge and no exudate. Left eye exhibits no discharge and no exudate.   Neck: Trachea normal. Carotid bruit is not present. No edema present. No thyroid mass present.   Cardiovascular: Regular rhythm, normal heart sounds and normal pulses.   No murmur heard.  Pulmonary/Chest: Breath sounds normal. No respiratory distress. She has no decreased breath sounds. She has no wheezes. She has no rhonchi. She has no rales.   Abdominal: Soft. There is no tenderness.   Lymphadenopathy:        Head (right side): No submental, no submandibular, no tonsillar, no preauricular, no posterior auricular " and no occipital adenopathy present.        Head (left side): No submental, no submandibular, no tonsillar, no preauricular, no posterior auricular and no occipital adenopathy present.   Neurological: She is alert. No sensory deficit.   Decreased strength left upper arm (hx shoulder replacement) but otherwise normal strength   Skin: Skin is warm, dry and intact. No cyanosis. Nails show no clubbing.       Assessment/Plan   Rosi was seen today for dizziness.    Diagnoses and all orders for this visit:    Left-sided weakness  -     Cancel: CT head w wo contrast; Future  -     MRI Brain With & Without Contrast; Future  -     C-reactive Protein; Future  -     C-reactive Protein    Lightheadedness  -     Cancel: CT head w wo contrast; Future  -     MRI Brain With & Without Contrast; Future    Tinnitus of both ears  -     Cancel: CT head w wo contrast; Future  -     MRI Brain With & Without Contrast; Future    Consulted with Dr. Fonseca who discussed symptoms with patient as well-regular labs are drawn today. However, due to her symptoms and hx of CVA will send for MRI to further evaluate.   She has an appt with neuro tomorrow but will report to ER with worsening symptoms.

## 2019-03-20 NOTE — TELEPHONE ENCOUNTER
----- Message from Daryl Kaplan sent at 3/20/2019  3:27 PM EDT -----  Contact: [phone]  PT WOULD LIKE TO KNOW HOW FAR SHE CAN PUSH HERSELF IN EXERCISE? PLEASE ADVISE AND CALL PT [phone]

## 2019-03-20 NOTE — PROGRESS NOTES
"DOS: 3/20/2019  NAME: Rosi Vicente   : 1950  PCP: Dionne Fonseca MD    Chief Complaint   Patient presents with   • Cerebrovascular Accident      SUBJECTIVE  Neurological Problem:  69 y.o. RHW female with HTN, HLD and h/o of trigeminal neuralgia and stroke who presents with episode of worsening left-sided weakness. She is unaccompanied.     Interval History:   Ms. Vicente initially presented to [facility] on 17 with progressive left hemiparesis and dysarthria.  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  She had waxing and waning of her symptoms during her hospitalization.  She was eventually discharged to our acute rehabilitation where she has made significant improvement. She was discharged on ASA, Plavix and Lipitor.    She has been seen in follow-up, most recently in 2018 and was about \"75% back to baseline\" at that time with residual left-sided weakness but with a nearly normal neurologic exam.  She had difficulty tolerating statins and did a trial of Repatha which she also had difficulty tolerating.      The patient called our office yesterday reporting sudden dizziness and left-sided weakness/heaviness.  It was recommended that she go to the ER for evaluation however patient declined.  She did see her PCP who ordered an MRI done at [facility] and appointment for today.  I reviewed her MRI with Dr. Don that was negative for any acute findings.  And she reports that her symptoms have improved.  She denies any dizziness, speech difficulty, vision changes, headaches or any new stroke/TIA symptoms.  She does report being fatigued more recently but denies any recent illnesses, fevers, nausea, vomiting.  She did have lab work done by her PCP that was essentially unrevealing; however, does show increase in her LDL.  She reports that her blood pressure is well controlled.  "     Review of Systems:Review of Systems   Constitutional: Positive for fatigue. Negative for activity change, appetite change, chills, diaphoresis, fever and unexpected weight change.   HENT: Positive for tinnitus. Negative for congestion, dental problem, drooling, ear discharge, ear pain, facial swelling, hearing loss, mouth sores, nosebleeds, postnasal drip, rhinorrhea, sinus pressure, sinus pain, sneezing, sore throat, trouble swallowing and voice change.    Eyes: Negative.    Respiratory: Negative.    Cardiovascular: Negative.    Gastrointestinal: Negative.    Endocrine: Negative.    Musculoskeletal: Negative.    Skin: Negative.    Allergic/Immunologic: Negative.    Neurological: Positive for dizziness. Negative for tremors, seizures, syncope, facial asymmetry, speech difficulty, weakness, light-headedness, numbness and headaches.   Psychiatric/Behavioral: Negative.     Above ROS reviewed    Current Medications:   Current Outpatient Medications:   •  ALPHA LIPOIC ACID PO, Take  by mouth Daily., Disp: , Rfl:   •  aspirin 81 MG EC tablet, Take 81 mg by mouth Daily., Disp: , Rfl:   •  calcium carbonate (OS-CARLOS) 600 MG tablet, Take 600 mg by mouth 2 (Two) Times a Day With Meals., Disp: , Rfl:   •  cholecalciferol (VITAMIN D3) 1000 UNITS tablet, Take 2,000 Units by mouth 2 (Two) Times a Day., Disp: , Rfl:   •  clopidogrel (PLAVIX) 75 MG tablet, TAKE 1 TABLET EVERY DAY, Disp: 90 tablet, Rfl: 1  •  Coenzyme Q10 (COQ-10) 400 MG capsule, Take  by mouth Daily., Disp: , Rfl:   •  icosapent ethyl (VASCEPA) 1 g capsule capsule, Take 2 g by mouth 2 (Two) Times a Day With Meals., Disp: 360 capsule, Rfl: 1  •  losartan-hydrochlorothiazide (HYZAAR) 100-12.5 MG per tablet, Take 1 tablet by mouth Daily., Disp: 90 tablet, Rfl: 0  •  Melatonin 10 MG tablet, Take 20 mg by mouth Daily., Disp: , Rfl:   •  pantoprazole (PROTONIX) 40 MG EC tablet, Take 1 tablet by mouth Daily., Disp: 10 tablet, Rfl: 0  •  vitamin B-12 (CYANOCOBALAMIN)  1000 MCG tablet, 5,000 mcg Daily., Disp: , Rfl:     The following portions of the patient's history were reviewed and updated as appropriate: allergies, current medications, past family history, past medical history, past social history, past surgical history and problem list.    OBJECTIVE  Vitals:    03/20/19 1445   BP: 128/60   Pulse: 73   SpO2: 93%       Diagnostics:  MRI brain 3/19/19 (Outside films): DWI negative. Encephalomalacia of right PLIC and caudate from previous stroke.     Laboratory Results:         Lab Results   Component Value Date    WBC 7.09 03/19/2019    HGB 11.4 (L) 03/19/2019    HCT 34.7 03/19/2019    MCV 81.3 03/19/2019     03/19/2019     Lab Results   Component Value Date    GLUCOSE 84 03/19/2019    BUN 11 03/19/2019    CREATININE 0.52 (L) 03/19/2019    EGFRIFNONA 117 03/19/2019    EGFRIFAFRI 133 01/04/2019    BCR 21.2 03/19/2019    K 3.7 03/19/2019    CO2 26.8 03/19/2019    CALCIUM 9.3 03/19/2019    PROTENTOTREF 6.9 02/08/2018    ALBUMIN 4.20 03/19/2019    LABIL2 2.6 02/08/2018    AST 13 03/19/2019    ALT 16 03/19/2019     Lab Results   Component Value Date    HGBA1C 5.30 09/13/2017     Lab Results   Component Value Date    CHOL 158 03/19/2019    CHOL 118 12/17/2018    CHOL 185 07/24/2018     Lab Results   Component Value Date    HDL 44 03/19/2019    HDL 46 12/17/2018    HDL 52 07/24/2018     Lab Results   Component Value Date     (H) 03/19/2019    LDL 62 12/17/2018     (H) 07/24/2018     Lab Results   Component Value Date    TRIG 66 03/19/2019    TRIG 52 12/17/2018    TRIG 69 07/24/2018     No results found for: RPR  Lab Results   Component Value Date    TSH 2.86 03/19/2019     Lab Results   Component Value Date    [id number] >2000 (H) 03/19/2019       Physical Examination:   General Appearance:   Well developed, thin, well groomed, alert, and cooperative.  HEENT: Normocephalic.    Neck and Spine: Normal range of motion.  Normal alignment. No mass or tenderness.    Cardiac: Regular rate and rhythm. No murmurs.  Peripheral Vasculature: Radial pulses are equal and symmetric. No signs of distal embolization.  Extremities:    No edema or deformities. Normal joint ROM.  Skin:    No rashes or birth marks.    Neurological examination:  Higher Integrative  Function: Oriented to time, place and person. Normal registration, attention span and concentration. Normal language including comprehension, spontaneous speech, repetition, reading, writing, naming and vocabulary. No neglect. Normal fund of knowledge and higher integrative function.  CN II: Pupils are equal, round, and reactive to light. Normal visual acuity and visual fields.    CN III IV VI: Extraocular movements are full without nystagmus.   CN V: Normal facial sensation and strength of muscles of mastication.  CN VII: Facial movements are symmetric. No weakness.  CN VIII:   Auditory acuity is normal.  CN IX & X:   Symmetric palatal movement.  CN XI: Sternocleidomastoid and trapezius are normal.  No weakness.  CN XII:   The tongue is midline.  No atrophy or fasciculations.  Motor: Normal muscle strength, bulk and tone in upper and lower extremities.  No fasciculations, rigidity, spasticity, or abnormal movements.  Sensation: Normal to light touch and proprioception in arms and legs.  Station and Gait: Normal gait and station.    Coordination: Finger to nose test shows no dysmetria.  Heel to shin normal.    Impression:  Ms. Vicente presents following an episode of dizziness and increased left-sided weakness yesterday.  I reviewed her MRI done at [facility] and d/w Dr. Meeks and is negative for any acute stroke.  She does have subjective complaints of left side weakness however, her neurologic exam today is essentially normal.  Her symptoms were likely an exacerbation of previous stroke due to her increased fatigue.  We discussed that the symptoms of her stroke may be exacerbated with fatigue or illness.  In these instances  recommend being well-hydrated and laying down.  We did discuss the possibility of low-dose SSRI agent for her anxiety that she plans on discussing with her PCP.  Her CBC, CMP were unremarkable; however, her LDL has increased and recommend at least a low-dose statin.  She states she may try taking something once a week as she has previously not been able to tolerate statins or Repatha.  We discussed the importance of staying well-hydrated.  We discussed at length her personal risk factors for stroke and the importance of risk factor control for stroke prevention, including BP and cholesterol control. She will continue to monitor BP regularly. We discussed importance of calling 911 for any signs/symptoms of stroke.    Plan:     Outside MRI reviewed -- done  Continue current medication regimen  Recommend addition of statin if tolerates  Encouraged to keep well-hydrated  Monitor BP regularly  Secondary stroke prevention: Ideal targets for stroke prevention would be Blood pressure < 130/80; B12 > 500 TSH in normal range and LDL < 70; HbA1c < 6.5 and smoking cessation if applicable.    Call 911 for stroke symptoms  Follow-up in August as previously scheduled.    I spent 25 minutes face to face with patient, with  > 50% spent counseling patient regarding diagnosis, review of diagnostics, personal risk factors for stroke and importance of risk factor control for stroke prevention.     Rosi was seen today for cerebrovascular accident.    Diagnoses and all orders for this visit:    History of CVA (cerebrovascular accident)    Essential hypertension    Mixed hyperlipidemia    Statin intolerance    Sequelae, post-stroke        Coding      Dictated using Dragon

## 2019-03-21 ENCOUNTER — TELEPHONE (OUTPATIENT)
Dept: INTERNAL MEDICINE | Facility: CLINIC | Age: 69
End: 2019-03-21

## 2019-03-21 DIAGNOSIS — D64.9 ANEMIA, UNSPECIFIED TYPE: Primary | ICD-10-CM

## 2019-03-21 LAB
FERRITIN SERPL-MCNC: 8.78 NG/ML (ref 13–150)
IRON 24H UR-MRATE: 67 MCG/DL (ref 37–145)
IRON SATN MFR SERPL: 14 % (ref 20–50)
TIBC SERPL-MCNC: 481 MCG/DL (ref 298–536)
TRANSFERRIN SERPL-MCNC: 323 MG/DL (ref 200–360)

## 2019-03-21 NOTE — TELEPHONE ENCOUNTER
----- Message from Shanta Ayala sent at 3/21/2019  8:29 AM EDT -----  Contact: Pt   Pt would like an order for Gene site testing for an anti depressant.   Suggested by neuro.      Pt# [phone]

## 2019-03-21 NOTE — TELEPHONE ENCOUNTER
I would take it easy the next couple of weeks, in light of her exacerbated stroke symptoms, and then build back up to a normal routine. Thanks.

## 2019-03-21 NOTE — TELEPHONE ENCOUNTER
[facility] does not do the Genesite testing.  That is done by a lab that is not associated with [facility].  [facility] does have Cytochrome P450 2D6 Genotyping .  Test code#712209. It covers several types of Medications.  I have the information, I will bring over to you to look at.      Thanks, Coleman

## 2019-03-21 NOTE — TELEPHONE ENCOUNTER
Called and notified patient of Peri's recommendations. Patient states understanding.    Patient is asking if Peri can write an order for the Gene  site testing for an anti depressant. She states that it was mentioned yesterday in the office visit, and she was requesting it to be ordered.     Thank you

## 2019-03-22 ENCOUNTER — TELEPHONE (OUTPATIENT)
Dept: NEUROLOGY | Facility: CLINIC | Age: 69
End: 2019-03-22

## 2019-03-22 DIAGNOSIS — D50.9 IRON DEFICIENCY ANEMIA, UNSPECIFIED IRON DEFICIENCY ANEMIA TYPE: Primary | ICD-10-CM

## 2019-03-22 RX ORDER — ESCITALOPRAM OXALATE 10 MG/1
10 TABLET ORAL DAILY
Qty: 30 TABLET | Refills: 6 | Status: SHIPPED | OUTPATIENT
Start: 2019-03-22 | End: 2019-04-18 | Stop reason: ALTCHOICE

## 2019-03-22 NOTE — TELEPHONE ENCOUNTER
I spoke to Ms. Vicente and she stated she would like for you to put her on any anti-depressant or if you feel like she need to have the genesite testing she will proceed further with asking her neurologist about how to go about doing this.    Please advise.

## 2019-03-22 NOTE — TELEPHONE ENCOUNTER
----- Message from Marielena Gonzalez sent at 3/22/2019  8:47 AM EDT -----  Contact: [phone]  Rosi would like to cancel her blood testing for now.    She would like a mild depression medication for post stroke depression.  Please call [facility]    If you have any question, please just give her a call

## 2019-03-22 NOTE — TELEPHONE ENCOUNTER
I left message that I would send in the weakest medicine (lexapro) - sent to [facility] in her chart - she may call or make appt for next wk

## 2019-03-24 RX ORDER — ESCITALOPRAM OXALATE 10 MG/1
10 TABLET ORAL DAILY
Qty: 30 TABLET | Refills: 2 | Status: SHIPPED | OUTPATIENT
Start: 2019-03-24 | End: 2019-04-05

## 2019-03-24 NOTE — TELEPHONE ENCOUNTER
I will send in for Lexapro 10 mg, please notify her of risk of increased suicidal thoughts and to call immediately if she were to have any adverse side effects. Also, if she would like to pursue gene sight testing to have her call me and she can come the office for this. Thanks.

## 2019-03-27 ENCOUNTER — TELEPHONE (OUTPATIENT)
Dept: NEUROLOGY | Facility: CLINIC | Age: 69
End: 2019-03-27

## 2019-03-27 NOTE — TELEPHONE ENCOUNTER
"----- Message from JUAN FRANCISCO Singh sent at 3/24/2019  7:42 PM EDT -----  Regarding: RE: Genetics Testing  Contact: [phone]  I'm getting several messages about this same patient and request from different people. One from Marielena earlier said she didn't want to come in for \"blood testing\". Another that she wanted me to send in a script for an antidepressant (which I did, Lexapro).      If she would like to come if for a swab, I can do it at [facility] sometime on Tuesday. If we can schedule her for a short appointment then.     Humberto, can you check at [facility] to see that we have the Gene Sight Testing kits (I [facility] has some).    Kenneth: can you call patient and let her know. Thanks!    ----- Message -----  From: Sarah Quinn MA  Sent: 3/22/2019   4:11 PM  To: JUAN FRANCISCO Singh  Subject: Genetics Testing                                 SHE WOULD LIKE TO COME IN A DO THE CHECK SWAB FOR GENETICS  IN OFFICE AS SOON AS POSSIBLE. AND MOVE FORWARD WITH GETTING A ANTIDEPRESSANT.    "

## 2019-04-02 ENCOUNTER — LAB (OUTPATIENT)
Dept: INTERNAL MEDICINE | Facility: CLINIC | Age: 69
End: 2019-04-02

## 2019-04-02 DIAGNOSIS — D50.9 IRON DEFICIENCY ANEMIA, UNSPECIFIED IRON DEFICIENCY ANEMIA TYPE: Primary | ICD-10-CM

## 2019-04-02 LAB — HEMOCCULT STL QL IA: NEGATIVE

## 2019-04-02 PROCEDURE — 82274 ASSAY TEST FOR BLOOD FECAL: CPT | Performed by: INTERNAL MEDICINE

## 2019-04-05 ENCOUNTER — OFFICE VISIT (OUTPATIENT)
Dept: CARDIOLOGY | Facility: CLINIC | Age: 69
End: 2019-04-05

## 2019-04-05 VITALS
SYSTOLIC BLOOD PRESSURE: 106 MMHG | HEART RATE: 65 BPM | HEIGHT: 65 IN | RESPIRATION RATE: 16 BRPM | WEIGHT: 126 LBS | BODY MASS INDEX: 20.99 KG/M2 | DIASTOLIC BLOOD PRESSURE: 70 MMHG

## 2019-04-05 DIAGNOSIS — Z78.9 STATIN INTOLERANCE: Primary | ICD-10-CM

## 2019-04-05 DIAGNOSIS — Z86.73 HISTORY OF CVA (CEREBROVASCULAR ACCIDENT): ICD-10-CM

## 2019-04-05 DIAGNOSIS — R93.1 ELEVATED CORONARY ARTERY CALCIUM SCORE: ICD-10-CM

## 2019-04-05 DIAGNOSIS — E78.2 MIXED HYPERLIPIDEMIA: ICD-10-CM

## 2019-04-05 DIAGNOSIS — I10 ESSENTIAL HYPERTENSION: ICD-10-CM

## 2019-04-05 PROCEDURE — 93000 ELECTROCARDIOGRAM COMPLETE: CPT | Performed by: INTERNAL MEDICINE

## 2019-04-05 PROCEDURE — 99214 OFFICE O/P EST MOD 30 MIN: CPT | Performed by: INTERNAL MEDICINE

## 2019-04-05 NOTE — PROGRESS NOTES
PATIENTINFORMATION    Date of Office Visit: 2019  Encounter Provider: Pinky Gee MD  Place of Service: [facility] CARDIOLOGY  Patient Name: Rosi Vicente  : 1950    Subjective:     Encounter Date:2019      Patient ID: Rosi Vicente is a 69 y.o. female.      History of Present Illness    This is a lady with history of hypertension and hyperlipidemia.  She has not tolerated Lipitor, pravastatin, or Livalo.  She tells me she tried Repatha and, after the second shot, she had no energy and lost her balance.  She was seeing a nurse practitioner in [place] who did some extensive lipid testing on her.  At that time, she believes she was off all medications.  Her total cholesterol is 149, LDL 85, triglycerides 81, and HDL was 57.  She had a calcium score performed at [facility] on 10/30/2018, which came back with a calcium score total of 200 with most of the calcium being located in the left anterior descending artery with some in the circumflex branch and none in the right coronary artery.      She was sent back in because she was having some issues with blood pressure and intolerance to numerous medications.  She is currently only on losartan and hydrochlorothiazide and her blood pressure is well controlled.  She denies chest pain, shortness of breath, edema, fatigue.  She is still doing Jazzercise.  She has had some blurred vision.  She had an MRI which she reports is okay.  She was started on some Lexapro and is tolerating 5 mg a day.    Review of Systems   Constitution: Negative for fever, malaise/fatigue, weight gain and weight loss.   HENT: Negative for ear pain, hearing loss, nosebleeds and sore throat.    Eyes: Negative for double vision, pain, vision loss in left eye and vision loss in right eye.   Cardiovascular:        See history of present illness.   Respiratory: Negative for cough, shortness of breath, sleep disturbances due to breathing,  "snoring and wheezing.    Endocrine: Negative for cold intolerance, heat intolerance and polyuria.   Skin: Negative for itching, poor wound healing and rash.   Musculoskeletal: Negative for joint pain, joint swelling and myalgias.   Gastrointestinal: Negative for abdominal pain, diarrhea, hematochezia, nausea and vomiting.   Genitourinary: Negative for hematuria and hesitancy.   Neurological: Negative for numbness, paresthesias and seizures.   Psychiatric/Behavioral: Negative for depression. The patient is not nervous/anxious.            ECG 12 Lead  Date/Time: 4/5/2019 12:57 PM  Performed by: Pinky Gee MD  Authorized by: Pinky Gee MD   Comparison: compared with previous ECG from 11/27/2018  Similar to previous ECG  Rhythm: sinus rhythm  BPM: 65  Conduction: conduction normal  ST Segments: ST segments normal  T Waves: T waves normal    Clinical impression: normal ECG               Objective:     /70 (BP Location: Left arm, Patient Position: Sitting, Cuff Size: Adult)   Pulse 65   Resp 16   Ht 165.1 cm (65\")   Wt 57.2 kg (126 lb)   LMP  (LMP Unknown)   BMI 20.97 kg/m²  Body mass index is 20.97 kg/m².     Physical Exam   Constitutional: She appears well-developed.   HENT:   Head: Normocephalic and atraumatic.   Eyes: Conjunctivae and lids are normal. Pupils are equal, round, and reactive to light. Lids are everted and swept, no foreign bodies found.   Neck: Normal range of motion. No JVD present. Carotid bruit is not present. No tracheal deviation present. No thyroid mass present.   Cardiovascular: Normal rate, regular rhythm and normal heart sounds.   Pulses:       Dorsalis pedis pulses are 2+ on the right side, and 2+ on the left side.   Pulmonary/Chest: Effort normal and breath sounds normal.   Abdominal: Normal appearance and bowel sounds are normal.   Musculoskeletal: Normal range of motion.   Neurological: She is alert. She has normal strength.   Skin: Skin is warm, dry and intact. "   Psychiatric: She has a normal mood and affect. Her behavior is normal.   Vitals reviewed.      Lab Review:  I reviewed his lipids.      Assessment/Plan:         1.  Hypertension.  Her blood pressure is well controlled.  She does not really tolerate much in the way of medication.  I am not going to make any adjustments.  It seems like the amlodipine may have been causing some gingivitis.  She thought the metoprolol was doing the same.  She had fatigue on metoprolol and Bystolic.  2.  History of stroke  3.  Hyperlipidemia.  She is intolerant to every statin she is ever tried.  She did not tolerate Repatha.  She is on the Vescepa right now and doing okay with it.  I am going to try her on Livalo 2 mg once a week.  4.  Elevated calcium score.  She eats healthy she exercises.  I am going to try her back on a statin.  She should monitor her blood pressure.  If she starts having symptoms consider stress test versus heart catheterization.    I will see her back in 1 year.    Future Appointments       Provider Department Center    4/9/2019 2:15 PM Dionne Fonseca MD [facility] INTERNAL MEDICINE     8/7/2019 9:00 AM Peri James APRN [facility] NEUROLOGY     4/6/2020 1:40 PM Pinky Gee MD [facility] CARDIOLOGY             Orders Placed This Encounter   Procedures   • ECG 12 Lead     This order was created via procedure documentation        Discharge Medications           Accurate as of 4/5/19  1:37 PM. If you have any questions, ask your nurse or doctor.               Changes to Medications      Instructions Start Date   escitalopram 10 MG tablet  Commonly known as:  LEXAPRO  What changed:  Another medication with the same name was removed. Continue taking this medication, and follow the directions you see here.  Changed by:  Pinky Gee MD   10 mg, Oral, Daily, 1/2 qhs for 2-4 nights, then 1 qhs         Continue These Medications       Instructions Start Date   ALPHA LIPOIC ACID PO   Oral, Daily      aspirin 81 MG EC tablet   81 mg, Oral, Daily      calcium carbonate 600 MG tablet  Commonly known as:  OS-CARLOS   600 mg, Oral, 2 Times Daily With Meals      cholecalciferol 1000 units tablet  Commonly known as:  VITAMIN D3   2,000 Units, Oral, 2 Times Daily      clopidogrel 75 MG tablet  Commonly known as:  PLAVIX   TAKE 1 TABLET EVERY DAY      CoQ-10 400 MG capsule   Oral, Daily      icosapent ethyl 1 g capsule capsule  Commonly known as:  VASCEPA   2 g, Oral, 2 Times Daily With Meals      losartan-hydrochlorothiazide 100-12.5 MG per tablet  Commonly known as:  HYZAAR   1 tablet, Oral, Daily      Melatonin 10 MG tablet   20 mg, Oral, Daily      pantoprazole 40 MG EC tablet  Commonly known as:  PROTONIX   40 mg, Oral, Daily      vitamin B-12 1000 MCG tablet  Commonly known as:  CYANOCOBALAMIN   5,000 mcg, Daily                    Pinky Gee MD  04/05/19  1:37 PM

## 2019-04-09 ENCOUNTER — OFFICE VISIT (OUTPATIENT)
Dept: INTERNAL MEDICINE | Facility: CLINIC | Age: 69
End: 2019-04-09

## 2019-04-09 VITALS
SYSTOLIC BLOOD PRESSURE: 120 MMHG | WEIGHT: 124.6 LBS | HEIGHT: 65 IN | DIASTOLIC BLOOD PRESSURE: 70 MMHG | BODY MASS INDEX: 20.76 KG/M2

## 2019-04-09 DIAGNOSIS — I10 ESSENTIAL HYPERTENSION: Primary | ICD-10-CM

## 2019-04-09 DIAGNOSIS — E78.2 MIXED HYPERLIPIDEMIA: ICD-10-CM

## 2019-04-09 DIAGNOSIS — E61.1 IRON DEFICIENCY: ICD-10-CM

## 2019-04-09 DIAGNOSIS — Z86.73 HISTORY OF CVA (CEREBROVASCULAR ACCIDENT): ICD-10-CM

## 2019-04-09 DIAGNOSIS — H53.9 VISION DISTURBANCE: ICD-10-CM

## 2019-04-09 DIAGNOSIS — R53.82 CHRONIC FATIGUE: ICD-10-CM

## 2019-04-09 DIAGNOSIS — R42 INTERMITTENT LIGHTHEADEDNESS: ICD-10-CM

## 2019-04-09 PROCEDURE — 99214 OFFICE O/P EST MOD 30 MIN: CPT | Performed by: INTERNAL MEDICINE

## 2019-04-09 RX ORDER — FERROUS SULFATE TAB EC 324 MG (65 MG FE EQUIVALENT) 324 (65 FE) MG
324 TABLET DELAYED RESPONSE ORAL
COMMUNITY
End: 2020-01-30

## 2019-04-09 RX ORDER — MONTELUKAST SODIUM 10 MG/1
10 TABLET ORAL DAILY
COMMUNITY
End: 2019-04-09 | Stop reason: SDUPTHER

## 2019-04-09 RX ORDER — MONTELUKAST SODIUM 10 MG/1
10 TABLET ORAL DAILY
Qty: 90 TABLET | Refills: 3 | Status: SHIPPED | OUTPATIENT
Start: 2019-04-09 | End: 2019-09-03

## 2019-04-09 RX ORDER — CYCLOSPORINE 0.5 MG/ML
1 EMULSION OPHTHALMIC 2 TIMES DAILY
COMMUNITY

## 2019-04-09 RX ORDER — VITAMIN B COMPLEX
1 CAPSULE ORAL DAILY
COMMUNITY
End: 2019-06-13 | Stop reason: ALTCHOICE

## 2019-04-09 NOTE — PROGRESS NOTES
"Subjective   Rosi Vicente is a 69 y.o. female here for   Chief Complaint   Patient presents with   • Hyperlipidemia     3 month follow-up   • Hypertension   .    Vitals:    04/09/19 1419   BP: 120/70   BP Location: Right arm   Patient Position: Sitting   Cuff Size: Adult   Weight: 56.5 kg (124 lb 9.6 oz)   Height: 165.1 cm (65\")       Body mass index is 20.73 kg/m².    Hyperlipidemia   This is a chronic problem. The current episode started more than 1 year ago. The problem is controlled. Recent lipid tests were reviewed and are normal. Pertinent negatives include no chest pain or shortness of breath.   Hypertension   This is a chronic problem. The current episode started more than 1 year ago. The problem is unchanged. The problem is controlled. Pertinent negatives include no chest pain, palpitations or shortness of breath.        The following portions of the patient's history were reviewed and updated as appropriate: allergies, current medications, past social history and problem list.    Review of Systems   Constitutional: Positive for fatigue. Negative for chills and fever.   Respiratory: Negative for cough, shortness of breath and wheezing.    Cardiovascular: Negative for chest pain, palpitations and leg swelling.   Neurological: Positive for light-headedness.   Psychiatric/Behavioral: Negative for dysphoric mood and sleep disturbance. The patient is not nervous/anxious.        Objective   Physical Exam   Constitutional: She appears well-developed and well-nourished. No distress.   Cardiovascular: Normal rate, regular rhythm and normal heart sounds.   Pulmonary/Chest: No respiratory distress. She has no wheezes. She has no rales. She exhibits no tenderness.   Musculoskeletal: She exhibits no edema.   Psychiatric: She has a normal mood and affect. Her behavior is normal.   Nursing note and vitals reviewed.    Gait normal.    Assessment/Plan   Diagnoses and all orders for this visit:    Essential " hypertension  Comments:  controlled -call if bp over 130/80  Orders:  -     Comprehensive Metabolic Panel; Future  -     Lipid Panel; Future    Mixed hyperlipidemia  Comments:  taking vascepa 4 daily & 1 livalo weekly  Orders:  -     Comprehensive Metabolic Panel; Future  -     Lipid Panel; Future    Chronic fatigue  Comments:  call if worse    History of CVA (cerebrovascular accident)  Comments:  f/u with neuro & cardiol    Vision disturbance  Comments:  f/u with dr angy sellers    Intermittent lightheadedness  Comments:  not worse - will go to [facility] if worse    Iron deficiency  Comments:  need rechk in 2-3 mos  Orders:  -     CBC Auto Differential; Future  -     Ferritin; Future  -     Iron Profile; Future    Other orders  -     B Complex Vitamins (VITAMIN B COMPLEX) capsule capsule; Take 1 capsule by mouth Daily.  -     Discontinue: montelukast (SINGULAIR) 10 MG tablet; Take 10 mg by mouth Daily.  -     ferrous sulfate 324 (65 Fe) MG tablet delayed-release EC tablet; Take 324 mg by mouth Daily With Breakfast.  -     ALLERGY SERUM SUBLINGUAL DROPS; Place 3 drops under the tongue 1 (One) Time.  -     cycloSPORINE (RESTASIS) 0.05 % ophthalmic emulsion; Apply 1 drop to eye(s) as directed by provider 2 (Two) Times a Day.  -     Probiotic Product (PROBIOTIC-10 PO); Take 1 capsule by mouth Daily.  -     montelukast (SINGULAIR) 10 MG tablet; Take 1 tablet by mouth Daily.

## 2019-04-10 ENCOUNTER — TELEPHONE (OUTPATIENT)
Dept: INTERNAL MEDICINE | Facility: CLINIC | Age: 69
End: 2019-04-10

## 2019-04-10 NOTE — TELEPHONE ENCOUNTER
----- Message from Shanta Ayala sent at 4/9/2019  3:26 PM EDT -----  Contact: pt- jony  Pt calling, spoke to pharm.  You CAN split a livalo tablet.    Pt# [phone]

## 2019-04-12 ENCOUNTER — TELEPHONE (OUTPATIENT)
Dept: NEUROLOGY | Facility: CLINIC | Age: 69
End: 2019-04-12

## 2019-04-12 NOTE — TELEPHONE ENCOUNTER
----- Message from Regina Zhang sent at 4/12/2019  8:05 AM EDT -----  Contact: [phone]  Patient would like to know her swab test results.

## 2019-04-16 NOTE — TELEPHONE ENCOUNTER
She can have the results if they did not send to her. For depression choices that are not good are in Red (fluoxetine, Citalopram). There are many others that she can use in the green column to help guide her PCP choices.

## 2019-04-30 ENCOUNTER — TELEPHONE (OUTPATIENT)
Dept: INTERNAL MEDICINE | Facility: CLINIC | Age: 69
End: 2019-04-30

## 2019-05-01 ENCOUNTER — TELEPHONE (OUTPATIENT)
Dept: NEUROLOGY | Facility: CLINIC | Age: 69
End: 2019-05-01

## 2019-05-01 RX ORDER — SERTRALINE HYDROCHLORIDE 25 MG/1
25 TABLET, FILM COATED ORAL DAILY
Qty: 30 TABLET | Refills: 6 | Status: SHIPPED | OUTPATIENT
Start: 2019-05-01 | End: 2019-11-26 | Stop reason: SDUPTHER

## 2019-05-01 NOTE — TELEPHONE ENCOUNTER
Call made to Ms. Yiy and she stated she would like to have a Sertraline 25mg so she can cut it in half to take one-half tablet (12.5 mg) daily.     Please advise.    Thank you

## 2019-05-01 NOTE — TELEPHONE ENCOUNTER
Is she using zoloft 25mg (1/2 of 50mg pill)? - if so, I can send in 25mg pill or she may cut 50mg pill in half & go up to 50mg daily b/c that is the lowest effective dose

## 2019-05-01 NOTE — TELEPHONE ENCOUNTER
This was already taken care of by the PCP          ----- Message from Daryl Berrios sent at 5/1/2019  8:45 AM EDT -----  Contact: [phone]  PT IS CALLING TO GET HER ZOLOFT REDUCED TO 25MG BECAUSE HER PCP CAN NOT DO IT. SHE WAS WONDERING IF [facility] COULD DO IT TODAY SO SHE CAN HAVE IT FOR TONIGHT. SHE ONLY NEEDS IT FOR ONE MONTH.  PT WANTS IT SENT TO [facility] IF ABLE. PLEASE ADVISE AND CALL PT [phone]

## 2019-05-03 ENCOUNTER — OFFICE VISIT (OUTPATIENT)
Dept: INTERNAL MEDICINE | Facility: CLINIC | Age: 69
End: 2019-05-03

## 2019-05-03 VITALS
OXYGEN SATURATION: 98 % | BODY MASS INDEX: 20.66 KG/M2 | SYSTOLIC BLOOD PRESSURE: 120 MMHG | WEIGHT: 124 LBS | DIASTOLIC BLOOD PRESSURE: 78 MMHG | HEART RATE: 74 BPM | HEIGHT: 65 IN

## 2019-05-03 DIAGNOSIS — F41.9 ANXIETY: ICD-10-CM

## 2019-05-03 DIAGNOSIS — D50.9 IRON DEFICIENCY ANEMIA, UNSPECIFIED IRON DEFICIENCY ANEMIA TYPE: ICD-10-CM

## 2019-05-03 DIAGNOSIS — E78.2 MIXED HYPERLIPIDEMIA: Primary | ICD-10-CM

## 2019-05-03 LAB
BASOPHILS # BLD AUTO: 0.03 10*3/MM3 (ref 0–0.2)
BASOPHILS NFR BLD AUTO: 0.4 % (ref 0–1.5)
DEPRECATED RDW RBC AUTO: 47.1 FL (ref 37–54)
EOSINOPHIL # BLD AUTO: 0.07 10*3/MM3 (ref 0–0.4)
EOSINOPHIL NFR BLD AUTO: 1 % (ref 0.3–6.2)
ERYTHROCYTE [DISTWIDTH] IN BLOOD BY AUTOMATED COUNT: 15.6 % (ref 12.3–15.4)
FERRITIN SERPL-MCNC: 37.8 NG/ML (ref 13–150)
HCT VFR BLD AUTO: 37.8 % (ref 34–46.6)
HGB BLD-MCNC: 12.3 G/DL (ref 12–15.9)
IRON 24H UR-MRATE: 88 MCG/DL (ref 37–145)
IRON SATN MFR SERPL: 20 % (ref 20–50)
LYMPHOCYTES # BLD AUTO: 1.39 10*3/MM3 (ref 0.7–3.1)
LYMPHOCYTES NFR BLD AUTO: 20.6 % (ref 19.6–45.3)
MCH RBC QN AUTO: 26.9 PG (ref 26.6–33)
MCHC RBC AUTO-ENTMCNC: 32.5 G/DL (ref 31.5–35.7)
MCV RBC AUTO: 82.7 FL (ref 79–97)
MONOCYTES # BLD AUTO: 0.55 10*3/MM3 (ref 0.1–0.9)
MONOCYTES NFR BLD AUTO: 8.2 % (ref 5–12)
NEUTROPHILS # BLD AUTO: 4.7 10*3/MM3 (ref 1.7–7)
NEUTROPHILS NFR BLD AUTO: 69.8 % (ref 42.7–76)
PLATELET # BLD AUTO: 395 10*3/MM3 (ref 140–450)
PMV BLD AUTO: 9.5 FL (ref 6–12)
RBC # BLD AUTO: 4.57 10*6/MM3 (ref 3.77–5.28)
TIBC SERPL-MCNC: 434 MCG/DL (ref 298–536)
TRANSFERRIN SERPL-MCNC: 291 MG/DL (ref 200–360)
WBC NRBC COR # BLD: 6.74 10*3/MM3 (ref 3.4–10.8)

## 2019-05-03 PROCEDURE — 84466 ASSAY OF TRANSFERRIN: CPT | Performed by: INTERNAL MEDICINE

## 2019-05-03 PROCEDURE — 85025 COMPLETE CBC W/AUTO DIFF WBC: CPT | Performed by: NURSE PRACTITIONER

## 2019-05-03 PROCEDURE — 82728 ASSAY OF FERRITIN: CPT | Performed by: INTERNAL MEDICINE

## 2019-05-03 PROCEDURE — 83540 ASSAY OF IRON: CPT | Performed by: INTERNAL MEDICINE

## 2019-05-03 PROCEDURE — 36415 COLL VENOUS BLD VENIPUNCTURE: CPT | Performed by: NURSE PRACTITIONER

## 2019-05-03 PROCEDURE — 99214 OFFICE O/P EST MOD 30 MIN: CPT | Performed by: NURSE PRACTITIONER

## 2019-05-03 RX ORDER — EZETIMIBE 10 MG/1
10 TABLET ORAL DAILY
Qty: 30 TABLET | Refills: 1 | Status: SHIPPED | OUTPATIENT
Start: 2019-05-03 | End: 2019-06-13 | Stop reason: ALTCHOICE

## 2019-05-05 NOTE — PROGRESS NOTES
Subjective   Rosi Vicente is a 69 y.o. female who presents for f/u regarding anxiety and hyperlipidemia.    She has started Sertraline 25mg daily and is cutting the tablet in half. She is adjusting to this dose and plans to increase to a full tablet eventually. She believes, however, that this small does has been helpful to her.  She is concerned about her elevated cholesterol with a personal hx of a CVA. She has been intolerant to multiple statins and Repatha.        Anxiety   Presents for follow-up visit. Symptoms include decreased concentration, depressed mood, excessive worry and nervous/anxious behavior. Patient reports no chest pain, nausea or palpitations.       Hyperlipidemia   This is a chronic problem. The current episode started more than 1 year ago. Pertinent negatives include no chest pain. She is currently on no antihyperlipidemic treatment. Compliance problems include medication side effects.         The following portions of the patient's history were reviewed and updated as appropriate: allergies, current medications, past social history and problem list.    Past Medical History:   Diagnosis Date   • Allergic rhinitis    • Anemia    • Arthritis 2016   • Bronchitis    • CVA (cerebral vascular accident) (CMS/HCC)    • Fatigue    • FH: colon cancer    • H/O bone density study 2013   • H/O mammogram 2013   • Hyperlipidemia    • Hypertension     Benign Essential   • Malaise and fatigue    • Muscle pain    • Nocturia    • Osteoporosis    • Stroke (CMS/HCC) 09/13/2017   • TMJ (temporomandibular joint syndrome)    • Trigeminal neuralgia     Surgery at [facility] in 2009 repeat in 2015         Current Outpatient Medications:   •  ALLERGY SERUM SUBLINGUAL DROPS, Place 3 drops under the tongue 1 (One) Time., Disp: , Rfl:   •  ALPHA LIPOIC ACID PO, Take  by mouth Daily., Disp: , Rfl:   •  aspirin 81 MG EC tablet, Take 81 mg by mouth Daily., Disp: , Rfl:   •  B Complex Vitamins (VITAMIN B COMPLEX) capsule  capsule, Take 1 capsule by mouth Daily., Disp: , Rfl:   •  calcium carbonate (OS-CARLOS) 600 MG tablet, Take 600 mg by mouth 2 (Two) Times a Day With Meals., Disp: , Rfl:   •  cholecalciferol (VITAMIN D3) 1000 UNITS tablet, Take 2,000 Units by mouth 2 (Two) Times a Day., Disp: , Rfl:   •  clopidogrel (PLAVIX) 75 MG tablet, TAKE 1 TABLET EVERY DAY, Disp: 90 tablet, Rfl: 1  •  Coenzyme Q10 (COQ-10) 400 MG capsule, Take  by mouth Daily., Disp: , Rfl:   •  cycloSPORINE (RESTASIS) 0.05 % ophthalmic emulsion, Apply 1 drop to eye(s) as directed by provider 2 (Two) Times a Day., Disp: , Rfl:   •  ferrous sulfate 324 (65 Fe) MG tablet delayed-release EC tablet, Take 324 mg by mouth Daily With Breakfast., Disp: , Rfl:   •  icosapent ethyl (VASCEPA) 1 g capsule capsule, Take 2 g by mouth 2 (Two) Times a Day With Meals., Disp: 360 capsule, Rfl: 1  •  losartan-hydrochlorothiazide (HYZAAR) 100-12.5 MG per tablet, Take 1 tablet by mouth Daily., Disp: 90 tablet, Rfl: 0  •  Melatonin 10 MG tablet, Take 20 mg by mouth Daily., Disp: , Rfl:   •  montelukast (SINGULAIR) 10 MG tablet, Take 1 tablet by mouth Daily., Disp: 90 tablet, Rfl: 3  •  pantoprazole (PROTONIX) 40 MG EC tablet, Take 1 tablet by mouth Daily., Disp: 10 tablet, Rfl: 0  •  Probiotic Product (PROBIOTIC-10 PO), Take 1 capsule by mouth Daily., Disp: , Rfl:   •  sertraline (ZOLOFT) 25 MG tablet, Take 1 tablet by mouth Daily. (Patient taking differently: Take 25 mg by mouth Daily. Taking 12.5 mg daily), Disp: 30 tablet, Rfl: 6  •  vitamin B-12 (CYANOCOBALAMIN) 1000 MCG tablet, 5,000 mcg Daily., Disp: , Rfl:   •  ezetimibe (ZETIA) 10 MG tablet, Take 1 tablet by mouth Daily., Disp: 30 tablet, Rfl: 1    Allergies   Allergen Reactions   • Amlodipine Other (See Comments)     GINGIVITIS- amlodipine induced.    • Bystolic [Nebivolol Hcl] Other (See Comments)     Extreme fatigue, dizziness   • Lisinopril Other (See Comments)     COUGH       Review of Systems   Constitutional: Negative  "for chills, fatigue, fever and unexpected weight change.   HENT: Negative for congestion, ear pain, postnasal drip, sinus pressure, sore throat and trouble swallowing.    Eyes: Negative for visual disturbance.   Respiratory: Negative for cough, chest tightness and wheezing.    Cardiovascular: Negative for chest pain, palpitations and leg swelling.   Gastrointestinal: Negative for abdominal pain, blood in stool, nausea and vomiting.   Genitourinary: Negative for dysuria, frequency and urgency.   Musculoskeletal: Negative for arthralgias and joint swelling.   Skin: Negative for color change.   Neurological: Negative for syncope, weakness and headaches.   Hematological: Does not bruise/bleed easily.   Psychiatric/Behavioral: Positive for decreased concentration. The patient is nervous/anxious.        Objective   Vitals:    05/03/19 1134   BP: 120/78   BP Location: Left arm   Patient Position: Sitting   Cuff Size: Adult   Pulse: 74   SpO2: 98%   Weight: 56.2 kg (124 lb)   Height: 165.1 cm (65\")     Physical Exam   Constitutional: She appears well-developed and well-nourished. She is cooperative. She does not have a sickly appearance. She does not appear ill.   HENT:   Head: Normocephalic.   Right Ear: Hearing, tympanic membrane and external ear normal.   Left Ear: Hearing, tympanic membrane and external ear normal.   Nose: Nose normal. No mucosal edema, rhinorrhea, sinus tenderness or nasal deformity. Right sinus exhibits no maxillary sinus tenderness and no frontal sinus tenderness. Left sinus exhibits no maxillary sinus tenderness and no frontal sinus tenderness.   Mouth/Throat: Oropharynx is clear and moist and mucous membranes are normal. Normal dentition.   Eyes: Conjunctivae and lids are normal. Right eye exhibits no discharge and no exudate. Left eye exhibits no discharge and no exudate.   Neck: Trachea normal. Carotid bruit is not present. No edema present. No thyroid mass present.   Cardiovascular: Regular " rhythm, normal heart sounds and normal pulses.   No murmur heard.  Pulmonary/Chest: Breath sounds normal. No respiratory distress. She has no decreased breath sounds. She has no wheezes. She has no rhonchi. She has no rales.   Lymphadenopathy:        Head (right side): No submental, no submandibular, no tonsillar, no preauricular, no posterior auricular and no occipital adenopathy present.        Head (left side): No submental, no submandibular, no tonsillar, no preauricular, no posterior auricular and no occipital adenopathy present.   Neurological: She is alert.   Skin: Skin is warm, dry and intact. No cyanosis. Nails show no clubbing.       Assessment/Plan   Rosi was seen today for follow-up.    Diagnoses and all orders for this visit:    Mixed hyperlipidemia  Comments:  will start Zetia at 1/2 tablet daily and advance as tolerated  Orders:  -     ezetimibe (ZETIA) 10 MG tablet; Take 1 tablet by mouth Daily.    Anxiety  Comments:  continue Sertraline, increase from 1/2 tablet to 1 tablet as tolerated    Iron deficiency anemia, unspecified iron deficiency anemia type  Comments:  recheck levels today  Orders:  -     CBC Auto Differential  -     Iron Profile  -     Ferritin

## 2019-05-08 ENCOUNTER — TELEPHONE (OUTPATIENT)
Dept: NEUROLOGY | Facility: CLINIC | Age: 69
End: 2019-05-08

## 2019-05-08 NOTE — TELEPHONE ENCOUNTER
----- Message from Latonia Butler sent at 5/7/2019  3:15 PM EDT -----  Patient called to try and get Peri's clearance for an outpatient surgery to get implants taken out on May 22nd. A Dr. Hermes Castro is doing the surgery. She said if a letter could be faxed stating she is ok to go ahead with surgery to [facility] at [phone]. Their office number is [phone]. Patient's is [phone].

## 2019-05-12 NOTE — TELEPHONE ENCOUNTER
I left this information with Karen before I left for the [facility] conference, can you please verify that the patient received her clearance. Thanks.

## 2019-05-14 ENCOUNTER — TELEPHONE (OUTPATIENT)
Dept: INTERNAL MEDICINE | Facility: CLINIC | Age: 69
End: 2019-05-14

## 2019-05-14 ENCOUNTER — TELEPHONE (OUTPATIENT)
Dept: CARDIOLOGY | Facility: CLINIC | Age: 69
End: 2019-05-14

## 2019-05-14 NOTE — TELEPHONE ENCOUNTER
Pt needs cardiac clearance through 's office for cosmetic breast in capsulation surgery. They are requesting a letter and recommendations if any cardiac medications need to be discontinued prior to the procedure

## 2019-05-14 NOTE — TELEPHONE ENCOUNTER
----- Message from Tiara Shah sent at 5/14/2019  8:52 AM EDT -----  Contact: pt - Dr Fonseca's pt - RE: medication  Pt calling and would like a return call regarding Rx - ezetimibe (ZETIA) 10 MG tablet she was put on. She informs been on a bit but joints are really sore, more than unusual. She would like to discuss number of BM as well. Could you please call pt to discuss? Please advise. Thanks      Pt # [phone]

## 2019-05-14 NOTE — TELEPHONE ENCOUNTER
Discussed with patient-she is currently taking Zetia 10mg 1/2 tablet every other day and c/o increased shoulder and joint pain. She has also noticed increased loose stools. She will hold Zetia for 2 weeks and monitor symptoms, will restart if sx have not resolved.

## 2019-05-17 DIAGNOSIS — I10 BENIGN ESSENTIAL HTN: ICD-10-CM

## 2019-05-17 RX ORDER — LOSARTAN POTASSIUM AND HYDROCHLOROTHIAZIDE 12.5; 1 MG/1; MG/1
TABLET ORAL
Qty: 90 TABLET | Refills: 1 | Status: SHIPPED | OUTPATIENT
Start: 2019-05-17 | End: 2019-11-25 | Stop reason: ALTCHOICE

## 2019-06-06 ENCOUNTER — TELEPHONE (OUTPATIENT)
Dept: INTERNAL MEDICINE | Facility: CLINIC | Age: 69
End: 2019-06-06

## 2019-06-06 NOTE — TELEPHONE ENCOUNTER
----- Message from Shanta Ayala sent at 6/6/2019 12:54 PM EDT -----  Contact: pt   Pt is stating her  ezetimibe (ZETIA) 10 MG tablet  States dizziness, and diarrhea  Is not working, she has tried alternating.  And cutting pills in half.  Zoloft is working well.    Pt#[phone]

## 2019-06-07 NOTE — TELEPHONE ENCOUNTER
Advised patient of comments and she states she has tried the red yeast rice. She was unsuccessful taking it.  She is requesting a new cardiologist.  Please advise.

## 2019-06-07 NOTE — TELEPHONE ENCOUNTER
Patient was informed per Dr. Fonseca's advice.     She than asked what is she to take?    Please advise.    Thank you

## 2019-06-07 NOTE — TELEPHONE ENCOUNTER
"She may try otc \"red yeast rice\" from [facility] or [facility] - or discuss with cardiologist   "

## 2019-06-10 DIAGNOSIS — E78.49 OTHER HYPERLIPIDEMIA: Primary | ICD-10-CM

## 2019-06-10 RX ORDER — COLESEVELAM 180 1/1
TABLET ORAL
Qty: 45 TABLET | Refills: 0 | Status: SHIPPED | OUTPATIENT
Start: 2019-06-10 | End: 2019-06-13 | Stop reason: ALTCHOICE

## 2019-06-10 NOTE — TELEPHONE ENCOUNTER
If you will place a new referral to 's group then I will inform the patient and send her information to their office. Please advise. Thank you.

## 2019-06-13 ENCOUNTER — TELEPHONE (OUTPATIENT)
Dept: INTERNAL MEDICINE | Facility: CLINIC | Age: 69
End: 2019-06-13

## 2019-06-13 ENCOUNTER — OFFICE VISIT (OUTPATIENT)
Dept: CARDIOLOGY | Facility: CLINIC | Age: 69
End: 2019-06-13

## 2019-06-13 VITALS
DIASTOLIC BLOOD PRESSURE: 78 MMHG | SYSTOLIC BLOOD PRESSURE: 118 MMHG | HEART RATE: 76 BPM | WEIGHT: 121 LBS | BODY MASS INDEX: 18.99 KG/M2 | HEIGHT: 67 IN

## 2019-06-13 DIAGNOSIS — I10 ESSENTIAL HYPERTENSION: Primary | ICD-10-CM

## 2019-06-13 DIAGNOSIS — E78.2 MIXED HYPERLIPIDEMIA: ICD-10-CM

## 2019-06-13 DIAGNOSIS — I63.9 CEREBROVASCULAR ACCIDENT (CVA), UNSPECIFIED MECHANISM (HCC): ICD-10-CM

## 2019-06-13 PROCEDURE — 99214 OFFICE O/P EST MOD 30 MIN: CPT | Performed by: INTERNAL MEDICINE

## 2019-06-13 RX ORDER — RANITIDINE 150 MG/1
150 TABLET ORAL DAILY
COMMUNITY
End: 2020-01-28

## 2019-06-13 RX ORDER — CLOPIDOGREL BISULFATE 75 MG/1
75 TABLET ORAL DAILY
Qty: 90 TABLET | Refills: 1 | Status: SHIPPED | OUTPATIENT
Start: 2019-06-13 | End: 2019-12-23 | Stop reason: SDUPTHER

## 2019-06-13 NOTE — ASSESSMENT & PLAN NOTE
Ms. Vicente has tried numerous statin medications and been intolerant to them.  She is also tried Zetia and was intolerant to this as well.  She has been on Repatha and was not able to tolerate this medication either.  There are not many drug options left.  I did suggest she try Praulent to see if tolerate this medication.  It most likely will cause a similar side effects, but it is worth a shot.  She is willing to attempt the medication.  My office will see if we can obtain the medication at a reasonable cost.  She will of course continue her risk factor modification with diet and exercise

## 2019-06-13 NOTE — TELEPHONE ENCOUNTER
----- Message from Shanta Ayala sent at 6/13/2019 11:11 AM EDT -----  Contact: Pt   Pt is calling for a refill     FOR  clopidogrel (PLAVIX) 75 MG tablet      Patient requests RX SENT TO   [facility] - [place], [place] - [address] AT Portal to Registered [facility] - [phone]  - [phone] FX      Caller# [phone]     Please and thank you.

## 2019-06-13 NOTE — PROGRESS NOTES
Subjective:     Encounter Date:06/13/2019      Patient ID: Rosi Vicnete is a 69 y.o. female.    Chief Complaint:  Chief Complaint   Patient presents with   • Hyperlipidemia   • Hypertension       HPI:  I had the pleasure of seeing Ms. Vicente in the office today.  She is a new patient to me.  I have reviewed her records.  She is a 69-year-old female with a history of essential hypertension, dyslipidemia and history of a lacunar infarct in 2017.    Ms. Vicente is being very diligent in trying to lower her LDL to less than 70.  Her most recent LDL was March 2019.  It was 101.  At the time of her infarct it was 134.  She is tried numerous statins, including atorvastatin, rosuvastatin, Livalo and red yeast rice.  She was intolerant to all.  She also tried Repatha which was very effective but she had intolerable fatigue.  She took just a couple of doses could not tolerate more.  She is also intolerant to Zetia.  She follows a healthy heart diet and she does exercise routinely.    Ms. Vicente is not complained of chest discomfort or shortness of air.  She denies palpitations, dizziness or near syncope.  She does not describe orthopnea or paroxysmal nocturnal dyspnea.  Overall feels well.    I did review an old EKG from 4/5/2019.  She had normal sinus rhythm normal EKG    The following portions of the patient's history were reviewed and updated as appropriate: allergies, current medications, past family history, past medical history, past social history, past surgical history and problem list.    Problem List:  Patient Active Problem List   Diagnosis   • Hypertension   • Allergic rhinitis   • Trigeminal neuralgia   • Hyperlipidemia   • Osteoarthritis of hip   • Vitamin D deficiency   • History of CVA (cerebrovascular accident)   • Stroke (CMS/[facility])   • Heartburn   • Frequent urination   • Left shoulder pain   • Age-related osteoporosis without current pathological fracture   • Chronic fatigue   • Statin intolerance   •  Overactive bladder   • Intermittent lightheadedness   • Vision disturbance   • Elevated coronary artery calcium score   • Sequelae, post-stroke       Past Medical History:  Past Medical History:   Diagnosis Date   • Allergic rhinitis    • Anemia    • Arthritis 2016   • Bronchitis    • CVA (cerebral vascular accident) (CMS/HCC)    • Fatigue    • FH: colon cancer    • H/O bone density study 2013   • H/O mammogram 2013   • Hyperlipidemia    • Hypertension     Benign Essential   • Malaise and fatigue    • Muscle pain    • Nocturia    • Osteoporosis    • Stroke (CMS/HCC) 09/13/2017   • TMJ (temporomandibular joint syndrome)    • Trigeminal neuralgia     Surgery at [facility] in 2009 repeat in 2015       Past Surgical History:  Past Surgical History:   Procedure Laterality Date   • ADENOIDECTOMY     • AUGMENTATION MAMMAPLASTY     • BRAIN SURGERY  12/8/2005    MVD   • COLONOSCOPY N/A 01/2015    Repeat Q 5 years due to Fhx of Colon Ca-Dr. Polk   • COSMETIC SURGERY  2015    augmentation   • EXTERNAL EAR SURGERY     • HIP SURGERY     • JOINT REPLACEMENT  10/3/16    left hip replacement   • PAP SMEAR N/A 2013    Dr. Burden   • RHINOPLASTY     • SEPTOPLASTY  2000    SEPTO/RHINOPLASTY POST TRAUMA   • SINUS SURGERY  1980'S   • TONSILLECTOMY  AGE 5       Social History:  Social History     Socioeconomic History   • Marital status:      Spouse name: Not on file   • Number of children: Not on file   • Years of education: Not on file   • Highest education level: Not on file   Tobacco Use   • Smoking status: Never Smoker   • Smokeless tobacco: Never Used   • Tobacco comment: daily caffiene   Substance and Sexual Activity   • Alcohol use: Yes     Types: 3 Glasses of wine per week     Comment: moderate   • Drug use: No   • Sexual activity: Yes     Partners: Male     Birth control/protection: Post-menopausal, None       Allergies:  Allergies   Allergen Reactions   • Amlodipine Other (See Comments)     GINGIVITIS-  "amlodipine induced.    • Bystolic [Nebivolol Hcl] Other (See Comments)     Extreme fatigue, dizziness   • Lisinopril Other (See Comments)     COUGH           ROS:  Review of Systems   Constitution: Negative for chills, decreased appetite, fever, malaise/fatigue, weight gain and weight loss.   HENT: Negative for congestion, hoarse voice, nosebleeds and sore throat.    Eyes: Negative for blurred vision, double vision and visual disturbance.   Cardiovascular: Negative for chest pain, claudication, dyspnea on exertion, irregular heartbeat, leg swelling, near-syncope, orthopnea, palpitations, paroxysmal nocturnal dyspnea and syncope.   Respiratory: Negative for cough, hemoptysis, shortness of breath, sleep disturbances due to breathing, snoring, sputum production and wheezing.    Endocrine: Negative for cold intolerance, heat intolerance, polydipsia and polyuria.   Hematologic/Lymphatic: Negative for adenopathy and bleeding problem. Does not bruise/bleed easily.   Skin: Negative for flushing, itching, nail changes and rash.   Musculoskeletal: Positive for arthritis. Negative for back pain, joint pain, muscle cramps, muscle weakness, myalgias and neck pain.   Gastrointestinal: Negative for bloating, abdominal pain, anorexia, change in bowel habit, constipation, diarrhea, heartburn, hematemesis, hematochezia, jaundice, melena, nausea and vomiting.   Genitourinary: Negative for dysuria, hematuria and nocturia.   Neurological: Negative for brief paralysis, disturbances in coordination, excessive daytime sleepiness, dizziness, headaches, light-headedness, loss of balance, numbness, paresthesias, seizures and vertigo.        Occasional sense of unsteadiness   Psychiatric/Behavioral: Negative for altered mental status and depression. The patient is not nervous/anxious.    Allergic/Immunologic: Negative for environmental allergies and hives.          Objective:         /78   Pulse 76   Ht 168.9 cm (66.5\")   Wt 54.9 kg " (121 lb)   LMP  (LMP Unknown)   BMI 19.24 kg/m²     Physical Exam   Constitutional: She is oriented to person, place, and time. She appears well-developed and well-nourished. No distress.   HENT:   Head: Normocephalic and atraumatic.   Mouth/Throat: Oropharynx is clear and moist.   Eyes: Conjunctivae and EOM are normal. Pupils are equal, round, and reactive to light. No scleral icterus.   Neck: Normal range of motion. Neck supple. No thyromegaly present.   Cardiovascular: Normal rate, regular rhythm, S1 normal, S2 normal and intact distal pulses.  No extrasystoles are present. PMI is not displaced. Exam reveals no gallop, no S3, no S4, no friction rub and no decreased pulses.   No murmur heard.  Pulses:       Carotid pulses are 2+ on the right side, and 2+ on the left side.       Dorsalis pedis pulses are 2+ on the right side, and 2+ on the left side.        Posterior tibial pulses are 2+ on the right side, and 2+ on the left side.   Pulmonary/Chest: Effort normal and breath sounds normal. No respiratory distress. She has no wheezes. She has no rales.   Abdominal: Soft. Bowel sounds are normal. She exhibits no distension and no mass. There is no tenderness. There is no rebound and no guarding.   Musculoskeletal: Normal range of motion. She exhibits no edema.   Lymphadenopathy:     She has no cervical adenopathy.   Neurological: She is alert and oriented to person, place, and time. Coordination normal.   Skin: Skin is warm and dry. No rash noted. She is not diaphoretic. No pallor.   Psychiatric: She has a normal mood and affect. Her behavior is normal.   Nursing note and vitals reviewed.      In-Office Procedure(s):  Procedures    ASCVD RIsk Score::  The ASCVD Risk score (Paris DC Jr., et al., 2013) failed to calculate for the following reasons:    The patient has a prior MI or stroke diagnosis    Recent Radiology:  Imaging Results (most recent)     None          Lab Review:   Office Visit on 05/03/2019   Component  Date Value   • WBC 05/03/2019 6.74    • RBC 05/03/2019 4.57    • Hemoglobin 05/03/2019 12.3    • Hematocrit 05/03/2019 37.8    • MCV 05/03/2019 82.7    • MCH 05/03/2019 26.9    • MCHC 05/03/2019 32.5    • RDW 05/03/2019 15.6*   • RDW-SD 05/03/2019 47.1    • MPV 05/03/2019 9.5    • Platelets 05/03/2019 395    • Neutrophil % 05/03/2019 69.8    • Lymphocyte % 05/03/2019 20.6    • Monocyte % 05/03/2019 8.2    • Eosinophil % 05/03/2019 1.0    • Basophil % 05/03/2019 0.4    • Neutrophils, Absolute 05/03/2019 4.70    • Lymphocytes, Absolute 05/03/2019 1.39    • Monocytes, Absolute 05/03/2019 0.55    • Eosinophils, Absolute 05/03/2019 0.07    • Basophils, Absolute 05/03/2019 0.03    • Iron 05/03/2019 88    • Iron Saturation 05/03/2019 20    • Transferrin 05/03/2019 291    • TIBC 05/03/2019 434    • Ferritin 05/03/2019 37.80               Invalid input(s): ALKPO4                        Invalid input(s): LDLCALC                  Problems Addressed this Visit        Cardiovascular and Mediastinum    Hypertension - Primary     Hypertension is controlled         Hyperlipidemia     Ms. Vicente has tried numerous statin medications and been intolerant to them.  She is also tried Zetia and was intolerant to this as well.  She has been on Repatha and was not able to tolerate this medication either.  There are not many drug options left.  I did suggest she try Praulent to see if tolerate this medication.  It most likely will cause a similar side effects, but it is worth a shot.  She is willing to attempt the medication.  My office will see if we can obtain the medication at a reasonable cost.  She will of course continue her risk factor modification with diet and exercise               Katarina Leung MD  06/13/19  .

## 2019-07-12 ENCOUNTER — TELEPHONE (OUTPATIENT)
Dept: INTERNAL MEDICINE | Facility: CLINIC | Age: 69
End: 2019-07-12

## 2019-07-12 DIAGNOSIS — Z86.73 HISTORY OF CVA (CEREBROVASCULAR ACCIDENT): ICD-10-CM

## 2019-07-12 DIAGNOSIS — E78.49 OTHER HYPERLIPIDEMIA: ICD-10-CM

## 2019-07-12 RX ORDER — ICOSAPENT ETHYL 1000 MG/1
2 CAPSULE ORAL 2 TIMES DAILY WITH MEALS
Qty: 360 CAPSULE | Refills: 1 | Status: SHIPPED | OUTPATIENT
Start: 2019-07-12 | End: 2019-11-25 | Stop reason: SINTOL

## 2019-07-12 NOTE — TELEPHONE ENCOUNTER
----- Message from Shanta Ayala sent at 7/12/2019  9:17 AM EDT -----  Contact: Pt   Pt is calling for a refill     FOR  icosapent ethyl (VASCEPA) 1 g capsule capsule      Patient requests RX SENT TO   [facility] [place], [place] - [address] AT Portal to Registered [facility] - [phone]  - [phone] FX      Caller# [phone]     Please and thank you.

## 2019-07-17 ENCOUNTER — LAB (OUTPATIENT)
Dept: INTERNAL MEDICINE | Facility: CLINIC | Age: 69
End: 2019-07-17

## 2019-07-17 DIAGNOSIS — I10 ESSENTIAL HYPERTENSION: ICD-10-CM

## 2019-07-17 DIAGNOSIS — E61.1 IRON DEFICIENCY: ICD-10-CM

## 2019-07-17 DIAGNOSIS — E78.2 MIXED HYPERLIPIDEMIA: ICD-10-CM

## 2019-07-17 LAB
ALBUMIN SERPL-MCNC: 4.4 G/DL (ref 3.5–5.2)
ALBUMIN/GLOB SERPL: 1.8 G/DL
ALP SERPL-CCNC: 85 U/L (ref 39–117)
ALT SERPL W P-5'-P-CCNC: 14 U/L (ref 1–33)
ANION GAP SERPL CALCULATED.3IONS-SCNC: 12.5 MMOL/L (ref 5–15)
AST SERPL-CCNC: 15 U/L (ref 1–32)
BASOPHILS # BLD AUTO: 0.03 10*3/MM3 (ref 0–0.2)
BASOPHILS NFR BLD AUTO: 0.6 % (ref 0–1.5)
BILIRUB SERPL-MCNC: 0.4 MG/DL (ref 0.2–1.2)
BUN BLD-MCNC: 10 MG/DL (ref 8–23)
BUN/CREAT SERPL: 16.1 (ref 7–25)
CALCIUM SPEC-SCNC: 10 MG/DL (ref 8.6–10.5)
CHLORIDE SERPL-SCNC: 91 MMOL/L (ref 98–107)
CHOLEST SERPL-MCNC: 152 MG/DL (ref 0–200)
CO2 SERPL-SCNC: 27.5 MMOL/L (ref 22–29)
CREAT BLD-MCNC: 0.62 MG/DL (ref 0.57–1)
DEPRECATED RDW RBC AUTO: 44.8 FL (ref 37–54)
EOSINOPHIL # BLD AUTO: 0.08 10*3/MM3 (ref 0–0.4)
EOSINOPHIL NFR BLD AUTO: 1.7 % (ref 0.3–6.2)
ERYTHROCYTE [DISTWIDTH] IN BLOOD BY AUTOMATED COUNT: 14.7 % (ref 12.3–15.4)
GFR SERPL CREATININE-BSD FRML MDRD: 95 ML/MIN/1.73
GLOBULIN UR ELPH-MCNC: 2.4 GM/DL
GLUCOSE BLD-MCNC: 84 MG/DL (ref 65–99)
HCT VFR BLD AUTO: 38.1 % (ref 34–46.6)
HDLC SERPL-MCNC: 40 MG/DL (ref 40–60)
HGB BLD-MCNC: 12.8 G/DL (ref 12–15.9)
IRON SATN MFR SERPL: 34 % (ref 20–50)
IRON SERPL-MCNC: 134 MCG/DL (ref 37–145)
LDLC SERPL CALC-MCNC: 99 MG/DL (ref 0–100)
LDLC/HDLC SERPL: 2.47 {RATIO}
LOPINAVIR ISLT PHENOTYP: 257 MCG/DL (ref 112–346)
LYMPHOCYTES # BLD AUTO: 1.31 10*3/MM3 (ref 0.7–3.1)
LYMPHOCYTES NFR BLD AUTO: 27.9 % (ref 19.6–45.3)
MCH RBC QN AUTO: 28.1 PG (ref 26.6–33)
MCHC RBC AUTO-ENTMCNC: 33.6 G/DL (ref 31.5–35.7)
MCV RBC AUTO: 83.7 FL (ref 79–97)
MONOCYTES # BLD AUTO: 0.54 10*3/MM3 (ref 0.1–0.9)
MONOCYTES NFR BLD AUTO: 11.5 % (ref 5–12)
NEUTROPHILS # BLD AUTO: 2.74 10*3/MM3 (ref 1.7–7)
NEUTROPHILS NFR BLD AUTO: 58.3 % (ref 42.7–76)
PLATELET # BLD AUTO: 356 10*3/MM3 (ref 140–450)
PMV BLD AUTO: 9.2 FL (ref 6–12)
POTASSIUM BLD-SCNC: 4.1 MMOL/L (ref 3.5–5.2)
PROT SERPL-MCNC: 6.8 G/DL (ref 6–8.5)
RBC # BLD AUTO: 4.55 10*6/MM3 (ref 3.77–5.28)
SODIUM BLD-SCNC: 131 MMOL/L (ref 136–145)
TIBC SERPL-MCNC: 391 MCG/DL
TRIGL SERPL-MCNC: 67 MG/DL (ref 0–150)
VLDLC SERPL-MCNC: 13.4 MG/DL (ref 5–40)
WBC NRBC COR # BLD: 4.7 10*3/MM3 (ref 3.4–10.8)

## 2019-07-17 PROCEDURE — 85025 COMPLETE CBC W/AUTO DIFF WBC: CPT | Performed by: INTERNAL MEDICINE

## 2019-07-17 PROCEDURE — 80053 COMPREHEN METABOLIC PANEL: CPT | Performed by: INTERNAL MEDICINE

## 2019-07-17 PROCEDURE — 80061 LIPID PANEL: CPT | Performed by: INTERNAL MEDICINE

## 2019-07-18 LAB — FERRITIN SERPL-MCNC: 26.4 NG/ML (ref 13–150)

## 2019-08-07 ENCOUNTER — TELEPHONE (OUTPATIENT)
Dept: CARDIOLOGY | Facility: CLINIC | Age: 69
End: 2019-08-07

## 2019-08-07 ENCOUNTER — OFFICE VISIT (OUTPATIENT)
Dept: NEUROLOGY | Facility: CLINIC | Age: 69
End: 2019-08-07

## 2019-08-07 VITALS
DIASTOLIC BLOOD PRESSURE: 76 MMHG | HEIGHT: 67 IN | HEART RATE: 75 BPM | SYSTOLIC BLOOD PRESSURE: 128 MMHG | OXYGEN SATURATION: 96 % | BODY MASS INDEX: 18.57 KG/M2 | WEIGHT: 118.3 LBS | RESPIRATION RATE: 17 BRPM

## 2019-08-07 DIAGNOSIS — Z78.9 STATIN INTOLERANCE: ICD-10-CM

## 2019-08-07 DIAGNOSIS — R20.0 NUMBNESS AND TINGLING OF BOTH LEGS: ICD-10-CM

## 2019-08-07 DIAGNOSIS — E78.5 HYPERLIPIDEMIA LDL GOAL <70: ICD-10-CM

## 2019-08-07 DIAGNOSIS — I63.50 CEREBROVASCULAR ACCIDENT (CVA) DUE TO STENOSIS OF CEREBRAL ARTERY (HCC): Primary | ICD-10-CM

## 2019-08-07 DIAGNOSIS — R20.2 NUMBNESS AND TINGLING OF BOTH LEGS: ICD-10-CM

## 2019-08-07 PROCEDURE — 99214 OFFICE O/P EST MOD 30 MIN: CPT | Performed by: NURSE PRACTITIONER

## 2019-08-07 RX ORDER — CHLORHEXIDINE GLUCONATE 0.12 MG/ML
RINSE ORAL
Refills: 1 | COMMUNITY
Start: 2019-07-11 | End: 2019-11-25

## 2019-08-07 NOTE — PATIENT INSTRUCTIONS
Stroke Prevention  Some medical conditions and behaviors are associated with a higher chance of having a stroke. You can help prevent a stroke by making nutrition, lifestyle, and other changes, including managing any medical conditions you may have.  What nutrition changes can be made?    · Eat healthy foods. You can do this by:  ? Choosing foods high in fiber, such as fresh fruits and vegetables and whole grains.  ? Eating at least 5 or more servings of fruits and vegetables a day. Try to fill half of your plate at each meal with fruits and vegetables.  ? Choosing lean protein foods, such as lean cuts of meat, poultry without skin, fish, tofu, beans, and nuts.  ? Eating low-fat dairy products.  ? Avoiding foods that are high in salt (sodium). This can help lower blood pressure.  ? Avoiding foods that have saturated fat, trans fat, and cholesterol. This can help prevent high cholesterol.  ? Avoiding processed and premade foods.  · Follow your health care provider's specific guidelines for losing weight, controlling high blood pressure (hypertension), lowering high cholesterol, and managing diabetes. These may include:  ? Reducing your daily calorie intake.  ? Limiting your daily sodium intake to 1,500 milligrams (mg).  ? Using only healthy fats for cooking, such as olive oil, canola oil, or sunflower oil.  ? Counting your daily carbohydrate intake.  What lifestyle changes can be made?  · Maintain a healthy weight. Talk to your health care provider about your ideal weight.  · Get at least 30 minutes of moderate physical activity at least 5 days a week. Moderate activity includes brisk walking, biking, and swimming.  · Do not use any products that contain nicotine or tobacco, such as cigarettes and e-cigarettes. If you need help quitting, ask your health care provider. It may also be helpful to avoid exposure to secondhand smoke.  · Limit alcohol intake to no more than 1 drink a day for nonpregnant women and 2 drinks  a day for men. One drink equals 12 oz of beer, 5 oz of wine, or 1½ oz of hard liquor.  · Stop any illegal drug use.  · Avoid taking birth control pills. Talk to your health care provider about the risks of taking birth control pills if:  ? You are over 35 years old.  ? You smoke.  ? You get migraines.  ? You have ever had a blood clot.  What other changes can be made?  · Manage your cholesterol levels.  ? Eating a healthy diet is important for preventing high cholesterol. If cholesterol cannot be managed through diet alone, you may also need to take medicines.  ? Take any prescribed medicines to control your cholesterol as told by your health care provider.  · Manage your diabetes.  ? Eating a healthy diet and exercising regularly are important parts of managing your blood sugar. If your blood sugar cannot be managed through diet and exercise, you may need to take medicines.  ? Take any prescribed medicines to control your diabetes as told by your health care provider.  · Control your hypertension.  ? To reduce your risk of stroke, try to keep your blood pressure below 130/80.  ? Eating a healthy diet and exercising regularly are an important part of controlling your blood pressure. If your blood pressure cannot be managed through diet and exercise, you may need to take medicines.  ? Take any prescribed medicines to control hypertension as told by your health care provider.  ? Ask your health care provider if you should monitor your blood pressure at home.  ? Have your blood pressure checked every year, even if your blood pressure is normal. Blood pressure increases with age and some medical conditions.  · Get evaluated for sleep disorders (sleep apnea). Talk to your health care provider about getting a sleep evaluation if you snore a lot or have excessive sleepiness.  · Take over-the-counter and prescription medicines only as told by your health care provider. Aspirin or blood thinners (antiplatelets or  anticoagulants) may be recommended to reduce your risk of forming blood clots that can lead to stroke.  · Make sure that any other medical conditions you have, such as atrial fibrillation or atherosclerosis, are managed.  What are the warning signs of a stroke?  The warning signs of a stroke can be easily remembered as BEFAST.  · B is for balance. Signs include:  ? Dizziness.  ? Loss of balance or coordination.  ? Sudden trouble walking.  · E is for eyes. Signs include:  ? A sudden change in vision.  ? Trouble seeing.  · F is for face. Signs include:  ? Sudden weakness or numbness of the face.  ? The face or eyelid drooping to one side.  · A is for arms. Signs include:  ? Sudden weakness or numbness of the arm, usually on one side of the body.  · S is for speech. Signs include:  ? Trouble speaking (aphasia).  ? Trouble understanding.  · T is for time.  ? These symptoms may represent a serious problem that is an emergency. Do not wait to see if the symptoms will go away. Get medical help right away. Call your local emergency services (911 in the [place] Do not drive yourself to the hospital.  · Other signs of stroke may include:  ? A sudden, severe headache with no known cause.  ? Nausea or vomiting.  ? Seizure.  Where to find more information  For more information, visit:  · American Stroke Association: www.strokeassociation.org  · National Stroke Association: www.stroke.org  Summary  · You can prevent a stroke by eating healthy, exercising, not smoking, limiting alcohol intake, and managing any medical conditions you may have.  · Do not use any products that contain nicotine or tobacco, such as cigarettes and e-cigarettes. If you need help quitting, ask your health care provider. It may also be helpful to avoid exposure to secondhand smoke.  · Remember BEFAST for warning signs of stroke. Get help right away if you or a loved one has any of these signs.  This information is not intended to replace advice given to you  by your health care provider. Make sure you discuss any questions you have with your health care provider.  Document Released: 01/25/2006 Document Revised: 01/23/2018 Document Reviewed: 01/23/2018  Elsevier Interactive Patient Education © 2019 [employer]vier Inc.

## 2019-08-07 NOTE — TELEPHONE ENCOUNTER
Pt states when she last saw Dr Bray it was discussed her starting Praulent. She is calling to check status. RTRN

## 2019-08-07 NOTE — PROGRESS NOTES
"DOS: 2019  NAME: Rosi Vicente   : 1950  PCP: Dionne Fonseca MD    Chief Complaint   Patient presents with   • Cerebrovascular Accident     5 month, follow up      SUBJECTIVE  Neurological Problem:  69 y.o.  RHW female with HTN, HLD and h/o of trigeminal neuralgia and stroke who presents today for follow-up. She is unaccompanied.     Interval History:   - 2017: She presented to [facility] with progressive left hemiparesis and dysarthria.  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  She had waxing and waning of her symptoms during her hospitalization.  She was eventually discharged to our acute rehabilitation where she has made significant improvement. She was discharged on ASA, Plavix and Lipitor.    - 2019: Complaints of sudden onset dizziness and worsening left-sided weakness/heaviness.  MRI done at [facility] MRI was negative for any acute findings and subsequent neurologic exam was normal.    She presents today and continues on ASA 81 mg, Plavix and vascepa and reports easy bruising but otherwise tolerating medications well.  She has a history of statin intolerance, has tried a trial of Repatha which she also had difficulty tolerating.  She has a list of complaints: \"intermittent tingling in her anterior lower legs that is intermittent, in the feet and shin, feeling \"scattered\" and having a lack of concentration, gum sensitivity (followed by  dentistry and suspected related to trigeminal neuralgia).     She is currently getting PT and described  \"phase III PT\", (at [facility]), she reports progress but does not like she is back to baseline.  She denies any new stroke/TIA symptoms, difficult she denies any dizziness, speech difficulty, vision changes, headaches or new weakness. Denies any other changes in her health since her last visit.  She has been taking zoloft " 12.5 mg with good results.  She states her blood pressure is well controlled.  She follows up with her PCP for routine lab work.  He denies smoking.  Rare alcohol use.     Review of Systems:Review of Systems   Constitutional: Positive for fatigue. Negative for activity change and appetite change.   HENT: Negative for facial swelling, trouble swallowing and voice change.    Eyes: Negative for photophobia, pain and visual disturbance.   Respiratory: Negative for chest tightness, shortness of breath and wheezing.    Cardiovascular: Negative for chest pain, palpitations and leg swelling.   Gastrointestinal: Negative for abdominal pain, constipation, diarrhea, nausea and vomiting.   Endocrine: Negative for polydipsia and polyphagia.   Musculoskeletal: Positive for gait problem (not as stable, working with PT). Negative for back pain, joint swelling and neck pain.   Skin: Negative for rash and wound.   Neurological: Positive for weakness (left sided) and numbness (tingling below the knees, started with the last month). Negative for dizziness, tremors, seizures, syncope, facial asymmetry, speech difficulty, light-headedness and headaches.   Hematological: Bruises/bleeds easily (bruise).   Psychiatric/Behavioral: Positive for decreased concentration (becoming more scattered). Negative for agitation, behavioral problems, confusion, dysphoric mood, hallucinations, self-injury, sleep disturbance and suicidal ideas. The patient is not nervous/anxious and is not hyperactive.     Above ROS reviewed    Current Medications:   Current Outpatient Medications:   •  ALLERGY SERUM SUBLINGUAL DROPS, Place 3 drops under the tongue 1 (One) Time., Disp: , Rfl:   •  calcium carbonate (OS-CARLOS) 600 MG tablet, Take 600 mg by mouth 2 (Two) Times a Day With Meals., Disp: , Rfl:   •  cholecalciferol (VITAMIN D3) 1000 UNITS tablet, Take 2,000 Units by mouth 2 (Two) Times a Day., Disp: , Rfl:   •  clopidogrel (PLAVIX) 75 MG tablet, Take 1 tablet by  mouth Daily., Disp: 90 tablet, Rfl: 1  •  Coenzyme Q10 (COQ-10) 400 MG capsule, Take  by mouth Daily., Disp: , Rfl:   •  cycloSPORINE (RESTASIS) 0.05 % ophthalmic emulsion, Apply 1 drop to eye(s) as directed by provider 2 (Two) Times a Day., Disp: , Rfl:   •  ferrous sulfate 324 (65 Fe) MG tablet delayed-release EC tablet, Take 324 mg by mouth Daily With Breakfast., Disp: , Rfl:   •  icosapent ethyl (VASCEPA) 1 g capsule capsule, Take 2 g by mouth 2 (Two) Times a Day With Meals., Disp: 360 capsule, Rfl: 1  •  losartan-hydrochlorothiazide (HYZAAR) 100-12.5 MG per tablet, TAKE 1 TABLET DAILY, Disp: 90 tablet, Rfl: 1  •  montelukast (SINGULAIR) 10 MG tablet, Take 1 tablet by mouth Daily., Disp: 90 tablet, Rfl: 3  •  Probiotic Product (PROBIOTIC-10 PO), Take 1 capsule by mouth Daily., Disp: , Rfl:   •  raNITIdine (ZANTAC) 150 MG tablet, Take 150 mg by mouth Daily., Disp: , Rfl:   •  sertraline (ZOLOFT) 25 MG tablet, Take 1 tablet by mouth Daily. (Patient taking differently: Take 25 mg by mouth Daily. Taking 12.5 mg daily), Disp: 30 tablet, Rfl: 6  •  vitamin B-12 (CYANOCOBALAMIN) 1000 MCG tablet, 5,000 mcg Daily., Disp: , Rfl:   •  ALPHA LIPOIC ACID PO, Take  by mouth Daily., Disp: , Rfl:   •  chlorhexidine (PERIDEX) 0.12 % solution, USE WITH COTTON BALL TWICE DAILY, Disp: , Rfl: 1    The following portions of the patient's history were reviewed and updated as appropriate: allergies, current medications, past family history, past medical history, past social history, past surgical history and problem list.    OBJECTIVE  Vitals:    08/07/19 0854   BP: 128/76   Pulse: 75   Resp: 17   SpO2: 96%       Diagnostics:    Laboratory Results:         Lab Results   Component Value Date    WBC 4.70 07/17/2019    HGB 12.8 07/17/2019    HCT 38.1 07/17/2019    MCV 83.7 07/17/2019     07/17/2019     Lab Results   Component Value Date    GLUCOSE 84 07/17/2019    BUN 10 07/17/2019    CREATININE 0.62 07/17/2019    EGFRIFNONA 95  07/17/2019    EGFRIFAFRI 133 01/04/2019    BCR 16.1 07/17/2019    K 4.1 07/17/2019    CO2 27.5 07/17/2019    CALCIUM 10.0 07/17/2019    PROTENTOTREF 6.9 02/08/2018    ALBUMIN 4.40 07/17/2019    LABIL2 1.5 03/28/2018    AST 15 07/17/2019    ALT 14 07/17/2019     Lab Results   Component Value Date    HGBA1C 5.30 09/13/2017     Lab Results   Component Value Date    CHOL 152 07/17/2019    CHOL 158 03/19/2019    CHOL 118 12/17/2018     Lab Results   Component Value Date    HDL 40 07/17/2019    HDL 44 03/19/2019    HDL 46 12/17/2018     Lab Results   Component Value Date    LDL 99 07/17/2019     (H) 03/19/2019    LDL 62 12/17/2018     Lab Results   Component Value Date    TRIG 67 07/17/2019    TRIG 66 03/19/2019    TRIG 52 12/17/2018     No results found for: RPR  Lab Results   Component Value Date    TSH 2.86 03/19/2019     Lab Results   Component Value Date    [id number] >2000 (H) 03/19/2019       Physical Examination:   General Appearance:   Well developed, well nourished, well groomed, alert, and cooperative.  HEENT: Normocephalic.    Neck and Spine: Normal range of motion.  Normal alignment. No mass or tenderness.   Cardiac: Regular rate and rhythm.   Peripheral Vasculature: Radial pulses are equal and symmetric. No signs of distal embolization.  Extremities:    No edema or deformities. Normal joint ROM.  Skin:    No rashes or birth marks.  Psychiatric:    Euthymic. Normal affect.    Neurological examination:  Higher Integrative  Function: Oriented to time, place and person. Normal registration, recall (3/3), attention span and concentration. Normal language including comprehension, spontaneous speech, repetition, reading, writing, naming and vocabulary. No neglect with normal visual-spatial function and construction. Normal fund of knowledge and higher integrative function.  CN II: Pupils are equal, round, and reactive to light. Normal visual acuity and visual fields.    CN III IV VI: Extraocular movements  are full without nystagmus.   CN V: Normal strength of muscles of mastication. Mild subjective decreased sensation on the left.  CN VII: Facial movements are symmetric. No weakness.  CN VIII:   Auditory acuity is normal.  CN IX & X:   Symmetric palatal movement.  CN XI: Sternocleidomastoid and trapezius are normal.  No weakness.  CN XII:   The tongue is midline.  No atrophy or fasciculations.  Motor: Normal muscle strength, bulk and tone in upper and lower extremities except for very subtle reduced strength of left hip flexor, 5-/5.  No fasciculations, rigidity, spasticity, or abnormal movements.  Reflexes: 2+ in the upper and lower extremities.   Sensation: Normal to light touch, pinprick, vibration, temperature, and proprioception in arms and legs. Normal graphesthesia and no extinction on DSS.  Station and Gait: Normal gait and station.    Coordination: Finger to nose test shows no dysmetria.  Heel to shin normal.    Impression:  Ms. Vicente is doing well following her hemispheric stroke she suffered in September 2017 and is nearly back to her neurologic baseline with complaints of residual fatigue, balance issues and subtle left-sided weakness; however, her neurologic exam is essentially normal today.  She continues to work with physical therapy.  Regarding her c/o intermittent BLE anterior leg paresthesias, her sensory exam is normal. Will continue to monitor, consider further w/u if progresses. We reviewed her personal risk factors for stroke portance of RF control for stroke prevention including BP and cholesterol control.  Her most recent LDL was at 99, goal less than 70.  She has been intolerant to statins in the past and did not tolerate Repatha.  She will continue to monitor her blood pressure regularly. Recommend that she continue Plavix, okay to d/c ASA.  We reviewed the signs and symptoms of stroke and the importance of calling 911 if she were to develop any of these.  Follow-up in 1 year, sooner if  symptoms warrant.    Plan:     1. Stroke  Okay to D/C ASA, continue Plavix.   Encouraged LDL control to goal < 70, intolerant of statins, repatha  Monitor BP regularly  F/U with PCP for continued cholesterol surveillance  Secondary stroke prevention: Ideal targets for stroke prevention would be Blood pressure < 130/80; B12 > 500 TSH in normal range and LDL < 70; HbA1c < 6.5 and smoking cessation if applicable.  Call 911 for stroke symptoms  Follow-up in one year    2. Intermittent anterior BLE paresthesias  Continue to monitor, consider further w/u if progresses    I spent 30 minutes face to face with patient, with  > 50% spent counseling patient regarding diagnosis, review of diagnostics, personal risk factors for stroke and importance of risk factor control for stroke prevention.   Rosi was seen today for cerebrovascular accident.    Diagnoses and all orders for this visit:    Cerebrovascular accident (CVA) due to stenosis of cerebral artery (CMS/HCC)    Hyperlipidemia LDL goal <70    Statin intolerance    Numbness and tingling of both legs        Coding      Dictated using Dragon

## 2019-09-03 ENCOUNTER — OFFICE VISIT (OUTPATIENT)
Dept: INTERNAL MEDICINE | Facility: CLINIC | Age: 69
End: 2019-09-03

## 2019-09-03 VITALS
HEIGHT: 67 IN | SYSTOLIC BLOOD PRESSURE: 118 MMHG | BODY MASS INDEX: 19.15 KG/M2 | DIASTOLIC BLOOD PRESSURE: 70 MMHG | RESPIRATION RATE: 14 BRPM | WEIGHT: 122 LBS

## 2019-09-03 DIAGNOSIS — I10 ESSENTIAL HYPERTENSION: Primary | ICD-10-CM

## 2019-09-03 DIAGNOSIS — H53.9 VISION DISTURBANCE: ICD-10-CM

## 2019-09-03 DIAGNOSIS — Z86.73 HISTORY OF CVA (CEREBROVASCULAR ACCIDENT): ICD-10-CM

## 2019-09-03 DIAGNOSIS — I69.30 SEQUELAE, POST-STROKE: ICD-10-CM

## 2019-09-03 DIAGNOSIS — E78.5 HYPERLIPIDEMIA LDL GOAL <70: ICD-10-CM

## 2019-09-03 PROCEDURE — 99213 OFFICE O/P EST LOW 20 MIN: CPT | Performed by: INTERNAL MEDICINE

## 2019-09-03 NOTE — PROGRESS NOTES
"Subjective   Rosi Vicente is a 69 y.o. female here for   Chief Complaint   Patient presents with   • Hypertension     4 month check up    • Hyperlipidemia   • Fatigue   • Fall     fell 2 days ago   .    Vitals:    09/03/19 1541   BP: 118/70   BP Location: Left arm   Patient Position: Sitting   Cuff Size: Adult   Resp: 14   Weight: 55.3 kg (122 lb)   Height: 168.9 cm (66.5\")       Body mass index is 19.4 kg/m².    Hypertension   This is a chronic problem. The current episode started more than 1 year ago. The problem is unchanged. The problem is controlled. Pertinent negatives include no chest pain, palpitations or shortness of breath.   Fall   The accident occurred 2 days ago. The fall occurred while walking. She fell from a height of 1 to 2 ft. She landed on concrete. The point of impact was the face. Pertinent negatives include no fever.   Hyperlipidemia   This is a chronic problem. The current episode started more than 1 year ago. The problem is controlled. Recent lipid tests were reviewed and are normal. She has no history of diabetes. Pertinent negatives include no chest pain or shortness of breath.        The following portions of the patient's history were reviewed and updated as appropriate: allergies, current medications, past social history and problem list.    Review of Systems   Constitutional: Positive for fatigue. Negative for chills and fever.   Eyes: Positive for visual disturbance (dry eyes).   Respiratory: Negative for cough, shortness of breath and wheezing.    Cardiovascular: Negative for chest pain, palpitations and leg swelling.   Neurological: Positive for weakness (left arm & leg since cva 2017).   Psychiatric/Behavioral: Negative for dysphoric mood and sleep disturbance. The patient is not nervous/anxious.        Objective   Physical Exam   Constitutional: She appears well-developed and well-nourished. No distress.   Cardiovascular: Normal rate, regular rhythm and normal heart sounds. "   Pulmonary/Chest: No respiratory distress. She has no wheezes. She has no rales. She exhibits no tenderness.   Musculoskeletal: She exhibits no edema.   Psychiatric: She has a normal mood and affect. Her behavior is normal.   Nursing note and vitals reviewed.    +ecchymosis around left eye.    Assessment/Plan   Diagnoses and all orders for this visit:    Essential hypertension  Comments:  controlled - need f/u with cardiologist    Hyperlipidemia LDL goal <70  Comments:  Follow up with cardiologist (new medicine to be started)    History of CVA (cerebrovascular accident)  Comments:  f/u with neuro     Vision disturbance  Comments:  f/u with opthal dr. angy sellers    Sequelae, post-stroke  Comments:  Follow-up with neurology.

## 2019-09-11 ENCOUNTER — TELEPHONE (OUTPATIENT)
Dept: CARDIOLOGY | Facility: CLINIC | Age: 69
End: 2019-09-11

## 2019-09-11 DIAGNOSIS — I10 BENIGN ESSENTIAL HTN: ICD-10-CM

## 2019-09-11 NOTE — TELEPHONE ENCOUNTER
I would continue Vascepa for now.  The Praulent is primarily for LDL.  Will see her next lipid profile and reassess

## 2019-09-11 NOTE — TELEPHONE ENCOUNTER
PT WANTED TO SAY THANKS FOR HELPING WITH PRALUENT    BUT IS WONDERING IF OK TO STOP THE VASCEPA NOW THAT SHE'S ON PRALUENT?     PT CB# [phone]

## 2019-09-12 RX ORDER — LOSARTAN POTASSIUM AND HYDROCHLOROTHIAZIDE 12.5; 1 MG/1; MG/1
TABLET ORAL
Qty: 90 TABLET | Refills: 1 | Status: SHIPPED | OUTPATIENT
Start: 2019-09-12 | End: 2019-11-25 | Stop reason: ALTCHOICE

## 2019-09-23 ENCOUNTER — TELEPHONE (OUTPATIENT)
Dept: CARDIOLOGY | Facility: CLINIC | Age: 69
End: 2019-09-23

## 2019-09-23 NOTE — TELEPHONE ENCOUNTER
Can dc the fish oil.  She can decrease the Praulent to once monthly, especially til we get her labs back

## 2019-09-23 NOTE — TELEPHONE ENCOUNTER
Mrs Vicente is getting blood work drawn @ dr Fonseca in November. She taking Praulent with no issues,  Pays $284.00 month even with meeting deductible. Can she reduce it to once a month.  She is taking a expensive fish oil can she stop taking it.  She wants to know if a blood test would be effective after being on medication for one month or does she need to be on medication longer to get true results. Unsure if she should make an appointment. Requesting a call back.    [phone]

## 2019-10-03 ENCOUNTER — TELEPHONE (OUTPATIENT)
Dept: INTERNAL MEDICINE | Facility: CLINIC | Age: 69
End: 2019-10-03

## 2019-10-03 RX ORDER — VALSARTAN AND HYDROCHLOROTHIAZIDE 160; 12.5 MG/1; MG/1
1 TABLET, FILM COATED ORAL DAILY
Qty: 30 TABLET | Refills: 11 | Status: SHIPPED | OUTPATIENT
Start: 2019-10-03 | End: 2019-10-03 | Stop reason: SDUPTHER

## 2019-10-03 RX ORDER — VALSARTAN AND HYDROCHLOROTHIAZIDE 160; 12.5 MG/1; MG/1
1 TABLET, FILM COATED ORAL DAILY
Qty: 90 TABLET | Refills: 3 | Status: SHIPPED | OUTPATIENT
Start: 2019-10-03 | End: 2019-11-25

## 2019-10-03 NOTE — TELEPHONE ENCOUNTER
[facility] has called stating losartan-HCTZ has been on back order and requesting a possible alternative   Ref# [id number]  [employer] [phone]

## 2019-10-04 NOTE — TELEPHONE ENCOUNTER
Ms. Vicente stated that her cardiologist has the Valsartan on hold as she is on the praluent. She said she is sorry for the confusion.

## 2019-10-29 ENCOUNTER — TELEPHONE (OUTPATIENT)
Dept: INTERNAL MEDICINE | Facility: CLINIC | Age: 69
End: 2019-10-29

## 2019-10-29 RX ORDER — GABAPENTIN 100 MG/1
100 CAPSULE ORAL 3 TIMES DAILY
Qty: 90 CAPSULE | Refills: 5 | OUTPATIENT
Start: 2019-10-29 | End: 2020-09-04

## 2019-10-29 NOTE — TELEPHONE ENCOUNTER
Pt wants to move up her lab appt from 12/3/19 to mid November.  Is this ok?    Secondly she used to take gabapentin - for breakthru pain....has some at home but it has . Asking for a new RX of same medication.      Pt confirms the [facility]  Need to call pt?  Cell [phone]

## 2019-10-29 NOTE — TELEPHONE ENCOUNTER
Ok to move appt up if openings.  pls call in refill of gabapentin - what dose was working for her?

## 2019-11-13 DIAGNOSIS — E78.5 HYPERLIPIDEMIA LDL GOAL <70: ICD-10-CM

## 2019-11-13 DIAGNOSIS — I10 ESSENTIAL HYPERTENSION: Primary | ICD-10-CM

## 2019-11-14 ENCOUNTER — LAB (OUTPATIENT)
Dept: INTERNAL MEDICINE | Facility: CLINIC | Age: 69
End: 2019-11-14

## 2019-11-14 DIAGNOSIS — I10 ESSENTIAL HYPERTENSION: ICD-10-CM

## 2019-11-14 DIAGNOSIS — E78.5 HYPERLIPIDEMIA LDL GOAL <70: ICD-10-CM

## 2019-11-15 LAB
ALBUMIN SERPL-MCNC: 4.2 G/DL (ref 3.5–4.8)
ALBUMIN/GLOB SERPL: 1.8 {RATIO} (ref 1.2–2.2)
ALP SERPL-CCNC: 98 IU/L (ref 39–117)
ALT SERPL-CCNC: 16 IU/L (ref 0–32)
AST SERPL-CCNC: 18 IU/L (ref 0–40)
BILIRUB SERPL-MCNC: 0.3 MG/DL (ref 0–1.2)
BUN SERPL-MCNC: 13 MG/DL (ref 8–27)
BUN/CREAT SERPL: 18 (ref 12–28)
CALCIUM SERPL-MCNC: 9.6 MG/DL (ref 8.7–10.3)
CHLORIDE SERPL-SCNC: 95 MMOL/L (ref 96–106)
CHOLEST SERPL-MCNC: 133 MG/DL (ref 100–199)
CO2 SERPL-SCNC: 20 MMOL/L (ref 20–29)
CREAT SERPL-MCNC: 0.71 MG/DL (ref 0.57–1)
GLOBULIN SER CALC-MCNC: 2.4 G/DL (ref 1.5–4.5)
GLUCOSE SERPL-MCNC: 81 MG/DL (ref 65–99)
HDLC SERPL-MCNC: 49 MG/DL
LDLC SERPL CALC-MCNC: 66 MG/DL (ref 0–99)
POTASSIUM SERPL-SCNC: 4.4 MMOL/L (ref 3.5–5.2)
PROT SERPL-MCNC: 6.6 G/DL (ref 6–8.5)
SODIUM SERPL-SCNC: 133 MMOL/L (ref 134–144)
TRIGL SERPL-MCNC: 91 MG/DL (ref 0–149)
VLDLC SERPL-MCNC: 18 MG/DL (ref 5–40)

## 2019-11-25 ENCOUNTER — OFFICE VISIT (OUTPATIENT)
Dept: CARDIOLOGY | Facility: CLINIC | Age: 69
End: 2019-11-25

## 2019-11-25 VITALS
HEART RATE: 89 BPM | DIASTOLIC BLOOD PRESSURE: 68 MMHG | BODY MASS INDEX: 19.62 KG/M2 | HEIGHT: 67 IN | SYSTOLIC BLOOD PRESSURE: 102 MMHG | WEIGHT: 125 LBS

## 2019-11-25 DIAGNOSIS — I63.9 CEREBROVASCULAR ACCIDENT (CVA), UNSPECIFIED MECHANISM (HCC): Primary | ICD-10-CM

## 2019-11-25 DIAGNOSIS — E78.5 HYPERLIPIDEMIA LDL GOAL <70: ICD-10-CM

## 2019-11-25 DIAGNOSIS — I10 ESSENTIAL HYPERTENSION: ICD-10-CM

## 2019-11-25 PROCEDURE — 99212 OFFICE O/P EST SF 10 MIN: CPT | Performed by: INTERNAL MEDICINE

## 2019-11-25 RX ORDER — LOSARTAN POTASSIUM AND HYDROCHLOROTHIAZIDE 12.5; 1 MG/1; MG/1
1 TABLET ORAL DAILY
Qty: 90 TABLET | Refills: 3 | Status: SHIPPED | OUTPATIENT
Start: 2019-11-25 | End: 2020-02-25 | Stop reason: SDUPTHER

## 2019-11-25 RX ORDER — LOSARTAN POTASSIUM AND HYDROCHLOROTHIAZIDE 12.5; 5 MG/1; MG/1
2 TABLET ORAL DAILY
Qty: 180 TABLET | Refills: 3 | Status: CANCELLED | OUTPATIENT
Start: 2019-11-25

## 2019-11-25 NOTE — PROGRESS NOTES
Subjective:     Encounter Date:11/25/2019      Patient ID: Rosi Vicente is a 69 y.o. female.    Chief Complaint:  Chief Complaint   Patient presents with   • Hypertension       HPI:  History of Present Illness  I saw Ms. Vicente in the office today.  She is a 69-year-old female with a history of essential hypertension, dyslipidemia and a history of vascular accident in 2017.  She has been trying to lower her LDL to less than 70.  In March it was 101.  At the time of her infarct it was 134.  She is tried pretty much all of the statins and is intolerant to the medication.  She also tried Repatha and now Praulent, but is intolerant to those medications as well.  Her current LDL was 66 I am sure that this is a reflection of her use of Praluent.  She is also intolerant to Zetia.  She does exercise and follow a healthy heart diet.    Ms. Vicente has no specific complaints.  She did bring in a booklet where she had her LDL broken down to molecule size.  I have actually referred her to Dr. Justin Jacobson for in-depth evaluation of her lipids.  She would also like to see a cardiologist whose primary orientation is preventative health.  I have referred her to [facility], Dr. Evan Solares.    The following portions of the patient's history were reviewed and updated as appropriate: allergies, current medications, past family history, past medical history, past social history, past surgical history and problem list.    Problem List:  Patient Active Problem List   Diagnosis   • Hypertension   • Allergic rhinitis   • Trigeminal neuralgia   • Hyperlipidemia LDL goal <70   • Osteoarthritis of hip   • Vitamin D deficiency   • History of CVA (cerebrovascular accident)   • Stroke (CMS/[facility])   • Heartburn   • Frequent urination   • Left shoulder pain   • Age-related osteoporosis without current pathological fracture   • Chronic fatigue   • Statin intolerance   • Overactive bladder   • Intermittent lightheadedness   • Vision  disturbance   • Elevated coronary artery calcium score   • Sequelae, post-stroke   • Numbness and tingling of both legs       Past Medical History:  Past Medical History:   Diagnosis Date   • Allergic rhinitis    • Anemia    • Arthritis 2016   • Bronchitis    • CVA (cerebral vascular accident) (CMS/HCC)    • Fatigue    • FH: colon cancer    • H/O bone density study 2013   • H/O mammogram 2013   • Hyperlipidemia    • Hypertension     Benign Essential   • Malaise and fatigue    • Muscle pain    • Nocturia    • Osteoporosis    • Stroke (CMS/HCC) 09/13/2017   • TMJ (temporomandibular joint syndrome)    • Trigeminal neuralgia     Surgery at [facility] in 2009 repeat in 2015       Past Surgical History:  Past Surgical History:   Procedure Laterality Date   • ADENOIDECTOMY     • AUGMENTATION MAMMAPLASTY     • BRAIN SURGERY  12/8/2005    MVD   • COLONOSCOPY N/A 01/2015    Repeat Q 5 years due to Fhx of Colon Ca-Dr. Polk   • COSMETIC SURGERY  2015    augmentation   • EXTERNAL EAR SURGERY     • HIP SURGERY     • JOINT REPLACEMENT  10/3/16    left hip replacement   • PAP SMEAR N/A 2013    Dr. Burden   • RHINOPLASTY     • SEPTOPLASTY  2000    SEPTO/RHINOPLASTY POST TRAUMA   • SINUS SURGERY  1980'S   • TONSILLECTOMY  AGE 5       Social History:  Social History     Socioeconomic History   • Marital status:      Spouse name: Not on file   • Number of children: Not on file   • Years of education: Not on file   • Highest education level: Not on file   Tobacco Use   • Smoking status: Never Smoker   • Smokeless tobacco: Never Used   • Tobacco comment: daily caffiene   Substance and Sexual Activity   • Alcohol use: Yes     Types: 3 Glasses of wine per week     Comment: moderate   • Drug use: No   • Sexual activity: Yes     Partners: Male     Birth control/protection: Post-menopausal, None       Allergies:  Allergies   Allergen Reactions   • Amlodipine Other (See Comments)     GINGIVITIS- amlodipine induced.    •  Bystolic [Nebivolol Hcl] Other (See Comments)     Extreme fatigue, dizziness   • Lisinopril Other (See Comments)     COUGH       Immunizations:  Immunization History   Administered Date(s) Administered   • Flu Mist 09/01/2015   • Fluzone High Dose =>65 Years ([employer] ONLY) 09/01/2015, 10/19/2016, 10/01/2018   • Hepatitis A 04/24/2018   • Influenza Quad Vaccine (Inpatient) 09/29/2017   • Influenza, Unspecified 10/02/2018   • Pneumococcal Conjugate 13-Valent (PCV13) 08/17/2015   • Shingrix 04/24/2018, 07/30/2018   • Tdap 12/17/2013       ROS:  Review of Systems   Constitution: Negative for chills, decreased appetite, fever, malaise/fatigue, weight gain and weight loss.   HENT: Negative for congestion, hoarse voice, nosebleeds and sore throat.    Eyes: Negative for blurred vision, double vision and visual disturbance.   Cardiovascular: Negative for chest pain, claudication, dyspnea on exertion, irregular heartbeat, leg swelling, near-syncope, orthopnea, palpitations, paroxysmal nocturnal dyspnea and syncope.   Respiratory: Negative for cough, hemoptysis, shortness of breath, sleep disturbances due to breathing, snoring, sputum production and wheezing.    Endocrine: Negative for cold intolerance, heat intolerance, polydipsia and polyuria.   Hematologic/Lymphatic: Negative for adenopathy and bleeding problem. Does not bruise/bleed easily.   Skin: Negative for flushing, itching, nail changes and rash.   Musculoskeletal: Positive for arthritis. Negative for back pain, joint pain, muscle cramps, muscle weakness, myalgias and neck pain.   Gastrointestinal: Negative for bloating, abdominal pain, anorexia, change in bowel habit, constipation, diarrhea, heartburn, hematemesis, hematochezia, jaundice, melena, nausea and vomiting.   Genitourinary: Negative for dysuria, hematuria and nocturia.   Neurological: Negative for brief paralysis, disturbances in coordination, excessive daytime sleepiness, dizziness, headaches,  "light-headedness, loss of balance, numbness, paresthesias, seizures and vertigo.        Occasional sense of unsteadiness   Psychiatric/Behavioral: Negative for altered mental status and depression. The patient is not nervous/anxious.    Allergic/Immunologic: Negative for environmental allergies and hives.          Objective:         /68   Pulse 89   Ht 168.9 cm (66.5\")   Wt 56.7 kg (125 lb)   LMP  (LMP Unknown)   BMI 19.87 kg/m²     Physical Exam   Constitutional: She is oriented to person, place, and time. She appears well-developed and well-nourished. No distress.   HENT:   Head: Normocephalic and atraumatic.   Mouth/Throat: Oropharynx is clear and moist.   Eyes: Conjunctivae and EOM are normal. Pupils are equal, round, and reactive to light. No scleral icterus.   Neck: Normal range of motion. Neck supple. No thyromegaly present.   Cardiovascular: Normal rate, regular rhythm, S1 normal, S2 normal and intact distal pulses.  No extrasystoles are present. PMI is not displaced. Exam reveals no gallop, no S3, no S4, no friction rub and no decreased pulses.   No murmur heard.  Pulses:       Carotid pulses are 2+ on the right side, and 2+ on the left side.       Dorsalis pedis pulses are 2+ on the right side, and 2+ on the left side.        Posterior tibial pulses are 2+ on the right side, and 2+ on the left side.   Pulmonary/Chest: Effort normal and breath sounds normal. No respiratory distress. She has no wheezes. She has no rales.   Abdominal: Soft. Bowel sounds are normal. She exhibits no distension and no mass. There is no tenderness. There is no rebound and no guarding.   Musculoskeletal: Normal range of motion. She exhibits no edema.   Lymphadenopathy:     She has no cervical adenopathy.   Neurological: She is alert and oriented to person, place, and time. Coordination normal.   Skin: Skin is warm and dry. No rash noted. She is not diaphoretic. No pallor.   Psychiatric: She has a normal mood and affect. " Her behavior is normal.   Nursing note and vitals reviewed.      In-Office Procedure(s):  Procedures    ASCVD RIsk Score::  The ASCVD Risk score (Charles Townmarsha MENARD Jr., et al., 2013) failed to calculate for the following reasons:    The patient has a prior MI or stroke diagnosis    Recent Radiology:  Imaging Results (Most Recent)     None          Lab Review:   not applicable             Assessment:          Diagnosis Plan   1. Cerebrovascular accident (CVA), unspecified mechanism (CMS/HCC)     2. Essential hypertension     3. Hyperlipidemia LDL goal <70            Plan:      I have referred her to Dr.Harold Jacobson for specific discussion regarding her LDL  She will see Dr. Evan Mccrary's for preventative cardiology, in-depth discussions  I did discuss a loop recorder since she did have a stroke with the source unidentified.  She will think about this      Level of Care:                 Katarina Leung MD  11/25/19  .

## 2019-11-26 RX ORDER — SERTRALINE HYDROCHLORIDE 25 MG/1
TABLET, FILM COATED ORAL
Qty: 30 TABLET | Refills: 6 | Status: SHIPPED | OUTPATIENT
Start: 2019-11-26 | End: 2020-04-21 | Stop reason: SDUPTHER

## 2019-12-23 ENCOUNTER — TELEPHONE (OUTPATIENT)
Dept: INTERNAL MEDICINE | Facility: CLINIC | Age: 69
End: 2019-12-23

## 2019-12-23 DIAGNOSIS — I63.9 CEREBROVASCULAR ACCIDENT (CVA), UNSPECIFIED MECHANISM (HCC): ICD-10-CM

## 2019-12-23 RX ORDER — CLOPIDOGREL BISULFATE 75 MG/1
75 TABLET ORAL DAILY
Qty: 90 TABLET | Refills: 0 | Status: SHIPPED | OUTPATIENT
Start: 2019-12-23 | End: 2020-01-28

## 2019-12-23 NOTE — TELEPHONE ENCOUNTER
Patient requested a refill on herclopidogrel (PLAVIX) 75 MG tablet medication be sent to her local [facility] pharm that is in her chart. She states that she is out of medication and would like this sent in before the holidays, if there is any questions or concerns the patient may be reached at [phone]

## 2020-01-13 ENCOUNTER — TELEPHONE (OUTPATIENT)
Dept: CARDIOLOGY | Facility: CLINIC | Age: 70
End: 2020-01-13

## 2020-01-13 NOTE — TELEPHONE ENCOUNTER
MILES    PT IS GOING TO SEE [facility] TODAY AND WILL FOLLOW UP AT NEXT APPNT.    PT CB # [phone]

## 2020-01-20 ENCOUNTER — TELEPHONE (OUTPATIENT)
Dept: NEUROLOGY | Facility: CLINIC | Age: 70
End: 2020-01-20

## 2020-01-21 DIAGNOSIS — Z86.73 HISTORY OF CVA (CEREBROVASCULAR ACCIDENT): Primary | ICD-10-CM

## 2020-01-24 ENCOUNTER — TELEPHONE (OUTPATIENT)
Dept: GASTROENTEROLOGY | Facility: CLINIC | Age: 70
End: 2020-01-24

## 2020-01-24 NOTE — TELEPHONE ENCOUNTER
Patient contacted Dr. Polk to schedule endoscopic evaluation.  She has had change in medical condition and he recommends a follow-up office discuss how to proceed/possible noninvasive monitoring versus endoscopic evaluation.    Please arrange follow-up office visit per Dr Polk request. She call CC last and it is ok to schedule with CC is patient is agreeable. Jalil

## 2020-01-27 ENCOUNTER — TELEPHONE (OUTPATIENT)
Dept: INTERNAL MEDICINE | Facility: CLINIC | Age: 70
End: 2020-01-27

## 2020-01-27 DIAGNOSIS — Z12.31 ENCOUNTER FOR SCREENING MAMMOGRAM FOR BREAST CANCER: ICD-10-CM

## 2020-01-27 DIAGNOSIS — Z78.0 MENOPAUSE: Primary | ICD-10-CM

## 2020-01-27 NOTE — TELEPHONE ENCOUNTER
----- Message from Alondra Castaneda sent at 1/27/2020 10:23 AM EST -----  Contact: pt  My chart message from patient. Please ask Dr Fonseca to place orders in patient chart. Thanks    my mammogram and bone density are due after 2/18.   Would like a 3-d mammogram

## 2020-01-28 ENCOUNTER — OFFICE VISIT (OUTPATIENT)
Dept: GASTROENTEROLOGY | Facility: CLINIC | Age: 70
End: 2020-01-28

## 2020-01-28 ENCOUNTER — PREP FOR SURGERY (OUTPATIENT)
Dept: OTHER | Facility: HOSPITAL | Age: 70
End: 2020-01-28

## 2020-01-28 VITALS
DIASTOLIC BLOOD PRESSURE: 64 MMHG | WEIGHT: 124 LBS | HEIGHT: 67 IN | OXYGEN SATURATION: 95 % | BODY MASS INDEX: 19.46 KG/M2 | SYSTOLIC BLOOD PRESSURE: 106 MMHG | TEMPERATURE: 99 F | HEART RATE: 73 BPM

## 2020-01-28 DIAGNOSIS — Z80.0 FAMILY HISTORY OF COLON CANCER: ICD-10-CM

## 2020-01-28 DIAGNOSIS — K22.70 BARRETT'S ESOPHAGUS WITHOUT DYSPLASIA: Primary | ICD-10-CM

## 2020-01-28 DIAGNOSIS — Z86.010 HISTORY OF COLON POLYPS: ICD-10-CM

## 2020-01-28 DIAGNOSIS — K21.9 GASTROESOPHAGEAL REFLUX DISEASE WITHOUT ESOPHAGITIS: ICD-10-CM

## 2020-01-28 PROCEDURE — 99214 OFFICE O/P EST MOD 30 MIN: CPT | Performed by: NURSE PRACTITIONER

## 2020-01-28 RX ORDER — ALIROCUMAB 75 MG/ML
INJECTION, SOLUTION SUBCUTANEOUS
COMMUNITY
Start: 2019-10-28 | End: 2020-08-26

## 2020-01-28 NOTE — PROGRESS NOTES
Chief Complaint   Patient presents with   • Discuss Scopes   Fonseca's esophagus, colon polyps    HPI  Patient is a 69-year-old female who presents today for follow-up.  She has a history of Fonseca's esophagus and colon polyps.  Her last colonoscopy was January 2015.  On this exam, she had 1 polyp and internal hemorrhoids.  She has a history of a tortuous colon.  Her father had colon cancer.    Patient presents today to discuss EGD and colonoscopy.  She is due for surveillance of Fonseca's esophagus and colon polyps.    She had a lacunar stroke September 2017, she is currently on aspirin for prevention.  She reports that she is working to discontinue Plavix with her neurologist.  Since her stroke, she had a breast reduction and shoulder replacement.  She reports that she tolerated the surgical procedures however she did have an ileus after the shoulder replacement.  She reports that she quickly improved from this after starting laxatives.    Today in the office, she reports she is feeling well.  She has been taking famotidine for GERD.  She has had symptoms for years.  She reports symptoms are controlled with famotidine.  She occasionally has breakthrough symptoms at night if she eats late.  She denies any dysphasia, nausea or vomiting.    She denies any abdominal pain or cramping.  Reports regular daily bowel movements with no blood in the stool or dark tarry stools.    She has questions about probiotics at today's office visit.      Review of Systems   Constitutional: Negative for appetite change, chills, diaphoresis, fatigue, fever and unexpected weight change.   HENT: Negative for dental problem, mouth sores, rhinorrhea, sore throat and voice change.    Eyes: Negative for pain, redness and visual disturbance.   Respiratory: Negative for cough, chest tightness and wheezing.    Cardiovascular: Negative for chest pain, palpitations and leg swelling.   Endocrine: Negative for cold intolerance, heat intolerance,  polydipsia, polyphagia and polyuria.   Genitourinary: Positive for frequency. Negative for dysuria, hematuria and urgency.   Musculoskeletal: Negative for arthralgias, back pain, joint swelling, myalgias and neck pain.   Skin: Negative for rash.   Allergic/Immunologic: Positive for environmental allergies. Negative for food allergies and immunocompromised state.   Neurological: Positive for weakness. Negative for dizziness, seizures, numbness and headaches.   Hematological: Bruises/bleeds easily.   Psychiatric/Behavioral: Negative for sleep disturbance. The patient is not nervous/anxious.         Problem List:    Patient Active Problem List   Diagnosis   • Hypertension   • Allergic rhinitis   • Trigeminal neuralgia   • Hyperlipidemia LDL goal <70   • Osteoarthritis of hip   • Vitamin D deficiency   • History of CVA (cerebrovascular accident)   • Stroke (CMS/HCC)   • Heartburn   • Frequent urination   • Left shoulder pain   • Age-related osteoporosis without current pathological fracture   • Chronic fatigue   • Statin intolerance   • Overactive bladder   • Intermittent lightheadedness   • Vision disturbance   • Elevated coronary artery calcium score   • Sequelae, post-stroke   • Numbness and tingling of both legs       Medical History:    Past Medical History:   Diagnosis Date   • Allergic rhinitis    • Anemia    • Arthritis 2016   • Bronchitis    • CVA (cerebral vascular accident) (CMS/HCC)    • Fatigue    • FH: colon cancer    • H/O bone density study 2013   • H/O mammogram 2013   • Hyperlipidemia    • Hypertension     Benign Essential   • Malaise and fatigue    • Muscle pain    • Nocturia    • Osteoporosis    • Stroke (CMS/HCC) 09/13/2017   • TMJ (temporomandibular joint syndrome)    • Trigeminal neuralgia     Surgery at [facility] in 2009 repeat in 2015        Social History:    Social History     Socioeconomic History   • Marital status:      Spouse name: Not on file   • Number of children: Not on  file   • Years of education: Not on file   • Highest education level: Not on file   Tobacco Use   • Smoking status: Never Smoker   • Smokeless tobacco: Never Used   • Tobacco comment: daily caffiene   Substance and Sexual Activity   • Alcohol use: Yes     Types: 3 Glasses of wine per week     Comment: moderate   • Drug use: No   • Sexual activity: Yes     Partners: Male     Birth control/protection: Post-menopausal, None       Family History:   Family History   Problem Relation Age of Onset   • Pancreatic cancer Mother    • Hyperlipidemia Mother    • Cancer Mother            • Hypertension Mother    • Miscarriages / Stillbirths Mother    • Colon cancer Father 56   • Arthritis Father    • Cancer Father            • Hearing loss Father         WAR INJURY   • Vision loss Father         dry glaucoma   • Liver cancer Brother 40   • Cancer Brother                Surgical History:   Past Surgical History:   Procedure Laterality Date   • ADENOIDECTOMY     • AUGMENTATION MAMMAPLASTY     • BRAIN SURGERY  2005    MVD   • COLONOSCOPY N/A 2015    Repeat Q 5 years due to Fhx of Colon Ca-Dr. Polk   • COSMETIC SURGERY      augmentation   • EXTERNAL EAR SURGERY     • HIP SURGERY     • JOINT REPLACEMENT  10/3/16    left hip replacement   • PAP SMEAR N/A     Dr. Burden   • RHINOPLASTY     • SEPTOPLASTY      SEPTO/RHINOPLASTY POST TRAUMA   • SINUS SURGERY     • TONSILLECTOMY  AGE 5         Current Outpatient Medications:   •  ALPHA LIPOIC ACID PO, Take  by mouth Daily., Disp: , Rfl:   •  cholecalciferol (VITAMIN D3) 1000 UNITS tablet, Take 2,000 Units by mouth 2 (Two) Times a Day., Disp: , Rfl:   •  Coenzyme Q10 (COQ-10) 400 MG capsule, Take  by mouth Daily., Disp: , Rfl:   •  cycloSPORINE (RESTASIS) 0.05 % ophthalmic emulsion, Apply 1 drop to eye(s) as directed by provider 2 (Two) Times a Day., Disp: , Rfl:   •  ferrous sulfate 324 (65 Fe) MG tablet delayed-release EC tablet, Take  324 mg by mouth Daily With Breakfast., Disp: , Rfl:   •  gabapentin (NEURONTIN) 100 MG capsule, Take 1 capsule by mouth 3 (Three) Times a Day. (Patient taking differently: Take 100 mg by mouth 3 (Three) Times a Day. prn), Disp: 90 capsule, Rfl: 5  •  losartan-hydrochlorothiazide (HYZAAR) 100-12.5 MG per tablet, Take 1 tablet by mouth Daily., Disp: 90 tablet, Rfl: 3  •  PRALUENT 75 MG/ML solution auto-injector, , Disp: , Rfl:   •  Probiotic Product (PROBIOTIC-10 PO), Take 1 capsule by mouth Daily., Disp: , Rfl:   •  sertraline (ZOLOFT) 25 MG tablet, TAKE 1 TABLET BY MOUTH EVERY DAY, Disp: 30 tablet, Rfl: 6  •  SUPER B COMPLEX/C PO, Take  by mouth., Disp: , Rfl:   •  vitamin B-12 (CYANOCOBALAMIN) 1000 MCG tablet, 5,000 mcg Daily., Disp: , Rfl:     Allergies:  Bystolic [nebivolol hcl]; Amlodipine; and Lisinopril    Vitals:    01/28/20 0953   BP: 106/64   Pulse: 73   Temp: 99 °F (37.2 °C)   SpO2: 95%       Physical Exam   Constitutional: She is oriented to person, place, and time. She appears well-developed and well-nourished. No distress.   HENT:   Head: Normocephalic and atraumatic.   Pulmonary/Chest: Effort normal. No respiratory distress.   Neurological: She is alert and oriented to person, place, and time.   Skin: Skin is dry.   Normal color   Psychiatric: She has a normal mood and affect. Thought content normal.   Vitals reviewed.      Assessment/ Plan  Diagnoses and all orders for this visit:    Fonseca's esophagus without dysplasia    Gastroesophageal reflux disease without esophagitis    Family history of colon cancer    History of colon polyps    Other orders  -     PRALUENT 75 MG/ML solution auto-injector        No follow-ups on file.      Discussion:  Risks and benefits of EGD and colonoscopy were discussed at today's office visit.  Discussed with patient that due to comorbidities, blood thinners, and tortuous colon, I do feel she is at an increased risk.  Discussed alternative methodologies for screening  including Cologuard and CT colonography.      After our discussion, patient would like to proceed with the testing as recommended by myself and Dr. Polk and is interested in EGD and colonoscopy for direct visualization.  Overall, she has improved since her stroke and is currently in good health.  Discussed case with Dr. Polk, we will plan to proceed with EGD and colonoscopy.  If colonoscopy is difficult and unable to be completed to the cecum, may need to consider CT colonography for complete evaluation.    Prior records were reviewed at today's office visit as summarized in HPI.

## 2020-01-30 ENCOUNTER — OFFICE VISIT (OUTPATIENT)
Dept: INTERNAL MEDICINE | Facility: CLINIC | Age: 70
End: 2020-01-30

## 2020-01-30 VITALS
SYSTOLIC BLOOD PRESSURE: 110 MMHG | HEART RATE: 90 BPM | OXYGEN SATURATION: 98 % | WEIGHT: 124 LBS | DIASTOLIC BLOOD PRESSURE: 66 MMHG | HEIGHT: 67 IN | BODY MASS INDEX: 19.46 KG/M2 | TEMPERATURE: 99 F

## 2020-01-30 DIAGNOSIS — J06.9 ACUTE URI: Primary | ICD-10-CM

## 2020-01-30 PROCEDURE — 99213 OFFICE O/P EST LOW 20 MIN: CPT | Performed by: NURSE PRACTITIONER

## 2020-01-30 RX ORDER — AZITHROMYCIN 250 MG/1
TABLET, FILM COATED ORAL
Qty: 6 TABLET | Refills: 0 | Status: SHIPPED | OUTPATIENT
Start: 2020-01-30 | End: 2020-02-25

## 2020-01-30 RX ORDER — ASPIRIN 81 MG/1
81 TABLET ORAL DAILY
COMMUNITY

## 2020-01-30 RX ORDER — GUAIFENESIN 600 MG/1
600 TABLET, EXTENDED RELEASE ORAL 2 TIMES DAILY
Qty: 14 TABLET | Refills: 0
Start: 2020-01-30 | End: 2020-02-06

## 2020-01-30 NOTE — PATIENT INSTRUCTIONS
"Upper Respiratory Infection, Adult  An upper respiratory infection (URI) affects the nose, throat, and upper air passages. URIs are caused by germs (viruses). The most common type of URI is often called \"the common cold.\"  Medicines cannot cure URIs, but you can do things at home to relieve your symptoms. URIs usually get better within 7-10 days.  Follow these instructions at home:  Activity  · Rest as needed.  · If you have a fever, stay home from work or school until your fever is gone, or until your doctor says you may return to work or school.  ? You should stay home until you cannot spread the infection anymore (you are not contagious).  ? Your doctor may have you wear a face mask so you have less risk of spreading the infection.  Relieving symptoms  · Gargle with a salt-water mixture 3-4 times a day or as needed. To make a salt-water mixture, completely dissolve ½-1 tsp of salt in 1 cup of warm water.  · Use a cool-mist humidifier to add moisture to the air. This can help you breathe more easily.  Eating and drinking    · Drink enough fluid to keep your pee (urine) pale yellow.  · Eat soups and other clear broths.  General instructions    · Take over-the-counter and prescription medicines only as told by your doctor. These include cold medicines, fever reducers, and cough suppressants.  · Do not use any products that contain nicotine or tobacco. These include cigarettes and e-cigarettes. If you need help quitting, ask your doctor.  · Avoid being where people are smoking (avoid secondhand smoke).  · Make sure you get regular shots and get the flu shot every year.  · Keep all follow-up visits as told by your doctor. This is important.  How to avoid spreading infection to others    · Wash your hands often with soap and water. If you do not have soap and water, use hand .  · Avoid touching your mouth, face, eyes, or nose.  · Cough or sneeze into a tissue or your sleeve or elbow. Do not cough or sneeze " "into your hand or into the air.  Contact a doctor if:  · You are getting worse, not better.  · You have any of these:  ? A fever.  ? Chills.  ? Brown or red mucus in your nose.  ? Yellow or brown fluid (discharge)coming from your nose.  ? Pain in your face, especially when you bend forward.  ? Swollen neck glands.  ? Pain with swallowing.  ? White areas in the back of your throat.  Get help right away if:  · You have shortness of breath that gets worse.  · You have very bad or constant:  ? Headache.  ? Ear pain.  ? Pain in your forehead, behind your eyes, and over your cheekbones (sinus pain).  ? Chest pain.  · You have long-lasting (chronic) lung disease along with any of these:  ? Wheezing.  ? Long-lasting cough.  ? Coughing up blood.  ? A change in your usual mucus.  · You have a stiff neck.  · You have changes in your:  ? Vision.  ? Hearing.  ? Thinking.  ? Mood.  Summary  · An upper respiratory infection (URI) is caused by a germ called a virus. The most common type of URI is often called \"the common cold.\"  · URIs usually get better within 7-10 days.  · Take over-the-counter and prescription medicines only as told by your doctor.  This information is not intended to replace advice given to you by your health care provider. Make sure you discuss any questions you have with your health care provider.  Document Released: 06/05/2009 Document Revised: 08/10/2018 Document Reviewed: 08/10/2018  [employer] Interactive Patient Education © 2019 [employer]      Upper Respiratory Infection, Adult  An upper respiratory infection (URI) affects the nose, throat, and upper air passages. URIs are caused by germs (viruses). The most common type of URI is often called \"the common cold.\"  Medicines cannot cure URIs, but you can do things at home to relieve your symptoms. URIs usually get better within 7-10 days.  Follow these instructions at home:  Activity  · Rest as needed.  · If you have a fever, stay home from work or school " until your fever is gone, or until your doctor says you may return to work or school.  ? You should stay home until you cannot spread the infection anymore (you are not contagious).  ? Your doctor may have you wear a face mask so you have less risk of spreading the infection.  Relieving symptoms  · Gargle with a salt-water mixture 3-4 times a day or as needed. To make a salt-water mixture, completely dissolve ½-1 tsp of salt in 1 cup of warm water.  · Use a cool-mist humidifier to add moisture to the air. This can help you breathe more easily.  Eating and drinking    · Drink enough fluid to keep your pee (urine) pale yellow.  · Eat soups and other clear broths.  General instructions    · Take over-the-counter and prescription medicines only as told by your doctor. These include cold medicines, fever reducers, and cough suppressants.  · Do not use any products that contain nicotine or tobacco. These include cigarettes and e-cigarettes. If you need help quitting, ask your doctor.  · Avoid being where people are smoking (avoid secondhand smoke).  · Make sure you get regular shots and get the flu shot every year.  · Keep all follow-up visits as told by your doctor. This is important.  How to avoid spreading infection to others    · Wash your hands often with soap and water. If you do not have soap and water, use hand .  · Avoid touching your mouth, face, eyes, or nose.  · Cough or sneeze into a tissue or your sleeve or elbow. Do not cough or sneeze into your hand or into the air.  Contact a doctor if:  · You are getting worse, not better.  · You have any of these:  ? A fever.  ? Chills.  ? Brown or red mucus in your nose.  ? Yellow or brown fluid (discharge)coming from your nose.  ? Pain in your face, especially when you bend forward.  ? Swollen neck glands.  ? Pain with swallowing.  ? White areas in the back of your throat.  Get help right away if:  · You have shortness of breath that gets worse.  · You have  "very bad or constant:  ? Headache.  ? Ear pain.  ? Pain in your forehead, behind your eyes, and over your cheekbones (sinus pain).  ? Chest pain.  · You have long-lasting (chronic) lung disease along with any of these:  ? Wheezing.  ? Long-lasting cough.  ? Coughing up blood.  ? A change in your usual mucus.  · You have a stiff neck.  · You have changes in your:  ? Vision.  ? Hearing.  ? Thinking.  ? Mood.  Summary  · An upper respiratory infection (URI) is caused by a germ called a virus. The most common type of URI is often called \"the common cold.\"  · URIs usually get better within 7-10 days.  · Take over-the-counter and prescription medicines only as told by your doctor.  This information is not intended to replace advice given to you by your health care provider. Make sure you discuss any questions you have with your health care provider.  Document Released: 06/05/2009 Document Revised: 08/10/2018 Document Reviewed: 08/10/2018  [employer] Interactive Patient Education © 2019 [employer]      "

## 2020-01-30 NOTE — PROGRESS NOTES
Subjective   Rosi Vicente is a 69 y.o. female.     No recent air travel. Her spouse had flu shot. She did receive are flu shot. She is due to travel [place] next week.     URI    This is a new problem. The current episode started 1 to 4 weeks ago. The problem has been gradually worsening. There has been no fever. Associated symptoms include congestion, coughing, rhinorrhea, sinus pain, sneezing and wheezing (worst at night time ). Pertinent negatives include no abdominal pain, chest pain, diarrhea, ear pain, headaches, nausea, neck pain, plugged ear sensation, sore throat or vomiting. Treatments tried: robitussin dm and cough drops  The treatment provided moderate relief.        The following portions of the patient's history were reviewed and updated as appropriate: allergies, current medications, past social history and problem list.    Review of Systems   Constitutional: Negative for appetite change, chills, fatigue and fever.   HENT: Positive for congestion, postnasal drip, rhinorrhea, sinus pain and sneezing. Negative for ear discharge, ear pain, facial swelling, hearing loss, sinus pressure, sore throat and tinnitus.    Respiratory: Positive for cough and wheezing (worst at night time ). Negative for chest tightness and shortness of breath.    Cardiovascular: Negative for chest pain, palpitations and leg swelling.   Gastrointestinal: Negative for abdominal pain, diarrhea, nausea and vomiting.   Musculoskeletal: Negative for neck pain and neck stiffness.   Allergic/Immunologic: Positive for environmental allergies.   Neurological: Negative for headaches.   Hematological: Negative for adenopathy.       Objective   Physical Exam   Constitutional: She appears well-developed and well-nourished. She is cooperative. She does not have a sickly appearance. She does not appear ill.   HENT:   Head: Normocephalic.   Right Ear: Hearing, tympanic membrane and external ear normal. No drainage, swelling or tenderness. No  mastoid tenderness. Tympanic membrane is not injected, not scarred, not erythematous and not bulging. Tympanic membrane mobility is normal. No middle ear effusion. No decreased hearing is noted.   Left Ear: Hearing and external ear normal. No drainage, swelling or tenderness. No foreign bodies. Tympanic membrane is bulging. Tympanic membrane is not injected and not erythematous. Tympanic membrane mobility is normal.  No middle ear effusion. No decreased hearing is noted.   Nose: Mucosal edema and rhinorrhea present. No sinus tenderness or nasal deformity. Right sinus exhibits no maxillary sinus tenderness and no frontal sinus tenderness. Left sinus exhibits no maxillary sinus tenderness and no frontal sinus tenderness.   Mouth/Throat: Mucous membranes are normal. Normal dentition. Posterior oropharyngeal erythema present. No tonsillar exudate.   Air bubbles in left ear    Eyes: Pupils are equal, round, and reactive to light. Conjunctivae and lids are normal. Right eye exhibits no discharge and no exudate. Left eye exhibits no discharge and no exudate.   Neck: Trachea normal and normal range of motion. No edema present. No thyroid mass and no thyromegaly present.   Cardiovascular: Regular rhythm, normal heart sounds and normal pulses.   No murmur heard.  Pulmonary/Chest: Breath sounds normal. No respiratory distress. She has no decreased breath sounds. She has no wheezes. She has no rhonchi. She has no rales.   Moist cough    Lymphadenopathy:        Head (right side): No submental, no submandibular, no tonsillar, no preauricular, no posterior auricular and no occipital adenopathy present.        Head (left side): No submental, no submandibular, no tonsillar, no preauricular, no posterior auricular and no occipital adenopathy present.     She has no cervical adenopathy.   Neurological: She is alert.   Skin: Skin is warm, dry and intact. No cyanosis. Nails show no clubbing.       Assessment/Plan   Rosi was seen today  for cough and nasal congestion.    Diagnoses and all orders for this visit:    Acute URI  Comments:  restart flonase, push fluids   Orders:  -     guaiFENesin (MUCINEX) 600 MG 12 hr tablet; Take 1 tablet by mouth 2 (Two) Times a Day for 7 days.  -     azithromycin (ZITHROMAX Z-JUSTINE) 250 MG tablet; Take 2 tablets the first day, then 1 tablet daily for 4 days.    She will return for worsening of symptoms.

## 2020-02-03 ENCOUNTER — TELEPHONE (OUTPATIENT)
Dept: GASTROENTEROLOGY | Facility: CLINIC | Age: 70
End: 2020-02-03

## 2020-02-04 ENCOUNTER — RESULTS ENCOUNTER (OUTPATIENT)
Dept: NEUROLOGY | Facility: CLINIC | Age: 70
End: 2020-02-04

## 2020-02-04 DIAGNOSIS — Z86.73 HISTORY OF CVA (CEREBROVASCULAR ACCIDENT): ICD-10-CM

## 2020-02-04 NOTE — TELEPHONE ENCOUNTER
Recommendations for EGD and Colonoscopy to follow up on her Barretts and PMH Colon Polyps were made on last visit.  We are requesting clearance for patient to come off ASA and/or Plavix for procedures.      Please advise

## 2020-02-05 NOTE — TELEPHONE ENCOUNTER
JO ANN James has placed a letter in pt's chart indicating clearance. If there is somewhere the letter needs faxed to please let me know.     Thank you.

## 2020-02-10 ENCOUNTER — APPOINTMENT (OUTPATIENT)
Dept: LAB | Facility: HOSPITAL | Age: 70
End: 2020-02-10

## 2020-02-10 LAB — ASA PLATELET INHIBITION: 483 ARU

## 2020-02-10 PROCEDURE — 85576 BLOOD PLATELET AGGREGATION: CPT | Performed by: NURSE PRACTITIONER

## 2020-02-10 PROCEDURE — 36415 COLL VENOUS BLD VENIPUNCTURE: CPT | Performed by: NURSE PRACTITIONER

## 2020-02-11 ENCOUNTER — TELEPHONE (OUTPATIENT)
Dept: NEUROLOGY | Facility: CLINIC | Age: 70
End: 2020-02-11

## 2020-02-18 ENCOUNTER — TELEPHONE (OUTPATIENT)
Dept: GASTROENTEROLOGY | Facility: CLINIC | Age: 70
End: 2020-02-18

## 2020-02-20 ENCOUNTER — TELEPHONE (OUTPATIENT)
Dept: INTERNAL MEDICINE | Facility: CLINIC | Age: 70
End: 2020-02-20

## 2020-02-20 NOTE — TELEPHONE ENCOUNTER
PT CALLED SAYS SHE WOULD LIKE AN ORDER PREPARED FOR REFERRAL TO [facility] DUE TO OVERACTIVE BLADDER, THAT DR SALAZAR IS AWARE OF.     [facility] FAX: [phone]    PT CALLBACK [phone], PLEASE CALL IF ANY QUESTIONS OR PROBLEMS WITH THIS REQUEST.

## 2020-02-21 DIAGNOSIS — R35.0 FREQUENT URINATION: Primary | ICD-10-CM

## 2020-02-25 DIAGNOSIS — I10 ESSENTIAL HYPERTENSION: Primary | ICD-10-CM

## 2020-02-25 RX ORDER — LOSARTAN POTASSIUM AND HYDROCHLOROTHIAZIDE 12.5; 1 MG/1; MG/1
1 TABLET ORAL DAILY
Qty: 90 TABLET | Refills: 1 | Status: SHIPPED | OUTPATIENT
Start: 2020-02-25 | End: 2020-12-02

## 2020-02-25 NOTE — TELEPHONE ENCOUNTER
PT CALLED TO REQUEST A REFILL ON HER losartan-hydrochlorothiazide MEDICATION AND SHE WOULD LIKE FOR THIS TO BE SENT TO THE [facility] IN HER CHART. PT CAN BE REACHED [phone] IF THERE IS ANY QUESTIONS OR CONCERNS.

## 2020-02-25 NOTE — TELEPHONE ENCOUNTER
MILES-----THE PATIENT CALLED BACK TO INFORM DR SALAZAR AND HER NURSE THAT SHE IS USING THE [facility] ON [address] THAT IS IN HER CHART.

## 2020-02-25 NOTE — TELEPHONE ENCOUNTER
Call made to patient to see what [facility] she would like to have her losartan-hydrochlorothiazide (HYZAAR) 100-12.5 MG sent into as [facility] a local [facility] and a [facility] mail order.    Order is pending until informed of correct pharmacy.

## 2020-03-03 ENCOUNTER — TRANSCRIBE ORDERS (OUTPATIENT)
Dept: ADMINISTRATIVE | Facility: HOSPITAL | Age: 70
End: 2020-03-03

## 2020-03-03 ENCOUNTER — OFFICE VISIT (OUTPATIENT)
Dept: INTERNAL MEDICINE | Facility: CLINIC | Age: 70
End: 2020-03-03

## 2020-03-03 VITALS
HEIGHT: 67 IN | BODY MASS INDEX: 19.84 KG/M2 | OXYGEN SATURATION: 98 % | SYSTOLIC BLOOD PRESSURE: 114 MMHG | WEIGHT: 126.4 LBS | DIASTOLIC BLOOD PRESSURE: 76 MMHG | HEART RATE: 66 BPM

## 2020-03-03 DIAGNOSIS — Z86.73 HISTORY OF CVA (CEREBROVASCULAR ACCIDENT): ICD-10-CM

## 2020-03-03 DIAGNOSIS — E78.5 HYPERLIPIDEMIA LDL GOAL <70: ICD-10-CM

## 2020-03-03 DIAGNOSIS — Z13.6 ENCOUNTER FOR SCREENING FOR VASCULAR DISEASE: Primary | ICD-10-CM

## 2020-03-03 DIAGNOSIS — L98.9 SKIN LESION: ICD-10-CM

## 2020-03-03 DIAGNOSIS — N32.81 OVERACTIVE BLADDER: ICD-10-CM

## 2020-03-03 DIAGNOSIS — G50.0 TRIGEMINAL NEURALGIA: ICD-10-CM

## 2020-03-03 DIAGNOSIS — Z00.00 MEDICARE ANNUAL WELLNESS VISIT, SUBSEQUENT: Primary | ICD-10-CM

## 2020-03-03 DIAGNOSIS — I10 ESSENTIAL HYPERTENSION: ICD-10-CM

## 2020-03-03 DIAGNOSIS — R35.0 FREQUENT URINATION: ICD-10-CM

## 2020-03-03 DIAGNOSIS — H04.123 DRY EYE SYNDROME OF BOTH EYES: ICD-10-CM

## 2020-03-03 DIAGNOSIS — H53.9 VISION DISTURBANCE: ICD-10-CM

## 2020-03-03 PROCEDURE — 99213 OFFICE O/P EST LOW 20 MIN: CPT | Performed by: INTERNAL MEDICINE

## 2020-03-03 PROCEDURE — G0439 PPPS, SUBSEQ VISIT: HCPCS | Performed by: INTERNAL MEDICINE

## 2020-03-03 RX ORDER — FLUTICASONE PROPIONATE 50 MCG
2 SPRAY, SUSPENSION (ML) NASAL DAILY
COMMUNITY
Start: 2020-02-26 | End: 2021-08-31 | Stop reason: SDUPTHER

## 2020-03-03 NOTE — PATIENT INSTRUCTIONS
Medicare Wellness  Personal Prevention Plan of Service     Date of Office Visit:  2020  Encounter Provider:  Dionne Fonseca MD  Place of Service:  [facility]  Patient Name: Rosi Vicente  :  1950    As part of the Medicare Wellness portion of your visit today, we are providing you with this personalized preventive plan of services (PPPS). This plan is based upon recommendations of the [place] Preventive Services Task Force (USPSTF) and the Advisory Committee on Immunization Practices (ACIP).    This lists the preventive care services that should be considered, and provides dates of when you are due. Items listed as completed are up-to-date and do not require any further intervention.    Health Maintenance   Topic Date Due   • Pneumococcal Vaccine Once at 65 Years Old  2014   • LIPID PANEL  2020   • MAMMOGRAM  2021   • MEDICARE ANNUAL WELLNESS  2021   • DXA SCAN  2021   • TDAP/TD VACCINES (2 - Td) 2023   • COLONOSCOPY  2025   • HEPATITIS C SCREENING  Completed   • INFLUENZA VACCINE  Completed   • ZOSTER VACCINE  Completed       No orders of the defined types were placed in this encounter.      No follow-ups on file.

## 2020-03-03 NOTE — PROGRESS NOTES
The ABCs of the Annual Wellness Visit  Subsequent Medicare Wellness Visit    Chief Complaint   Patient presents with   • Medicare Wellness-subsequent       Subjective   History of Present Illness:  Rosi Vicente is a 69 y.o. female who presents for a Subsequent Medicare Wellness Visit.    HEALTH RISK ASSESSMENT    Recent Hospitalizations:  No hospitalization(s) within the last year.    Current Medical Providers:  Patient Care Team:  Dionne Fonseca MD as PCP - General (Internal Medicine)  Hermes Anne MD as PCP - Claims Attributed  Christopher Young MD as Consulting Physician (Otolaryngology)  Antonio Davila MD as Consulting Physician (Orthopedic Surgery)  Manny Sexton MD as Consulting Physician (Dermatology)  Víctor Polk MD as Consulting Physician (Gastroenterology)  Kori Vaughn MD as Consulting Physician (Neurosurgery)  Hermes Anne MD as Consulting Physician (Orthopedic Surgery)  Peri James APRN as Nurse Practitioner (Neurology)  Gallo Shields MD as Consulting Physician (Ophthalmology)  Yuri Lay DPM as Consulting Physician (Podiatry)  Katarina Leung MD as Consulting Physician (Cardiology)    Smoking Status:  Social History     Tobacco Use   Smoking Status Never Smoker   Smokeless Tobacco Never Used   Tobacco Comment    daily caffiene       Alcohol Consumption:  Social History     Substance and Sexual Activity   Alcohol Use Yes   • Types: 3 Glasses of wine per week    Comment: moderate       Depression Screen:   PHQ-2/PHQ-9 Depression Screening 4/9/2019   Little interest or pleasure in doing things 0   Feeling down, depressed, or hopeless 0   Trouble falling or staying asleep, or sleeping too much -   Feeling tired or having little energy -   Poor appetite or overeating -   Feeling bad about yourself - or that you are a failure or have let yourself or your family down -   Trouble concentrating on things, such as reading the newspaper or  watching television -   Moving or speaking so slowly that other people could have noticed. Or the opposite - being so fidgety or restless that you have been moving around a lot more than usual -   Thoughts that you would be better off dead, or of hurting yourself in some way -   Total Score 0       Fall Risk Screen:  CH[facility] Fall Risk Assessment has not been completed.    Health Habits and Functional and Cognitive Screening:  Functional & Cognitive Status 3/3/2020   Do you have difficulty preparing food and eating? No   Do you have difficulty bathing yourself, getting dressed or grooming yourself? -   Do you have difficulty using the toilet? -   Do you have difficulty moving around from place to place? -   Do you have trouble with steps or getting out of a bed or a chair? -   Do you need help using the phone?  -   Are you deaf or do you have serious difficulty hearing?  -   Do you need help with transportation? -   Do you need help shopping? -   Do you need help preparing meals?  -   Do you need help with housework?  -   Do you need help with laundry? -   Do you need help taking your medications? -   Do you need help managing money? -   Do you ever drive or ride in a car without wearing a seat belt? -   Do you have difficulty concentrating, remembering or making decisions? -         Does the patient have evidence of cognitive impairment? No    Asprin use counseling:Taking ASA appropriately as indicated    Age-appropriate Screening Schedule:  Refer to the list below for future screening recommendations based on patient's age, sex and/or medical conditions. Orders for these recommended tests are listed in the plan section. The patient has been provided with a written plan.    Health Maintenance   Topic Date Due   • LIPID PANEL  11/14/2020   • MAMMOGRAM  02/18/2021   • DXA SCAN  06/03/2021   • TDAP/TD VACCINES (2 - Td) 12/17/2023   • COLONOSCOPY  01/06/2025   • INFLUENZA VACCINE  Completed   • ZOSTER VACCINE  Completed           The following portions of the patient's history were reviewed and updated as appropriate: allergies, current medications, past family history, past medical history, past social history, past surgical history and problem list.    Outpatient Medications Prior to Visit   Medication Sig Dispense Refill   • ALPHA LIPOIC ACID PO Take  by mouth Daily.     • aspirin 81 MG EC tablet Take 81 mg by mouth Daily.     • cholecalciferol (VITAMIN D3) 1000 UNITS tablet Take 2,000 Units by mouth 2 (Two) Times a Day.     • Coenzyme Q10 (COQ-10) 400 MG capsule Take  by mouth Daily.     • cycloSPORINE (RESTASIS) 0.05 % ophthalmic emulsion Apply 1 drop to eye(s) as directed by provider 2 (Two) Times a Day.     • fluticasone (FLONASE) 50 MCG/ACT nasal spray 2 sprays into the nostril(s) as directed by provider Daily.     • gabapentin (NEURONTIN) 100 MG capsule Take 1 capsule by mouth 3 (Three) Times a Day. (Patient taking differently: Take 100 mg by mouth 3 (Three) Times a Day. prn) 90 capsule 5   • Icosapent Ethyl (VASCEPA PO) Take 2,000 mg by mouth 2 (Two) Times a Day.     • losartan-hydrochlorothiazide (HYZAAR) 100-12.5 MG per tablet Take 1 tablet by mouth Daily. 90 tablet 1   • PRALUENT 75 MG/ML solution auto-injector      • Probiotic Product (PROBIOTIC-10 PO) Take 1 capsule by mouth Daily.     • sertraline (ZOLOFT) 25 MG tablet TAKE 1 TABLET BY MOUTH EVERY DAY (Patient taking differently: 12.5 mg.) 30 tablet 6   • SUPER B COMPLEX/C PO Take  by mouth.     • vitamin B-12 (CYANOCOBALAMIN) 1000 MCG tablet 5,000 mcg Daily.       No facility-administered medications prior to visit.        Patient Active Problem List   Diagnosis   • Hypertension   • Allergic rhinitis   • Trigeminal neuralgia   • Hyperlipidemia LDL goal <70   • Osteoarthritis of hip   • Vitamin D deficiency   • History of CVA (cerebrovascular accident)   • Stroke (CMS/HCC)   • Heartburn   • Frequent urination   • Left shoulder pain   • Age-related osteoporosis  "without current pathological fracture   • Chronic fatigue   • Statin intolerance   • Overactive bladder   • Intermittent lightheadedness   • Vision disturbance   • Elevated coronary artery calcium score   • Sequelae, post-stroke   • Numbness and tingling of both legs   • Dry eye syndrome of both eyes       Advanced Care Planning:  ACP discussion was held with the patient during this visit. Patient has an advance directive (not in EMR), copy requested.    Review of Systems   Constitutional: Negative for chills, fatigue and fever.   Eyes: Positive for visual disturbance (blurred vision from dry eyes).   Respiratory: Negative for cough, shortness of breath and wheezing.    Cardiovascular: Negative for chest pain, palpitations and leg swelling.   Genitourinary: Positive for difficulty urinating (goint to PT for urinary incontinence), enuresis and urgency. Negative for dysuria and hematuria.   Musculoskeletal: Positive for arthralgias (hands & right shoulder pain).   Psychiatric/Behavioral: Negative for dysphoric mood and sleep disturbance. The patient is not nervous/anxious.        Compared to one year ago, the patient feels her physical health is the same.  Compared to one year ago, the patient feels her mental health is the same.    Reviewed chart for potential of high risk medication in the elderly: yes  Reviewed chart for potential of harmful drug interactions in the elderly:yes    Objective         Vitals:    03/03/20 1417   BP: 114/76   BP Location: Left arm   Patient Position: Sitting   Cuff Size: Adult   Pulse: 66   SpO2: 98%   Weight: 57.3 kg (126 lb 6.4 oz)   Height: 168.9 cm (66.5\")       Body mass index is 20.1 kg/m².  Discussed the patient's BMI with her. The BMI is in the acceptable range.    Physical Exam   Constitutional: She appears well-developed and well-nourished. No distress.   Cardiovascular: Normal rate, regular rhythm and normal heart sounds.   Pulmonary/Chest: No respiratory distress. She has no " wheezes. She has no rales. She exhibits no tenderness.   Musculoskeletal: She exhibits no edema, tenderness or deformity.   Neurological: She is alert. No cranial nerve deficit.   Psychiatric: She has a normal mood and affect. Her behavior is normal.   Nursing note and vitals reviewed.            Assessment/Plan   Medicare Risks and Personalized Health Plan  CMS Preventative Services Quick Reference  Abdominal Aortic Aneurysm Screening  Advance Directive Discussion  Breast Cancer/Mammogram Screening  Cardiovascular risk  Osteoprorosis Risk  Urinary Incontinence    The above risks/problems have been discussed with the patient.  Pertinent information has been shared with the patient in the After Visit Summary.  Follow up plans and orders are seen below in the Assessment/Plan Section.    Diagnoses and all orders for this visit:    1. Medicare annual wellness visit, subsequent (Primary)    2. Skin lesion  Comments:  refer to derm  Orders:  -     Ambulatory Referral to Dermatology    3. Overactive bladder    4. History of CVA (cerebrovascular accident)  Comments:  f/u with neuro    5. Vision disturbance  Comments:  f/u with opthal    6. Frequent urination  Comments:  continue PT    7. Trigeminal neuralgia  Comments:  ok with occ gabapentin    8. Essential hypertension  Comments:  controlled - call if bp over 130/80    9. Hyperlipidemia LDL goal <70  Comments:  she wants to change vascepa to otc fish oil    10. Dry eye syndrome of both eyes  Comments:  f/u with opthal      Follow Up:  Return in about 6 months (around 9/3/2020) for Recheck.     An After Visit Summary and PPPS were given to the patient.        Need screening for AAA & carotid disease.  Information given today.   Need daily strengthening & balance exercises (shown today).     She had pneu 23 sev yrs ago - I need report.

## 2020-03-03 NOTE — PROGRESS NOTES
"Julio C Vicente is a 69 y.o. female here for   Chief Complaint   Patient presents with   • Medicare Wellness-subsequent   .    Vitals:    03/03/20 1417   BP: 114/76   BP Location: Left arm   Patient Position: Sitting   Cuff Size: Adult   Pulse: 66   SpO2: 98%   Weight: 57.3 kg (126 lb 6.4 oz)   Height: 168.9 cm (66.5\")       Body mass index is 20.1 kg/m².    History of Present Illness     The following portions of the patient's history were reviewed and updated as appropriate: allergies, current medications, past social history and problem list.    Review of Systems    Objective   Physical Exam    Assessment/Plan   There are no diagnoses linked to this encounter.       "

## 2020-03-06 ENCOUNTER — HOSPITAL ENCOUNTER (OUTPATIENT)
Dept: MAMMOGRAPHY | Facility: HOSPITAL | Age: 70
Discharge: HOME | End: 2020-03-06

## 2020-03-06 ENCOUNTER — HOSPITAL ENCOUNTER (OUTPATIENT)
Dept: BONE DENSITY | Facility: HOSPITAL | Age: 70
Discharge: HOME | End: 2020-03-06
Admitting: INTERNAL MEDICINE

## 2020-03-06 DIAGNOSIS — Z78.0 MENOPAUSE: ICD-10-CM

## 2020-03-06 DIAGNOSIS — Z12.31 ENCOUNTER FOR SCREENING MAMMOGRAM FOR BREAST CANCER: ICD-10-CM

## 2020-03-06 PROCEDURE — 77080 DXA BONE DENSITY AXIAL: CPT

## 2020-03-06 PROCEDURE — 77067 SCR MAMMO BI INCL CAD: CPT

## 2020-03-06 PROCEDURE — 77063 BREAST TOMOSYNTHESIS BI: CPT

## 2020-03-12 ENCOUNTER — LAB REQUISITION (OUTPATIENT)
Dept: LAB | Facility: HOSPITAL | Age: 70
End: 2020-03-12

## 2020-03-12 ENCOUNTER — OUTSIDE FACILITY SERVICE (OUTPATIENT)
Dept: GASTROENTEROLOGY | Facility: CLINIC | Age: 70
End: 2020-03-12

## 2020-03-12 DIAGNOSIS — K56.50 INTESTINAL OR PERITONEAL ADHESIONS WITH OBSTRUCTION (POSTOPERATIVE) (POSTINFECTION) (HCC): Primary | ICD-10-CM

## 2020-03-12 DIAGNOSIS — Z80.0 FAMILY HISTORY OF MALIGNANT NEOPLASM OF DIGESTIVE ORGANS: ICD-10-CM

## 2020-03-12 DIAGNOSIS — Z12.11 ENCOUNTER FOR SCREENING FOR MALIGNANT NEOPLASM OF COLON: Primary | ICD-10-CM

## 2020-03-12 DIAGNOSIS — K63.5 HYPERPLASTIC POLYP OF INTESTINE: ICD-10-CM

## 2020-03-12 PROCEDURE — 43239 EGD BIOPSY SINGLE/MULTIPLE: CPT | Performed by: INTERNAL MEDICINE

## 2020-03-12 PROCEDURE — G0105 COLORECTAL SCRN; HI RISK IND: HCPCS | Performed by: INTERNAL MEDICINE

## 2020-03-12 PROCEDURE — 88305 TISSUE EXAM BY PATHOLOGIST: CPT | Performed by: INTERNAL MEDICINE

## 2020-03-12 PROCEDURE — 88104 CYTOPATH FL NONGYN SMEARS: CPT | Performed by: INTERNAL MEDICINE

## 2020-03-13 LAB
CYTO UR: NORMAL
LAB AP CASE REPORT: NORMAL
LAB AP CASE REPORT: NORMAL
LAB AP CLINICAL INFORMATION: NORMAL
PATH REPORT.FINAL DX SPEC: NORMAL
PATH REPORT.FINAL DX SPEC: NORMAL
PATH REPORT.GROSS SPEC: NORMAL
PATH REPORT.GROSS SPEC: NORMAL

## 2020-03-15 DIAGNOSIS — I63.9 CEREBROVASCULAR ACCIDENT (CVA), UNSPECIFIED MECHANISM (HCC): ICD-10-CM

## 2020-03-16 ENCOUNTER — APPOINTMENT (OUTPATIENT)
Dept: CARDIOLOGY | Facility: HOSPITAL | Age: 70
End: 2020-03-16

## 2020-03-16 RX ORDER — CLOPIDOGREL BISULFATE 75 MG/1
TABLET ORAL
Qty: 90 TABLET | Refills: 0 | OUTPATIENT
Start: 2020-03-16

## 2020-03-18 ENCOUNTER — TELEPHONE (OUTPATIENT)
Dept: GASTROENTEROLOGY | Facility: CLINIC | Age: 70
End: 2020-03-18

## 2020-03-18 NOTE — TELEPHONE ENCOUNTER
Patient c/o 2 to 3 loose stools per day.  She reports the gas and bloating sx are better now.  She plans to continue IB Guard and may switch brands when she runs out.  She does not wish to reschedule the virtual CT at this time.  She will call us back when she is ready to schedule.  Informed patient she can take Imodium prn.  She does not wish to get stools studies at this time but will call us back if the sx persist.   Informed patient Cologedwardrd is not an option for her because she has a history of colon polyps and we would need to do the   Virtual CT anyway if the test is positive.

## 2020-03-25 ENCOUNTER — TELEPHONE (OUTPATIENT)
Dept: GASTROENTEROLOGY | Facility: CLINIC | Age: 70
End: 2020-03-25

## 2020-03-25 NOTE — TELEPHONE ENCOUNTER
----- Message from Víctor Polk MD sent at 3/25/2020 12:08 PM EDT -----  Please call patient. She is texting me regarding stool changes.  Monday she texted me they were normal and now texting me otherwise    Please ask patient what is going on

## 2020-03-25 NOTE — TELEPHONE ENCOUNTER
Spoke with pt, states that she is having about 2-3 BM daily. Saturday was her first formed stool. Yesterday she started having dairrhea stools again. 2-3 semi-formed, then last night 3 diarrhea with one being explosive diarrhea. Had one BM this morning semi-formed. Took 2 imodium this morning and the last of the IBGard - states that this is very expensive.     Scheduled an video visit with you tomorrow. Offered stool studies but no response.

## 2020-03-26 ENCOUNTER — TELEMEDICINE (OUTPATIENT)
Dept: GASTROENTEROLOGY | Facility: CLINIC | Age: 70
End: 2020-03-26

## 2020-03-26 DIAGNOSIS — K58.0 IRRITABLE BOWEL SYNDROME WITH DIARRHEA: ICD-10-CM

## 2020-03-26 DIAGNOSIS — R19.7 DIARRHEA, UNSPECIFIED TYPE: Primary | ICD-10-CM

## 2020-03-26 DIAGNOSIS — R14.0 BLOATING: ICD-10-CM

## 2020-03-26 PROCEDURE — 99212 OFFICE O/P EST SF 10 MIN: CPT | Performed by: NURSE PRACTITIONER

## 2020-03-26 RX ORDER — OMEPRAZOLE 20 MG/1
20 CAPSULE, DELAYED RELEASE ORAL DAILY
COMMUNITY
Start: 2020-03-16 | End: 2020-09-04

## 2020-03-26 NOTE — PROGRESS NOTES
Chief Complaint   Patient presents with   • Diarrhea       HPI  Patient is a 70-year-old female who presents today for evaluation.  She presents today with concerns about diarrhea.  She has a history of Fonseca's esophagus and colon polyps.  This encounter was completed via telephone.  It was attempted to be completed via [employer] video visit however patient was unable to access the video component of the application and contacted the help desk but this issue was unable to be resolved.  She requested a telephone call for assistance.    She had an EGD for surveillance of Fonseca's esophagus March 2020.  She had white plaques in the esophagus, LA grade a esophagitis, and mucosal changes suspicious for short segment Fonseca's esophagus as well as a 2 cm hiatal hernia.  Biopsies were benign, negative for dysplasia.  Negative for H. pylori.  Colonoscopy March 2020 with poor bowel prep, procedure was aborted at the hepatic flexure.  There were internal hemorrhoids present.  CT colonography had been recommended for further evaluation.    Patient reports she experienced diarrhea for around 1 week after her colonoscopy. She reports she had 2-3 loose urgent bowel movements per day. She reports she tried imodium and noted some brief improvement, but then the diarrhea returned. She has also tried IB Vasyl with no improvement.    Associated with the diarrhea, she has had increased flatulence and bloating,    She reports she had difficulty prepping for her colonoscopy and did not feel she was cleaned out well.    She denies abdominal pain, denies rectal bleeding.      Review of Systems   Constitutional: Negative for fatigue and fever.   Respiratory: Negative for cough.    Gastrointestinal: Negative for abdominal pain and blood in stool.       Problem List:    Patient Active Problem List   Diagnosis   • Hypertension   • Allergic rhinitis   • Trigeminal neuralgia   • Hyperlipidemia LDL goal <70   • Osteoarthritis of hip   • Vitamin D  deficiency   • History of CVA (cerebrovascular accident)   • Stroke (CMS/[facility])   • Heartburn   • Frequent urination   • Left shoulder pain   • Age-related osteoporosis without current pathological fracture   • Chronic fatigue   • Statin intolerance   • Overactive bladder   • Intermittent lightheadedness   • Vision disturbance   • Elevated coronary artery calcium score   • Sequelae, post-stroke   • Numbness and tingling of both legs   • Dry eye syndrome of both eyes       Medical History:    Past Medical History:   Diagnosis Date   • Allergic rhinitis    • Anemia    • Arthritis    • Bronchitis    • CVA (cerebral vascular accident) (CMS/[facility])    • Fatigue    • FH: colon cancer    • H/O bone density study    • H/O mammogram    • Hyperlipidemia    • Hypertension     Benign Essential   • Malaise and fatigue    • Muscle pain    • Nocturia    • Osteoporosis    • Stroke (CMS/HCC) 2017   • TMJ (temporomandibular joint syndrome)    • Trigeminal neuralgia     Surgery at [facility] in  repeat in         Social History:    Social History     Socioeconomic History   • Marital status:      Spouse name: Not on file   • Number of children: Not on file   • Years of education: Not on file   • Highest education level: Not on file   Tobacco Use   • Smoking status: Never Smoker   • Smokeless tobacco: Never Used   • Tobacco comment: daily caffiene   Substance and Sexual Activity   • Alcohol use: Yes     Types: 3 Glasses of wine per week     Comment: moderate   • Drug use: No   • Sexual activity: Yes     Partners: Male     Birth control/protection: Post-menopausal, None       Family History:   Family History   Problem Relation Age of Onset   • Pancreatic cancer Mother    • Hyperlipidemia Mother    • Cancer Mother            • Hypertension Mother    • Miscarriages / Stillbirths Mother    • Colon cancer Father 56   • Arthritis Father    • Cancer Father            • Hearing loss Father          WAR INJURY   • Vision loss Father         dry glaucoma   • Liver cancer Brother 40   • Cancer Brother                Surgical History:   Past Surgical History:   Procedure Laterality Date   • ADENOIDECTOMY     • AUGMENTATION MAMMAPLASTY     • BRAIN SURGERY  2005    MVD   • COLONOSCOPY N/A 2015    Repeat Q 5 years due to Fhx of Colon Ca-Dr. Polk   • COSMETIC SURGERY      augmentation   • EXTERNAL EAR SURGERY     • HIP SURGERY     • JOINT REPLACEMENT  10/3/16    left hip replacement   • PAP SMEAR N/A     Dr. Burden   • RHINOPLASTY     • SEPTOPLASTY      SEPTO/RHINOPLASTY POST TRAUMA   • SINUS SURGERY     • TONSILLECTOMY  AGE 5         Current Outpatient Medications:   •  ALPHA LIPOIC ACID PO, Take  by mouth Daily., Disp: , Rfl:   •  aspirin 81 MG EC tablet, Take 81 mg by mouth Daily., Disp: , Rfl:   •  cholecalciferol (VITAMIN D3) 1000 UNITS tablet, Take 2,000 Units by mouth 2 (Two) Times a Day., Disp: , Rfl:   •  Coenzyme Q10 (COQ-10) 400 MG capsule, Take  by mouth Daily., Disp: , Rfl:   •  cycloSPORINE (RESTASIS) 0.05 % ophthalmic emulsion, Apply 1 drop to eye(s) as directed by provider 2 (Two) Times a Day., Disp: , Rfl:   •  fluticasone (FLONASE) 50 MCG/ACT nasal spray, 2 sprays into the nostril(s) as directed by provider Daily., Disp: , Rfl:   •  gabapentin (NEURONTIN) 100 MG capsule, Take 1 capsule by mouth 3 (Three) Times a Day. (Patient taking differently: Take 100 mg by mouth 3 (Three) Times a Day. prn), Disp: 90 capsule, Rfl: 5  •  Icosapent Ethyl (VASCEPA PO), Take 2,000 mg by mouth 2 (Two) Times a Day., Disp: , Rfl:   •  losartan-hydrochlorothiazide (HYZAAR) 100-12.5 MG per tablet, Take 1 tablet by mouth Daily., Disp: 90 tablet, Rfl: 1  •  PRALUENT 75 MG/ML solution auto-injector, , Disp: , Rfl:   •  Probiotic Product (PROBIOTIC-10 PO), Take 1 capsule by mouth Daily., Disp: , Rfl:   •  riFAXIMin (Xifaxan) 550 MG tablet, Take 1 tablet by mouth 3 (Three) Times a Day for  14 days., Disp: 42 tablet, Rfl: 0  •  sertraline (ZOLOFT) 25 MG tablet, TAKE 1 TABLET BY MOUTH EVERY DAY (Patient taking differently: 12.5 mg.), Disp: 30 tablet, Rfl: 6  •  SUPER B COMPLEX/C PO, Take  by mouth., Disp: , Rfl:   •  vitamin B-12 (CYANOCOBALAMIN) 1000 MCG tablet, 5,000 mcg Daily., Disp: , Rfl:     Allergies:  Bystolic [nebivolol hcl]; Amlodipine; and Lisinopril    The following portions of the patient's history were reviewed and updated as appropriate: allergies, current medications, past family history, past medical history, past social history, past surgical history and problem list.        Assessment/ Plan  Rosi was seen today for diarrhea.    Diagnoses and all orders for this visit:    Diarrhea, unspecified type    Irritable bowel syndrome with diarrhea    Other orders  -     riFAXIMin (Xifaxan) 550 MG tablet; Take 1 tablet by mouth 3 (Three) Times a Day for 14 days.    For diarrhea, increase probiotic to 1 capsule twice daily for 1 to 2 weeks.    For diarrhea and to help regulate bowel movements, recommend starting a daily fiber supplement, Metamucil.    For diarrhea, if symptoms do not improve with probiotics and Metamucil, recommend treatment with Xifaxan.  Prescription was sent to pharmacy to attempt to obtain insurance coverage.    Please contact the office next week to provide an update and we can provide further recommendations if needed.    Return if symptoms worsen or fail to improve.        Discussion:  Discussed with patient today diarrhea may be secondary to alteration in gut microbiota after completing bowel prep for her recent colonoscopy.  Also consideration for irritable bowel syndrome.  Recommended increasing her probiotic to twice daily dosing for the next 1 to 2 weeks and also recommended fiber supplement to help promote more regular bowel movements and help soak up liquid and stool to make stool more formed with her ongoing diarrhea.  If this does not help, we will provide a  course of Xifaxan.  A prescription was sent to her pharmacy date to attempt to obtain Tourette's coverage.  If insurance does not cover this, we will attempt to obtain samples for patient.  She will contact her office next week with an update.    This visit was completed as a telemedicine video visit due to COVID-19 pandemic.

## 2020-03-26 NOTE — PATIENT INSTRUCTIONS
For diarrhea, increase probiotic to 1 capsule twice daily for 1 to 2 weeks.    For diarrhea and to help regulate bowel movements, recommend starting a daily fiber supplement, Metamucil.    For diarrhea, if symptoms do not improve with probiotics and Metamucil, recommend treatment with Xifaxan.  Prescription was sent to pharmacy to attempt to obtain insurance coverage.    Please contact the office next week to provide an update and we can provide further recommendations if needed.

## 2020-04-11 DIAGNOSIS — I63.9 CEREBROVASCULAR ACCIDENT (CVA), UNSPECIFIED MECHANISM (HCC): ICD-10-CM

## 2020-04-13 RX ORDER — CLOPIDOGREL BISULFATE 75 MG/1
TABLET ORAL
Qty: 90 TABLET | Refills: 1 | Status: SHIPPED | OUTPATIENT
Start: 2020-04-13 | End: 2020-08-26

## 2020-04-21 RX ORDER — SERTRALINE HYDROCHLORIDE 25 MG/1
25 TABLET, FILM COATED ORAL DAILY
Qty: 90 TABLET | Refills: 0 | Status: SHIPPED | OUTPATIENT
Start: 2020-04-21 | End: 2020-11-09

## 2020-05-08 ENCOUNTER — TELEMEDICINE (OUTPATIENT)
Dept: GASTROENTEROLOGY | Facility: CLINIC | Age: 70
End: 2020-05-08

## 2020-05-08 DIAGNOSIS — R14.3 EXCESSIVE GAS: ICD-10-CM

## 2020-05-08 DIAGNOSIS — R19.7 DIARRHEA, UNSPECIFIED TYPE: Primary | ICD-10-CM

## 2020-05-08 PROBLEM — R42 INTERMITTENT LIGHTHEADEDNESS: Status: RESOLVED | Noted: 2019-01-04 | Resolved: 2020-05-08

## 2020-05-08 PROBLEM — R20.0 NUMBNESS AND TINGLING OF BOTH LEGS: Status: RESOLVED | Noted: 2019-08-07 | Resolved: 2020-05-08

## 2020-05-08 PROBLEM — R93.1 ELEVATED CORONARY ARTERY CALCIUM SCORE: Status: RESOLVED | Noted: 2019-01-14 | Resolved: 2020-05-08

## 2020-05-08 PROBLEM — I63.9 STROKE (HCC): Status: RESOLVED | Noted: 2017-09-19 | Resolved: 2020-05-08

## 2020-05-08 PROBLEM — Z78.9 STATIN INTOLERANCE: Status: RESOLVED | Noted: 2018-08-28 | Resolved: 2020-05-08

## 2020-05-08 PROBLEM — M25.512 LEFT SHOULDER PAIN: Status: RESOLVED | Noted: 2017-11-07 | Resolved: 2020-05-08

## 2020-05-08 PROBLEM — R20.2 NUMBNESS AND TINGLING OF BOTH LEGS: Status: RESOLVED | Noted: 2019-08-07 | Resolved: 2020-05-08

## 2020-05-08 PROCEDURE — 99214 OFFICE O/P EST MOD 30 MIN: CPT | Performed by: NURSE PRACTITIONER

## 2020-05-08 RX ORDER — DICYCLOMINE HYDROCHLORIDE 10 MG/1
10 CAPSULE ORAL 2 TIMES DAILY PRN
Qty: 60 CAPSULE | Refills: 2 | Status: SHIPPED | OUTPATIENT
Start: 2020-05-08 | End: 2020-09-04

## 2020-05-08 NOTE — PROGRESS NOTES
Chief Complaint   Patient presents with   • Diarrhea       HPI  Patient is a 70-year-old female who presents today for follow-up.  She has a history of Fonseca's esophagus and colon polyps.  At her last office visit, she had been experiencing diarrhea and fecal urgency.  She was advised to increase her daily probiotic to twice daily use and start a daily fiber supplement.    Patient presents today for follow-up.  She reports she is experiencing persistent issues with diarrhea.  She reports she will have diarrhea, generally with 2-4 bowel movements per day clustered in the morning.  She describes the diarrhea as being explosive and accompanied by a large amount of gas.  She will then go 1 to 2 days without having a bowel movement and then will have the diarrhea again.    She denies any abdominal pain or rectal bleeding.  Reports her weight is stable.  Denies fever, nausea, or vomiting.    She has not been taking fiber supplement regularly.  She was given a prescription for Xifaxan at her last office visit however her insurance would not cover this affordably.  She tried IBgard with no significant improvement and this was also cost prohibitive.    CT colonography had been recommended to follow-up after recent incomplete colonoscopy.  Patient questions if she could do the Cologuard test instead, she is hesitant to go through a bowel preparation again.    You have chosen to receive care through a telehealth visit.  Do you consent to use a video/audio connection for your medical care today? Yes      Review of Systems   Constitutional: Negative for fever and unexpected weight change.   Gastrointestinal: Positive for diarrhea.       Problem List:    Patient Active Problem List   Diagnosis   • Hypertension   • Allergic rhinitis   • Hyperlipidemia LDL goal <70   • Osteoarthritis of hip   • Vitamin D deficiency   • History of CVA (cerebrovascular accident)   • Heartburn   • Frequent urination   • Age-related osteoporosis  without current pathological fracture   • Chronic fatigue   • Overactive bladder   • Vision disturbance   • Sequelae, post-stroke   • Dry eye syndrome of both eyes       Medical History:    Past Medical History:   Diagnosis Date   • Allergic rhinitis    • Anemia    • Arthritis    • Bronchitis    • CVA (cerebral vascular accident) (CMS/HCC)    • Elevated coronary artery calcium score    • Fatigue    • FH: colon cancer    • GERD (gastroesophageal reflux disease) occasionally   • H/O bone density study    • H/O mammogram    • Hyperlipidemia    • Hypertension     Benign Essential   • Malaise and fatigue    • Muscle pain    • Nocturia    • Osteoporosis    • Stroke (CMS/HCC) 2017   • TMJ (temporomandibular joint syndrome)    • Trigeminal neuralgia     [facility] in  repeat in         Social History:    Social History     Socioeconomic History   • Marital status:      Spouse name: Not on file   • Number of children: Not on file   • Years of education: Not on file   • Highest education level: Not on file   Tobacco Use   • Smoking status: Never Smoker   • Smokeless tobacco: Never Used   • Tobacco comment: daily caffiene   Substance and Sexual Activity   • Alcohol use: Yes     Alcohol/week: 3.0 standard drinks     Types: 3 Glasses of wine per week     Comment: moderate   • Drug use: No   • Sexual activity: Yes     Partners: Male     Birth control/protection: Post-menopausal, None       Family History:   Family History   Problem Relation Age of Onset   • Pancreatic cancer Mother    • Hyperlipidemia Mother    • Cancer Mother            • Hypertension Mother    • Miscarriages / Stillbirths Mother    • Colon cancer Father 56   • Arthritis Father    • Cancer Father            • Hearing loss Father         WAR INJURY   • Vision loss Father         dry glaucoma   • Liver cancer Brother 40   • Cancer Brother                Surgical History:   Past Surgical  History:   Procedure Laterality Date   • ADENOIDECTOMY     • AUGMENTATION MAMMAPLASTY     • BRAIN SURGERY  12/8/2005    MVD   • COLONOSCOPY N/A 01/2015    Repeat Q 5 years due to Fhx of Colon Ca-Dr. Polk   • COSMETIC SURGERY  2015    augmentation   • EXTERNAL EAR SURGERY     • HIP SURGERY     • JOINT REPLACEMENT  10/3/16    left hip replacement   • PAP SMEAR N/A 2013    Dr. Burden   • RHINOPLASTY     • SEPTOPLASTY  2000    SEPTO/RHINOPLASTY POST TRAUMA   • SINUS SURGERY  1980'S   • TONSILLECTOMY  AGE 5         Current Outpatient Medications:   •  ALPHA LIPOIC ACID PO, Take  by mouth Daily., Disp: , Rfl:   •  aspirin 81 MG EC tablet, Take 81 mg by mouth Daily., Disp: , Rfl:   •  cholecalciferol (VITAMIN D3) 1000 UNITS tablet, Take 2,000 Units by mouth 2 (Two) Times a Day., Disp: , Rfl:   •  clopidogrel (PLAVIX) 75 MG tablet, TAKE 1 TABLET BY MOUTH EVERY DAY, Disp: 90 tablet, Rfl: 1  •  Coenzyme Q10 (COQ-10) 400 MG capsule, Take  by mouth Daily., Disp: , Rfl:   •  cycloSPORINE (RESTASIS) 0.05 % ophthalmic emulsion, Apply 1 drop to eye(s) as directed by provider 2 (Two) Times a Day., Disp: , Rfl:   •  fluticasone (FLONASE) 50 MCG/ACT nasal spray, 2 sprays into the nostril(s) as directed by provider Daily., Disp: , Rfl:   •  gabapentin (NEURONTIN) 100 MG capsule, Take 1 capsule by mouth 3 (Three) Times a Day. (Patient taking differently: Take 100 mg by mouth 3 (Three) Times a Day. prn), Disp: 90 capsule, Rfl: 5  •  Icosapent Ethyl (VASCEPA PO), Take 2,000 mg by mouth 2 (Two) Times a Day., Disp: , Rfl:   •  losartan-hydrochlorothiazide (HYZAAR) 100-12.5 MG per tablet, Take 1 tablet by mouth Daily., Disp: 90 tablet, Rfl: 1  •  omeprazole (priLOSEC) 20 MG capsule, Take 20 mg by mouth Daily., Disp: , Rfl:   •  PRALUENT 75 MG/ML solution auto-injector, , Disp: , Rfl:   •  Probiotic Product (PROBIOTIC-10 PO), Take 1 capsule by mouth Daily., Disp: , Rfl:   •  sertraline (ZOLOFT) 25 MG tablet, Take 1 tablet by mouth  Daily., Disp: 90 tablet, Rfl: 0  •  SUPER B COMPLEX/C PO, Take  by mouth., Disp: , Rfl:   •  vitamin B-12 (CYANOCOBALAMIN) 1000 MCG tablet, 5,000 mcg Daily., Disp: , Rfl:     Allergies:  Bystolic [nebivolol hcl] and Lisinopril    The following portions of the patient's history were reviewed and updated as appropriate: allergies, current medications, past family history, past medical history, past social history, past surgical history and problem list.    Physical Exam   Constitutional: She appears well-developed and well-nourished. No distress.   Pulmonary/Chest: No respiratory distress.   Skin: No pallor.       Assessment/ Plan  Rosi was seen today for diarrhea.    Diagnoses and all orders for this visit:    Diarrhea, unspecified type    Excessive gas     For diarrhea, recommend using a daily fiber supplement, Metamucil, to help add bulk and form to the stool and promote regular bowel movements.    For diarrhea, begin trial of dicyclomine 10 mg capsules.  Take 1 capsule daily at bedtime.  If this has not helped after 1 week, increase to 1 capsule twice daily.    Will review Cologuard versus CT colonography with Dr. Polk and contact patient with further recommendations    We will attempt to obtain samples of Xifaxan and contact patient when these are available.    Please contact the office in 2 weeks to provide an update if symptoms have not improved.    Return if symptoms worsen or fail to improve.        Discussion:  Suspect irritable bowel syndrome as etiology of symptoms, also possible component of small bowel bacterial overgrowth related to increased gassiness.  We will proceed as outlined above.  We will discuss Cologuard versus CT colonography with Dr. Zapata.  I did discuss with her today if the Cologuard was positive, we would then need to follow this with either CT colonography or repeated colonoscopy.  If negative, no further work-up would be needed.    This visit was completed as a telemedicine video  visit due to COVID-19 pandemic.    5/12/2020 reviewed with Dr. Polk.  Recommends proceeding with CT colonography due to family history and personal history of colon polyps.  Discussed this with patient.

## 2020-05-08 NOTE — PATIENT INSTRUCTIONS
For diarrhea, recommend using a daily fiber supplement, Metamucil, to help add bulk and form to the stool and promote regular bowel movements.    For diarrhea, begin trial of dicyclomine 10 mg capsules.  Take 1 capsule daily at bedtime.  If this has not helped after 1 week, increase to 1 capsule twice daily.    Will review Cologuard versus CT colonography with Dr. Polk and contact patient with further recommendations    We will attempt to obtain samples of Xifaxan and contact patient when these are available.    Please contact the office in 2 weeks to provide an update if symptoms have not improved.

## 2020-05-15 ENCOUNTER — TELEPHONE (OUTPATIENT)
Dept: INTERNAL MEDICINE | Facility: CLINIC | Age: 70
End: 2020-05-15

## 2020-05-15 DIAGNOSIS — R35.0 FREQUENT URINATION: Primary | ICD-10-CM

## 2020-05-15 NOTE — TELEPHONE ENCOUNTER
[facility] called phone [phone] and fax [phone] today they need a new physical therapy referral diagnosis frequent urination her 30 days are up

## 2020-06-08 DIAGNOSIS — R06.81 WITNESSED APNEIC SPELLS: Primary | ICD-10-CM

## 2020-06-25 ENCOUNTER — TELEPHONE (OUTPATIENT)
Dept: GASTROENTEROLOGY | Facility: CLINIC | Age: 70
End: 2020-06-25

## 2020-06-25 NOTE — TELEPHONE ENCOUNTER
Spoke with patient, she stated that she is feeling fine and does not need the Xifaxan at this time. Patient stated that IB Guard is working wonders for her. Let me know if anything else is needed.      TS

## 2020-06-25 NOTE — TELEPHONE ENCOUNTER
----- Message from JUAN FRANCISCO Hopkins sent at 6/24/2020  9:28 AM EDT -----  Can you contact patient to see if she needs the samples? I sent her a [employer] message but have not heard back yet.    If she is feeling better, does not need Xifaxan at this time. If symptoms have persisted, would recommend the course and she can  the samples. I have them in my office. Let me know. Thanks.  ----- Message -----  From: Tod Heller  Sent: 6/19/2020   2:37 PM EDT  To: JUAN FRANCISCO Hopkins    Xifaxan samples have arrived. Let me know if anything else is needed.    TS  ----- Message -----  From: Flor Beyer APRN  Sent: 5/12/2020  12:11 PM EDT  To: [facility] [place]    I need xifaxan samples for this patient. Cele and I  Have both ordered some through the online ordering form. Can you let me know when these have arrived? Thank you.

## 2020-07-02 DIAGNOSIS — I10 ESSENTIAL HYPERTENSION: Primary | ICD-10-CM

## 2020-07-02 DIAGNOSIS — E78.5 HYPERLIPIDEMIA LDL GOAL <70: ICD-10-CM

## 2020-07-20 ENCOUNTER — TELEMEDICINE (OUTPATIENT)
Dept: GASTROENTEROLOGY | Facility: CLINIC | Age: 70
End: 2020-07-20

## 2020-07-20 DIAGNOSIS — R19.7 DIARRHEA, UNSPECIFIED TYPE: Primary | ICD-10-CM

## 2020-07-20 DIAGNOSIS — K21.9 GASTROESOPHAGEAL REFLUX DISEASE WITHOUT ESOPHAGITIS: ICD-10-CM

## 2020-07-20 PROCEDURE — 99213 OFFICE O/P EST LOW 20 MIN: CPT | Performed by: NURSE PRACTITIONER

## 2020-07-20 NOTE — PATIENT INSTRUCTIONS
For diarrhea, we will obtain samples for a course of Xifaxan and recommend treatment with Xifaxan 550 mg 3 times daily for 10 to 14 days.  We will contact you when the samples are available.    For intermittent diarrhea, recommend resuming daily fiber supplement, take Metamucil once daily.    Continue taking daily probiotic.    For GERD, continue cimetidine as needed.  If this is not adequately controlling symptoms, you may use Nexium, Prevacid, or Prilosec available over-the-counter.    Follow-up as needed.  Contact the office for any new or worsening symptoms or failure to improve.

## 2020-07-20 NOTE — PROGRESS NOTES
Chief Complaint   Patient presents with   • Diarrhea       HPI  Patient is a 70-year-old female who presents today for follow-up.  She was last seen via telemedicine May 8 2/20/2020.  She has a history of Fonseca's esophagus and colon polyps.  She had been experiencing diarrhea and urgency.  Symptoms improved with fiber supplement and IBgard.    Patient presents today for follow-up.  She reports she has experienced a return of loose stools and urgency.  She is generally having 2-3 loose stools per day.  She denies any associated abdominal pain though reports occasional fullness to her abdomen.  She reports increased flatulence that has been malodorous.  She denies any nausea or vomiting.  Denies any blood in the stool or dark stool.    She has noticed that her weight has been fluctuating some, her baseline is around 120, she has been ranging between 1 16-1 18 now despite no dietary changes or changes in intake.    She has been taking cimetidine for GERD since ranitidine was removed from the market.  She has occasional breakthrough symptoms if she eats fatty foods or sweets.    You have chosen to receive care through a telehealth visit.  Do you consent to use a video/audio connection for your medical care today? Yes      Review of Systems   Constitutional: Positive for unexpected weight change.   Gastrointestinal: Positive for diarrhea.       Problem List:    Patient Active Problem List   Diagnosis   • Hypertension   • Allergic rhinitis   • Hyperlipidemia LDL goal <70   • Osteoarthritis of hip   • Vitamin D deficiency   • History of CVA (cerebrovascular accident)   • Heartburn   • Frequent urination   • Age-related osteoporosis without current pathological fracture   • Chronic fatigue   • Overactive bladder   • Vision disturbance   • Sequelae, post-stroke   • Dry eye syndrome of both eyes       Medical History:    Past Medical History:   Diagnosis Date   • Allergic rhinitis    • Anemia    • Arthritis 2016   •  Bronchitis    • CVA (cerebral vascular accident) (CMS/HCC)    • Elevated coronary artery calcium score    • Fatigue    • FH: colon cancer    • GERD (gastroesophageal reflux disease) occasionally   • H/O bone density study    • H/O mammogram    • Hyperlipidemia    • Hypertension     Benign Essential   • Malaise and fatigue    • Muscle pain    • Nocturia    • Osteoporosis    • Stroke (CMS/HCC) 2017   • TMJ (temporomandibular joint syndrome)    • Trigeminal neuralgia     Surgery at [facility] in  repeat in         Social History:    Social History     Socioeconomic History   • Marital status:      Spouse name: Not on file   • Number of children: Not on file   • Years of education: Not on file   • Highest education level: Not on file   Tobacco Use   • Smoking status: Never Smoker   • Smokeless tobacco: Never Used   • Tobacco comment: daily caffiene   Substance and Sexual Activity   • Alcohol use: Yes     Alcohol/week: 3.0 standard drinks     Types: 3 Glasses of wine per week     Comment: moderate   • Drug use: No   • Sexual activity: Yes     Partners: Male     Birth control/protection: Post-menopausal, None       Family History:   Family History   Problem Relation Age of Onset   • Pancreatic cancer Mother    • Hyperlipidemia Mother    • Cancer Mother            • Hypertension Mother    • Miscarriages / Stillbirths Mother    • Colon cancer Father 56   • Arthritis Father    • Cancer Father            • Hearing loss Father         WAR INJURY   • Vision loss Father         dry glaucoma   • Liver cancer Brother 40   • Cancer Brother                Surgical History:   Past Surgical History:   Procedure Laterality Date   • ADENOIDECTOMY     • AUGMENTATION MAMMAPLASTY     • BRAIN SURGERY  2005    MVD   • COLONOSCOPY N/A 2015    Repeat Q 5 years due to Fhx of Colon Ca-Dr. Polk   • COSMETIC SURGERY      augmentation   • EXTERNAL EAR SURGERY     • HIP SURGERY      • JOINT REPLACEMENT  10/3/16    left hip replacement   • PAP SMEAR N/A 2013    Dr. Burden   • RHINOPLASTY     • SEPTOPLASTY  2000    SEPTO/RHINOPLASTY POST TRAUMA   • SINUS SURGERY  1980'S   • TONSILLECTOMY  AGE 5         Current Outpatient Medications:   •  ALPHA LIPOIC ACID PO, Take  by mouth Daily., Disp: , Rfl:   •  aspirin 81 MG EC tablet, Take 81 mg by mouth Daily., Disp: , Rfl:   •  cholecalciferol (VITAMIN D3) 1000 UNITS tablet, Take 2,000 Units by mouth 2 (Two) Times a Day., Disp: , Rfl:   •  clopidogrel (PLAVIX) 75 MG tablet, TAKE 1 TABLET BY MOUTH EVERY DAY, Disp: 90 tablet, Rfl: 1  •  Coenzyme Q10 (COQ-10) 400 MG capsule, Take  by mouth Daily., Disp: , Rfl:   •  cycloSPORINE (RESTASIS) 0.05 % ophthalmic emulsion, Apply 1 drop to eye(s) as directed by provider 2 (Two) Times a Day., Disp: , Rfl:   •  dicyclomine (BENTYL) 10 MG capsule, Take 1 capsule by mouth 2 (Two) Times a Day As Needed (diarrhea)., Disp: 60 capsule, Rfl: 2  •  fluticasone (FLONASE) 50 MCG/ACT nasal spray, 2 sprays into the nostril(s) as directed by provider Daily., Disp: , Rfl:   •  gabapentin (NEURONTIN) 100 MG capsule, Take 1 capsule by mouth 3 (Three) Times a Day. (Patient taking differently: Take 100 mg by mouth 3 (Three) Times a Day. prn), Disp: 90 capsule, Rfl: 5  •  Icosapent Ethyl (VASCEPA PO), Take 2,000 mg by mouth 2 (Two) Times a Day., Disp: , Rfl:   •  losartan-hydrochlorothiazide (HYZAAR) 100-12.5 MG per tablet, Take 1 tablet by mouth Daily., Disp: 90 tablet, Rfl: 1  •  omeprazole (priLOSEC) 20 MG capsule, Take 20 mg by mouth Daily., Disp: , Rfl:   •  PRALUENT 75 MG/ML solution auto-injector, , Disp: , Rfl:   •  Probiotic Product (PROBIOTIC-10 PO), Take 1 capsule by mouth Daily., Disp: , Rfl:   •  sertraline (ZOLOFT) 25 MG tablet, Take 1 tablet by mouth Daily., Disp: 90 tablet, Rfl: 0  •  SUPER B COMPLEX/C PO, Take  by mouth., Disp: , Rfl:   •  vitamin B-12 (CYANOCOBALAMIN) 1000 MCG tablet, 5,000 mcg Daily., Disp: ,  Rfl:     Allergies:  Bystolic [nebivolol hcl] and Lisinopril    The following portions of the patient's history were reviewed and updated as appropriate: allergies, current medications, past family history, past medical history, past social history, past surgical history and problem list.    Physical Exam   Constitutional: She appears well-developed and well-nourished. No distress.   Pulmonary/Chest: No respiratory distress.   Skin: No pallor.       Assessment/ Plan  Rosi was seen today for diarrhea.    Diagnoses and all orders for this visit:    Diarrhea, unspecified type    Gastroesophageal reflux disease without esophagitis         Return if symptoms worsen or fail to improve.    Patient Instructions   For diarrhea, we will obtain samples for a course of Xifaxan and recommend treatment with Xifaxan 550 mg 3 times daily for 10 to 14 days.  We will contact you when the samples are available.    For intermittent diarrhea, recommend resuming daily fiber supplement, take Metamucil once daily.    Continue taking daily probiotic.    For GERD, continue cimetidine as needed.  If this is not adequately controlling symptoms, you may use Nexium, Prevacid, or Prilosec available over-the-counter.    Follow-up as needed.  Contact the office for any new or worsening symptoms or failure to improve.        Discussion:  Discussed with patient today suspect small intestinal bacterial overgrowth.  As her symptoms have been intermittent over the last several months, I did recommend a course of Xifaxan, even if she is feeling better to help return his symptoms to baseline.  Her insurance will not cover this affordably and we will work to obtain samples of this.    This visit was completed as a telemedicine video visit due to COVID-19 pandemic.

## 2020-07-23 ENCOUNTER — OFFICE VISIT (OUTPATIENT)
Dept: SLEEP MEDICINE | Facility: HOSPITAL | Age: 70
End: 2020-07-23

## 2020-07-23 VITALS
SYSTOLIC BLOOD PRESSURE: 119 MMHG | BODY MASS INDEX: 18.83 KG/M2 | WEIGHT: 120 LBS | DIASTOLIC BLOOD PRESSURE: 77 MMHG | HEIGHT: 67 IN | OXYGEN SATURATION: 97 % | HEART RATE: 80 BPM

## 2020-07-23 DIAGNOSIS — Z86.73 HISTORY OF CVA (CEREBROVASCULAR ACCIDENT): ICD-10-CM

## 2020-07-23 DIAGNOSIS — G47.30 OBSERVED SLEEP APNEA: ICD-10-CM

## 2020-07-23 DIAGNOSIS — R06.83 SNORING: Primary | ICD-10-CM

## 2020-07-23 PROCEDURE — 99204 OFFICE O/P NEW MOD 45 MIN: CPT | Performed by: INTERNAL MEDICINE

## 2020-07-23 PROCEDURE — G0463 HOSPITAL OUTPT CLINIC VISIT: HCPCS

## 2020-07-23 NOTE — PROGRESS NOTES
[facility]  [address]  [address] [place] [address]  Phone   Fax       Rosi Vicente  1950  70 y.o.  female      Referring Provider and PCP Dionne Fonseca MD    Type of service: Initial consult  Date of service: 7/23/2020      Chief Complaint   Patient presents with   • Snoring       History of present illness;  Thank you for asking to see Rosi Vicente, 70 y.o.  for evaluation of sleep apnea. The patient was seen today on 7/23/2020 at [facility].   Patient has been sent for evaluation of sleep apnea because of snoring and also she had lacunar stroke    Patient gives the following sleep history.  Sleep schedule:  Bedtime: Between 10 and 11 PM  Wake time: 7 AM  Normally takes about 5-10 minutes to fall asleep  Average hours of sleep 7  Number of naps per day 1  When patient wakes up still feels tired and not rested  Snoring; yes  Witnessed apneas; not sure  Have you ever awakened gasping for breath, coughing, choking: Not sure      Past Medical History:   Diagnosis Date   • Allergic rhinitis    • Anemia    • Arthritis 2016   • Bronchitis    • CVA (cerebral vascular accident) (CMS/[facility])    • Elevated coronary artery calcium score    • Fatigue    • FH: colon cancer    • GERD (gastroesophageal reflux disease) occasionally   • H/O bone density study 2013   • H/O mammogram 2013   • Hyperlipidemia    • Hypertension     Benign Essential   • Malaise and fatigue    • Muscle pain    • Nocturia    • Osteoporosis    • Stroke (CMS/HCC) 09/13/2017   • TMJ (temporomandibular joint syndrome)    • Trigeminal neuralgia     Surgery at [facility] in 2009 repeat in 2015       Social history:  Shift work: No she is retired  Tobacco use: No  Alcohol use: 2/week  Caffeinated drinks: 1-2 coffee  Over-the-counter sleeping aid and medications: None  Narcotic medications: No    Family Hx  Family history of sleep apnea, no but  has sleep apnea  Family  History   Problem Relation Age of Onset   • Pancreatic cancer Mother    • Hyperlipidemia Mother    • Cancer Mother            • Hypertension Mother    • Miscarriages / Stillbirths Mother    • Colon cancer Father 56   • Arthritis Father    • Cancer Father            • Hearing loss Father         WAR INJURY   • Vision loss Father         dry glaucoma   • Liver cancer Brother 40   • Cancer Brother                Medications: reviewed    Current Outpatient Medications:   •  ALPHA LIPOIC ACID PO, Take  by mouth Daily., Disp: , Rfl:   •  aspirin 81 MG EC tablet, Take 81 mg by mouth Daily., Disp: , Rfl:   •  cholecalciferol (VITAMIN D3) 1000 UNITS tablet, Take 2,000 Units by mouth 2 (Two) Times a Day., Disp: , Rfl:   •  clopidogrel (PLAVIX) 75 MG tablet, TAKE 1 TABLET BY MOUTH EVERY DAY, Disp: 90 tablet, Rfl: 1  •  Coenzyme Q10 (COQ-10) 400 MG capsule, Take  by mouth Daily., Disp: , Rfl:   •  cycloSPORINE (RESTASIS) 0.05 % ophthalmic emulsion, Apply 1 drop to eye(s) as directed by provider 2 (Two) Times a Day., Disp: , Rfl:   •  dicyclomine (BENTYL) 10 MG capsule, Take 1 capsule by mouth 2 (Two) Times a Day As Needed (diarrhea)., Disp: 60 capsule, Rfl: 2  •  fluticasone (FLONASE) 50 MCG/ACT nasal spray, 2 sprays into the nostril(s) as directed by provider Daily., Disp: , Rfl:   •  gabapentin (NEURONTIN) 100 MG capsule, Take 1 capsule by mouth 3 (Three) Times a Day. (Patient taking differently: Take 100 mg by mouth 3 (Three) Times a Day. prn), Disp: 90 capsule, Rfl: 5  •  Icosapent Ethyl (VASCEPA PO), Take 2,000 mg by mouth 2 (Two) Times a Day., Disp: , Rfl:   •  losartan-hydrochlorothiazide (HYZAAR) 100-12.5 MG per tablet, Take 1 tablet by mouth Daily., Disp: 90 tablet, Rfl: 1  •  omeprazole (priLOSEC) 20 MG capsule, Take 20 mg by mouth Daily., Disp: , Rfl:   •  PRALUENT 75 MG/ML solution auto-injector, , Disp: , Rfl:   •  Probiotic Product (PROBIOTIC-10 PO), Take 1 capsule by mouth Daily., Disp: ,  "Rfl:   •  sertraline (ZOLOFT) 25 MG tablet, Take 1 tablet by mouth Daily., Disp: 90 tablet, Rfl: 0  •  SUPER B COMPLEX/C PO, Take  by mouth., Disp: , Rfl:   •  vitamin B-12 (CYANOCOBALAMIN) 1000 MCG tablet, 5,000 mcg Daily., Disp: , Rfl:     Review of systems:  [place] Sleepiness Scale: Total score: 3   Positive for snoring, fatigue  Negative for shortness of breath, cough, wheezing, chest pain, nausea and vomiting, palpitation, swelling of feet:    Morning headaches: No  Awaken with sore throat or dry mouth : yes  Leg jerking at night: No  Crawly feeling/urge sensation to move in the legs: No  Teeth grinding:no  Sleepwalking, nightmares, muscle weakness with laughing or anger,sleep paralysis: No  Nasal Congestion: No  Nocturia (how many times/night): 2-3  Memory Problem: No  Change in weight, no    Physical exam:  Vitals:    07/23/20 1536   BP: 119/77   Pulse: 80   SpO2: 97%   Weight: 54.4 kg (120 lb)   Height: 170.2 cm (67\")    Body mass index is 18.79 kg/m². Neck Circumference: 13 inches  HEENT: Head is atraumatic, normocephalic   Eyes:pupils are round equal and reacting to light and accommodation, conjunctiva normal  Nose:no nasal septal defects or deviation and the nasal passages are clear, no nasal polyps,   Throat: tonsils are not enlarged, tongue normal size, oral airway Mallampati class 3  NECK: No lymphadenopathy, trachea is in the midline, thyroid not enlarged  RESPIRATORY SYSTEM: Breath sounds are equal on both sides, there are no wheezes or crackles  CARDIOVASULAR SYSTEM: Heart sounds are regular rhythm and joel rate, there are no murmurs or thrills  ABDOMEN: Soft, no hepatosplenomegaly, no evidence of ascites  EXTREMITES: No cyanosis, clubbing or edema   NEUROLOGICAL SYSTEM: Oriented x 3, no gross neurological defects, gait normal    Medical records and labs reviewed in [facility] reviewed    Assessment and plan:  · Sleep apnea unspecified, (G47.30) Patient's symptoms and examination is consistent with " sleep apnea.  I have talked to the patient about the signs and symptoms of sleep apnea and consequences of untreated sleep apnea. Discussed sleep testing.  I have placed a order in epic for a home sleep test.  Patient will have a follow-up after this sleep test is done.   · Snoring secondary to sleep apnea  · History of lacunar stroke      I once again thank you for asking me to see this patient in consultation and I have forwarded my opinion and treatment plan.  Please do not hesitate to call me if you have any questions.     Jon Cooper MD, [facility]  Sleep Medicine.(Board-certified)  [facility]  Medical Director for [facility]  7/23/2020 ,

## 2020-07-27 ENCOUNTER — LAB (OUTPATIENT)
Dept: INTERNAL MEDICINE | Facility: CLINIC | Age: 70
End: 2020-07-27

## 2020-07-27 DIAGNOSIS — E78.5 HYPERLIPIDEMIA LDL GOAL <70: ICD-10-CM

## 2020-07-27 DIAGNOSIS — I10 ESSENTIAL HYPERTENSION: ICD-10-CM

## 2020-07-27 LAB
ALBUMIN SERPL-MCNC: 4.3 G/DL (ref 3.5–5.2)
ALBUMIN/GLOB SERPL: 2 G/DL
ALP SERPL-CCNC: 79 U/L (ref 39–117)
ALT SERPL-CCNC: 19 U/L (ref 1–33)
AST SERPL-CCNC: 18 U/L (ref 1–32)
BASOPHILS # BLD AUTO: 0.04 10*3/MM3 (ref 0–0.2)
BASOPHILS NFR BLD AUTO: 0.6 % (ref 0–1.5)
BILIRUB SERPL-MCNC: 0.5 MG/DL (ref 0–1.2)
BUN SERPL-MCNC: 13 MG/DL (ref 8–23)
BUN/CREAT SERPL: 21.3 (ref 7–25)
CALCIUM SERPL-MCNC: 9.8 MG/DL (ref 8.6–10.5)
CHLORIDE SERPL-SCNC: 93 MMOL/L (ref 98–107)
CHOLEST SERPL-MCNC: 163 MG/DL (ref 0–200)
CO2 SERPL-SCNC: 28.1 MMOL/L (ref 22–29)
CREAT SERPL-MCNC: 0.61 MG/DL (ref 0.57–1)
EOSINOPHIL # BLD AUTO: 0.05 10*3/MM3 (ref 0–0.4)
EOSINOPHIL NFR BLD AUTO: 0.7 % (ref 0.3–6.2)
ERYTHROCYTE [DISTWIDTH] IN BLOOD BY AUTOMATED COUNT: 13.4 % (ref 12.3–15.4)
GLOBULIN SER CALC-MCNC: 2.1 GM/DL
GLUCOSE SERPL-MCNC: 73 MG/DL (ref 65–99)
HCT VFR BLD AUTO: 37.2 % (ref 34–46.6)
HDLC SERPL-MCNC: 45 MG/DL (ref 40–60)
HGB BLD-MCNC: 12.2 G/DL (ref 12–15.9)
IMM GRANULOCYTES # BLD AUTO: 0.03 10*3/MM3 (ref 0–0.05)
IMM GRANULOCYTES NFR BLD AUTO: 0.4 % (ref 0–0.5)
LDLC SERPL CALC-MCNC: 102 MG/DL (ref 0–100)
LYMPHOCYTES # BLD AUTO: 1.75 10*3/MM3 (ref 0.7–3.1)
LYMPHOCYTES NFR BLD AUTO: 24.6 % (ref 19.6–45.3)
MCH RBC QN AUTO: 27.3 PG (ref 26.6–33)
MCHC RBC AUTO-ENTMCNC: 32.8 G/DL (ref 31.5–35.7)
MCV RBC AUTO: 83.2 FL (ref 79–97)
MONOCYTES # BLD AUTO: 0.69 10*3/MM3 (ref 0.1–0.9)
MONOCYTES NFR BLD AUTO: 9.7 % (ref 5–12)
NEUTROPHILS # BLD AUTO: 4.56 10*3/MM3 (ref 1.7–7)
NEUTROPHILS NFR BLD AUTO: 64 % (ref 42.7–76)
NRBC BLD AUTO-RTO: 0 /100 WBC (ref 0–0.2)
PLATELET # BLD AUTO: 350 10*3/MM3 (ref 140–450)
POTASSIUM SERPL-SCNC: 4.8 MMOL/L (ref 3.5–5.2)
PROT SERPL-MCNC: 6.4 G/DL (ref 6–8.5)
RBC # BLD AUTO: 4.47 10*6/MM3 (ref 3.77–5.28)
SODIUM SERPL-SCNC: 130 MMOL/L (ref 136–145)
TRIGL SERPL-MCNC: 78 MG/DL (ref 0–150)
VLDLC SERPL CALC-MCNC: 15.6 MG/DL
WBC # BLD AUTO: 7.12 10*3/MM3 (ref 3.4–10.8)

## 2020-08-13 ENCOUNTER — HOSPITAL ENCOUNTER (OUTPATIENT)
Dept: SLEEP MEDICINE | Facility: HOSPITAL | Age: 70
Discharge: HOME | End: 2020-08-13
Admitting: INTERNAL MEDICINE

## 2020-08-13 DIAGNOSIS — R06.83 SNORING: ICD-10-CM

## 2020-08-13 DIAGNOSIS — G47.30 OBSERVED SLEEP APNEA: ICD-10-CM

## 2020-08-13 DIAGNOSIS — Z86.73 HISTORY OF CVA (CEREBROVASCULAR ACCIDENT): ICD-10-CM

## 2020-08-13 PROCEDURE — 95806 SLEEP STUDY UNATT&RESP EFFT: CPT | Performed by: INTERNAL MEDICINE

## 2020-08-13 PROCEDURE — 95806 SLEEP STUDY UNATT&RESP EFFT: CPT

## 2020-08-26 ENCOUNTER — TELEPHONE (OUTPATIENT)
Dept: SLEEP MEDICINE | Facility: HOSPITAL | Age: 70
End: 2020-08-26

## 2020-08-26 ENCOUNTER — OFFICE VISIT (OUTPATIENT)
Dept: NEUROLOGY | Facility: CLINIC | Age: 70
End: 2020-08-26

## 2020-08-26 VITALS
HEART RATE: 79 BPM | HEIGHT: 67 IN | WEIGHT: 123 LBS | BODY MASS INDEX: 19.3 KG/M2 | OXYGEN SATURATION: 99 % | DIASTOLIC BLOOD PRESSURE: 70 MMHG | SYSTOLIC BLOOD PRESSURE: 110 MMHG

## 2020-08-26 DIAGNOSIS — E78.5 HYPERLIPIDEMIA LDL GOAL <70: ICD-10-CM

## 2020-08-26 DIAGNOSIS — Z86.73 HISTORY OF CVA (CEREBROVASCULAR ACCIDENT): Primary | ICD-10-CM

## 2020-08-26 DIAGNOSIS — I10 ESSENTIAL HYPERTENSION: ICD-10-CM

## 2020-08-26 DIAGNOSIS — I69.30 SEQUELAE, POST-STROKE: ICD-10-CM

## 2020-08-26 PROCEDURE — 99214 OFFICE O/P EST MOD 30 MIN: CPT | Performed by: NURSE PRACTITIONER

## 2020-08-26 RX ORDER — FLUOCINONIDE GEL 0.5 MG/G
GEL TOPICAL
COMMUNITY
Start: 2020-06-17 | End: 2020-08-26

## 2020-08-26 NOTE — PROGRESS NOTES
"DOS: 2020  NAME: Rosi Vicente   : 1950  PCP: Dionne Fonseca MD    Chief Complaint   Patient presents with   • Stroke      SUBJECTIVE  Neurological Problem:  70 y.o. RHW female with HTN, HLD and h/o of trigeminal neuralgia and stroke who presents today for follow-up. She is unaccompanied.     Interval History:   **For previous history, please see progress notes dated 19    Ms. Vicente has a h/o right BG/ corona radiata stroke she suffered in 2017 due to small vessel disease from uncontrolled risk factors.  She was last seen in 2019, on ASA 81 mg, Plavix and vascepa with reporting of easy bruising.  Has h/o of intolerance to statins and repatha. She was nearly back to her neurologic baseline with complaints of residual fatigue, balance issues and subtle left-sided weakness; however, her neurologic exam was essentially normal.  It was recommended at that time that she could discontinue one antiplatelet and continue monotherapy with one antiplatelet agent, initially patient stayed on Plavix but later transitioned to ASA 81 mg, and good response with an ARU of 483.    She presents today and she continues on ASA 81 mg, fish oil supplements.  She has a history of intolerability to multiple statins, and Repatha.  She denies any new stroke/TIA symptoms.  She does continue with some residual fatigue, states she has been \"80% \"back to her baseline from pre-stroke.  She does continue with all of her daily activities, has been biking, does yoga, participates in a book club. She has followed up with pulmonology, Dr. Cooper, had a sleep study done, is scheduled to f/u, she prefers to use a dental appliance. No other changes in her health since her last visit.  She continues to take Zoloft 12.5 mg with good results..  She follows up with her PCP for routine lab work, review of labs from  show CMP with a slightly low sodium at 130; lipid panel with a total of 163, HDL 45, , TG " 78.  She states her BP is well controlled.  She denies any changes in her gait, no falls.  No smoking.  Rare alcohol use.    Review of Systems:Review of Systems   Constitutional: Positive for fatigue. Negative for activity change and appetite change.   HENT: Negative for ear pain, tinnitus and trouble swallowing.    Eyes: Negative for photophobia, pain and visual disturbance.   Musculoskeletal: Positive for gait problem. Negative for back pain and neck pain.   Neurological: Negative for dizziness, tremors, seizures, syncope, facial asymmetry, speech difficulty, weakness, light-headedness, numbness and headaches.   Psychiatric/Behavioral: Negative for agitation, behavioral problems, confusion, decreased concentration, dysphoric mood, hallucinations, self-injury, sleep disturbance and suicidal ideas. The patient is not nervous/anxious and is not hyperactive.     Above ROS reviewed    The following portions of the patient's history were reviewed and updated as appropriate: allergies, current medications, past family history, past medical history, past social history, past surgical history and problem list.    Current Medications:   Current Outpatient Medications:   •  ALPHA LIPOIC ACID PO, Take  by mouth Daily., Disp: , Rfl:   •  aspirin 81 MG EC tablet, Take 81 mg by mouth Daily., Disp: , Rfl:   •  cholecalciferol (VITAMIN D3) 1000 UNITS tablet, Take 2,000 Units by mouth 2 (Two) Times a Day., Disp: , Rfl:   •  Coenzyme Q10 (COQ-10) 400 MG capsule, Take  by mouth Daily., Disp: , Rfl:   •  cycloSPORINE (RESTASIS) 0.05 % ophthalmic emulsion, Apply 1 drop to eye(s) as directed by provider 2 (Two) Times a Day., Disp: , Rfl:   •  dicyclomine (BENTYL) 10 MG capsule, Take 1 capsule by mouth 2 (Two) Times a Day As Needed (diarrhea)., Disp: 60 capsule, Rfl: 2  •  fluticasone (FLONASE) 50 MCG/ACT nasal spray, 2 sprays into the nostril(s) as directed by provider Daily., Disp: , Rfl:   •  gabapentin (NEURONTIN) 100 MG capsule,  Take 1 capsule by mouth 3 (Three) Times a Day. (Patient taking differently: Take 100 mg by mouth 3 (Three) Times a Day. prn), Disp: 90 capsule, Rfl: 5  •  Icosapent Ethyl (VASCEPA PO), Take 2,000 mg by mouth 2 (Two) Times a Day., Disp: , Rfl:   •  losartan-hydrochlorothiazide (HYZAAR) 100-12.5 MG per tablet, Take 1 tablet by mouth Daily., Disp: 90 tablet, Rfl: 1  •  Nutritional Supplements (CALCIUM D-GLUCARATE PO), Take  by mouth., Disp: , Rfl:   •  nystatin (MYCOSTATIN) 154641 UNIT/ML suspension, , Disp: , Rfl:   •  Peppermint Oil (IBGARD PO), Take  by mouth., Disp: , Rfl:   •  Probiotic Product (PROBIOTIC-10 PO), Take 1 capsule by mouth Daily., Disp: , Rfl:   •  sertraline (ZOLOFT) 25 MG tablet, Take 1 tablet by mouth Daily. (Patient taking differently: Take 12.5 mg by mouth Daily.), Disp: 90 tablet, Rfl: 0  •  SUPER B COMPLEX/C PO, Take  by mouth., Disp: , Rfl:   •  vitamin B-12 (CYANOCOBALAMIN) 1000 MCG tablet, 5,000 mcg Daily., Disp: , Rfl:   •  omeprazole (priLOSEC) 20 MG capsule, Take 20 mg by mouth Daily., Disp: , Rfl:   **I did not stop or change the above medications.  Patient's medication list was updated to reflect medications they have reported as currently taking, including medication changes made by other providers.    OBJECTIVE  Vitals:    08/26/20 1457   BP: 110/70   Pulse: 79   SpO2: 99%     Body mass index is 19.26 kg/m².    Diagnostics:    Laboratory Results:         Lab Results   Component Value Date    WBC 7.12 07/27/2020    HGB 12.2 07/27/2020    HCT 37.2 07/27/2020    MCV 83.2 07/27/2020     07/27/2020     Lab Results   Component Value Date    GLUCOSE 84 07/17/2019    BUN 13 07/27/2020    CREATININE 0.61 07/27/2020    EGFRIFNONA 97 07/27/2020    EGFRIFAFRI 117 07/27/2020    BCR 21.3 07/27/2020    K 4.8 07/27/2020    CO2 28.1 07/27/2020    CALCIUM 9.8 07/27/2020    PROTENTOTREF 6.4 07/27/2020    ALBUMIN 4.30 07/27/2020    LABIL2 2.0 07/27/2020    AST 18 07/27/2020    ALT 19 07/27/2020      Lab Results   Component Value Date    HGBA1C 5.30 09/13/2017     Lab Results   Component Value Date    CHOL 152 07/17/2019    CHOL 158 03/19/2019    CHOL 118 12/17/2018     Lab Results   Component Value Date    HDL 45 07/27/2020    HDL 49 11/14/2019    HDL 40 07/17/2019     Lab Results   Component Value Date     (H) 07/27/2020    LDL 66 11/14/2019    LDL 99 07/17/2019     Lab Results   Component Value Date    TRIG 78 07/27/2020    TRIG 91 11/14/2019    TRIG 67 07/17/2019     No results found for: RPR  Lab Results   Component Value Date    TSH 2.86 03/19/2019     Lab Results   Component Value Date    HBHNDGTM33 >2000 (H) 03/19/2019       Physical Exam:  GENERAL: NAD  HEENT: Normocephalic, atraumatic   COR: RRR  Resp: Even and unlabored  Extremities: Equal pulses, no signs of distal embolization.   Psychiatric: Normal mood and affect.    Neurological:   MS: AO. Language normal. No neglect. Follows all commands.  CN: II-XII grossly normal  Motor: Normal strength and tone throughout.  Sensory: Intact to light touch in arms and legs  Station and Gait: Normal gait and station.    Coordination: Normal finger to nose bilaterally    Impression:  Ms. Vicente presents for follow-up of right hemispheric stroke she suffered in September 2017, currently maintained on monotherapy with ASA 81 mg, for which she shows good response, .  We again discussed the importance of risk factor control for stroke prevention including cholesterol control, her most recent LDL was 102, she has known intolerance to statins and Repatha.  We also discussed signs symptoms of stroke reports calling unaware she will develop any of these.  She will follow-up in 1 year, sooner if symptoms warrant.  She voiced understanding and agrees with plan.    Plan:     1. Stroke  Continue ASA 81 mg daily  Recommend LDL control to goal less than 70, previous intolerance to statins, Repatha  Monitor BP regularly  Keep well-hydrated  Recommend continued  regular physical activity, healthy diet  Follow-up with pulmonology regarding possible MELISSA  Secondary stroke prevention: Ideal targets for stroke prevention would be Blood pressure < 130/80; B12 > 500 TSH in normal range and LDL < 70; HbA1c < 6.5 and smoking cessation if applicable.  Call 911 for stroke symptoms  Follow-up as needed or in one year    Greater than 50% of this 25-minute visit was spent counseling patient regarding diagnosis, review of diagnostics, personal risk factors for stroke and importance of risk factor control for stroke prevention, including lifestyle modifications and preventative measures.   There are no diagnoses linked to this encounter.    Coding      Dictated using Dragon

## 2020-08-28 ENCOUNTER — OFFICE VISIT (OUTPATIENT)
Dept: SLEEP MEDICINE | Facility: HOSPITAL | Age: 70
End: 2020-08-28

## 2020-08-28 VITALS
HEART RATE: 68 BPM | BODY MASS INDEX: 19.3 KG/M2 | DIASTOLIC BLOOD PRESSURE: 83 MMHG | HEIGHT: 67 IN | WEIGHT: 123 LBS | SYSTOLIC BLOOD PRESSURE: 130 MMHG

## 2020-08-28 DIAGNOSIS — G47.33 OSA (OBSTRUCTIVE SLEEP APNEA): Primary | ICD-10-CM

## 2020-08-28 DIAGNOSIS — R06.83 SNORING: ICD-10-CM

## 2020-08-28 PROCEDURE — G0463 HOSPITAL OUTPT CLINIC VISIT: HCPCS

## 2020-08-28 PROCEDURE — 99213 OFFICE O/P EST LOW 20 MIN: CPT | Performed by: INTERNAL MEDICINE

## 2020-08-28 NOTE — PROGRESS NOTES
[place] [id number] [facility]  [address]  [place] [address]  Phone   Fax       SLEEP CLINIC FOLLOW UP PROGRESS NOTE.    Rosi Vicente  1950  70 y.o.  female      PCP: Dionne Fonseca MD      Date of visit: 8/28/2020    Chief Complaint   Patient presents with   • Sleep Apnea   • Follow-up       INTERM HISTORY:  This is a 70 y.o. years old patient who has a history of snoring.  Recently she had a home sleep test.  Patient is here to discuss the test results after she had a home sleep test.  She is accompanied by her .    I have discussed the home sleep test results with the patient and .  Date of the test is August 13, 2020  Monitor time 515 minutes  AHI, 17.8/h, total events 154.  There were 81 events of obstructive apneas and 73 events of hypopneas  Sleep apnea is noted in all positions supine left and right but worse in supine position  She also had 239 events of snoring, and the snoring present for 14.7% of the sleep duration        Past Medical History:   Diagnosis Date   • Allergic rhinitis    • Anemia    • Arthritis 2016   • Bronchitis    • CVA (cerebral vascular accident) (CMS/HCC)    • Elevated coronary artery calcium score    • Fatigue    • FH: colon cancer    • GERD (gastroesophageal reflux disease) occasionally   • H/O bone density study 2013   • H/O mammogram 2013   • Hyperlipidemia    • Hypertension     Benign Essential   • Malaise and fatigue    • Muscle pain    • Nocturia    • Osteoporosis    • Sleep apnea     AHI 17/h   • Stroke (CMS/HCC) 09/13/2017   • TMJ (temporomandibular joint syndrome)    • Trigeminal neuralgia     Surgery at [facility] in 2009 repeat in 2015       MEDICATIONS: reviewed by me    Current Outpatient Medications:   •  ALPHA LIPOIC ACID PO, Take  by mouth Daily., Disp: , Rfl:   •  aspirin 81 MG EC tablet, Take 81 mg by mouth Daily., Disp: , Rfl:   •  cholecalciferol (VITAMIN D3) 1000 UNITS tablet, Take 2,000  Units by mouth 2 (Two) Times a Day., Disp: , Rfl:   •  Coenzyme Q10 (COQ-10) 400 MG capsule, Take  by mouth Daily., Disp: , Rfl:   •  cycloSPORINE (RESTASIS) 0.05 % ophthalmic emulsion, Apply 1 drop to eye(s) as directed by provider 2 (Two) Times a Day., Disp: , Rfl:   •  dicyclomine (BENTYL) 10 MG capsule, Take 1 capsule by mouth 2 (Two) Times a Day As Needed (diarrhea)., Disp: 60 capsule, Rfl: 2  •  fluticasone (FLONASE) 50 MCG/ACT nasal spray, 2 sprays into the nostril(s) as directed by provider Daily., Disp: , Rfl:   •  gabapentin (NEURONTIN) 100 MG capsule, Take 1 capsule by mouth 3 (Three) Times a Day. (Patient taking differently: Take 100 mg by mouth 3 (Three) Times a Day. prn), Disp: 90 capsule, Rfl: 5  •  Icosapent Ethyl (VASCEPA PO), Take 2,000 mg by mouth 2 (Two) Times a Day., Disp: , Rfl:   •  losartan-hydrochlorothiazide (HYZAAR) 100-12.5 MG per tablet, Take 1 tablet by mouth Daily., Disp: 90 tablet, Rfl: 1  •  Nutritional Supplements (CALCIUM D-GLUCARATE PO), Take  by mouth., Disp: , Rfl:   •  nystatin (MYCOSTATIN) 781726 UNIT/ML suspension, , Disp: , Rfl:   •  omeprazole (priLOSEC) 20 MG capsule, Take 20 mg by mouth Daily., Disp: , Rfl:   •  Peppermint Oil (IBGARD PO), Take  by mouth., Disp: , Rfl:   •  Probiotic Product (PROBIOTIC-10 PO), Take 1 capsule by mouth Daily., Disp: , Rfl:   •  sertraline (ZOLOFT) 25 MG tablet, Take 1 tablet by mouth Daily. (Patient taking differently: Take 12.5 mg by mouth Daily.), Disp: 90 tablet, Rfl: 0  •  SUPER B COMPLEX/C PO, Take  by mouth., Disp: , Rfl:   •  vitamin B-12 (CYANOCOBALAMIN) 1000 MCG tablet, 5,000 mcg Daily., Disp: , Rfl:     Allergies   Allergen Reactions   • Bystolic [Nebivolol Hcl] Other (See Comments)     Extreme fatigue, dizziness   • Lisinopril Other (See Comments)     COUGH    reviewed by me    SOCIAL, FAMILY HISTORY: Medical records are reviewed and noted by me.  History of smoking no  History of alcohol use no  Caffeine use 1    REVIEW OF  "SYSTEMS:   [place] Sleepiness Scale :Total score: 3   Snoring: Yes  Morning headache: No  Nasal congestion: No  Leg movements: No  Number of times you wake up once asleep: 2  Jaw pain, none    PHYSICAL EXAMINATION:  Vitals:    08/28/20 0900   BP: 130/83   Pulse: 68   Weight: 55.8 kg (123 lb)   Height: 170.2 cm (67\")    Body mass index is 19.26 kg/m².    HEENT: pupils are round equal and reacting to light and accommodation, nasal passage is clear, no nasal polyps, no lymphadenopathy, throat is clear, oral airway Mallampati class 3  RESPRATORY SYSTEM: Breath sounds are equal on both sides and are normal, no wheezes or crackles  CARDIOVASULAR SYSTEM: Heart rate is regular without murmur  ABDOMEN: Soft, no ascites, no hepatosplenomegaly.  EXTREMITIES: No cyanosis, clubbing or edema       ASSESSMENT AND PLAN:  · Obstructive sleep apnea, patient has moderate obstructive sleep apnea.  We have discussed the test results also also treatment options.  Patient is interested in mandibular advancement device.  Her sleep apnea is moderate and would benefit from treatment.  The dental device is medically necessary to treat her sleep apnea.  I have gone I am going to send the patient to see Dr. Iván Castillo for evaluation and treatment of sleep apnea with mandibular advancement device.  After the device is made she will have a repeat home sleep test on the device to reassess treatment effectiveness        Jon Cooper MD  Sleep Medicine.(Board-certified)  [facility]  Medical Director for [facility]  8/28/2020               "

## 2020-09-04 ENCOUNTER — OFFICE VISIT (OUTPATIENT)
Dept: INTERNAL MEDICINE | Facility: CLINIC | Age: 70
End: 2020-09-04

## 2020-09-04 VITALS
OXYGEN SATURATION: 98 % | WEIGHT: 122 LBS | HEIGHT: 67 IN | SYSTOLIC BLOOD PRESSURE: 100 MMHG | TEMPERATURE: 98.6 F | DIASTOLIC BLOOD PRESSURE: 60 MMHG | BODY MASS INDEX: 19.15 KG/M2 | HEART RATE: 66 BPM

## 2020-09-04 DIAGNOSIS — Z23 NEED FOR 23-POLYVALENT PNEUMOCOCCAL POLYSACCHARIDE VACCINE: ICD-10-CM

## 2020-09-04 DIAGNOSIS — I10 ESSENTIAL HYPERTENSION: Primary | ICD-10-CM

## 2020-09-04 DIAGNOSIS — Z86.73 HISTORY OF CVA (CEREBROVASCULAR ACCIDENT): ICD-10-CM

## 2020-09-04 DIAGNOSIS — E78.5 HYPERLIPIDEMIA LDL GOAL <70: ICD-10-CM

## 2020-09-04 DIAGNOSIS — G47.33 OBSTRUCTIVE SLEEP APNEA SYNDROME: ICD-10-CM

## 2020-09-04 DIAGNOSIS — K58.2 IRRITABLE BOWEL SYNDROME WITH BOTH CONSTIPATION AND DIARRHEA: ICD-10-CM

## 2020-09-04 PROCEDURE — 90732 PPSV23 VACC 2 YRS+ SUBQ/IM: CPT | Performed by: INTERNAL MEDICINE

## 2020-09-04 PROCEDURE — 99214 OFFICE O/P EST MOD 30 MIN: CPT | Performed by: INTERNAL MEDICINE

## 2020-09-04 PROCEDURE — G0009 ADMIN PNEUMOCOCCAL VACCINE: HCPCS | Performed by: INTERNAL MEDICINE

## 2020-09-04 NOTE — PROGRESS NOTES
"Subjective   Rosi Vicente is a 70 y.o. female here for   Chief Complaint   Patient presents with   • Hypertension   • Hyperlipidemia   • Fatigue   .    Vitals:    09/04/20 0809   BP: 100/60   Pulse: 66   Temp: 98.6 °F (37 °C)   SpO2: 98%   Weight: 55.3 kg (122 lb)   Height: 170.2 cm (67.01\")       Body mass index is 19.1 kg/m².    Hypertension   This is a chronic problem. The current episode started more than 1 year ago. The problem is unchanged. The problem is controlled. Pertinent negatives include no chest pain, palpitations or shortness of breath.   Hyperlipidemia   This is a chronic problem. The current episode started more than 1 year ago. The problem is controlled. Recent lipid tests were reviewed and are normal. Pertinent negatives include no chest pain or shortness of breath.   Fatigue   This is a chronic problem. The current episode started more than 1 year ago. The problem occurs constantly. The problem has been unchanged. Associated symptoms include fatigue. Pertinent negatives include no chest pain, chills, coughing or fever.        The following portions of the patient's history were reviewed and updated as appropriate: allergies, current medications, past social history and problem list.    Review of Systems   Constitutional: Positive for fatigue. Negative for chills and fever.   Respiratory: Negative for cough, shortness of breath and wheezing.    Cardiovascular: Negative for chest pain, palpitations and leg swelling.   Gastrointestinal: Positive for constipation and diarrhea.   Psychiatric/Behavioral: Negative for dysphoric mood and sleep disturbance. The patient is not nervous/anxious.        Objective   Physical Exam   Constitutional: She appears well-developed and well-nourished. No distress.   Cardiovascular: Normal rate, regular rhythm and normal heart sounds.   Pulmonary/Chest: No respiratory distress. She has no wheezes. She has no rales. She exhibits no tenderness.   Abdominal: There is no " tenderness.   Musculoskeletal: She exhibits no edema.   Psychiatric: She has a normal mood and affect. Her behavior is normal.   Nursing note and vitals reviewed.      Assessment/Plan   Diagnoses and all orders for this visit:    Essential hypertension  Comments:  controlled - call if bp over 140/90  Orders:  -     Comprehensive Metabolic Panel; Future  -     Lipid Panel; Future    Hyperlipidemia LDL goal <70  Comments:  continue diet/ex  Orders:  -     Comprehensive Metabolic Panel; Future  -     Lipid Panel; Future    Obstructive sleep apnea syndrome  Comments:  she is getting dental appliance    History of CVA (cerebrovascular accident)  Comments:  continue ASA daily    Irritable bowel syndrome with both constipation and diarrhea  Comments:  need trial of daily metamucil - call if worse

## 2020-09-17 ENCOUNTER — APPOINTMENT (OUTPATIENT)
Dept: SLEEP MEDICINE | Facility: HOSPITAL | Age: 70
End: 2020-09-17

## 2020-10-26 ENCOUNTER — PATIENT MESSAGE (OUTPATIENT)
Dept: CARDIOLOGY | Facility: CLINIC | Age: 70
End: 2020-10-26

## 2020-10-26 NOTE — TELEPHONE ENCOUNTER
From: Rosi Vicente  To: Katarina Leung MD  Sent: 10/26/2020 9:08 AM EDT  Subject: Prescription Question    Morning   Realize have not seen you in awhile. Doing well. B/P in control and lipids acceptable. In years past I took flush-free niacin which helped increased My HDL. Could I take it again?  Dr Fonseca suggested I ask you.   Thanks   Rosi Vicente

## 2020-11-09 RX ORDER — SERTRALINE HYDROCHLORIDE 25 MG/1
TABLET, FILM COATED ORAL
Qty: 90 TABLET | Refills: 1 | Status: SHIPPED | OUTPATIENT
Start: 2020-11-09 | End: 2022-02-03 | Stop reason: SDUPTHER

## 2020-12-01 DIAGNOSIS — I10 ESSENTIAL HYPERTENSION: ICD-10-CM

## 2020-12-02 RX ORDER — LOSARTAN POTASSIUM AND HYDROCHLOROTHIAZIDE 12.5; 1 MG/1; MG/1
1 TABLET ORAL DAILY
Qty: 30 TABLET | Refills: 11 | Status: SHIPPED | OUTPATIENT
Start: 2020-12-02 | End: 2021-07-27

## 2020-12-23 ENCOUNTER — TELEPHONE (OUTPATIENT)
Dept: INTERNAL MEDICINE | Facility: CLINIC | Age: 70
End: 2020-12-23

## 2020-12-23 DIAGNOSIS — N32.81 OVERACTIVE BLADDER: Primary | ICD-10-CM

## 2020-12-23 NOTE — TELEPHONE ENCOUNTER
Pt is requesting a new order to go back to [facility] for her overactive bladder  Referral placed please sign off     [facility] Fax# [phone]

## 2020-12-28 DIAGNOSIS — Z12.31 ENCOUNTER FOR SCREENING MAMMOGRAM FOR BREAST CANCER: Primary | ICD-10-CM

## 2021-01-08 DIAGNOSIS — E78.5 HYPERLIPIDEMIA LDL GOAL <70: ICD-10-CM

## 2021-01-08 DIAGNOSIS — I10 ESSENTIAL HYPERTENSION: ICD-10-CM

## 2021-01-09 LAB
ALBUMIN SERPL-MCNC: 4.6 G/DL (ref 3.7–4.7)
ALBUMIN/GLOB SERPL: 1.9 {RATIO} (ref 1.2–2.2)
ALP SERPL-CCNC: 97 IU/L (ref 39–117)
ALT SERPL-CCNC: 19 IU/L (ref 0–32)
AST SERPL-CCNC: 25 IU/L (ref 0–40)
BILIRUB SERPL-MCNC: 0.4 MG/DL (ref 0–1.2)
BUN SERPL-MCNC: 13 MG/DL (ref 8–27)
BUN/CREAT SERPL: 19 (ref 12–28)
CALCIUM SERPL-MCNC: 10.1 MG/DL (ref 8.7–10.3)
CHLORIDE SERPL-SCNC: 98 MMOL/L (ref 96–106)
CHOLEST SERPL-MCNC: 160 MG/DL (ref 100–199)
CO2 SERPL-SCNC: 25 MMOL/L (ref 20–29)
CREAT SERPL-MCNC: 0.7 MG/DL (ref 0.57–1)
GLOBULIN SER CALC-MCNC: 2.4 G/DL (ref 1.5–4.5)
GLUCOSE SERPL-MCNC: 71 MG/DL (ref 65–99)
HDLC SERPL-MCNC: 47 MG/DL
LDLC SERPL CALC-MCNC: 104 MG/DL (ref 0–99)
POTASSIUM SERPL-SCNC: 4.8 MMOL/L (ref 3.5–5.2)
PROT SERPL-MCNC: 7 G/DL (ref 6–8.5)
SODIUM SERPL-SCNC: 137 MMOL/L (ref 134–144)
TRIGL SERPL-MCNC: 43 MG/DL (ref 0–149)
VLDLC SERPL CALC-MCNC: 9 MG/DL (ref 5–40)

## 2021-01-11 ENCOUNTER — PATIENT MESSAGE (OUTPATIENT)
Dept: INTERNAL MEDICINE | Facility: CLINIC | Age: 71
End: 2021-01-11

## 2021-01-11 RX ORDER — ICOSAPENT ETHYL 1000 MG/1
2 CAPSULE ORAL 2 TIMES DAILY WITH MEALS
Qty: 360 CAPSULE | Refills: 4 | Status: SHIPPED | OUTPATIENT
Start: 2021-01-11 | End: 2021-01-11 | Stop reason: SDUPTHER

## 2021-01-11 RX ORDER — ICOSAPENT ETHYL 1000 MG/1
2 CAPSULE ORAL 2 TIMES DAILY WITH MEALS
Qty: 360 CAPSULE | Refills: 4 | Status: SHIPPED | OUTPATIENT
Start: 2021-01-11 | End: 2022-02-07

## 2021-01-19 ENCOUNTER — IMMUNIZATION (OUTPATIENT)
Dept: VACCINE CLINIC | Facility: HOSPITAL | Age: 71
End: 2021-01-19

## 2021-01-19 PROCEDURE — 0001A: CPT | Performed by: INTERNAL MEDICINE

## 2021-01-19 PROCEDURE — 91300 HC SARSCOV02 VAC 30MCG/0.3ML IM: CPT | Performed by: INTERNAL MEDICINE

## 2021-01-28 ENCOUNTER — TRANSCRIBE ORDERS (OUTPATIENT)
Dept: SLEEP MEDICINE | Facility: HOSPITAL | Age: 71
End: 2021-01-28

## 2021-01-28 DIAGNOSIS — G47.33 OSA (OBSTRUCTIVE SLEEP APNEA): Primary | ICD-10-CM

## 2021-02-09 ENCOUNTER — IMMUNIZATION (OUTPATIENT)
Dept: VACCINE CLINIC | Facility: HOSPITAL | Age: 71
End: 2021-02-09

## 2021-02-09 PROCEDURE — 0002A: CPT | Performed by: INTERNAL MEDICINE

## 2021-02-09 PROCEDURE — 91300 HC SARSCOV02 VAC 30MCG/0.3ML IM: CPT | Performed by: INTERNAL MEDICINE

## 2021-02-15 ENCOUNTER — OFFICE VISIT (OUTPATIENT)
Dept: INTERNAL MEDICINE | Facility: CLINIC | Age: 71
End: 2021-02-15

## 2021-02-15 VITALS
RESPIRATION RATE: 15 BRPM | DIASTOLIC BLOOD PRESSURE: 82 MMHG | OXYGEN SATURATION: 98 % | BODY MASS INDEX: 19.15 KG/M2 | TEMPERATURE: 97.3 F | HEART RATE: 73 BPM | WEIGHT: 122 LBS | HEIGHT: 67 IN | SYSTOLIC BLOOD PRESSURE: 138 MMHG

## 2021-02-15 DIAGNOSIS — R42 DIZZINESS: Primary | ICD-10-CM

## 2021-02-15 DIAGNOSIS — I10 ESSENTIAL HYPERTENSION: ICD-10-CM

## 2021-02-15 DIAGNOSIS — J30.2 SEASONAL ALLERGIC RHINITIS, UNSPECIFIED TRIGGER: ICD-10-CM

## 2021-02-15 PROCEDURE — 99213 OFFICE O/P EST LOW 20 MIN: CPT | Performed by: INTERNAL MEDICINE

## 2021-02-15 NOTE — PROGRESS NOTES
"Chief Complaint  Dizziness (X 2days.), Hypertension, and Allergic Rhinitis    Subjective          Rosi Vicente presents to [facility] for   Dizziness  This is a new problem. The current episode started yesterday. The problem occurs intermittently. The problem has been waxing and waning. Associated symptoms include congestion. Pertinent negatives include no chest pain, chills, coughing, fatigue, fever, numbness, sore throat or weakness.   Hypertension  This is a chronic problem. The current episode started more than 1 year ago. The problem is unchanged. The problem is controlled. Pertinent negatives include no chest pain, palpitations or shortness of breath.   Allergic Rhinitis  Presents for follow-up visit. She complains of congestion and sinus pressure. She reports no cough, ear pain, fatigue, fever, rhinorrhea, sneezing, sore throat or wheezing. The problem occurs intermittently.       Review of Systems   Constitutional: Negative for chills, fatigue and fever.   HENT: Positive for congestion, sinus pressure and tinnitus. Negative for ear pain, facial swelling, rhinorrhea, sneezing, sore throat, trouble swallowing and voice change.    Respiratory: Negative for cough, shortness of breath and wheezing.    Cardiovascular: Negative for chest pain, palpitations and leg swelling.   Neurological: Positive for dizziness (yesterday) and light-headedness. Negative for syncope, facial asymmetry, speech difficulty, weakness and numbness.        Objective   Vital Signs:   /82 (BP Location: Left arm, Patient Position: Sitting, Cuff Size: Adult)   Pulse 73   Temp 97.3 °F (36.3 °C)   Resp 15   Ht 170.2 cm (67.01\")   Wt 55.3 kg (122 lb)   SpO2 98%   BMI 19.10 kg/m²     Physical Exam  Vitals signs and nursing note reviewed.   Constitutional:       General: She is not in acute distress.     Appearance: She is well-developed.   HENT:      Head: Normocephalic.      Right Ear: External " ear normal. Tympanic membrane is bulging. Tympanic membrane is not erythematous.      Left Ear: External ear normal. Tympanic membrane is bulging. Tympanic membrane is not erythematous.      Nose:      Right Sinus: No frontal sinus tenderness.      Left Sinus: No frontal sinus tenderness.   Neck:      Musculoskeletal: Normal range of motion and neck supple.   Cardiovascular:      Rate and Rhythm: Normal rate and regular rhythm.      Heart sounds: Normal heart sounds.   Pulmonary:      Effort: Pulmonary effort is normal. No respiratory distress.      Breath sounds: Normal breath sounds. No wheezing or rales.   Chest:      Chest wall: No tenderness.   Musculoskeletal: Normal range of motion.   Lymphadenopathy:      Cervical: No cervical adenopathy.   Neurological:      General: No focal deficit present.      Mental Status: She is oriented to person, place, and time.      Cranial Nerves: No cranial nerve deficit.      Sensory: No sensory deficit.      Motor: No weakness.      Coordination: Coordination normal.      Gait: Gait normal.      Deep Tendon Reflexes: Reflexes normal.   Psychiatric:         Mood and Affect: Mood normal.         Behavior: Behavior normal.         Thought Content: Thought content normal.         Judgment: Judgment normal.        Result Review :                 Assessment and Plan    Problem List Items Addressed This Visit        Unprioritized    Hypertension    Current Assessment & Plan     Hypertension is improving with treatment.  Continue current treatment regimen.  Dietary sodium restriction.  Blood pressure will be reassessed at the next regular appointment.         Allergic rhinitis    Current Assessment & Plan     Need daily flonase & mucinex 1200mg 2x daily if needed         Dizziness - Primary    Overview     need mucinex 1200mg bid, flonase daily & nasal saline 4x daily         Relevant Orders    Ambulatory Referral to Physical Therapy Evaluate and treat, Vestibular          Follow Up    No follow-ups on file.  Patient was given instructions and counseling regarding her condition or for health maintenance advice. Please see specific information pulled into the AVS if appropriate.      Normal neurologic exam today. She only has dizziness with moving head fast.  Need to see PT today if possible. To to ER if worsening sx.

## 2021-02-24 ENCOUNTER — APPOINTMENT (OUTPATIENT)
Dept: SLEEP MEDICINE | Facility: HOSPITAL | Age: 71
End: 2021-02-24

## 2021-02-25 ENCOUNTER — APPOINTMENT (OUTPATIENT)
Dept: SLEEP MEDICINE | Facility: HOSPITAL | Age: 71
End: 2021-02-25

## 2021-02-26 ENCOUNTER — APPOINTMENT (OUTPATIENT)
Dept: SLEEP MEDICINE | Facility: HOSPITAL | Age: 71
End: 2021-02-26

## 2021-03-07 ENCOUNTER — PATIENT MESSAGE (OUTPATIENT)
Dept: CARDIOLOGY | Facility: CLINIC | Age: 71
End: 2021-03-07

## 2021-03-08 NOTE — TELEPHONE ENCOUNTER
From: Rosi Vicente  To: Katarina Leung MD  Sent: 3/7/2021 9:52 AM EST  Subject: Prescription Question    Hi  In reading about fish oil I see that pure EPA fish oil is better that the common combo of EPA/DPA IN reducing LDL. VASCEPA IS SOOOO expensive. Any other products just EPA? Will it help with my persistent elevated LDL. AS YOU may recall all Statin or injectables cause terrible side effects for me.   Thanks for your input

## 2021-03-08 NOTE — TELEPHONE ENCOUNTER
Please let her know that the fish oils, like Vascepa will assist in lowering the LDL only to a small degree.  In fact Lovaza increase his LDL.  Statins and PCSK9 inhibitors are the most effective LDL lowering agents.  Zetia will help lower LDL to some degree

## 2021-03-08 NOTE — TELEPHONE ENCOUNTER
Please let her know that the fish oils will lower her LDL only to a small degree.  In fact Lovaza increase his LDL.  The best LDL lowering agents available today are statins and PCSK9 inhibitors.  Zetia can lower LDL to some degree as well

## 2021-03-15 ENCOUNTER — APPOINTMENT (OUTPATIENT)
Dept: SLEEP MEDICINE | Facility: HOSPITAL | Age: 71
End: 2021-03-15

## 2021-03-15 ENCOUNTER — OFFICE VISIT (OUTPATIENT)
Dept: INTERNAL MEDICINE | Facility: CLINIC | Age: 71
End: 2021-03-15

## 2021-03-15 VITALS
BODY MASS INDEX: 19.46 KG/M2 | HEART RATE: 69 BPM | TEMPERATURE: 97.5 F | WEIGHT: 124 LBS | OXYGEN SATURATION: 99 % | DIASTOLIC BLOOD PRESSURE: 68 MMHG | HEIGHT: 67 IN | SYSTOLIC BLOOD PRESSURE: 122 MMHG

## 2021-03-15 DIAGNOSIS — Z86.73 HISTORY OF CVA (CEREBROVASCULAR ACCIDENT): ICD-10-CM

## 2021-03-15 DIAGNOSIS — E78.5 HYPERLIPIDEMIA LDL GOAL <70: ICD-10-CM

## 2021-03-15 DIAGNOSIS — R53.82 CHRONIC FATIGUE: ICD-10-CM

## 2021-03-15 DIAGNOSIS — G50.0 TRIGEMINAL NEURALGIA: ICD-10-CM

## 2021-03-15 DIAGNOSIS — I10 ESSENTIAL HYPERTENSION: Primary | ICD-10-CM

## 2021-03-15 DIAGNOSIS — Z71.84 COUNSELING FOR TRAVEL: ICD-10-CM

## 2021-03-15 DIAGNOSIS — R42 DIZZINESS: ICD-10-CM

## 2021-03-15 PROCEDURE — 99213 OFFICE O/P EST LOW 20 MIN: CPT | Performed by: INTERNAL MEDICINE

## 2021-03-15 RX ORDER — SULFAMETHOXAZOLE AND TRIMETHOPRIM 800; 160 MG/1; MG/1
1 TABLET ORAL 2 TIMES DAILY
Qty: 20 TABLET | Refills: 0 | Status: SHIPPED | OUTPATIENT
Start: 2021-03-15 | End: 2021-05-12

## 2021-03-15 RX ORDER — GABAPENTIN 100 MG/1
CAPSULE ORAL
Qty: 90 CAPSULE | Refills: 1 | Status: SHIPPED | OUTPATIENT
Start: 2021-03-15 | End: 2021-10-26 | Stop reason: SDUPTHER

## 2021-03-15 NOTE — PROGRESS NOTES
"Subjective   Rosi Vicente is a 70 y.o. female seen today for Hypertension (6 month follow up) and Hyperlipidemia.     Hypertension  This is a chronic problem. The current episode started more than 1 year ago. The problem is unchanged. The problem is controlled. Pertinent negatives include no chest pain, palpitations or shortness of breath.   Hyperlipidemia  This is a chronic problem. The current episode started more than 1 year ago. The problem is uncontrolled. Recent lipid tests were reviewed and are normal. Pertinent negatives include no chest pain or shortness of breath.        The following portions of the patient's history were reviewed and updated as appropriate: allergies, current medications, past social history and problem list.    Review of Systems   Constitutional: Negative for chills, fatigue and fever.   Respiratory: Negative for cough, shortness of breath and wheezing.    Cardiovascular: Negative for chest pain, palpitations and leg swelling.   Psychiatric/Behavioral: Negative for dysphoric mood and sleep disturbance. The patient is not nervous/anxious.        Objective   /68 (BP Location: Right arm, Patient Position: Sitting, Cuff Size: Adult)   Pulse 69   Temp 97.5 °F (36.4 °C)   Ht 170.2 cm (67\")   Wt 56.2 kg (124 lb)   LMP  (LMP Unknown)   SpO2 99%   BMI 19.42 kg/m²   Physical Exam  Vitals and nursing note reviewed.   Constitutional:       General: She is not in acute distress.     Appearance: She is well-developed.   Cardiovascular:      Rate and Rhythm: Normal rate and regular rhythm.      Heart sounds: Normal heart sounds.   Pulmonary:      Effort: No respiratory distress.      Breath sounds: No wheezing or rales.   Chest:      Chest wall: No tenderness.   Psychiatric:         Behavior: Behavior normal.         Assessment/Plan   Problems Addressed this Visit        Unprioritized    Hypertension - Primary     Hypertension is unchanged.  Continue current treatment regimen.  Dietary " sodium restriction.  Regular aerobic exercise.  Continue current medications.  Blood pressure will be reassessed at the next regular appointment.         Hyperlipidemia LDL goal <70     Lipid abnormalities are unchanged.  Nutritional counseling was provided.  Lipids will be reassessed in 6 months.         History of CVA (cerebrovascular accident)    Chronic fatigue    Dizziness      Other Visit Diagnoses     Counseling for travel        Trigeminal neuralgia        Relevant Medications    gabapentin (NEURONTIN) 100 MG capsule      Diagnoses       Codes Comments    Essential hypertension    -  Primary ICD-10-CM: I10  ICD-9-CM: 401.9     Hyperlipidemia LDL goal <70     ICD-10-CM: E78.5  ICD-9-CM: 272.4     History of CVA (cerebrovascular accident)     ICD-10-CM: Z86.73  ICD-9-CM: V12.54     Counseling for travel     ICD-10-CM: Z71.84  ICD-9-CM: V65.49     Chronic fatigue     ICD-10-CM: R53.82  ICD-9-CM: 780.79     Dizziness     ICD-10-CM: R42  ICD-9-CM: 780.4     Trigeminal neuralgia     ICD-10-CM: G50.0  ICD-9-CM: 350.1

## 2021-03-16 ENCOUNTER — APPOINTMENT (OUTPATIENT)
Dept: SLEEP MEDICINE | Facility: HOSPITAL | Age: 71
End: 2021-03-16

## 2021-03-17 ENCOUNTER — APPOINTMENT (OUTPATIENT)
Dept: SLEEP MEDICINE | Facility: HOSPITAL | Age: 71
End: 2021-03-17

## 2021-04-05 ENCOUNTER — APPOINTMENT (OUTPATIENT)
Dept: MAMMOGRAPHY | Facility: HOSPITAL | Age: 71
End: 2021-04-05

## 2021-04-05 ENCOUNTER — TELEPHONE (OUTPATIENT)
Dept: INTERNAL MEDICINE | Facility: CLINIC | Age: 71
End: 2021-04-05

## 2021-04-05 ENCOUNTER — HOSPITAL ENCOUNTER (OUTPATIENT)
Dept: MAMMOGRAPHY | Facility: HOSPITAL | Age: 71
Discharge: HOME | End: 2021-04-05
Admitting: INTERNAL MEDICINE

## 2021-04-05 DIAGNOSIS — Z12.31 ENCOUNTER FOR SCREENING MAMMOGRAM FOR BREAST CANCER: ICD-10-CM

## 2021-04-05 PROCEDURE — 77063 BREAST TOMOSYNTHESIS BI: CPT

## 2021-04-05 PROCEDURE — 77067 SCR MAMMO BI INCL CAD: CPT

## 2021-04-05 NOTE — TELEPHONE ENCOUNTER
Caller: [employer]    Best call back number: [phone]    What is the best time to reach you: ANY     Who are you requesting to speak with (clinical staff, provider,  specific staff member): CLINICAL STAFF     Do you know the name of the person who called: KATELYN FROM [facility]     What was the call regarding: PRIOR AUTHORIZATION FOR CASE #[id number].     Do you require a callback: YES, WAS UNABLE TO WARM TRANSFER.

## 2021-04-06 ENCOUNTER — PRIOR AUTHORIZATION (OUTPATIENT)
Dept: INTERNAL MEDICINE | Facility: CLINIC | Age: 71
End: 2021-04-06

## 2021-04-06 NOTE — TELEPHONE ENCOUNTER
Fax received from [employer] indicating Vascepa Capsule 1GM Capsule was approved as the medication is covered on the formulary and does not require a Coverage Determination Request.    Approval Timeframe: Start Date: 4/5/2021 End Date: 12/31/2021.    Fax was also received for Tier exception that needed a box checked of YES or NO indicating the other formulary  Medications would not be as effective as the requested drug and/or that the preferred drug would have adverse effects for the enrollee.    Box for Yes was checked and faxed back to [phone]

## 2021-04-06 NOTE — TELEPHONE ENCOUNTER
Fax received from [employer] indicating Vascepa Capsule 1GM Capsule was approved as the medication is covered on the formulary and does not require a Coverage Determination Request.     Approval Timeframe: Start Date: 4/5/2021 End Date: 12/31/2021.     Fax was also received for Tier exception that needed a box checked of YES or NO indicating the other formulary  Medications would not be as effective as the requested drug and/or that the preferred drug would have adverse effects for the enrollee.     Box for Yes was checked and faxed back to [phone]

## 2021-04-07 ENCOUNTER — TELEPHONE (OUTPATIENT)
Dept: INTERNAL MEDICINE | Facility: CLINIC | Age: 71
End: 2021-04-07

## 2021-04-07 DIAGNOSIS — R92.8 ABNORMAL MAMMOGRAM OF LEFT BREAST: Primary | ICD-10-CM

## 2021-04-07 NOTE — TELEPHONE ENCOUNTER
PATIENT IS NEEDING TO GET A FOLLOW UP MAMMOGRAM ON HER LEFT BREAST AND NEEDS AN ORDER FOR THAT. SHE JUST GOT HER RESULTS FOR ORIGINAL MAMMOGRAM TODAY.    PLEASE ADVISE  [phone]

## 2021-04-08 ENCOUNTER — OFFICE VISIT (OUTPATIENT)
Dept: SLEEP MEDICINE | Facility: HOSPITAL | Age: 71
End: 2021-04-08

## 2021-04-08 VITALS
SYSTOLIC BLOOD PRESSURE: 135 MMHG | OXYGEN SATURATION: 98 % | BODY MASS INDEX: 19.3 KG/M2 | HEIGHT: 67 IN | HEART RATE: 82 BPM | DIASTOLIC BLOOD PRESSURE: 86 MMHG | WEIGHT: 123 LBS

## 2021-04-08 DIAGNOSIS — R06.83 SNORING: ICD-10-CM

## 2021-04-08 DIAGNOSIS — G47.33 OBSTRUCTIVE SLEEP APNEA SYNDROME: Primary | ICD-10-CM

## 2021-04-08 PROCEDURE — G0463 HOSPITAL OUTPT CLINIC VISIT: HCPCS

## 2021-04-08 PROCEDURE — 99213 OFFICE O/P EST LOW 20 MIN: CPT | Performed by: INTERNAL MEDICINE

## 2021-04-08 NOTE — PROGRESS NOTES
[facility]  [address]  3rd [place] [address]  Phone   Fax       SLEEP CLINIC FOLLOW UP PROGRESS NOTE.    Rosi Vicente  1950  71 y.o.  female      PCP: Dionne Fonseca MD         Date of visit: 4/8/2021    Chief Complaint   Patient presents with   • Witnessed Apnea       HPI:  This is a 71 y.o. years old patient who has a history of obstructive sleep apnea.  She had a home sleep test in August 2020 and found to have moderate sleep apnea with AHI of 17/h.  At that time she opted to go for a oral device.  She was seen by Dr. Castillo and was in the process of getting a mandibular advancement device.  Meantime she has trigeminal neuralgia.  She saw her neurologist Dr. Christopher Batista, who advised her not to use the oral device because of twitching of nerve triggering trigeminal neuralgia.  She had a microvascular surgery in 2009.  Because of the development of these she was sent back to me to try the CPAP.    Normal bedtime 11 PM  Normal wake time 6:30 AM    Past Medical History:   Diagnosis Date   • Allergic rhinitis    • Anemia    • Arthritis 2016   • Bronchitis    • CVA (cerebral vascular accident) (CMS/HCC)    • Elevated coronary artery calcium score    • Fatigue    • FH: colon cancer    • GERD (gastroesophageal reflux disease) occasionally   • H/O bone density study 2013   • H/O mammogram 2013   • Hyperlipidemia    • Hypertension     Benign Essential   • Irritable bowel syndrome with both constipation and diarrhea 9/4/2020   • Malaise and fatigue    • Muscle pain    • Nocturia    • Osteoporosis    • Sleep apnea     AHI 17/h   • Stroke (CMS/HCC) 09/13/2017   • TMJ (temporomandibular joint syndrome)    • Trigeminal neuralgia     Surgery at [facility] in 2009 repeat in 2015       MEDICATIONS: reviewed by me    Current Outpatient Medications:   •  ALPHA LIPOIC ACID PO, Take  by mouth Daily., Disp: , Rfl:   •  aspirin 81 MG EC tablet, Take 81 mg by  mouth Daily., Disp: , Rfl:   •  cholecalciferol (VITAMIN D3) 1000 UNITS tablet, Take 2,000 Units by mouth 2 (Two) Times a Day., Disp: , Rfl:   •  Coenzyme Q10 (COQ-10) 400 MG capsule, Take  by mouth Daily., Disp: , Rfl:   •  cycloSPORINE (RESTASIS) 0.05 % ophthalmic emulsion, Apply 1 drop to eye(s) as directed by provider 2 (Two) Times a Day., Disp: , Rfl:   •  fluticasone (FLONASE) 50 MCG/ACT nasal spray, 2 sprays into the nostril(s) as directed by provider Daily., Disp: , Rfl:   •  gabapentin (NEURONTIN) 100 MG capsule, qhs, Disp: 90 capsule, Rfl: 1  •  icosapent ethyl (Vascepa) 1 g capsule capsule, Take 2 g by mouth 2 (Two) Times a Day With Meals., Disp: 360 capsule, Rfl: 4  •  losartan-hydrochlorothiazide (HYZAAR) 100-12.5 MG per tablet, Take 1 tablet by mouth Daily. 30 DAYS ONLY NEEDS AN APPOINTMENT, Disp: 30 tablet, Rfl: 11  •  Nutritional Supplements (CALCIUM D-GLUCARATE PO), Take  by mouth., Disp: , Rfl:   •  nystatin (MYCOSTATIN) 058784 UNIT/ML suspension, , Disp: , Rfl:   •  Peppermint Oil (IBGARD PO), Take  by mouth., Disp: , Rfl:   •  Probiotic Product (PROBIOTIC-10 PO), Take 1 capsule by mouth Daily., Disp: , Rfl:   •  sertraline (ZOLOFT) 25 MG tablet, TAKE ONE TABLET BY MOUTH DAILY, Disp: 90 tablet, Rfl: 1  •  sulfamethoxazole-trimethoprim (Bactrim DS) 800-160 MG per tablet, Take 1 tablet by mouth 2 (Two) Times a Day., Disp: 20 tablet, Rfl: 0  •  SUPER B COMPLEX/C PO, Take  by mouth., Disp: , Rfl:   •  vitamin B-12 (CYANOCOBALAMIN) 1000 MCG tablet, 5,000 mcg Daily., Disp: , Rfl:     Allergies   Allergen Reactions   • Bystolic [Nebivolol Hcl] Other (See Comments)     Extreme fatigue, dizziness   • Lisinopril Other (See Comments)     COUGH    reviewed by me    SOCIAL, FAMILY HISTORY: Medical records are reviewed and noted by me.  History of smoking no  History of alcohol use 2 3  Caffeine use 1-2    REVIEW OF SYSTEMS:   Burdett Sleepiness Scale :Total score: 6   Snoring: Yes  Morning headache: No  Nasal  "congestion: Yes  Leg movements: No  Heart burn no  Posterior nasal drip    PHYSICAL EXAMINATION:  CONSTITUTIONAL:  Vitals:    04/08/21 0849   BP: 135/86   Pulse: 82   SpO2: 98%   Weight: 55.8 kg (123 lb)   Height: 170.2 cm (67\")    Body mass index is 19.26 kg/m².    EYES: pupils are round equal and reacting to light and accommodation,   NOSE: nasal passages are clear, no nasal polyps, septum in the midline.  THROAT: throat is clear, oral airway Mallampati class 3  RESP SYSTEM: Breath sounds are normal, no wheezes or crackles  CARDIOVASULAR: Heart rate is regular without murmur. No edema  Musculosketal: No cyanosis, No clubbing, gait is normal        ASSESSMENT AND PLAN:  · Obstructive sleep apnea ( G 47.33).  Have sent a order to [facility] to set up a auto CPAP between 6 and 16 with a flex of 3.  She will have a mask fitting there.  I also set up an appointment for you to come back and see me in about 2 months for follow-up of compliance.  · Trigeminal neuralgia, unable to use the mandibular advancement device  · Return to clinic in 2 months for follow-up and patient also instructed to call the sleep clinic for any problems.  All the patient's questions were answered.        Jon Cooper MD  Sleep Medicine  Medical Director for [facility]  4/8/2021               "

## 2021-04-22 NOTE — ED NOTES
"Pt here with c/o heaviness to the left leg that began at 0630. Pt states that she has a stroke on  09/13. She states that her leg heaviness was her first symptom of her stroke, although today \"the heaviness really isn't as bad\". On exam patient does have a slight left sided facial droop that is from the last stroke. Pt has no drift on BLE exam. Denies numbness, tingling, or any deficits to the leg aside from \"heaviness\".     Loren Casper RN  11/03/17 0747    " no

## 2021-04-28 ENCOUNTER — HOSPITAL ENCOUNTER (OUTPATIENT)
Dept: ULTRASOUND IMAGING | Facility: HOSPITAL | Age: 71
Discharge: HOME | End: 2021-04-28
Admitting: INTERNAL MEDICINE

## 2021-04-28 DIAGNOSIS — R92.8 ABNORMAL MAMMOGRAM OF LEFT BREAST: ICD-10-CM

## 2021-04-28 PROCEDURE — 76642 ULTRASOUND BREAST LIMITED: CPT

## 2021-04-28 NOTE — PROGRESS NOTES
Chief Complaint   Patient presents with   • Diarrhea         History of Present Illness  Patient is a 71-year-old female who presents today for follow-up.    She has a history of Fonseca's esophagus, colon polyps, GERD.  At her last office visit, she had been experiencing loose stools and fecal urgency.    Patient presents today for follow-up.  She presents today with concerns about persistent diarrhea.  She had completed a course of Xifaxan in the fall and she reports this was significantly helpful for her symptoms and the diarrhea resolved for several months but it has since recurred.  She has been having diarrhea around 40% of the time.  When she is not having diarrhea, she reports she generally has 1-2 regular bowel movements per day.  She describes the diarrhea as being explosive.  She denies any blood in the stool or associated abdominal pain.  She has had no weight loss.    She has been using IBgard for the diarrhea which does help her symptoms.    She had a colonoscopy in 2020 that was unable to be completed due to significant tortuosity. Colonoscopy was advanced to the hepatic flexure.  Prep was poor.  She has questions about options for colon cancer screening.  CT colonography had previously been discussed but had not been completed.        You have chosen to receive care through a telehealth visit.  Do you consent to use a video/audio connection for your medical care today? Yes    Physical Exam  Constitutional:       General: She is not in acute distress.     Appearance: She is well-developed.   Pulmonary:      Effort: No respiratory distress.   Skin:     Coloration: Skin is not pale.          Review of Systems   Constitutional: Negative for unexpected weight change.   Gastrointestinal: Negative for blood in stool.        Result Review :      Tissue Pathology Exam (03/12/2020 10:10)   Non-gynecologic Cytology (03/12/2020 10:13)   SCANNED - COLONOSCOPY (03/12/2020)   Telemedicine with Flor Beyer  APRN (07/20/2020)       Assessment and Plan    Diagnoses and all orders for this visit:    1. Irritable bowel syndrome with diarrhea (Primary)    2. Encounter for screening for malignant neoplasm of colon  -     Cologuard - Stool, Per Rectum    Other orders  -     riFAXIMin (Xifaxan) 550 MG tablet; Take 1 tablet by mouth 3 (Three) Times a Day for 14 days.  Dispense: 42 tablet; Refill: 2       Discussion  Patient presents today with concerns about worsening diarrhea.  She did notice a significant improvement in her symptoms with course of Xifaxan.  Discussed suspect diarrhea secondary to IBS-D or small intestinal bacterial overgrowth due to her dramatic response to the Xifaxan however also consideration for microscopic colitis.    For diarrhea, we discussed evaluation could include flexible sigmoidoscopy with random biopsies versus attempts at full colonoscopy with random biopsies which would also allow for complete colon cancer screening after her incomplete colonoscopy last year.  We discussed alternatives for colon cancer screening which could include Cologuard or CT colonography.  Risks and benefits of these options were discussed today.    After our discussion, we will plan to have patient repeat a course of Xifaxan.  She would be interested in proceeding with Cologuard for colon cancer screening which we will obtain.  If Xifaxan helps her symptoms and Cologuard is negative, no further testing will be needed.  If Xifaxan does not help and Cologuard is negative, we will plan on flexible sigmoidoscopy with random biopsies for further evaluation of diarrhea.  If Xifaxan does not help and the Cologuard is positive, we will discuss attempt at full colonoscopy.          Follow Up   Return for Follow up to review results after testing complete.    Patient Instructions   For IBS-D, complete course of Xifaxan as prescribed.    Obtain a Cologuard for colon cancer screening.    Further recommendations to be made pending  results of the above work-up and clinical course.

## 2021-04-29 ENCOUNTER — TELEPHONE (OUTPATIENT)
Dept: SURGERY | Facility: CLINIC | Age: 71
End: 2021-04-29

## 2021-04-29 DIAGNOSIS — R53.82 CHRONIC FATIGUE: ICD-10-CM

## 2021-04-29 DIAGNOSIS — I10 ESSENTIAL HYPERTENSION: Primary | ICD-10-CM

## 2021-04-29 DIAGNOSIS — R92.8 ABNORMALITY OF BREAST ON SCREENING MAMMOGRAPHY: Primary | ICD-10-CM

## 2021-04-29 DIAGNOSIS — E78.5 HYPERLIPIDEMIA LDL GOAL <70: ICD-10-CM

## 2021-04-29 NOTE — TELEPHONE ENCOUNTER
New patient appointment with Caitlyn Nelson NP is scheduled on 6/22/2021 @ 2:30pm.    Called and spoke to patient, patient expressed v/u of appointment time.    Sent patient a reminder letter in the mail.

## 2021-04-30 ENCOUNTER — TELEMEDICINE (OUTPATIENT)
Dept: GASTROENTEROLOGY | Facility: CLINIC | Age: 71
End: 2021-04-30

## 2021-04-30 DIAGNOSIS — Z12.11 ENCOUNTER FOR SCREENING FOR MALIGNANT NEOPLASM OF COLON: ICD-10-CM

## 2021-04-30 DIAGNOSIS — K58.0 IRRITABLE BOWEL SYNDROME WITH DIARRHEA: Primary | ICD-10-CM

## 2021-04-30 PROCEDURE — 99214 OFFICE O/P EST MOD 30 MIN: CPT | Performed by: NURSE PRACTITIONER

## 2021-04-30 NOTE — PATIENT INSTRUCTIONS
For IBS-D, complete course of Xifaxan as prescribed.    Obtain a Cologuard for colon cancer screening.    Further recommendations to be made pending results of the above work-up and clinical course.

## 2021-05-10 ENCOUNTER — TELEPHONE (OUTPATIENT)
Dept: INTERNAL MEDICINE | Facility: CLINIC | Age: 71
End: 2021-05-10

## 2021-05-10 NOTE — TELEPHONE ENCOUNTER
Dr. Fonseca, I just wanted to check with you before scheduling.  I see your orders that were placed in April, but I see in office note from March that lipids will be reassessed in six months.   When would you like her to come in for labs?

## 2021-05-10 NOTE — TELEPHONE ENCOUNTER
Pls call - last chol was January 8 - fasting labs have been ordered - she may get this month or wait 2-3 mos for recheck - she just needs to tell us when she is coming in

## 2021-05-10 NOTE — TELEPHONE ENCOUNTER
Caller: Rosi Vicente    Relationship to patient: Self    Best call back number: [phone]    Patient is needing: SCHEDULE FASTING LABS AT THE END OF THE MONTH

## 2021-05-12 ENCOUNTER — OFFICE VISIT (OUTPATIENT)
Dept: SURGERY | Facility: CLINIC | Age: 71
End: 2021-05-12

## 2021-05-12 ENCOUNTER — PREP FOR SURGERY (OUTPATIENT)
Dept: OTHER | Facility: HOSPITAL | Age: 71
End: 2021-05-12

## 2021-05-12 VITALS
HEIGHT: 67 IN | HEART RATE: 71 BPM | TEMPERATURE: 97.3 F | OXYGEN SATURATION: 97 % | SYSTOLIC BLOOD PRESSURE: 123 MMHG | DIASTOLIC BLOOD PRESSURE: 72 MMHG | BODY MASS INDEX: 19.3 KG/M2 | WEIGHT: 123 LBS

## 2021-05-12 DIAGNOSIS — N60.11 FIBROCYSTIC BREAST CHANGES, BILATERAL: ICD-10-CM

## 2021-05-12 DIAGNOSIS — R92.8 ABNORMAL FINDING ON BREAST IMAGING: Primary | ICD-10-CM

## 2021-05-12 DIAGNOSIS — R92.8 ABNORMAL MAMMOGRAM: Primary | ICD-10-CM

## 2021-05-12 DIAGNOSIS — Z98.86 HISTORY OF REMOVAL OF IMPLANTS OF BOTH BREASTS: ICD-10-CM

## 2021-05-12 DIAGNOSIS — N60.12 FIBROCYSTIC BREAST CHANGES, BILATERAL: ICD-10-CM

## 2021-05-12 PROCEDURE — 99215 OFFICE O/P EST HI 40 MIN: CPT | Performed by: NURSE PRACTITIONER

## 2021-05-12 RX ORDER — ACETAMINOPHEN 500 MG
500 TABLET ORAL EVERY 6 HOURS PRN
COMMUNITY

## 2021-05-12 RX ORDER — CHLORAL HYDRATE 500 MG
CAPSULE ORAL
COMMUNITY
End: 2022-03-14

## 2021-05-12 NOTE — PROGRESS NOTES
[facility]     Referring Provider: Dionne Fonseca MD     Chief complaint: abnormal breast imaging     HPI: Ms. Rosi Vicente is a 70 yo woman, seen at the request of Dionne Fonseca MD, for abnormal imaging left breast    I personally reviewed her records and summarized her relevant breast history/imaging:    She has a personal history of breast augmentation in 1978 with silicone implants that were replaced in 1989 due to rupture, and again replaced in 2017 due to capsular contracture, replaced with textured silicone implants, removal of implants in 2019.        She denies any family history of breast or ovarian cancer.     3/6/2020:  Bilateral screening mammogram with tomosynthesis at [facility]  FINDINGS: Bilateral digital CC and MLO mammographic and Tomosynthesis images were obtained. Comparison is made to prior examinations dated 01/29/2018, 01/19/2017 and 04/20/2015.   Scattered fibroglandular densities are seen throughout both breasts. There is postoperative architectural distortion seen in both breasts associated with prior bilateral silicone implant removal. Within the posterior one-third superior aspect of the right breast there is a 3 cm area of free silicone. No evidence for axillary adenopathy is appreciated. No suspicious findings are seen within either breast.     IMPRESSION:  There are no findings suspicious for malignancy in either breast.  Routine follow-up mammography is recommended.     BI-RADS Category 2: Benign finding.     4/5/2021:  Bilateral screening mammogram with tomosynthesis at [facility]  FINDINGS: Bilateral digital CC and MLO mammographic and digital Tomosynthesis images were obtained. Comparison is made to prior studies dated 3/6/2020, 1/29/2018 and 1/19/2017 .   Scattered fibroglandular densities are seen throughout both breasts in a pattern which is unchanged. There is considerable focal architectural distortion in the posterior one third retroareolar region of  the left breast in the region of prior surgery associated with prior implant removal. The architectural distortion of the left breast is greater than that on the right breast in the same region that is associated with prior breast implant removal. There is a stable 3 cm mass associated with increased density internal calcifications that is seen in the axillary tail region of the right breast and is consistent with silicone within a lymph node.     IMPRESSION:  1. There is asymmetric architectural torsion increased density in the posterior one third retroareolar region of the left breast in the region of prior breast implant removal. Correlation with targeted left breast sonography of the left breast in this region is recommended.  2. There are no findings suspicious for malignancy in the right breast.     BI-RADS Category 0: Incomplete. Needs additional imaging evaluation.   This report was finalized on 4/6/2021 10:11 AM by Dr. Christofer Jerez M.D.    4/28/2021:  Left breast limited US at [facility]  FINDINGS: Targeted sonographic evaluation of the retroareolar region of the left breast was performed. Comparison is made to prior mammography dated 04/05/2021. There is a 3.8 x 1.5 x 4.5 cm anechoic unilocular fluid collection seen in this region. This is consistent with a postoperative seroma. No suspicious findings are visualized.     IMPRESSION:  A postoperative seroma is noted in the retroareolar region of the left breast. Aspiration should be considered to remove the appearance from the patient's mammogram. Also, routine breast MRI without oral contrast may be of added value due to the distortion seen on the patient's mammogram. Otherwise, routine clinical follow-up and mammography is recommended.     BI-RADS Category 2: Benign.   This report was finalized on 4/29/2021 7:50 AM by Dr. Christofer Jerez M.D.    Today she presents with dissatisfaction with her cosmetic outcome.  She has some asymmetry and her right  nipple has been inverted since her last surgery.  With her last surgery and removal of the implants, the plastic surgeon did not remove the capsules because she was on plavix and he was concerned with bleeding.  She is now on aspirin and is wondering if removing the capsules might improve her cosmetic outcome.    She reports that she is otherwise healthy.      She was by herself in clinic today.       Review of Systems   Gastrointestinal: Positive for constipation and diarrhea.   All other systems reviewed and are negative.      Medications:    Current Outpatient Medications:   •  acetaminophen (TYLENOL) 500 MG tablet, Take 500 mg by mouth Every 6 (Six) Hours As Needed for Mild Pain ., Disp: , Rfl:   •  CIMETIDINE 200 PO, Take  by mouth., Disp: , Rfl:   •  Omega-3 Fatty Acids (fish oil) 1000 MG capsule capsule, Take  by mouth Daily With Breakfast., Disp: , Rfl:   •  psyllium (METAMUCIL) 0.52 g capsule, Take 0.52 g by mouth Daily., Disp: , Rfl:   •  ALPHA LIPOIC ACID PO, Take  by mouth Daily., Disp: , Rfl:   •  aspirin 81 MG EC tablet, Take 81 mg by mouth Daily., Disp: , Rfl:   •  cholecalciferol (VITAMIN D3) 1000 UNITS tablet, Take 2,000 Units by mouth 2 (Two) Times a Day., Disp: , Rfl:   •  Coenzyme Q10 (COQ-10) 400 MG capsule, Take  by mouth Daily., Disp: , Rfl:   •  cycloSPORINE (RESTASIS) 0.05 % ophthalmic emulsion, Apply 1 drop to eye(s) as directed by provider 2 (Two) Times a Day., Disp: , Rfl:   •  fluticasone (FLONASE) 50 MCG/ACT nasal spray, 2 sprays into the nostril(s) as directed by provider Daily., Disp: , Rfl:   •  gabapentin (NEURONTIN) 100 MG capsule, qhs, Disp: 90 capsule, Rfl: 1  •  icosapent ethyl (Vascepa) 1 g capsule capsule, Take 2 g by mouth 2 (Two) Times a Day With Meals., Disp: 360 capsule, Rfl: 4  •  losartan-hydrochlorothiazide (HYZAAR) 100-12.5 MG per tablet, Take 1 tablet by mouth Daily. 30 DAYS ONLY NEEDS AN APPOINTMENT, Disp: 30 tablet, Rfl: 11  •  Nutritional Supplements (CALCIUM  D-GLUCARATE PO), Take  by mouth., Disp: , Rfl:   •  Peppermint Oil (IBGARD PO), Take  by mouth., Disp: , Rfl:   •  Probiotic Product (PROBIOTIC-10 PO), Take 1 capsule by mouth Daily., Disp: , Rfl:   •  riFAXIMin (Xifaxan) 550 MG tablet, Take 1 tablet by mouth 3 (Three) Times a Day for 14 days., Disp: 42 tablet, Rfl: 2  •  sertraline (ZOLOFT) 25 MG tablet, TAKE ONE TABLET BY MOUTH DAILY, Disp: 90 tablet, Rfl: 1  •  SUPER B COMPLEX/C PO, Take  by mouth., Disp: , Rfl:   •  vitamin B-12 (CYANOCOBALAMIN) 1000 MCG tablet, 5,000 mcg Daily., Disp: , Rfl:     Allergies:  Allergies   Allergen Reactions   • Bystolic [Nebivolol Hcl] Other (See Comments)     Extreme fatigue, dizziness   • Lisinopril Other (See Comments)     COUGH       Medical history:  Past Medical History:   Diagnosis Date   • Allergic rhinitis    • Anemia    • Arthritis 2016   • Bronchitis    • CVA (cerebral vascular accident) (CMS/[facility])    • Elevated coronary artery calcium score    • Fatigue    • FH: colon cancer    • GERD (gastroesophageal reflux disease) occasionally   • H/O bone density study 2013   • H/O mammogram 2013   • Hyperlipidemia    • Hypertension     Benign Essential   • Irritable bowel syndrome with both constipation and diarrhea 9/4/2020   • Malaise and fatigue    • Muscle pain    • Nocturia    • Osteoporosis    • Sleep apnea     AHI 17/h   • Stroke (CMS/[facility]) 09/13/2017   • TMJ (temporomandibular joint syndrome)    • Trigeminal neuralgia     Surgery at [facility] in 2009 repeat in 2015       Surgical History:  Past Surgical History:   Procedure Laterality Date   • ADENOIDECTOMY     • AUGMENTATION MAMMAPLASTY Bilateral 2019    removed   • BRAIN SURGERY  12/8/2005    MVD   • COLONOSCOPY N/A 01/2015    Repeat Q 5 years due to Fhx of Colon Ca-Dr. Polk   • COSMETIC SURGERY  2015    augmentation   • EXTERNAL EAR SURGERY     • HIP SURGERY     • JOINT REPLACEMENT  10/3/16    left hip replacement   • PAP SMEAR N/A 2013    Dr. Burden   •  RHINOPLASTY     • SEPTOPLASTY      SEPTO/RHINOPLASTY POST TRAUMA   • SINUS SURGERY     • TONSILLECTOMY  AGE 5       Family History:  Family History   Problem Relation Age of Onset   • Pancreatic cancer Mother    • Hyperlipidemia Mother    • Cancer Mother            • Hypertension Mother    • Miscarriages / Stillbirths Mother    • Colon cancer Father 56   • Arthritis Father    • Cancer Father            • Hearing loss Father         WAR INJURY   • Vision loss Father         dry glaucoma   • Liver cancer Brother 40   • Cancer Brother                Social History:   Social History     Socioeconomic History   • Marital status:      Spouse name: Not on file   • Number of children: Not on file   • Years of education: Not on file   • Highest education level: Not on file   Tobacco Use   • Smoking status: Never Smoker   • Smokeless tobacco: Never Used   • Tobacco comment: daily caffiene   Substance and Sexual Activity   • Alcohol use: Yes     Alcohol/week: 3.0 standard drinks     Types: 3 Glasses of wine per week     Comment: moderate   • Drug use: No   • Sexual activity: Yes     Partners: Male     Birth control/protection: Post-menopausal, None     Patient drinks 2 servings of caffeine per day.       GYNECOLOGIC HISTORY:   . P: 0. AB: 0.  Last menstrual period: age 49  Age at menarche: 14  Age at first childbirth: n/a  Lactation/How long: n/a  Age at menopause: 49  Total years of oral contraceptive use: 20  Total years of hormone replacement therapy: 2      Physical Exam  Vitals:    21 1000   BP: 123/72   Pulse: 71   Temp: 97.3 °F (36.3 °C)   SpO2: 97%     ECOG 0 - Asymptomatic  General: NAD, well appearing  Psych: a&o x 3, normal mood and affect  Eyes: EOMI, no scleral icterus  ENMT: neck supple without masses or thyromegaly, mucus membranes moist  Resp: normal effort, CTAB  CV: RRR, no murmurs, no edema   GI: soft, NT, ND  MSK: normal gait, normal ROM in bilateral  shoulders  Lymph nodes:  no cervical, supraclavicular or axillary lymphadenopathy  Breast: asymmetric, left > right, bilateral inframammary surgical incisions with scarring and volume deficit.  Small volume  Right:  Volume deficit in lower aspect while seated and with arms raised or hands on hips. No masses, skin changes.  Right nipple creasing with crusty exudate that was removed with exam.  No inflammation noted.  Left:  Mild volume deficit in lower aspect on inspection while seated, with arms raised or hands on hips. Soft fullness noted in upper central aspect, at 1200, 1 CFN a smooth, soft fullness 2 cm is noted.  No masses, skin changes, or nipple abnormalities.      Assessment:    1)  71 y.o. F with abnormal left breast imaging, c/w post surgical seroma.  2)  History of bilateral silicone implants with rupture, removed in 2019  3)  Fibrocystic breast changes      Discussion:  1)  Imaging findings were reviewed and discussed, a copy given.  She is in agreement with aspiration of left retroareolar seroma under imaging guidance in radiology.  We will schedule that procedure and I will call her afterwards to discuss.    A follow up left breast US will be scheduled after the seroma aspiration is complete followed by clinic visit.  She is in agreement with that plan.  At that visit we will discuss her feelings regarding cosmetic appearance and refer her to plastics if she prefers.    2)  We discussed fibrocystic change and how this is benign and can sometimes be associated with increased breast density. I reassured her today that she had a normal clinical breast exam and recent normal bilateral imaging. I explained that cords of dense breast tissue or focal areas of fibrocystic change can sometimes feel like a lump on exam. This is common in women like her with nodular tissue. I encouraged her to become familiar with her own self-exam over the next few months to establish a personal baseline.        Plan:  - Left  breast retroareolar seroma aspiration at [facility]  - exam in 6 months     I have advised the patient to continue monthly breast self examination and to notify us if she develops new breast symptoms.       JUAN FRANCISCO Vasquez    I spent 65 minutes caring for Ms Vicente on this date of service. This time includes time spent by me in the following activities: preparing for the visit, reviewing tests, performing a medically appropriate examination and/or evaluation , counseling and educating the patient/family/caregiver, ordering medications, tests, or procedures and documenting information in the medical record.        CC:  Dionne Fonseca MD      EMR Dragon/transcription disclaimer:  Dictated using Dragon dictation

## 2021-05-13 ENCOUNTER — TELEPHONE (OUTPATIENT)
Dept: INTERNAL MEDICINE | Facility: CLINIC | Age: 71
End: 2021-05-13

## 2021-05-13 NOTE — TELEPHONE ENCOUNTER
Hub staff attempted to follow warm transfer process and was unsuccessful     Caller: Rosi Vicente    Relationship to patient: Self    Best call back number: [phone] (H)    Patient is needing: TO RESCHEDULE LAB APPOINTMENT ON 05/27/2021, PLEASE CALL BACK ASAP

## 2021-05-17 ENCOUNTER — TELEPHONE (OUTPATIENT)
Dept: SURGERY | Facility: CLINIC | Age: 71
End: 2021-05-17

## 2021-05-17 NOTE — TELEPHONE ENCOUNTER
Cyst aspiration is scheduled on 5/26/2021 @ 10:00am, patient arrival 9:30am.    Called and spoke to patient, patient expressed v/u of appointment time.

## 2021-05-18 ENCOUNTER — LAB (OUTPATIENT)
Dept: INTERNAL MEDICINE | Facility: CLINIC | Age: 71
End: 2021-05-18

## 2021-05-18 DIAGNOSIS — R53.82 CHRONIC FATIGUE: ICD-10-CM

## 2021-05-18 DIAGNOSIS — I10 ESSENTIAL HYPERTENSION: ICD-10-CM

## 2021-05-18 DIAGNOSIS — E78.5 HYPERLIPIDEMIA LDL GOAL <70: ICD-10-CM

## 2021-05-19 LAB
ALBUMIN SERPL-MCNC: 4.7 G/DL (ref 3.5–5.2)
ALBUMIN/GLOB SERPL: 2.9 G/DL
ALP SERPL-CCNC: 88 U/L (ref 39–117)
ALT SERPL-CCNC: 21 U/L (ref 1–33)
AST SERPL-CCNC: 19 U/L (ref 1–32)
BASOPHILS # BLD AUTO: 0.04 10*3/MM3 (ref 0–0.2)
BASOPHILS NFR BLD AUTO: 0.7 % (ref 0–1.5)
BILIRUB SERPL-MCNC: 0.4 MG/DL (ref 0–1.2)
BUN SERPL-MCNC: 11 MG/DL (ref 8–23)
BUN/CREAT SERPL: 20.8 (ref 7–25)
CALCIUM SERPL-MCNC: 9.8 MG/DL (ref 8.6–10.5)
CHLORIDE SERPL-SCNC: 89 MMOL/L (ref 98–107)
CHOLEST SERPL-MCNC: 156 MG/DL (ref 0–200)
CO2 SERPL-SCNC: 29.5 MMOL/L (ref 22–29)
CREAT SERPL-MCNC: 0.53 MG/DL (ref 0.57–1)
EOSINOPHIL # BLD AUTO: 0.08 10*3/MM3 (ref 0–0.4)
EOSINOPHIL NFR BLD AUTO: 1.5 % (ref 0.3–6.2)
ERYTHROCYTE [DISTWIDTH] IN BLOOD BY AUTOMATED COUNT: 13.4 % (ref 12.3–15.4)
FOLATE SERPL-MCNC: >20 NG/ML (ref 4.78–24.2)
GLOBULIN SER CALC-MCNC: 1.6 GM/DL
GLUCOSE SERPL-MCNC: 80 MG/DL (ref 65–99)
HCT VFR BLD AUTO: 37.8 % (ref 34–46.6)
HDLC SERPL-MCNC: 52 MG/DL (ref 40–60)
HGB BLD-MCNC: 12.4 G/DL (ref 12–15.9)
IMM GRANULOCYTES # BLD AUTO: 0.02 10*3/MM3 (ref 0–0.05)
IMM GRANULOCYTES NFR BLD AUTO: 0.4 % (ref 0–0.5)
LDLC SERPL CALC-MCNC: 89 MG/DL (ref 0–100)
LYMPHOCYTES # BLD AUTO: 1.34 10*3/MM3 (ref 0.7–3.1)
LYMPHOCYTES NFR BLD AUTO: 24.5 % (ref 19.6–45.3)
MCH RBC QN AUTO: 27.7 PG (ref 26.6–33)
MCHC RBC AUTO-ENTMCNC: 32.8 G/DL (ref 31.5–35.7)
MCV RBC AUTO: 84.4 FL (ref 79–97)
MONOCYTES # BLD AUTO: 0.61 10*3/MM3 (ref 0.1–0.9)
MONOCYTES NFR BLD AUTO: 11.2 % (ref 5–12)
NEUTROPHILS # BLD AUTO: 3.38 10*3/MM3 (ref 1.7–7)
NEUTROPHILS NFR BLD AUTO: 61.7 % (ref 42.7–76)
NRBC BLD AUTO-RTO: 0 /100 WBC (ref 0–0.2)
PLATELET # BLD AUTO: 356 10*3/MM3 (ref 140–450)
POTASSIUM SERPL-SCNC: 4.6 MMOL/L (ref 3.5–5.2)
PROT SERPL-MCNC: 6.3 G/DL (ref 6–8.5)
RBC # BLD AUTO: 4.48 10*6/MM3 (ref 3.77–5.28)
SODIUM SERPL-SCNC: 127 MMOL/L (ref 136–145)
TRIGL SERPL-MCNC: 81 MG/DL (ref 0–150)
TSH SERPL DL<=0.005 MIU/L-ACNC: 2.52 UIU/ML (ref 0.27–4.2)
VIT B12 SERPL-MCNC: 1948 PG/ML (ref 211–946)
VLDLC SERPL CALC-MCNC: 15 MG/DL (ref 5–40)
WBC # BLD AUTO: 5.47 10*3/MM3 (ref 3.4–10.8)

## 2021-05-26 ENCOUNTER — HOSPITAL ENCOUNTER (OUTPATIENT)
Dept: ULTRASOUND IMAGING | Facility: HOSPITAL | Age: 71
Discharge: HOME | End: 2021-05-26
Admitting: NURSE PRACTITIONER

## 2021-05-26 VITALS
HEART RATE: 63 BPM | SYSTOLIC BLOOD PRESSURE: 125 MMHG | HEIGHT: 67 IN | WEIGHT: 122 LBS | RESPIRATION RATE: 16 BRPM | TEMPERATURE: 96.9 F | OXYGEN SATURATION: 97 % | DIASTOLIC BLOOD PRESSURE: 73 MMHG | BODY MASS INDEX: 19.15 KG/M2

## 2021-05-26 DIAGNOSIS — R92.8 ABNORMAL MAMMOGRAM: ICD-10-CM

## 2021-05-26 PROCEDURE — 88112 CYTOPATH CELL ENHANCE TECH: CPT | Performed by: NURSE PRACTITIONER

## 2021-05-26 PROCEDURE — 76942 ECHO GUIDE FOR BIOPSY: CPT

## 2021-05-26 PROCEDURE — 88305 TISSUE EXAM BY PATHOLOGIST: CPT | Performed by: NURSE PRACTITIONER

## 2021-05-26 PROCEDURE — 25010000003 LIDOCAINE 1 % SOLUTION: Performed by: RADIOLOGY

## 2021-05-26 RX ORDER — LIDOCAINE HYDROCHLORIDE 10 MG/ML
10 INJECTION, SOLUTION INFILTRATION; PERINEURAL ONCE
Status: COMPLETED | OUTPATIENT
Start: 2021-05-26 | End: 2021-05-26

## 2021-05-26 RX ADMIN — LIDOCAINE HYDROCHLORIDE 5 ML: 10 INJECTION, SOLUTION INFILTRATION; PERINEURAL at 10:42

## 2021-05-26 NOTE — NURSING NOTE
Patient returned from seroma aspiration procedure. Skin Affix to needle site dry and intact. No fluid sent to the lab. Denies pain. No distress noted. Discharge instructions reviewed with patient who voiced understanding. Copies provided to patient. Patient to home via private vehicle.

## 2021-05-26 NOTE — H&P
Name: Rosi Vicente ADMIT: 2021   : 1950  PCP: Dionne Fonseca MD    MRN: [id number] LOS: 0 days   AGE/SEX: 71 y.o. female  ROOM: Room/bed info not found       Chief complaint L breast seroma    Present Illness or Internal History:  Patient is a 71 y.o. female presents with L breast seroma for US aspiration.     Past Surgical History:  Past Surgical History:   Procedure Laterality Date   • ADENOIDECTOMY     • AUGMENTATION MAMMAPLASTY Bilateral 2019    removed   • BRAIN SURGERY  2005    MVD   • COLONOSCOPY N/A 2015    Repeat Q 5 years due to Fhx of Colon Ca-Dr. Polk   • COSMETIC SURGERY      augmentation   • HIP SURGERY     • INNER EAR SURGERY     • JOINT REPLACEMENT  10/3/16    left hip replacement   • PAP SMEAR N/A     Dr. Burden   • RHINOPLASTY     • SEPTOPLASTY      SEPTO/RHINOPLASTY POST TRAUMA   • SINUS SURGERY     • TONSILLECTOMY  AGE 5       Past Medical History:  Past Medical History:   Diagnosis Date   • Allergic rhinitis    • Anemia    • Arthritis    • Bronchitis    • CVA (cerebral vascular accident) (CMS/[facility])    • Elevated coronary artery calcium score    • Fatigue    • FH: colon cancer    • Fibrocystic breast changes, bilateral 2021   • GERD (gastroesophageal reflux disease) occasionally   • H/O bone density study    • H/O mammogram    • Hyperlipidemia    • Hypertension     Benign Essential   • Irritable bowel syndrome with both constipation and diarrhea 2020   • Malaise and fatigue    • Muscle pain    • Nocturia    • Osteoporosis    • Sleep apnea     AHI 17/h   • Stroke (CMS/[facility]) 2017   • TMJ (temporomandibular joint syndrome)    • Trigeminal neuralgia     Surgery at [facility] in  repeat in        Home Medications:  (Not in a hospital admission)      Allergies:  Bystolic [nebivolol hcl] and Lisinopril    Family History:  Family History   Problem Relation Age of Onset   • Pancreatic cancer Mother    •  Hyperlipidemia Mother    • Cancer Mother            • Hypertension Mother    • Miscarriages / Stillbirths Mother    • Colon cancer Father 56   • Arthritis Father    • Cancer Father            • Hearing loss Father         WAR INJURY   • Vision loss Father         dry glaucoma   • Liver cancer Brother 40   • Cancer Brother                Social History:  Social History     Tobacco Use   • Smoking status: Never Smoker   • Smokeless tobacco: Never Used   • Tobacco comment: daily caffiene   Substance Use Topics   • Alcohol use: Yes     Alcohol/week: 3.0 standard drinks     Types: 3 Glasses of wine per week     Comment: moderate   • Drug use: No        Objective     Physical Exam:    No exam performed today,    Vital Signs  Temp:  [96.9 °F (36.1 °C)] 96.9 °F (36.1 °C)  Heart Rate:  [72] 72  Resp:  [16] 16  BP: (132)/(74) 132/74    Anticipated Surgical Procedure:  Left breast ultrasound guided needle aspiration    The risks, benefits and alternatives of this procedure have been discussed with the patient or responsible party: Yes        Nevaeh Berry MD  21  10:20 EDT

## 2021-05-27 LAB
LAB AP CASE REPORT: NORMAL
PATH REPORT.FINAL DX SPEC: NORMAL
PATH REPORT.GROSS SPEC: NORMAL

## 2021-06-01 ENCOUNTER — TELEPHONE (OUTPATIENT)
Dept: SURGERY | Facility: CLINIC | Age: 71
End: 2021-06-01

## 2021-06-01 ENCOUNTER — DOCUMENTATION (OUTPATIENT)
Dept: SURGERY | Facility: CLINIC | Age: 71
End: 2021-06-01

## 2021-06-01 DIAGNOSIS — N64.89 SEROMA OF BREAST: Primary | ICD-10-CM

## 2021-06-01 NOTE — TELEPHONE ENCOUNTER
Spoke with patient regarding follow up left US prior to clinic visit in 11/2021.  That imaging will be scheduled. She is in agreement with that plan

## 2021-06-01 NOTE — PROGRESS NOTES
Lt. Breast US scheduled at [facility] 11/2/2021 @ 8:30 am.   Patient said she would check her MyChart for appt time.

## 2021-06-01 NOTE — TELEPHONE ENCOUNTER
Seroma aspiration results reported to patient.  Follow up in clinic is scheduled in 11/2021, patient aware.    IMPRESSION:  Ultrasound-guided needle aspiration of a postoperative  seroma in the retroareolar left breast with removal of 3 mL of clear  yellow fluid and complete resolution of the collection. Recommend annual  screening mammogram in April 2022.

## 2021-06-07 DIAGNOSIS — R26.89 BALANCE DISORDER: Primary | ICD-10-CM

## 2021-07-09 ENCOUNTER — PATIENT MESSAGE (OUTPATIENT)
Dept: INTERNAL MEDICINE | Facility: CLINIC | Age: 71
End: 2021-07-09

## 2021-07-09 NOTE — TELEPHONE ENCOUNTER
From: Rosi Vicente  To: Dionne Fonseca MD  Sent: 7/9/2021 6:37 AM EDT  Subject: Non-Urgent Medical Question    Morning   Prior to my upcoming visit would you like lipids or other labs to be drawn ?  Rosi

## 2021-07-27 ENCOUNTER — TRANSCRIBE ORDERS (OUTPATIENT)
Dept: ADMINISTRATIVE | Facility: HOSPITAL | Age: 71
End: 2021-07-27

## 2021-07-27 ENCOUNTER — OFFICE VISIT (OUTPATIENT)
Dept: INTERNAL MEDICINE | Facility: CLINIC | Age: 71
End: 2021-07-27

## 2021-07-27 VITALS
HEART RATE: 64 BPM | DIASTOLIC BLOOD PRESSURE: 70 MMHG | WEIGHT: 126 LBS | TEMPERATURE: 98.4 F | SYSTOLIC BLOOD PRESSURE: 124 MMHG | HEIGHT: 67 IN | BODY MASS INDEX: 19.78 KG/M2 | OXYGEN SATURATION: 99 %

## 2021-07-27 DIAGNOSIS — E87.1 HYPONATREMIA: ICD-10-CM

## 2021-07-27 DIAGNOSIS — I69.30 SEQUELAE, POST-STROKE: ICD-10-CM

## 2021-07-27 DIAGNOSIS — R42 DIZZINESS: ICD-10-CM

## 2021-07-27 DIAGNOSIS — E55.9 VITAMIN D DEFICIENCY: ICD-10-CM

## 2021-07-27 DIAGNOSIS — E78.5 HYPERLIPIDEMIA LDL GOAL <70: ICD-10-CM

## 2021-07-27 DIAGNOSIS — K58.2 IRRITABLE BOWEL SYNDROME WITH BOTH CONSTIPATION AND DIARRHEA: ICD-10-CM

## 2021-07-27 DIAGNOSIS — R53.82 CHRONIC FATIGUE: ICD-10-CM

## 2021-07-27 DIAGNOSIS — I10 ESSENTIAL HYPERTENSION: Primary | ICD-10-CM

## 2021-07-27 DIAGNOSIS — Z13.9 SCREENING FOR CONDITION: Primary | ICD-10-CM

## 2021-07-27 DIAGNOSIS — G47.33 OBSTRUCTIVE SLEEP APNEA SYNDROME: ICD-10-CM

## 2021-07-27 PROCEDURE — 99214 OFFICE O/P EST MOD 30 MIN: CPT | Performed by: INTERNAL MEDICINE

## 2021-07-27 PROCEDURE — G0439 PPPS, SUBSEQ VISIT: HCPCS | Performed by: INTERNAL MEDICINE

## 2021-07-27 PROCEDURE — 93000 ELECTROCARDIOGRAM COMPLETE: CPT | Performed by: INTERNAL MEDICINE

## 2021-07-27 RX ORDER — LOSARTAN POTASSIUM 100 MG/1
100 TABLET ORAL DAILY
Qty: 90 TABLET | Refills: 3 | Status: SHIPPED | OUTPATIENT
Start: 2021-07-27 | End: 2021-10-21

## 2021-07-27 NOTE — ASSESSMENT & PLAN NOTE
Stop HCTZ and recheck labs in several months.  She will continue losartan for blood pressure without HCTZ.

## 2021-07-27 NOTE — PROGRESS NOTES
The ABCs of the Annual Wellness Visit  Subsequent Medicare Wellness Visit    Chief Complaint   Patient presents with   • Medicare Wellness-subsequent       Subjective   History of Present Illness:  Rosi Vicente is a 71 y.o. female who presents for a Subsequent Medicare Wellness Visit.    HEALTH RISK ASSESSMENT    Recent Hospitalizations:  No hospitalization(s) within the last year.    Current Medical Providers:  Patient Care Team:  Dionne Fonseca MD as PCP - General (Internal Medicine)  Christopher Young MD as Consulting Physician (Otolaryngology)  Antonio Davila MD as Consulting Physician (Orthopedic Surgery)  Manny Sexton MD as Consulting Physician (Dermatology)  Víctor Polk MD as Consulting Physician (Gastroenterology)  Kori Vaughn MD as Consulting Physician (Neurosurgery)  Hermes Anne MD as Consulting Physician (Orthopedic Surgery)  Peri James APRN as Nurse Practitioner (Neurology)  Gallo Shields MD as Consulting Physician (Ophthalmology)  Yuri Lay DPM as Consulting Physician (Podiatry)  Katarina Leung MD as Consulting Physician (Cardiology)    Smoking Status:  Social History     Tobacco Use   Smoking Status Never Smoker   Smokeless Tobacco Never Used   Tobacco Comment    daily caffiene       Alcohol Consumption:  Social History     Substance and Sexual Activity   Alcohol Use Yes   • Alcohol/week: 3.0 standard drinks   • Types: 3 Glasses of wine per week    Comment: moderate       Depression Screen:   PHQ-2/PHQ-9 Depression Screening 7/27/2021   Little interest or pleasure in doing things 0   Feeling down, depressed, or hopeless 0   Trouble falling or staying asleep, or sleeping too much 0   Feeling tired or having little energy 2   Poor appetite or overeating 0   Feeling bad about yourself - or that you are a failure or have let yourself or your family down 0   Trouble concentrating on things, such as reading the newspaper or  watching television 0   Moving or speaking so slowly that other people could have noticed. Or the opposite - being so fidgety or restless that you have been moving around a lot more than usual 0   Thoughts that you would be better off dead, or of hurting yourself in some way 0   Total Score 2   If you checked off any problems, how difficult have these problems made it for you to do your work, take care of things at home, or get along with other people? Somewhat difficult       Fall Risk Screen:  RONIADI Fall Risk Assessment was completed, and patient is at MODERATE risk for falls. Assessment completed on:7/27/2021    Health Habits and Functional and Cognitive Screening:  Functional & Cognitive Status 7/27/2021   Do you have difficulty preparing food and eating? No   Do you have difficulty bathing yourself, getting dressed or grooming yourself? No   Do you have difficulty using the toilet? No   Do you have difficulty moving around from place to place? No   Do you have trouble with steps or getting out of a bed or a chair? No   Current Diet Well Balanced Diet   Dental Exam Up to date   Eye Exam Up to date   Exercise (times per week) 4 times per week   Current Exercises Include Gardening   Do you need help using the phone?  No   Are you deaf or do you have serious difficulty hearing?  No   Do you need help with transportation? No   Do you need help shopping? No   Do you need help preparing meals?  No   Do you need help with housework?  No   Do you need help with laundry? No   Do you need help taking your medications? No   Do you need help managing money? No   Do you ever drive or ride in a car without wearing a seat belt? No   Have you felt unusual stress, anger or loneliness in the last month? No   Who do you live with? Spouse   If you need help, do you have trouble finding someone available to you? No   Have you been bothered in the last four weeks by sexual problems? No   Do you have difficulty concentrating,  remembering or making decisions? No         Does the patient have evidence of cognitive impairment? No    Asprin use counseling:Taking ASA appropriately as indicated    Age-appropriate Screening Schedule:  Refer to the list below for future screening recommendations based on patient's age, sex and/or medical conditions. Orders for these recommended tests are listed in the plan section. The patient has been provided with a written plan.    Health Maintenance   Topic Date Due   • INFLUENZA VACCINE  10/01/2021   • DXA SCAN  03/06/2022   • LIPID PANEL  05/18/2022   • MAMMOGRAM  04/28/2023   • TDAP/TD VACCINES (2 - Td or Tdap) 12/17/2023   • ZOSTER VACCINE  Completed          The following portions of the patient's history were reviewed and updated as appropriate: allergies, current medications, past family history, past medical history, past social history, past surgical history and problem list.    Outpatient Medications Prior to Visit   Medication Sig Dispense Refill   • acetaminophen (TYLENOL) 500 MG tablet Take 500 mg by mouth Every 6 (Six) Hours As Needed for Mild Pain .     • ALPHA LIPOIC ACID PO Take  by mouth Daily.     • aspirin 81 MG EC tablet Take 81 mg by mouth Daily.     • cholecalciferol (VITAMIN D3) 1000 UNITS tablet Take 2,000 Units by mouth 2 (Two) Times a Day.     • CIMETIDINE 200 PO Take  by mouth.     • Coenzyme Q10 (COQ-10) 400 MG capsule Take  by mouth Daily.     • cycloSPORINE (RESTASIS) 0.05 % ophthalmic emulsion Apply 1 drop to eye(s) as directed by provider 2 (Two) Times a Day.     • fluticasone (FLONASE) 50 MCG/ACT nasal spray 2 sprays into the nostril(s) as directed by provider Daily.     • gabapentin (NEURONTIN) 100 MG capsule qhs 90 capsule 1   • icosapent ethyl (Vascepa) 1 g capsule capsule Take 2 g by mouth 2 (Two) Times a Day With Meals. 360 capsule 4   • losartan-hydrochlorothiazide (HYZAAR) 100-12.5 MG per tablet Take 1 tablet by mouth Daily. 30 DAYS ONLY NEEDS AN APPOINTMENT 30 tablet  11   • Nutritional Supplements (CALCIUM D-GLUCARATE PO) Take  by mouth.     • nystatin (MYCOSTATIN) 637486 UNIT/ML suspension Swish and swallow 500,000 Units 5 (Five) Times a Day.     • Omega-3 Fatty Acids (fish oil) 1000 MG capsule capsule Take  by mouth Daily With Breakfast.     • Peppermint Oil (IBGARD PO) Take  by mouth.     • Probiotic Product (PROBIOTIC-10 PO) Take 1 capsule by mouth Daily.     • psyllium (METAMUCIL) 0.52 g capsule Take 0.52 g by mouth Daily.     • sertraline (ZOLOFT) 25 MG tablet TAKE ONE TABLET BY MOUTH DAILY 90 tablet 1   • SUPER B COMPLEX/C PO Take  by mouth.     • vitamin B-12 (CYANOCOBALAMIN) 1000 MCG tablet 5,000 mcg Daily.       No facility-administered medications prior to visit.       Patient Active Problem List   Diagnosis   • Hypertension   • Allergic rhinitis   • Hyperlipidemia LDL goal <70   • Osteoarthritis of hip   • Vitamin D deficiency   • History of CVA (cerebrovascular accident)   • Heartburn   • Frequent urination   • Age-related osteoporosis without current pathological fracture   • Chronic fatigue   • Overactive bladder   • Vision disturbance   • Sequelae, post-stroke   • Dry eye syndrome of both eyes   • Snoring   • Obstructive sleep apnea syndrome   • Irritable bowel syndrome with both constipation and diarrhea   • Dizziness   • Abnormal finding on breast imaging   • History of removal of implants of both breasts   • Fibrocystic breast changes, bilateral       Advanced Care Planning:  ACP discussion was held with the patient during this visit. Patient has an advance directive (not in EMR), copy requested.    Review of Systems    Compared to one year ago, the patient feels her physical health is worse.  Compared to one year ago, the patient feels her mental health is the same.    Reviewed chart for potential of high risk medication in the elderly: yes  Reviewed chart for potential of harmful drug interactions in the elderly:yes    Objective         Vitals:    07/27/21  "1102   BP: 124/70   BP Location: Left arm   Patient Position: Sitting   Cuff Size: Adult   Pulse: 64   Temp: 98.4 °F (36.9 °C)   SpO2: 99%   Weight: 57.2 kg (126 lb)   Height: 170.2 cm (67\")       Body mass index is 19.73 kg/m².  Discussed the patient's BMI with her. The BMI is in the acceptable range.    Physical Exam    Lab Results   Component Value Date    GLU 80 05/18/2021    CHLPL 156 05/18/2021    TRIG 81 05/18/2021    HDL 52 05/18/2021    LDL 89 05/18/2021    VLDL 15 05/18/2021        Assessment/Plan   Medicare Risks and Personalized Health Plan  CMS Preventative Services Quick Reference  Abdominal Aortic Aneurysm Screening  Osteoporosis Risk    The above risks/problems have been discussed with the patient.  Pertinent information has been shared with the patient in the After Visit Summary.  Follow up plans and orders are seen below in the Assessment/Plan Section.    There are no diagnoses linked to this encounter.  Follow Up:  No follow-ups on file.     An After Visit Summary and PPPS were given to the patient.        Need screening for AAA & carotid disease.  Information given today.   Need daily strengthening & balance exercises (shown today).          "

## 2021-07-27 NOTE — PATIENT INSTRUCTIONS
Medicare Wellness  Personal Prevention Plan of Service     Date of Office Visit:  2021  Encounter Provider:  Dionne Fonseca MD  Place of Service:  [facility]  Patient Name: Rosi Vicente  :  1950    As part of the Medicare Wellness portion of your visit today, we are providing you with this personalized preventive plan of services (PPPS). This plan is based upon recommendations of the [place] Preventive Services Task Force (USPSTF) and the Advisory Committee on Immunization Practices (ACIP).    This lists the preventive care services that should be considered, and provides dates of when you are due. Items listed as completed are up-to-date and do not require any further intervention.    Health Maintenance   Topic Date Due   • INFLUENZA VACCINE  10/01/2021   • DXA SCAN  2022   • LIPID PANEL  2022   • ANNUAL WELLNESS VISIT  2022   • MAMMOGRAM  2023   • TDAP/TD VACCINES (2 - Td or Tdap) 2023   • COLORECTAL CANCER SCREENING  2030   • HEPATITIS C SCREENING  Completed   • COVID-19 Vaccine  Completed   • Pneumococcal Vaccine 65+  Completed   • ZOSTER VACCINE  Completed       No orders of the defined types were placed in this encounter.      No follow-ups on file.

## 2021-07-27 NOTE — PROGRESS NOTES
Chief Complaint  Medicare Wellness-subsequent, Fatigue (since cva - moderate sleep apnea but can't use cpap or dental appliance b/c it hurts trigeminal nerve), Constipation (taking meds by dr solomon), Dizziness (seen by ENT & PT), Hypertension, and Hyperlipidemia    Subjective          Rosi Vicente presents to [facility] for   Fatigue  This is a chronic problem. The current episode started more than 1 year ago. The problem occurs constantly. The problem has been unchanged. Associated symptoms include arthralgias (aching diffusely), fatigue and a visual change (seeing opthal). Pertinent negatives include no abdominal pain, anorexia, change in bowel habit, chest pain, chills, coughing, diaphoresis, fever, headaches, nausea, numbness, rash, sore throat, swollen glands, urinary symptoms, vertigo, vomiting or weakness.   Constipation  This is a chronic problem. The current episode started more than 1 year ago. The problem is unchanged. Pertinent negatives include no abdominal pain, anorexia, fever, nausea or vomiting.   Dizziness  This is a recurrent problem. The current episode started more than 1 year ago. The problem occurs intermittently. The problem has been waxing and waning. Associated symptoms include arthralgias (aching diffusely), fatigue and a visual change (seeing opthal). Pertinent negatives include no abdominal pain, anorexia, change in bowel habit, chest pain, chills, coughing, diaphoresis, fever, headaches, nausea, numbness, rash, sore throat, swollen glands, urinary symptoms, vertigo, vomiting or weakness.   Hypertension  This is a chronic problem. The current episode started more than 1 year ago. The problem is unchanged. The problem is controlled. Pertinent negatives include no chest pain or headaches.   Hyperlipidemia  This is a chronic problem. The current episode started more than 1 year ago. The problem is controlled. She has no history of diabetes. Pertinent  "negatives include no chest pain.       Review of Systems   Constitutional: Positive for fatigue. Negative for chills, diaphoresis and fever.   HENT: Negative for sore throat.    Respiratory: Negative for cough.    Cardiovascular: Negative for chest pain.   Gastrointestinal: Positive for constipation. Negative for abdominal pain, anorexia, change in bowel habit, nausea and vomiting.   Musculoskeletal: Positive for arthralgias (aching diffusely).   Skin: Negative for rash.   Neurological: Positive for dizziness. Negative for vertigo, weakness, numbness and headaches.        Objective   Vital Signs:   /70 (BP Location: Left arm, Patient Position: Sitting, Cuff Size: Adult)   Pulse 64   Temp 98.4 °F (36.9 °C)   Ht 170.2 cm (67\")   Wt 57.2 kg (126 lb)   SpO2 99%   BMI 19.73 kg/m²     Physical Exam  Vitals and nursing note reviewed.   Constitutional:       General: She is not in acute distress.     Appearance: She is well-developed.   Cardiovascular:      Rate and Rhythm: Normal rate and regular rhythm.      Heart sounds: Normal heart sounds.   Pulmonary:      Effort: No respiratory distress.      Breath sounds: No wheezing or rales.   Chest:      Chest wall: No tenderness.   Psychiatric:         Behavior: Behavior normal.          Result Review :                 Assessment and Plan    Problem List Items Addressed This Visit        Unprioritized    Hypertension - Primary    Current Assessment & Plan     Hypertension is improving with treatment.    Blood pressure will be reassessed at the next regular appointment.         Relevant Medications    losartan (Cozaar) 100 MG tablet    Other Relevant Orders    CBC & Differential    Comprehensive Metabolic Panel    Lipid Panel    Hyperlipidemia LDL goal <70    Current Assessment & Plan     Lipid abnormalities are unchanged.  Nutritional counseling was provided.  Lipids will be reassessed in 6 months.         Relevant Orders    Comprehensive Metabolic Panel    Lipid " Panel    Vitamin D deficiency    Overview     D=33         Current Assessment & Plan     Continue daily vit d         Chronic fatigue    Current Assessment & Plan     Since CVA - consider re-eval for sleep apnea-refer for second opinion.         Relevant Orders    TSH Rfx On Abnormal To Free T4    Vitamin B12 & Folate    Sequelae, post-stroke    Current Assessment & Plan     Released from neurology.         Obstructive sleep apnea syndrome    Current Assessment & Plan     Need 2nd opinion         Irritable bowel syndrome with both constipation and diarrhea    Current Assessment & Plan     F/u with dr soloomn         Dizziness    Overview     need mucinex 1200mg bid, flonase daily & nasal saline 4x daily         Current Assessment & Plan     Follow-up with ENT and neurology.                  ECG 12 Lead    Date/Time: 7/27/2021 5:29 PM  Performed by: Dionne Fonseca MD  Authorized by: Dionne Fonseca MD   Comparison: not compared with previous ECG   Previous ECG: no previous ECG available  Rhythm: sinus rhythm  Rate: normal  Conduction: conduction normal  ST Segments: ST segments normal  T Waves: T waves normal  QRS axis: normal  Other: no other findings    Clinical impression: normal ECG            Follow Up   Return in about 3 months (around 10/27/2021) for Recheck.  Patient was given instructions and counseling regarding her condition or for health maintenance advice. Please see specific information pulled into the AVS if appropriate.      Will get labs in 2-3 mos.

## 2021-08-30 ENCOUNTER — PATIENT MESSAGE (OUTPATIENT)
Dept: INTERNAL MEDICINE | Facility: CLINIC | Age: 71
End: 2021-08-30

## 2021-08-31 ENCOUNTER — APPOINTMENT (OUTPATIENT)
Dept: VACCINE CLINIC | Facility: HOSPITAL | Age: 71
End: 2021-08-31

## 2021-08-31 RX ORDER — FLUTICASONE PROPIONATE 50 MCG
2 SPRAY, SUSPENSION (ML) NASAL DAILY
Qty: 16 G | Refills: 6 | Status: CANCELLED | OUTPATIENT
Start: 2021-08-31

## 2021-08-31 RX ORDER — FLUTICASONE PROPIONATE 50 MCG
2 SPRAY, SUSPENSION (ML) NASAL DAILY
Qty: 15.8 ML | Refills: 3 | Status: SHIPPED | OUTPATIENT
Start: 2021-08-31 | End: 2022-08-15 | Stop reason: SDUPTHER

## 2021-08-31 NOTE — TELEPHONE ENCOUNTER
From: Rosi Vicente  To: Dionne Fonseca MD  Sent: 8/30/2021 6:35 PM EDT  Subject: Prescription Question    Hi  Several questions.    Had second Pfizer injection 2/9/21. Hopefully leaving for [place] in October. Okay if I get booster now?  Would you send script for Flonase? It is effective for my allergies.   FYI I had to return to using HCTZ to [facility]. Diastolic went back up to 90-94.   Thanks in advance for your response   Rosi

## 2021-09-21 ENCOUNTER — APPOINTMENT (OUTPATIENT)
Dept: VACCINE CLINIC | Facility: HOSPITAL | Age: 71
End: 2021-09-21

## 2021-10-18 NOTE — TELEPHONE ENCOUNTER
Pt called requesting a call back from you. She would like to discuss plavix vs aspirin long term therapy with you. She was last seen 8/7/19, and isn't due to follow up until 8/5/2020. Her call back number is [phone].    Please review and advise.   Thank you.   Pt tolerated 45min PT initial evaluation fairly well. Pt rec'd in bed in NAD, s/p fall on stairs, +tSAH agreeable to PT. OT Sheera bedside for co-eval.

## 2021-10-21 ENCOUNTER — OFFICE VISIT (OUTPATIENT)
Dept: INTERNAL MEDICINE | Facility: CLINIC | Age: 71
End: 2021-10-21

## 2021-10-21 VITALS
WEIGHT: 124.58 LBS | RESPIRATION RATE: 17 BRPM | OXYGEN SATURATION: 98 % | SYSTOLIC BLOOD PRESSURE: 133 MMHG | TEMPERATURE: 97.8 F | DIASTOLIC BLOOD PRESSURE: 78 MMHG | HEART RATE: 58 BPM | BODY MASS INDEX: 19.55 KG/M2 | HEIGHT: 67 IN

## 2021-10-21 DIAGNOSIS — R53.82 CHRONIC FATIGUE: ICD-10-CM

## 2021-10-21 DIAGNOSIS — I10 PRIMARY HYPERTENSION: Primary | ICD-10-CM

## 2021-10-21 DIAGNOSIS — E78.5 HYPERLIPIDEMIA LDL GOAL <70: ICD-10-CM

## 2021-10-21 DIAGNOSIS — E55.9 VITAMIN D DEFICIENCY: ICD-10-CM

## 2021-10-21 PROCEDURE — 99214 OFFICE O/P EST MOD 30 MIN: CPT | Performed by: INTERNAL MEDICINE

## 2021-10-21 RX ORDER — LOSARTAN POTASSIUM AND HYDROCHLOROTHIAZIDE 12.5; 5 MG/1; MG/1
1 TABLET ORAL DAILY
COMMUNITY
End: 2022-01-27 | Stop reason: SDUPTHER

## 2021-10-21 NOTE — ASSESSMENT & PLAN NOTE
Lipid abnormalities are unchanged.  Nutritional counseling was provided.  Lipids will be reassessed in 6 months.    Continue Vascepa daily.

## 2021-10-21 NOTE — ASSESSMENT & PLAN NOTE
Hypertension is improving with treatment.  Dietary sodium restriction.  Weight loss.  Regular aerobic exercise.  Blood pressure will be reassessed at the next regular appointment.

## 2021-10-21 NOTE — PROGRESS NOTES
"Chief Complaint  Hypertension (f/U.), Hyperlipidemia, and Fatigue    Subjective          Rosi Vicente presents to [facility] for   Hypertension  This is a chronic problem. The current episode started more than 1 year ago. The problem is unchanged. The problem is controlled.   Hyperlipidemia  This is a chronic problem. The current episode started more than 1 year ago. The problem is uncontrolled. Recent lipid tests were reviewed and are high.   Fatigue  This is a chronic problem. The current episode started more than 1 year ago. The problem occurs constantly. The problem has been unchanged. Associated symptoms include fatigue.       Review of Systems   Constitutional: Positive for fatigue.        Objective   Vital Signs:   /78 (BP Location: Right arm, Patient Position: Sitting, Cuff Size: Adult)   Pulse 58   Temp 97.8 °F (36.6 °C)   Resp 17   Ht 170.2 cm (67\")   Wt 56.5 kg (124 lb 9.3 oz)   SpO2 98%   BMI 19.51 kg/m²     Physical Exam  Vitals and nursing note reviewed.   Constitutional:       General: She is not in acute distress.     Appearance: She is well-developed.   Cardiovascular:      Rate and Rhythm: Normal rate and regular rhythm.      Heart sounds: Normal heart sounds.   Pulmonary:      Effort: No respiratory distress.      Breath sounds: No wheezing or rales.   Chest:      Chest wall: No tenderness.   Psychiatric:         Behavior: Behavior normal.        Result Review :                 Assessment and Plan    Problem List Items Addressed This Visit        Unprioritized    Hypertension - Primary    Current Assessment & Plan     Hypertension is improving with treatment.  Dietary sodium restriction.  Weight loss.  Regular aerobic exercise.  Blood pressure will be reassessed at the next regular appointment.         Relevant Medications    losartan-hydrochlorothiazide (HYZAAR) 50-12.5 MG per tablet    Other Relevant Orders    CBC & Differential    Comprehensive " Metabolic Panel    Lipid Panel    Hyperlipidemia LDL goal <70    Current Assessment & Plan     Lipid abnormalities are unchanged.  Nutritional counseling was provided.  Lipids will be reassessed in 6 months.    Continue Vascepa daily.         Vitamin D deficiency    Overview     D=33         Current Assessment & Plan     Continue 5,000 units daily         Relevant Orders    Vitamin D 25 Hydroxy    Chronic fatigue    Current Assessment & Plan     Call if worse         Relevant Orders    TSH Rfx On Abnormal To Free T4    Vitamin B12 & Folate          Follow Up   Return in about 6 months (around 4/21/2022) for Recheck.  Patient was given instructions and counseling regarding her condition or for health maintenance advice. Please see specific information pulled into the AVS if appropriate.

## 2021-10-22 LAB
25(OH)D3+25(OH)D2 SERPL-MCNC: 98.8 NG/ML (ref 30–100)
ALBUMIN SERPL-MCNC: 4.6 G/DL (ref 3.7–4.7)
ALBUMIN/GLOB SERPL: 2.2 {RATIO} (ref 1.2–2.2)
ALP SERPL-CCNC: 92 IU/L (ref 44–121)
ALT SERPL-CCNC: 16 IU/L (ref 0–32)
AST SERPL-CCNC: 21 IU/L (ref 0–40)
BASOPHILS # BLD AUTO: 0.1 X10E3/UL (ref 0–0.2)
BASOPHILS NFR BLD AUTO: 1 %
BILIRUB SERPL-MCNC: 0.4 MG/DL (ref 0–1.2)
BUN SERPL-MCNC: 12 MG/DL (ref 8–27)
BUN/CREAT SERPL: 19 (ref 12–28)
CALCIUM SERPL-MCNC: 9.6 MG/DL (ref 8.7–10.3)
CHLORIDE SERPL-SCNC: 91 MMOL/L (ref 96–106)
CHOLEST SERPL-MCNC: 152 MG/DL (ref 100–199)
CO2 SERPL-SCNC: 24 MMOL/L (ref 20–29)
CREAT SERPL-MCNC: 0.62 MG/DL (ref 0.57–1)
EOSINOPHIL # BLD AUTO: 0.1 X10E3/UL (ref 0–0.4)
EOSINOPHIL NFR BLD AUTO: 2 %
ERYTHROCYTE [DISTWIDTH] IN BLOOD BY AUTOMATED COUNT: 13.3 % (ref 11.7–15.4)
FOLATE SERPL-MCNC: >20 NG/ML
GLOBULIN SER CALC-MCNC: 2.1 G/DL (ref 1.5–4.5)
GLUCOSE SERPL-MCNC: 87 MG/DL (ref 65–99)
HCT VFR BLD AUTO: 38.3 % (ref 34–46.6)
HDLC SERPL-MCNC: 46 MG/DL
HGB BLD-MCNC: 12.1 G/DL (ref 11.1–15.9)
IMM GRANULOCYTES # BLD AUTO: 0 X10E3/UL (ref 0–0.1)
IMM GRANULOCYTES NFR BLD AUTO: 0 %
LDLC SERPL CALC-MCNC: 94 MG/DL (ref 0–99)
LYMPHOCYTES # BLD AUTO: 1.4 X10E3/UL (ref 0.7–3.1)
LYMPHOCYTES NFR BLD AUTO: 28 %
MCH RBC QN AUTO: 26.5 PG (ref 26.6–33)
MCHC RBC AUTO-ENTMCNC: 31.6 G/DL (ref 31.5–35.7)
MCV RBC AUTO: 84 FL (ref 79–97)
MONOCYTES # BLD AUTO: 0.5 X10E3/UL (ref 0.1–0.9)
MONOCYTES NFR BLD AUTO: 11 %
NEUTROPHILS # BLD AUTO: 2.9 X10E3/UL (ref 1.4–7)
NEUTROPHILS NFR BLD AUTO: 58 %
PLATELET # BLD AUTO: 378 X10E3/UL (ref 150–450)
POTASSIUM SERPL-SCNC: 4 MMOL/L (ref 3.5–5.2)
PROT SERPL-MCNC: 6.7 G/DL (ref 6–8.5)
RBC # BLD AUTO: 4.56 X10E6/UL (ref 3.77–5.28)
SODIUM SERPL-SCNC: 130 MMOL/L (ref 134–144)
TRIGL SERPL-MCNC: 59 MG/DL (ref 0–149)
TSH SERPL DL<=0.005 MIU/L-ACNC: 2.06 UIU/ML (ref 0.45–4.5)
VIT B12 SERPL-MCNC: >2000 PG/ML (ref 232–1245)
VLDLC SERPL CALC-MCNC: 12 MG/DL (ref 5–40)
WBC # BLD AUTO: 4.9 X10E3/UL (ref 3.4–10.8)

## 2021-10-26 ENCOUNTER — PATIENT MESSAGE (OUTPATIENT)
Dept: INTERNAL MEDICINE | Facility: CLINIC | Age: 71
End: 2021-10-26

## 2021-10-26 DIAGNOSIS — G50.0 TRIGEMINAL NEURALGIA: ICD-10-CM

## 2021-10-26 RX ORDER — GABAPENTIN 100 MG/1
CAPSULE ORAL
Qty: 90 CAPSULE | Refills: 0 | Status: SHIPPED | OUTPATIENT
Start: 2021-10-26 | End: 2021-10-27 | Stop reason: SDUPTHER

## 2021-10-27 RX ORDER — GABAPENTIN 100 MG/1
CAPSULE ORAL
Qty: 90 CAPSULE | Refills: 0 | Status: SHIPPED | OUTPATIENT
Start: 2021-10-27 | End: 2022-01-18 | Stop reason: SDUPTHER

## 2021-10-27 NOTE — TELEPHONE ENCOUNTER
From: Rosi Vicente  Sent: 10/27/2021 1:03 PM EDT  To: Katelyn Christianson [facility] Clinical Pool  Subject: gabapentin    Please send to Eric

## 2021-11-02 ENCOUNTER — APPOINTMENT (OUTPATIENT)
Dept: ULTRASOUND IMAGING | Facility: HOSPITAL | Age: 71
End: 2021-11-02

## 2021-11-15 ENCOUNTER — OFFICE VISIT (OUTPATIENT)
Dept: SURGERY | Facility: CLINIC | Age: 71
End: 2021-11-15

## 2021-11-15 ENCOUNTER — HOSPITAL ENCOUNTER (OUTPATIENT)
Dept: ULTRASOUND IMAGING | Facility: HOSPITAL | Age: 71
Discharge: HOME | End: 2021-11-15
Admitting: NURSE PRACTITIONER

## 2021-11-15 VITALS
HEART RATE: 69 BPM | DIASTOLIC BLOOD PRESSURE: 78 MMHG | SYSTOLIC BLOOD PRESSURE: 148 MMHG | HEIGHT: 67 IN | WEIGHT: 124 LBS | BODY MASS INDEX: 19.46 KG/M2

## 2021-11-15 DIAGNOSIS — N64.89 SEROMA OF BREAST: ICD-10-CM

## 2021-11-15 DIAGNOSIS — N60.12 FIBROCYSTIC BREAST CHANGES, BILATERAL: ICD-10-CM

## 2021-11-15 DIAGNOSIS — R92.8 ABNORMAL FINDING ON BREAST IMAGING: Primary | ICD-10-CM

## 2021-11-15 DIAGNOSIS — N60.11 FIBROCYSTIC BREAST CHANGES, BILATERAL: ICD-10-CM

## 2021-11-15 PROCEDURE — 76642 ULTRASOUND BREAST LIMITED: CPT

## 2021-11-15 PROCEDURE — 99213 OFFICE O/P EST LOW 20 MIN: CPT | Performed by: NURSE PRACTITIONER

## 2021-11-15 NOTE — PROGRESS NOTES
BREAST CARE CENTER     Referring Provider: No ref. provider found     Chief complaint: abnormal breast imaging     HPI: Ms. Rosi Vicente is a 72 yo woman, seen at the request of No ref. provider found, for abnormal imaging left breast    I personally reviewed her records and summarized her relevant breast history/imaging:    She has a personal history of breast augmentation in 1978 with silicone implants that were replaced in 1989 due to rupture, and again replaced in 2017 due to capsular contracture, replaced with textured silicone implants, removal of implants in 2019.        She denies any family history of breast or ovarian cancer.     3/6/2020:  Bilateral screening mammogram with tomosynthesis at [facility]  FINDINGS: Bilateral digital CC and MLO mammographic and Tomosynthesis images were obtained. Comparison is made to prior examinations dated 01/29/2018, 01/19/2017 and 04/20/2015.   Scattered fibroglandular densities are seen throughout both breasts. There is postoperative architectural distortion seen in both breasts associated with prior bilateral silicone implant removal. Within the posterior one-third superior aspect of the right breast there is a 3 cm area of free silicone. No evidence for axillary adenopathy is appreciated. No suspicious findings are seen within either breast.     IMPRESSION:  There are no findings suspicious for malignancy in either breast.  Routine follow-up mammography is recommended.     BI-RADS Category 2: Benign finding.     4/5/2021:  Bilateral screening mammogram with tomosynthesis at [facility]  FINDINGS: Bilateral digital CC and MLO mammographic and digital Tomosynthesis images were obtained. Comparison is made to prior studies dated 3/6/2020, 1/29/2018 and 1/19/2017 .   Scattered fibroglandular densities are seen throughout both breasts in a pattern which is unchanged. There is considerable focal architectural distortion in the posterior one third retroareolar region  of the left breast in the region of prior surgery associated with prior implant removal. The architectural distortion of the left breast is greater than that on the right breast in the same region that is associated with prior breast implant removal. There is a stable 3 cm mass associated with increased density internal calcifications that is seen in the axillary tail region of the right breast and is consistent with silicone within a lymph node.     IMPRESSION:  1. There is asymmetric architectural torsion increased density in the posterior one third retroareolar region of the left breast in the region of prior breast implant removal. Correlation with targeted left breast sonography of the left breast in this region is recommended.  2. There are no findings suspicious for malignancy in the right breast.     BI-RADS Category 0: Incomplete. Needs additional imaging evaluation.   This report was finalized on 4/6/2021 10:11 AM by Dr. Christofer Jerez M.D.    4/28/2021:  Left breast limited US at [facility]  FINDINGS: Targeted sonographic evaluation of the retroareolar region of the left breast was performed. Comparison is made to prior mammography dated 04/05/2021. There is a 3.8 x 1.5 x 4.5 cm anechoic unilocular fluid collection seen in this region. This is consistent with a postoperative seroma. No suspicious findings are visualized.     IMPRESSION:  A postoperative seroma is noted in the retroareolar region of the left breast. Aspiration should be considered to remove the appearance from the patient's mammogram. Also, routine breast MRI without oral contrast may be of added value due to the distortion seen on the patient's mammogram. Otherwise, routine clinical follow-up and mammography is recommended.     BI-RADS Category 2: Benign.   This report was finalized on 4/29/2021 7:50 AM by Dr. Christofer Jerez M.D.    Today she presents with dissatisfaction with her cosmetic outcome.  She has some asymmetry and her  right nipple has been inverted since her last surgery.  With her last surgery and removal of the implants, the plastic surgeon did not remove the capsules because she was on plavix and he was concerned with bleeding.  She is now on aspirin and is wondering if removing the capsules might improve her cosmetic outcome.    She reports that she is otherwise healthy.      She was by herself in clinic today.       Review of Systems   Gastrointestinal: Positive for constipation and diarrhea.   All other systems reviewed and are negative.      Medications:    Current Outpatient Medications:   •  acetaminophen (TYLENOL) 500 MG tablet, Take 500 mg by mouth Every 6 (Six) Hours As Needed for Mild Pain ., Disp: , Rfl:   •  aspirin 81 MG EC tablet, Take 81 mg by mouth Daily., Disp: , Rfl:   •  cholecalciferol (VITAMIN D3) 1000 UNITS tablet, Take 2,000 Units by mouth 2 (Two) Times a Day., Disp: , Rfl:   •  CIMETIDINE 200 PO, Take  by mouth., Disp: , Rfl:   •  Coenzyme Q10 (COQ-10) 400 MG capsule, Take  by mouth Daily., Disp: , Rfl:   •  cycloSPORINE (RESTASIS) 0.05 % ophthalmic emulsion, Apply 1 drop to eye(s) as directed by provider 2 (Two) Times a Day., Disp: , Rfl:   •  fluticasone (FLONASE) 50 MCG/ACT nasal spray, 2 sprays into the nostril(s) as directed by provider Daily., Disp: 15.8 mL, Rfl: 3  •  gabapentin (NEURONTIN) 100 MG capsule, qhs, Disp: 90 capsule, Rfl: 0  •  Grape Seed 100 MG capsule, , Disp: , Rfl:   •  icosapent ethyl (Vascepa) 1 g capsule capsule, Take 2 g by mouth 2 (Two) Times a Day With Meals., Disp: 360 capsule, Rfl: 4  •  losartan-hydrochlorothiazide (HYZAAR) 50-12.5 MG per tablet, Take 1 tablet by mouth Daily., Disp: , Rfl:   •  Nutritional Supplements (CALCIUM D-GLUCARATE PO), Take  by mouth., Disp: , Rfl:   •  Omega-3 Fatty Acids (fish oil) 1000 MG capsule capsule, Take  by mouth Daily With Breakfast., Disp: , Rfl:   •  Probiotic Product (PROBIOTIC-10 PO), Take 1 capsule by mouth Daily., Disp: , Rfl:   •   psyllium (METAMUCIL) 0.52 g capsule, Take 0.52 g by mouth Daily., Disp: , Rfl:   •  sertraline (ZOLOFT) 25 MG tablet, TAKE ONE TABLET BY MOUTH DAILY, Disp: 90 tablet, Rfl: 1  •  SUPER B COMPLEX/C PO, Take  by mouth., Disp: , Rfl:   •  vitamin B-12 (CYANOCOBALAMIN) 1000 MCG tablet, 5,000 mcg Daily., Disp: , Rfl:     Allergies:  Allergies   Allergen Reactions   • Bystolic [Nebivolol Hcl] Other (See Comments)     Extreme fatigue, dizziness   • Lisinopril Other (See Comments)     COUGH       Medical history:  Past Medical History:   Diagnosis Date   • Allergic rhinitis    • Anemia    • Arthritis 2016   • Bronchitis    • CVA (cerebral vascular accident) ([facility])    • Elevated coronary artery calcium score    • Fatigue    • FH: colon cancer    • Fibrocystic breast changes, bilateral 5/12/2021   • GERD (gastroesophageal reflux disease) occasionally   • H/O bone density study 2013   • H/O mammogram 2013   • Hyperlipidemia    • Hypertension     Benign Essential   • Irritable bowel syndrome with both constipation and diarrhea 9/4/2020   • Malaise and fatigue    • Muscle pain    • Nocturia    • Osteoporosis    • Sleep apnea     AHI 17/h   • Stroke (HCC) 09/13/2017   • TMJ (temporomandibular joint syndrome)    • Trigeminal neuralgia     Surgery at [facility] in 2009 repeat in 2015       Surgical History:  Past Surgical History:   Procedure Laterality Date   • ADENOIDECTOMY     • AUGMENTATION MAMMAPLASTY Bilateral 2019    removed   • BRAIN SURGERY  12/8/2005    MVD   • COLONOSCOPY N/A 01/2015    Repeat Q 5 years due to Fhx of Colon Ca-Dr. Polk   • COSMETIC SURGERY  2015    augmentation   • HIP SURGERY     • INNER EAR SURGERY     • JOINT REPLACEMENT  10/3/16    left hip replacement   • PAP SMEAR N/A 2013    Dr. Burden   • RHINOPLASTY     • SEPTOPLASTY  2000    SEPTO/RHINOPLASTY POST TRAUMA   • SINUS SURGERY  1980'S   • TONSILLECTOMY  AGE 5   • US GUIDED CYST ASPIRATION BREAST N/A 5/26/2021       Family History:  Family  "History   Problem Relation Age of Onset   • Pancreatic cancer Mother    • Hyperlipidemia Mother    • Cancer Mother            • Hypertension Mother    • Miscarriages / Stillbirths Mother    • Colon cancer Father 56   • Arthritis Father    • Cancer Father            • Hearing loss Father         WAR INJURY   • Vision loss Father         dry glaucoma   • Liver cancer Brother 40   • Cancer Brother                Social History:   Social History     Socioeconomic History   • Marital status:    Tobacco Use   • Smoking status: Never Smoker   • Smokeless tobacco: Never Used   • Tobacco comment: daily caffiene   Substance and Sexual Activity   • Alcohol use: Yes     Alcohol/week: 2.0 standard drinks     Types: 2 Glasses of wine per week     Comment: moderate   • Drug use: No   • Sexual activity: Yes     Partners: Male     Birth control/protection: Post-menopausal, None     Patient drinks 2 servings of caffeine per day.       GYNECOLOGIC HISTORY:   . P: 0. AB: 0.  Last menstrual period: age 49  Age at menarche: 14  Age at first childbirth: n/a  Lactation/How long: n/a  Age at menopause: 49  Total years of oral contraceptive use: 20  Total years of hormone replacement therapy: 2      Physical Exam  Vitals:    11/15/21 1403   BP: 148/78   Pulse: 69   /78 (BP Location: Right arm)   Pulse 69   Ht 170.2 cm (67\")   Wt 56.2 kg (124 lb)   LMP  (LMP Unknown)   BMI 19.42 kg/m²   I reviewed physical exam, no changes noted    ECOG 0 - Asymptomatic  General: NAD, well appearing  Psych: a&o x 3, normal mood and affect  Eyes: EOMI, no scleral icterus  ENMT: neck supple without masses or thyromegaly, mucus membranes moist  Resp: normal effort, CTAB  CV: RRR, no murmurs, no edema   GI: soft, NT, ND  MSK: normal gait, normal ROM in bilateral shoulders  Lymph nodes:  no cervical, supraclavicular or axillary lymphadenopathy  Breast: asymmetric, left > right, bilateral inframammary surgical incisions " with scarring and volume deficit.  Small volume  Right:  Volume deficit in lower aspect while seated and with arms raised or hands on hips. No masses, skin changes.  Right nipple creasing with crusty exudate that was removed with exam.  No inflammation noted.  Left:  Mild volume deficit in lower aspect on inspection while seated, with arms raised or hands on hips. Soft fullness noted in upper central aspect, at 1200, 1 CFN a smooth, soft fullness 2 cm is noted.  No masses, skin changes, or nipple abnormalities.    Imagin21: left breast cyst aspiration at [facility]  CLINICAL INDICATION: 71 years old female presents for ultrasound-guided needle aspiration of a postoperative seroma in the left breast.    PRE-PROCEDURAL CONSULTATION: Details of the procedure and possible limitations and complications were discussed with the patient. After addressing her questions and concerns, written informed consent was obtained.     PROCEDURE: The fluid collection in the retroareolar left breast was targeted under ultrasound. The skin of the breast was cleansed and prepped. 5 mL of 1% lidocaine was used for local anesthesia. An 18 g needle was inserted into the collection. 3 mL of clear yellow fluid was aspirated and discarded with complete resolution of the fluid collection. The patient tolerated the procedure well with no immediate complications.      IMPRESSION:  Ultrasound-guided needle aspiration of a postoperative seroma in the retroareolar left breast with removal of 3 mL of clear  yellow fluid and complete resolution of the collection. Recommend annual screening mammogram in 2022.    11/15/21: left breast limited US at [facility]  FINDINGS: Targeted sonographic evaluation of the left breast was performed through the retroareolar region at the 6-o'clock position.  Comparison is made to prior left breast sonography dated 2021.   At this location, there is a 1.9 x 1.8 x 0.1 cm sliver of fluid within the seroma cavity.  Previously, the seroma cavity measured 4.5 x 3.8 x 1.5 cm. No abnormal mass effect is appreciated.     IMPRESSION:  Only a small sliver of fluid remains within the seroma cavity in the left breast as described. No drainable fluid collection is  appreciated. Clinical follow-up and follow-up imaging as clinically indicated is recommended.     BI-RADS Category 2: Benign finding.    Assessment:    1)  71 y.o. F with abnormal left breast imaging, c/w post surgical seroma.  2)  History of bilateral silicone implants with rupture, removed in 2019  3)  Fibrocystic breast changes      Discussion:  1)  Imaging findings were reviewed and discussed, a copy given.      She is relieved with her imaging results and content with her cosmetic outcome which is not perfect but tolerable.    Plan:  In view of her normal imaging and examination I will not given her a follow-up in our office but of asked her to check her self-exam and to call us in the interim with any concerns would be happy to see her back.    She will be due her screening mammogram with tomosynthesis in 4/2022.        Caitlyn Nelson, APRN          CC:  No ref. provider found      EMR Dragon/transcription disclaimer:  Dictated using Dragon dictation

## 2021-12-17 DIAGNOSIS — I10 ESSENTIAL HYPERTENSION: ICD-10-CM

## 2021-12-17 RX ORDER — LOSARTAN POTASSIUM AND HYDROCHLOROTHIAZIDE 12.5; 1 MG/1; MG/1
TABLET ORAL
Qty: 90 TABLET | OUTPATIENT
Start: 2021-12-17

## 2021-12-20 DIAGNOSIS — I10 ESSENTIAL HYPERTENSION: ICD-10-CM

## 2021-12-20 RX ORDER — LOSARTAN POTASSIUM AND HYDROCHLOROTHIAZIDE 12.5; 1 MG/1; MG/1
TABLET ORAL
Qty: 90 TABLET | OUTPATIENT
Start: 2021-12-20

## 2021-12-21 ENCOUNTER — TELEPHONE (OUTPATIENT)
Dept: INTERNAL MEDICINE | Facility: CLINIC | Age: 71
End: 2021-12-21

## 2021-12-21 ENCOUNTER — APPOINTMENT (OUTPATIENT)
Dept: CARDIOLOGY | Facility: HOSPITAL | Age: 71
End: 2021-12-21

## 2021-12-21 NOTE — TELEPHONE ENCOUNTER
Pt's RX was denied by Dr Leung she was advised  to contact their office  I also advised Claudia will order her Dexa when she establishes with her

## 2021-12-21 NOTE — TELEPHONE ENCOUNTER
Caller: Jules, Rosi L    Relationship: Self    Best call back number: [phone] (H)    What is the best time to reach you: ANYTIME    Who are you requesting to speak with (clinical staff, provider,  specific staff member): CLINICAL STAFF    Do you know the name of the person who called:     What was the call regarding: BONE DENSITY AND HER REFILL ON HER MEDICATION. PATIENT STATES THAT SHE WAS IN TO DR. SALAZAR IN October AND DOES NOT SEE WHY SHE HAS TO COME BACK IN TO GET HER MEDICATION REFILL. PATIENT ALSO STATES THAT IF SHE WAITS TO GET HER REFERRAL IN January SHE WILL NOT GET AN APPOINTMENT UNTIL MARCH.     Do you require a callback: YES        THANKS

## 2022-01-04 ENCOUNTER — OFFICE VISIT (OUTPATIENT)
Dept: INTERNAL MEDICINE | Facility: CLINIC | Age: 72
End: 2022-01-04

## 2022-01-04 VITALS
SYSTOLIC BLOOD PRESSURE: 121 MMHG | BODY MASS INDEX: 19.4 KG/M2 | WEIGHT: 123.6 LBS | HEIGHT: 67 IN | DIASTOLIC BLOOD PRESSURE: 70 MMHG | HEART RATE: 77 BPM | TEMPERATURE: 96.6 F | OXYGEN SATURATION: 99 %

## 2022-01-04 DIAGNOSIS — Z76.0 MEDICATION REFILL: ICD-10-CM

## 2022-01-04 DIAGNOSIS — R42 VERTIGO: Primary | ICD-10-CM

## 2022-01-04 PROCEDURE — 99214 OFFICE O/P EST MOD 30 MIN: CPT | Performed by: NURSE PRACTITIONER

## 2022-01-04 RX ORDER — HYDROCHLOROTHIAZIDE 12.5 MG/1
12.5 TABLET ORAL DAILY
Qty: 30 TABLET | Refills: 0 | Status: SHIPPED | OUTPATIENT
Start: 2022-01-04 | End: 2022-03-14 | Stop reason: DRUGHIGH

## 2022-01-04 NOTE — PROGRESS NOTES
"Chief Complaint  Establish Care (new patient )    Subjective          Rosi Vicente presents to [facility]  History of Present Illness    Patient is a pleasant 71-year-old female who typically saw Dr. Fonseca here in the office.  Patient is here doing an establish care visit here with me today.  Patient has a history of trigeminal neuralgia and she had a MVD back in 2019 and most of her symptoms are manageable with gabapentin.  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    Every six months that Dr. Fonseca saw her.     Dr. West for Neurontin refill     Broken bones in the past so she prefers to get this done every year.   Wants a bone density test due in March of 2022.     Patient also states she has a history of vertigo from time to time and she would like to get a referral for vestibular therapy (PT).     Objective   Vital Signs:   /70 (BP Location: Left arm, Patient Position: Sitting, Cuff Size: Adult)   Pulse 77   Temp 96.6 °F (35.9 °C) (Temporal)   Ht 170.2 cm (67.01\")   Wt 56.1 kg (123 lb 9.6 oz)   SpO2 99%   BMI 19.35 kg/m²     Physical Exam  Vitals and nursing note reviewed.   Constitutional:       Appearance: Normal appearance.   HENT:      Head: Normocephalic.      Nose: Nose normal.      Mouth/Throat:      Mouth: Mucous membranes are moist.   Eyes:      Pupils: Pupils are equal, round, and reactive to light.   Cardiovascular:      Rate and Rhythm: Normal rate and regular rhythm.      Pulses: Normal pulses.      Heart sounds: Normal heart sounds.      Comments: No peripheral edema   Pulmonary:      Effort: Pulmonary effort is normal. No respiratory distress.      Breath sounds: Normal breath sounds. No stridor. No wheezing, rhonchi or rales.   Chest:      Chest wall: No tenderness.   Musculoskeletal:      Cervical back: Normal range of motion.      Comments: Weakness d/t history of Stroke.    Skin:     General: Skin is warm.      Capillary Refill: Capillary " refill takes less than 2 seconds.   Neurological:      Mental Status: She is alert and oriented to person, place, and time.   Psychiatric:         Behavior: Behavior normal.        Result Review :            Office Visit with Dionne Fonseca MD (10/21/2021)  Vitamin D 25 Hydroxy (10/21/2021 9:57 AM)  Vitamin B12 & Folate (10/21/2021 9:57 AM)  TSH Rfx On Abnormal To Free T4 (10/21/2021 9:57 AM)  Lipid Panel (10/21/2021 9:57 AM)  Comprehensive Metabolic Panel (10/21/2021 9:57 AM)  CBC & Differential (10/21/2021 9:57 AM)         Assessment and Plan    Diagnoses and all orders for this visit:    1. Vertigo (Primary)  -     Ambulatory Referral to Physical Therapy Vestibular    2. Medication refill    Other orders  -     hydroCHLOROthiazide (HYDRODIURIL) 12.5 MG tablet; Take 1 tablet by mouth Daily for 30 days.  Dispense: 30 tablet; Refill: 0    Patient states she has a prescription of losartan which is 50 mg tablet at home and she needs a plain prescription of hydrochlorothiazide 12.5 tablet at home to take in conjunction with this prescription.  Patient wants to complete that prescription and then we will refill her Hyzaar after completion of the previous medication.  Patient has been on this medication for quite some time for hypertension and her blood pressure on today's office visit is 121/70 and she denies any side effects of the medication.    Patient has a history of trigeminal neuralgia and she is currently taking gabapentin 100 mg capsule as needed for this problem.  Patient denies any side effects of the medication.    An ambulatory referral was placed for physical therapy today's office visit her history of vertigo.  I also have sent a question for the DEXA scan that she wishes to receive in March of this year and I have also sent over a request for a prior authorization for her Vascepa 4 g daily.  We will get back with this patient on this matter.    I spent 33 minutes caring for Rosi on this date of  service. This time includes time spent by me in the following activities:preparing for the visit, reviewing tests, obtaining and/or reviewing a separately obtained history, performing a medically appropriate examination and/or evaluation , ordering medications, tests, or procedures, referring and communicating with other health care professionals , documenting information in the medical record and independently interpreting results and communicating that information with the patient/family/caregiver  Follow Up   Return in about 6 months (around 7/4/2022).  Patient was given instructions and counseling regarding her condition or for health maintenance advice. Please see specific information pulled into the AVS if appropriate.

## 2022-01-06 ENCOUNTER — PATIENT MESSAGE (OUTPATIENT)
Dept: INTERNAL MEDICINE | Facility: CLINIC | Age: 72
End: 2022-01-06

## 2022-01-06 NOTE — TELEPHONE ENCOUNTER
From: Rosi Vicente  To: JUAN FRANCISCO Narvaez  Sent: 1/6/2022 11:45 AM EST  Subject: Vascepa    The [employer] send a for for you to complete to get the tier reduction for Vascepa. This is required for their review.   Thanks in advance for your timely response to this faxed for sent this week.  Sincerely,  Rosi Vicente

## 2022-01-13 ENCOUNTER — HOSPITAL ENCOUNTER (OUTPATIENT)
Dept: PHYSICAL THERAPY | Facility: HOSPITAL | Age: 72
Setting detail: THERAPIES SERIES
Discharge: HOME | End: 2022-01-13

## 2022-01-13 DIAGNOSIS — Z86.73 HISTORY OF LACUNAR CEREBROVASCULAR ACCIDENT (CVA): ICD-10-CM

## 2022-01-13 DIAGNOSIS — R42 VERTIGO: Primary | ICD-10-CM

## 2022-01-13 DIAGNOSIS — R26.89 BALANCE PROBLEM: ICD-10-CM

## 2022-01-13 PROCEDURE — 97110 THERAPEUTIC EXERCISES: CPT

## 2022-01-13 PROCEDURE — 97161 PT EVAL LOW COMPLEX 20 MIN: CPT

## 2022-01-13 NOTE — THERAPY EVALUATION
Outpatient Physical Therapy Vestibular Initial Evaluation  [facility]     Patient Name: Rosi Vicente  : 1950  MRN: [id number]  Today's Date: 2022      Visit Date: 2022    Patient Active Problem List   Diagnosis   • Hypertension   • Allergic rhinitis   • Hyperlipidemia LDL goal <70   • Osteoarthritis of hip   • Vitamin D deficiency   • History of CVA (cerebrovascular accident)   • Heartburn   • Frequent urination   • Age-related osteoporosis without current pathological fracture   • Chronic fatigue   • Hyponatremia   • Overactive bladder   • Vision disturbance   • Sequelae, post-stroke   • Dry eye syndrome of both eyes   • Snoring   • Obstructive sleep apnea syndrome   • Irritable bowel syndrome with both constipation and diarrhea   • Dizziness   • Abnormal finding on breast imaging   • History of removal of implants of both breasts   • Fibrocystic breast changes, bilateral        Past Medical History:   Diagnosis Date   • Allergic rhinitis    • Anemia    • Arthritis    • Bronchitis    • CVA (cerebral vascular accident) ([facility])    • Elevated coronary artery calcium score    • Fatigue    • FH: colon cancer    • Fibrocystic breast changes, bilateral 2021   • GERD (gastroesophageal reflux disease) occasionally   • H/O bone density study    • H/O mammogram    • Hyperlipidemia    • Hypertension     Benign Essential   • Irritable bowel syndrome with both constipation and diarrhea 2020   • Malaise and fatigue    • Muscle pain    • Nocturia    • Osteoporosis    • Sleep apnea     AHI 17/h   • Stroke ([facility]) 2017   • TMJ (temporomandibular joint syndrome)    • Trigeminal neuralgia     Surgery at [facility] in  repeat in         Past Surgical History:   Procedure Laterality Date   • ADENOIDECTOMY     • AUGMENTATION MAMMAPLASTY Bilateral 2019    removed   • BRAIN SURGERY  2005    MVD   • COLONOSCOPY N/A 2015    Repeat Q 5 years due to Fhx of Colon Ca-  "Felicitas   • COSMETIC SURGERY  2015    augmentation   • HIP SURGERY     • INNER EAR SURGERY     • JOINT REPLACEMENT  10/3/16    left hip replacement   • PAP SMEAR N/A 2013    Dr. Burden   • RHINOPLASTY     • SEPTOPLASTY  2000    SEPTO/RHINOPLASTY POST TRAUMA   • SINUS SURGERY  1980'S   • TONSILLECTOMY  AGE 5   • US GUIDED CYST ASPIRATION BREAST N/A 5/26/2021         Visit Dx:     ICD-10-CM ICD-9-CM   1. Vertigo  R42 780.4   2. History of lacunar cerebrovascular accident (CVA)  Z86.73 V12.54   3. Balance problem  R26.89 781.99        Patient History     Row Name 01/13/22 1600             History    Chief Complaint Balance Problems  -      Date Current Problem(s) Began 09/13/17  -      Brief Description of Current Complaint Pt. Presents to therapy with reports of intermittent symptoms. She said her balance tends to come and go and she has worked on it at [facility] in the past and she wanted to try somewhere new. She has been taking neurontin for her trigeminal neuralgia which seems to have impacted her balance. Pt. Reports she does take zoom yoga and she feels she does not have the balance she should we she does side to side movements. Pt. Reports dizziness that feels like a \"swimminess\" in her head. It seems to be getting worse. She reports a couple of episodes since the initial lacunar stroke. About a year ago she began therapy for this issue, she feels she had roughly 7 sessions but was progressing well so she was discharge. Denies any room spinning sensation, nausea//headaches, tinnitus/fullness in ears.  -      Patient/Caregiver Goals Know what to do to help the symptoms  -      Hand Dominance right-handed  -      Occupation/sports/leisure activities Yoga, walking, seasonal gardening with [employer]  -      Patient seeing anyone else for problem(s)? no  -              Pain     Difficulties with ADL's? yes  -      Difficulties with recreational activities? yes  -              Fall Risk " Assessment    Any falls in the past year: Yes  -      Number of falls reported in the last 12 months 3  -      Factors that contributed to the fall: Tripped; Uneven surface  -      Other factors that contributed to the fall: related to L foot drop  -              Services    Prior Rehab/Home Health Experiences Yes  -      When was the prior experience with Rehab/Home Health 2021  -              Daily Activities    Primary Language English  -      Are you able to read Yes  -      Are you able to write Yes  -      How does patient learn best? Listening; Reading; Demonstration  -              Safety    Are you being hurt, hit, or frightened by anyone at home or in your life? No  -      Have you had any of the following issues with N/A  -            User Key  (r) = Recorded By, (t) = Taken By, (c) = Cosigned By    Initials Name Provider Type     Dionne Bagley PT Physical Therapist                 Vestibular Eval     Row Name 01/13/22 1600             Occulomotor Exam Fixation Present    Occular ROM Normal  -      Spontaneous Nystagmus Absent  -      Gaze-induced Nystagmus Absent  -      Smooth Pursuit Normal  -      Saccades Intact  -      Convergence Normal  -              Vestibulo-Occular Reflex (VOR)    VOR to Fast Head Movement/Head Thrust Test Negative right; Negative left  -      VOR Cancellation Normal  -              General ROM    GENERAL ROM COMMENTS B LE WFL  -              Strength    Lumbar/Hip Right Hip Flexion; Left Hip Flexion  -      R Hip Flexion 4+/5  -      L Hip Flexion 4+/5  -      Knee Right Knee Flexion; Right Knee Extension; Left Knee Flexion; Left Knee Extension  -      R Knee Flexion 4+/5  -      R Knee Extension 5/5  -      L Knee Flexion 4/5  -      L Knee Flexion 5/5  L knee extension  -      Ankle/Foot Right Ankle Dorsiflexion; Right Ankle Plantar Flexion; Left Ankle Dorsiflexion; Left Ankle Plantar Flexion  -      Right  Ankle Dorsiflexion 5/5  -      Right Ankle Plantar Flexion 5/5  -      Left Ankle Dorsiflexion 4+/5  able to achieve ~10 degrees  -      Left Ankle Plantar Flexion 5/5  -              Static    Static Position  -      Position --  SLS w/ fingertip support 8 sec B  -MH              High-level Balance    High-level Balance Other mCTSIB 99/120, unable to maintain with EC on noncompliant surface  -              Cervicogenic Assessment    Cervicogenic Assess c-spine WFL  -            User Key  (r) = Recorded By, (t) = Taken By, (c) = Cosigned By    Initials Name Provider Type     Dionne Bagley PT Physical Therapist                            Therapy Education  Education Details: Educated on PT role and POC; purpose of vestibular therapy. Discussed vestibular anatomy and 3 components of balance system. Issued HEP Access Code: [email or web address]  Given: HEP, Symptoms/condition management, Pain management, Posture/body mechanics  Program: New  How Provided: Verbal, Written, Demonstration  Provided to: Patient  Level of Understanding: Teach back education performed, Verbalized, Demonstrated       OP Exercises     Row Name 01/13/22 1700             Total Minutes    38466 - PT Therapeutic Exercise Minutes 10  -MH              Exercise 1    Exercise Name 1 heel raises  -      Cueing 1 Verbal; Demo  -      Reps 1 20  -MH              Exercise 2    Exercise Name 2 toe raises  -      Cueing 2 Verbal; Demo  -      Reps 2 20  -MH              Exercise 3    Exercise Name 3 SLS B  -      Cueing 3 Verbal; Demo  -      Reps 3 1e  -MH      Time 3 30sec  -      Additional Comments fingertip support  -              Exercise 4    Exercise Name 4 tandem stance + VOR yaw/pitch  -      Cueing 4 Verbal; Demo  -      Reps 4 1e  -MH      Time 4 30sec  -            User Key  (r) = Recorded By, (t) = Taken By, (c) = Cosigned By    Initials Name Provider Type     Dionne Bagley, PT Physical Therapist                              PT OP Goals     Row Name 01/13/22 1700          PT Short Term Goals    STG Date to Achieve 02/13/22  -     STG 1 Pt. Will be independent with initial HEP to improve self-management of condition.  -     STG 1 Progress New  [facility]     STG 2 Pt. Will ambulate with good heel strike and improved eccentric control to foot flat to improve gait mechanics and safety.  -     STG 2 Progress New  [facility]            Long Term Goals    LTG Date to Achieve 03/14/22  -     LTG 1 Pt. Will be independent with advanced HEP to improve long-term management of condition and independence.  -     LTG 1 Progress New  [facility]     LTG 2 Pt. Will improve DGI score to >/= 19/24 to indicate reduced falls risk and improved safety.  -     LTG 2 Progress New  [facility]     LTG 3 Pt. will report 0 falls since initiating to improve overall safety and independence.  -     LTG 3 Progress New  [facility]     LTG 4 Pt. will improve L foot DF MMT to 5/5 to reduce foot drop during gait.  -     LTG 4 Progress New  [facility]     LTG 5 Pt. will report improved confidence with lateral movements in yoga to improve participation in recreational activities.  -     LTG 5 Progress New  [facility]            Time Calculation    PT Goal Re-Cert Due Date 04/13/22  -           User Key  (r) = Recorded By, (t) = Taken By, (c) = Cosigned By    Initials Name Provider Type     Dionne Bagley, PT Physical Therapist                 PT Assessment/Plan     Row Name 01/13/22 3912          PT Assessment    Functional Limitations Impaired gait; Limitation in home management; Performance in leisure activities; Decreased safety during functional activities; Limitations in community activities  -     Impairments Balance; Gait; Posture; Poor body mechanics; Muscle strength  -     Assessment Comments Rosi Vicente is a 71 y.o. year-old female referred to physical therapy for vestibular evaluation. She presents with a stable clinical presentation. She has comorbidities of hx  "lacunar stroke 2017, L LIZA, L TSA, HTN, CAD and personal factors of falls history, L foot drop 2' CVA, R knee OA that may affect her progress in the plan of care. Self scored disability measure of DHI was a 16/100 indicating mild handicap. She demonstrated impaired balance as evidenced by DGI score 17/24 (</= 19/24 falls risk), mCSTIB 99/120, limited stability in SLS, impaired gait with foot drop/slap on L during heel strike to foot flat, decreased LE strength particularly hip flexion and L DF. Pt. Oculomotor exam unremarkable though does endorse sensation of dizziness/\"swimming\" sensation with quick head/body turns. Signs and symptoms are consistent with referring diagnosis. She is appropriate for skilled therapy services at this time to address deficits and improve confidence with performance of recreational activities.  -     Please refer to paper survey for additional self-reported information Yes  -MH     Rehab Potential Good  -     Patient/caregiver participated in establishment of treatment plan and goals Yes  -     Patient would benefit from skilled therapy intervention Yes  -            PT Plan    PT Frequency 1x/week  -     Predicted Duration of Therapy Intervention (PT) 8-10 visits  -     Planned CPT's? PT EVAL LOW COMPLEXITY: 41192; PT RE-EVAL: 37765; PT THER PROC EA 15 MIN: 09133; PT THER ACT EA 15 MIN: 79249; PT MANUAL THERAPY EA 15 MIN: 88463; PT NEUROMUSC RE-EDUCATION EA 15 MIN: 70501; PT GAIT TRAINING EA 15 MIN: 87936; PT HOT OR COLD PACK TREAT MCARE; PT ELECTRICAL STIM UNATTEND:   -     PT Plan Comments Assess tolerance to HEP; any falls? begin high level balance challenges, incorporate yoga as able  -           User Key  (r) = Recorded By, (t) = Taken By, (c) = Cosigned By    Initials Name Provider Type     Dionne Bagley, PT Physical Therapist                 Outcome Measure Options: Dynamic Gait Index, Dizziness Handicap Inventory (16/100)  Dynamic Gait Index (DGI)  Gait " Level Surface: Mild impairment: walks 20’, uses assistive devices, slower speed, mild gait deviations  Change in Gait Speed: Normal: Able to smoothly change walking speed without loss of balance or gait deviation. Shows significant difference in walking speeds between normal, fast and slow paces.  Gait with Horizontal Head Turns: Mild impairment: Performs head turns smoothly with slight change in gait velocity, i.e. minor disruption to smooth gait path or uses walking aid.  Gait with Vertical Head Turns: Mild impairment: Performs head turns smoothly with slight change in gait velocity, i.e. minor disruption to smooth gait path or uses walking aid.  Gait and Pivot Turn: Mild impairment: pivot turns safely in >3 seconds and stops with no loss of balance.  Step Over Obstacle: Mild impairment: Is able to step over shoe box, but must slow down and adjust steps to clear box safely.  Step Around Obstacles: Mild impairment: Is able to step around both cones, but must slow down and adjust steps to clear cones.  Steps: Mild impairment: Alternating feet, must use rail.  Dynamic Gait Index Score: 17      Time Calculation:   Start Time: 1645  Stop Time: 1726  Time Calculation (min): 41 min  Total Timed Code Minutes- PT: 10 minute(s)  Timed Charges  63119 - PT Therapeutic Exercise Minutes: 10  Untimed Charges  PT Eval/Re-eval Minutes: 31  Total Minutes  Timed Charges Total Minutes: 10  Untimed Charges Total Minutes: 31   Total Minutes: 41   Therapy Charges for Today     Code Description Service Date Service Provider Modifiers Qty    92525102891 HC PT THER PROC EA 15 MIN 1/13/2022 Dionne Bagley, PT GP 1    10007350674 HC PT EVAL LOW COMPLEXITY 2 1/13/2022 Dionne Bagley, PT GP 1          PT G-Codes  Outcome Measure Options: Dynamic Gait Index, Dizziness Handicap Inventory (16/100)         Dionne Bagley PT  1/13/2022

## 2022-01-18 DIAGNOSIS — G50.0 TRIGEMINAL NEURALGIA: ICD-10-CM

## 2022-01-18 RX ORDER — GABAPENTIN 100 MG/1
CAPSULE ORAL
Qty: 90 CAPSULE | Refills: 0 | Status: SHIPPED | OUTPATIENT
Start: 2022-01-18 | End: 2022-04-25 | Stop reason: SDUPTHER

## 2022-01-18 NOTE — TELEPHONE ENCOUNTER
Caller: Rosi Vicente    Relationship: Self    Best call back number:    Requested Prescriptions:   Requested Prescriptions     Pending Prescriptions Disp Refills   • gabapentin (NEURONTIN) 100 MG capsule 90 capsule 0     Sig: [facility]        Pharmacy where request should be sent: [facility] [place] [address] [facility] [address]  - [phone]  - [phone] FX     Additional details provided by patient:   Does the patient have less than a 3 day supply:  [x] Yes  [] No    Daryl Aguayo Rep   01/18/22 08:20 EST

## 2022-01-27 ENCOUNTER — TELEPHONE (OUTPATIENT)
Dept: INTERNAL MEDICINE | Facility: CLINIC | Age: 72
End: 2022-01-27

## 2022-01-27 RX ORDER — LOSARTAN POTASSIUM AND HYDROCHLOROTHIAZIDE 12.5; 5 MG/1; MG/1
1 TABLET ORAL DAILY
Qty: 90 TABLET | Refills: 1 | Status: SHIPPED | OUTPATIENT
Start: 2022-01-27 | End: 2022-02-03 | Stop reason: SDUPTHER

## 2022-01-27 NOTE — TELEPHONE ENCOUNTER
----- Message from Rosi Vicente sent at 1/27/2022 11:34 AM EST -----  Regarding: Valerio Taylor.  luciana Gardiner.  Eric at [facility]  has requested you send a script for my Losartin 100 mg /HCTZ.  Luciana thought we discussed this.    Rosi

## 2022-02-01 ENCOUNTER — DOCUMENTATION (OUTPATIENT)
Dept: INTERNAL MEDICINE | Facility: CLINIC | Age: 72
End: 2022-02-01

## 2022-02-03 RX ORDER — LOSARTAN POTASSIUM AND HYDROCHLOROTHIAZIDE 12.5; 5 MG/1; MG/1
1 TABLET ORAL DAILY
Qty: 90 TABLET | Refills: 1 | Status: SHIPPED | OUTPATIENT
Start: 2022-02-03 | End: 2022-03-14 | Stop reason: DRUGHIGH

## 2022-02-03 RX ORDER — SERTRALINE HYDROCHLORIDE 25 MG/1
25 TABLET, FILM COATED ORAL DAILY
Qty: 90 TABLET | Refills: 1 | Status: SHIPPED | OUTPATIENT
Start: 2022-02-03 | End: 2022-09-21 | Stop reason: SDUPTHER

## 2022-02-03 NOTE — TELEPHONE ENCOUNTER
Caller: Rosi Vicente    Relationship: Self    Best call back number: [phone] (H    Requested Prescriptions:   Requested Prescriptions     Pending Prescriptions Disp Refills   • sertraline (ZOLOFT) 25 MG tablet 90 tablet 1     Sig: Take 1 tablet by mouth Daily.   • losartan-hydrochlorothiazide (HYZAAR) 50-12.5 MG per tablet 90 tablet 1     Sig: Take 1 tablet by mouth Daily.        Ph[place] where [place] be sent[place] [place] [address] [facility] [address]  - [phone]  - [phone]   [phone]    Additional details provided by patient: PATIENT IS NEEDING HER LOSARTAN HCTZ IN A 100 MG TABLET!     SHE HAS ENOUGH OF HER LOSARTIN TO LAST TIL Monday AND IS NEEDING A NEW SCRIPT WROTE FOR THE ZOLOFT, WITH REFILLS      Does the patient have less than a 3 day supply:  [x] Yes  [] No    Cindy Jin, PCT   02/03/22 11:13 EST       PATIENT CALLED IN WANTING ME TO SEND MANY MANY THANKS TO JAMIN LEON FOR HER.    HER INSURANCE HAS ACCEPTED THE TIER 1 VASCEPA AND SHE'S GONE FROM A 700$ SCRIPT TO NEARLY 9$ AND SHE IS SUPER HAPPY. SHE WANTED JAMIN TO KNOW HER HARD WORK PAID OFF AND THANKS HER VERY VERY MUCH .

## 2022-02-07 ENCOUNTER — TELEPHONE (OUTPATIENT)
Dept: INTERNAL MEDICINE | Facility: CLINIC | Age: 72
End: 2022-02-07

## 2022-02-07 RX ORDER — ICOSAPENT ETHYL 1000 MG/1
CAPSULE ORAL
Qty: 360 CAPSULE | Refills: 4 | Status: SHIPPED | OUTPATIENT
Start: 2022-02-07 | End: 2022-02-23 | Stop reason: SDUPTHER

## 2022-02-07 NOTE — TELEPHONE ENCOUNTER
----- Message from Rosi Vicente sent at 2/6/2022  7:05 PM EST -----  Regarding: Med refil  Claudia  I run out of losartin 100 mg/HCTZ12.5 after Monday morning. PLEASE CALL IN SCRIPT to Eric Brown.   Thanks

## 2022-02-08 ENCOUNTER — HOSPITAL ENCOUNTER (OUTPATIENT)
Dept: PHYSICAL THERAPY | Facility: HOSPITAL | Age: 72
Setting detail: THERAPIES SERIES
Discharge: HOME | End: 2022-02-08

## 2022-02-08 DIAGNOSIS — Z86.73 HISTORY OF LACUNAR CEREBROVASCULAR ACCIDENT (CVA): ICD-10-CM

## 2022-02-08 DIAGNOSIS — R42 VERTIGO: Primary | ICD-10-CM

## 2022-02-08 DIAGNOSIS — R26.89 BALANCE PROBLEM: ICD-10-CM

## 2022-02-08 PROCEDURE — 97110 THERAPEUTIC EXERCISES: CPT

## 2022-02-08 PROCEDURE — 97112 NEUROMUSCULAR REEDUCATION: CPT

## 2022-02-09 ENCOUNTER — TELEPHONE (OUTPATIENT)
Dept: INTERNAL MEDICINE | Facility: CLINIC | Age: 72
End: 2022-02-09

## 2022-02-09 NOTE — TELEPHONE ENCOUNTER
PATIENT CALLED, STATING THAT SHE HAS ALWAYS BEEN ON LOSARTAN 100 mg/HCTZ12.5   [facility] HAS NOT RECEIVED HER NEW PRESCRIPTION, STILL WANT TO DISPENSE losartan-hydrochlorothiazide (HYZAAR) 50-12.5 MG per tablet  SAYS HAS BEEN  MG FOR YEARS.    PATIENT ALREADY CONTACTED PROVIDER    Note  ----- Message from Rosi Vicente sent at 2/6/2022  7:05 PM EST -----  Regarding: Med refil  Claudia  I run out of losartin 100 mg/HCTZ12.5 after Monday morning. PLEASE CALL IN SCRIPT to [facility].   Thanks

## 2022-02-14 ENCOUNTER — HOSPITAL ENCOUNTER (OUTPATIENT)
Dept: PHYSICAL THERAPY | Facility: HOSPITAL | Age: 72
Setting detail: THERAPIES SERIES
Discharge: HOME | End: 2022-02-14

## 2022-02-14 DIAGNOSIS — R42 VERTIGO: ICD-10-CM

## 2022-02-14 DIAGNOSIS — Z86.73 HISTORY OF LACUNAR CEREBROVASCULAR ACCIDENT (CVA): Primary | ICD-10-CM

## 2022-02-14 DIAGNOSIS — R26.89 BALANCE PROBLEM: ICD-10-CM

## 2022-02-14 PROCEDURE — 97110 THERAPEUTIC EXERCISES: CPT

## 2022-02-14 PROCEDURE — 97112 NEUROMUSCULAR REEDUCATION: CPT

## 2022-02-14 NOTE — THERAPY PROGRESS REPORT/RE-CERT
Outpatient Physical Therapy Ortho Progress Note  [facility]     Patient Name: oRsi Vicente  : 1950  MRN: [id number]  Today's Date: 2022      Visit Date: 2022    Visit Dx:    ICD-10-CM ICD-9-CM   1. History of lacunar cerebrovascular accident (CVA)  Z86.73 V12.54   2. Vertigo  R42 780.4   3. Balance problem  R26.89 781.99       Patient Active Problem List   Diagnosis   • Hypertension   • Allergic rhinitis   • Hyperlipidemia LDL goal <70   • Osteoarthritis of hip   • Vitamin D deficiency   • History of CVA (cerebrovascular accident)   • Heartburn   • Frequent urination   • Age-related osteoporosis without current pathological fracture   • Chronic fatigue   • Hyponatremia   • Overactive bladder   • Vision disturbance   • Sequelae, post-stroke   • Dry eye syndrome of both eyes   • Snoring   • Obstructive sleep apnea syndrome   • Irritable bowel syndrome with both constipation and diarrhea   • Dizziness   • Abnormal finding on breast imaging   • History of removal of implants of both breasts   • Fibrocystic breast changes, bilateral        Past Medical History:   Diagnosis Date   • Allergic rhinitis    • Anemia    • Arthritis    • Bronchitis    • CVA (cerebral vascular accident) ([facility])    • Elevated coronary artery calcium score    • Fatigue    • FH: colon cancer    • Fibrocystic breast changes, bilateral 2021   • GERD (gastroesophageal reflux disease) occasionally   • H/O bone density study    • H/O mammogram    • Hyperlipidemia    • Hypertension     Benign Essential   • Irritable bowel syndrome with both constipation and diarrhea 2020   • Malaise and fatigue    • Muscle pain    • Nocturia    • Osteoporosis    • Sleep apnea     AHI 17/h   • Stroke (HCC) 2017   • TMJ (temporomandibular joint syndrome)    • Trigeminal neuralgia     Surgery at [facility] in  repeat in         Past Surgical History:   Procedure Laterality Date   • ADENOIDECTOMY     •  AUGMENTATION MAMMAPLASTY Bilateral 2019    removed   • BRAIN SURGERY  12/8/2005    MVD   • COLONOSCOPY N/A 01/2015    Repeat Q 5 years due to Fhx of Colon Ca-Dr. Polk   • COSMETIC SURGERY  2015    augmentation   • HIP SURGERY     • INNER EAR SURGERY     • JOINT REPLACEMENT  10/3/16    left hip replacement   • PAP SMEAR N/A 2013    Dr. Burden   • RHINOPLASTY     • SEPTOPLASTY  2000    SEPTO/RHINOPLASTY POST TRAUMA   • SINUS SURGERY  1980'S   • TONSILLECTOMY  AGE 5   • US GUIDED CYST ASPIRATION BREAST N/A 5/26/2021                        PT Assessment/Plan     Row Name 02/14/22 0921          PT Assessment    Functional Limitations Impaired gait; Limitation in home management; Performance in leisure activities; Decreased safety during functional activities; Limitations in community activities  -     Impairments Balance; Gait; Posture; Poor body mechanics; Muscle strength  -     Assessment Comments Rosi Vicente has been seen for 3 physical therapy sessions for balance deficits/vertigo with history of lacunar stroke. Treatment has included therapeutic exercise, neuro-muscular retraining , patient education with home exercise program  and vestibular rehab. Progress to physical therapy goals is good as pt. Has met 1/2 STGs, and 2/5 LTGs. Progress thus far has likely been limited secondary to delay in starting visits due to patient/clinic scheduling limitations. She reports improved confidence with lateral movements during yoga though continues to endorse imbalance/vertigo at times. Pt. Has tolerated progression toward higher level balance challenges without complaint, noted difficulty with lateral movement to L>R, added metronome to VORx1 this date to improve patient pacing and challenged reliance on vestibular system with eyes closed during several exercises. Close supervision-CGA throughout session for safety though no over LOB noted. She will benefit from continued skilled physical therapy to address remaining  impairments and functional limitations.  -     Rehab Potential Good  -     Patient/caregiver participated in establishment of treatment plan and goals Yes  -     Patient would benefit from skilled therapy intervention Yes  -            PT Plan    PT Frequency 1x/week  -     Predicted Duration of Therapy Intervention (PT) 6 visits  -     Planned CPT's? PT RE-EVAL: 27064; PT THER PROC EA 15 MIN: 47999; PT THER ACT EA 15 MIN: 47139; PT MANUAL THERAPY EA 15 MIN: 21499; PT NEUROMUSC RE-EDUCATION EA 15 MIN: 15671; PT GAIT TRAINING EA 15 MIN: 80595; PT ELECTRICAL STIM UNATTEND: ; PT HOT OR COLD PACK TREAT MCARE; PT ELECTRICAL STIM ATTD EA 15 MIN: 67498  -     PT Plan Comments consider fitter board? re-assess DGI?  -           User Key  (r) = Recorded By, (t) = Taken By, (c) = Cosigned By    Initials Name Provider Type     Dionne Bagley, PT Physical Therapist                   OP Exercises     Row Name 02/14/22 0800             Subjective Comments    Subjective Comments I think going side to side is getting easier  -              Total Minutes    56768 - PT Therapeutic Exercise Minutes 13  -MH      55885 -  PT Neuromuscular Reeducation Minutes 27  -              Exercise 1    Exercise Name 1 heel raises  -      Cueing 1 Verbal; Demo  -      Sets 1 2  -      Reps 1 20  -              Exercise 2    Exercise Name 2 toe raises  -      Cueing 2 Verbal; Demo  -      Sets 2 2  -MH      Reps 2 20  -MH              Exercise 4    Exercise Name 4 tandem stance + VOR yaw/pitch  -      Cueing 4 Verbal; Demo  -      Reps 4 3e  -MH      Time 4 30sec  -      Additional Comments 90 bpm metronome, stance on airex  -              Exercise 5    Exercise Name 5 lateral step + reach  -      Cueing 5 Verbal; Demo  -      Sets 5 2  -MH      Reps 5 10e  -      Time 5 second set alternating R/L  -              Exercise 6    Exercise Name 6 tandem + trunk rotation  -      Cueing 6 Verbal;  Demo  -      Reps 6 10e  -MH      Time 6 30 degree rotation  -      Additional Comments L2 med ball  -MH              Exercise 7    Exercise Name 7 NuStep  -      Cueing 7 Verbal; Demo  -MH      Time 7 5min  -      Additional Comments UE/LE  -MH              Exercise 8    Exercise Name 8 tandem stance w/ airex  -MH      Cueing 8 Verbal; Demo  -MH      Reps 8 2we  -MH      Time 8 30sec  -MH      Additional Comments Ec  -              Exercise 10    Exercise Name 10 tandem + rebounder  -MH      Cueing 10 Verbal; Demo  -MH      Reps 10 10 throws L2  -MH              Exercise 11    Exercise Name 11 squat on airex  -MH      Cueing 11 Verbal; Demo  -      Reps 11 10  -MH      Time 11 no UE  -              Exercise 12    Exercise Name 12 step tap to airex  -      Cueing 12 Verbal; Demo  -      Reps 12 10e  -MH      Time 12 EC  -              Exercise 13    Exercise Name 13 side-step over 2 hurdles  -      Cueing 13 Verbal; Demo  -      Reps 13 10e  -MH      Time 13 increased difficulty moving to L  -            User Key  (r) = Recorded By, (t) = Taken By, (c) = Cosigned By    Initials Name Provider Type    Dionne Crockett, PT Physical Therapist                              PT OP Goals     Row Name 02/14/22 0900          PT Short Term Goals    STG Date to Achieve 02/13/22  -     STG 1 Pt. Will be independent with initial HEP to improve self-management of condition.  -     STG 1 Progress Met  -     STG 2 Pt. Will ambulate with good heel strike and improved eccentric control to foot flat to improve gait mechanics and safety.  -     STG 2 Progress Ongoing; Progressing  -            Long Term Goals    LTG Date to Achieve 03/14/22  -     LTG 1 Pt. Will be independent with advanced HEP to improve long-term management of condition and independence.  -     LTG 1 Progress Ongoing  -     LTG 1 Progress Comments updating as appropriate  -     LTG 2 Pt. Will improve DGI score to >/= 19/24  to indicate reduced falls risk and improved safety.  -     LTG 2 Progress Ongoing  -     LTG 3 Pt. will report 0 falls since initiating to improve overall safety and independence.  -     LTG 3 Progress Met  -     LTG 3 Progress Comments 0 falls since evaluation  -     LTG 4 Pt. will improve L foot DF MMT to 5/5 to reduce foot drop during gait.  -     LTG 4 Progress Ongoing  -     LTG 5 Pt. will report improved confidence with lateral movements in yoga to improve participation in recreational activities.  -     [facility] Comments verbalizes improved ease/confidence with lateral stepping in yoga.  -           User Key  (r) = Recorded By, (t) = Taken By, (c) = Cosigned By    Initials Name Provider Type    Dionne Crockett PT Physical Therapist                Therapy Education  Education Details: Updated HEP; Access Code: [email or web address]  Given: Symptoms/condition management, Posture/body mechanics  Program: Reinforced  How Provided: Verbal, Demonstration, Written  Provided to: Patient  Level of Understanding: Verbalized, Demonstrated              Time Calculation:   Start Time: 0835  Stop Time: 0915  Time Calculation (min): 40 min  Total Timed Code Minutes- PT: 40 minute(s)  Timed Charges  72908 - PT Therapeutic Exercise Minutes: 13  88847 -  PT Neuromuscular Reeducation Minutes: 27  Total Minutes  Timed Charges Total Minutes: 40   Total Minutes: 40  Therapy Charges for Today     Code Description Service Date Service Provider Modifiers Qty    68860959846  PT THER PROC EA 15 MIN 2/14/2022 Dionne Bagley, PT GP 1    [id number]  PT NEUROMUSC RE EDUCATION EA 15 MIN 2/14/2022 Dionne Bagley, PT GP 2                    Dionne Bagley PT  2/14/2022

## 2022-02-21 ENCOUNTER — HOSPITAL ENCOUNTER (OUTPATIENT)
Dept: PHYSICAL THERAPY | Facility: HOSPITAL | Age: 72
Setting detail: THERAPIES SERIES
Discharge: HOME | End: 2022-02-21

## 2022-02-21 DIAGNOSIS — R26.89 BALANCE PROBLEM: ICD-10-CM

## 2022-02-21 DIAGNOSIS — R42 VERTIGO: ICD-10-CM

## 2022-02-21 DIAGNOSIS — Z86.73 HISTORY OF LACUNAR CEREBROVASCULAR ACCIDENT (CVA): Primary | ICD-10-CM

## 2022-02-21 PROCEDURE — 97110 THERAPEUTIC EXERCISES: CPT

## 2022-02-21 PROCEDURE — 97112 NEUROMUSCULAR REEDUCATION: CPT

## 2022-02-21 NOTE — THERAPY TREATMENT NOTE
Outpatient Physical Therapy Vestibular Treatment Note  [facility]     Patient Name: Rosi Vicente  : 1950  MRN: [id number]  Today's Date: 2022      Visit Date: 2022    Visit Dx:     ICD-10-CM ICD-9-CM   1. History of lacunar cerebrovascular accident (CVA)  Z86.73 V12.54   2. Vertigo  R42 780.4   3. Balance problem  R26.89 781.99       Patient Active Problem List   Diagnosis   • Hypertension   • Allergic rhinitis   • Hyperlipidemia LDL goal <70   • Osteoarthritis of hip   • Vitamin D deficiency   • History of CVA (cerebrovascular accident)   • Heartburn   • Frequent urination   • Age-related osteoporosis without current pathological fracture   • Chronic fatigue   • Hyponatremia   • Overactive bladder   • Vision disturbance   • Sequelae, post-stroke   • Dry eye syndrome of both eyes   • Snoring   • Obstructive sleep apnea syndrome   • Irritable bowel syndrome with both constipation and diarrhea   • Dizziness   • Abnormal finding on breast imaging   • History of removal of implants of both breasts   • Fibrocystic breast changes, bilateral                        PT Assessment/Plan     Row Name 22 0900          PT Assessment    Assessment Comments Rosi returns noting continued improvement in stability with lateral movements and improved tolerance to VOR with HEP. Increased vetibular/balance challenge with increased bpm on metronome, added walking to VOR tasks, and progressed unstable surface to further challenge functional stability. Pt. will continue to benefit from skilled PT.  -            PT Plan    PT Plan Comments squat on BOSU?  -           User Key  (r) = Recorded By, (t) = Taken By, (c) = Cosigned By    Initials Name Provider Type    Dionne Crockett, PT Physical Therapist                    OP Exercises     Row Name 22 0800             Subjective Comments    Subjective Comments I think you're helping me, I know you're helping me. I have noticed the head turning is  better and the balance.  -MH              Total Minutes    10568 - PT Therapeutic Exercise Minutes 15  -MH      85102 -  PT Neuromuscular Reeducation Minutes 27  -MH              Exercise 1    Exercise Name 1 heel raises  -MH      Cueing 1 Verbal; Demo  -MH      Sets 1 2  -MH      Reps 1 20  -MH      Time 1 on airex  -MH              Exercise 2    Exercise Name 2 toe raises  -MH      Cueing 2 Verbal; Demo  -MH      Sets 2 2  -MH      Reps 2 20  -MH      Time 2 on airex  -MH              Exercise 4    Exercise Name 4 tandem stance + VOR yaw/pitch  -MH      Cueing 4 Verbal; Demo  -MH      Reps 4 3e  -MH      Time 4 30sec  -MH      Additional Comments 100 bpm, green foam  -MH              Exercise 5    Exercise Name 5 laeral step + press out  -MH      Cueing 5 Verbal; Demo  -MH      Sets 5 2  -MH      Reps 5 10e  -MH      Time 5 alternating with L2 med ball  -MH              Exercise 6    Exercise Name 6 tandem + trunk rotation adn up/down  -MH      Cueing 6 Verbal; Demo  -MH      Reps 6 2  -MH      Additional Comments L2 med ball  -MH              Exercise 7    Exercise Name 7 NuStep  -MH      Cueing 7 Verbal; Demo  -MH      Time 7 5min  -MH      Additional Comments UE/LE  -MH              Exercise 10    Exercise Name 10 tandem + rebounder  -MH      Cueing 10 Verbal; Demo  -MH      Reps 10 10e  -MH      Time 10 L2  -MH      Additional Comments FWD/LAT  -MH              Exercise 12    Exercise Name 12 step tap to green foam  -MH      Cueing 12 Verbal; Demo  -MH      Reps 12 10e  -MH      Time 12 EC  -MH              Exercise 13    Exercise Name 13 side-step over 2 hurdles  -MH      Cueing 13 Verbal; Demo  -MH      Reps 13 10e  -MH      Time 13 stepping onto airex and green foam  -MH              Exercise 14    Exercise Name 14 fitter board  -MH      Cueing 14 Verbal; Demo  -MH      Time 14 1 min each  -MH      Additional Comments A/P M/L  -MH              Exercise 15    Exercise Name 15 VORx1 walking  -MH      Cueing  15 Verbal; Demo  -      Reps 15 2e  -      Time 15 // bars  -            User Key  (r) = Recorded By, (t) = Taken By, (c) = Cosigned By    Initials Name Provider Type    Dionne Crockett PT Physical Therapist                             PT OP Goals     Row Name 02/21/22 0900          PT Short Term Goals    STG Date to Achieve 02/13/22  -     STG 1 Pt. Will be independent with initial HEP to improve self-management of condition.  -     STG 1 Progress Met  -     STG 2 Pt. Will ambulate with good heel strike and improved eccentric control to foot flat to improve gait mechanics and safety.  -     STG 2 Progress Ongoing; Progressing  -            Long Term Goals    LTG Date to Achieve 03/14/22  -     LTG 1 Pt. Will be independent with advanced HEP to improve long-term management of condition and independence.  -     LTG 1 Progress Ongoing  -     LTG 2 Pt. Will improve DGI score to >/= 19/24 to indicate reduced falls risk and improved safety.  -     LTG 2 Progress Ongoing  -     LTG 3 Pt. will report 0 falls since initiating to improve overall safety and independence.  -     LTG 3 Progress Met  [facility]     LTG 4 Pt. will improve L foot DF MMT to 5/5 to reduce foot drop during gait.  -     LTG 4 Progress Ongoing  -     LTG 5 Pt. will report improved confidence with lateral movements in yoga to improve participation in recreational activities.  -     LTG 5 Progress Met  [facility]           User Key  (r) = Recorded By, (t) = Taken By, (c) = Cosigned By    Initials Name Provider Type    Dionne Crockett PT Physical Therapist                Therapy Education  Education Details: increase metronome  Given: HEP, Symptoms/condition management  Program: Reinforced  How Provided: Verbal, Demonstration  Provided to: Patient  Level of Understanding: Verbalized, Demonstrated              Time Calculation:   Start Time: 0828  Stop Time: 0910  Time Calculation (min): 42 min  Total Timed Code Minutes- PT: 42  minute(s)  Timed Charges  08482 - PT Therapeutic Exercise Minutes: 15  86351 -  PT Neuromuscular Reeducation Minutes: 27  Total Minutes  Timed Charges Total Minutes: 42   Total Minutes: 42   Therapy Charges for Today     Code Description Service Date Service Provider Modifiers Qty    [id number]  PT THER PROC EA 15 MIN 2/21/2022 Dionne Bagley, PT GP 1    [id number]  PT NEUROMUSC RE EDUCATION EA 15 MIN 2/21/2022 Dionne Bagley, PT GP 2                   Dionne Bagley PT  2/21/2022

## 2022-02-23 RX ORDER — ICOSAPENT ETHYL 1000 MG/1
2 CAPSULE ORAL 2 TIMES DAILY WITH MEALS
Qty: 360 CAPSULE | Refills: 1 | Status: SHIPPED | OUTPATIENT
Start: 2022-02-23 | End: 2022-03-01 | Stop reason: SDUPTHER

## 2022-02-28 ENCOUNTER — HOSPITAL ENCOUNTER (OUTPATIENT)
Dept: PHYSICAL THERAPY | Facility: HOSPITAL | Age: 72
Setting detail: THERAPIES SERIES
Discharge: HOME | End: 2022-02-28

## 2022-02-28 DIAGNOSIS — Z86.73 HISTORY OF LACUNAR CEREBROVASCULAR ACCIDENT (CVA): Primary | ICD-10-CM

## 2022-02-28 DIAGNOSIS — R26.89 BALANCE PROBLEM: ICD-10-CM

## 2022-02-28 DIAGNOSIS — R42 VERTIGO: ICD-10-CM

## 2022-02-28 PROCEDURE — 97112 NEUROMUSCULAR REEDUCATION: CPT

## 2022-02-28 PROCEDURE — 97110 THERAPEUTIC EXERCISES: CPT

## 2022-03-01 ENCOUNTER — TRANSCRIBE ORDERS (OUTPATIENT)
Dept: ADMINISTRATIVE | Facility: HOSPITAL | Age: 72
End: 2022-03-01

## 2022-03-01 ENCOUNTER — TELEPHONE (OUTPATIENT)
Dept: INTERNAL MEDICINE | Facility: CLINIC | Age: 72
End: 2022-03-01

## 2022-03-01 DIAGNOSIS — Z12.31 VISIT FOR SCREENING MAMMOGRAM: Primary | ICD-10-CM

## 2022-03-01 RX ORDER — ICOSAPENT ETHYL 1000 MG/1
2 CAPSULE ORAL 2 TIMES DAILY WITH MEALS
Qty: 360 CAPSULE | Refills: 1 | Status: SHIPPED | OUTPATIENT
Start: 2022-03-01 | End: 2022-05-09 | Stop reason: SDUPTHER

## 2022-03-01 NOTE — TELEPHONE ENCOUNTER
PATIENT IS CALLING TO SEE IF JAYCOB LEON COULD RESUBMIT Vascepa 1 g capsule capsule TO [facility] - [place], [place] - [address] [facility], [address] - [phone] ph - [phone] FX    IT CANNOT BE A GENERIC AND SHE IS COORDINATING WITH HER INSURANCE ON THIS. PLEASE ADVISE: [phone] OR [employer] WHEN THIS IS DONE.

## 2022-03-02 ENCOUNTER — TELEMEDICINE (OUTPATIENT)
Dept: GASTROENTEROLOGY | Facility: CLINIC | Age: 72
End: 2022-03-02

## 2022-03-02 DIAGNOSIS — K58.0 IRRITABLE BOWEL SYNDROME WITH DIARRHEA: ICD-10-CM

## 2022-03-02 DIAGNOSIS — K22.70 BARRETT'S ESOPHAGUS WITHOUT DYSPLASIA: ICD-10-CM

## 2022-03-02 DIAGNOSIS — K21.9 GASTROESOPHAGEAL REFLUX DISEASE WITHOUT ESOPHAGITIS: Primary | ICD-10-CM

## 2022-03-02 PROCEDURE — 99214 OFFICE O/P EST MOD 30 MIN: CPT | Performed by: NURSE PRACTITIONER

## 2022-03-02 RX ORDER — OMEPRAZOLE 20 MG/1
20 CAPSULE, DELAYED RELEASE ORAL DAILY
Qty: 90 CAPSULE | Refills: 3 | Status: SHIPPED | OUTPATIENT
Start: 2022-03-02 | End: 2022-03-14 | Stop reason: SDUPTHER

## 2022-03-02 NOTE — PATIENT INSTRUCTIONS
For GERD, continue omeprazole 20 mg daily.  Prescription sent to pharmacy.    For surveillance of Fonseca's esophagus, EGD recommended at 3-year interval, due March 2023.    Colon cancer screening up-to-date with negative Cologuard May 2021.  Next colon cancer screening due May 2024.    For IBS-D, continue daily fiber supplement and use of IBgard as needed.  If symptoms worsen, we can consider repeating treatment with Xifaxan if needed.

## 2022-03-08 ENCOUNTER — CLINICAL SUPPORT (OUTPATIENT)
Dept: INTERNAL MEDICINE | Facility: CLINIC | Age: 72
End: 2022-03-08

## 2022-03-08 ENCOUNTER — HOSPITAL ENCOUNTER (OUTPATIENT)
Dept: PHYSICAL THERAPY | Facility: HOSPITAL | Age: 72
Setting detail: THERAPIES SERIES
Discharge: HOME | End: 2022-03-08

## 2022-03-08 DIAGNOSIS — Z78.0 POST-MENOPAUSAL: Primary | ICD-10-CM

## 2022-03-08 DIAGNOSIS — Z86.73 HISTORY OF LACUNAR CEREBROVASCULAR ACCIDENT (CVA): Primary | ICD-10-CM

## 2022-03-08 DIAGNOSIS — Z78.0 POST-MENOPAUSAL: ICD-10-CM

## 2022-03-08 DIAGNOSIS — R26.89 BALANCE PROBLEM: ICD-10-CM

## 2022-03-08 DIAGNOSIS — R42 VERTIGO: ICD-10-CM

## 2022-03-08 PROCEDURE — 97112 NEUROMUSCULAR REEDUCATION: CPT

## 2022-03-08 PROCEDURE — 77080 DXA BONE DENSITY AXIAL: CPT | Performed by: NURSE PRACTITIONER

## 2022-03-08 PROCEDURE — 97110 THERAPEUTIC EXERCISES: CPT

## 2022-03-08 NOTE — THERAPY TREATMENT NOTE
Outpatient Physical Therapy Vestibular Treatment Note  [facility]     Patient Name: Rosi Vicente  : 1950  MRN: [id number]  Today's Date: 3/8/2022      Visit Date: 2022    Visit Dx:     ICD-10-CM ICD-9-CM   1. History of lacunar cerebrovascular accident (CVA)  Z86.73 V12.54   2. Vertigo  R42 780.4   3. Balance problem  R26.89 781.99       Patient Active Problem List   Diagnosis   • Hypertension   • Allergic rhinitis   • Hyperlipidemia LDL goal <70   • Osteoarthritis of hip   • Vitamin D deficiency   • History of CVA (cerebrovascular accident)   • Heartburn   • Frequent urination   • Age-related osteoporosis without current pathological fracture   • Chronic fatigue   • Hyponatremia   • Overactive bladder   • Vision disturbance   • Sequelae, post-stroke   • Dry eye syndrome of both eyes   • Snoring   • Obstructive sleep apnea syndrome   • Irritable bowel syndrome with both constipation and diarrhea   • Dizziness   • Abnormal finding on breast imaging   • History of removal of implants of both breasts   • Fibrocystic breast changes, bilateral                        PT Assessment/Plan     Row Name 22 1300          PT Assessment    Assessment Comments Rosi returns to therapy, continues to verbalize feeling overall improvement in condition particularly with increased ease with yoga. Pt. tolerating vestibular retraining well with minimal onset of symptoms throughout session. Increased challenge with eyes closed in tandem stance on compliant surface, eyes closed with squats and dual task challenges in SLS. Pt. with several minor LOB with performance of VOR during backwards walk but able to self-correct. Pt. continue to demonstrate improved tolerance to progressions in clinic, will continue to benefit from skilled PT.  -            PT Plan    PT Plan Comments consider swiss ball with performance of VOR  -           User Key  (r) = Recorded By, (t) = Taken By, (c) = Cosigned By    Initials Name  Provider Type     Dionne Bagley, PT Physical Therapist                    OP Exercises     Row Name 03/08/22 1300             Subjective Comments    Subjective Comments I think things are improving, I notice yoga is easier  -MH              Total Minutes    89100 - PT Therapeutic Exercise Minutes 13  -MH      55558 -  PT Neuromuscular Reeducation Minutes 27  -MH              Exercise 4    Exercise Name 4 tandem stance on green foam  -MH      Cueing 4 Verbal;Demo  -MH      Reps 4 3e  -MH      Time 4 20sec  -MH      Additional Comments EC  -MH              Exercise 6    Exercise Name 6 rainbow tracking  -MH      Cueing 6 Verbal;Demo  -MH      Reps 6 10ea  -MH      Time 6 follow thumb  -MH              Exercise 7    Exercise Name 7 elliptical  -MH      Cueing 7 Verbal;Demo  -MH      Time 7 4 min  -MH      Additional Comments UE/LE  -MH              Exercise 9    Exercise Name 9 transverse lunge - open  -MH      Cueing 9 Verbal;Demo  -MH      Reps 9 15ee  -MH      Time 9 w/ visual fixation  -MH              Exercise 11    Exercise Name 11 squat on green foam  -MH      Cueing 11 Verbal;Demo  -MH      Sets 11 2  -MH      Reps 11 10  -MH      Time 11 no UE  -MH      Additional Comments EC  -MH              Exercise 13    Exercise Name 13 side-step over 2 hurdles  -MH      Cueing 13 Verbal;Demo  -MH      Reps 13 01e  -MH      Time 13 stepping onto airex, green foam, yellow foam  -MH              Exercise 14    Exercise Name 14 SLS + clock  -MH      Cueing 14 Verbal;Demo  -MH      Reps 14 15e taps  -MH      Time 14 1-6 o'clock  -MH      Additional Comments dual tasking  -MH              Exercise 15    Exercise Name 15 VORx1 walking  -MH      Cueing 15 Verbal;Demo  -MH      Sets 15 tandem walk  -MH      Reps 15 3e  -MH      Time 15 // bars  -MH      Additional Comments FW/BW  -MH            User Key  (r) = Recorded By, (t) = Taken By, (c) = Cosigned By    Initials Name Provider Type     Dionne Bagley, [facility] Physical  Therapist                             PT OP Goals     Row Name 03/08/22 1300          PT Short Term Goals    STG Date to Achieve 02/13/22  -     STG 1 Pt. Will be independent with initial HEP to improve self-management of condition.  -     STG 1 Progress Met  [facility]     STG 2 Pt. Will ambulate with good heel strike and improved eccentric control to foot flat to improve gait mechanics and safety.  -     STG 2 Progress Ongoing;Progressing  -            Long Term Goals    LTG Date to Achieve 03/14/22  -     LTG 1 Pt. Will be independent with advanced HEP to improve long-term management of condition and independence.  -     LTG 1 Progress Ongoing  -     LTG 2 Pt. Will improve DGI score to >/= 19/24 to indicate reduced falls risk and improved safety.  -     LTG 2 Progress Ongoing  -     LTG 3 Pt. will report 0 falls since initiating to improve overall safety and independence.  -     LTG 3 Progress Met  [facility]     LTG 4 Pt. will improve L foot DF MMT to 5/5 to reduce foot drop during gait.  -     LTG 4 Progress Ongoing  -     LTG 5 Pt. will report improved confidence with lateral movements in yoga to improve participation in recreational activities.  -     LTG 5 Progress Met  [facility]           User Key  (r) = Recorded By, (t) = Taken By, (c) = Cosigned By    Initials Name Provider Type    Dionne Crockett PT Physical Therapist                Therapy Education  Given: Symptoms/condition management, Posture/body mechanics  Program: Reinforced  How Provided: Verbal, Demonstration  Provided to: Patient  Level of Understanding: Verbalized, Demonstrated              Time Calculation:   Start Time: 1300  Stop Time: 1340  Time Calculation (min): 40 min  Total Timed Code Minutes- PT: 40 minute(s)  Timed Charges  26016 - PT Therapeutic Exercise Minutes: 13  93350 -  PT Neuromuscular Reeducation Minutes: 27  Total Minutes  Timed Charges Total Minutes: 40   Total Minutes: 40   Therapy Charges for Today     Code  Description Service Date Service Provider Modifiers Qty    [id number] HC PT THER PROC EA 15 MIN 3/8/2022 Dionne Bagley, PT GP 1    [id number] HC PT NEUROMUSC RE EDUCATION EA 15 MIN 3/8/2022 Dionne Bagley, PT GP 2                   Dionne Bagley, PT  3/8/2022

## 2022-03-14 ENCOUNTER — OFFICE VISIT (OUTPATIENT)
Dept: INTERNAL MEDICINE | Facility: CLINIC | Age: 72
End: 2022-03-14

## 2022-03-14 ENCOUNTER — PATIENT ROUNDING (BHMG ONLY) (OUTPATIENT)
Dept: INTERNAL MEDICINE | Facility: CLINIC | Age: 72
End: 2022-03-14

## 2022-03-14 VITALS
WEIGHT: 120.9 LBS | SYSTOLIC BLOOD PRESSURE: 114 MMHG | HEIGHT: 67 IN | BODY MASS INDEX: 18.98 KG/M2 | HEART RATE: 81 BPM | OXYGEN SATURATION: 98 % | TEMPERATURE: 97.8 F | DIASTOLIC BLOOD PRESSURE: 70 MMHG

## 2022-03-14 DIAGNOSIS — E55.9 VITAMIN D DEFICIENCY: ICD-10-CM

## 2022-03-14 DIAGNOSIS — I10 PRIMARY HYPERTENSION: Primary | ICD-10-CM

## 2022-03-14 DIAGNOSIS — M81.0 AGE-RELATED OSTEOPOROSIS WITHOUT CURRENT PATHOLOGICAL FRACTURE: ICD-10-CM

## 2022-03-14 DIAGNOSIS — M26.609 TMJ (TEMPOROMANDIBULAR JOINT SYNDROME): ICD-10-CM

## 2022-03-14 DIAGNOSIS — G50.0 TRIGEMINAL NEURALGIA OF LEFT SIDE OF FACE: ICD-10-CM

## 2022-03-14 DIAGNOSIS — G47.33 OBSTRUCTIVE SLEEP APNEA SYNDROME: ICD-10-CM

## 2022-03-14 DIAGNOSIS — E78.5 HYPERLIPIDEMIA LDL GOAL <70: ICD-10-CM

## 2022-03-14 DIAGNOSIS — E87.1 HYPONATREMIA: ICD-10-CM

## 2022-03-14 DIAGNOSIS — R12 HEARTBURN: ICD-10-CM

## 2022-03-14 DIAGNOSIS — Z86.73 HISTORY OF CVA (CEREBROVASCULAR ACCIDENT): ICD-10-CM

## 2022-03-14 PROBLEM — R06.83 SNORING: Status: RESOLVED | Noted: 2020-07-23 | Resolved: 2022-03-14

## 2022-03-14 PROCEDURE — 99214 OFFICE O/P EST MOD 30 MIN: CPT | Performed by: NURSE PRACTITIONER

## 2022-03-14 RX ORDER — OMEPRAZOLE 20 MG/1
20 CAPSULE, DELAYED RELEASE ORAL DAILY
Qty: 90 CAPSULE | Refills: 3 | Status: SHIPPED | OUTPATIENT
Start: 2022-03-14 | End: 2023-03-30 | Stop reason: DRUGHIGH

## 2022-03-14 RX ORDER — LOSARTAN POTASSIUM AND HYDROCHLOROTHIAZIDE 12.5; 1 MG/1; MG/1
1 TABLET ORAL DAILY
COMMUNITY
Start: 2022-02-09 | End: 2022-06-10 | Stop reason: SINTOL

## 2022-03-14 NOTE — PROGRESS NOTES
March 14, 2022    Spoke with patient at checkout    Patient liked Anette, referred by other friends that come to the same office.     No questions or concerns today.

## 2022-03-14 NOTE — PROGRESS NOTES
Subjective   Rosi Vicente is a 71 y.o. female.     Chief Complaint   Patient presents with   • Hypothyroidism   • Hypertension   • Hyperlipidemia   • Trigeminal Neuralgia        History of Present Illness   She is here today as a new patient to establish care.  She feels like her overall health is pretty good.  She eats a vegetarian/pescatarian diet and stays active.  Previous PCP Dr. Keys.  She has been having left side jaw popping and clicking along with pain in the left ear for a few weeks. She denies any prior hx of TMJ. She sees her dentist every 3 months. She tries to avoid chewing on the left side. She chews gum occasionally.    HTN- she is currently taking losartan-hctz 100/12.5 mg daily.  BP is well controlled.  She occasionally monitors her BP.  She has had persistent hyponatremia, last sodium level was 130.  She denies any symptoms with this.  HPL- she is currently off of vascepa secondary to medication cost with insurance. She is currently taking OTC fish oil.  She is scheduled for a CT calcium scan in June.  Trigeminal neuralgia-diagnosed in 2009.  She has been followed by neurology, Peri James.  She underwent a microvascular decompression surgery with improvement in symptoms.  She is currently taking gabapentin 100 mg qHS PRN. She uses this as needed including after dental work.  She did have a recurrence of mild pain on the left side after trial of oral appliance and CPAP machine for MELISSA.  She denies any side effects with the gabapentin.  Hx of Lacunar CVA-diagnosed in 2017. She is still with some left sided weakness and chronic fatigue. She is currently in PT for vestibular rehab for vertigo and balance.  The symptoms will wax and wane.  She has improvement with vestibular rehab.  She has been released from neurology. She is currently on ASA 81 mg daily. She denies any abnormal bruising or bleeding. She is also taking Vascepa 2 grams BID, but has been out of this for a few months secondary  to insurance. She has a hx of intolerance to statins and Repatha. She is currently taking Zoloft 12.5 mg daily since her CVA.   GERD- she takes omeprazole 20 mg daily with symptom relief.  She has previously tried famotidine without symptom control.  She does see GI, Dr. Polk  MELISSA- she has tried both a c-pap and oral appliance. She was not able to tolerate this secondary to left-sided facial discomfort.     C-scope- last c-scope completed in 2019 with Dr. Polk.  Her prep was not complete and she had GI side effects for several months after completing the colonoscopy prep.  She discussed with GI colon cancer screening with Cologuard. She completed Cologuard in 2021, negative. She has daily BM.  Denies any change in bowel habits, dark tarry stool, BRBPR, abdominal pain or change in stool caliber.    GYN- she is no longer seeing GYN. She is no longer receiving paps. Last mammo completed 2021, showing a pocket of fluid from prior breast implant removal, f/u imaging was normal. G0. Postmenopausal at age 49.    She is .  She is a former nurse clinical specialist. Is good friends with Taryn Langford.    The following portions of the patient's history were reviewed and updated as appropriate: allergies, current medications, past family history, past medical history, past social history, past surgical history and problem list.    Review of Systems   Constitutional: Positive for fatigue. Negative for chills and fever.   HENT: Positive for ear pain (left ear).         Bilateral jaw popping     Respiratory: Negative for cough, chest tightness, shortness of breath and wheezing.    Cardiovascular: Negative for chest pain, palpitations and leg swelling.   Neurological: Positive for weakness. Negative for dizziness, tremors, seizures, syncope, speech difficulty, light-headedness, numbness, headache, memory problem and confusion.   Psychiatric/Behavioral: Negative for suicidal ideas and depressed mood. The patient is not  nervous/anxious.        Objective   Physical Exam  Constitutional:       Appearance: She is well-developed.   HENT:      Head: Normocephalic and atraumatic.      Jaw: Tenderness ( clicking left side with opening and closing the mouth) and pain on movement present. No trismus, swelling or malocclusion.      Right Ear: Hearing, tympanic membrane, ear canal and external ear normal.      Left Ear: Hearing, tympanic membrane, ear canal and external ear normal.      Nose: Nose normal.      Mouth/Throat:      Lips: Pink.      Mouth: Mucous membranes are moist. No injury or oral lesions.      Dentition: Normal dentition.      Tongue: No lesions ( fissures in tongue). Tongue does not deviate from midline.      Palate: No mass and lesions.      Pharynx: Oropharynx is clear. Uvula midline.   Neck:      Thyroid: No thyroid mass, thyromegaly or thyroid tenderness.      Vascular: No carotid bruit.      Trachea: Trachea normal.   Cardiovascular:      Rate and Rhythm: Normal rate and regular rhythm.      Chest Wall: PMI is not displaced.      Pulses:           Radial pulses are 2+ on the right side and 2+ on the left side.        Dorsalis pedis pulses are 2+ on the right side and 2+ on the left side.        Posterior tibial pulses are 2+ on the right side and 2+ on the left side.      Heart sounds: S1 normal and S2 normal.   Pulmonary:      Effort: Pulmonary effort is normal.      Breath sounds: Normal breath sounds.   Musculoskeletal:      Right lower leg: No edema.      Left lower leg: No edema.   Lymphadenopathy:      Head:      Right side of head: No submental, submandibular, tonsillar or occipital adenopathy.      Left side of head: No submental, submandibular, tonsillar or occipital adenopathy.      Cervical: No cervical adenopathy.   Skin:     General: Skin is warm and dry.      Capillary Refill: Capillary refill takes less than 2 seconds.      Nails: There is no clubbing.   Neurological:      Mental Status: She is alert and  oriented to person, place, and time. Mental status is at baseline.      Cranial Nerves: Cranial nerves are intact.      Sensory: Sensation is intact.      Motor: Weakness (mild weakness left side, patient baseline since CVA) present.      Coordination: Coordination is intact.      Gait: Gait is intact.      Deep Tendon Reflexes:      Reflex Scores:       Patellar reflexes are 2+ on the right side and 2+ on the left side.  Psychiatric:         Attention and Perception: Attention normal.         Mood and Affect: Mood and affect normal.         Speech: Speech normal.         Behavior: Behavior normal.         Thought Content: Thought content normal.         Cognition and Memory: Cognition normal.         Vitals:    03/14/22 1337   BP: 114/70   Pulse: 81   Temp: 97.8 °F (36.6 °C)   SpO2: 98%      Body mass index is 18.93 kg/m².    Assessment/Plan   Diagnoses and all orders for this visit:    1. Primary hypertension (Primary)  -     Comprehensive Metabolic Panel; Future  -     Lipid Panel With LDL / HDL Ratio; Future    2. Hyperlipidemia LDL goal <70  -     Comprehensive Metabolic Panel; Future  -     Lipid Panel With LDL / HDL Ratio; Future    3. Hyponatremia  -     Comprehensive Metabolic Panel; Future  -     Lipid Panel With LDL / HDL Ratio; Future    4. History of CVA (cerebrovascular accident)  -     Comprehensive Metabolic Panel; Future  -     Lipid Panel With LDL / HDL Ratio; Future    5. Trigeminal neuralgia of left side of face  -     Comprehensive Metabolic Panel; Future  -     Lipid Panel With LDL / HDL Ratio; Future    6. Obstructive sleep apnea syndrome    7. TMJ (temporomandibular joint syndrome)    8. Vitamin D deficiency  -     Vitamin D 25 Hydroxy; Future    9. Age-related osteoporosis without current pathological fracture  -     Vitamin D 25 Hydroxy; Future    10. Heartburn    Other orders  -     omeprazole (priLOSEC) 20 MG capsule; Take 1 capsule by mouth Daily.  Dispense: 90 capsule; Refill: 3      1.  HTN- well controlled. Continue to monitor BP at home with goal < 130/80.   2. HPL/Hx CVA- she is currently off Vascepa. Will work on insurance approval and cost to patient. With her hx of acute CVA she needs adequate LDL control with goal of < 70 for cardiovascular risk reduction and stroke prevention. Continue B12 supplement. Continue healthy eating and staying active. Will check labs in 1 month.   3. Hyponatremia- check labs in 1 month. She is asymptomatic with hyponatremia. This could be secondary to diuretic use and SSRI. Her BP is well controlled. Could consider stopping hctz pending f/u lab results.    4. Trigeminal neuralgia- well controlled. CSA completed today for PRN gabapentin 100 mg. She is aware of appropriate use and risks associated with this medication. She denies any side effects. She is not due for refill.   5. TMJ syndrome- recommend jaw rest with soft foods to avoid excessive chewing. Avoid gum and chewing on the left side. Ok for tylenol PRN, application of heat or cool PRN. F/u with dentist if symptoms persist.   6.  Vitamin D deficiency-last vitamin D level was significantly elevated at 98.  Recheck vitamin D level in 6 weeks.  7. GERD- omeprazole 20 mg prescription refilled today.    F/u in 6 weeks with fasting labs prior.

## 2022-03-15 ENCOUNTER — HOSPITAL ENCOUNTER (OUTPATIENT)
Dept: PHYSICAL THERAPY | Facility: HOSPITAL | Age: 72
Setting detail: THERAPIES SERIES
Discharge: HOME | End: 2022-03-15

## 2022-03-15 DIAGNOSIS — R26.89 BALANCE PROBLEM: ICD-10-CM

## 2022-03-15 DIAGNOSIS — Z86.73 HISTORY OF LACUNAR CEREBROVASCULAR ACCIDENT (CVA): Primary | ICD-10-CM

## 2022-03-15 DIAGNOSIS — R42 VERTIGO: ICD-10-CM

## 2022-03-15 PROCEDURE — 97110 THERAPEUTIC EXERCISES: CPT

## 2022-03-15 PROCEDURE — 97530 THERAPEUTIC ACTIVITIES: CPT

## 2022-03-15 NOTE — THERAPY PROGRESS REPORT/RE-CERT
Outpatient Physical Therapy Vestibular Progress Note  [facility]     Patient Name: Rosi Vicente  : 1950  MRN: [id number]  Today's Date: 3/15/2022      Visit Date: 03/15/2022    Visit Dx:     ICD-10-CM ICD-9-CM   1. History of lacunar cerebrovascular accident (CVA)  Z86.73 V12.54   2. Vertigo  R42 780.4   3. Balance problem  R26.89 781.99       Patient Active Problem List   Diagnosis   • Hypertension   • Allergic rhinitis   • Trigeminal neuralgia of left side of face   • Hyperlipidemia LDL goal <70   • Osteoarthritis of hip   • Vitamin D deficiency   • History of CVA (cerebrovascular accident)   • Heartburn   • Frequent urination   • Age-related osteoporosis without current pathological fracture   • Chronic fatigue   • Hyponatremia   • Overactive bladder   • Vision disturbance   • Sequelae, post-stroke   • Dry eye syndrome of both eyes   • Obstructive sleep apnea syndrome   • Irritable bowel syndrome with both constipation and diarrhea   • Dizziness   • Abnormal finding on breast imaging   • History of removal of implants of both breasts   • Fibrocystic breast changes, bilateral        Vestibular Eval     Row Name 03/15/22 1400             Strength    Left Ankle Dorsiflexion   -            User Key  (r) = Recorded By, (t) = Taken By, (c) = Cosigned By    Initials Name Provider Type     Dionne Bagley, PT Physical Therapist                             PT Assessment/Plan     Row Name 03/15/22 1517          PT Assessment    Functional Limitations Impaired gait;Limitation in home management;Performance in leisure activities;Decreased safety during functional activities;Limitations in community activities  -     Impairments Balance;Gait;Posture;Poor body mechanics;Muscle strength  -     Assessment Comments Rosi Vicente has been seen for 7 physical therapy sessions for balance/vertigo. Treatment has included therapeutic exercise, therapeutic activity, neuro-muscular retraining  and patient  education with home exercise program . Progress to physical therapy goals is good as pt. Has met 2/2 STGs, and 4/6 LTGs with one new goal established. She reports improved balance and confidence with lateral movements and yoga challenges, she does continue to report difficulty with dizziness with repated forward bending or with quick turning movements such as when dancing. Discussed alternative options to reduce symptoms with forward bending this date with reduction in dizziness when performing in modified split squat to retreive item from floor level. Pt. Demonstrates improved DGI score of 22 from  indicating reduced falls risk, L ankle DF 5/5 from 4+/5 at evaluation. Pt. Progressing quite well and tolerating advanced balance/multi plane and vestibular retraining exercises without significant exacerbation of symptoms in clinic. She will benefit from continued skilled physical therapy to address remaining impairments and functional limitations.  -     Rehab Potential Good  -     Patient/caregiver participated in establishment of treatment plan and goals Yes  -     Patient would benefit from skilled therapy intervention Yes  -            PT Plan    PT Frequency 1x/week  -     Predicted Duration of Therapy Intervention (PT) 4 visits  -     Planned CPT's? PT RE-EVAL: 05487;PT THER PROC EA 15 MIN: 82184;PT THER ACT EA 15 MIN: 07689;PT MANUAL THERAPY EA 15 MIN: 40920;PT NEUROMUSC RE-EDUCATION EA 15 MIN: 74842;PT GAIT TRAINING EA 15 MIN: 24797;PT ELECTRICAL STIM UNATTEND: ;PT ELECTRICAL STIM ATTD EA 15 MIN: 76992;PT CANALITH REPOSITIONIN  -     PT Plan Comments consider figure 8 with ball catch?  -           User Key  (r) = Recorded By, (t) = Taken By, (c) = Cosigned By    Initials Name Provider Type     Dionne Bagley, PT Physical Therapist                    OP Exercises     Row Name 03/15/22 1300             Subjective Comments    Subjective Comments I did notice it easier to  balance with my yoga this morning, I still have trouble bending forward or when dancing getting that swimming feeling in my head  -MH              Total Minutes    16969 - PT Therapeutic Exercise Minutes 25  -MH      51454 - PT Therapeutic Activity Minutes 14  -MH              Exercise 5    Exercise Name 5 update POC, goals, discussion of progress thusfar  -MH      Cueing 5 Verbal  -MH      Time 5 9 min  -MH              Exercise 6    Exercise Name 6 rainbow tracking  -MH      Cueing 6 Verbal;Demo  -MH      Reps 6 10ea  -MH      Time 6 w/ ball  -MH              Exercise 7    Exercise Name 7 NuStep  -MH      Cueing 7 Verbal;Demo  -MH      Time 7 5 min  -MH      Additional Comments LE  -MH              Exercise 8    Exercise Name 8 seated swiss ball bouce + eye chart  -MH      Cueing 8 Verbal;Demo  -MH      Reps 8 3  -MH      Time 8 30sec  -MH      Additional Comments with head turns  -MH              Exercise 10    Exercise Name 10 habituation: retreiving items from floor  -      Cueing 10 Verbal;Demo  -MH      Reps 10 5 min  -MH      Time 10 carying position, SL RDL , split squaat, forward bend  -MH              Exercise 11    Exercise Name 11 squat on green foam  -      Cueing 11 Verbal;Demo  -      Sets 11 2  -MH      Reps 11 10  -MH      Time 11 no UE  -MH      Additional Comments --  EC  -MH              Exercise 14    Exercise Name 14 SLS + clock  -MH      Cueing 14 Verbal;Demo  -MH      Reps 14 10  -MH      Time 14 10 taps each  -      Additional Comments whole clock  -MH              Exercise 15    Exercise Name 15 VORx1 walking  -MH      Cueing 15 Verbal;Demo  -      Sets 15 tandem walk  -MH      Reps 15 3e  -MH      Time 15 // bars  -MH      Additional Comments FW/BW  -            User Key  (r) = Recorded By, (t) = Taken By, (c) = Cosigned By    Initials Name Provider Type    Dionne Crockett, PT Physical Therapist                             PT OP Goals     Row Name 03/15/22 1400           PT Short Term Goals    STG Date to Achieve 02/13/22  -     STG 1 Pt. Will be independent with initial HEP to improve self-management of condition.  -     STG 1 Progress Met  -     STG 2 Pt. Will ambulate with good heel strike and improved eccentric control to foot flat to improve gait mechanics and safety.  -     STG 2 Progress Met  -     STG 2 Progress Comments reduced floot flat with improved heel strike  -            Long Term Goals    LTG Date to Achieve 03/14/22  -     LTG 1 Pt. Will be independent with advanced HEP to improve long-term management of condition and independence.  -     LTG 1 Progress Ongoing  -     LTG 1 Progress Comments updating as appropriate  -     LTG 2 Pt. Will improve DGI score to >/= 19/24 to indicate reduced falls risk and improved safety.  -     LTG 2 Progress Met  -     LTG 2 Progress Comments 22/24  -     LTG 3 Pt. will report 0 falls since initiating to improve overall safety and independence.  -     LTG 3 Progress Met  -     LTG 4 Pt. will improve L foot DF MMT to 5/5 to reduce foot drop during gait.  -     LTG 4 Progress Met  -     LTG 4 Progress Comments 5/5  -     LTG 5 Pt. will report improved confidence with lateral movements in yoga to improve participation in recreational activities.  -     LTG 5 Progress Met  -     LTG 6 Pt. will report 50% improvement in reduced symptoms with forward bending to improve ability to participate in recreational activities such as gardening.  -     LTG 6 Progress New  -           User Key  (r) = Recorded By, (t) = Taken By, (c) = Cosigned By    Initials Name Provider Type    Dionne Crockett PT Physical Therapist                Therapy Education  Education Details: reviewed progress thus far, update POC  Given: HEP, Symptoms/condition management  Program: Reinforced  How Provided: Demonstration  Provided to: Patient  Level of Understanding: Verbalized, Demonstrated    Outcome Measure Options:  Dynamic Gait Index  Dynamic Gait Index (DGI)  Gait Level Surface: Normal: walks 20’, no assistive devices, good speed, no evidence for imbalance, normal gait pattern  Change in Gait Speed: Normal: Able to smoothly change walking speed without loss of balance or gait deviation. Shows significant difference in walking speeds between normal, fast and slow paces.  Gait with Horizontal Head Turns: Normal: Performs head turns smoothly with no change in gait.  Gait with Vertical Head Turns: Normal: Performs head turns smoothly with no change in gait.  Gait and Pivot Turn: Mild impairment: pivot turns safely in >3 seconds and stops with no loss of balance.  Step Over Obstacle: Normal: Is able to step over box without changing gait speed, no evidence for imbalance.  Step Around Obstacles: Normal: Is able to walk safely around cones safely without changing gait speed, no evidence of imbalance.  Steps: Mild impairment: Alternating feet, must use rail.  Dynamic Gait Index Score: 22      Time Calculation:   Start Time: 1347  Stop Time: 1426  Time Calculation (min): 39 min  Total Timed Code Minutes- PT: 39 minute(s)  Timed Charges  01430 - PT Therapeutic Exercise Minutes: 25  07728 - PT Therapeutic Activity Minutes: 14  Total Minutes  Timed Charges Total Minutes: 39   Total Minutes: 39   Therapy Charges for Today     Code Description Service Date Service Provider Modifiers Qty    88581885915 HC PT THERAPEUTIC ACT EA 15 MIN 3/15/2022 Dionne Bagley, PT GP 1    60568252233 HC PT THER PROC EA 15 MIN 3/15/2022 Dionne Bagley, PT GP 2          PT G-Codes  Outcome Measure Options: Dynamic Gait Index        Dionne Bagley PT  3/15/2022

## 2022-03-22 ENCOUNTER — HOSPITAL ENCOUNTER (OUTPATIENT)
Dept: PHYSICAL THERAPY | Facility: HOSPITAL | Age: 72
Setting detail: THERAPIES SERIES
Discharge: HOME | End: 2022-03-22

## 2022-03-22 DIAGNOSIS — R26.89 BALANCE PROBLEM: ICD-10-CM

## 2022-03-22 DIAGNOSIS — R42 VERTIGO: ICD-10-CM

## 2022-03-22 DIAGNOSIS — Z86.73 HISTORY OF LACUNAR CEREBROVASCULAR ACCIDENT (CVA): Primary | ICD-10-CM

## 2022-03-22 PROCEDURE — 97112 NEUROMUSCULAR REEDUCATION: CPT

## 2022-03-22 PROCEDURE — 97110 THERAPEUTIC EXERCISES: CPT

## 2022-03-22 NOTE — THERAPY TREATMENT NOTE
Outpatient Physical Therapy Vestibular Treatment Note  [facility]     Patient Name: Rosi Vicente  : 1950  MRN: [id number]  Today's Date: 3/22/2022      Visit Date: 2022    Visit Dx:     ICD-10-CM ICD-9-CM   1. History of lacunar cerebrovascular accident (CVA)  Z86.73 V12.54   2. Vertigo  R42 780.4   3. Balance problem  R26.89 781.99       Patient Active Problem List   Diagnosis   • Hypertension   • Allergic rhinitis   • Trigeminal neuralgia of left side of face   • Hyperlipidemia LDL goal <70   • Osteoarthritis of hip   • Vitamin D deficiency   • History of CVA (cerebrovascular accident)   • Heartburn   • Frequent urination   • Age-related osteoporosis without current pathological fracture   • Chronic fatigue   • Hyponatremia   • Overactive bladder   • Vision disturbance   • Sequelae, post-stroke   • Dry eye syndrome of both eyes   • Obstructive sleep apnea syndrome   • Irritable bowel syndrome with both constipation and diarrhea   • Dizziness   • Abnormal finding on breast imaging   • History of removal of implants of both breasts   • Fibrocystic breast changes, bilateral                        PT Assessment/Plan     Row Name 22 1300          PT Assessment    Assessment Comments Rosi returns to clinic with reports of overall ~30% improvement in condition, continues with difficulty with forward bendiing, fatigue levels and overall strength. Continued to address habitiation training to forward bending and emphasis on multi-planar movements. SLS remains difficult with need for intermittent UE support to correct. Plan to continue over 2 skilled sessions and allow for possible transition to independent management.  -            PT Plan    PT Plan Comments figure 8 with ball catch?  -           User Key  (r) = Recorded By, (t) = Taken By, (c) = Cosigned By    Initials Name Provider Type     Dionne Bagley, PT Physical Therapist                    OP Exercises     Row Name 22 1300              Subjective Comments    Subjective Comments I guess maybe 30% improvement overall  -MH              Total Minutes    91080 - PT Therapeutic Exercise Minutes 22  -MH      72813 -  PT Neuromuscular Reeducation Minutes 17  -MH              Exercise 3    Exercise Name 3 tandem walk on read beam  -MH      Cueing 3 Verbal;Demo  -MH      Reps 3 5 laps FW/BW  -MH      Time 3 intermittent UE support  -MH              Exercise 4    Exercise Name 4 ball toss at rebounder  -MH      Cueing 4 Verbal;Demo  -MH      Reps 4 10ea  -MH      Time 4 cross body  -MH      Additional Comments semi-tandem stance  -MH              Exercise 5    Exercise Name 5 lateral step overs  -MH      Cueing 5 Verbal;Demo  -MH      Reps 5 4  -MH      Time 5 20sec  -MH      Additional Comments BOSU  -MH              Exercise 6    Exercise Name 6 rainbow/tracking tracking  -MH      Cueing 6 Verbal;Demo  -MH      Reps 6 10ea  -MH      Time 6 w/ ball  -MH              Exercise 7    Exercise Name 7 NuStep  -MH      Cueing 7 Verbal;Demo  -MH      Time 7 5 min  -MH      Additional Comments LE  -MH              Exercise 9    Exercise Name 9 mini lunge - front foot to airex  -MH      Cueing 9 Verbal;Demo  -MH      Reps 9 10ea  -MH              Exercise 10    Exercise Name 10 forward bending from airex to ceilin - habituation  -MH      Cueing 10 Verbal  -MH      Reps 10 10e  -MH      Time 10 ball tap to airex and follow up with head  -MH              Exercise 12    Exercise Name 12 STS w/ B foot tap - EC  -MH      Cueing 12 Verbal  -MH      Reps 12 10ea  -MH              Exercise 14    Exercise Name 14 SLS + clock  -MH      Cueing 14 Verbal;Demo  -MH      Sets 14 3ea  -MH      Reps 14 10-12 taps  -MH      Time 14 whole clock  -MH      Additional Comments intermittent UE  -MH            User Key  (r) = Recorded By, (t) = Taken By, (c) = Cosigned By    Initials Name Provider Type    Dionne Crockett, PT Physical Therapist                             PT  OP Goals     Row Name 03/22/22 1300          PT Short Term Goals    STG Date to Achieve 02/13/22  -     STG 1 Pt. Will be independent with initial HEP to improve self-management of condition.  -     STG 1 Progress Met  -     STG 2 Pt. Will ambulate with good heel strike and improved eccentric control to foot flat to improve gait mechanics and safety.  -     STG 2 Progress Met  [facility]            Long Term Goals    LTG Date to Achieve 03/14/22  -     LTG 1 Pt. Will be independent with advanced HEP to improve long-term management of condition and independence.  -     LTG 1 Progress Ongoing  -     LTG 2 Pt. Will improve DGI score to >/= 19/24 to indicate reduced falls risk and improved safety.  -     LTG 2 Progress Met  -     LTG 3 Pt. will report 0 falls since initiating to improve overall safety and independence.  -     LTG 3 Progress Met  -     LTG 4 Pt. will improve L foot DF MMT to 5/5 to reduce foot drop during gait.  -     LTG 4 Progress Met  -     LTG 5 Pt. will report improved confidence with lateral movements in yoga to improve participation in recreational activities.  -     LTG 5 Progress Met  -     LTG 6 Pt. will report 50% improvement in reduced symptoms with forward bending to improve ability to participate in recreational activities such as gardening.  -     LTG 6 Progress New  [facility]           User Key  (r) = Recorded By, (t) = Taken By, (c) = Cosigned By    Initials Name Provider Type    Dionne Crockett PT Physical Therapist                Therapy Education  Education Details: issued updated schedule  Given: Symptoms/condition management, Posture/body mechanics  Program: Reinforced  How Provided: Verbal, Demonstration  Provided to: Patient  Level of Understanding: Verbalized              Time Calculation:   Start Time: 1303  Stop Time: 1342  Time Calculation (min): 39 min  Total Timed Code Minutes- PT: 39 minute(s)  Timed Charges  83690 - PT Therapeutic Exercise Minutes:  22  80013 -  PT Neuromuscular Reeducation Minutes: 17  Total Minutes  Timed Charges Total Minutes: 39   Total Minutes: 39   Therapy Charges for Today     Code Description Service Date Service Provider Modifiers Qty    03803087173  PT THER PROC EA 15 MIN 3/22/2022 Dionne Bagley, PT GP 2    [id number]  PT NEUROMUSC RE EDUCATION EA 15 MIN 3/22/2022 Dionne Bagley, PT GP 1                   Dionne Bagley PT  3/22/2022

## 2022-03-28 DIAGNOSIS — E78.5 HYPERLIPIDEMIA LDL GOAL <70: ICD-10-CM

## 2022-03-28 DIAGNOSIS — I10 PRIMARY HYPERTENSION: Primary | ICD-10-CM

## 2022-03-28 DIAGNOSIS — E55.9 VITAMIN D DEFICIENCY: ICD-10-CM

## 2022-04-06 ENCOUNTER — HOSPITAL ENCOUNTER (OUTPATIENT)
Dept: PHYSICAL THERAPY | Facility: HOSPITAL | Age: 72
Setting detail: THERAPIES SERIES
Discharge: HOME | End: 2022-04-06

## 2022-04-06 DIAGNOSIS — Z86.73 HISTORY OF LACUNAR CEREBROVASCULAR ACCIDENT (CVA): Primary | ICD-10-CM

## 2022-04-06 DIAGNOSIS — R26.89 BALANCE PROBLEM: ICD-10-CM

## 2022-04-06 DIAGNOSIS — R42 VERTIGO: ICD-10-CM

## 2022-04-06 PROCEDURE — 97110 THERAPEUTIC EXERCISES: CPT

## 2022-04-06 PROCEDURE — 97112 NEUROMUSCULAR REEDUCATION: CPT

## 2022-04-06 NOTE — THERAPY TREATMENT NOTE
Outpatient Physical Therapy Vestibular Treatment Note  [facility]     Patient Name: Rosi Vicente  : 1950  MRN: [id number]  Today's Date: 2022      Visit Date: 2022    Visit Dx:     ICD-10-CM ICD-9-CM   1. History of lacunar cerebrovascular accident (CVA)  Z86.73 V12.54   2. Vertigo  R42 780.4   3. Balance problem  R26.89 781.99       Patient Active Problem List   Diagnosis   • Hypertension   • Allergic rhinitis   • Trigeminal neuralgia of left side of face   • Hyperlipidemia LDL goal <70   • Osteoarthritis of hip   • Vitamin D deficiency   • History of CVA (cerebrovascular accident)   • Heartburn   • Frequent urination   • Age-related osteoporosis without current pathological fracture   • Chronic fatigue   • Hyponatremia   • Overactive bladder   • Vision disturbance   • Sequelae, post-stroke   • Dry eye syndrome of both eyes   • Obstructive sleep apnea syndrome   • Irritable bowel syndrome with both constipation and diarrhea   • Dizziness   • Abnormal finding on breast imaging   • History of removal of implants of both breasts   • Fibrocystic breast changes, bilateral                        PT Assessment/Plan     Row Name 22 1300          PT Assessment    Assessment Comments Rosi returns to clinic after ~2 weeks, reports feeling instability with riding bike but otherwise no new complaints. Continued to address balance deficits, added seated challenges on swiss ball to improve core control and seated balance to improve confidence with riding bike. Pt. has 1 additional skilled PT session remaining at which time plan to transition to independent management.  -            PT Plan    PT Plan Comments D/C  -           User Key  (r) = Recorded By, (t) = Taken By, (c) = Cosigned By    Initials Name Provider Type    Dionne Crockett, PT Physical Therapist                    OP Exercises     Row Name 22 1200             Subjective Comments    Subjective Comments I feel off balance  on my bike  -              Total Minutes    77184 - PT Therapeutic Exercise Minutes 18  -MH      67135 -  PT Neuromuscular Reeducation Minutes 20  -MH              Exercise 4    Exercise Name 4 ball toss at rebounder  -MH      Cueing 4 Verbal;Demo  -MH      Reps 4 semi-tandem stance 3x10  -MH      Time 4 airex  -MH              Exercise 5    Exercise Name 5 lateral step overs  -MH      Cueing 5 Verbal;Demo  -MH      Reps 5 4  -MH      Time 5 20sec  -MH      Additional Comments BOSU  -MH              Exercise 6    Exercise Name 6 seated on swiss ball + visual tracking  -MH      Cueing 6 Verbal;Demo  -MH      Reps 6 10ea  -MH      Time 6 w/ ball diagonal, arc  -MH              Exercise 7    Exercise Name 7 NuStep  -MH      Cueing 7 Verbal;Demo  -MH      Time 7 5 min  -MH      Additional Comments LE  -MH              Exercise 8    Exercise Name 8 seated swiss ball marches  -MH      Cueing 8 Verbal;Demo  -MH      Sets 8 2  -MH      Reps 8 10ea  -MH              Exercise 9    Exercise Name 9 mini lunge - front foot to airex  -MH      Cueing 9 Verbal;Demo  -MH      Reps 9 10ea  -MH      Time 9 w/ trunk rotation  -MH              Exercise 11    Exercise Name 11 tandem stance EC  -MH      Cueing 11 Verbal;Demo  -MH      Reps 11 3e  -MH      Time 11 30sec  -MH      Additional Comments on airex  -MH            User Key  (r) = Recorded By, (t) = Taken By, (c) = Cosigned By    Initials Name Provider Type    Dionne Crockett, PT Physical Therapist                             PT OP Goals     Row Name 04/06/22 1300          PT Short Term Goals    STG Date to Achieve 02/13/22  -     STG 1 Pt. Will be independent with initial HEP to improve self-management of condition.  -     STG 1 Progress Met  -     STG 2 Pt. Will ambulate with good heel strike and improved eccentric control to foot flat to improve gait mechanics and safety.  -     STG 2 Progress Met  -            Long Term Goals    LTG Date to Achieve 03/14/22  -      LTG 1 Pt. Will be independent with advanced HEP to improve long-term management of condition and independence.  -     LTG 1 Progress Ongoing  -     LTG 2 Pt. Will improve DGI score to >/= 19/24 to indicate reduced falls risk and improved safety.  -     LTG 2 Progress Met  -     LTG 3 Pt. will report 0 falls since initiating to improve overall safety and independence.  -     LTG 3 Progress Met  -     LTG 4 Pt. will improve L foot DF MMT to 5/5 to reduce foot drop during gait.  -     [facility] will report improved confidence with lateral movements in yoga to improve participation in recreational activities.  -     LTG 5 [facility] will report 50% improvement in reduced symptoms with forward bending to improve ability to participate in recreational activities such as gardening.  -     LTG 6 Progress Ongoing  -           User Key  (r) = Recorded By, (t) = Taken By, (c) = Cosigned By    Initials Name Provider Type     Dionne Bagley PT Physical Therapist                Therapy Education  Given: Symptoms/condition management, Posture/body mechanics  Program: Reinforced  How Provided: Verbal, Demonstration  Provided to: Patient  Level of Understanding: Verbalized, Demonstrated              Time Calculation:   Start Time: 1220  Stop Time: 1258  Time Calculation (min): 38 min  Total Timed Code Minutes- PT: 38 minute(s)  Timed Charges  86236 - PT Therapeutic Exercise Minutes: 18  46034 -  PT Neuromuscular Reeducation Minutes: 20  Total Minutes  Timed Charges Total Minutes: 38   Total Minutes: 38   Therapy Charges for Today     Code Description Service Date Service Provider Modifiers Qty    [id number]  PT THER PROC EA 15 MIN 4/6/2022 Dionne Bagley, PT GP 1    [id number]  PT NEUROMUSC RE EDUCATION EA 15 MIN 4/6/2022 Dionne Bagley PT GP 2                   Dionne Bagley PT  4/6/2022

## 2022-04-12 ENCOUNTER — LAB (OUTPATIENT)
Dept: LAB | Facility: HOSPITAL | Age: 72
End: 2022-04-12

## 2022-04-12 DIAGNOSIS — E55.9 VITAMIN D DEFICIENCY: ICD-10-CM

## 2022-04-12 DIAGNOSIS — E78.5 HYPERLIPIDEMIA LDL GOAL <70: ICD-10-CM

## 2022-04-12 DIAGNOSIS — I10 PRIMARY HYPERTENSION: ICD-10-CM

## 2022-04-12 LAB
25(OH)D3 SERPL-MCNC: 65.7 NG/ML (ref 30–100)
ALBUMIN SERPL-MCNC: 4.3 G/DL (ref 3.5–5.2)
ALBUMIN/GLOB SERPL: 1.9 G/DL
ALP SERPL-CCNC: 87 U/L (ref 39–117)
ALT SERPL W P-5'-P-CCNC: 31 U/L (ref 1–33)
ANION GAP SERPL CALCULATED.3IONS-SCNC: 10.8 MMOL/L (ref 5–15)
AST SERPL-CCNC: 25 U/L (ref 1–32)
BILIRUB SERPL-MCNC: 0.4 MG/DL (ref 0–1.2)
BUN SERPL-MCNC: 13 MG/DL (ref 8–23)
BUN/CREAT SERPL: 16 (ref 7–25)
CALCIUM SPEC-SCNC: 9.4 MG/DL (ref 8.6–10.5)
CHLORIDE SERPL-SCNC: 96 MMOL/L (ref 98–107)
CHOLEST SERPL-MCNC: 176 MG/DL (ref 0–200)
CO2 SERPL-SCNC: 27.2 MMOL/L (ref 22–29)
CREAT SERPL-MCNC: 0.81 MG/DL (ref 0.57–1)
EGFRCR SERPLBLD CKD-EPI 2021: 76.8 ML/MIN/1.73
GLOBULIN UR ELPH-MCNC: 2.3 GM/DL
GLUCOSE SERPL-MCNC: 85 MG/DL (ref 65–99)
HDLC SERPL QL: 3.14
HDLC SERPL-MCNC: 56 MG/DL (ref 40–60)
LDLC SERPL CALC-MCNC: 109 MG/DL (ref 0–100)
POTASSIUM SERPL-SCNC: 4.7 MMOL/L (ref 3.5–5.2)
PROT SERPL-MCNC: 6.6 G/DL (ref 6–8.5)
SODIUM SERPL-SCNC: 134 MMOL/L (ref 136–145)
TRIGL SERPL-MCNC: 55 MG/DL (ref 0–150)
VLDLC SERPL-MCNC: 11 MG/DL (ref 5–40)

## 2022-04-12 PROCEDURE — 80061 LIPID PANEL: CPT

## 2022-04-12 PROCEDURE — 82306 VITAMIN D 25 HYDROXY: CPT

## 2022-04-12 PROCEDURE — 36415 COLL VENOUS BLD VENIPUNCTURE: CPT

## 2022-04-12 PROCEDURE — 80053 COMPREHEN METABOLIC PANEL: CPT

## 2022-04-13 ENCOUNTER — HOSPITAL ENCOUNTER (OUTPATIENT)
Dept: PHYSICAL THERAPY | Facility: HOSPITAL | Age: 72
Setting detail: THERAPIES SERIES
Discharge: HOME | End: 2022-04-13

## 2022-04-13 DIAGNOSIS — R42 VERTIGO: ICD-10-CM

## 2022-04-13 DIAGNOSIS — R26.89 BALANCE PROBLEM: ICD-10-CM

## 2022-04-13 DIAGNOSIS — Z86.73 HISTORY OF LACUNAR CEREBROVASCULAR ACCIDENT (CVA): Primary | ICD-10-CM

## 2022-04-13 PROCEDURE — 97112 NEUROMUSCULAR REEDUCATION: CPT

## 2022-04-13 PROCEDURE — 97110 THERAPEUTIC EXERCISES: CPT

## 2022-04-13 NOTE — THERAPY DISCHARGE NOTE
Outpatient Physical Therapy Vestibular Progress Note/Discharge Summary  [facility]     Patient Name: Rosi Vicente  : 1950  MRN: [id number]  Today's Date: 2022      Visit Date: 2022    Visit Dx:     ICD-10-CM ICD-9-CM   1. History of lacunar cerebrovascular accident (CVA)  Z86.73 V12.54   2. Vertigo  R42 780.4   3. Balance problem  R26.89 781.99       Patient Active Problem List   Diagnosis   • Hypertension   • Allergic rhinitis   • Trigeminal neuralgia of left side of face   • Hyperlipidemia LDL goal <70   • Osteoarthritis of hip   • Vitamin D deficiency   • History of CVA (cerebrovascular accident)   • Heartburn   • Frequent urination   • Age-related osteoporosis without current pathological fracture   • Chronic fatigue   • Hyponatremia   • Overactive bladder   • Vision disturbance   • Sequelae, post-stroke   • Dry eye syndrome of both eyes   • Obstructive sleep apnea syndrome   • Irritable bowel syndrome with both constipation and diarrhea   • Dizziness   • Abnormal finding on breast imaging   • History of removal of implants of both breasts   • Fibrocystic breast changes, bilateral                         PT Assessment/Plan     Row Name 22 1306          PT Assessment    Functional Limitations Impaired gait;Limitation in home management;Performance in leisure activities;Decreased safety during functional activities;Limitations in community activities  -     Impairments Balance;Gait;Posture;Poor body mechanics;Muscle strength  -     Assessment Comments Rosi Vicente was seen for 10 physical therapy sessions for balance/vertigo. Treatment included therapeutic exercise, therapeutic activity, neuro-muscular retraining  and patient education with home exercise program . Progress to physical therapy goals was good as pt. Met 2/2 STGs and 6/6 LTGs. Pt. Overall reports 50-60% improvement in symptoms that were addressed in clinic, complaints of continued fatigue/endurance she relates  to lacunar stroke that have been ongoing. She demonstrated improvement in DGI from 17/24 to 22/24 at last assessment, L ankle DF MMT 5/5, and verbalizes much improved confidence in lateral movements during yoga, has not had any falls and reoprts no longer feeling sensations of vertigo with forward bending. She has tolerated progressions in clinic quite well, demonstrates compliance with HEP. She was discharged to an independent [facility] and provided patient education to self-manage condition.  -     Please refer to paper survey for additional self-reported information --  -     Rehab Potential Good  -     Patient/caregiver participated in establishment of treatment plan and goals Yes  -            PT Plan    Predicted Duration of Therapy Intervention (PT) D/C  -     Planned CPT's? PT RE-EVAL: 95198;PT THER PROC EA 15 MIN: 36240;PT THER ACT EA 15 MIN: 66185;PT MANUAL THERAPY EA 15 MIN: 23995;PT NEUROMUSC RE-EDUCATION EA 15 MIN: 56558;PT GAIT TRAINING EA 15 MIN: 36249;PT HOT OR COLD PACK TREAT MCARE;PT ELECTRICAL STIM UNATTEND:   -     PT Plan Comments D/C  -           User Key  (r) = Recorded By, (t) = Taken By, (c) = Cosigned By    Initials Name Provider Type     Dionne Bagley, PT Physical Therapist                      OP Exercises     Row Name 04/13/22 1200             Subjective Comments    Subjective Comments I think its a good time we wrap up for now  -              Total Minutes    24113 - PT Therapeutic Exercise Minutes 18  -MH      14810 -  PT Neuromuscular Reeducation Minutes 20  -              Exercise 4    Exercise Name 4 ball toss at rebounder  -      Cueing 4 Verbal;Demo  -      Reps 4 semi-tandem stance x10  -      Time 4 airex  -              Exercise 5    Exercise Name 5 lateral step overs  -      Cueing 5 Verbal;Demo  -      Reps 5 4  -      Time 5 30sec  -      Additional Comments BOSU  -              Exercise 6    Exercise Name 6 seated on swiss ball +  visual tracking  -      Cueing 6 Verbal;Demo  -      Sets 6 2  -MH      Reps 6 10ea  -      Time 6 w/ ball diagonal, arc  -              Exercise 7    Exercise Name 7 NuStep  -      Cueing 7 Verbal;Demo  -      Time 7 5 min  -      Additional Comments LE  -              Exercise 8    Exercise Name 8 seated swiss ball marches  -      Cueing 8 Verbal;Demo  -      Sets 8 2  -MH      Reps 8 10ea  -              Exercise 9    Exercise Name 9 mini lunge - front foot to airex  -      Cueing 9 Verbal;Demo  -      Reps 9 10ea  -      Time 9 w/ trunk rotation  -              Exercise 11    Exercise Name 11 tandem stance EC  -      Cueing 11 Verbal;Demo  -      Reps 11 2e  -MH      Time 11 30sec  -      Additional Comments on airex  -            User Key  (r) = Recorded By, (t) = Taken By, (c) = Cosigned By    Initials Name Provider Type    Dionne Crockett, PT Physical Therapist                               PT OP Goals     Row Name 04/13/22 1200          PT Short Term Goals    STG Date to Achieve 02/13/22  -     STG 1 Pt. Will be independent with initial HEP to improve self-management of condition.  -     STG 1 Progress Met  -     STG 2 Pt. Will ambulate with good heel strike and improved eccentric control to foot flat to improve gait mechanics and safety.  -     STG 2 Progress Met  [facility]            Long Term Goals    LTG Date to Achieve 03/14/22  -     LTG 1 Pt. Will be independent with advanced HEP to improve long-term management of condition and independence.  -     LTG 1 Progress Met  -     LTG 2 Pt. Will improve DGI score to >/= 19/24 to indicate reduced falls risk and improved safety.  -     LTG 2 Progress Met  -     LTG 3 Pt. will report 0 falls since initiating to improve overall safety and independence.  -     LTG 3 Progress Met  [facility]     LTG 4 Pt. will improve L foot DF MMT to 5/5 to reduce foot drop during gait.  -     LTG 4 Progress Met  -     LTG 5 Pt.  will report improved confidence with lateral movements in yoga to improve participation in recreational activities.  -     LTG 5 Progress Met  -     LTG 6 Pt. will report 50% improvement in reduced symptoms with forward bending to improve ability to participate in recreational activities such as gardening.  -     LTG 6 Progress Met  -[facility] 6 Progress Comments no longer experiencing dizziness with fowward bending but does endorse fatigue.  -           User Key  (r) = Recorded By, (t) = Taken By, (c) = Cosigned By    Initials Name Provider Type     Dionne Bagley, PT Physical Therapist                Therapy Education  Education Details: Updated HEP Access Code: [email or web address]  Given: HEP, Symptoms/condition management  Program: Reinforced, Progressed  How Provided: Verbal, Demonstration, Written  Provided to: Patient  Level of Understanding: Verbalized, Demonstrated              Time Calculation:   Start Time: 1220  Stop Time: 1258  Time Calculation (min): 38 min  Total Timed Code Minutes- PT: 38 minute(s)  Timed Charges  94447 - PT Therapeutic Exercise Minutes: 18  03920 -  PT Neuromuscular Reeducation Minutes: 20  Total Minutes  Timed Charges Total Minutes: 38   Total Minutes: 38     Therapy Charges for Today     Code Description Service Date Service Provider Modifiers Qty    [id number]  PT THER PROC EA 15 MIN 4/13/2022 Dionne Bagley, PT GP 1    [id number]  PT NEUROMUSC RE EDUCATION EA 15 MIN 4/13/2022 Dionne Bagley, PT GP 2                         Dionne Bagley PT  4/13/2022

## 2022-04-25 ENCOUNTER — OFFICE VISIT (OUTPATIENT)
Dept: INTERNAL MEDICINE | Facility: CLINIC | Age: 72
End: 2022-04-25

## 2022-04-25 VITALS
WEIGHT: 122.9 LBS | HEART RATE: 72 BPM | DIASTOLIC BLOOD PRESSURE: 72 MMHG | SYSTOLIC BLOOD PRESSURE: 110 MMHG | HEIGHT: 67 IN | TEMPERATURE: 96.9 F | OXYGEN SATURATION: 97 % | BODY MASS INDEX: 19.29 KG/M2

## 2022-04-25 DIAGNOSIS — E78.5 HYPERLIPIDEMIA LDL GOAL <70: Primary | ICD-10-CM

## 2022-04-25 DIAGNOSIS — G50.0 TRIGEMINAL NEURALGIA: ICD-10-CM

## 2022-04-25 DIAGNOSIS — E87.1 HYPONATREMIA: ICD-10-CM

## 2022-04-25 DIAGNOSIS — E55.9 VITAMIN D DEFICIENCY: ICD-10-CM

## 2022-04-25 DIAGNOSIS — I10 PRIMARY HYPERTENSION: ICD-10-CM

## 2022-04-25 DIAGNOSIS — Z86.73 HISTORY OF CVA (CEREBROVASCULAR ACCIDENT): ICD-10-CM

## 2022-04-25 PROCEDURE — 99214 OFFICE O/P EST MOD 30 MIN: CPT | Performed by: NURSE PRACTITIONER

## 2022-04-25 RX ORDER — GABAPENTIN 100 MG/1
CAPSULE ORAL
Qty: 90 CAPSULE | Refills: 0 | Status: SHIPPED | OUTPATIENT
Start: 2022-04-25 | End: 2022-10-27 | Stop reason: SDUPTHER

## 2022-04-25 RX ORDER — LOVASTATIN 10 MG/1
10 TABLET ORAL NIGHTLY
Qty: 30 TABLET | Refills: 5 | Status: SHIPPED | OUTPATIENT
Start: 2022-04-25 | End: 2022-06-10

## 2022-04-25 NOTE — PROGRESS NOTES
Subjective   Rosi Vicente is a 72 y.o. female.     Chief Complaint   Patient presents with   • Hypertension   • Hyperlipidemia        History of Present Illness   Is here today to follow-up on hypertension hyperlipidemia and lab work. She is feeling well today. She is planning a trip to [place] in May. She has been having worsening right shoulder pain. She is scheduled to see Dr. Anne with orthopedics. She thinks she is going to need surgery.      HPL/history CVA- she has been intolerant to all statins, Livalo, repatha, and praluent. She has previously tried Vascepa with improvement in LDL cholesterol near goal. She tolerated Vascepa without side effects. Insurance has not covered this medication, and she had to stop this secondary to cost of the medication. She has been using OTC fish oil.   HTN- Patient doing well with current medication regimen, compliant with medication schedule, denies adverse effects. She monitors her BP at home occasionally.   Trigeminal neuralgia- well controlled with gabapentin 100-200 mg at bedtime. She recently had a dental procedure with flare up in symptoms. Patient doing well with current medication regimen, compliant with medication schedule, denies adverse effects.     The following portions of the patient's history were reviewed and updated as appropriate: allergies, current medications, past family history, past medical history, past social history, past surgical history and problem list.    Review of Systems   Constitutional: Positive for fatigue. Negative for chills and fever.   Respiratory: Negative for cough, chest tightness, shortness of breath and wheezing.    Cardiovascular: Negative for chest pain, palpitations and leg swelling.   Musculoskeletal: Positive for arthralgias (right shoulder).   Neurological: Negative for dizziness, tremors, seizures, syncope, speech difficulty, weakness, light-headedness, numbness, headache and confusion.   Psychiatric/Behavioral: Negative  for suicidal ideas and depressed mood. The patient is not nervous/anxious.        Objective   Physical Exam  Constitutional:       Appearance: She is well-developed.   Neck:      Thyroid: No thyroid mass, thyromegaly or thyroid tenderness.      Vascular: No carotid bruit.      Trachea: Trachea normal.   Cardiovascular:      Rate and Rhythm: Normal rate and regular rhythm.      Chest Wall: PMI is not displaced.      Pulses:           Radial pulses are 2+ on the right side and 2+ on the left side.        Dorsalis pedis pulses are 2+ on the right side and 2+ on the left side.        Posterior tibial pulses are 2+ on the right side and 2+ on the left side.      Heart sounds: S1 normal and S2 normal.   Pulmonary:      Effort: Pulmonary effort is normal.      Breath sounds: Normal breath sounds.   Musculoskeletal:      Right lower leg: No edema.      Left lower leg: No edema.   Lymphadenopathy:      Head:      Right side of head: No submental, submandibular, tonsillar or occipital adenopathy.      Left side of head: No submental, submandibular, tonsillar or occipital adenopathy.      Cervical: No cervical adenopathy.   Skin:     General: Skin is warm and dry.      Capillary Refill: Capillary refill takes less than 2 seconds.      Nails: There is no clubbing.   Neurological:      Mental Status: She is alert and oriented to person, place, and time. Mental status is at baseline.      Cranial Nerves: Cranial nerves are intact.      Sensory: Sensory deficit (decreased sensation left face along trigeminal nerve) present.      Motor: Motor function is intact.      Coordination: Coordination is intact.      Gait: Gait is intact.      Deep Tendon Reflexes:      Reflex Scores:       Patellar reflexes are 2+ on the right side and 2+ on the left side.  Psychiatric:         Attention and Perception: Attention normal.         Mood and Affect: Mood and affect normal.         Speech: Speech normal.         Behavior: Behavior normal.          Thought Content: Thought content normal.         Cognition and Memory: Cognition normal.         Vitals:    04/25/22 1311   BP: 110/72   Pulse: 72   Temp: 96.9 °F (36.1 °C)   SpO2: 97%      Body mass index is 19.24 kg/m².    Assessment/Plan   Diagnoses and all orders for this visit:    1. Hyperlipidemia LDL goal <70 (Primary)  -     lovastatin (MEVACOR) 10 MG tablet; Take 1 tablet by mouth Every Night.  Dispense: 30 tablet; Refill: 5    2. Trigeminal neuralgia  -     gabapentin (NEURONTIN) 100 MG capsule; qhs  Dispense: 90 capsule; Refill: 0    3. History of CVA (cerebrovascular accident)  -     lovastatin (MEVACOR) 10 MG tablet; Take 1 tablet by mouth Every Night.  Dispense: 30 tablet; Refill: 5    4. Primary hypertension    5. Vitamin D deficiency    6. Hyponatremia      Lab results discussed with patient today.    1. HPL/Hx CVA- LDL has increased above goal since being off Vascepa. Will talk to insurance about Vascepa medication coverage. Will try restarting lovastatin 10 mg 2-3 days a week to see if she can tolerate this. Continue healthy, Mediterranean style diet and regular exercise. Re-check labs in 6 months.  2. HTN- well controlled. Continue losartan-hctz at current dose. Continue to monitor BP at home with goal < 130/80.  3. Hyponatremia- improved.   4. Vit D deficiency- improved. Continue current dose.  5. Trigeminal neuralgia- well controlled with gabapentin 1-2 tabs at bedtime. Discussed appropriate use and a/e of medication. Patient elects to continue treatment with this medication. Prescription refilled today. JESUSITA and Gaurav FERNANDES.      F/u in 6 months with fasting labs prior.

## 2022-05-09 RX ORDER — ICOSAPENT ETHYL 1000 MG/1
2 CAPSULE ORAL 2 TIMES DAILY WITH MEALS
Qty: 360 CAPSULE | Refills: 1 | OUTPATIENT
Start: 2022-05-09 | End: 2022-07-26 | Stop reason: SDUPTHER

## 2022-05-17 ENCOUNTER — HOSPITAL ENCOUNTER (OUTPATIENT)
Dept: MAMMOGRAPHY | Facility: HOSPITAL | Age: 72
Discharge: HOME | End: 2022-05-17
Admitting: NURSE PRACTITIONER

## 2022-05-17 DIAGNOSIS — Z12.31 VISIT FOR SCREENING MAMMOGRAM: ICD-10-CM

## 2022-05-17 PROCEDURE — 77067 SCR MAMMO BI INCL CAD: CPT

## 2022-05-17 PROCEDURE — 77063 BREAST TOMOSYNTHESIS BI: CPT

## 2022-05-24 NOTE — TELEPHONE ENCOUNTER
RX SENT TO [facility]   Zygomaticofacial Flap Text: Given the location of the defect, shape of the defect and the proximity to free margins a zygomaticofacial flap was deemed most appropriate for repair.  Using a sterile surgical marker, the appropriate flap was drawn incorporating the defect and placing the expected incisions within the relaxed skin tension lines where possible. The area thus outlined was incised deep to adipose tissue with a #15 scalpel blade with preservation of a vascular pedicle.  The skin margins were undermined to an appropriate distance in all directions utilizing iris scissors.  The flap was then placed into the defect and anchored with interrupted buried subcutaneous sutures.

## 2022-06-06 ENCOUNTER — TELEPHONE (OUTPATIENT)
Dept: NEUROLOGY | Facility: CLINIC | Age: 72
End: 2022-06-06

## 2022-06-06 NOTE — TELEPHONE ENCOUNTER
Caller: PASCUAL  Relationship to Patient: SELF  Phone Number: [phone]    Reason for Call: PT IS HAVING SURGERY ON 7-5-22, SHE IS NEEDING TO KNOW HOW MANY DAYS SHE SHOULD STOP THE MEDICATION (aspirin 81 MG EC tablet) PRIOR TO THE SURGERY

## 2022-06-08 ENCOUNTER — TELEPHONE (OUTPATIENT)
Dept: INTERNAL MEDICINE | Facility: CLINIC | Age: 72
End: 2022-06-08

## 2022-06-08 NOTE — TELEPHONE ENCOUNTER
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 I told her she could dehydrate and suggest to drink water. Please advise.

## 2022-06-09 ENCOUNTER — TELEPHONE (OUTPATIENT)
Dept: NEUROLOGY | Facility: CLINIC | Age: 72
End: 2022-06-09

## 2022-06-09 NOTE — TELEPHONE ENCOUNTER
Caller: Rosi Vicente    Relationship to patient: Self    Best call back number: [phone]    Chief complaint: PT CALLED WITH COMPLAINTS OF NEW ONSET OF FATIGUE OVER THE LAST FEW DAYS. PT REQUESTING TO SPEAK WITH A MEDICAL ASSISTANCE TO FURTHER DISCUSS THESE CONCERNS. I THEN SAW WHERE PT HAD SPOKE W/ HER PCP YESTERDAY, 6/8/22, WHO ADVISED PT TO GO THE ED FOR FURTHER EVAL IF SYMPTOMS PERSIST.    I ADVISED PT, GIVER HER HX OF STROKE, OUR OFFICE WOULD ALSO ENDORSE SHE GO TO THE NEAREST ED FOR FURTHER EVAL. I OFFERED TO CALL 911 ON BEHALF OF PT. PT DECLINED AND STATES SHE WILL HAVE SOMEONE TAKE HER.     Patient directed to call 911 or go to their nearest emergency room.     Patient verbalized understanding: [x] Yes  [] No    DOCUMENTING PER [employer]UB [employer].

## 2022-06-09 NOTE — TELEPHONE ENCOUNTER
Spoke with patient and she states that she is very fatigued and dizzy, she has appt to see her PCP at 8 am,I stressed to her that if she is concerned for stroke or her symptoms worsen we would recommend she go to ER now and not wait until she see's her PCP tomorrow    Patient voiced understanding

## 2022-06-10 ENCOUNTER — PATIENT MESSAGE (OUTPATIENT)
Dept: GASTROENTEROLOGY | Facility: CLINIC | Age: 72
End: 2022-06-10

## 2022-06-10 ENCOUNTER — OFFICE VISIT (OUTPATIENT)
Dept: INTERNAL MEDICINE | Facility: CLINIC | Age: 72
End: 2022-06-10

## 2022-06-10 VITALS — BODY MASS INDEX: 19.37 KG/M2 | HEIGHT: 67 IN | WEIGHT: 123.4 LBS | TEMPERATURE: 96.6 F | OXYGEN SATURATION: 99 %

## 2022-06-10 DIAGNOSIS — R53.83 FATIGUE, UNSPECIFIED TYPE: ICD-10-CM

## 2022-06-10 DIAGNOSIS — E87.1 HYPONATREMIA: ICD-10-CM

## 2022-06-10 DIAGNOSIS — D64.9 ANEMIA, UNSPECIFIED TYPE: ICD-10-CM

## 2022-06-10 DIAGNOSIS — R42 INTERMITTENT LIGHTHEADEDNESS: ICD-10-CM

## 2022-06-10 DIAGNOSIS — I10 PRIMARY HYPERTENSION: Primary | ICD-10-CM

## 2022-06-10 PROCEDURE — 99214 OFFICE O/P EST MOD 30 MIN: CPT | Performed by: NURSE PRACTITIONER

## 2022-06-10 RX ORDER — LOSARTAN POTASSIUM 100 MG/1
100 TABLET ORAL DAILY
Qty: 90 TABLET | Refills: 1 | Status: SHIPPED | OUTPATIENT
Start: 2022-06-10 | End: 2022-07-01

## 2022-06-10 RX ORDER — PROMETHAZINE HYDROCHLORIDE 25 MG/1
1 TABLET ORAL DAILY
COMMUNITY
End: 2022-10-27

## 2022-06-10 RX ORDER — PHENOL 1.4 %
20 AEROSOL, SPRAY (ML) MUCOUS MEMBRANE
COMMUNITY

## 2022-06-10 NOTE — TELEPHONE ENCOUNTER
From: Rosi Vicente  To: JUAN FRANCISCO Hopkins  Sent: 6/10/2022 1:59 PM EDT  Subject: Diarrhea    For four days I have had 2-3 bouts of diarrhea however after talking 1-2 Ibgard for each bout diarrhea subsides until next day. Should I start the Rifaximin 550 mg tabs? Got it from [facility] as you had suggested.   Thanks for your help   Rosi Hanley Hope all is well with your baby

## 2022-06-10 NOTE — PROGRESS NOTES
"Subjective   Rosi Vicente is a 72 y.o. female.     Chief Complaint   Patient presents with   • Fatigue   • Dizziness     Pt c/o fatigue, swimmy headed X Wednesday.        History of Present Illness   She is here today with c/o new onset fatigue and lightheadedness, described as \"swimmy sensation in the head.\" Symptoms started on Wednesday. She feels like her balance is off. Denies feeling of the room spinning.  Lightheadedness is not associated with position changes.  She had been working as a volunteer prior to symptoms starting. She had been walking a lot when volunteering on Wednesday, and noticed that she was easily fatigued with intermittent lightheadedness with activity.  She notices that her fatigue and lightheadedness becomes worse at the end of the day.   She was seen at [facility] for surgical pre-screening yesterday. ECG completed. Her blood work from yesterday showed mild anemia with hemoglobin of 11.2 and hyponatremia with sodium of 130.  Her BP has been running on the lower end of normal recently, 110s/60s, currently taking losartan-HCTZ 100-12.5 mg.   She has some generalized weakness on the left side since her lacunar infarct in 2017.  She has occasionally had to go to physical therapy for balance and left-sided weakness.  She denies any chest pain, palpitations, syncope, dizziness, shortness of breath, headache, new onset unilateral weakness, acute vision change, difficulty with speech, numbness, facial droop, confusion, BRBPR, melena, hematemesis, recent illness.  She completed a COVID test yesterday, negative.    The following portions of the patient's history were reviewed and updated as appropriate: allergies, current medications, past family history, past medical history, past social history, past surgical history and problem list.    Review of Systems   Constitutional: Positive for fatigue. Negative for chills and fever.   HENT: Negative for congestion, ear pain, postnasal drip, rhinorrhea, sore " throat, tinnitus and trouble swallowing.    Respiratory: Negative for cough, chest tightness, shortness of breath and wheezing.    Cardiovascular: Negative for chest pain, palpitations and leg swelling.   Gastrointestinal: Negative for abdominal pain, blood in stool and vomiting.   Neurological: Positive for light-headedness. Negative for dizziness, tremors, seizures, syncope, facial asymmetry, speech difficulty, weakness, numbness, headache, memory problem and confusion.       Objective   Physical Exam  Constitutional:       Appearance: She is well-developed.   HENT:      Head: Normocephalic and atraumatic.      Right Ear: Hearing, tympanic membrane, ear canal and external ear normal.      Left Ear: Hearing, tympanic membrane, ear canal and external ear normal.      Nose: Nose normal.      Mouth/Throat:      Lips: Pink.      Mouth: Mucous membranes are moist. No injury or oral lesions.      Dentition: Normal dentition.      Tongue: No lesions. Tongue does not deviate from midline.      Palate: No mass and lesions.      Pharynx: Oropharynx is clear. Uvula midline.   Neck:      Thyroid: No thyroid mass, thyromegaly or thyroid tenderness.      Vascular: No carotid bruit.      Trachea: Trachea normal.   Cardiovascular:      Rate and Rhythm: Normal rate and regular rhythm.      Chest Wall: PMI is not displaced.      Pulses:           Radial pulses are 2+ on the right side and 2+ on the left side.        Dorsalis pedis pulses are 2+ on the right side and 2+ on the left side.        Posterior tibial pulses are 2+ on the right side and 2+ on the left side.      Heart sounds: S1 normal and S2 normal.   Pulmonary:      Effort: Pulmonary effort is normal.      Breath sounds: Normal breath sounds.   Musculoskeletal:      Right lower leg: No edema.      Left lower leg: No edema.   Lymphadenopathy:      Head:      Right side of head: No submental, submandibular, tonsillar or occipital adenopathy.      Left side of head: No  submental, submandibular, tonsillar or occipital adenopathy.      Cervical: No cervical adenopathy.   Skin:     General: Skin is warm and dry.      Capillary Refill: Capillary refill takes less than 2 seconds.      Nails: There is no clubbing.   Neurological:      General: No focal deficit present.      Mental Status: She is alert and oriented to person, place, and time. Mental status is at baseline.      Cranial Nerves: Cranial nerves are intact.      Sensory: Sensation is intact.      Motor: Motor function is intact.      Coordination: Coordination is intact.      Gait: Gait is intact.      Deep Tendon Reflexes:      Reflex Scores:       Patellar reflexes are 2+ on the right side and 2+ on the left side.     Comments: No evidence of facial droop or arm drift.  Ingrid-Hallpike negative bilaterally.   Psychiatric:         Attention and Perception: Attention normal.         Mood and Affect: Mood and affect normal.         Speech: Speech normal.         Behavior: Behavior normal.         Thought Content: Thought content normal.         Cognition and Memory: Cognition normal.         Vitals:    06/10/22 0815   BP: 110/64   Pulse: 71   Temp: 96.6 °F (35.9 °C)   SpO2: 99%      Body mass index is 19.32 kg/m².    Assessment & Plan   Diagnoses and all orders for this visit:    1. Primary hypertension (Primary)  -     losartan (Cozaar) 100 MG tablet; Take 1 tablet by mouth Daily.  Dispense: 90 tablet; Refill: 1  -     CBC & Differential; Future  -     Basic Metabolic Panel; Future  -     Ferritin; Future  -     Iron Profile; Future  -     Magnesium; Future    2. Anemia, unspecified type  -     Cancel: Occult Blood, Fecal By Immunoassay - Stool, Per Rectum  -     Occult Blood, Fecal By Immunoassay - Stool, Per Rectum  -     CBC & Differential; Future  -     Basic Metabolic Panel; Future  -     Ferritin; Future  -     Iron Profile; Future  -     Magnesium; Future    3. Fatigue, unspecified type  -     Cancel: Occult Blood, Fecal  By Immunoassay - Stool, Per Rectum  -     Occult Blood, Fecal By Immunoassay - Stool, Per Rectum  -     CBC & Differential; Future  -     Basic Metabolic Panel; Future  -     Ferritin; Future  -     Iron Profile; Future  -     Magnesium; Future    4. Intermittent lightheadedness  -     Cancel: Occult Blood, Fecal By Immunoassay - Stool, Per Rectum  -     Occult Blood, Fecal By Immunoassay - Stool, Per Rectum  -     CBC & Differential; Future  -     Basic Metabolic Panel; Future  -     Ferritin; Future  -     Iron Profile; Future  -     Magnesium; Future    5. Hyponatremia  -     CBC & Differential; Future  -     Basic Metabolic Panel; Future  -     Ferritin; Future  -     Iron Profile; Future  -     Magnesium; Future      1.  Intermittent lightheadedness/fatigue- neuro exam normal today in office.  I suspect symptoms are related to anemia, hyponatremia and low blood pressure.  We will try off hydrochlorothiazide 12.5 mg and continue losartan 100 mg daily.  Discussed that with her history of a lacunar CVA, I would like to order a STAT CT scan of the brain today.  Patient defers this today.  Discussed with her at length that she should be seen emergently for any acute dizziness, unilateral weakness, headache, confusion, difficulty with speech or syncope, or other acute neurological changes.  EKG from [place] requested today.  We will follow-up in 10 days with repeat labs.  To ER with worsening symptoms.  2.  Hyponatremia- hold hydrochlorothiazide and continue losartan 100 mg daily.  Make position changes slowly.  Follow-up in 10 days with repeat BMP.  3.  Anemia-check fecal occult blood test today.  She denies any abnormal bleeding.  She eats a vegetarian style diet and anemia could be related to inadequate dietary intake of iron.  Follow-up in 10 days with repeat CBC, iron profile and ferritin.  4.  HTN- orthostatic blood pressures completed in office today showing a drop in diastolic pressure of 10 mmHg from sitting  to standing.  Stop hydrochlorothiazide 2.5 mg daily.  Continue losartan 100 mg daily.  Begin monitoring blood pressure in the morning and evening.  Notify if still consistently less than 110/60 off of HCTZ.  Make position changes slowly.  Follow-up in 10 days with repeat labs.

## 2022-06-13 ENCOUNTER — HOSPITAL ENCOUNTER (OUTPATIENT)
Dept: CARDIOLOGY | Facility: HOSPITAL | Age: 72
Discharge: HOME | End: 2022-06-13
Admitting: INTERNAL MEDICINE

## 2022-06-13 VITALS
BODY MASS INDEX: 19.44 KG/M2 | WEIGHT: 121 LBS | HEART RATE: 63 BPM | HEIGHT: 66 IN | DIASTOLIC BLOOD PRESSURE: 79 MMHG | SYSTOLIC BLOOD PRESSURE: 130 MMHG

## 2022-06-13 DIAGNOSIS — Z13.9 SCREENING FOR CONDITION: ICD-10-CM

## 2022-06-13 LAB
BH CV ECHO MEAS - DIST AO DIAM: 1.49 CM
BH CV VAS BP LEFT ARM: NORMAL MMHG
BH CV VAS BP RIGHT ARM: NORMAL MMHG
BH CV XLRA MEAS - MID AO DIAM: 1.66 CM
BH CV XLRA MEAS - PROX AO DIAM: 1.81 CM
BH CV XLRA MEAS LEFT ICA/CCA RATIO: 0.88
BH CV XLRA MEAS LEFT MID CCA PSV: NORMAL CM/SEC
BH CV XLRA MEAS LEFT MID ICA PSV: NORMAL CM/SEC
BH CV XLRA MEAS LEFT PROX ECA PSV: NORMAL CM/SEC
BH CV XLRA MEAS RIGHT ICA/CCA RATIO: 0.73
BH CV XLRA MEAS RIGHT MID CCA PSV: NORMAL CM/SEC
BH CV XLRA MEAS RIGHT MID ICA PSV: NORMAL CM/SEC
BH CV XLRA MEAS RIGHT PROX ECA PSV: NORMAL CM/SEC
HEMOCCULT STL QL IA: POSITIVE
MAXIMAL PREDICTED HEART RATE: 147 BPM
STRESS TARGET HR: 125 BPM

## 2022-06-13 PROCEDURE — 93799 UNLISTED CV SVC/PROCEDURE: CPT

## 2022-06-21 ENCOUNTER — TELEPHONE (OUTPATIENT)
Dept: GASTROENTEROLOGY | Facility: CLINIC | Age: 72
End: 2022-06-21

## 2022-06-21 ENCOUNTER — LAB (OUTPATIENT)
Dept: LAB | Facility: HOSPITAL | Age: 72
End: 2022-06-21

## 2022-06-21 ENCOUNTER — TELEMEDICINE (OUTPATIENT)
Dept: GASTROENTEROLOGY | Facility: CLINIC | Age: 72
End: 2022-06-21

## 2022-06-21 DIAGNOSIS — R19.5 POSITIVE FECAL OCCULT BLOOD TEST: ICD-10-CM

## 2022-06-21 DIAGNOSIS — D64.9 ANEMIA, UNSPECIFIED TYPE: ICD-10-CM

## 2022-06-21 DIAGNOSIS — D64.9 ANEMIA, UNSPECIFIED TYPE: Primary | ICD-10-CM

## 2022-06-21 PROCEDURE — 83540 ASSAY OF IRON: CPT | Performed by: NURSE PRACTITIONER

## 2022-06-21 PROCEDURE — 80048 BASIC METABOLIC PNL TOTAL CA: CPT | Performed by: NURSE PRACTITIONER

## 2022-06-21 PROCEDURE — 99214 OFFICE O/P EST MOD 30 MIN: CPT | Performed by: NURSE PRACTITIONER

## 2022-06-21 PROCEDURE — 83735 ASSAY OF MAGNESIUM: CPT | Performed by: NURSE PRACTITIONER

## 2022-06-21 PROCEDURE — 84466 ASSAY OF TRANSFERRIN: CPT | Performed by: NURSE PRACTITIONER

## 2022-06-21 PROCEDURE — 82728 ASSAY OF FERRITIN: CPT | Performed by: NURSE PRACTITIONER

## 2022-06-21 PROCEDURE — 85025 COMPLETE CBC W/AUTO DIFF WBC: CPT | Performed by: NURSE PRACTITIONER

## 2022-06-21 NOTE — PATIENT INSTRUCTIONS
Recheck CBC for monitoring.    For further evaluation of anemia with positive fecal occult blood, EGD and colonoscopy are recommended.

## 2022-06-21 NOTE — TELEPHONE ENCOUNTER
Contacted to Dr. Anne's office, left message with question of how long after shoulder replacement patient would need to wait to undergo EGD and colonoscopy.

## 2022-06-21 NOTE — TELEPHONE ENCOUNTER
Received call back from Dr. Anne's office, spoke with Alec CASTILLO.  Recommend waiting 2 months after joint replacement if possible to proceed with EGD and colonoscopy.  If deemed emergent, could be performed sooner if needed.  Will await results of CBC.

## 2022-06-21 NOTE — PROGRESS NOTES
Chief Complaint   Patient presents with   • Anemia         History of Present Illness  Patient is a 72-year-old female who presents today for follow-up.  She has a history of Fonseca's esophagus, colon polyps, GERD, and IBS-D.  She presents today after recent testing was performed that showed mild microcytic anemia with hemoglobin of 11.2.  Fecal occult blood testing was then performed and was positive.  Her most recent EGD and colonoscopy were March 2020.  Prep was poor. A Cologuard test was performed May 2021 and was negative.    Patient presents today for follow-up.  She reports she has been anemic in the past.  She follows a plant-based diet and questions if she needs to be eating more meat.    She has not seen any visible blood in the stool or melena however reports her hemorrhoids were causing symptoms when she submitted the stool sample.  She continues to have episodic diarrhea which has responded well to Xifaxan and IBgard.  She has been taking omeprazole for GERD which she reports is working very well for her symptoms.    She reports the lab work that discover the anemia was done as part of preoperative work-up.  She is scheduled to have a shoulder replacement July 5 with Dr. Hermes Anne and questions if she is okay to proceed.    You have chosen to receive care through a telehealth visit.  Do you consent to use a video/audio connection for your medical care today? Yes    Physical Exam  Constitutional:       General: She is not in acute distress.     Appearance: She is well-developed.   Pulmonary:      Effort: No respiratory distress.   Skin:     Coloration: Skin is not pale.             Result Review :      SCANNED - COLONOSCOPY (03/12/2020)   Occult Blood, Fecal By Immunoassay - Stool, Per Rectum (06/10/2022 11:30)   CBC AND DIFFERENTIAL (06/09/2022 10:14)   Telemedicine with Flor Beyer APRN (03/02/2022)   Telemedicine with Flor Beyer APRN (04/30/2021)   Tissue Pathology Exam (03/12/2020  10:10)   Non-gynecologic Cytology (03/12/2020 10:13)   SCANNED - COLONOSCOPY (03/12/2020)   Telemedicine with Flor Beyer APRN (07/20/2020)   SCANNED - COLOGUARD (05/11/2021)      Assessment and Plan    Diagnoses and all orders for this visit:    1. Anemia, unspecified type (Primary)  -     CBC & Differential; Future    2. Positive fecal occult blood test         Discussion  Patient presents today for follow-up with new finding of anemia with positive fecal occult blood test.  Discussed recommendation for EGD and colonoscopy for further evaluation and to evaluate for source of bleeding.  We will obtain an updated CBC today and contact her orthopedic surgeon to see how long postoperatively she would required to wait for EGD and colonoscopy.  If anemia has worsened, would recommend EGD and colonoscopy prior to undergoing shoulder replacement.  If anemia stable or improved and dependent upon how long postoperatively her surgeon would recommend waiting to perform EGD and colonoscopy, we may be able to postpone these until after her upcoming surgery.  If CBC is stable and her orthopedic surgeon is comfortable proceeding and as long as EGD and colonoscopy can be performed at reasonable interval postoperatively, okay from a GI standpoint to proceed.  We will provide further recommendations once CBC has resulted.    Addendum 6/22/2022: CBC is stable, patient does have evidence of iron deficiency on lab work.  Orthopedic surgeons recommends waiting 2 months if possible after surgery to perform EGD/CLS.  Reviewed results with patient and recommendations from her surgeon and discussed if possible, would recommend we proceed with endoscopic evaluation prior to surgery however if she is comfortable waiting until 2 months postoperatively, as hemoglobin is stable, I feel this is reasonable but again would not be my recommendation.  Patient agreeable to proceed and appointment scheduled for 6/29.      Follow Up   Return if  symptoms worsen or fail to improve.    Patient Instructions   Recheck CBC for monitoring.    For further evaluation of anemia with positive fecal occult blood, EGD and colonoscopy are recommended.

## 2022-06-22 ENCOUNTER — PREP FOR SURGERY (OUTPATIENT)
Dept: SURGERY | Facility: SURGERY CENTER | Age: 72
End: 2022-06-22

## 2022-06-22 DIAGNOSIS — R19.5 POSITIVE FECAL OCCULT BLOOD TEST: ICD-10-CM

## 2022-06-22 DIAGNOSIS — D50.0 IRON DEFICIENCY ANEMIA DUE TO CHRONIC BLOOD LOSS: Primary | ICD-10-CM

## 2022-06-22 RX ORDER — SODIUM CHLORIDE, SODIUM LACTATE, POTASSIUM CHLORIDE, CALCIUM CHLORIDE 600; 310; 30; 20 MG/100ML; MG/100ML; MG/100ML; MG/100ML
30 INJECTION, SOLUTION INTRAVENOUS CONTINUOUS PRN
Status: CANCELLED | OUTPATIENT
Start: 2022-06-22

## 2022-06-22 RX ORDER — SODIUM, POTASSIUM,MAG SULFATES 17.5-3.13G
2 SOLUTION, RECONSTITUTED, ORAL ORAL EVERY 12 HOURS
Qty: 354 ML | Refills: 0 | Status: SHIPPED | OUTPATIENT
Start: 2022-06-22 | End: 2022-07-01

## 2022-06-22 RX ORDER — SODIUM CHLORIDE 0.9 % (FLUSH) 0.9 %
3 SYRINGE (ML) INJECTION EVERY 12 HOURS SCHEDULED
Status: CANCELLED | OUTPATIENT
Start: 2022-06-22

## 2022-06-22 RX ORDER — SODIUM CHLORIDE 0.9 % (FLUSH) 0.9 %
10 SYRINGE (ML) INJECTION AS NEEDED
Status: CANCELLED | OUTPATIENT
Start: 2022-06-22

## 2022-06-27 ENCOUNTER — LAB (OUTPATIENT)
Dept: LAB | Facility: SURGERY CENTER | Age: 72
End: 2022-06-27

## 2022-06-27 ENCOUNTER — LAB (OUTPATIENT)
Dept: LAB | Facility: HOSPITAL | Age: 72
End: 2022-06-27

## 2022-06-27 DIAGNOSIS — R19.5 POSITIVE FECAL OCCULT BLOOD TEST: ICD-10-CM

## 2022-06-27 DIAGNOSIS — D50.0 IRON DEFICIENCY ANEMIA DUE TO CHRONIC BLOOD LOSS: ICD-10-CM

## 2022-06-27 LAB — SARS-COV-2 ORF1AB RESP QL NAA+PROBE: NOT DETECTED

## 2022-06-27 PROCEDURE — U0004 COV-19 TEST NON-CDC HGH THRU: HCPCS

## 2022-06-27 PROCEDURE — C9803 HOPD COVID-19 SPEC COLLECT: HCPCS

## 2022-06-27 PROCEDURE — U0005 INFEC AGEN DETEC AMPLI PROBE: HCPCS

## 2022-06-29 ENCOUNTER — ANESTHESIA EVENT (OUTPATIENT)
Dept: SURGERY | Facility: SURGERY CENTER | Age: 72
End: 2022-06-29

## 2022-06-29 ENCOUNTER — ANESTHESIA (OUTPATIENT)
Dept: SURGERY | Facility: SURGERY CENTER | Age: 72
End: 2022-06-29

## 2022-06-29 ENCOUNTER — HOSPITAL ENCOUNTER (OUTPATIENT)
Facility: SURGERY CENTER | Age: 72
Setting detail: HOSPITAL OUTPATIENT SURGERY
Discharge: HOME | End: 2022-06-29
Attending: INTERNAL MEDICINE | Admitting: INTERNAL MEDICINE

## 2022-06-29 VITALS
BODY MASS INDEX: 18.52 KG/M2 | TEMPERATURE: 98.4 F | WEIGHT: 118 LBS | DIASTOLIC BLOOD PRESSURE: 96 MMHG | HEART RATE: 67 BPM | RESPIRATION RATE: 16 BRPM | HEIGHT: 67 IN | OXYGEN SATURATION: 99 % | SYSTOLIC BLOOD PRESSURE: 135 MMHG

## 2022-06-29 DIAGNOSIS — R19.5 POSITIVE FECAL OCCULT BLOOD TEST: ICD-10-CM

## 2022-06-29 DIAGNOSIS — D50.0 IRON DEFICIENCY ANEMIA DUE TO CHRONIC BLOOD LOSS: ICD-10-CM

## 2022-06-29 PROCEDURE — 25010000002 PROPOFOL 10 MG/ML EMULSION: Performed by: ANESTHESIOLOGY

## 2022-06-29 PROCEDURE — 88305 TISSUE EXAM BY PATHOLOGIST: CPT | Performed by: INTERNAL MEDICINE

## 2022-06-29 PROCEDURE — 45378 DIAGNOSTIC COLONOSCOPY: CPT | Performed by: INTERNAL MEDICINE

## 2022-06-29 PROCEDURE — 43239 EGD BIOPSY SINGLE/MULTIPLE: CPT | Performed by: INTERNAL MEDICINE

## 2022-06-29 RX ORDER — PROPOFOL 10 MG/ML
VIAL (ML) INTRAVENOUS CONTINUOUS PRN
Status: DISCONTINUED | OUTPATIENT
Start: 2022-06-29 | End: 2022-06-29 | Stop reason: SURG

## 2022-06-29 RX ORDER — SODIUM CHLORIDE 0.9 % (FLUSH) 0.9 %
3 SYRINGE (ML) INJECTION EVERY 12 HOURS SCHEDULED
Status: DISCONTINUED | OUTPATIENT
Start: 2022-06-29 | End: 2022-06-29 | Stop reason: HOSPADM

## 2022-06-29 RX ORDER — PROPOFOL 10 MG/ML
VIAL (ML) INTRAVENOUS AS NEEDED
Status: DISCONTINUED | OUTPATIENT
Start: 2022-06-29 | End: 2022-06-29 | Stop reason: SURG

## 2022-06-29 RX ORDER — SODIUM CHLORIDE 0.9 % (FLUSH) 0.9 %
10 SYRINGE (ML) INJECTION AS NEEDED
Status: DISCONTINUED | OUTPATIENT
Start: 2022-06-29 | End: 2022-06-29 | Stop reason: HOSPADM

## 2022-06-29 RX ORDER — SODIUM CHLORIDE, SODIUM LACTATE, POTASSIUM CHLORIDE, CALCIUM CHLORIDE 600; 310; 30; 20 MG/100ML; MG/100ML; MG/100ML; MG/100ML
INJECTION, SOLUTION INTRAVENOUS CONTINUOUS PRN
Status: DISCONTINUED | OUTPATIENT
Start: 2022-06-29 | End: 2022-06-29 | Stop reason: SURG

## 2022-06-29 RX ORDER — MAGNESIUM HYDROXIDE 1200 MG/15ML
LIQUID ORAL AS NEEDED
Status: DISCONTINUED | OUTPATIENT
Start: 2022-06-29 | End: 2022-06-29 | Stop reason: HOSPADM

## 2022-06-29 RX ORDER — SODIUM CHLORIDE, SODIUM LACTATE, POTASSIUM CHLORIDE, CALCIUM CHLORIDE 600; 310; 30; 20 MG/100ML; MG/100ML; MG/100ML; MG/100ML
30 INJECTION, SOLUTION INTRAVENOUS CONTINUOUS PRN
Status: DISCONTINUED | OUTPATIENT
Start: 2022-06-29 | End: 2022-06-29 | Stop reason: HOSPADM

## 2022-06-29 RX ORDER — LIDOCAINE HYDROCHLORIDE 20 MG/ML
INJECTION, SOLUTION INFILTRATION; PERINEURAL AS NEEDED
Status: DISCONTINUED | OUTPATIENT
Start: 2022-06-29 | End: 2022-06-29 | Stop reason: SURG

## 2022-06-29 RX ADMIN — PROPOFOL 100 MG: 10 INJECTION, EMULSION INTRAVENOUS at 08:50

## 2022-06-29 RX ADMIN — LIDOCAINE HYDROCHLORIDE 60 MG: 20 INJECTION, SOLUTION INFILTRATION; PERINEURAL at 08:50

## 2022-06-29 RX ADMIN — SODIUM CHLORIDE, SODIUM LACTATE, POTASSIUM CHLORIDE, AND CALCIUM CHLORIDE: .6; .31; .03; .02 INJECTION, SOLUTION INTRAVENOUS at 08:50

## 2022-06-29 RX ADMIN — SODIUM CHLORIDE, POTASSIUM CHLORIDE, SODIUM LACTATE AND CALCIUM CHLORIDE 30 ML/HR: 600; 310; 30; 20 INJECTION, SOLUTION INTRAVENOUS at 07:54

## 2022-06-29 RX ADMIN — Medication 160 MCG/KG/MIN: at 08:50

## 2022-06-29 NOTE — ANESTHESIA POSTPROCEDURE EVALUATION
"Patient: Rsoi Vicente    Procedure Summary     Date: 06/29/22 Room / Location: [facility]    Anesthesia Start: 0846 Anesthesia Stop: 0929    Procedures:       ESOPHAGOGASTRODUODENOSCOPY (N/A )      COLONOSCOPY FOR SCREENING (N/A ) Diagnosis:       Iron deficiency anemia due to chronic blood loss      Positive fecal occult blood test      (Iron deficiency anemia due to chronic blood loss [D50.0])      (Positive fecal occult blood test [R19.5])    Surgeons: Víctor Polk MD Provider: Dio Ramirez MD    Anesthesia Type: MAC ASA Status: 3          Anesthesia Type: MAC    Vitals  Vitals Value Taken Time   /96 06/29/22 0937   Temp 36.9 °C (98.4 °F) 06/29/22 0924   Pulse 67 06/29/22 0937   Resp 16 06/29/22 0937   SpO2 99 % 06/29/22 0937           Post Anesthesia Care and Evaluation    Patient location during evaluation: bedside  Patient participation: complete - patient participated  Level of consciousness: awake and alert  Pain management: adequate    Airway patency: patent  Anesthetic complications: No anesthetic complications    Cardiovascular status: acceptable  Respiratory status: acceptable  Hydration status: acceptable    Comments: /96 (BP Location: Left arm, Patient Position: Lying)   Pulse 67   Temp 36.9 °C (98.4 °F) (Infrared)   Resp 16   Ht 170.2 cm (67\")   Wt 53.5 kg (118 lb)   LMP  (LMP Unknown)   SpO2 99%   BMI 18.48 kg/m²       "

## 2022-06-29 NOTE — ANESTHESIA PREPROCEDURE EVALUATION
Anesthesia Evaluation     Patient summary reviewed and Nursing notes reviewed   NPO Solid Status: > 8 hours  NPO Liquid Status: > 2 hours           Airway   Mallampati: II  TM distance: >3 FB  Neck ROM: full  no difficulty expected  Dental - normal exam     Pulmonary - normal exam   (+) sleep apnea,   (-) decreased breath sounds, wheezes  Cardiovascular - normal exam  Exercise tolerance: good (4-7 METS)    (+) hypertension, CAD, hyperlipidemia,       Neuro/Psych  (+) CVA,    (-) seizures  GI/Hepatic/Renal/Endo - negative ROS   (-) diabetes    Musculoskeletal (-) negative ROS    Abdominal  - normal exam   Substance History - negative use  (-) alcohol use, drug use     OB/GYN negative ob/gyn ROS         Other - negative ROS                       Anesthesia Plan    ASA 3     MAC     intravenous induction     Anesthetic plan, risks, benefits, and alternatives have been provided, discussed and informed consent has been obtained with: patient.        CODE STATUS:

## 2022-06-30 LAB
LAB AP CASE REPORT: NORMAL
LAB AP DIAGNOSIS COMMENT: NORMAL
PATH REPORT.FINAL DX SPEC: NORMAL
PATH REPORT.GROSS SPEC: NORMAL

## 2022-07-01 ENCOUNTER — OFFICE VISIT (OUTPATIENT)
Dept: INTERNAL MEDICINE | Facility: CLINIC | Age: 72
End: 2022-07-01

## 2022-07-01 VITALS
OXYGEN SATURATION: 99 % | TEMPERATURE: 96.8 F | DIASTOLIC BLOOD PRESSURE: 66 MMHG | BODY MASS INDEX: 18.9 KG/M2 | SYSTOLIC BLOOD PRESSURE: 118 MMHG | WEIGHT: 120.4 LBS | HEIGHT: 67 IN | HEART RATE: 75 BPM

## 2022-07-01 DIAGNOSIS — Z01.818 PRE-OPERATIVE CLEARANCE: Primary | ICD-10-CM

## 2022-07-01 DIAGNOSIS — D50.8 IRON DEFICIENCY ANEMIA SECONDARY TO INADEQUATE DIETARY IRON INTAKE: ICD-10-CM

## 2022-07-01 PROCEDURE — 99213 OFFICE O/P EST LOW 20 MIN: CPT | Performed by: NURSE PRACTITIONER

## 2022-07-11 DIAGNOSIS — D50.8 IRON DEFICIENCY ANEMIA SECONDARY TO INADEQUATE DIETARY IRON INTAKE: Primary | ICD-10-CM

## 2022-07-12 ENCOUNTER — LAB (OUTPATIENT)
Dept: LAB | Facility: HOSPITAL | Age: 72
End: 2022-07-12

## 2022-07-12 LAB
BASOPHILS # BLD AUTO: 0.06 10*3/MM3 (ref 0–0.2)
BASOPHILS NFR BLD AUTO: 1.1 % (ref 0–1.5)
DEPRECATED RDW RBC AUTO: 38.3 FL (ref 37–54)
EOSINOPHIL # BLD AUTO: 0.08 10*3/MM3 (ref 0–0.4)
EOSINOPHIL NFR BLD AUTO: 1.5 % (ref 0.3–6.2)
ERYTHROCYTE [DISTWIDTH] IN BLOOD BY AUTOMATED COUNT: 14.1 % (ref 12.3–15.4)
HCT VFR BLD AUTO: 36.5 % (ref 34–46.6)
HGB BLD-MCNC: 11.9 G/DL (ref 12–15.9)
IMM GRANULOCYTES # BLD AUTO: 0.02 10*3/MM3 (ref 0–0.05)
IMM GRANULOCYTES NFR BLD AUTO: 0.4 % (ref 0–0.5)
LYMPHOCYTES # BLD AUTO: 1.69 10*3/MM3 (ref 0.7–3.1)
LYMPHOCYTES NFR BLD AUTO: 31.1 % (ref 19.6–45.3)
MCH RBC QN AUTO: 25 PG (ref 26.6–33)
MCHC RBC AUTO-ENTMCNC: 32.6 G/DL (ref 31.5–35.7)
MCV RBC AUTO: 76.7 FL (ref 79–97)
MONOCYTES # BLD AUTO: 0.51 10*3/MM3 (ref 0.1–0.9)
MONOCYTES NFR BLD AUTO: 9.4 % (ref 5–12)
NEUTROPHILS NFR BLD AUTO: 3.07 10*3/MM3 (ref 1.7–7)
NEUTROPHILS NFR BLD AUTO: 56.5 % (ref 42.7–76)
NRBC BLD AUTO-RTO: 0 /100 WBC (ref 0–0.2)
PLATELET # BLD AUTO: 424 10*3/MM3 (ref 140–450)
PMV BLD AUTO: 9.3 FL (ref 6–12)
RBC # BLD AUTO: 4.76 10*6/MM3 (ref 3.77–5.28)
WBC NRBC COR # BLD: 5.43 10*3/MM3 (ref 3.4–10.8)

## 2022-07-12 PROCEDURE — 85025 COMPLETE CBC W/AUTO DIFF WBC: CPT | Performed by: NURSE PRACTITIONER

## 2022-07-12 PROCEDURE — 36415 COLL VENOUS BLD VENIPUNCTURE: CPT | Performed by: NURSE PRACTITIONER

## 2022-07-26 RX ORDER — ICOSAPENT ETHYL 1000 MG/1
2 CAPSULE ORAL 2 TIMES DAILY WITH MEALS
Qty: 360 CAPSULE | Refills: 1 | OUTPATIENT
Start: 2022-07-26 | End: 2022-08-01 | Stop reason: SDUPTHER

## 2022-08-01 RX ORDER — ICOSAPENT ETHYL 1000 MG/1
2 CAPSULE ORAL 2 TIMES DAILY WITH MEALS
Qty: 360 CAPSULE | Refills: 1 | Status: SHIPPED | OUTPATIENT
Start: 2022-08-01 | End: 2023-01-25 | Stop reason: SDUPTHER

## 2022-08-03 RX ORDER — LOSARTAN POTASSIUM AND HYDROCHLOROTHIAZIDE 12.5; 1 MG/1; MG/1
1 TABLET ORAL DAILY
Qty: 90 TABLET | Refills: 1 | Status: SHIPPED | OUTPATIENT
Start: 2022-08-03 | End: 2023-01-26

## 2022-08-15 ENCOUNTER — TELEPHONE (OUTPATIENT)
Dept: INTERNAL MEDICINE | Facility: CLINIC | Age: 72
End: 2022-08-15

## 2022-08-15 RX ORDER — FLUTICASONE PROPIONATE 50 MCG
2 SPRAY, SUSPENSION (ML) NASAL DAILY
Qty: 15.8 ML | Refills: 3 | Status: SHIPPED | OUTPATIENT
Start: 2022-08-15

## 2022-08-15 NOTE — TELEPHONE ENCOUNTER
RX SENT TO PHARMACY      ----- Message from Rosi Vicente sent at 8/15/2022  8:37 AM EDT -----  Regarding: lab  Morning   Could you have a script for Flonase sent to [facility] pharmacy ?  My sinuses and I thank you   Rosi

## 2022-09-20 RX ORDER — SERTRALINE HYDROCHLORIDE 25 MG/1
TABLET, FILM COATED ORAL
Qty: 90 TABLET | Refills: 1 | OUTPATIENT
Start: 2022-09-20

## 2022-09-21 RX ORDER — SERTRALINE HYDROCHLORIDE 25 MG/1
25 TABLET, FILM COATED ORAL DAILY
Qty: 90 TABLET | Refills: 1 | Status: SHIPPED | OUTPATIENT
Start: 2022-09-21

## 2022-10-12 DIAGNOSIS — E78.5 HYPERLIPIDEMIA LDL GOAL <70: ICD-10-CM

## 2022-10-12 DIAGNOSIS — D50.8 IRON DEFICIENCY ANEMIA SECONDARY TO INADEQUATE DIETARY IRON INTAKE: Primary | ICD-10-CM

## 2022-10-12 DIAGNOSIS — I10 PRIMARY HYPERTENSION: ICD-10-CM

## 2022-10-12 DIAGNOSIS — Z86.73 HISTORY OF CVA (CEREBROVASCULAR ACCIDENT): ICD-10-CM

## 2022-10-14 ENCOUNTER — LAB (OUTPATIENT)
Dept: LAB | Facility: HOSPITAL | Age: 72
End: 2022-10-14

## 2022-10-14 DIAGNOSIS — I10 PRIMARY HYPERTENSION: ICD-10-CM

## 2022-10-14 DIAGNOSIS — D50.8 IRON DEFICIENCY ANEMIA SECONDARY TO INADEQUATE DIETARY IRON INTAKE: ICD-10-CM

## 2022-10-14 DIAGNOSIS — Z86.73 HISTORY OF CVA (CEREBROVASCULAR ACCIDENT): ICD-10-CM

## 2022-10-14 DIAGNOSIS — E78.5 HYPERLIPIDEMIA LDL GOAL <70: ICD-10-CM

## 2022-10-14 LAB
ALBUMIN SERPL-MCNC: 4.2 G/DL (ref 3.5–5.2)
ALBUMIN/GLOB SERPL: 1.8 G/DL
ALP SERPL-CCNC: 100 U/L (ref 39–117)
ALT SERPL W P-5'-P-CCNC: 23 U/L (ref 1–33)
ANION GAP SERPL CALCULATED.3IONS-SCNC: 10.3 MMOL/L (ref 5–15)
AST SERPL-CCNC: 29 U/L (ref 1–32)
BASOPHILS # BLD AUTO: 0.07 10*3/MM3 (ref 0–0.2)
BASOPHILS # BLD MANUAL: 0.06 10*3/MM3 (ref 0–0.2)
BASOPHILS NFR BLD AUTO: 1.1 % (ref 0–1.5)
BASOPHILS NFR BLD MANUAL: 1 % (ref 0–1.5)
BILIRUB SERPL-MCNC: 0.3 MG/DL (ref 0–1.2)
BUN SERPL-MCNC: 13 MG/DL (ref 8–23)
BUN/CREAT SERPL: 26 (ref 7–25)
CALCIUM SPEC-SCNC: 9.1 MG/DL (ref 8.6–10.5)
CHLORIDE SERPL-SCNC: 96 MMOL/L (ref 98–107)
CHOLEST SERPL-MCNC: 154 MG/DL (ref 0–200)
CO2 SERPL-SCNC: 26.7 MMOL/L (ref 22–29)
CREAT SERPL-MCNC: 0.5 MG/DL (ref 0.57–1)
DEPRECATED RDW RBC AUTO: 37.3 FL (ref 37–54)
EGFRCR SERPLBLD CKD-EPI 2021: 99.2 ML/MIN/1.73
EOSINOPHIL # BLD AUTO: 0.21 10*3/MM3 (ref 0–0.4)
EOSINOPHIL # BLD MANUAL: 0.19 10*3/MM3 (ref 0–0.4)
EOSINOPHIL NFR BLD AUTO: 3.4 % (ref 0.3–6.2)
EOSINOPHIL NFR BLD MANUAL: 3.1 % (ref 0.3–6.2)
ERYTHROCYTE [DISTWIDTH] IN BLOOD BY AUTOMATED COUNT: 13.8 % (ref 12.3–15.4)
GLOBULIN UR ELPH-MCNC: 2.3 GM/DL
GLUCOSE SERPL-MCNC: 77 MG/DL (ref 65–99)
HCT VFR BLD AUTO: 35.3 % (ref 34–46.6)
HDLC SERPL-MCNC: 42 MG/DL (ref 40–60)
HGB BLD-MCNC: 11.2 G/DL (ref 12–15.9)
LDLC SERPL CALC-MCNC: 99 MG/DL (ref 0–100)
LDLC/HDLC SERPL: 2.35 {RATIO}
LYMPHOCYTES # BLD AUTO: 1.83 10*3/MM3 (ref 0.7–3.1)
LYMPHOCYTES # BLD MANUAL: 1.15 10*3/MM3 (ref 0.7–3.1)
LYMPHOCYTES NFR BLD AUTO: 29.7 % (ref 19.6–45.3)
LYMPHOCYTES NFR BLD MANUAL: 8.3 % (ref 5–12)
MCH RBC QN AUTO: 23.6 PG (ref 26.6–33)
MCHC RBC AUTO-ENTMCNC: 31.7 G/DL (ref 31.5–35.7)
MCV RBC AUTO: 74.3 FL (ref 79–97)
MONOCYTES # BLD AUTO: 0.73 10*3/MM3 (ref 0.1–0.9)
MONOCYTES # BLD: 0.51 10*3/MM3 (ref 0.1–0.9)
MONOCYTES NFR BLD AUTO: 11.8 % (ref 5–12)
NEUTROPHILS # BLD AUTO: 4.24 10*3/MM3 (ref 1.7–7)
NEUTROPHILS NFR BLD AUTO: 3.3 10*3/MM3 (ref 1.7–7)
NEUTROPHILS NFR BLD AUTO: 53.5 % (ref 42.7–76)
NEUTROPHILS NFR BLD MANUAL: 68.8 % (ref 42.7–76)
PLAT MORPH BLD: NORMAL
PLATELET # BLD AUTO: 362 10*3/MM3 (ref 140–450)
PMV BLD AUTO: 9.4 FL (ref 6–12)
POTASSIUM SERPL-SCNC: 4.2 MMOL/L (ref 3.5–5.2)
PROT SERPL-MCNC: 6.5 G/DL (ref 6–8.5)
RBC # BLD AUTO: 4.75 10*6/MM3 (ref 3.77–5.28)
RBC MORPH BLD: NORMAL
SMUDGE CELLS BLD QL SMEAR: ABNORMAL
SODIUM SERPL-SCNC: 133 MMOL/L (ref 136–145)
TRIGL SERPL-MCNC: 67 MG/DL (ref 0–150)
VARIANT LYMPHS NFR BLD MANUAL: 1 % (ref 0–5)
VARIANT LYMPHS NFR BLD MANUAL: 17.7 % (ref 19.6–45.3)
VLDLC SERPL-MCNC: 13 MG/DL (ref 5–40)
WBC NRBC COR # BLD: 6.17 10*3/MM3 (ref 3.4–10.8)

## 2022-10-14 PROCEDURE — 80061 LIPID PANEL: CPT

## 2022-10-14 PROCEDURE — 85007 BL SMEAR W/DIFF WBC COUNT: CPT

## 2022-10-14 PROCEDURE — 80053 COMPREHEN METABOLIC PANEL: CPT

## 2022-10-14 PROCEDURE — 85025 COMPLETE CBC W/AUTO DIFF WBC: CPT

## 2022-10-14 PROCEDURE — 36415 COLL VENOUS BLD VENIPUNCTURE: CPT

## 2022-10-27 ENCOUNTER — OFFICE VISIT (OUTPATIENT)
Dept: INTERNAL MEDICINE | Facility: CLINIC | Age: 72
End: 2022-10-27

## 2022-10-27 VITALS
DIASTOLIC BLOOD PRESSURE: 62 MMHG | HEART RATE: 67 BPM | BODY MASS INDEX: 19.15 KG/M2 | WEIGHT: 122 LBS | OXYGEN SATURATION: 97 % | HEIGHT: 67 IN | SYSTOLIC BLOOD PRESSURE: 118 MMHG | TEMPERATURE: 96.3 F

## 2022-10-27 DIAGNOSIS — E78.5 HYPERLIPIDEMIA LDL GOAL <70: ICD-10-CM

## 2022-10-27 DIAGNOSIS — E87.1 HYPONATREMIA: ICD-10-CM

## 2022-10-27 DIAGNOSIS — Z00.00 MEDICARE ANNUAL WELLNESS VISIT, SUBSEQUENT: Primary | ICD-10-CM

## 2022-10-27 DIAGNOSIS — G50.0 TRIGEMINAL NEURALGIA: ICD-10-CM

## 2022-10-27 DIAGNOSIS — Z86.73 HISTORY OF CVA (CEREBROVASCULAR ACCIDENT): ICD-10-CM

## 2022-10-27 DIAGNOSIS — D50.0 IRON DEFICIENCY ANEMIA DUE TO CHRONIC BLOOD LOSS: ICD-10-CM

## 2022-10-27 DIAGNOSIS — I10 PRIMARY HYPERTENSION: ICD-10-CM

## 2022-10-27 DIAGNOSIS — G50.0 TRIGEMINAL NEURALGIA OF LEFT SIDE OF FACE: ICD-10-CM

## 2022-10-27 DIAGNOSIS — E55.9 VITAMIN D DEFICIENCY: ICD-10-CM

## 2022-10-27 PROCEDURE — 1170F FXNL STATUS ASSESSED: CPT | Performed by: NURSE PRACTITIONER

## 2022-10-27 PROCEDURE — 99213 OFFICE O/P EST LOW 20 MIN: CPT | Performed by: NURSE PRACTITIONER

## 2022-10-27 PROCEDURE — 1159F MED LIST DOCD IN RCRD: CPT | Performed by: NURSE PRACTITIONER

## 2022-10-27 PROCEDURE — G0439 PPPS, SUBSEQ VISIT: HCPCS | Performed by: NURSE PRACTITIONER

## 2022-10-27 RX ORDER — MULTIPLE VITAMINS W/ MINERALS TAB 9MG-400MCG
1 TAB ORAL DAILY
COMMUNITY

## 2022-10-27 RX ORDER — AMOXICILLIN 500 MG/1
CAPSULE ORAL
COMMUNITY
Start: 2022-08-22

## 2022-10-27 RX ORDER — GABAPENTIN 100 MG/1
CAPSULE ORAL
Qty: 90 CAPSULE | Refills: 0 | Status: SHIPPED | OUTPATIENT
Start: 2022-10-27 | End: 2023-01-26

## 2022-10-27 NOTE — PROGRESS NOTES
The ABCs of the Annual Wellness Visit  Subsequent Medicare Wellness Visit    Chief Complaint   Patient presents with   • Medicare Wellness-subsequent     Labs done       Subjective    History of Present Illness:  Rosi Vicente is a 72 y.o. female who presents for a Subsequent Medicare Wellness Visit.    The following portions of the patient's history were reviewed and   updated as appropriate: allergies, current medications, past family history, past medical history, past social history, past surgical history and problem list.    Compared to one year ago, the patient feels her physical   health is the same.    Compared to one year ago, the patient feels her mental   health is the same.    Recent Hospitalizations:  She was not admitted to the hospital during the last year.       Current Medical Providers:  Patient Care Team:  Anette Machado APRN as PCP - General (Nurse Practitioner)  Christopher Young MD as Consulting Physician (Otolaryngology)  Antonio Davila MD as Consulting Physician (Orthopedic Surgery)  Manny Sexton MD as Consulting Physician (Dermatology)  Víctor Polk MD as Consulting Physician (Gastroenterology)  Kori Vaughn MD as Consulting Physician (Neurosurgery)  Hermes Anne MD as Consulting Physician (Orthopedic Surgery)  Peri James APRN as Nurse Practitioner (Neurology)  Gallo Shields MD as Consulting Physician (Ophthalmology)  Yuri Lay DPM as Consulting Physician (Podiatry)  Katarina Leung MD as Consulting Physician (Cardiology)    Outpatient Medications Prior to Visit   Medication Sig Dispense Refill   • acetaminophen (TYLENOL) 500 MG tablet Take 500 mg by mouth Every 6 (Six) Hours As Needed for Mild Pain .     • amoxicillin (AMOXIL) 500 MG capsule      • aspirin 81 MG EC tablet Take 81 mg by mouth Daily.     • Biotin 5000 MCG capsule Take 1 capsule by mouth Daily.     • cholecalciferol (VITAMIN D3) 1000 UNITS tablet Take  2,000 Units by mouth 2 (Two) Times a Day.     • Coenzyme Q10 (COQ-10) 400 MG capsule Take  by mouth Daily.     • cycloSPORINE (RESTASIS) 0.05 % ophthalmic emulsion Apply 1 drop to eye(s) as directed by provider 2 (Two) Times a Day.     • fluticasone (FLONASE) 50 MCG/ACT nasal spray 2 sprays into the nostril(s) as directed by provider Daily. 15.8 mL 3   • losartan-hydrochlorothiazide (HYZAAR) 100-12.5 MG per tablet Take 1 tablet by mouth Daily. 90 tablet 1   • Melatonin 10 MG tablet Take 20 mg by mouth.     • multivitamin with minerals tablet tablet Take 1 tablet by mouth Daily.     • mupirocin (BACTROBAN) 2 % ointment      • NON FORMULARY berberine po     • NON FORMULARY Bone growth factor     • omeprazole (priLOSEC) 20 MG capsule Take 1 capsule by mouth Daily. 90 capsule 3   • Peppermint Oil (IBGARD PO) Take  by mouth.     • Probiotic Product (PROBIOTIC-10 PO) Take 1 capsule by mouth Daily.     • sertraline (ZOLOFT) 25 MG tablet Take 1 tablet by mouth Daily. 90 tablet 1   • SUPER B COMPLEX/C PO Take  by mouth.     • Vascepa 1 g capsule capsule Take 2 g by mouth 2 (Two) Times a Day With Meals. 360 capsule 1   • gabapentin (NEURONTIN) 100 MG capsule qhs (Patient taking differently: PRN) 90 capsule 0   • Multiple Vitamins-Iron tablet Take 1 tablet by mouth Daily.     • psyllium (METAMUCIL) 0.52 g capsule Take 0.52 g by mouth Daily.     • vitamin B-12 (CYANOCOBALAMIN) 1000 MCG tablet 5,000 mcg Daily.     • hydrocortisone 2.5 % cream        No facility-administered medications prior to visit.       No opioid medication identified on active medication list. I have reviewed chart for other potential  high risk medication/s and harmful drug interactions in the elderly.          Aspirin is on active medication list. Aspirin use is indicated based on review of current medical condition/s. Pros and cons of this therapy have been discussed today. Benefits of this medication outweigh potential harm.  Patient has been encouraged  "to continue taking this medication.  .      Patient Active Problem List   Diagnosis   • Hypertension   • Allergic rhinitis   • Trigeminal neuralgia of left side of face   • Hyperlipidemia LDL goal <70   • Osteoarthritis of hip   • Vitamin D deficiency   • History of CVA (cerebrovascular accident)   • Heartburn   • Frequent urination   • Age-related osteoporosis without current pathological fracture   • Chronic fatigue   • Hyponatremia   • Overactive bladder   • Vision disturbance   • Sequelae, post-stroke   • Dry eye syndrome of both eyes   • Obstructive sleep apnea syndrome   • Irritable bowel syndrome with both constipation and diarrhea   • Dizziness   • Abnormal finding on breast imaging   • History of removal of implants of both breasts   • Fibrocystic breast changes, bilateral   • Iron deficiency anemia due to chronic blood loss   • Positive fecal occult blood test     Advance Care Planning  Advance Directive is not on file.  ACP discussion was held with the patient during this visit. Patient has an advance directive (not in EMR), copy requested.          Objective    Vitals:    10/27/22 0952   BP: 118/62   Pulse: 67   Temp: 96.3 °F (35.7 °C)   Weight: 55.3 kg (122 lb)   Height: 170.2 cm (67.01\")     Estimated body mass index is 19.1 kg/m² as calculated from the following:    Height as of this encounter: 170.2 cm (67.01\").    Weight as of this encounter: 55.3 kg (122 lb).    BMI is within normal parameters. No other follow-up for BMI required.      Does the patient have evidence of cognitive impairment? No    Physical Exam  Lab Results   Component Value Date    TRIG 67 10/14/2022    HDL 42 10/14/2022    LDL 99 10/14/2022    VLDL 13 10/14/2022            HEALTH RISK ASSESSMENT    Smoking Status:  Social History     Tobacco Use   Smoking Status Never   Smokeless Tobacco Never   Tobacco Comments    daily caffiene     Alcohol Consumption:  Social History     Substance and Sexual Activity   Alcohol Use Yes   • " Alcohol/week: 2.0 standard drinks   • Types: 2 Glasses of wine per week    Comment: a few days a week     Fall Risk Screen:    CHOCO Fall Risk Assessment was completed, and patient is at MODERATE risk for falls. Assessment completed on:3/14/2022    Depression Screening:  PHQ-2/PHQ-9 Depression Screening 10/27/2022   Retired PHQ-9 Total Score -   Retired Total Score -   Little Interest or Pleasure in Doing Things 0-->not at all   Feeling Down, Depressed or Hopeless 0-->not at all   PHQ-9: Brief Depression Severity Measure Score 0       Health Habits and Functional and Cognitive Screening:  Functional & Cognitive Status 10/27/2022   Do you have difficulty preparing food and eating? No   Do you have difficulty bathing yourself, getting dressed or grooming yourself? No   Do you have difficulty using the toilet? No   Do you have difficulty moving around from place to place? No   Do you have trouble with steps or getting out of a bed or a chair? No   Current Diet Well Balanced Diet   Dental Exam Up to date   Eye Exam Up to date   Exercise (times per week) 7 times per week   Current Exercises Include Walking   Do you need help using the phone?  No   Are you deaf or do you have serious difficulty hearing?  No   Do you need help with transportation? No   Do you need help shopping? No   Do you need help preparing meals?  No   Do you need help with housework?  No   Do you need help with laundry? No   Do you need help taking your medications? No   Do you need help managing money? No   Do you ever drive or ride in a car without wearing a seat belt? No   Have you felt unusual stress, anger or loneliness in the last month? No   Who do you live with? Spouse   If you need help, do you have trouble finding someone available to you? No   Have you been bothered in the last four weeks by sexual problems? No   Do you have difficulty concentrating, remembering or making decisions? No       Age-appropriate Screening Schedule:  Refer to  the list below for future screening recommendations based on patient's age, sex and/or medical conditions. Orders for these recommended tests are listed in the plan section. The patient has been provided with a written plan.    Health Maintenance   Topic Date Due   • LIPID PANEL  10/14/2023   • TDAP/TD VACCINES (2 - Td or Tdap) 12/17/2023   • DXA SCAN  03/08/2024   • MAMMOGRAM  05/17/2024   • INFLUENZA VACCINE  Completed   • ZOSTER VACCINE  Completed              Assessment & Plan   CMS Preventative Services Quick Reference  Risk Factors Identified During Encounter  Cardiovascular Disease  Fall Risk-High or Moderate  Glaucoma or Family History of Glaucoma  Immunizations Discussed/Encouraged (specific Immunizations; Influenza  The above risks/problems have been discussed with the patient.  Follow up actions/plans if indicated are seen below in the Assessment/Plan Section.  Pertinent information has been shared with the patient in the After Visit Summary.    Diagnoses and all orders for this visit:    1. Medicare annual wellness visit, subsequent (Primary)    2. History of CVA (cerebrovascular accident)    3. Primary hypertension    4. Hyperlipidemia LDL goal <70    5. Iron deficiency anemia due to chronic blood loss    6. Trigeminal neuralgia of left side of face  -     gabapentin (NEURONTIN) 100 MG capsule; qhs  Dispense: 90 capsule; Refill: 0    7. Trigeminal neuralgia  -     gabapentin (NEURONTIN) 100 MG capsule; qhs  Dispense: 90 capsule; Refill: 0        Follow Up:   Return in about 6 months (around 4/27/2023).     An After Visit Summary and PPPS were made available to the patient.

## 2022-10-27 NOTE — PROGRESS NOTES
Subjective   Rosi Vicente is a 72 y.o. female.     Chief Complaint   Patient presents with   • Medicare Wellness-subsequent     Labs done         History of Present Illness   She is here today for Medicare wellness and lab follow-up. She is feeling well today. She is wanting to return to exercise to work on balance.   She recently had right total shoulder arthroplasty with Dr. Anne in July. She is currently in PT at [facility] with improvement in ROM.     HPL/history of CVA- Patient doing well with current medication regimen, compliant with medication schedule, denies adverse effects.   HTN- Patient doing well with current medication regimen, compliant with medication schedule, denies adverse effects. She monitors her BP at home occasionally.   Trigeminal neuralgia- she had some left side jaw pain, was seen by her dentist and PT with improvement. She has been having some mild pain. She takes gabapentin 100 mg PRN. She is needing a refill today.   Iron deficiency anemia- she is not currently taking iron.    Colon cancer screening- c-scope UTD.  She denies any change in bowel habits, changes will,, BRBPR, melena, abdominal pain.    GYN- UTD with mammogram.     The following portions of the patient's history were reviewed and updated as appropriate: allergies, current medications, past family history, past medical history, past social history, past surgical history and problem list.    Review of Systems   Constitutional: Negative for chills, fatigue and fever.   Respiratory: Negative for cough, chest tightness, shortness of breath and wheezing.    Cardiovascular: Negative for chest pain, palpitations and leg swelling.   Gastrointestinal: Negative.    Neurological: Negative.    Psychiatric/Behavioral: Negative for suicidal ideas and depressed mood. The patient is not nervous/anxious.        Objective   Physical Exam  Constitutional:       Appearance: She is well-developed.   Neck:      Thyroid: No thyroid mass, thyromegaly  or thyroid tenderness.      Vascular: No carotid bruit.      Trachea: Trachea normal.   Cardiovascular:      Rate and Rhythm: Normal rate and regular rhythm.      Chest Wall: PMI is not displaced.      Pulses:           Radial pulses are 2+ on the right side and 2+ on the left side.        Dorsalis pedis pulses are 2+ on the right side and 2+ on the left side.        Posterior tibial pulses are 2+ on the right side and 2+ on the left side.      Heart sounds: S1 normal and S2 normal.   Pulmonary:      Effort: Pulmonary effort is normal.      Breath sounds: Normal breath sounds.   Musculoskeletal:      Right lower leg: No edema.      Left lower leg: No edema.   Lymphadenopathy:      Head:      Right side of head: No submental, submandibular, tonsillar or occipital adenopathy.      Left side of head: No submental, submandibular, tonsillar or occipital adenopathy.      Cervical: No cervical adenopathy.   Skin:     General: Skin is warm and dry.      Capillary Refill: Capillary refill takes less than 2 seconds.      Nails: There is no clubbing.   Neurological:      Mental Status: She is alert and oriented to person, place, and time. Mental status is at baseline.      Cranial Nerves: Cranial nerves 2-12 are intact.      Sensory: Sensory deficit (left alysa eof face. Patient baseline) present.      Motor: Motor function is intact.      Coordination: Coordination is intact.      Gait: Gait is intact.      Deep Tendon Reflexes:      Reflex Scores:       Patellar reflexes are 2+ on the right side and 2+ on the left side.  Psychiatric:         Attention and Perception: Attention normal.         Mood and Affect: Mood and affect normal.         Speech: Speech normal.         Behavior: Behavior normal.         Thought Content: Thought content normal.         Cognition and Memory: Cognition normal.         Vitals:    10/27/22 0952   BP: 118/62   Pulse: 67   Temp: 96.3 °F (35.7 °C)   SpO2: 97%      Body mass index is 19.1  kg/m².    Assessment & Plan   Diagnoses and all orders for this visit:    1. Medicare annual wellness visit, subsequent (Primary)    2. History of CVA (cerebrovascular accident)  -     CBC & Differential; Future  -     Comprehensive Metabolic Panel; Future  -     Lipid Panel With LDL / HDL Ratio; Future  -     TSH Rfx On Abnormal To Free T4; Future  -     Ferritin; Future    3. Primary hypertension  -     Comprehensive Metabolic Panel; Future  -     Lipid Panel With LDL / HDL Ratio; Future  -     TSH Rfx On Abnormal To Free T4; Future  -     Ferritin; Future    4. Hyperlipidemia LDL goal <70  -     Comprehensive Metabolic Panel; Future  -     Lipid Panel With LDL / HDL Ratio; Future  -     TSH Rfx On Abnormal To Free T4; Future  -     Ferritin; Future    5. Iron deficiency anemia due to chronic blood loss  -     CBC & Differential; Future  -     Ferritin; Future    6. Trigeminal neuralgia of left side of face  -     gabapentin (NEURONTIN) 100 MG capsule; qhs  Dispense: 90 capsule; Refill: 0  -     CBC & Differential; Future  -     Comprehensive Metabolic Panel; Future  -     Lipid Panel With LDL / HDL Ratio; Future  -     TSH Rfx On Abnormal To Free T4; Future  -     Ferritin; Future    7. Trigeminal neuralgia  -     gabapentin (NEURONTIN) 100 MG capsule; qhs  Dispense: 90 capsule; Refill: 0    8. Vitamin D deficiency  -     Vitamin D,25-Hydroxy; Future    9. Hyponatremia  -     Comprehensive Metabolic Panel; Future      Lab results discussed with patient in office today.    1.  HTN-well-controlled.  Continue losartan-HCTZ 100-12.5 mg daily.  Continue to monitor BP occasionally with goal less than 130/80.  2.  HPL/history of CVA- lipid panel improved.  Continue Vascepa along with Mediterranean-style diet and regular exercise.  Continue aspirin 81 mg.  Encouraged her to start exercises focusing on balance and strengthening.  3.  Trigeminal neuralgia- well controlled with gabapentin 100 mg nightly as needed.   Prescription refilled today.  Patient aware of appropriate use and adverse effects of this high risk medication.  CSA up-to-date, Gaurav completed today.  4.  Iron deficiency anemia- return to ferrous sulfate 325 mg every other day.  Recheck labs in April.    Dentist up-to-date  Eye exam up-to-date  C-scope up-to-date  Mammogram up-to-date  Wear sunscreen outside    Follow-up in 6 months with fasting labs prior.

## 2022-12-21 ENCOUNTER — OFFICE VISIT (OUTPATIENT)
Dept: INTERNAL MEDICINE | Facility: CLINIC | Age: 72
End: 2022-12-21

## 2022-12-21 VITALS
WEIGHT: 128 LBS | SYSTOLIC BLOOD PRESSURE: 118 MMHG | HEART RATE: 85 BPM | DIASTOLIC BLOOD PRESSURE: 60 MMHG | OXYGEN SATURATION: 98 % | BODY MASS INDEX: 20.04 KG/M2

## 2022-12-21 DIAGNOSIS — R68.84 PAIN IN LOWER JAW: Primary | ICD-10-CM

## 2022-12-21 PROCEDURE — 99213 OFFICE O/P EST LOW 20 MIN: CPT | Performed by: NURSE PRACTITIONER

## 2022-12-21 RX ORDER — OXYCODONE AND ACETAMINOPHEN 7.5; 325 MG/1; MG/1
1 TABLET ORAL
COMMUNITY
Start: 2022-07-19

## 2022-12-21 RX ORDER — ONDANSETRON 4 MG/1
4 TABLET, FILM COATED ORAL
COMMUNITY
Start: 2022-07-19

## 2022-12-21 RX ORDER — AMOXICILLIN 875 MG/1
875 TABLET, COATED ORAL 2 TIMES DAILY
Qty: 14 TABLET | Refills: 0 | Status: SHIPPED | OUTPATIENT
Start: 2022-12-21 | End: 2022-12-28

## 2022-12-21 NOTE — PROGRESS NOTES
Subjective   Rosi Vicente is a 72 y.o. female.     No chief complaint on file.       History of Present Illness   She is here today with c/o left jaw and tooth pain. This initially was intermittent and has become more persistent over the past 2 weeks. She is concerned that her left trigeminal neuralgia has returned, but she states the pain is different than her typical trigeminal nerve pain. The pain is described as an ache around her left lower molar and into her left lower jaw. The pain is worse when her teeth touch and with exposure to hot and cold.  She has tried gabapentin without relief.  She had been taking Tylenol every 5-6 hours with some improvement in pain.  She initially was seen by her dentist who did not think this was related to her teeth.  Positive history of root canal.    The following portions of the patient's history were reviewed and updated as appropriate: allergies, current medications, past family history, past medical history, past social history, past surgical history and problem list.    Review of Systems   Constitutional: Negative for chills, fatigue and fever.   HENT: Positive for dental problem. Negative for ear pain, facial swelling, sore throat and trouble swallowing.         Left jaw pain.   Respiratory: Negative for cough, chest tightness, shortness of breath and wheezing.    Cardiovascular: Negative for chest pain, palpitations and leg swelling.   Neurological: Negative for numbness.   Psychiatric/Behavioral: Negative for suicidal ideas and depressed mood. The patient is not nervous/anxious.        Objective   Physical Exam  Constitutional:       Appearance: She is well-developed.   HENT:      Head: Normocephalic and atraumatic.      Jaw: Tenderness present. No trismus, swelling, pain on movement or malocclusion.      Salivary Glands: Right salivary gland is not diffusely enlarged. Left salivary gland is not diffusely enlarged.      Right Ear: Hearing, tympanic membrane, ear canal  and external ear normal.      Left Ear: Hearing, tympanic membrane, ear canal and external ear normal.      Mouth/Throat:      Lips: Pink.      Mouth: Mucous membranes are moist. No injury or oral lesions.      Dentition: Abnormal dentition. Does not have dentures. Dental tenderness present. No gingival swelling, dental caries, dental abscesses or gum lesions.      Tongue: No lesions. Tongue does not deviate from midline.      Palate: No mass and lesions.      Pharynx: Oropharynx is clear. Uvula midline.   Neck:      Thyroid: No thyroid mass, thyromegaly or thyroid tenderness.      Vascular: No carotid bruit.      Trachea: Trachea normal.   Cardiovascular:      Rate and Rhythm: Normal rate and regular rhythm.      Chest Wall: PMI is not displaced.      Pulses:           Radial pulses are 2+ on the right side and 2+ on the left side.        Dorsalis pedis pulses are 2+ on the right side and 2+ on the left side.        Posterior tibial pulses are 2+ on the right side and 2+ on the left side.      Heart sounds: S1 normal and S2 normal.   Pulmonary:      Effort: Pulmonary effort is normal.      Breath sounds: Normal breath sounds.   Musculoskeletal:      Right lower leg: No edema.      Left lower leg: No edema.   Lymphadenopathy:      Head:      Right side of head: No submental, submandibular, tonsillar or occipital adenopathy.      Left side of head: No submental, submandibular, tonsillar or occipital adenopathy.      Cervical: No cervical adenopathy.   Skin:     General: Skin is warm and dry.      Capillary Refill: Capillary refill takes less than 2 seconds.      Nails: There is no clubbing.   Neurological:      Mental Status: She is alert and oriented to person, place, and time.   Psychiatric:         Attention and Perception: Attention normal.         Mood and Affect: Mood and affect normal.         Speech: Speech normal.         Behavior: Behavior normal.         Thought Content: Thought content normal.          Cognition and Memory: Cognition normal.         Vitals:    12/21/22 1502   BP: 118/60   Pulse: 85   SpO2: 98%      Body mass index is 20.04 kg/m².    Assessment & Plan   Diagnoses and all orders for this visit:    1. Pain in lower jaw (Primary)  -     amoxicillin (AMOXIL) 875 MG tablet; Take 1 tablet by mouth 2 (Two) Times a Day for 7 days.  Dispense: 14 tablet; Refill: 0      1.  Pain in lower jaw-?  Dental infection.  We will try treating with amoxicillin 875 mg twice daily x7 days to see if this helps.  She will follow-up with her dentist on Tuesday.  Recommend a probiotic separate from the antibiotic and continuing Tylenol as needed for analgesia.  Okay to continue gabapentin 100 mg at bedtime as needed.  If there is no improvement after completing the antibiotic and dental exam is negative we will place a referral to neurology for further evaluation and management for trigeminal neuralgia.

## 2022-12-27 ENCOUNTER — TELEPHONE (OUTPATIENT)
Dept: NEUROLOGY | Facility: CLINIC | Age: 72
End: 2022-12-27

## 2022-12-27 ENCOUNTER — TELEPHONE (OUTPATIENT)
Dept: INTERNAL MEDICINE | Facility: CLINIC | Age: 72
End: 2022-12-27

## 2022-12-27 NOTE — TELEPHONE ENCOUNTER
Caller: PASCUAL    Best call back number: [phone] YOU CAN LEAVE A DETAILED MESS IF GET V.M       What was the call regarding: PT WOULD LIKE TO SEE IF YOU CAN CALL HER IN RX . SHE STATED THE GABAPENTIN DOES NOT WORK. TEGRATOL DOES HOWEVER/ OR HAS IN PAST? IF YOU HAVE ANYTHING ELSE TO TRY SHE WILL     Do you require a callback: YES TO DISCUSS WHAT HER OPTIONS ARE.         PLEASE ADVISE.

## 2022-12-27 NOTE — TELEPHONE ENCOUNTER
Caller: Rosi Vicente    Relationship: Self    Best call back number: [phone]    What is the best time to reach you: ANY    Who are you requesting to speak with (clinical staff, provider,  specific staff member): MARNE QUESADA    Do you know the name of the person who called:     What was the call regarding: SHE IS IN PAIN AND WANTS TO SPEAK WITH MAREN    Do you require a callback: YES

## 2022-12-27 NOTE — TELEPHONE ENCOUNTER
WE HAVE NOT SEEN PATIENT SINCE 2020 AND IT WAS NOT FOR TRIGEMINAL NEURALGIA    IT APPEARS HER PCP HAS BEEN PRESCRIBING THE GABAPENTIN    LM FOR PATIENT LETTING HER KNOW THAT IF HER PAIN IS THAT SEVERE SHE WILL NEED TO GO TO ER    IS THIS SOMETHING THAT YOU CAN SEE HER FOR ?  IF SO DO YOU REQUIRE A 60 MIN APPT  ?

## 2022-12-27 NOTE — TELEPHONE ENCOUNTER
PLEASE ADVISE- LM FOR PATIENT YOUR OUT OF OFFICE UNTIL WED    UNSURE AS TO PRESCRIBED THE TEGRETOL OR WHEN

## 2022-12-27 NOTE — TELEPHONE ENCOUNTER
Caller: Rosi Vicente     Relationship: PATIENT    Best call back number: [phone]    What is your medical concern? PATIENT WAS DX WITH TRIGEMINAL NEURALGIA AND IS EXPERIENCING SEVERE PAIN, PATIENT IS UNABLE TO EAT AND HAS DIFFICULTY SPEAKING. PATIENT HAS TRIED GABAPENTIN WITH NO RELIEF    PLEASE CALL AND ADVISE    THANK YOU    How long has this issue been going on? 2 WEEKS    Is your provider already aware of this issue? N/A    Have you been treated for this issue? N/A

## 2022-12-28 ENCOUNTER — TELEPHONE (OUTPATIENT)
Dept: INTERNAL MEDICINE | Facility: CLINIC | Age: 72
End: 2022-12-28

## 2022-12-28 DIAGNOSIS — G50.0 TRIGEMINAL NEURALGIA OF LEFT SIDE OF FACE: Primary | ICD-10-CM

## 2022-12-28 RX ORDER — CARBAMAZEPINE 100 MG/1
100 TABLET, EXTENDED RELEASE ORAL 2 TIMES DAILY
Qty: 60 TABLET | Refills: 1 | Status: SHIPPED | OUTPATIENT
Start: 2022-12-28 | End: 2022-12-29 | Stop reason: SINTOL

## 2022-12-28 NOTE — TELEPHONE ENCOUNTER
Caller: Rosi Vicente    Relationship: Self    Best call back number: [phone]    What is the best time to reach you: ANYTIME    Who are you requesting to speak with (clinical staff, provider,  specific staff member): MAREN QUESADA    What was the call regarding: PATIENT IS REQUESTING A CALLBACK FROM MAREN QUESADA STATING THAT SHE IS STILL IN  A LOT OF PAIN THIS MORNING AND IS WANTING TO KNOW IF  Tegretol COULD BE CALLED IN FOR HER. PATIENT REQUESTING A CALLBACK WITH UPDATES.

## 2022-12-28 NOTE — TELEPHONE ENCOUNTER
Called patient to discuss symptoms of persistent left lower jaw and face pain. She was seen by her dentist and dental cause for pain has been ruled out. She has had return of trigeminal neuralgia. She has a telehealth appointment scheduled with the [facility] neurology department specializing in trigeminal neuralgia on 01/05 for further evaluation and management. Discussed with her that we will goa ahead and start carbamazepine 100 mg BID for trigeminal neuralgia. She has previously been on this medication. Discussed appropriate use and a/e. Prescription sent to her pharmacy.  All questions answered and she is agreeable with this plan of care.

## 2022-12-28 NOTE — TELEPHONE ENCOUNTER
Patient seen dentist, states trigeminal neuralgia, patient cannot see neurologist until February. Patient also got a hold of  [facility] who specialize in trigeminal neuralgia and they will give patient a call to do teleahealth for Tegretol, but would like a script from you until then.

## 2022-12-29 ENCOUNTER — TELEPHONE (OUTPATIENT)
Dept: INTERNAL MEDICINE | Facility: CLINIC | Age: 72
End: 2022-12-29

## 2022-12-29 DIAGNOSIS — G50.0 TRIGEMINAL NEURALGIA OF LEFT SIDE OF FACE: Primary | ICD-10-CM

## 2022-12-29 RX ORDER — CARBAMAZEPINE 100 MG/1
50 TABLET, CHEWABLE ORAL 2 TIMES DAILY
Qty: 30 TABLET | Refills: 1 | Status: SHIPPED | OUTPATIENT
Start: 2022-12-29 | End: 2023-01-16 | Stop reason: SDUPTHER

## 2022-12-29 NOTE — TELEPHONE ENCOUNTER
Called and spoke with patient regarding Tegretol 100 mg capsules twice daily prescription for trigeminal neuralgia.  She started this last night and has felt a lot of sedation today.  She does note improvement in nerve pain. She is wondering if there is a lower dose she can try.  Discussed with her that we can try the Tegretol 100 mg chewable tablet, half a tablet twice a day to see if this is better tolerated.  New prescription has been sent to her pharmacy.  She is following up with neurology as scheduled next week.

## 2022-12-29 NOTE — TELEPHONE ENCOUNTER
LM FOR PATIENT LETTING HER KNOW THAT WE HAVE NOT TREATED HER FOR OR DIAGNOSED HER WITH TRIGEMINAL- SHE NEEDS TO FOLLOW UP WITH PROVIDER THAT PRESCRIBED GABAPENTIN AND WHO IS TREATING HER FOR THIS CONDITION

## 2022-12-29 NOTE — TELEPHONE ENCOUNTER
Caller: Rosi Vicente    Relationship: Self    Best call back number: [phone]    Do you require a callback: YES-PATIENT IS REQUESTING THE LOWEST DOSE OF TEGRETOL BE CALLED IN TO PHARMACY BELOW.  MG IS TOO MUCH AND IT CAN NOT BE HALVED BECAUSE IT IS A CAPSULE. PLEASE CALL AND ADVISE    [facility] [id number] - [place], [place] - [facility] [address] AT [address] - [phone]  - [phone]   [phone]

## 2023-01-06 ENCOUNTER — TELEPHONE (OUTPATIENT)
Dept: INTERNAL MEDICINE | Facility: CLINIC | Age: 73
End: 2023-01-06
Payer: MEDICARE

## 2023-01-06 NOTE — TELEPHONE ENCOUNTER
Caller: Rosi Vicente    Relationship to patient: Self    Best call back number: [phone]    Date of exposure: 1/3/23    Date of positive COVID19 test: 1/6/23    Date if possible COVID19 exposure: 1/3/23    COVID19 symptoms: LIKE A BAD COLD AND ACHY    Date of initial quarantine: SINCE 1/3/23    Additional information or concerns: WOULD LIKE TO KNOW WHAT MEDICATIONS SHE SHOULD TAKE IF ANY.    What is the patients preferred pharmacy:     [facility] [id number] - [place] [address] [address] AT [address] - [phone]  - [phone] FX

## 2023-01-16 DIAGNOSIS — G50.0 TRIGEMINAL NEURALGIA OF LEFT SIDE OF FACE: ICD-10-CM

## 2023-01-16 RX ORDER — CARBAMAZEPINE 100 MG/1
50 TABLET, CHEWABLE ORAL 2 TIMES DAILY
Qty: 30 TABLET | Refills: 1 | Status: SHIPPED | OUTPATIENT
Start: 2023-01-16 | End: 2023-02-15 | Stop reason: SDUPTHER

## 2023-01-25 DIAGNOSIS — G50.0 TRIGEMINAL NEURALGIA OF LEFT SIDE OF FACE: ICD-10-CM

## 2023-01-25 DIAGNOSIS — R68.84 PAIN IN LOWER JAW: ICD-10-CM

## 2023-01-25 DIAGNOSIS — G50.0 TRIGEMINAL NEURALGIA: ICD-10-CM

## 2023-01-25 RX ORDER — ICOSAPENT ETHYL 1000 MG/1
2 CAPSULE ORAL 2 TIMES DAILY WITH MEALS
Qty: 360 CAPSULE | Refills: 1 | Status: SHIPPED | OUTPATIENT
Start: 2023-01-25

## 2023-01-26 RX ORDER — GABAPENTIN 100 MG/1
CAPSULE ORAL
Qty: 90 CAPSULE | Refills: 0 | Status: SHIPPED | OUTPATIENT
Start: 2023-01-26

## 2023-01-26 RX ORDER — LOSARTAN POTASSIUM AND HYDROCHLOROTHIAZIDE 12.5; 1 MG/1; MG/1
TABLET ORAL
Qty: 90 TABLET | Refills: 1 | Status: SHIPPED | OUTPATIENT
Start: 2023-01-26

## 2023-01-26 RX ORDER — AMOXICILLIN 875 MG/1
TABLET, COATED ORAL
Qty: 14 TABLET | Refills: 0 | OUTPATIENT
Start: 2023-01-26

## 2023-01-28 ENCOUNTER — PATIENT MESSAGE (OUTPATIENT)
Dept: GASTROENTEROLOGY | Facility: CLINIC | Age: 73
End: 2023-01-28
Payer: MEDICARE

## 2023-01-30 ENCOUNTER — TELEPHONE (OUTPATIENT)
Dept: GASTROENTEROLOGY | Facility: CLINIC | Age: 73
End: 2023-01-30
Payer: MEDICARE

## 2023-02-02 NOTE — TELEPHONE ENCOUNTER
From: Rosi Vicente  To: Flor Beyer  Sent: 1/28/2023 11:14 AM EST  Subject: Rixmin    MORNING,  Hope all is well with you and the family.   For the past 2 weeks have had diarrhea on an off especially after eating a salad. Been using IBgard as Felicitas suggested. It works but probably time to take a round of Rixmin which began Sunday. [place] [facility] with your prescription. Need a refill please unless you have other ideas.   Thanks,  Rosi

## 2023-02-09 DIAGNOSIS — F41.9 ANXIETY DUE TO INVASIVE PROCEDURE: Primary | ICD-10-CM

## 2023-02-09 RX ORDER — DIAZEPAM 2 MG/1
2 TABLET ORAL ONCE AS NEEDED
Qty: 2 TABLET | Refills: 0 | Status: SHIPPED | OUTPATIENT
Start: 2023-02-09

## 2023-02-15 DIAGNOSIS — G50.0 TRIGEMINAL NEURALGIA OF LEFT SIDE OF FACE: ICD-10-CM

## 2023-02-15 RX ORDER — CARBAMAZEPINE 100 MG/1
100 TABLET, CHEWABLE ORAL 2 TIMES DAILY
Qty: 90 TABLET | Refills: 1 | Status: SHIPPED | OUTPATIENT
Start: 2023-02-15 | End: 2023-02-17 | Stop reason: SDUPTHER

## 2023-02-17 ENCOUNTER — TELEPHONE (OUTPATIENT)
Dept: INTERNAL MEDICINE | Facility: CLINIC | Age: 73
End: 2023-02-17
Payer: MEDICARE

## 2023-02-17 DIAGNOSIS — G50.0 TRIGEMINAL NEURALGIA OF LEFT SIDE OF FACE: ICD-10-CM

## 2023-02-17 RX ORDER — CARBAMAZEPINE 100 MG/1
TABLET, CHEWABLE ORAL
Qty: 90 TABLET | Refills: 1 | Status: SHIPPED | OUTPATIENT
Start: 2023-02-17

## 2023-02-17 NOTE — TELEPHONE ENCOUNTER
Rx sent with correct directions.      ----- Message from JUAN FRANCISCO Nguyen sent at 2/17/2023  9:19 AM EST -----  Carbamazepine 200 mg in the a.m. and 100 mg in the p.m.  ----- Message -----  From: Cecilia Shea MA  Sent: 2/17/2023   9:13 AM EST  To: JUAN FRANCISCO Nguyen    Please clarify directions for the carbamazepine 100 mg.

## 2023-02-22 DIAGNOSIS — G50.0 TRIGEMINAL NEURALGIA OF LEFT SIDE OF FACE: ICD-10-CM

## 2023-03-03 ENCOUNTER — OFFICE VISIT (OUTPATIENT)
Dept: NEUROLOGY | Facility: CLINIC | Age: 73
End: 2023-03-03
Payer: MEDICARE

## 2023-03-03 VITALS
WEIGHT: 123.6 LBS | BODY MASS INDEX: 19.4 KG/M2 | HEIGHT: 67 IN | HEART RATE: 74 BPM | OXYGEN SATURATION: 99 % | DIASTOLIC BLOOD PRESSURE: 72 MMHG | SYSTOLIC BLOOD PRESSURE: 122 MMHG

## 2023-03-03 DIAGNOSIS — G50.0 TRIGEMINAL NEURALGIA OF LEFT SIDE OF FACE: Primary | ICD-10-CM

## 2023-03-03 DIAGNOSIS — Z86.73 HISTORY OF CVA (CEREBROVASCULAR ACCIDENT): ICD-10-CM

## 2023-03-03 PROCEDURE — 99213 OFFICE O/P EST LOW 20 MIN: CPT | Performed by: NURSE PRACTITIONER

## 2023-03-03 NOTE — PROGRESS NOTES
DOS: 3/4/2023  NAME: Rosi Vicente   : 1950  PCP: Anette Machado APRN    Chief Complaint   Patient presents with   • Trigeminal Neuralgia      SUBJECTIVE  Neurological Problem:  72 y.o.  RHW female with HTN, HLD and h/o of trigeminal neuralgia and stroke who presents today for follow-up. She is accompanied by her spouse.     Interval History:   **For detailed previous history, please see progress notes dated 19    Ms. Vicente has been previously followed by our service for history of right basal ganglia/corona radiata stroke she suffered in 2017 due to small vessel disease from uncontrolled risk factors.  She was last seen in our office back in 2020 doing well on secondary stroke prevention with ASA 81 mg, fish oil supplements as she has a intolerability to multiple statins and also trial of Repatha.     Patient presents today she is doing well from a stroke standpoint, no recurrent stroke/TIA symptoms and she is doing well on ASA 81 mg and Vascepa.  Her primary concern today regards new complaints of trigeminal neuralgia flare up.  She apparently has a history of trigeminal neuralgia dating back over 10 years prior, states she was initially treated in [place] with gamma knife which was not helpful, later was seen at [facility], had MVD.  She is concerned that this flareup will progress to the significant amount of pain she had years back.  She is inquiring about a local provider with surgical experience treating this condition.  She is currently being managed medically by her PCP, taking carbamazepine as well as gabapentin.  Pain is somewhat controlled at this point, but again patient is expressing significant concerns that this could become uncontrollable.      Review of Systems:Review of Systems   Constitutional: Negative for activity change, appetite change, chills, diaphoresis, fatigue, fever and unexpected weight change.   HENT: Negative for  congestion, dental problem, drooling, ear discharge, ear pain, facial swelling, hearing loss, mouth sores, nosebleeds, postnasal drip, rhinorrhea, sinus pressure, sinus pain, sneezing, sore throat, tinnitus, trouble swallowing and voice change.    Eyes: Negative for photophobia, pain, discharge, redness, itching and visual disturbance.   Musculoskeletal: Negative for arthralgias, back pain, gait problem, joint swelling, myalgias, neck pain and neck stiffness.   Neurological: Negative for dizziness, tremors, seizures, syncope, facial asymmetry, speech difficulty, weakness, light-headedness, numbness and headaches.   Psychiatric/Behavioral: Negative for agitation, behavioral problems, confusion, decreased concentration, dysphoric mood, hallucinations, self-injury, sleep disturbance and suicidal ideas. The patient is not nervous/anxious and is not hyperactive.     Above ROS reviewed    The following portions of the patient's history were reviewed and updated as appropriate: allergies, current medications, past family history, past medical history, past social history, past surgical history and problem list.    Current Medications:   Current Outpatient Medications:   •  acetaminophen (TYLENOL) 500 MG tablet, Take 1 tablet by mouth Every 6 (Six) Hours As Needed for Mild Pain., Disp: , Rfl:   •  aspirin 81 MG EC tablet, Take 1 tablet by mouth Daily., Disp: , Rfl:   •  Biotin 5000 MCG capsule, Take 1 capsule by mouth Daily., Disp: , Rfl:   •  carBAMazepine (TEGretol) 100 MG chewable tablet, Take 2 tablets in the morning and 1 tablet in the evening. (Patient taking differently: Currently taking 25 mg prn), Disp: 90 tablet, Rfl: 1  •  cholecalciferol (VITAMIN D3) 1000 UNITS tablet, Take 2 tablets by mouth 2 (Two) Times a Day., Disp: , Rfl:   •  Coenzyme Q10 (COQ-10) 400 MG capsule, Take  by mouth Daily., Disp: , Rfl:   •  fluticasone (FLONASE) 50 MCG/ACT nasal spray, 2 sprays into the nostril(s) as directed by provider  Daily., Disp: 15.8 mL, Rfl: 3  •  losartan-hydrochlorothiazide (HYZAAR) 100-12.5 MG per tablet, TAKE ONE TABLET BY MOUTH DAILY, Disp: 90 tablet, Rfl: 1  •  multivitamin with minerals tablet tablet, Take 1 tablet by mouth Daily., Disp: , Rfl:   •  NON FORMULARY, berberine po, Disp: , Rfl:   •  NON FORMULARY, Bone growth factor, Disp: , Rfl:   •  Peppermint Oil (IBGARD PO), Take  by mouth., Disp: , Rfl:   •  Probiotic Product (PROBIOTIC-10 PO), Take 1 capsule by mouth Daily., Disp: , Rfl:   •  sertraline (ZOLOFT) 25 MG tablet, Take 1 tablet by mouth Daily., Disp: 90 tablet, Rfl: 1  •  SUPER B COMPLEX/C PO, Take  by mouth., Disp: , Rfl:   •  Vascepa 1 g capsule capsule, Take 2 g by mouth 2 (Two) Times a Day With Meals., Disp: 360 capsule, Rfl: 1  •  amoxicillin (AMOXIL) 500 MG capsule, , Disp: , Rfl:   •  cycloSPORINE (RESTASIS) 0.05 % ophthalmic emulsion, Apply 1 drop to eye(s) as directed by provider 2 (Two) Times a Day., Disp: , Rfl:   •  diazePAM (Valium) 2 MG tablet, Take 1 tablet by mouth 1 (One) Time As Needed for Anxiety., Disp: 2 tablet, Rfl: 0  •  gabapentin (NEURONTIN) 100 MG capsule, TAKE ONE CAPSULE BY MOUTH EVERY NIGHT AT BEDTIME, Disp: 90 capsule, Rfl: 0  •  Melatonin 10 MG tablet, Take 2 tablets by mouth., Disp: , Rfl:   •  mupirocin (BACTROBAN) 2 % ointment, , Disp: , Rfl:   •  omeprazole (priLOSEC) 20 MG capsule, Take 1 capsule by mouth Daily., Disp: 90 capsule, Rfl: 3  •  ondansetron (ZOFRAN) 4 MG tablet, Take 1 tablet by mouth., Disp: , Rfl:   •  oxyCODONE-acetaminophen (PERCOCET) 7.5-325 MG per tablet, Take 1 tablet by mouth., Disp: , Rfl:   **I did not stop or change the above medications.  Patient's medication list was updated to reflect medications they have reported as currently taking, including medication changes made by other providers.    OBJECTIVE  Vitals:    03/03/23 1040   BP: 122/72   Pulse: 74   SpO2: 99%     Body mass index is 19.35 kg/m².    Diagnostics:    Laboratory Results:          Lab Results   Component Value Date    WBC 6.17 10/14/2022    HGB 11.2 (L) 10/14/2022    HCT 35.3 10/14/2022    MCV 74.3 (L) 10/14/2022     10/14/2022     Lab Results   Component Value Date    GLUCOSE 77 10/14/2022    BUN 13 10/14/2022    CREATININE 0.50 (L) 10/14/2022    EGFRIFNONA 90 10/21/2021    EGFRIFAFRI 104 10/21/2021    BCR 26.0 (H) 10/14/2022    K 4.2 10/14/2022    CO2 26.7 10/14/2022    CALCIUM 9.1 10/14/2022    PROTENTOTREF 6.7 10/21/2021    ALBUMIN 4.20 10/14/2022    LABIL2 2.2 10/21/2021    AST 29 10/14/2022    ALT 23 10/14/2022     Lab Results   Component Value Date    HGBA1C 5.30 09/13/2017     Lab Results   Component Value Date    CHOL 154 10/14/2022    CHOL 176 04/12/2022    CHOL 152 07/17/2019     Lab Results   Component Value Date    HDL 42 10/14/2022    HDL 56 04/12/2022    HDL 46 10/21/2021     Lab Results   Component Value Date    LDL 99 10/14/2022     (H) 04/12/2022    LDL 94 10/21/2021     Lab Results   Component Value Date    TRIG 67 10/14/2022    TRIG 55 04/12/2022    TRIG 59 10/21/2021     No results found for: RPR  Lab Results   Component Value Date    TSH 2.060 10/21/2021     Lab Results   Component Value Date    NRCHQYCC57 >2000 (H) 10/21/2021       Physical Exam:  GENERAL: NAD  HEENT: Normocephalic, atraumatic   COR: RRR  Resp: Even and unlabored  Extremities: No signs of distal embolization.   Skin: No rashes, lesions or ulcers.  Psychiatric: Normal mood and affect.    Neurological:   MS: AO. Language normal. No neglect. Follows all commands.  CN: II-XII grossly normal  Motor: Normal strength and tone throughout.  Station and Gait: Normal gait and station.      Impression/Plan: Discussed with patient that our office can manage her symptoms medically but that for information on any further surgical treatment would need to refer. She is satisfied with her medications as managed by her PCP at this point and is interested in a referral to possible [facility] or other. Will d/w  colleagues regarding appropriate provider referral and get back with patient once this is determined.  She will continue to follow-up with her PCP for medication management.      Diagnoses and all orders for this visit:    1. Trigeminal neuralgia of left side of face (Primary)    2. History of CVA (cerebrovascular accident)        Coding      Dictated using Dragon

## 2023-03-07 DIAGNOSIS — G50.0 TRIGEMINAL NEURALGIA OF LEFT SIDE OF FACE: Primary | ICD-10-CM

## 2023-03-09 ENCOUNTER — TELEPHONE (OUTPATIENT)
Dept: NEUROLOGY | Facility: CLINIC | Age: 73
End: 2023-03-09
Payer: MEDICARE

## 2023-03-09 NOTE — TELEPHONE ENCOUNTER
Dr. Lovelace is requesting patient to have MRI Brain done prior to scheduling an appointment. Can you place a MRI Brain order.    Thanks!

## 2023-03-10 DIAGNOSIS — G50.0 TRIGEMINAL NEURALGIA OF LEFT SIDE OF FACE: Primary | ICD-10-CM

## 2023-03-10 DIAGNOSIS — Z86.73 HISTORY OF CVA (CEREBROVASCULAR ACCIDENT): ICD-10-CM

## 2023-03-20 ENCOUNTER — TRANSCRIBE ORDERS (OUTPATIENT)
Dept: ADMINISTRATIVE | Facility: HOSPITAL | Age: 73
End: 2023-03-20
Payer: MEDICARE

## 2023-03-20 DIAGNOSIS — G50.0 TRIGEMINAL NEURALGIA: ICD-10-CM

## 2023-03-20 DIAGNOSIS — D33.2 BENIGN NEOPLASM OF BRAIN, UNSPECIFIED BRAIN REGION: Primary | ICD-10-CM

## 2023-03-30 ENCOUNTER — TELEMEDICINE (OUTPATIENT)
Dept: GASTROENTEROLOGY | Facility: CLINIC | Age: 73
End: 2023-03-30
Payer: MEDICARE

## 2023-03-30 VITALS — WEIGHT: 120 LBS | BODY MASS INDEX: 18.79 KG/M2

## 2023-03-30 DIAGNOSIS — K22.70 BARRETT'S ESOPHAGUS WITHOUT DYSPLASIA: Primary | ICD-10-CM

## 2023-03-30 DIAGNOSIS — K44.9 HIATAL HERNIA: Chronic | ICD-10-CM

## 2023-03-30 DIAGNOSIS — K21.9 GASTROESOPHAGEAL REFLUX DISEASE, UNSPECIFIED WHETHER ESOPHAGITIS PRESENT: ICD-10-CM

## 2023-03-30 DIAGNOSIS — Z12.11 COLON CANCER SCREENING: ICD-10-CM

## 2023-03-30 DIAGNOSIS — K57.90 DIVERTICULOSIS: Chronic | ICD-10-CM

## 2023-03-30 DIAGNOSIS — K58.2 IRRITABLE BOWEL SYNDROME WITH BOTH CONSTIPATION AND DIARRHEA: Chronic | ICD-10-CM

## 2023-03-30 PROCEDURE — 1160F RVW MEDS BY RX/DR IN RCRD: CPT | Performed by: NURSE PRACTITIONER

## 2023-03-30 PROCEDURE — 99214 OFFICE O/P EST MOD 30 MIN: CPT | Performed by: NURSE PRACTITIONER

## 2023-03-30 PROCEDURE — 1159F MED LIST DOCD IN RCRD: CPT | Performed by: NURSE PRACTITIONER

## 2023-03-30 RX ORDER — OMEPRAZOLE 20 MG/1
CAPSULE, DELAYED RELEASE ORAL
Qty: 180 CAPSULE | Refills: 3 | Status: SHIPPED | OUTPATIENT
Start: 2023-03-30

## 2023-03-30 NOTE — PROGRESS NOTES
Chief Complaint   Patient presents with   • Follow-up     Fonseca's, GERD, IBS-D, colon polyps         History of Present Illness  73-year-old female presents today for telemedicine follow-up.  She was last seen in office on 6/21/2022.  She has a history of esophagus, GERD, IBS-D, and colon polyps.    Last Cologuard was performed May 2021 and was negative.  She underwent EGD and colonoscopy on 6/29/2022.  EGD revealed an irregular Z-line and a 3 cm hiatal hernia.  Fonseca's esophagus was noted on esophagus biopsy.  Colonoscopy revealed diverticulosis and nonbleeding internal hemorrhoids.  Bowel prep was fair.    GERD and Fonseca's esophagus patient continues omeprazole 20 mg once daily. Most days she feels that it works well to manage symptoms. She will eat DGL (licorice) when she has symptom flares and symptoms will respond well most of the time.  She avoids eating close to bedtime. She denies any nausea, vomiting, or dysphagia.     She reports a history of IBS. She has trigeminal nerve neuralgia and takes Tegretol, which slows down her bowel habits. Her bowel habits range from explosive diarrhea to constipation. Her last BM was Saturday or Sunday. She does not have abdominal distention and does not feel full. She is a pescatarian. She has added in a daily fiber supplement. She took smooth lax today which generally helps her to have a BM. She is now taking 2 Fiber tabs per day, which she started 3 days ago. She reports having a lot of gas. She takes a daily probiotic.  She has utilized Xifaxan in the past which has worked well to manage intermittent episodes of diarrhea.  She requests a refill sent to [facility] for this medication.    You have chosen to receive care through a telehealth visit.  Do you consent to use a video/audio connection for your medical care today? Yes    Physical Exam  Constitutional:       General: She is not in acute distress.     Appearance: She is well-developed.   Pulmonary:       Effort: No respiratory distress.   Skin:     Coloration: Skin is not pale.        Review of Systems   Constitutional: Negative for fever and unexpected weight change.   HENT: Negative for trouble swallowing.    Cardiovascular: Negative for chest pain.   Gastrointestinal: Positive for abdominal pain, constipation and diarrhea. Negative for abdominal distention, anal bleeding, blood in stool, nausea, rectal pain and vomiting.      Result Review :      Telemedicine with Flor Beyer APRN (06/21/2022)  UPPER GI ENDOSCOPY (06/29/2022 08:43)  COLONOSCOPY (06/29/2022 08:43)  Tissue Pathology Exam (06/29/2022 09:13)    Assessment and Plan    Diagnoses and all orders for this visit:    1. Fonseca's esophagus without dysplasia (Primary)    2. Gastroesophageal reflux disease, unspecified whether esophagitis present    3. Hiatal hernia  Comments:  3 cm    4. Diverticulosis    5. Irritable bowel syndrome with both constipation and diarrhea    6. Colon cancer screening    Other orders  -     omeprazole (priLOSEC) 20 MG capsule; Take 1 capsule 1-2 times daily  Dispense: 180 capsule; Refill: 3           Patient Instructions   1.  For GERD and Fonseca's esophagus continue omeprazole 20 mg once daily.  We will send in a prescription for omeprazole 20 mg 1-2 times daily, in the event that you need to take a second dose in the evening based upon symptoms.  You may also continue to use licorice, DGL, as this has previously worked well to help manage symptoms as well.    2.  We do recommend avoiding eating 3 to 4 hours before bedtime due to your history of hiatal hernia.    3.  Next EGD will be due at a 3-year interval, June 2025 for surveillance of Fonseca's esophagus.    4.  For IBS, with predominant constipation pattern we recommend that you continue daily probiotic, continue fiber 2 tablets daily, and start with a low-dose of MiraLAX and adjust your dosing accordingly based upon how your bowel habits respond.  I feel that  overall this will help to regulate your constipation and hopefully minimize any episodes of breakthrough diarrhea.    5.  Next colonoscopy will be due at a 5-year interval, which will be due June 2027.    6.  We will plan for annual office follow-up with JUAN FRANCISCO Brand via telemedicine visit, or sooner should symptoms worsen or fail to improve.    7.  We will send in a course of Xifaxan to [email or web address] per your request should you experience any breakthrough episodes of diarrhea.  If taking Xifaxan, you will take 1 tablet 3 times daily x14 days.     Discussion:    Patient is EGD and colonoscopy reports and pathology reviewed today to her office visit.  Neck surveillance EGD for Fonseca's esophagus will be due June 2025.  Patient can continue omeprazole 20 mg once daily with the ability to take a second dose in the evening if needed based upon symptoms.  New prescription has been sent to her pharmacy.  Patient recommended to avoid eating 3 to 4 hours before bedtime due to her history of hiatal hernia, GERD, Fonseca's esophagus.    For IBS, which is constipation predominant, we have recommend that she continue her daily probiotic, continue 2 fiber tablets daily, and add MiraLAX into her regimen with a low dose to start and titrate accordingly to help produce more regular daily bowel movements.  Overall I feel that this will work best to help minimize any breakthrough episodes of diarrhea and to help manage her constipation.  We will also send in a prescription for Xifaxan to [email or web address] for patient to have on hand should she experience any episodes of significant diarrhea, as this is worked well in the past for symptom management.    Next colonoscopy due June 2027 for surveillance due to her history of colon polyps.  We will plan for telemedicine follow-up in 1 year with JUAN FRANCISCO Brand for reassessment of symptoms, or sooner should her symptoms worsen or fail to improve.  Patient verbalized  understanding of above plan of care and is in agreement.  All questions answered and support provided.    EMR Dragon/Transcription Disclaimer:  This document has been Dictated utilizing Dragon dictation.

## 2023-03-30 NOTE — PATIENT INSTRUCTIONS
1.  For GERD and Fonseca's esophagus continue omeprazole 20 mg once daily.  We will send in a prescription for omeprazole 20 mg 1-2 times daily, in the event that you need to take a second dose in the evening based upon symptoms.  You may also continue to use licorice, DGL, as this has previously worked well to help manage symptoms as well.    2.  We do recommend avoiding eating 3 to 4 hours before bedtime due to your history of hiatal hernia.    3.  Next EGD will be due at a 3-year interval, June 2025 for surveillance of Fonseca's esophagus.    4.  For IBS, with predominant constipation pattern we recommend that you continue daily probiotic, continue fiber 2 tablets daily, and start with a low-dose of MiraLAX and adjust your dosing accordingly based upon how your bowel habits respond.  I feel that overall this will help to regulate your constipation and hopefully minimize any episodes of breakthrough diarrhea.    5.  Next colonoscopy will be due at a 5-year interval, which will be due June 2027.    6.  We will plan for annual office follow-up with JUAN FRANCISCO Brand via telemedicine visit, or sooner should symptoms worsen or fail to improve.    7.  We will send in a course of Xifaxan to [facility] per your request should you experience any breakthrough episodes of diarrhea.  If taking Xifaxan, you will take 1 tablet 3 times daily x14 days.

## 2023-03-31 ENCOUNTER — TELEPHONE (OUTPATIENT)
Dept: GASTROENTEROLOGY | Facility: CLINIC | Age: 73
End: 2023-03-31
Payer: MEDICARE

## 2023-03-31 NOTE — TELEPHONE ENCOUNTER
----- Message from JUAN FRANCISCO Choi sent at 3/30/2023  5:09 PM EDT -----  Patient has request that we sent a prescription to [facility] for Xifaxan 550 mg, take 1 tablet 3 times daily x14 days.  I saw the patient today for a video visit.  Please contact [facility] to send in this prescription or patient request.  She can have 1 refill also.  Thank you.  Pinky CHICAS    Faxed Xifaxan 550 tid #[id number] with one refill to [facility].  pk/jh

## 2023-04-03 ENCOUNTER — TRANSCRIBE ORDERS (OUTPATIENT)
Dept: ADMINISTRATIVE | Facility: HOSPITAL | Age: 73
End: 2023-04-03
Payer: MEDICARE

## 2023-04-03 DIAGNOSIS — Z12.31 ENCOUNTER FOR SCREENING MAMMOGRAM FOR BREAST CANCER: Primary | ICD-10-CM

## 2023-04-10 RX ORDER — OMEPRAZOLE 20 MG/1
CAPSULE, DELAYED RELEASE ORAL
Qty: 90 CAPSULE | OUTPATIENT
Start: 2023-04-10

## 2023-05-03 ENCOUNTER — HOSPITAL ENCOUNTER (OUTPATIENT)
Dept: MRI IMAGING | Facility: HOSPITAL | Age: 73
Discharge: HOME | End: 2023-05-03
Payer: MEDICARE

## 2023-05-03 ENCOUNTER — APPOINTMENT (OUTPATIENT)
Dept: OTHER | Facility: HOSPITAL | Age: 73
End: 2023-05-03
Payer: MEDICARE

## 2023-05-03 DIAGNOSIS — G50.0 TRIGEMINAL NEURALGIA: ICD-10-CM

## 2023-05-03 DIAGNOSIS — Z09 FOLLOW-UP EXAM: ICD-10-CM

## 2023-05-03 LAB — CREAT BLDA-MCNC: 0.6 MG/DL (ref 0.6–1.3)

## 2023-05-03 PROCEDURE — 82565 ASSAY OF CREATININE: CPT

## 2023-05-03 PROCEDURE — A9577 INJ MULTIHANCE: HCPCS | Performed by: NEUROLOGICAL SURGERY

## 2023-05-03 PROCEDURE — 0 GADOBENATE DIMEGLUMINE 529 MG/ML SOLUTION: Performed by: NEUROLOGICAL SURGERY

## 2023-05-03 PROCEDURE — 70553 MRI BRAIN STEM W/O & W/DYE: CPT

## 2023-05-03 RX ADMIN — GADOBENATE DIMEGLUMINE 10 ML: 529 INJECTION, SOLUTION INTRAVENOUS at 09:28

## 2023-05-04 LAB — CREAT BLDA-MCNC: 0.6 MG/DL (ref 0.6–1.3)

## 2023-05-05 ENCOUNTER — LAB (OUTPATIENT)
Dept: LAB | Facility: HOSPITAL | Age: 73
End: 2023-05-05
Payer: MEDICARE

## 2023-05-05 PROCEDURE — 80061 LIPID PANEL: CPT | Performed by: NURSE PRACTITIONER

## 2023-05-05 PROCEDURE — 80053 COMPREHEN METABOLIC PANEL: CPT | Performed by: NURSE PRACTITIONER

## 2023-05-05 PROCEDURE — 82728 ASSAY OF FERRITIN: CPT | Performed by: NURSE PRACTITIONER

## 2023-05-05 PROCEDURE — 84443 ASSAY THYROID STIM HORMONE: CPT | Performed by: NURSE PRACTITIONER

## 2023-05-05 PROCEDURE — 85025 COMPLETE CBC W/AUTO DIFF WBC: CPT | Performed by: NURSE PRACTITIONER

## 2023-05-05 PROCEDURE — 82306 VITAMIN D 25 HYDROXY: CPT | Performed by: NURSE PRACTITIONER

## 2023-05-08 ENCOUNTER — OFFICE VISIT (OUTPATIENT)
Dept: INTERNAL MEDICINE | Facility: CLINIC | Age: 73
End: 2023-05-08
Payer: MEDICARE

## 2023-05-08 VITALS
WEIGHT: 121.9 LBS | OXYGEN SATURATION: 99 % | SYSTOLIC BLOOD PRESSURE: 122 MMHG | TEMPERATURE: 97.7 F | HEIGHT: 67 IN | DIASTOLIC BLOOD PRESSURE: 66 MMHG | BODY MASS INDEX: 19.13 KG/M2 | HEART RATE: 71 BPM

## 2023-05-08 DIAGNOSIS — D75.839 THROMBOCYTHEMIA: ICD-10-CM

## 2023-05-08 DIAGNOSIS — J06.9 VIRAL URI WITH COUGH: ICD-10-CM

## 2023-05-08 DIAGNOSIS — Z86.73 HISTORY OF CVA (CEREBROVASCULAR ACCIDENT): ICD-10-CM

## 2023-05-08 DIAGNOSIS — G50.0 TRIGEMINAL NEURALGIA OF LEFT SIDE OF FACE: ICD-10-CM

## 2023-05-08 DIAGNOSIS — I10 PRIMARY HYPERTENSION: Primary | ICD-10-CM

## 2023-05-08 DIAGNOSIS — D50.0 IRON DEFICIENCY ANEMIA DUE TO CHRONIC BLOOD LOSS: ICD-10-CM

## 2023-05-08 DIAGNOSIS — E78.5 HYPERLIPIDEMIA LDL GOAL <70: ICD-10-CM

## 2023-05-08 LAB
EXPIRATION DATE: NORMAL
FLUAV AG UPPER RESP QL IA.RAPID: NOT DETECTED
FLUBV AG UPPER RESP QL IA.RAPID: NOT DETECTED
INTERNAL CONTROL: NORMAL
Lab: NORMAL
SARS-COV-2 AG UPPER RESP QL IA.RAPID: NOT DETECTED

## 2023-05-08 PROCEDURE — 3074F SYST BP LT 130 MM HG: CPT | Performed by: NURSE PRACTITIONER

## 2023-05-08 PROCEDURE — 1160F RVW MEDS BY RX/DR IN RCRD: CPT | Performed by: NURSE PRACTITIONER

## 2023-05-08 PROCEDURE — 99214 OFFICE O/P EST MOD 30 MIN: CPT | Performed by: NURSE PRACTITIONER

## 2023-05-08 PROCEDURE — 3078F DIAST BP <80 MM HG: CPT | Performed by: NURSE PRACTITIONER

## 2023-05-08 PROCEDURE — 1159F MED LIST DOCD IN RCRD: CPT | Performed by: NURSE PRACTITIONER

## 2023-05-08 RX ORDER — BENZONATATE 200 MG/1
200 CAPSULE ORAL 3 TIMES DAILY PRN
Qty: 21 CAPSULE | Refills: 0 | Status: SHIPPED | OUTPATIENT
Start: 2023-05-08

## 2023-05-08 NOTE — PROGRESS NOTES
Subjective   Rosi Vicente is a 73 y.o. female.     Chief Complaint   Patient presents with   • Hypertension   • Hyperlipidemia   • Trigeminal Neuralgia   • Cough        History of Present Illness   She is here today for follow-up and lab work.  She is feeling okay today.  She recently returned from [place] on Tuesday and is now with a cough and hoarse voice.  Cough is occasionally productive of yellow sputum, worse at night when she lays down.  She denies any fever, chills, shortness of breath or wheezing.  She has been using cough drops.    HTN- Patient doing well with current medication regimen, compliant with medication schedule, denies adverse effects.   HLD/history of CVA- she is currently on vascepa 2 capsules BID and ASA 81 mg.  She was previously on Plavix.  Trigeminal neuralgia- she is currently taking carbamazepine 25 mg BID with some improvement in symptoms.  She notes occasional dizziness with the carbamazepine.  She just completed MRI brain with and without contrast. She is scheduled to see Dr. Lovelace with neurosurgery on May 15.   Iron deficiency anemia- chronic. She eats a pescatarian diet. She tries to eat iron rich foods. She did not tolerate oral iron supplement secondary to constipation and GI upset.      The following portions of the patient's history were reviewed and updated as appropriate: allergies, current medications, past family history, past medical history, past social history, past surgical history and problem list.    Review of Systems   Constitutional: Negative for chills, fatigue and fever.   HENT: Positive for congestion, rhinorrhea and voice change. Negative for ear pain, postnasal drip, sinus pressure and sore throat.    Respiratory: Positive for cough. Negative for chest tightness, shortness of breath and wheezing.    Cardiovascular: Negative for chest pain, palpitations and leg swelling.   Neurological: Positive for dizziness (occasional with tegretol). Negative for tremors,  seizures, syncope, facial asymmetry, speech difficulty, weakness, light-headedness, numbness, headache, memory problem and confusion.   Hematological: Bruises/bleeds easily.   Psychiatric/Behavioral: Negative for suicidal ideas and depressed mood. The patient is not nervous/anxious.        Objective   Physical Exam  Constitutional:       Appearance: She is well-developed.   HENT:      Head: Normocephalic and atraumatic.      Right Ear: Hearing, tympanic membrane, ear canal and external ear normal.      Left Ear: Hearing, tympanic membrane, ear canal and external ear normal.      Nose: Rhinorrhea present. Rhinorrhea is clear.      Right Turbinates: Not enlarged.      Left Turbinates: Not enlarged.      Mouth/Throat:      Lips: Pink.      Mouth: Mucous membranes are moist. No injury or oral lesions.      Dentition: Normal dentition.      Tongue: No lesions. Tongue does not deviate from midline.      Palate: No mass and lesions.      Pharynx: Uvula midline. No pharyngeal swelling, oropharyngeal exudate, posterior oropharyngeal erythema or uvula swelling.      Comments: Posterior pharynx with clear drainage.  Neck:      Thyroid: No thyroid mass, thyromegaly or thyroid tenderness.      Vascular: No carotid bruit.      Trachea: Trachea normal.   Cardiovascular:      Rate and Rhythm: Normal rate and regular rhythm.      Chest Wall: PMI is not displaced.      Pulses:           Radial pulses are 2+ on the right side and 2+ on the left side.        Dorsalis pedis pulses are 2+ on the right side and 2+ on the left side.        Posterior tibial pulses are 2+ on the right side and 2+ on the left side.      Heart sounds: S1 normal and S2 normal.   Pulmonary:      Effort: Pulmonary effort is normal.      Breath sounds: Normal breath sounds. No decreased breath sounds, wheezing, rhonchi or rales.      Comments: Intermittent productive cough.  Musculoskeletal:      Right lower leg: No edema.      Left lower leg: No edema.    Lymphadenopathy:      Head:      Right side of head: No submental, submandibular, tonsillar or occipital adenopathy.      Left side of head: No submental, submandibular, tonsillar or occipital adenopathy.      Cervical: No cervical adenopathy.   Skin:     General: Skin is warm and dry.      Capillary Refill: Capillary refill takes less than 2 seconds.      Nails: There is no clubbing.   Neurological:      Mental Status: She is alert and oriented to person, place, and time.      Cranial Nerves: Cranial nerves 2-12 are intact.      Sensory: Sensory deficit (left side of face) present.      Motor: Motor function is intact.      Coordination: Coordination is intact.      Gait: Gait is intact.   Psychiatric:         Attention and Perception: Attention normal.         Mood and Affect: Mood and affect normal.         Speech: Speech normal.         Behavior: Behavior normal.         Thought Content: Thought content normal.         Cognition and Memory: Cognition normal.         Vitals:    05/08/23 1014   BP: 122/66   Pulse: 71   Temp: 97.7 °F (36.5 °C)   SpO2: 99%      Body mass index is 19.09 kg/m².    Assessment & Plan   Diagnoses and all orders for this visit:    1. Primary hypertension (Primary)    2. Hyperlipidemia LDL goal <70    3. History of CVA (cerebrovascular accident)    4. Trigeminal neuralgia of left side of face    5. Thrombocythemia  -     Cancel: CBC & Differential; Future  -     CBC & Differential; Future    6. Iron deficiency anemia due to chronic blood loss  -     Cancel: CBC & Differential; Future  -     CBC & Differential; Future    7. Viral URI with cough  -     benzonatate (TESSALON) 200 MG capsule; Take 1 capsule by mouth 3 (Three) Times a Day As Needed for Cough.  Dispense: 21 capsule; Refill: 0      Lab results discussed with patient in office today.    1.  HTN- well-controlled.  Continue losartan-HCTZ 100-12.5 mg daily.  2.  History of CVA/HLD-up some.  Encouraged her to continue Vascepa 2  capsules twice daily and aspirin 81 mg daily along with Mediterranean-style diet and return to regular exercise.  Monitor for abnormal bleeding.  Follow-up in 6 months.  3.  Trigeminal neuralgia- continue carbamazepine 25 mg twice daily.  Instructed her to make position changes slowly secondary to side effect of dizziness with the medication.  Follow-up with neurosurgery as scheduled on May 15.  Okay to continue gabapentin 100 mg as needed.  4.  Thrombocythemia- abdominal exam normal today in office.  Repeat CBC in 4 weeks.  If platelet count is still elevated recommend further evaluation with ultrasound of the spleen and possible referral to hematology.  5.  Iron deficiency anemia- hemoglobin stable but ferritin has decreased.  Recommend starting a multivitamin with iron daily and continuing iron rich diet.  Repeat CBC in 4 weeks and ferritin in 3 months.  6.  Viral URI with cough-rapid COVID and flu test negative today in office.  Recommend continue with cough drops, vitamin C, vitamin D, hydrating well with fluids.  Prescription sent for Tessalon 200 mg 3 times daily as needed for cough.  Notify for worsening symptoms.    A follow-up in 6 months with fasting labs prior.

## 2023-05-11 RX ORDER — LOSARTAN POTASSIUM AND HYDROCHLOROTHIAZIDE 12.5; 1 MG/1; MG/1
1 TABLET ORAL DAILY
Qty: 90 TABLET | Refills: 1 | Status: SHIPPED | OUTPATIENT
Start: 2023-05-11

## 2023-05-11 NOTE — PROGRESS NOTES
Subjective   History of Present Illness: Rosi Vicente is a 73 y.o. female is being seen for consultation today at the request of TARSHA Singh* for trigeminal neuralgia. MRI brain done on 5/3/23.  She has a complex history of trigeminal neuralgia.  She reports she had gamma knife stereotactic radiosurgery at the [facility] in 2008.  This was followed by a microvascular decompression at [place] in 2009.  She reports that she had complete resolution of her symptoms in 2009.  The symptoms unfortunately had recently returned in December.  She reports that the symptoms are dull and aching and describes as a pressure sensation.  She reports that the symptoms are worse with talking and eating and chewing.  The symptoms are in the V2 distribution.    History of Present Illness    The following portions of the patient's history were reviewed and updated as appropriate: allergies, past family history, past medical history, past social history, past surgical history and problem list.    Past Medical History:   Diagnosis Date   • Allergic rhinitis    • Anemia    • Arthritis 2016   • Bronchitis    • CVA (cerebral vascular accident)    • Elevated coronary artery calcium score    • Fatigue    • FH: colon cancer    • Fibrocystic breast changes, bilateral 5/12/2021   • GERD (gastroesophageal reflux disease) occasionally   • H/O bone density study 2013   • H/O mammogram 2013   • Hyperlipidemia    • Hypertension     Benign Essential   • Irritable bowel syndrome with both constipation and diarrhea 9/4/2020   • Malaise and fatigue    • Muscle pain    • Nocturia    • Osteoporosis    • Sleep apnea     AHI 17/h   • Stroke 09/13/2017   • TMJ (temporomandibular joint syndrome)    • Trigeminal neuralgia     Surgery at [facility] in 2009 repeat in 2015        Past Surgical History:   Procedure Laterality Date   • ADENOIDECTOMY     • AUGMENTATION MAMMAPLASTY Bilateral 2019    removed   • BRAIN SURGERY  12/8/2005     MVD   • COLONOSCOPY N/A 01/2015    Repeat Q 5 years due to Fhx of Colon Ca-Dr. Polk   • COLONOSCOPY N/A 6/29/2022    Procedure: COLONOSCOPY FOR SCREENING;  Surgeon: Víctor Polk MD;  Location: [facility] MAIN OR;  Service: Gastroenterology;  Laterality: N/A;  hedmorrhoids, diverticulosis, adequate prep, tortuous   • COSMETIC SURGERY  2015    augmentation   • ENDOSCOPY N/A 6/29/2022    Procedure: ESOPHAGOGASTRODUODENOSCOPY;  Surgeon: Víctor Polk MD;  Location: [facility] MAIN OR;  Service: Gastroenterology;  Laterality: N/A;  hiatal hernia, irregular zline   • HIP SURGERY     • INNER EAR SURGERY     • JOINT REPLACEMENT  10/3/16    left hip replacement   • PAP SMEAR N/A 2013    Dr. Burden   • RHINOPLASTY     • SEPTOPLASTY  2000    SEPTO/RHINOPLASTY POST TRAUMA   • SINUS SURGERY  1980'S   • TONSILLECTOMY  AGE 5   • US GUIDED CYST ASPIRATION BREAST N/A 5/26/2021          Current Outpatient Medications:   •  acetaminophen (TYLENOL) 500 MG tablet, Take 1 tablet by mouth Every 6 (Six) Hours As Needed for Mild Pain., Disp: , Rfl:   •  aspirin 81 MG EC tablet, Take 1 tablet by mouth Daily., Disp: , Rfl:   •  benzonatate (TESSALON) 200 MG capsule, Take 1 capsule by mouth 3 (Three) Times a Day As Needed for Cough., Disp: 21 capsule, Rfl: 0  •  Biotin 5000 MCG capsule, Take 1 capsule by mouth Daily., Disp: , Rfl:   •  carBAMazepine (TEGretol) 100 MG chewable tablet, Take 2 tablets in the morning and 1 tablet in the evening. (Patient taking differently: 25 mg. Currently taking 25 mg am and 25 mg pm.), Disp: 90 tablet, Rfl: 1  •  cholecalciferol (VITAMIN D3) 1000 UNITS tablet, Take 2 tablets by mouth 2 (Two) Times a Day., Disp: , Rfl:   •  Coenzyme Q10 (COQ-10) 400 MG capsule, Take  by mouth Daily., Disp: , Rfl:   •  cycloSPORINE (RESTASIS) 0.05 % ophthalmic emulsion, Apply 1 drop to eye(s) as directed by provider 2 (Two) Times a Day., Disp: , Rfl:   •  fluticasone (FLONASE) 50 MCG/ACT nasal spray, 2 sprays into the  nostril(s) as directed by provider Daily., Disp: 15.8 mL, Rfl: 3  •  losartan-hydrochlorothiazide (HYZAAR) 100-12.5 MG per tablet, Take 1 tablet by mouth Daily., Disp: 90 tablet, Rfl: 1  •  Melatonin 10 MG tablet, Take 2 tablets by mouth., Disp: , Rfl:   •  multivitamin with minerals tablet tablet, Take 1 tablet by mouth Daily., Disp: , Rfl:   •  NON FORMULARY, berberine po, Disp: , Rfl:   •  NON FORMULARY, Bone growth factor, Disp: , Rfl:   •  omeprazole (priLOSEC) 20 MG capsule, Take 1 capsule 1-2 times daily, Disp: 180 capsule, Rfl: 3  •  ondansetron (ZOFRAN) 4 MG tablet, Take 1 tablet by mouth., Disp: , Rfl:   •  Peppermint Oil (IBGARD PO), Take  by mouth., Disp: , Rfl:   •  Probiotic Product (PROBIOTIC-10 PO), Take 1 capsule by mouth Daily., Disp: , Rfl:   •  sertraline (ZOLOFT) 25 MG tablet, Take 1 tablet by mouth Daily., Disp: 90 tablet, Rfl: 1  •  SUPER B COMPLEX/C PO, Take  by mouth., Disp: , Rfl:   •  Vascepa 1 g capsule capsule, Take 2 g by mouth 2 (Two) Times a Day With Meals., Disp: 360 capsule, Rfl: 1  •  azithromycin (Zithromax Z-Dago) 250 MG tablet, Take 2 tablets by mouth on day 1, then 1 tablet daily on days 2-5, Disp: 6 tablet, Rfl: 0  •  gabapentin (NEURONTIN) 100 MG capsule, TAKE ONE CAPSULE BY MOUTH EVERY NIGHT AT BEDTIME (Patient not taking: Reported on 5/15/2023), Disp: 90 capsule, Rfl: 0     Allergies   Allergen Reactions   • Bystolic [Nebivolol Hcl] Other (See Comments)     Extreme fatigue, dizziness   • Lisinopril Other (See Comments)     COUGH        Social History     Socioeconomic History   • Marital status:    Tobacco Use   • Smoking status: Never   • Smokeless tobacco: Never   • Tobacco comments:     daily caffiene   Vaping Use   • Vaping Use: Never used   Substance and Sexual Activity   • Alcohol use: Yes     Alcohol/week: 1.0 standard drink     Types: 1 Glasses of wine per week     Comment: a few days a week   • Drug use: No   • Sexual activity: Yes     Partners: Male      "Birth control/protection: Post-menopausal, None        Family History   Problem Relation Age of Onset   • Pancreatic cancer Mother    • Hyperlipidemia Mother    • Cancer Mother            • Hypertension Mother    • Miscarriages / Stillbirths Mother    • Colon cancer Father 56   • Arthritis Father    • Cancer Father            • Hearing loss Father         WAR INJURY   • Vision loss Father         dry glaucoma   • Liver cancer Brother 40   • Cancer Brother            • Heart disease Maternal Grandmother    • Cancer Paternal Grandmother    • Heart disease Paternal Grandfather         Review of Systems   Constitutional: Positive for fatigue.   Eyes: Negative for visual disturbance.   Genitourinary: Negative for difficulty urinating.   Musculoskeletal: Positive for gait problem. Negative for neck pain and neck stiffness.   Neurological: Positive for light-headedness and numbness. Negative for speech difficulty.       Objective     Vitals:    05/15/23 1115   BP: 124/84   Pulse: 76   Temp: 96.6 °F (35.9 °C)   SpO2: 99%   Weight: 56 kg (123 lb 6.4 oz)   Height: 170.2 cm (67\")     Body mass index is 19.33 kg/m².      Physical Exam  Neurologic Exam  Awake, alert, oriented x3  Pupils equal round reactive to light  Extraocular muscles intact  Face symmetric  Speech is fluent and clear  No pronator drift  Motor exam  Bilateral deltoids 5/5, bilateral biceps 5/5, bilateral triceps 5/5, bilateral wrist extension 5/5 bilateral hand  5/5  Bilateral hip flexion 5/5, bilateral knee extension 5/5, bilateral DF/PF 5/5  No clonus  No Marina's reflex  Steady normal gait  Able to detect  light touch in all 4 extremities      Assessment & Plan     Medical Decision Makin-year-old female with complex left sided trigeminal neuralgia  -[facility] decomp[facility] in .  She reports that she had very good " results after her microvascular decompression with resolution of her symptoms for 13 years.  The symptoms have been back for approximately 5 months.  The symptoms are intermittent, dull, aching, in the V2 distribution.  We talked about management strategies for trigeminal neuralgia.  I think she is very high risk for any additional procedures such as a reoperative microvascular decompression.  I think she is at risk for afferent pain syndrome with additional manipulation of the nerve.  I think a good option would be percutaneous trigeminal balloon compression.  I have referred her to [facility] to Dr. Chas Bower for further evaluation  -I will plan to have her follow-up as needed    Diagnoses and all orders for this visit:    1. Trigeminal neuralgia (Primary)      Return if symptoms worsen or fail to improve.    I spent 45 minutes reviewing the medical record, reviewing the MRI images, discussing the management of recurrent trigeminal neuralgia, discussing the risks and benefits of reoperative MVD, discussing the risks and benefits of a trigeminal balloon compression, discussing the risks of afferent pain syndrome/CRPS

## 2023-05-15 ENCOUNTER — OFFICE VISIT (OUTPATIENT)
Dept: NEUROSURGERY | Facility: CLINIC | Age: 73
End: 2023-05-15
Payer: MEDICARE

## 2023-05-15 VITALS
HEART RATE: 76 BPM | TEMPERATURE: 96.6 F | OXYGEN SATURATION: 99 % | BODY MASS INDEX: 19.37 KG/M2 | SYSTOLIC BLOOD PRESSURE: 124 MMHG | DIASTOLIC BLOOD PRESSURE: 84 MMHG | WEIGHT: 123.4 LBS | HEIGHT: 67 IN

## 2023-05-15 DIAGNOSIS — G50.0 TRIGEMINAL NEURALGIA: Primary | ICD-10-CM

## 2023-05-15 DIAGNOSIS — J06.9 URI WITH COUGH AND CONGESTION: Primary | ICD-10-CM

## 2023-05-15 PROCEDURE — 1160F RVW MEDS BY RX/DR IN RCRD: CPT | Performed by: NEUROLOGICAL SURGERY

## 2023-05-15 PROCEDURE — 3079F DIAST BP 80-89 MM HG: CPT | Performed by: NEUROLOGICAL SURGERY

## 2023-05-15 PROCEDURE — 99204 OFFICE O/P NEW MOD 45 MIN: CPT | Performed by: NEUROLOGICAL SURGERY

## 2023-05-15 PROCEDURE — 3074F SYST BP LT 130 MM HG: CPT | Performed by: NEUROLOGICAL SURGERY

## 2023-05-15 PROCEDURE — 1159F MED LIST DOCD IN RCRD: CPT | Performed by: NEUROLOGICAL SURGERY

## 2023-05-15 RX ORDER — AZITHROMYCIN 250 MG/1
TABLET, FILM COATED ORAL
Qty: 6 TABLET | Refills: 0 | Status: SHIPPED | OUTPATIENT
Start: 2023-05-15

## 2023-05-18 ENCOUNTER — HOSPITAL ENCOUNTER (OUTPATIENT)
Dept: MAMMOGRAPHY | Facility: HOSPITAL | Age: 73
Discharge: HOME | End: 2023-05-18
Admitting: NURSE PRACTITIONER
Payer: MEDICARE

## 2023-05-18 DIAGNOSIS — Z12.31 ENCOUNTER FOR SCREENING MAMMOGRAM FOR BREAST CANCER: ICD-10-CM

## 2023-05-18 PROCEDURE — 77063 BREAST TOMOSYNTHESIS BI: CPT

## 2023-05-18 PROCEDURE — 77067 SCR MAMMO BI INCL CAD: CPT

## 2023-05-19 ENCOUNTER — PATIENT ROUNDING (BHMG ONLY) (OUTPATIENT)
Dept: NEUROSURGERY | Facility: CLINIC | Age: 73
End: 2023-05-19
Payer: MEDICARE

## 2023-06-05 ENCOUNTER — LAB (OUTPATIENT)
Dept: LAB | Facility: HOSPITAL | Age: 73
End: 2023-06-05
Payer: MEDICARE

## 2023-06-05 DIAGNOSIS — D50.9 IRON DEFICIENCY ANEMIA, UNSPECIFIED IRON DEFICIENCY ANEMIA TYPE: ICD-10-CM

## 2023-06-05 DIAGNOSIS — D75.839 THROMBOCYTHEMIA: ICD-10-CM

## 2023-06-05 DIAGNOSIS — D50.0 IRON DEFICIENCY ANEMIA DUE TO CHRONIC BLOOD LOSS: Primary | ICD-10-CM

## 2023-06-05 DIAGNOSIS — D50.0 IRON DEFICIENCY ANEMIA DUE TO CHRONIC BLOOD LOSS: ICD-10-CM

## 2023-06-05 LAB
BASOPHILS # BLD AUTO: 0.05 10*3/MM3 (ref 0–0.2)
BASOPHILS NFR BLD AUTO: 0.7 % (ref 0–1.5)
DEPRECATED RDW RBC AUTO: 44.2 FL (ref 37–54)
EOSINOPHIL # BLD AUTO: 0.16 10*3/MM3 (ref 0–0.4)
EOSINOPHIL NFR BLD AUTO: 2.3 % (ref 0.3–6.2)
ERYTHROCYTE [DISTWIDTH] IN BLOOD BY AUTOMATED COUNT: 15.8 % (ref 12.3–15.4)
HCT VFR BLD AUTO: 37.2 % (ref 34–46.6)
HGB BLD-MCNC: 11.7 G/DL (ref 12–15.9)
IMM GRANULOCYTES # BLD AUTO: 0.01 10*3/MM3 (ref 0–0.05)
IMM GRANULOCYTES NFR BLD AUTO: 0.1 % (ref 0–0.5)
LYMPHOCYTES # BLD AUTO: 1.85 10*3/MM3 (ref 0.7–3.1)
LYMPHOCYTES NFR BLD AUTO: 26.2 % (ref 19.6–45.3)
MCH RBC QN AUTO: 24.4 PG (ref 26.6–33)
MCHC RBC AUTO-ENTMCNC: 31.5 G/DL (ref 31.5–35.7)
MCV RBC AUTO: 77.7 FL (ref 79–97)
MONOCYTES # BLD AUTO: 0.8 10*3/MM3 (ref 0.1–0.9)
MONOCYTES NFR BLD AUTO: 11.3 % (ref 5–12)
NEUTROPHILS NFR BLD AUTO: 4.19 10*3/MM3 (ref 1.7–7)
NEUTROPHILS NFR BLD AUTO: 59.4 % (ref 42.7–76)
NRBC BLD AUTO-RTO: 0 /100 WBC (ref 0–0.2)
PLATELET # BLD AUTO: 423 10*3/MM3 (ref 140–450)
PMV BLD AUTO: 9.2 FL (ref 6–12)
RBC # BLD AUTO: 4.79 10*6/MM3 (ref 3.77–5.28)
WBC NRBC COR # BLD: 7.06 10*3/MM3 (ref 3.4–10.8)

## 2023-06-05 PROCEDURE — 85025 COMPLETE CBC W/AUTO DIFF WBC: CPT

## 2023-06-05 PROCEDURE — 36415 COLL VENOUS BLD VENIPUNCTURE: CPT

## 2023-08-07 DIAGNOSIS — D50.9 IRON DEFICIENCY ANEMIA, UNSPECIFIED IRON DEFICIENCY ANEMIA TYPE: Primary | ICD-10-CM

## 2023-08-10 ENCOUNTER — LAB (OUTPATIENT)
Dept: LAB | Facility: HOSPITAL | Age: 73
End: 2023-08-10
Payer: MEDICARE

## 2023-08-10 LAB
BASOPHILS # BLD AUTO: 0.04 10*3/MM3 (ref 0–0.2)
BASOPHILS NFR BLD AUTO: 0.8 % (ref 0–1.5)
DEPRECATED RDW RBC AUTO: 44.1 FL (ref 37–54)
EOSINOPHIL # BLD AUTO: 0.07 10*3/MM3 (ref 0–0.4)
EOSINOPHIL NFR BLD AUTO: 1.4 % (ref 0.3–6.2)
ERYTHROCYTE [DISTWIDTH] IN BLOOD BY AUTOMATED COUNT: 15.7 % (ref 12.3–15.4)
FERRITIN SERPL-MCNC: 22.4 NG/ML (ref 13–150)
HCT VFR BLD AUTO: 36.8 % (ref 34–46.6)
HGB BLD-MCNC: 11.5 G/DL (ref 12–15.9)
IMM GRANULOCYTES # BLD AUTO: 0.01 10*3/MM3 (ref 0–0.05)
IMM GRANULOCYTES NFR BLD AUTO: 0.2 % (ref 0–0.5)
IRON 24H UR-MRATE: 57 MCG/DL (ref 37–145)
IRON SATN MFR SERPL: 12 % (ref 20–50)
LYMPHOCYTES # BLD AUTO: 1.55 10*3/MM3 (ref 0.7–3.1)
LYMPHOCYTES NFR BLD AUTO: 31.3 % (ref 19.6–45.3)
MCH RBC QN AUTO: 24.4 PG (ref 26.6–33)
MCHC RBC AUTO-ENTMCNC: 31.3 G/DL (ref 31.5–35.7)
MCV RBC AUTO: 78 FL (ref 79–97)
MONOCYTES # BLD AUTO: 0.59 10*3/MM3 (ref 0.1–0.9)
MONOCYTES NFR BLD AUTO: 11.9 % (ref 5–12)
NEUTROPHILS NFR BLD AUTO: 2.69 10*3/MM3 (ref 1.7–7)
NEUTROPHILS NFR BLD AUTO: 54.4 % (ref 42.7–76)
NRBC BLD AUTO-RTO: 0 /100 WBC (ref 0–0.2)
PLATELET # BLD AUTO: 395 10*3/MM3 (ref 140–450)
PMV BLD AUTO: 9.3 FL (ref 6–12)
RBC # BLD AUTO: 4.72 10*6/MM3 (ref 3.77–5.28)
TIBC SERPL-MCNC: 478 MCG/DL (ref 298–536)
TRANSFERRIN SERPL-MCNC: 321 MG/DL (ref 200–360)
WBC NRBC COR # BLD: 4.95 10*3/MM3 (ref 3.4–10.8)

## 2023-08-10 PROCEDURE — 82728 ASSAY OF FERRITIN: CPT | Performed by: NURSE PRACTITIONER

## 2023-08-10 PROCEDURE — 83540 ASSAY OF IRON: CPT | Performed by: NURSE PRACTITIONER

## 2023-08-10 PROCEDURE — 36415 COLL VENOUS BLD VENIPUNCTURE: CPT | Performed by: NURSE PRACTITIONER

## 2023-08-10 PROCEDURE — 84466 ASSAY OF TRANSFERRIN: CPT | Performed by: NURSE PRACTITIONER

## 2023-08-10 PROCEDURE — 85025 COMPLETE CBC W/AUTO DIFF WBC: CPT | Performed by: NURSE PRACTITIONER

## 2023-08-10 RX ORDER — LOSARTAN POTASSIUM AND HYDROCHLOROTHIAZIDE 12.5; 1 MG/1; MG/1
TABLET ORAL
Qty: 90 TABLET | Refills: 1 | OUTPATIENT
Start: 2023-08-10

## 2023-08-15 RX ORDER — LOSARTAN POTASSIUM AND HYDROCHLOROTHIAZIDE 12.5; 1 MG/1; MG/1
TABLET ORAL
Qty: 90 TABLET | Refills: 1 | OUTPATIENT
Start: 2023-08-15

## 2023-09-05 RX ORDER — ICOSAPENT ETHYL 1000 MG/1
2 CAPSULE ORAL 2 TIMES DAILY WITH MEALS
Qty: 360 CAPSULE | Refills: 1 | Status: SHIPPED | OUTPATIENT
Start: 2023-09-05

## 2023-09-05 NOTE — TELEPHONE ENCOUNTER
Caller: Rosi Vicente    Relationship: Self    Best call back number: [phone]    Requested Prescriptions:   Requested Prescriptions     Pending Prescriptions Disp Refills    Vascepa 1 g capsule capsule 360 capsule 1     Sig: Take 2 g by mouth 2 (Two) Times a Day With Meals.        Pharmacy where request should be sent: [facility] [place] [id number] [facility] [place]note 2], [facility] [address] - [phone] [facility] [phone] FX     Last office visit with prescribing clinician: 5/8/2023   Last telemedicine visit with prescribing clinician: Visit date not found   Next office visit with prescribing clinician: 11/16/2023     Additional details provided by patient: LESS THAN 2 WEEKS LEFT.  NEEDS 3 MOS SUPPLY    Does the patient have less than a 3 day supply:  [x] Yes  [] No    Would you like a call back once the refill request has been completed: [] Yes [x] No    If the office needs to give you a call back, can they leave a voicemail: [] Yes [x] No    Daryl Barber Rep   09/05/23 13:43 EDT

## 2023-09-29 ENCOUNTER — HOSPITAL ENCOUNTER (OUTPATIENT)
Dept: ULTRASOUND IMAGING | Facility: HOSPITAL | Age: 73
Discharge: HOME | End: 2023-09-29
Payer: MEDICARE

## 2023-09-29 ENCOUNTER — HOSPITAL ENCOUNTER (OUTPATIENT)
Facility: HOSPITAL | Age: 73
Discharge: HOME | End: 2023-09-29
Payer: MEDICARE

## 2023-09-29 ENCOUNTER — OFFICE VISIT (OUTPATIENT)
Dept: INTERNAL MEDICINE | Facility: CLINIC | Age: 73
End: 2023-09-29
Payer: MEDICARE

## 2023-09-29 VITALS
OXYGEN SATURATION: 99 % | HEIGHT: 67 IN | SYSTOLIC BLOOD PRESSURE: 130 MMHG | DIASTOLIC BLOOD PRESSURE: 60 MMHG | WEIGHT: 120.3 LBS | TEMPERATURE: 98.3 F | BODY MASS INDEX: 18.88 KG/M2 | HEART RATE: 75 BPM

## 2023-09-29 DIAGNOSIS — G47.33 OBSTRUCTIVE SLEEP APNEA SYNDROME: ICD-10-CM

## 2023-09-29 DIAGNOSIS — I10 PRIMARY HYPERTENSION: ICD-10-CM

## 2023-09-29 DIAGNOSIS — R22.0 LOCALIZED SWELLING, MASS AND LUMP, HEAD: Primary | ICD-10-CM

## 2023-09-29 DIAGNOSIS — R26.89 BALANCE PROBLEM: ICD-10-CM

## 2023-09-29 DIAGNOSIS — R06.02 SOB (SHORTNESS OF BREATH) ON EXERTION: ICD-10-CM

## 2023-09-29 DIAGNOSIS — R22.0 LOCALIZED SWELLING, MASS AND LUMP, HEAD: ICD-10-CM

## 2023-09-29 DIAGNOSIS — Z86.73 HISTORY OF CVA (CEREBROVASCULAR ACCIDENT): ICD-10-CM

## 2023-09-29 PROBLEM — M19.011 OSTEOARTHRITIS OF RIGHT SHOULDER: Status: ACTIVE | Noted: 2022-06-01

## 2023-09-29 PROCEDURE — 1160F RVW MEDS BY RX/DR IN RCRD: CPT | Performed by: NURSE PRACTITIONER

## 2023-09-29 PROCEDURE — 1159F MED LIST DOCD IN RCRD: CPT | Performed by: NURSE PRACTITIONER

## 2023-09-29 PROCEDURE — 3075F SYST BP GE 130 - 139MM HG: CPT | Performed by: NURSE PRACTITIONER

## 2023-09-29 PROCEDURE — 3078F DIAST BP <80 MM HG: CPT | Performed by: NURSE PRACTITIONER

## 2023-09-29 PROCEDURE — 76536 US EXAM OF HEAD AND NECK: CPT

## 2023-09-29 PROCEDURE — 93000 ELECTROCARDIOGRAM COMPLETE: CPT | Performed by: NURSE PRACTITIONER

## 2023-09-29 PROCEDURE — 99214 OFFICE O/P EST MOD 30 MIN: CPT | Performed by: NURSE PRACTITIONER

## 2023-09-29 NOTE — PROGRESS NOTES
Subjective   Rosi Vicente is a 73 y.o. female.     Chief Complaint   Patient presents with    Mass     Pt c/o sore lump on left side of head X 2-3 days.        History of Present Illness   She is here today with complaints of palpable lump on the left side of the head.  She noticed this initially 2 to 3 days ago.  The area is sore to palpation.  She is concerned as the lump is above where she previously had surgery.  She had decompression surgery below the area of concern for trigeminal neuralgia in 2009.   She denies any head trauma, paresthesias, change in vision, headache.    She would also like to discuss worsening balance issues over the past few months.  This initially started after her CVA.  She tries to stay active with walking and working on her yard. She has previously done PT with improvement in balance.  She is interested in returning for skilled PT.    She also notes mild dyspnea over the past few months.  This typically occurs with exertion and with activities that involve squatting and bending over.  She denies any chest pain, palpitations, cough, wheezing, syncope.  She does have a history of iron deficiency anemia for which she is taking oral iron for.  Last iron profile showed small improvement in iron saturation up to 12%, ferritin of 22 and hemoglobin of 11.5.  She used to follow with cardiology Dr. Leung.    The following portions of the patient's history were reviewed and updated as appropriate: allergies, current medications, past family history, past medical history, past social history, past surgical history, and problem list.    Review of Systems   Constitutional:  Positive for activity change. Negative for chills, fatigue and fever.   Respiratory:  Positive for shortness of breath. Negative for cough, chest tightness and wheezing.    Cardiovascular:  Negative for chest pain, palpitations and leg swelling.   Musculoskeletal:  Positive for gait problem (balance issue).   Skin:          Lump on head.   Neurological:  Negative for dizziness, tremors, seizures, syncope, facial asymmetry, speech difficulty, weakness, light-headedness, numbness, headache, memory problem and confusion.     Objective   Physical Exam  Constitutional:       Appearance: She is well-developed.   HENT:      Head: Mass present.        Comments: Small, round, soft, slightly tender lump on the left side of the head.   Neck:      Thyroid: No thyroid mass, thyromegaly or thyroid tenderness.      Vascular: No carotid bruit.      Trachea: Trachea normal.   Cardiovascular:      Rate and Rhythm: Normal rate and regular rhythm.      Chest Wall: PMI is not displaced.      Pulses:           Radial pulses are 2+ on the right side and 2+ on the left side.        Dorsalis pedis pulses are 2+ on the right side and 2+ on the left side.        Posterior tibial pulses are 2+ on the right side and 2+ on the left side.      Heart sounds: S1 normal and S2 normal.   Pulmonary:      Effort: Pulmonary effort is normal.      Breath sounds: Normal breath sounds.   Musculoskeletal:      Right lower leg: No edema.      Left lower leg: No edema.   Lymphadenopathy:      Head:      Right side of head: No submental, submandibular, tonsillar or occipital adenopathy.      Left side of head: No submental, submandibular, tonsillar or occipital adenopathy.      Cervical: No cervical adenopathy.   Skin:     General: Skin is warm and dry.      Capillary Refill: Capillary refill takes less than 2 seconds.      Nails: There is no clubbing.   Neurological:      General: No focal deficit present.      Mental Status: She is alert and oriented to person, place, and time. Mental status is at baseline.   Psychiatric:         Attention and Perception: Attention normal.         Mood and Affect: Mood and affect normal.         Speech: Speech normal.         Behavior: Behavior normal.         Thought Content: Thought content normal.         Cognition and Memory: Cognition  normal.       Vitals:    09/29/23 1358   BP: 130/60   Pulse: 75   Temp: 98.3 °F (36.8 °C)   SpO2: 99%      Body mass index is 18.84 kg/m².    Assessment & Plan   Problems Addressed this Visit          Cardiac and Vasculature    Hypertension    Relevant Orders    Ambulatory Referral to Cardiology (Completed)       Neuro    History of CVA (cerebrovascular accident)    Relevant Orders    Ambulatory Referral to Physical Therapy Evaluate and treat (Completed)       Sleep    Obstructive sleep apnea syndrome    Relevant Orders    Ambulatory Referral to Cardiology (Completed)     Other Visit Diagnoses       Localized swelling, mass and lump, head    -  Primary    Relevant Orders    US Head Neck Soft Tissue    SOB (shortness of breath) on exertion        Relevant Orders    Ambulatory Referral to Cardiology (Completed)    Balance problem        Relevant Orders    Ambulatory Referral to Physical Therapy Evaluate and treat (Completed)          Diagnoses         Codes Comments    Localized swelling, mass and lump, head    -  Primary ICD-10-CM: R22.0  ICD-9-CM: 784.2     SOB (shortness of breath) on exertion     ICD-10-CM: R06.02  ICD-9-CM: 786.05     Balance problem     ICD-10-CM: R26.89  ICD-9-CM: 781.99     History of CVA (cerebrovascular accident)     ICD-10-CM: Z86.73  ICD-9-CM: V12.54     Primary hypertension     ICD-10-CM: I10  ICD-9-CM: 401.9     Obstructive sleep apnea syndrome     ICD-10-CM: G47.33  ICD-9-CM: 327.23             ECG 12 Lead    Date/Time: 9/29/2023 2:59 PM  Performed by: Anette Machado APRN  Authorized by: Anette Machado APRN   Comparison: compared with previous ECG   Similar to previous ECG  Rhythm: sinus rhythm  Rate: normal  Conduction: conduction normal  ST Segments: ST segments normal  QRS axis: normal  Other findings: T wave abnormality    Clinical impression: non-specific ECG      1.  Localized swelling, mass lump of head-check ultrasound head for further evaluation as she does have a history  of decompression surgery for trigeminal neuralgia below the area of palpable concern.  2.  Shortness of breath on exertion/hypertension-EKG completed in the office today showing sinus rhythm with nonspecific T wave abnormality, no major change from baseline.  Discussed with her that this may be multifactorial related to iron deficiency anemia and deconditioning.  However she does have several cardiac risk factors and has not seen cardiology in several years. Will place referral to cardiology for further evaluation.  3.  Balance problem/history of CVA-patient would benefit from skilled physical therapy.  Referral placed to PT for evaluation and treatment.  Encouraged fall risk reduction techniques for the home.

## 2023-10-04 DIAGNOSIS — Z86.73 HISTORY OF CVA (CEREBROVASCULAR ACCIDENT): ICD-10-CM

## 2023-10-04 DIAGNOSIS — E78.5 HYPERLIPIDEMIA LDL GOAL <70: Primary | ICD-10-CM

## 2023-10-04 RX ORDER — NIACIN 500 MG/1
500 TABLET, EXTENDED RELEASE ORAL NIGHTLY
Qty: 30 TABLET | Refills: 5 | Status: SHIPPED | OUTPATIENT
Start: 2023-10-04

## 2023-10-16 ENCOUNTER — TELEPHONE (OUTPATIENT)
Dept: GASTROENTEROLOGY | Facility: CLINIC | Age: 73
End: 2023-10-16
Payer: MEDICARE

## 2023-10-16 NOTE — TELEPHONE ENCOUNTER
Done. Patient notified on MyChart.    Patient Message with Mychart, Generic Provider (10/16/2023)

## 2023-10-16 NOTE — TELEPHONE ENCOUNTER
Patient left a Voice Message stating that she had some questions for Allison.     I called back, she is taking Xifaxan lori and has some in [place] that she [place]. Pt would like us to provide a sample for her , that way she completes Tx and still has some reserve for future needs.     Prefers to communicate through [employer].

## 2023-10-25 ENCOUNTER — TELEPHONE (OUTPATIENT)
Dept: NEUROLOGY | Facility: OTHER | Age: 73
End: 2023-10-25

## 2023-10-25 NOTE — TELEPHONE ENCOUNTER
Caller: Rosi Vicente    Relationship: Self    Best call back number: [phone]    Who are you requesting to speak with (clinical staff, provider,  specific staff member): FORMER [facility] PATIENT    What was the call regardin2017-- PT IS NEEDING TO KNOW IF SHE HAD AN ECHOCARDIOGRAM OR A MAYCOL DONE AROUND THIS DATE? AND IF THEY WERE DONE, CAN ANOTHER [facility] PROVIDER SEE THOSE RESULTS.    Is it okay if the provider responds through [employer]: YES, PLEASE.

## 2023-11-01 DIAGNOSIS — I10 PRIMARY HYPERTENSION: ICD-10-CM

## 2023-11-01 DIAGNOSIS — E78.5 HYPERLIPIDEMIA LDL GOAL <70: Primary | ICD-10-CM

## 2023-11-01 DIAGNOSIS — D50.9 IRON DEFICIENCY ANEMIA, UNSPECIFIED IRON DEFICIENCY ANEMIA TYPE: ICD-10-CM

## 2023-11-07 ENCOUNTER — LAB (OUTPATIENT)
Dept: LAB | Facility: HOSPITAL | Age: 73
End: 2023-11-07
Payer: MEDICARE

## 2023-11-07 LAB
ALBUMIN SERPL-MCNC: 4.6 G/DL (ref 3.5–5.2)
ALBUMIN/GLOB SERPL: 2.6 G/DL
ALP SERPL-CCNC: 84 U/L (ref 39–117)
ALT SERPL W P-5'-P-CCNC: 18 U/L (ref 1–33)
ANION GAP SERPL CALCULATED.3IONS-SCNC: 7 MMOL/L (ref 5–15)
AST SERPL-CCNC: 18 U/L (ref 1–32)
BASOPHILS # BLD AUTO: 0.06 10*3/MM3 (ref 0–0.2)
BASOPHILS NFR BLD AUTO: 1.1 % (ref 0–1.5)
BILIRUB SERPL-MCNC: 0.4 MG/DL (ref 0–1.2)
BUN SERPL-MCNC: 16 MG/DL (ref 8–23)
BUN/CREAT SERPL: 25.4 (ref 7–25)
CALCIUM SPEC-SCNC: 9.5 MG/DL (ref 8.6–10.5)
CHLORIDE SERPL-SCNC: 100 MMOL/L (ref 98–107)
CHOLEST SERPL-MCNC: 159 MG/DL (ref 0–200)
CO2 SERPL-SCNC: 29 MMOL/L (ref 22–29)
CREAT SERPL-MCNC: 0.63 MG/DL (ref 0.57–1)
DEPRECATED RDW RBC AUTO: 40.4 FL (ref 37–54)
EGFRCR SERPLBLD CKD-EPI 2021: 93.2 ML/MIN/1.73
EOSINOPHIL # BLD AUTO: 0.12 10*3/MM3 (ref 0–0.4)
EOSINOPHIL NFR BLD AUTO: 2.3 % (ref 0.3–6.2)
ERYTHROCYTE [DISTWIDTH] IN BLOOD BY AUTOMATED COUNT: 13.7 % (ref 12.3–15.4)
FERRITIN SERPL-MCNC: 11.7 NG/ML (ref 13–150)
GLOBULIN UR ELPH-MCNC: 1.8 GM/DL
GLUCOSE SERPL-MCNC: 84 MG/DL (ref 65–99)
HCT VFR BLD AUTO: 37.2 % (ref 34–46.6)
HDLC SERPL QL: 3.06
HDLC SERPL-MCNC: 52 MG/DL (ref 40–60)
HGB BLD-MCNC: 11.2 G/DL (ref 12–15.9)
IMM GRANULOCYTES # BLD AUTO: 0.01 10*3/MM3 (ref 0–0.05)
IMM GRANULOCYTES NFR BLD AUTO: 0.2 % (ref 0–0.5)
IRON 24H UR-MRATE: 40 MCG/DL (ref 37–145)
IRON SATN MFR SERPL: 8 % (ref 20–50)
LDLC SERPL CALC-MCNC: 95 MG/DL (ref 0–100)
LYMPHOCYTES # BLD AUTO: 1.69 10*3/MM3 (ref 0.7–3.1)
LYMPHOCYTES NFR BLD AUTO: 31.8 % (ref 19.6–45.3)
MCH RBC QN AUTO: 24.4 PG (ref 26.6–33)
MCHC RBC AUTO-ENTMCNC: 30.1 G/DL (ref 31.5–35.7)
MCV RBC AUTO: 81 FL (ref 79–97)
MONOCYTES # BLD AUTO: 0.6 10*3/MM3 (ref 0.1–0.9)
MONOCYTES NFR BLD AUTO: 11.3 % (ref 5–12)
NEUTROPHILS NFR BLD AUTO: 2.83 10*3/MM3 (ref 1.7–7)
NEUTROPHILS NFR BLD AUTO: 53.3 % (ref 42.7–76)
NRBC BLD AUTO-RTO: 0 /100 WBC (ref 0–0.2)
PLATELET # BLD AUTO: 378 10*3/MM3 (ref 140–450)
PMV BLD AUTO: 9.4 FL (ref 6–12)
POTASSIUM SERPL-SCNC: 4.7 MMOL/L (ref 3.5–5.2)
PROT SERPL-MCNC: 6.4 G/DL (ref 6–8.5)
RBC # BLD AUTO: 4.59 10*6/MM3 (ref 3.77–5.28)
SODIUM SERPL-SCNC: 136 MMOL/L (ref 136–145)
TIBC SERPL-MCNC: 477 MCG/DL (ref 298–536)
TRANSFERRIN SERPL-MCNC: 320 MG/DL (ref 200–360)
TRIGL SERPL-MCNC: 61 MG/DL (ref 0–150)
TSH SERPL DL<=0.05 MIU/L-ACNC: 2.62 UIU/ML (ref 0.27–4.2)
VLDLC SERPL-MCNC: 12 MG/DL (ref 5–40)
WBC NRBC COR # BLD: 5.31 10*3/MM3 (ref 3.4–10.8)

## 2023-11-07 PROCEDURE — 82728 ASSAY OF FERRITIN: CPT | Performed by: NURSE PRACTITIONER

## 2023-11-07 PROCEDURE — 84443 ASSAY THYROID STIM HORMONE: CPT | Performed by: NURSE PRACTITIONER

## 2023-11-07 PROCEDURE — 84466 ASSAY OF TRANSFERRIN: CPT | Performed by: NURSE PRACTITIONER

## 2023-11-07 PROCEDURE — 83540 ASSAY OF IRON: CPT | Performed by: NURSE PRACTITIONER

## 2023-11-07 PROCEDURE — 85025 COMPLETE CBC W/AUTO DIFF WBC: CPT | Performed by: NURSE PRACTITIONER

## 2023-11-07 PROCEDURE — 80053 COMPREHEN METABOLIC PANEL: CPT | Performed by: NURSE PRACTITIONER

## 2023-11-07 PROCEDURE — 80061 LIPID PANEL: CPT | Performed by: NURSE PRACTITIONER

## 2023-11-16 ENCOUNTER — OFFICE VISIT (OUTPATIENT)
Dept: INTERNAL MEDICINE | Facility: CLINIC | Age: 73
End: 2023-11-16
Payer: MEDICARE

## 2023-11-16 VITALS
HEART RATE: 81 BPM | BODY MASS INDEX: 19.38 KG/M2 | DIASTOLIC BLOOD PRESSURE: 72 MMHG | TEMPERATURE: 98.2 F | OXYGEN SATURATION: 98 % | HEIGHT: 67 IN | WEIGHT: 123.5 LBS | SYSTOLIC BLOOD PRESSURE: 118 MMHG

## 2023-11-16 DIAGNOSIS — H93.8X2 EAR FULLNESS, LEFT: ICD-10-CM

## 2023-11-16 DIAGNOSIS — I10 PRIMARY HYPERTENSION: Primary | ICD-10-CM

## 2023-11-16 DIAGNOSIS — Z86.73 HISTORY OF CVA (CEREBROVASCULAR ACCIDENT): ICD-10-CM

## 2023-11-16 DIAGNOSIS — G50.0 TRIGEMINAL NEURALGIA OF LEFT SIDE OF FACE: ICD-10-CM

## 2023-11-16 DIAGNOSIS — E78.5 HYPERLIPIDEMIA LDL GOAL <70: ICD-10-CM

## 2023-11-16 DIAGNOSIS — D50.9 IRON DEFICIENCY ANEMIA, UNSPECIFIED IRON DEFICIENCY ANEMIA TYPE: ICD-10-CM

## 2023-11-16 PROCEDURE — 1160F RVW MEDS BY RX/DR IN RCRD: CPT | Performed by: NURSE PRACTITIONER

## 2023-11-16 PROCEDURE — 1159F MED LIST DOCD IN RCRD: CPT | Performed by: NURSE PRACTITIONER

## 2023-11-16 PROCEDURE — 99214 OFFICE O/P EST MOD 30 MIN: CPT | Performed by: NURSE PRACTITIONER

## 2023-11-16 PROCEDURE — 3078F DIAST BP <80 MM HG: CPT | Performed by: NURSE PRACTITIONER

## 2023-11-16 PROCEDURE — 3074F SYST BP LT 130 MM HG: CPT | Performed by: NURSE PRACTITIONER

## 2023-11-16 RX ORDER — GABAPENTIN 100 MG/1
CAPSULE ORAL
Qty: 90 CAPSULE | Refills: 0 | Status: SHIPPED | OUTPATIENT
Start: 2023-11-16

## 2023-11-16 NOTE — PROGRESS NOTES
Subjective   Rosi Vicente is a 73 y.o. female.     Chief Complaint   Patient presents with    Anemia    Hypertension    Hyperlipidemia    Earache     Pt c/o left ear and fullness X 1 month.        History of Present Illness   She is here today for follow-up and lab work. She is feeling ok today. She notes left ear fullness for the past month. She notes mild pain in the left ear. She denies any tinnitus. She notes mild left maxillary sinus pressure and occasional pain. She has been trying to use her NSS regularly.  She denies any fever or chills.    HTN- Patient doing well with current medication regimen, compliant with medication schedule, denies adverse effects.  HLD/history of CVA-she was seen by cardiology, Dr. Buckley recently who recommended  MAYCOL scheduled on 11/28 for further evaluation of possible PFO and valvular heart disease. She is currently on niacin 500 mg nightly, started in September along with Vascepa 2 capsules twice daily with meals. Patient doing well with current medication regimen, compliant with medication schedule, denies adverse effects.     Trigeminal neuralgia-she is requesting refill of her gabapentin today. She uses 100 mg at bedtime occasionally for nerve pain symptom improvement.  She notes occasional grogginess the next day after taking the gabapentin.  She was previously on Tegretol but stopped this.    The following portions of the patient's history were reviewed and updated as appropriate: allergies, current medications, past family history, past medical history, past social history, past surgical history, and problem list.    Review of Systems   Constitutional:  Negative for chills, fatigue and fever.   HENT:  Positive for congestion, ear pain and sinus pressure. Negative for postnasal drip, sore throat and trouble swallowing.    Respiratory:  Positive for shortness of breath (occasional with activity). Negative for cough, chest tightness and wheezing.    Cardiovascular:   Negative for chest pain, palpitations and leg swelling.   Neurological: Negative.    Hematological:  Does not bruise/bleed easily.   Psychiatric/Behavioral:  Negative for suicidal ideas and depressed mood. The patient is not nervous/anxious.        Objective   Physical Exam  Constitutional:       Appearance: She is well-developed.   HENT:      Head: Normocephalic and atraumatic.      Right Ear: Hearing, tympanic membrane, ear canal and external ear normal.      Left Ear: Hearing, ear canal and external ear normal.      Ears:      Comments: Fluid present left TM.     Nose: Congestion present.      Right Turbinates: Not enlarged.      Left Turbinates: Not enlarged.      Right Sinus: No maxillary sinus tenderness or frontal sinus tenderness.      Left Sinus: Maxillary sinus tenderness present. No frontal sinus tenderness.      Mouth/Throat:      Lips: Pink.      Mouth: Mucous membranes are moist. No injury or oral lesions.      Dentition: Normal dentition.      Tongue: No lesions. Tongue does not deviate from midline.      Palate: No mass and lesions.      Pharynx: Uvula midline. No pharyngeal swelling, oropharyngeal exudate, posterior oropharyngeal erythema or uvula swelling.      Comments: Posterior pharynx with clear drainage.  Neck:      Thyroid: No thyroid mass, thyromegaly or thyroid tenderness.      Vascular: No carotid bruit.      Trachea: Trachea normal.   Cardiovascular:      Rate and Rhythm: Normal rate and regular rhythm.      Chest Wall: PMI is not displaced.      Pulses:           Radial pulses are 2+ on the right side and 2+ on the left side.        Dorsalis pedis pulses are 2+ on the right side and 2+ on the left side.        Posterior tibial pulses are 2+ on the right side and 2+ on the left side.      Heart sounds: S1 normal and S2 normal.   Pulmonary:      Effort: Pulmonary effort is normal.      Breath sounds: Normal breath sounds.   Musculoskeletal:      Right lower leg: No edema.      Left lower  leg: No edema.   Lymphadenopathy:      Head:      Right side of head: No submental, submandibular, tonsillar or occipital adenopathy.      Left side of head: No submental, submandibular, tonsillar or occipital adenopathy.      Cervical: No cervical adenopathy.   Skin:     General: Skin is warm and dry.      Capillary Refill: Capillary refill takes less than 2 seconds.      Nails: There is no clubbing.   Neurological:      Mental Status: She is alert and oriented to person, place, and time.   Psychiatric:         Attention and Perception: Attention normal.         Mood and Affect: Mood and affect normal.         Speech: Speech normal.         Behavior: Behavior normal.         Thought Content: Thought content normal.         Cognition and Memory: Cognition normal.         Vitals:    11/16/23 0930   BP: 118/72   Pulse: 81   Temp: 98.2 °F (36.8 °C)   SpO2: 98%      Body mass index is 19.34 kg/m².    Assessment & Plan   Problems Addressed this Visit       Hypertension - Primary    Trigeminal neuralgia of left side of face    Relevant Medications    gabapentin (NEURONTIN) 100 MG capsule    Hyperlipidemia LDL goal <70    History of CVA (cerebrovascular accident)     Other Visit Diagnoses       Ear fullness, left        Iron deficiency anemia, unspecified iron deficiency anemia type        Relevant Orders    CBC & Differential    Ferritin    Iron Profile          Diagnoses         Codes Comments    Primary hypertension    -  Primary ICD-10-CM: I10  ICD-9-CM: 401.9     Hyperlipidemia LDL goal <70     ICD-10-CM: E78.5  ICD-9-CM: 272.4     History of CVA (cerebrovascular accident)     ICD-10-CM: Z86.73  ICD-9-CM: V12.54     Trigeminal neuralgia of left side of face     ICD-10-CM: G50.0  ICD-9-CM: 350.1     Ear fullness, left     ICD-10-CM: H93.8X2  ICD-9-CM: 388.8     Iron deficiency anemia, unspecified iron deficiency anemia type     ICD-10-CM: D50.9  ICD-9-CM: 280.9           Lab results discussed with patient in office  today.    1.  HTN-well-controlled.  Continue losartan HCTZ 100-12.5 mg daily.  Make sure to hydrate well with fluids.  Goal BP less than 130/80.  2.  HLD/history of CVA-LDL improving with addition of niacin.  Recommend continuing Vascepa 2 capsules twice daily and niacin 500 mg nightly.  Continue Mediterranean-style diet and regular exercise.  Follow-up with cardiology as scheduled for MAYCOL for further evaluation.  3.  Trigeminal neuralgia of left side of face-stable with as needed gabapentin 100 mg.  Prescription refilled today.  Discussed proper use and adverse effects.  JESUSITA and Gaurav updated today.  4.  Left ear fullness-recommend increasing Flonase to 1 puff twice daily along with warm facial compresses.  If symptoms become worse she will notify me and we will prescribe an antibiotic.  5.  Iron deficiency anemia-start oral ferrous sulfate 325 mg daily.  Take with vitamin C to enhance absorption.  Take separate from dairy products.  Take with food if GI upset occurs.  Notify for worse significant constipation.  Repeat labs in 3 months.    Follow-up in 6 months for Medicare wellness with fasting labs prior.

## 2023-11-17 RX ORDER — FLUTICASONE PROPIONATE 50 MCG
SPRAY, SUSPENSION (ML) NASAL
Qty: 16 ML | Refills: 5 | Status: SHIPPED | OUTPATIENT
Start: 2023-11-17

## 2023-12-11 DIAGNOSIS — E78.5 HYPERLIPIDEMIA LDL GOAL <70: ICD-10-CM

## 2023-12-11 DIAGNOSIS — Z86.73 HISTORY OF CVA (CEREBROVASCULAR ACCIDENT): ICD-10-CM

## 2023-12-11 RX ORDER — NIACIN 500 MG/1
500 TABLET, EXTENDED RELEASE ORAL NIGHTLY
Qty: 90 TABLET | Refills: 1 | Status: SHIPPED | OUTPATIENT
Start: 2023-12-11

## 2024-01-10 DIAGNOSIS — Z86.73 HISTORY OF CVA (CEREBROVASCULAR ACCIDENT): ICD-10-CM

## 2024-01-10 DIAGNOSIS — R26.89 BALANCE PROBLEM: Primary | ICD-10-CM

## 2024-01-11 ENCOUNTER — TELEPHONE (OUTPATIENT)
Dept: INTERNAL MEDICINE | Facility: CLINIC | Age: 74
End: 2024-01-11

## 2024-01-11 NOTE — TELEPHONE ENCOUNTER
Caller: Rosi Vicente    Relationship: Self    Best call back number: [phone]*    What was the call regarding: PATIENT CALLING TO GIVE THE FAX NUMBER TO [facility], TO FAX THE REFERRAL TO THEM SO THE PATIENT CAN BE SCHEDULED.    [facility] FAX#: [phone]

## 2024-01-11 NOTE — TELEPHONE ENCOUNTER
Caller: Rosi Vicente    Relationship to patient: Self    Best call back number: [phone]      Patient is needing: PATIENT CALLED BACK TO ASK THAT ORDER BE FAXED OVER TO [facility] AS SOON AS POSSIBLE.    PLEASE AVISE.

## 2024-01-16 DIAGNOSIS — G50.0 TRIGEMINAL NEURALGIA OF LEFT SIDE OF FACE: ICD-10-CM

## 2024-01-17 RX ORDER — GABAPENTIN 100 MG/1
CAPSULE ORAL
Qty: 90 CAPSULE | Refills: 0 | Status: SHIPPED | OUTPATIENT
Start: 2024-01-17

## 2024-01-17 NOTE — TELEPHONE ENCOUNTER
JESUSITA AND HARI UTD  LAST RF 11/16/23   LAST OV 11/16/23   SENT MESSAGE TO FRONT TO SCHEDULE UPCOMING VISIT

## 2024-02-08 RX ORDER — LOSARTAN POTASSIUM AND HYDROCHLOROTHIAZIDE 12.5; 1 MG/1; MG/1
1 TABLET ORAL DAILY
Qty: 90 TABLET | Refills: 0 | Status: SHIPPED | OUTPATIENT
Start: 2024-02-08

## 2024-02-19 ENCOUNTER — LAB (OUTPATIENT)
Dept: LAB | Facility: HOSPITAL | Age: 74
End: 2024-02-19
Payer: MEDICARE

## 2024-02-19 PROCEDURE — 82728 ASSAY OF FERRITIN: CPT | Performed by: NURSE PRACTITIONER

## 2024-02-19 PROCEDURE — 84466 ASSAY OF TRANSFERRIN: CPT | Performed by: NURSE PRACTITIONER

## 2024-02-19 PROCEDURE — 83540 ASSAY OF IRON: CPT | Performed by: NURSE PRACTITIONER

## 2024-02-19 PROCEDURE — 85025 COMPLETE CBC W/AUTO DIFF WBC: CPT | Performed by: NURSE PRACTITIONER

## 2024-02-20 DIAGNOSIS — M81.0 AGE-RELATED OSTEOPOROSIS WITHOUT CURRENT PATHOLOGICAL FRACTURE: ICD-10-CM

## 2024-02-20 DIAGNOSIS — Z78.0 POST-MENOPAUSAL: Primary | ICD-10-CM

## 2024-03-04 RX ORDER — ICOSAPENT ETHYL 1000 MG/1
2 CAPSULE ORAL 2 TIMES DAILY WITH MEALS
Qty: 360 CAPSULE | Refills: 1 | Status: SHIPPED | OUTPATIENT
Start: 2024-03-04

## 2024-03-10 NOTE — TELEPHONE ENCOUNTER
----- Message from Shanta Ayala sent at 1/8/2018  8:25 AM EST -----  Contact: pt MILSE  The livalo is working so much better. And would like to continue taking it.  Even tho its more expensive, and needs a PA.    Grateful for the persistence in finding something that works  Pt#  [phone]   Warm/Dry

## 2024-03-14 ENCOUNTER — TELEPHONE (OUTPATIENT)
Dept: INTERNAL MEDICINE | Facility: CLINIC | Age: 74
End: 2024-03-14
Payer: MEDICARE

## 2024-03-14 RX ORDER — ICOSAPENT ETHYL 1000 MG/1
2 CAPSULE ORAL 2 TIMES DAILY WITH MEALS
Qty: 360 CAPSULE | Refills: 1 | Status: SHIPPED | OUTPATIENT
Start: 2024-03-14

## 2024-03-14 NOTE — TELEPHONE ENCOUNTER
Caller: Rosi Vicente    Relationship to patient: Self    Best call back number: [phone]    Patient is needing: PATIENT WAS CALLING TO INFORM MAREN HER THAT [facility] WILL BE SENDING OVER A REQUEST FOR Vascepa 1 g capsule capsule AND SHE ALSO PROVIDED A NUMBER FOR THEM:[phone]

## 2024-03-27 RX ORDER — FLUTICASONE PROPIONATE 50 MCG
1 SPRAY, SUSPENSION (ML) NASAL DAILY
Qty: 16 ML | Refills: 3 | Status: SHIPPED | OUTPATIENT
Start: 2024-03-27

## 2024-03-28 ENCOUNTER — TELEMEDICINE (OUTPATIENT)
Dept: GASTROENTEROLOGY | Facility: CLINIC | Age: 74
End: 2024-03-28
Payer: MEDICARE

## 2024-03-28 DIAGNOSIS — K21.9 GASTROESOPHAGEAL REFLUX DISEASE, UNSPECIFIED WHETHER ESOPHAGITIS PRESENT: ICD-10-CM

## 2024-03-28 DIAGNOSIS — K44.9 HIATAL HERNIA: ICD-10-CM

## 2024-03-28 DIAGNOSIS — K58.2 IRRITABLE BOWEL SYNDROME WITH BOTH CONSTIPATION AND DIARRHEA: ICD-10-CM

## 2024-03-28 DIAGNOSIS — K22.70 BARRETT'S ESOPHAGUS WITHOUT DYSPLASIA: Primary | ICD-10-CM

## 2024-03-28 PROCEDURE — 1159F MED LIST DOCD IN RCRD: CPT | Performed by: NURSE PRACTITIONER

## 2024-03-28 PROCEDURE — 99214 OFFICE O/P EST MOD 30 MIN: CPT | Performed by: NURSE PRACTITIONER

## 2024-03-28 PROCEDURE — 1160F RVW MEDS BY RX/DR IN RCRD: CPT | Performed by: NURSE PRACTITIONER

## 2024-03-28 NOTE — PATIENT INSTRUCTIONS
For irritable bowel syndrome with diarrhea, recommend using IBgard if needed.  If diarrhea persist longer than 3 to 4 days and does not improve with use of IBgard, recommended treatment course of Xifaxan.  Take 1 pill 3 times daily for 2 weeks.    For GERD, continue treatment with omeprazole 20 mg daily.    For surveillance of Fonseca's esophagus, EGD recommended at 3-year interval, due June 2025.    We will plan on Cologuard for colon cancer screening to be done in June 2025.

## 2024-03-28 NOTE — PROGRESS NOTES
Chief Complaint   Patient presents with    Irritable Bowel Syndrome           History of Present Illness  Patient is a 74-year-old female who presents today for follow-up. She has a history of GERD, hiatal hernia, Fonseca's esophagus, IBS-D, and colon polyps.  Most recent EGD and colonoscopy were June 2022.    Patient presents today for follow-up.  She reports overall she had done very well since her last office visit.  She had had no significant issues with diarrhea.  Over the weekend she began experiencing diarrhea, she started taking IBgard and the symptoms resolved.  She had cramping associated with this.  She almost restarted a treatment course of Xifaxan but then the symptoms improved.  She questions when she should retreat with Xifaxan.    She is generally having a daily bowel movement.  She denies any abdominal pain or bloating.    She continues on omeprazole 20 mg daily for GERD.  Reports symptoms have been very well-controlled.  She has occasional breakthrough symptoms if she eats late.  She denies any nausea, vomiting, or dysphagia.    You have chosen to receive care through a telehealth visit.  Do you consent to use a video/audio connection for your medical care today? Yes    Physical Exam  Constitutional:       General: She is not in acute distress.     Appearance: Normal appearance. She is well-developed. She is not ill-appearing.   Pulmonary:      Effort: No respiratory distress.   Skin:     Coloration: Skin is not jaundiced or pale.   Neurological:      Mental Status: She is alert and oriented to person, place, and time.   Psychiatric:         Thought Content: Thought content normal.             Result Review :      Comprehensive metabolic panel (11/07/2023 07:51)    CBC & Differential (02/19/2024 08:03)    Tissue Pathology Exam (06/29/2022 09:13)    COLONOSCOPY (06/29/2022 08:43)    UPPER GI ENDOSCOPY (06/29/2022 08:43)    Telemedicine with Pinky Drake APRN (03/30/2023)    Assessment and Plan     Diagnoses and all orders for this visit:    1. Fonseca's esophagus without dysplasia (Primary)    2. Gastroesophageal reflux disease, unspecified whether esophagitis present    3. Hiatal hernia    4. Irritable bowel syndrome with both constipation and diarrhea    Other orders  -     riFAXIMin (Xifaxan) 550 MG tablet; Take 1 tablet by mouth 3 (Three) Times a Day for 14 days.  Dispense: 100 tablet; Refill: 2         Discussion  Patient presents today for follow-up.  Overall symptoms are stable and controlled since last office visit.  She will be due for EGD for surveillance of Fonseca's esophagus in 2025.  Most recent colonoscopy in 2022 with fair prep, we discussed consideration for repeat colon to be in 2025.  She would prefer to proceed noninvasively and start with Cologuard which we will do next year.  If this is positive, we will plan on colonoscopy at time of surveillance EGD.    Reflux symptoms are well-controlled on omeprazole and will continue current treatment.    For irritable bowel syndrome, discussed if she has persistent diarrhea that lasts longer than 3 to 4 days, okay to retreat with Xifaxan.  Would try IBgard first as this has worked well for her in the past.  She questions if it is okay to take MiraLAX, discussed okay to do so if needed for constipation but does not sound like she needs to take this on a regular basis currently.          Follow Up   Return in about 1 year (around 3/28/2025), or if symptoms worsen or fail to improve.    Patient Instructions   For irritable bowel syndrome with diarrhea, recommend using IBgard if needed.  If diarrhea persist longer than 3 to 4 days and does not improve with use of IBgard, recommended treatment course of Xifaxan.  Take 1 pill 3 times daily for 2 weeks.    For GERD, continue treatment with omeprazole 20 mg daily.    For surveillance of Fonseca's esophagus, EGD recommended at 3-year interval, due June 2025.    We will plan on Cologuard for colon cancer  screening to be done in June 2025.

## 2024-03-29 ENCOUNTER — TRANSCRIBE ORDERS (OUTPATIENT)
Dept: ADMINISTRATIVE | Facility: HOSPITAL | Age: 74
End: 2024-03-29
Payer: MEDICARE

## 2024-03-29 DIAGNOSIS — Z12.31 SCREENING MAMMOGRAM FOR BREAST CANCER: Primary | ICD-10-CM

## 2024-03-29 RX ORDER — FLUTICASONE PROPIONATE 50 MCG
1 SPRAY, SUSPENSION (ML) NASAL DAILY
Qty: 16 ML | Refills: 3 | OUTPATIENT
Start: 2024-03-29

## 2024-04-11 ENCOUNTER — TELEPHONE (OUTPATIENT)
Dept: INTERNAL MEDICINE | Facility: CLINIC | Age: 74
End: 2024-04-11

## 2024-04-11 RX ORDER — ICOSAPENT ETHYL 1000 MG/1
2 CAPSULE ORAL 2 TIMES DAILY WITH MEALS
Qty: 360 CAPSULE | Refills: 1 | Status: CANCELLED | OUTPATIENT
Start: 2024-04-11

## 2024-04-11 NOTE — TELEPHONE ENCOUNTER
Caller: Rosi Vicente    Relationship: Self    Best call back number:     [phone] (Mobile)       Requested Prescriptions:   Requested Prescriptions     Pending Prescriptions Disp Refills    Vascepa 1 g capsule capsule 360 capsule 1     Sig: Take 2 g by mouth 2 (Two) Times a Day With Meals.        Pharmacy where request should be sent: [facility] [address] [phone] [facility] [phone]      Last office visit with prescribing clinician: 11/16/2023   Last telemedicine visit with prescribing clinician: Visit date not found   Next office visit with prescribing clinician: 5/15/2024     Additional details provided by patient: PATIENT HAS BEEN OUT OF THIS MEDICATION FOR WEEKS    Does the patient have less than a 3 day supply:  [x] Yes  [] No    Would you like a call back once the refill request has been completed: [] Yes [x] No    If the office needs to give you a call back, can they leave a voicemail: [] Yes [x] No    Daryl Rea Rep   04/11/24 15:19 EDT

## 2024-04-30 ENCOUNTER — LAB (OUTPATIENT)
Dept: LAB | Facility: HOSPITAL | Age: 74
End: 2024-04-30
Payer: MEDICARE

## 2024-04-30 DIAGNOSIS — E78.5 HYPERLIPIDEMIA LDL GOAL <70: ICD-10-CM

## 2024-04-30 DIAGNOSIS — I10 PRIMARY HYPERTENSION: ICD-10-CM

## 2024-04-30 DIAGNOSIS — D50.9 IRON DEFICIENCY ANEMIA, UNSPECIFIED IRON DEFICIENCY ANEMIA TYPE: ICD-10-CM

## 2024-04-30 DIAGNOSIS — Z00.00 MEDICARE ANNUAL WELLNESS VISIT, SUBSEQUENT: Primary | ICD-10-CM

## 2024-04-30 LAB
ALBUMIN SERPL-MCNC: 4.5 G/DL (ref 3.5–5.2)
ALBUMIN/GLOB SERPL: 2.1 G/DL
ALP SERPL-CCNC: 85 U/L (ref 39–117)
ALT SERPL W P-5'-P-CCNC: 16 U/L (ref 1–33)
ANION GAP SERPL CALCULATED.3IONS-SCNC: 9 MMOL/L (ref 5–15)
AST SERPL-CCNC: 12 U/L (ref 1–32)
BASOPHILS # BLD AUTO: 0.06 10*3/MM3 (ref 0–0.2)
BASOPHILS NFR BLD AUTO: 1.3 % (ref 0–1.5)
BILIRUB SERPL-MCNC: 0.4 MG/DL (ref 0–1.2)
BUN SERPL-MCNC: 19 MG/DL (ref 8–23)
BUN/CREAT SERPL: 32.8 (ref 7–25)
CALCIUM SPEC-SCNC: 9.2 MG/DL (ref 8.6–10.5)
CHLORIDE SERPL-SCNC: 98 MMOL/L (ref 98–107)
CHOLEST SERPL-MCNC: 181 MG/DL (ref 0–200)
CO2 SERPL-SCNC: 30 MMOL/L (ref 22–29)
CREAT SERPL-MCNC: 0.58 MG/DL (ref 0.57–1)
DEPRECATED RDW RBC AUTO: 41.8 FL (ref 37–54)
EGFRCR SERPLBLD CKD-EPI 2021: 94.5 ML/MIN/1.73
EOSINOPHIL # BLD AUTO: 0.16 10*3/MM3 (ref 0–0.4)
EOSINOPHIL NFR BLD AUTO: 3.4 % (ref 0.3–6.2)
ERYTHROCYTE [DISTWIDTH] IN BLOOD BY AUTOMATED COUNT: 13.2 % (ref 12.3–15.4)
FERRITIN SERPL-MCNC: 26.9 NG/ML (ref 13–150)
GLOBULIN UR ELPH-MCNC: 2.1 GM/DL
GLUCOSE SERPL-MCNC: 77 MG/DL (ref 65–99)
HCT VFR BLD AUTO: 40.6 % (ref 34–46.6)
HDLC SERPL-MCNC: 49 MG/DL (ref 40–60)
HGB BLD-MCNC: 12.8 G/DL (ref 12–15.9)
IMM GRANULOCYTES # BLD AUTO: 0.01 10*3/MM3 (ref 0–0.05)
IMM GRANULOCYTES NFR BLD AUTO: 0.2 % (ref 0–0.5)
IRON 24H UR-MRATE: 82 MCG/DL (ref 37–145)
IRON SATN MFR SERPL: 19 % (ref 20–50)
LDLC SERPL CALC-MCNC: 120 MG/DL (ref 0–100)
LDLC/HDLC SERPL: 2.43 {RATIO}
LYMPHOCYTES # BLD AUTO: 1.77 10*3/MM3 (ref 0.7–3.1)
LYMPHOCYTES NFR BLD AUTO: 38.1 % (ref 19.6–45.3)
MCH RBC QN AUTO: 26.8 PG (ref 26.6–33)
MCHC RBC AUTO-ENTMCNC: 31.5 G/DL (ref 31.5–35.7)
MCV RBC AUTO: 85.1 FL (ref 79–97)
MONOCYTES # BLD AUTO: 0.46 10*3/MM3 (ref 0.1–0.9)
MONOCYTES NFR BLD AUTO: 9.9 % (ref 5–12)
NEUTROPHILS NFR BLD AUTO: 2.19 10*3/MM3 (ref 1.7–7)
NEUTROPHILS NFR BLD AUTO: 47.1 % (ref 42.7–76)
NRBC BLD AUTO-RTO: 0 /100 WBC (ref 0–0.2)
PLATELET # BLD AUTO: 344 10*3/MM3 (ref 140–450)
PMV BLD AUTO: 9.5 FL (ref 6–12)
POTASSIUM SERPL-SCNC: 4.4 MMOL/L (ref 3.5–5.2)
PROT SERPL-MCNC: 6.6 G/DL (ref 6–8.5)
RBC # BLD AUTO: 4.77 10*6/MM3 (ref 3.77–5.28)
SODIUM SERPL-SCNC: 137 MMOL/L (ref 136–145)
TIBC SERPL-MCNC: 428 MCG/DL (ref 298–536)
TRANSFERRIN SERPL-MCNC: 287 MG/DL (ref 200–360)
TRIGL SERPL-MCNC: 64 MG/DL (ref 0–150)
TSH SERPL DL<=0.05 MIU/L-ACNC: 2.98 UIU/ML (ref 0.27–4.2)
VLDLC SERPL-MCNC: 12 MG/DL (ref 5–40)
WBC NRBC COR # BLD AUTO: 4.65 10*3/MM3 (ref 3.4–10.8)

## 2024-04-30 PROCEDURE — 82728 ASSAY OF FERRITIN: CPT | Performed by: NURSE PRACTITIONER

## 2024-04-30 PROCEDURE — 84443 ASSAY THYROID STIM HORMONE: CPT | Performed by: NURSE PRACTITIONER

## 2024-04-30 PROCEDURE — 85025 COMPLETE CBC W/AUTO DIFF WBC: CPT | Performed by: NURSE PRACTITIONER

## 2024-04-30 PROCEDURE — 80061 LIPID PANEL: CPT | Performed by: NURSE PRACTITIONER

## 2024-04-30 PROCEDURE — 83540 ASSAY OF IRON: CPT | Performed by: NURSE PRACTITIONER

## 2024-04-30 PROCEDURE — 84466 ASSAY OF TRANSFERRIN: CPT | Performed by: NURSE PRACTITIONER

## 2024-04-30 PROCEDURE — 80053 COMPREHEN METABOLIC PANEL: CPT | Performed by: NURSE PRACTITIONER

## 2024-04-30 PROCEDURE — 36415 COLL VENOUS BLD VENIPUNCTURE: CPT | Performed by: NURSE PRACTITIONER

## 2024-05-10 RX ORDER — LOSARTAN POTASSIUM AND HYDROCHLOROTHIAZIDE 12.5; 1 MG/1; MG/1
1 TABLET ORAL DAILY
Qty: 90 TABLET | Refills: 0 | Status: SHIPPED | OUTPATIENT
Start: 2024-05-10

## 2024-05-15 ENCOUNTER — OFFICE VISIT (OUTPATIENT)
Dept: INTERNAL MEDICINE | Facility: CLINIC | Age: 74
End: 2024-05-15
Payer: MEDICARE

## 2024-05-15 VITALS
HEIGHT: 67 IN | SYSTOLIC BLOOD PRESSURE: 122 MMHG | OXYGEN SATURATION: 97 % | BODY MASS INDEX: 19.68 KG/M2 | DIASTOLIC BLOOD PRESSURE: 70 MMHG | WEIGHT: 125.4 LBS | HEART RATE: 70 BPM | TEMPERATURE: 98.1 F

## 2024-05-15 DIAGNOSIS — F07.81 POST CONCUSSION SYNDROME: ICD-10-CM

## 2024-05-15 DIAGNOSIS — G50.0 TRIGEMINAL NEURALGIA OF LEFT SIDE OF FACE: ICD-10-CM

## 2024-05-15 DIAGNOSIS — R26.89 BALANCE PROBLEM: ICD-10-CM

## 2024-05-15 DIAGNOSIS — G47.33 OBSTRUCTIVE SLEEP APNEA SYNDROME: ICD-10-CM

## 2024-05-15 DIAGNOSIS — Z00.00 MEDICARE ANNUAL WELLNESS VISIT, SUBSEQUENT: Primary | ICD-10-CM

## 2024-05-15 DIAGNOSIS — E55.9 VITAMIN D DEFICIENCY: ICD-10-CM

## 2024-05-15 DIAGNOSIS — I10 PRIMARY HYPERTENSION: ICD-10-CM

## 2024-05-15 DIAGNOSIS — E78.5 HYPERLIPIDEMIA LDL GOAL <70: ICD-10-CM

## 2024-05-15 DIAGNOSIS — Z86.73 HISTORY OF CVA (CEREBROVASCULAR ACCIDENT): ICD-10-CM

## 2024-05-15 DIAGNOSIS — D50.9 IRON DEFICIENCY ANEMIA, UNSPECIFIED IRON DEFICIENCY ANEMIA TYPE: ICD-10-CM

## 2024-05-15 DIAGNOSIS — R53.83 FATIGUE, UNSPECIFIED TYPE: ICD-10-CM

## 2024-05-15 PROCEDURE — 1160F RVW MEDS BY RX/DR IN RCRD: CPT | Performed by: NURSE PRACTITIONER

## 2024-05-15 PROCEDURE — 1126F AMNT PAIN NOTED NONE PRSNT: CPT | Performed by: NURSE PRACTITIONER

## 2024-05-15 PROCEDURE — 3078F DIAST BP <80 MM HG: CPT | Performed by: NURSE PRACTITIONER

## 2024-05-15 PROCEDURE — 3074F SYST BP LT 130 MM HG: CPT | Performed by: NURSE PRACTITIONER

## 2024-05-15 PROCEDURE — 1170F FXNL STATUS ASSESSED: CPT | Performed by: NURSE PRACTITIONER

## 2024-05-15 PROCEDURE — G0439 PPPS, SUBSEQ VISIT: HCPCS | Performed by: NURSE PRACTITIONER

## 2024-05-15 PROCEDURE — 1159F MED LIST DOCD IN RCRD: CPT | Performed by: NURSE PRACTITIONER

## 2024-05-15 PROCEDURE — 99214 OFFICE O/P EST MOD 30 MIN: CPT | Performed by: NURSE PRACTITIONER

## 2024-05-15 RX ORDER — LOSARTAN POTASSIUM 100 MG/1
100 TABLET ORAL DAILY
Qty: 90 TABLET | Refills: 2 | Status: SHIPPED | OUTPATIENT
Start: 2024-05-15

## 2024-05-15 RX ORDER — AMLODIPINE BESYLATE 2.5 MG/1
2.5 TABLET ORAL DAILY
Qty: 90 TABLET | Refills: 2 | Status: SHIPPED | OUTPATIENT
Start: 2024-05-15

## 2024-05-15 NOTE — PROGRESS NOTES
The ABCs of the Annual Wellness Visit  Subsequent Medicare Wellness Visit    Subjective    Rosi Vicente is a 74 y.o. female who presents for a Subsequent Medicare Wellness Visit.    The following portions of the patient's history were reviewed and   updated as appropriate: allergies, current medications, past family history, past medical history, past social history, past surgical history, and problem list.    Compared to one year ago, the patient feels her physical   health is worse.    Compared to one year ago, the patient feels her mental   health is the same.    Recent Hospitalizations:  She was not admitted to the hospital during the last year.       Current Medical Providers:  Patient Care Team:  Anette Machado APRN as PCP - General (Nurse Practitioner)  Christopher Young MD as Consulting Physician (Otolaryngology)  Antonio Davila MD as Consulting Physician (Orthopedic Surgery)  Manny Sexton MD as Consulting Physician (Dermatology)  Víctor Polk MD as Consulting Physician (Gastroenterology)  Kori Vaughn MD as Consulting Physician (Neurosurgery)  Hermes Anne MD as Consulting Physician (Orthopedic Surgery)  Peri James APRN as Nurse Practitioner (Neurology)  Gallo Shields MD as Consulting Physician (Ophthalmology)  Yuri Lay DPM as Consulting Physician (Podiatry)  Katarina Leung MD as Consulting Physician (Cardiology)    Outpatient Medications Prior to Visit   Medication Sig Dispense Refill    acetaminophen (TYLENOL) 500 MG tablet Take 1 tablet by mouth Every 6 (Six) Hours As Needed for Mild Pain.      aspirin 81 MG EC tablet Take 1 tablet by mouth Daily.      cholecalciferol (VITAMIN D3) 1000 UNITS tablet Take 2 tablets by mouth 2 (Two) Times a Day.      Coenzyme Q10 (COQ-10) 400 MG capsule Take  by mouth Daily.      cycloSPORINE (RESTASIS) 0.05 % ophthalmic emulsion Apply 1 drop to eye(s) as directed by provider 2 (Two) Times a Day.       fluticasone (FLONASE) 50 MCG/ACT nasal spray 1 spray into the nostril(s) as directed by provider Daily. 16 mL 3    gabapentin (NEURONTIN) 100 MG capsule TAKE ONE CAPSULE BY MOUTH EVERY NIGHT AT BEDTIME 90 capsule 0    Melatonin 10 MG tablet Take 2 tablets by mouth.      niacin (Niaspan) 500 MG CR tablet Take 1 tablet by mouth Every Night. 90 tablet 1    NON FORMULARY Bone growth factor      omeprazole (priLOSEC) 20 MG capsule Take 1 capsule 1-2 times daily 180 capsule 3    Peppermint Oil (IBGARD PO) Take  by mouth.      Probiotic Product (PROBIOTIC-10 PO) Take 1 capsule by mouth Daily.      Vascepa 1 g capsule capsule Take 2 g by mouth 2 (Two) Times a Day With Meals. 360 capsule 1    losartan-hydrochlorothiazide (HYZAAR) 100-12.5 MG per tablet TAKE 1 TABLET BY MOUTH DAILY 90 tablet 0    SUPER B COMPLEX/C PO Take  by mouth.       No facility-administered medications prior to visit.       No opioid medication identified on active medication list. I have reviewed chart for other potential  high risk medication/s and harmful drug interactions in the elderly.        Aspirin is on active medication list. Aspirin use is indicated based on review of current medical condition/s. Pros and cons of this therapy have been discussed today. Benefits of this medication outweigh potential harm.  Patient has been encouraged to continue taking this medication.  .      Patient Active Problem List   Diagnosis    Hypertension    Allergic rhinitis    Trigeminal neuralgia of left side of face    Hyperlipidemia LDL goal <70    Osteoarthritis of hip    Vitamin D deficiency    History of CVA (cerebrovascular accident)    Heartburn    Frequent urination    Age-related osteoporosis without current pathological fracture    Chronic fatigue    Hyponatremia    Overactive bladder    Vision disturbance    Sequelae, post-stroke    Dry eye syndrome of both eyes    Obstructive sleep apnea syndrome    Irritable bowel syndrome with both constipation  "and diarrhea    Dizziness    Abnormal finding on breast imaging    History of removal of implants of both breasts    Fibrocystic breast changes, bilateral    Iron deficiency anemia    Positive fecal occult blood test    Osteoarthritis of right shoulder     Advance Care Planning   Advance Care Planning     Advance Directive is not on file.  ACP discussion was held with the patient during this visit. Patient has an advance directive (not in EMR), copy requested.     Objective    Vitals:    05/15/24 1423   BP: 122/70   BP Location: Left arm   Patient Position: Sitting   Cuff Size: Adult   Pulse: 70   Temp: 98.1 °F (36.7 °C)   TempSrc: Oral   SpO2: 97%   Weight: 56.9 kg (125 lb 6.4 oz)   Height: 170.2 cm (67\")     Estimated body mass index is 19.64 kg/m² as calculated from the following:    Height as of this encounter: 170.2 cm (67\").    Weight as of this encounter: 56.9 kg (125 lb 6.4 oz).    BMI is within normal parameters. No other follow-up for BMI required.      Does the patient have evidence of cognitive impairment? No    Lab Results   Component Value Date    TRIG 64 2024    HDL 49 2024     (H) 2024    VLDL 12 2024        HEALTH RISK ASSESSMENT    Smoking Status:  Social History     Tobacco Use   Smoking Status Never   Smokeless Tobacco Never   Tobacco Comments    daily caffiene     Alcohol Consumption:  Social History     Substance and Sexual Activity   Alcohol Use Yes    Alcohol/week: 1.0 standard drink of alcohol    Types: 1 Glasses of wine per week    Comment: a few days a week     Fall Risk Screen:    STEADI Fall Risk Assessment was completed, and patient is at MODERATE risk for falls. Assessment completed on:5/15/2024    Depression Screenin/15/2024     2:21 PM   PHQ-2/PHQ-9 Depression Screening   Little Interest or Pleasure in Doing Things 0-->not at all   Feeling Down, Depressed or Hopeless 0-->not at all   PHQ-9: Brief Depression Severity Measure Score 0       Health " Habits and Functional and Cognitive Screenin/14/2024     8:42 AM   Functional & Cognitive Status   Do you have difficulty preparing food and eating? No   Do you have difficulty bathing yourself, getting dressed or grooming yourself? No   Do you have difficulty using the toilet? No   Do you have difficulty moving around from place to place? No   Do you have trouble with steps or getting out of a bed or a chair? No   Current Diet Well Balanced Diet   Dental Exam Up to date   Eye Exam Up to date   Exercise (times per week) 3 times per week   Current Exercises Include Gardening;Light Weights;Walking;Yard Work   Do you need help using the phone?  No   Are you deaf or do you have serious difficulty hearing?  No   Do you need help to go to places out of walking distance? No   Do you need help shopping? No   Do you need help preparing meals?  No   Do you need help with housework?  No   Do you need help with laundry? No   Do you need help taking your medications? No   Do you need help managing money? No   Do you ever drive or ride in a car without wearing a seat belt? No   Have you felt unusual stress, anger or loneliness in the last month? No   Who do you live with? Spouse   If you need help, do you have trouble finding someone available to you? No   Have you been bothered in the last four weeks by sexual problems? No   Do you have difficulty concentrating, remembering or making decisions? No       Age-appropriate Screening Schedule:  Refer to the list below for future screening recommendations based on patient's age, sex and/or medical conditions. Orders for these recommended tests are listed in the plan section. The patient has been provided with a written plan.    Health Maintenance   Topic Date Due    TDAP/TD VACCINES (2 - Td or Tdap) 2023    COVID-19 Vaccine (2023- season) 2024    INFLUENZA VACCINE  2024    LIPID PANEL  2025    ANNUAL WELLNESS VISIT  05/15/2025    DXA SCAN   04/15/2026    COLORECTAL CANCER SCREENING  06/29/2032    HEPATITIS C SCREENING  Completed    RSV Vaccine - Adults  Completed    Pneumococcal Vaccine 65+  Completed    ZOSTER VACCINE  Completed                  CMS Preventative Services Quick Reference  Risk Factors Identified During Encounter  Fall Risk-High or Moderate: Discussed Fall Prevention in the home  Immunizations Discussed/Encouraged: Tdap  Dental Screening Recommended  Vision Screening Recommended  The above risks/problems have been discussed with the patient.  Pertinent information has been shared with the patient in the After Visit Summary.  An After Visit Summary and PPPS were made available to the patient.    Follow Up:   Next Medicare Wellness visit to be scheduled in 1 year.       Additional E&M Note during same encounter follows:  Patient has multiple medical problems which are significant and separately identifiable that require additional work above and beyond the Medicare Wellness Visit.      Chief Complaint  Medicare Wellness-subsequent, Fatigue, and Fall (Pt had a fall back in march and have dizziness since, very fatigue.)    Subjective        HPI  Rosi Vicente is also being seen today for f/u and lab work.  She notes mechanical fall after tripping over a stone in her yard on March 30 in which she hit the left side of her face on a rock.  She was evaluated for symptoms at [facility].  CT facial bones and head performed negative for acute fracture.  Evidence of chronic small vessel ischemic change present.  She notes that since the fall she has been experiencing fatigue and intermittent balance difficulty.  She notes some difficulty with balance with climbing stairs, turning and walking in a straight line.  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  She denies any dizziness.  She has a history of vertigo and she has previously tried vestibular PT with improvement. She denies any tinnitus, unilateral weakness, difficulty with  "speech, HA, unilateral vision loss, tremors.  She has some mild weakness since her CVA.  She is up-to-date with cardiology, last seen yesterday.    HTN-blood pressure has been well-controlled on losartan/HCTZ.  She does note frequent urination, worse at night, affecting her sleep.  Blood pressure has been well-controlled less than 130/80 on this medication combo.  HLD/History of CVA- she is currently on aspirin 81 mg daily and vascepa 2 capsules BID and niacin.  She notes she was out of the Vascepa for a few weeks secondary to insurance issues prior to lab work.  She recently increased niacin to her 1000 mg nightly.    MELISSA- she cannot tolerate Cpap or Bipap secondary to trigeminal neuralgia. She has had a bite guard but stopped using this.  She does note a lot of fatigue especially in the daytime along with nonrestorative sleep.    Trigeminal neuralgia- she uses gabapentin 100 mg nightly PRN. Patient doing well with current medication regimen, compliant with medication schedule, denies adverse effects.   Review of Systems   Constitutional:  Positive for fatigue. Negative for chills and fever.   Respiratory: Negative.     Cardiovascular: Negative.    Genitourinary:  Positive for frequency.   Neurological:  Negative for dizziness, tremors, seizures, syncope, facial asymmetry, speech difficulty, weakness, light-headedness and numbness.        Balance issues.    Psychiatric/Behavioral:  Positive for sleep disturbance. Negative for dysphoric mood and suicidal ideas. The patient is not nervous/anxious.        Objective   Vital Signs:  /70 (BP Location: Left arm, Patient Position: Sitting, Cuff Size: Adult)   Pulse 70   Temp 98.1 °F (36.7 °C) (Oral)   Ht 170.2 cm (67\")   Wt 56.9 kg (125 lb 6.4 oz)   SpO2 97%   BMI 19.64 kg/m²     Physical Exam  Constitutional:       Appearance: She is well-developed.   Neck:      Thyroid: No thyroid mass, thyromegaly or thyroid tenderness.      Vascular: No carotid bruit.      " Trachea: Trachea normal.   Cardiovascular:      Rate and Rhythm: Normal rate and regular rhythm.      Chest Wall: PMI is not displaced.      Pulses:           Radial pulses are 2+ on the right side and 2+ on the left side.        Dorsalis pedis pulses are 2+ on the right side and 2+ on the left side.        Posterior tibial pulses are 2+ on the right side and 2+ on the left side.      Heart sounds: S1 normal and S2 normal.   Pulmonary:      Effort: Pulmonary effort is normal.      Breath sounds: Normal breath sounds.   Musculoskeletal:      Right lower leg: No edema.      Left lower leg: No edema.   Lymphadenopathy:      Head:      Right side of head: No submental, submandibular, tonsillar or occipital adenopathy.      Left side of head: No submental, submandibular, tonsillar or occipital adenopathy.      Cervical: No cervical adenopathy.   Skin:     General: Skin is warm and dry.      Capillary Refill: Capillary refill takes less than 2 seconds.      Nails: There is no clubbing.   Neurological:      Mental Status: She is alert and oriented to person, place, and time.      Cranial Nerves: Cranial nerves 2-12 are intact.      Sensory: Sensation is intact.      Motor: Motor function is intact.      Coordination: Coordination is intact.      Gait: Tandem walk abnormal.      Comments: Difficulty with tandem walk and some decreased balance with turning.  Upper and lower extremity strength 4 out of 5 symmetric and equal bilaterally.   Psychiatric:         Attention and Perception: Attention normal.         Mood and Affect: Mood and affect normal.         Speech: Speech normal.         Behavior: Behavior normal.         Thought Content: Thought content normal.         Cognition and Memory: Cognition normal.                         Assessment and Plan   Diagnoses and all orders for this visit:    1. Medicare annual wellness visit, subsequent (Primary)    2. Primary hypertension  -     losartan (Cozaar) 100 MG tablet; Take 1  tablet by mouth Daily.  Dispense: 90 tablet; Refill: 2  -     amLODIPine (NORVASC) 2.5 MG tablet; Take 1 tablet by mouth Daily.  Dispense: 90 tablet; Refill: 2  -     Comprehensive Metabolic Panel; Future    3. Hyperlipidemia LDL goal <70  -     Comprehensive Metabolic Panel; Future  -     Lipid Panel With LDL / HDL Ratio; Future    4. History of CVA (cerebrovascular accident)  -     Lipid Panel With LDL / HDL Ratio; Future    5. Trigeminal neuralgia of left side of face    6. Obstructive sleep apnea syndrome    7. Balance problem  -     Ambulatory Referral to Physical Therapy    8. Post concussion syndrome  -     Ambulatory Referral to Physical Therapy    9. Fatigue, unspecified type  -     CBC & Differential; Future  -     Ferritin; Future  -     Iron Profile; Future  -     Vitamin B12; Future  -     Folate; Future    10. Iron deficiency anemia, unspecified iron deficiency anemia type  -     CBC & Differential; Future  -     Ferritin; Future  -     Iron Profile; Future    11. Vitamin D deficiency  -     Vitamin D,25-Hydroxy; Future    Lab results discussed with patient in office today.    1.  HTN-she is having some side effects with the hydrochlorothiazide.  Will change to losartan 100 mg daily and amlodipine 2.5 mg nightly.  She will monitor blood pressure at home and send me home BP readings in 3 weeks.  Goal BP less than 130/80.  2.  HLD/history of CVA-LDL above goal, except she has been off the Vascepa for the past several weeks prior to lab work.  Recommend continuing Vascepa 2 g twice daily, niacin at 1000 mg nightly and aspirin 81 mg daily.  Could consider addition of berberine 500 mg, but she would need to monitor blood pressure closely secondary to risk of hypotension.  Will repeat lipid panel in 6 months.  3.  Trigeminal neuralgia-stable with as needed gabapentin 100 mg.  She uses this rarely.  CSA up-to-date and PDMP reviewed by me today.  Notify for worsening symptoms.  4.  Fatigue-discussed with her I  feel this is multifaceted related to possible uncontrolled MELISSA and postconcussive syndrome.  Recommend wearing bite guard for MELISSA to see if this helps.  Will also work on nighttime awakenings by transitioning off diuretic therapy.  Referral has been placed to physical therapy for postconcussive syndrome.  5.  Balance problem/postconcussion syndrome-referral placed to physical therapy for evaluation and treatment.  Encouraged fall risk reduction techniques at home.  Recommend limiting bluelight exposure and screen time.  6.  Iron deficiency anemia-improving overall.  Recommend continuing oral iron supplement.    Follow-up with labs in 6 months.       Follow Up   Return in about 6 months (around 11/15/2024).  Patient was given instructions and counseling regarding her condition or for health maintenance advice. Please see specific information pulled into the AVS if appropriate.

## 2024-05-15 NOTE — PATIENT INSTRUCTIONS
Medicare Wellness  Personal Prevention Plan of Service     Date of Office Visit:    Encounter Provider:  JUAN FRANCISCO Nguyen  Place of Service:  [facility]  Patient Name: Rosi Vicente  :  1950    As part of the Medicare Wellness portion of your visit today, we are providing you with this personalized preventive plan of services (PPPS). This plan is based upon recommendations of the [place] Preventive Services Task Force (USPSTF) and the Advisory Committee on Immunization Practices (ACIP).    This lists the preventive care services that should be considered, and provides dates of when you are due. Items listed as completed are up-to-date and do not require any further intervention.    Health Maintenance   Topic Date Due    TDAP/TD VACCINES (2 - Td or Tdap) 2023    COVID-19 Vaccine ( - -24 season) 2024    INFLUENZA VACCINE  2024    LIPID PANEL  2025    ANNUAL WELLNESS VISIT  05/15/2025    DXA SCAN  04/15/2026    COLORECTAL CANCER SCREENING  2032    HEPATITIS C SCREENING  Completed    RSV Vaccine - Adults  Completed    Pneumococcal Vaccine 65+  Completed    ZOSTER VACCINE  Completed       Orders Placed This Encounter   Procedures    Comprehensive Metabolic Panel     Standing Status:   Future     Standing Expiration Date:   5/15/2025     Order Specific Question:   Release to patient     Answer:   Routine Release [[id number]]    Lipid Panel With LDL / HDL Ratio     Standing Status:   Future     Standing Expiration Date:   5/15/2025     Order Specific Question:   Release to patient     Answer:   Routine Release [[id number]]    Ferritin     Standing Status:   Future     Standing Expiration Date:   5/15/2025     Order Specific Question:   Release to patient     Answer:   Routine Release [[id number]]    Iron Profile     Standing Status:   Future     Standing Expiration Date:   5/15/2025     Order Specific Question:   Release to patient      Answer:   Routine Release [[id number]]    Vitamin D,25-Hydroxy     Standing Status:   Future     Standing Expiration Date:   5/15/2025     Order Specific Question:   Release to patient     Answer:   Routine Release [[id number]]    Vitamin B12     Standing Status:   Future     Standing Expiration Date:   5/15/2025     Order Specific Question:   Release to patient     Answer:   Routine Release [[id number]]    Folate     Standing Status:   Future     Standing Expiration Date:   5/15/2025     Order Specific Question:   Release to patient     Answer:   Routine Release [[id number]]    Ambulatory Referral to Physical Therapy     Referral Priority:   Routine     Referral Type:   Physical Therapy     Referral Reason:   Specialty Services Required     Requested Specialty:   Physical Therapy     Number of Visits Requested:   1    CBC & Differential     Standing Status:   Future     Standing Expiration Date:   5/15/2025     Order Specific Question:   Manual Differential     Answer:   No     Order Specific Question:   Release to patient     Answer:   Routine Release [[id number]]       Return in about 6 months (around 11/15/2024).

## 2024-05-16 ENCOUNTER — TELEPHONE (OUTPATIENT)
Dept: INTERNAL MEDICINE | Facility: CLINIC | Age: 74
End: 2024-05-16
Payer: MEDICARE

## 2024-05-21 ENCOUNTER — HOSPITAL ENCOUNTER (OUTPATIENT)
Dept: MAMMOGRAPHY | Facility: HOSPITAL | Age: 74
Discharge: HOME | End: 2024-05-21
Admitting: NURSE PRACTITIONER
Payer: MEDICARE

## 2024-05-21 DIAGNOSIS — Z12.31 SCREENING MAMMOGRAM FOR BREAST CANCER: ICD-10-CM

## 2024-05-21 PROCEDURE — 77063 BREAST TOMOSYNTHESIS BI: CPT

## 2024-05-21 PROCEDURE — 77067 SCR MAMMO BI INCL CAD: CPT

## 2024-06-11 ENCOUNTER — TELEPHONE (OUTPATIENT)
Dept: INTERNAL MEDICINE | Facility: CLINIC | Age: 74
End: 2024-06-11

## 2024-06-11 DIAGNOSIS — R92.8 ABNORMAL MAMMOGRAM: ICD-10-CM

## 2024-06-11 DIAGNOSIS — R92.30 DENSE BREAST TISSUE: Primary | ICD-10-CM

## 2024-06-11 RX ORDER — OMEPRAZOLE 20 MG/1
CAPSULE, DELAYED RELEASE ORAL
Qty: 180 CAPSULE | Refills: 3 | Status: SHIPPED | OUTPATIENT
Start: 2024-06-11

## 2024-06-11 NOTE — TELEPHONE ENCOUNTER
Caller: Rosi Vicente    Relationship: Self    Best call back number: [phone]    What is the medical concern/diagnosis: FOLLOW UP ON BREAST FROM MAMMOGRAM    What specialty or service is being requested: SONOGRAM    What is the provider, practice or medical service name: [facility]    What is the office location: [address] [place] [address]     What is the office phone number: [phone]    Any additional details: PATIENT STATED THAT JUAN FRANCISCO QUESADA WANTED HER TO DO A A FOLLOW UP SONOGRAM ON HER BREAST AND IS REQUESTING IT BE PLACED AT THE FACILITY ABOVE.CALLBACK ONCE DONE.

## 2024-06-14 ENCOUNTER — TELEPHONE (OUTPATIENT)
Dept: INTERNAL MEDICINE | Facility: CLINIC | Age: 74
End: 2024-06-14
Payer: MEDICARE

## 2024-06-14 DIAGNOSIS — R92.8 ABNORMAL MAMMOGRAM: Primary | ICD-10-CM

## 2024-06-14 NOTE — TELEPHONE ENCOUNTER
Caller: Rosi Vicente    Relationship: Self    Best call back number: [phone]    What orders are you requesting (i.e. lab or imaging): DIAGNOSTIC MAMMOGRAM AND SONOGRAM    In what timeframe would the patient need to come in: ASAP    Where will you receive your lab/imaging services: [facility]

## 2024-06-14 NOTE — TELEPHONE ENCOUNTER
RADIOLOGY ARE NOT JUST DOING ULTRASOUND AND THEY NEED DIAGNOSTIC MAMMOGRAM SO I ORDERED. MARVA WILL YOU FAX THIS TO THAT PLACE PLEASE.

## 2024-06-21 NOTE — ASSESSMENT & PLAN NOTE
Addended by: TAVO KNIGHT on: 6/21/2024 04:39 PM     Modules accepted: Orders     Hypertension is improving with treatment.    Blood pressure will be reassessed at the next regular appointment.

## 2024-06-28 ENCOUNTER — HOSPITAL ENCOUNTER (OUTPATIENT)
Dept: ULTRASOUND IMAGING | Facility: HOSPITAL | Age: 74
Discharge: HOME | End: 2024-06-28
Payer: MEDICARE

## 2024-06-28 ENCOUNTER — HOSPITAL ENCOUNTER (OUTPATIENT)
Dept: MAMMOGRAPHY | Facility: HOSPITAL | Age: 74
Discharge: HOME | End: 2024-06-28
Payer: MEDICARE

## 2024-06-28 PROCEDURE — 76641 ULTRASOUND BREAST COMPLETE: CPT

## 2024-07-25 RX ORDER — LOSARTAN POTASSIUM AND HYDROCHLOROTHIAZIDE 12.5; 1 MG/1; MG/1
TABLET ORAL
Qty: 90 TABLET | Refills: 1 | OUTPATIENT
Start: 2024-07-25

## 2024-07-29 ENCOUNTER — OFFICE VISIT (OUTPATIENT)
Dept: INTERNAL MEDICINE | Facility: CLINIC | Age: 74
End: 2024-07-29
Payer: MEDICARE

## 2024-07-29 VITALS
TEMPERATURE: 97.7 F | HEIGHT: 67 IN | DIASTOLIC BLOOD PRESSURE: 76 MMHG | SYSTOLIC BLOOD PRESSURE: 130 MMHG | BODY MASS INDEX: 19.56 KG/M2 | OXYGEN SATURATION: 97 % | WEIGHT: 124.6 LBS | HEART RATE: 62 BPM

## 2024-07-29 DIAGNOSIS — R42 DIZZINESS: ICD-10-CM

## 2024-07-29 DIAGNOSIS — I10 PRIMARY HYPERTENSION: Primary | ICD-10-CM

## 2024-07-29 DIAGNOSIS — Z86.73 HISTORY OF CVA (CEREBROVASCULAR ACCIDENT): ICD-10-CM

## 2024-07-29 PROCEDURE — 3078F DIAST BP <80 MM HG: CPT | Performed by: NURSE PRACTITIONER

## 2024-07-29 PROCEDURE — 1160F RVW MEDS BY RX/DR IN RCRD: CPT | Performed by: NURSE PRACTITIONER

## 2024-07-29 PROCEDURE — 1159F MED LIST DOCD IN RCRD: CPT | Performed by: NURSE PRACTITIONER

## 2024-07-29 PROCEDURE — 3075F SYST BP GE 130 - 139MM HG: CPT | Performed by: NURSE PRACTITIONER

## 2024-07-29 PROCEDURE — 99214 OFFICE O/P EST MOD 30 MIN: CPT | Performed by: NURSE PRACTITIONER

## 2024-07-29 PROCEDURE — 1126F AMNT PAIN NOTED NONE PRSNT: CPT | Performed by: NURSE PRACTITIONER

## 2024-07-29 RX ORDER — LOSARTAN POTASSIUM AND HYDROCHLOROTHIAZIDE 12.5; 1 MG/1; MG/1
1 TABLET ORAL DAILY
Qty: 90 TABLET | Refills: 1 | Status: SHIPPED | OUTPATIENT
Start: 2024-07-29

## 2024-07-29 NOTE — PROGRESS NOTES
"Subjective   Rosi Vicente is a 74 y.o. female.     Chief Complaint   Patient presents with    Hypertension        History of Present Illness   She is here today with concerns for low blood pressure. She notes recently low BP, down to 110 systolic. She stopped the losartan and amlodipine and returned to taking the losartan-hctz 100-12.5 mg daily. She notes some improvement in BP since making the medication adjustment.   She still notes frequent lightheadedness, described as \"swimming feeling, like after drinking a few alcoholic beverages.\"  She has a history of CVA and notes left-sided lower extremity residual weakness since this, described as a heavy feeling in the leg.  She then had a fall at the end of March onto the left side.  She noted worsening episodes of lightheadedness after this.  She is currently in PT working on balance and vestibular rehab. She still notes some difficulty with balance despite PT. She is very cautious with stairs and when ambulating.  She denies any headache, acute confusion, unilateral vision change, tinnitus, difficulty with speech, tremor, syncope.  She also notes that since her fall in March her trigeminal neuralgia flares have been more frequent.    The following portions of the patient's history were reviewed and updated as appropriate: allergies, current medications, past family history, past medical history, past social history, past surgical history, and problem list.    Review of Systems   Respiratory: Negative.     Cardiovascular: Negative.    Musculoskeletal:  Positive for gait problem.   Neurological:  Positive for dizziness, weakness (left side chronic) and light-headedness. Negative for tremors, seizures, syncope, facial asymmetry, speech difficulty, numbness, headache, memory problem and confusion.       Objective   Physical Exam  Constitutional:       Appearance: She is well-developed.   HENT:      Ears:      Comments: Fluid present bilateral TM.  Neck:      Thyroid: No " thyroid mass, thyromegaly or thyroid tenderness.      Vascular: No carotid bruit.      Trachea: Trachea normal.   Cardiovascular:      Rate and Rhythm: Normal rate and regular rhythm.      Chest Wall: PMI is not displaced.      Pulses:           Radial pulses are 2+ on the right side and 2+ on the left side.        Dorsalis pedis pulses are 2+ on the right side and 2+ on the left side.        Posterior tibial pulses are 2+ on the right side and 2+ on the left side.      Heart sounds: S1 normal and S2 normal.   Pulmonary:      Effort: Pulmonary effort is normal.      Breath sounds: Normal breath sounds.   Musculoskeletal:      Right lower leg: No edema.      Left lower leg: No edema.   Lymphadenopathy:      Head:      Right side of head: No submental, submandibular, tonsillar or occipital adenopathy.      Left side of head: No submental, submandibular, tonsillar or occipital adenopathy.      Cervical: No cervical adenopathy.   Skin:     General: Skin is warm and dry.      Capillary Refill: Capillary refill takes less than 2 seconds.      Nails: There is no clubbing.   Neurological:      Mental Status: She is alert and oriented to person, place, and time.      Cranial Nerves: Cranial nerves 2-12 are intact.      Sensory: Sensation is intact.      Motor: Weakness (mild weakness left lower extremity) present.      Coordination: Coordination is intact.      Gait: Gait abnormal (slow to transfer on and off of the exam table).      Deep Tendon Reflexes:      Reflex Scores:       Patellar reflexes are 2+ on the right side and 2+ on the left side.  Psychiatric:         Attention and Perception: Attention normal.         Mood and Affect: Mood and affect normal.         Speech: Speech normal.         Behavior: Behavior normal.         Thought Content: Thought content normal.         Cognition and Memory: Cognition normal.         Vitals:    07/29/24 1414   BP: 130/76   Pulse: 62   Temp: 97.7 °F (36.5 °C)   SpO2: 97%      Body  mass index is 19.51 kg/m².    Assessment & Plan   Problems Addressed this Visit       Hypertension - Primary    Relevant Medications    losartan-hydrochlorothiazide (HYZAAR) 100-12.5 MG per tablet    History of CVA (cerebrovascular accident)    Relevant Orders    Duplex Carotid Ultrasound CAR    Ambulatory Referral to Neurology    Dizziness    Relevant Orders    Duplex Carotid Ultrasound CAR    Ambulatory Referral to Neurology     Diagnoses         Codes Comments    Primary hypertension    -  Primary ICD-10-CM: I10  ICD-9-CM: 401.9     History of CVA (cerebrovascular accident)     ICD-10-CM: Z86.73  ICD-9-CM: V12.54     Dizziness     ICD-10-CM: R42  ICD-9-CM: 780.4           1.  HTN-blood pressure much improved on the losartan/HCTZ 100-12.5 mg daily.  Encouraged her to continue this.  Okay to continue off amlodipine for now.  Recommend monitoring blood pressure daily at home with goal BP less than 130/80.  We will follow-up in 4 weeks for blood pressure check with same-day BMP.  2.  History of CVA/dizziness-referral placed to neurology for further evaluation.  Discussed repeating MRI brain.  Patient would like to wait on this for now.  Will order carotid duplex for further evaluation of symptoms.  She does have some fluid present in the bilateral ears.  Recommend changing from Flonase to Nasacort to see if this helps.  Recommend continuing physical therapy.  To ER with any acute neurological changes.    Follow-up in 4 weeks for hypertension.

## 2024-08-14 ENCOUNTER — HOSPITAL ENCOUNTER (OUTPATIENT)
Dept: CARDIOLOGY | Facility: HOSPITAL | Age: 74
Discharge: HOME | End: 2024-08-14
Admitting: NURSE PRACTITIONER
Payer: MEDICARE

## 2024-08-14 LAB
BH CV XLRA MEAS LEFT DIST CCA EDV: 17.7 CM/SEC
BH CV XLRA MEAS LEFT DIST CCA PSV: 62.9 CM/SEC
BH CV XLRA MEAS LEFT DIST ICA EDV: -29.1 CM/SEC
BH CV XLRA MEAS LEFT DIST ICA PSV: -81.8 CM/SEC
BH CV XLRA MEAS LEFT ICA/CCA RATIO: 1.3
BH CV XLRA MEAS LEFT MID ICA EDV: -20.1 CM/SEC
BH CV XLRA MEAS LEFT MID ICA PSV: -63.9 CM/SEC
BH CV XLRA MEAS LEFT PROX CCA EDV: 11.5 CM/SEC
BH CV XLRA MEAS LEFT PROX CCA PSV: 79 CM/SEC
BH CV XLRA MEAS LEFT PROX ECA EDV: -12.3 CM/SEC
BH CV XLRA MEAS LEFT PROX ECA PSV: -65.4 CM/SEC
BH CV XLRA MEAS LEFT PROX ICA EDV: 15.7 CM/SEC
BH CV XLRA MEAS LEFT PROX ICA PSV: 55.5 CM/SEC
BH CV XLRA MEAS LEFT PROX SCLA PSV: 103.1 CM/SEC
BH CV XLRA MEAS LEFT VERTEBRAL A EDV: -15.4 CM/SEC
BH CV XLRA MEAS LEFT VERTEBRAL A PSV: -65.9 CM/SEC
BH CV XLRA MEAS RIGHT DIST CCA EDV: 18.1 CM/SEC
BH CV XLRA MEAS RIGHT DIST CCA PSV: 73 CM/SEC
BH CV XLRA MEAS RIGHT DIST ICA EDV: -22.4 CM/SEC
BH CV XLRA MEAS RIGHT DIST ICA PSV: -54.4 CM/SEC
BH CV XLRA MEAS RIGHT ICA/CCA RATIO: 0.86
BH CV XLRA MEAS RIGHT MID ICA EDV: -20.6 CM/SEC
BH CV XLRA MEAS RIGHT MID ICA PSV: -62.8 CM/SEC
BH CV XLRA MEAS RIGHT PROX CCA EDV: 27.4 CM/SEC
BH CV XLRA MEAS RIGHT PROX CCA PSV: 144.1 CM/SEC
BH CV XLRA MEAS RIGHT PROX ECA EDV: -16.8 CM/SEC
BH CV XLRA MEAS RIGHT PROX ECA PSV: -100.9 CM/SEC
BH CV XLRA MEAS RIGHT PROX ICA EDV: -13 CM/SEC
BH CV XLRA MEAS RIGHT PROX ICA PSV: -50.1 CM/SEC
BH CV XLRA MEAS RIGHT PROX SCLA PSV: 185.2 CM/SEC
BH CV XLRA MEAS RIGHT VERTEBRAL A EDV: -11.9 CM/SEC
BH CV XLRA MEAS RIGHT VERTEBRAL A PSV: -39.5 CM/SEC
LEFT ARM BP: NORMAL MMHG
RIGHT ARM BP: NORMAL MMHG

## 2024-08-14 PROCEDURE — 93880 EXTRACRANIAL BILAT STUDY: CPT

## 2024-08-16 ENCOUNTER — TELEPHONE (OUTPATIENT)
Dept: INTERNAL MEDICINE | Facility: CLINIC | Age: 74
End: 2024-08-16
Payer: MEDICARE

## 2024-08-16 DIAGNOSIS — I65.21 ATHEROSCLEROSIS OF RIGHT CAROTID ARTERY: Primary | ICD-10-CM

## 2024-08-16 NOTE — TELEPHONE ENCOUNTER
Caller: Rosi Vicente    Relationship: Self    Best call back number: [phone]    What is the best time to reach you: WILL BE OUT OF STATE AND 3 HOURS AHEAD. PATIENT STATES TO LEAVE VM IS OK.    Who are you requesting to speak with (clinical staff, provider,  specific staff member): CLINICAL OR [facility]    Do you know the name of the person who called: N/A    What was the call regarding: PATIENT STATES THE VASCULAR SURGEON SHE WAS REFERRED TO IS OUT UNTIL AN UNKNOWN DATE, PATIENT WAS REFERRED BY THE FACILITY TO [facility] AND WOULD LIKE TO KNOW WHAT [facility] QUESADA RECOMMENDS. PLEASE CALLBACK AND ADVISE. PATIENT STATES WHICHEVER WAY IS EASIEST TO REACH HER IS OK.    Is it okay if the provider responds through [employer]: YES

## 2024-08-22 ENCOUNTER — LAB (OUTPATIENT)
Dept: LAB | Facility: HOSPITAL | Age: 74
End: 2024-08-22
Payer: MEDICARE

## 2024-08-22 DIAGNOSIS — E78.5 HYPERLIPIDEMIA LDL GOAL <70: ICD-10-CM

## 2024-08-22 DIAGNOSIS — E55.9 VITAMIN D DEFICIENCY: ICD-10-CM

## 2024-08-22 DIAGNOSIS — R53.83 FATIGUE, UNSPECIFIED TYPE: ICD-10-CM

## 2024-08-22 DIAGNOSIS — Z86.73 HISTORY OF CVA (CEREBROVASCULAR ACCIDENT): ICD-10-CM

## 2024-08-22 DIAGNOSIS — D50.9 IRON DEFICIENCY ANEMIA, UNSPECIFIED IRON DEFICIENCY ANEMIA TYPE: ICD-10-CM

## 2024-08-22 DIAGNOSIS — I10 PRIMARY HYPERTENSION: ICD-10-CM

## 2024-08-22 LAB
25(OH)D3 SERPL-MCNC: 46.1 NG/ML (ref 30–100)
ALBUMIN SERPL-MCNC: 4.4 G/DL (ref 3.5–5.2)
ALBUMIN/GLOB SERPL: 1.9 G/DL
ALP SERPL-CCNC: 84 U/L (ref 39–117)
ALT SERPL W P-5'-P-CCNC: 16 U/L (ref 1–33)
ANION GAP SERPL CALCULATED.3IONS-SCNC: 9.5 MMOL/L (ref 5–15)
AST SERPL-CCNC: 18 U/L (ref 1–32)
BASOPHILS # BLD AUTO: 0.05 10*3/MM3 (ref 0–0.2)
BASOPHILS NFR BLD AUTO: 0.9 % (ref 0–1.5)
BILIRUB SERPL-MCNC: 0.5 MG/DL (ref 0–1.2)
BUN SERPL-MCNC: 13 MG/DL (ref 8–23)
BUN/CREAT SERPL: 19.1 (ref 7–25)
CALCIUM SPEC-SCNC: 9.5 MG/DL (ref 8.6–10.5)
CHLORIDE SERPL-SCNC: 98 MMOL/L (ref 98–107)
CHOLEST SERPL-MCNC: 166 MG/DL (ref 0–200)
CO2 SERPL-SCNC: 28.5 MMOL/L (ref 22–29)
CREAT SERPL-MCNC: 0.68 MG/DL (ref 0.57–1)
DEPRECATED RDW RBC AUTO: 39.3 FL (ref 37–54)
EGFRCR SERPLBLD CKD-EPI 2021: 91 ML/MIN/1.73
EOSINOPHIL # BLD AUTO: 0.12 10*3/MM3 (ref 0–0.4)
EOSINOPHIL NFR BLD AUTO: 2.3 % (ref 0.3–6.2)
ERYTHROCYTE [DISTWIDTH] IN BLOOD BY AUTOMATED COUNT: 12.8 % (ref 12.3–15.4)
FERRITIN SERPL-MCNC: 39.4 NG/ML (ref 13–150)
FOLATE SERPL-MCNC: >20 NG/ML (ref 4.78–24.2)
GLOBULIN UR ELPH-MCNC: 2.3 GM/DL
GLUCOSE SERPL-MCNC: 87 MG/DL (ref 65–99)
HCT VFR BLD AUTO: 41.8 % (ref 34–46.6)
HDLC SERPL-MCNC: 42 MG/DL (ref 40–60)
HGB BLD-MCNC: 13.8 G/DL (ref 12–15.9)
IMM GRANULOCYTES # BLD AUTO: 0.01 10*3/MM3 (ref 0–0.05)
IMM GRANULOCYTES NFR BLD AUTO: 0.2 % (ref 0–0.5)
IRON 24H UR-MRATE: 121 MCG/DL (ref 37–145)
IRON SATN MFR SERPL: 30 % (ref 20–50)
LDLC SERPL CALC-MCNC: 108 MG/DL (ref 0–100)
LDLC/HDLC SERPL: 2.55 {RATIO}
LYMPHOCYTES # BLD AUTO: 2.02 10*3/MM3 (ref 0.7–3.1)
LYMPHOCYTES NFR BLD AUTO: 38 % (ref 19.6–45.3)
MCH RBC QN AUTO: 28.4 PG (ref 26.6–33)
MCHC RBC AUTO-ENTMCNC: 33 G/DL (ref 31.5–35.7)
MCV RBC AUTO: 86 FL (ref 79–97)
MONOCYTES # BLD AUTO: 0.5 10*3/MM3 (ref 0.1–0.9)
MONOCYTES NFR BLD AUTO: 9.4 % (ref 5–12)
NEUTROPHILS NFR BLD AUTO: 2.61 10*3/MM3 (ref 1.7–7)
NEUTROPHILS NFR BLD AUTO: 49.2 % (ref 42.7–76)
NRBC BLD AUTO-RTO: 0 /100 WBC (ref 0–0.2)
PLATELET # BLD AUTO: 307 10*3/MM3 (ref 140–450)
PMV BLD AUTO: 9 FL (ref 6–12)
POTASSIUM SERPL-SCNC: 4.2 MMOL/L (ref 3.5–5.2)
PROT SERPL-MCNC: 6.7 G/DL (ref 6–8.5)
RBC # BLD AUTO: 4.86 10*6/MM3 (ref 3.77–5.28)
SODIUM SERPL-SCNC: 136 MMOL/L (ref 136–145)
TIBC SERPL-MCNC: 399 MCG/DL (ref 298–536)
TRANSFERRIN SERPL-MCNC: 268 MG/DL (ref 200–360)
TRIGL SERPL-MCNC: 84 MG/DL (ref 0–150)
VIT B12 BLD-MCNC: 663 PG/ML (ref 211–946)
VLDLC SERPL-MCNC: 16 MG/DL (ref 5–40)
WBC NRBC COR # BLD AUTO: 5.31 10*3/MM3 (ref 3.4–10.8)

## 2024-08-22 PROCEDURE — 82728 ASSAY OF FERRITIN: CPT

## 2024-08-22 PROCEDURE — 84466 ASSAY OF TRANSFERRIN: CPT

## 2024-08-22 PROCEDURE — 80061 LIPID PANEL: CPT

## 2024-08-22 PROCEDURE — 80053 COMPREHEN METABOLIC PANEL: CPT

## 2024-08-22 PROCEDURE — 85025 COMPLETE CBC W/AUTO DIFF WBC: CPT

## 2024-08-22 PROCEDURE — 82746 ASSAY OF FOLIC ACID SERUM: CPT

## 2024-08-22 PROCEDURE — 36415 COLL VENOUS BLD VENIPUNCTURE: CPT

## 2024-08-22 PROCEDURE — 83540 ASSAY OF IRON: CPT

## 2024-08-22 PROCEDURE — 82607 VITAMIN B-12: CPT

## 2024-08-22 PROCEDURE — 82306 VITAMIN D 25 HYDROXY: CPT

## 2024-08-26 ENCOUNTER — OFFICE VISIT (OUTPATIENT)
Dept: INTERNAL MEDICINE | Facility: CLINIC | Age: 74
End: 2024-08-26
Payer: MEDICARE

## 2024-08-26 VITALS
OXYGEN SATURATION: 98 % | BODY MASS INDEX: 19.54 KG/M2 | TEMPERATURE: 97.5 F | HEART RATE: 70 BPM | WEIGHT: 124.5 LBS | DIASTOLIC BLOOD PRESSURE: 72 MMHG | HEIGHT: 67 IN | SYSTOLIC BLOOD PRESSURE: 110 MMHG

## 2024-08-26 DIAGNOSIS — E78.5 HYPERLIPIDEMIA LDL GOAL <70: ICD-10-CM

## 2024-08-26 DIAGNOSIS — I10 PRIMARY HYPERTENSION: Primary | ICD-10-CM

## 2024-08-26 DIAGNOSIS — Z86.73 HISTORY OF CVA (CEREBROVASCULAR ACCIDENT): ICD-10-CM

## 2024-08-26 DIAGNOSIS — D50.9 IRON DEFICIENCY ANEMIA, UNSPECIFIED IRON DEFICIENCY ANEMIA TYPE: ICD-10-CM

## 2024-08-26 DIAGNOSIS — R42 DIZZINESS: ICD-10-CM

## 2024-08-26 PROCEDURE — 1160F RVW MEDS BY RX/DR IN RCRD: CPT | Performed by: NURSE PRACTITIONER

## 2024-08-26 PROCEDURE — 1159F MED LIST DOCD IN RCRD: CPT | Performed by: NURSE PRACTITIONER

## 2024-08-26 PROCEDURE — 1126F AMNT PAIN NOTED NONE PRSNT: CPT | Performed by: NURSE PRACTITIONER

## 2024-08-26 PROCEDURE — 3074F SYST BP LT 130 MM HG: CPT | Performed by: NURSE PRACTITIONER

## 2024-08-26 PROCEDURE — 99214 OFFICE O/P EST MOD 30 MIN: CPT | Performed by: NURSE PRACTITIONER

## 2024-08-26 PROCEDURE — 3078F DIAST BP <80 MM HG: CPT | Performed by: NURSE PRACTITIONER

## 2024-08-26 RX ORDER — NIACIN 500 MG/1
500 TABLET, EXTENDED RELEASE ORAL NIGHTLY
Qty: 90 TABLET | Refills: 1 | Status: SHIPPED | OUTPATIENT
Start: 2024-08-26 | End: 2024-08-28

## 2024-08-26 RX ORDER — LOSARTAN POTASSIUM 100 MG/1
100 TABLET ORAL DAILY
Qty: 90 TABLET | Refills: 2 | Status: SHIPPED | OUTPATIENT
Start: 2024-08-26

## 2024-08-26 NOTE — PROGRESS NOTES
Subjective   Rosi Vicente is a 74 y.o. female.     Chief Complaint   Patient presents with    Hypertension        History of Present Illness   She is here today for follow-up on hypertension and lab work.  At last office visit she was restarted on losartan/HCTZ 100-12.5 mg daily secondary to uncontrolled hypertension, and instructed to stop amlodipine. She notes home BP has been running 110-120s/60-70. She does note worsening dizziness since going back on the hctz. She would like to discuss coming off the hctz and continuing losartan. She has started taking niacin 500 mg nightly for hyperlipidemia and thinks this may be contributing to lower blood pressure readings.  She is scheduled to see Dr. Solorio tomorrow to follow up for bilateral carotid U/S stenosis with antegrade flow.  She has noticed intermittent dizziness over the past several months since experiencing head trauma related to a fall.  She is scheduled to see neurology on September 16 for further evaluation.    The following portions of the patient's history were reviewed and updated as appropriate: allergies, current medications, past family history, past medical history, past social history, past surgical history, and problem list.    Review of Systems   Constitutional:  Positive for fatigue. Negative for chills and fever.   Respiratory: Negative.     Cardiovascular: Negative.    Neurological:  Positive for dizziness. Negative for tremors, seizures, syncope, facial asymmetry, speech difficulty, weakness, light-headedness, numbness, headache, memory problem and confusion.       Objective   Physical Exam  Constitutional:       Appearance: She is well-developed.   HENT:      Right Ear: Hearing, tympanic membrane, ear canal and external ear normal.      Left Ear: Hearing, tympanic membrane, ear canal and external ear normal.   Neck:      Thyroid: No thyroid mass, thyromegaly or thyroid tenderness.      Vascular: No carotid bruit.      Trachea: Trachea  normal.   Cardiovascular:      Rate and Rhythm: Normal rate and regular rhythm.      Chest Wall: PMI is not displaced.      Pulses:           Radial pulses are 2+ on the right side and 2+ on the left side.        Dorsalis pedis pulses are 2+ on the right side and 2+ on the left side.        Posterior tibial pulses are 2+ on the right side and 2+ on the left side.      Heart sounds: S1 normal and S2 normal.   Pulmonary:      Effort: Pulmonary effort is normal.      Breath sounds: Normal breath sounds.   Musculoskeletal:      Right lower leg: No edema.      Left lower leg: No edema.   Lymphadenopathy:      Head:      Right side of head: No submental, submandibular, tonsillar or occipital adenopathy.      Left side of head: No submental, submandibular, tonsillar or occipital adenopathy.      Cervical: No cervical adenopathy.   Skin:     General: Skin is warm and dry.      Capillary Refill: Capillary refill takes less than 2 seconds.      Nails: There is no clubbing.   Neurological:      Mental Status: She is alert and oriented to person, place, and time.   Psychiatric:         Attention and Perception: Attention normal.         Mood and Affect: Mood and affect normal.         Speech: Speech normal.         Behavior: Behavior normal.         Thought Content: Thought content normal.         Cognition and Memory: Cognition normal.         Vitals:    08/26/24 1519   BP: 110/72   Pulse: 70   Temp: 97.5 °F (36.4 °C)   SpO2: 98%      Body mass index is 19.49 kg/m².    Assessment & Plan   Problems Addressed this Visit       Hypertension - Primary    Relevant Medications    losartan (Cozaar) 100 MG tablet    Hyperlipidemia LDL goal <70    Relevant Medications    niacin (Niaspan) 500 MG CR tablet    History of CVA (cerebrovascular accident)    Relevant Medications    niacin (Niaspan) 500 MG CR tablet    Dizziness    Iron deficiency anemia     Diagnoses         Codes Comments    Primary hypertension    -  Primary ICD-10-CM:  I10  ICD-9-CM: 401.9     Dizziness     ICD-10-CM: R42  ICD-9-CM: 780.4     Hyperlipidemia LDL goal <70     ICD-10-CM: E78.5  ICD-9-CM: 272.4     History of CVA (cerebrovascular accident)     ICD-10-CM: Z86.73  ICD-9-CM: V12.54     Iron deficiency anemia, unspecified iron deficiency anemia type     ICD-10-CM: D50.9  ICD-9-CM: 280.9           Lab results discussed with patient in office today.    1.  HTN-BP on the low end today in the office.  since she notes an increase in dizziness since restarting HCTZ, we will stop this and continue with losartan 100 mg daily.  Continue off amlodipine.  Encouraged her to continue monitoring blood pressure daily and send me home BP readings in 2 weeks with goal BP less than 130/80.  2.  Dizziness-discussed with her this could be multifactorial contributed to head trauma several months ago as well as history of CVA and lower blood pressure.  Encouraged her to follow-up with neurology as scheduled in a few weeks.  We will adjust antihypertensive medication to see if this helps.  Also discussed with her if she is still experiencing worsening dizziness despite stopping the HCTZ could consider trying extended release niacin to see if this is better tolerated.  To ER with any acute neurologic changes.  3.  History of CVA-with most recent carotid ultrasound results showing worsening stenosis encouraged her to follow-up with vascular surgery as scheduled tomorrow.  Continue aspirin 81 mg daily along with Vascepa and niacin.  4.  HLD-LDL improving.  She is intolerant to statins, Praluent and Repatha.  Encouraged to continue Vascepa and niacin along with Mediterranean-style eating and regular exercise.  Follow-up with labs in 6 months.  5.  Iron deficiency anemia-significantly improved.

## 2024-08-27 ENCOUNTER — OFFICE VISIT (OUTPATIENT)
Age: 74
End: 2024-08-27
Payer: MEDICARE

## 2024-08-27 VITALS
SYSTOLIC BLOOD PRESSURE: 124 MMHG | WEIGHT: 124 LBS | BODY MASS INDEX: 19.46 KG/M2 | DIASTOLIC BLOOD PRESSURE: 72 MMHG | HEIGHT: 67 IN | HEART RATE: 73 BPM

## 2024-08-27 DIAGNOSIS — Z86.73 HISTORY OF STROKE: ICD-10-CM

## 2024-08-27 DIAGNOSIS — I65.23 CAROTID STENOSIS, BILATERAL: Primary | ICD-10-CM

## 2024-08-27 DIAGNOSIS — Z78.9 STATIN INTOLERANCE: ICD-10-CM

## 2024-08-27 NOTE — PROGRESS NOTES
"Chief Complaint  Carotid Artery Disease    Subjective        Rosi Vicente presents to [facility] VASCULAR SURGERY  History of Present Illness    Patient referred for carotid stenosis.  On detailed direct questioning she has had no recent strokes mini strokes or emergencies fugax.    Objective   Vital Signs:  /72 (BP Location: Left arm)   Pulse 73   Ht 170.2 cm (67.01\")   Wt 56.2 kg (124 lb)   BMI 19.42 kg/m²   Estimated body mass index is 19.42 kg/m² as calculated from the following:    Height as of this encounter: 170.2 cm (67.01\").    Weight as of this encounter: 56.2 kg (124 lb).     BMI is within normal parameters. No other follow-up for BMI required.    Rosi Vicente  reports that she has never smoked. She has never used smokeless tobacco.        Physical Exam  Constitutional:       Appearance: She is well-developed.   Pulmonary:      Effort: Pulmonary effort is normal. No respiratory distress.   Abdominal:      General: There is no distension.      Palpations: Abdomen is soft.   Neurological:      Mental Status: She is alert and oriented to person, place, and time.        Result Review :    The following data was reviewed by: Masoud Solorio MD on 08/27/24  CBC          2/19/2024    08:03 4/30/2024    08:26 8/22/2024    08:20   CBC   WBC 5.25  4.65  5.31    RBC 4.93  4.77  4.86    Hemoglobin 13.2  12.8  13.8    Hematocrit 41.0  40.6  41.8    MCV 83.2  85.1  86.0    MCH 26.8  26.8  28.4    MCHC 32.2  31.5  33.0    RDW 15.0  13.2  12.8    Platelets 325  344  307       BMP          11/7/2023    07:51 4/30/2024    08:26 8/22/2024    08:20   BMP   BUN 16  19  13    Creatinine 0.63  0.58  0.68    Sodium 136  137  136    Potassium 4.7  4.4  4.2    Chloride 100  98  98    CO2 29.0  30.0  28.5    Calcium 9.5  9.2  9.5             Vascular Surgical History:    Vascular Imaging History:  Carotid duplex:  8/14/2024: Bilateral less than 50% (right side 50/13 ratio of 0.86, left side 81/29 " with ratio of 1.3)         Assessment and Plan     Vascular surgery following for:  Carotid stenosis.  History of stroke.    Diagnoses and all orders for this visit:    1. Carotid stenosis, bilateral (Primary)    2. History of stroke    3. Statin intolerance    Patient referred for relatively mild bilateral carotid stenosis.  On detailed direct questioning she has had no symptoms from these stenoses.  She has a history of a lacunar stroke in the past.  Generally speaking lacunar strokes are small vessel strokes and not a symptom of carotid stenosis.  With these relatively minimal findings on duplex I have recommended as needed follow-up.  She will call the office if further care is required.     Vascular medical management: Continue aspirin 81 mg daily,Patient's should also continue Vascepa 1 g capsule daily.  She is intolerant to statin medications.  Follow Up     Return if symptoms worsen or fail to improve.  Patient was given instructions and counseling regarding her condition or for health maintenance advice. Please see specific information pulled into the AVS if appropriate.

## 2024-08-28 DIAGNOSIS — E78.5 HYPERLIPIDEMIA LDL GOAL <70: Primary | ICD-10-CM

## 2024-08-28 DIAGNOSIS — Z86.73 HISTORY OF CVA (CEREBROVASCULAR ACCIDENT): ICD-10-CM

## 2024-09-06 ENCOUNTER — OFFICE VISIT (OUTPATIENT)
Dept: SLEEP MEDICINE | Facility: HOSPITAL | Age: 74
End: 2024-09-06
Payer: MEDICARE

## 2024-09-06 VITALS — HEART RATE: 56 BPM | BODY MASS INDEX: 19.46 KG/M2 | OXYGEN SATURATION: 97 % | HEIGHT: 67 IN | WEIGHT: 124 LBS

## 2024-09-06 DIAGNOSIS — Z78.9 INTOLERANCE OF CONTINUOUS POSITIVE AIRWAY PRESSURE (CPAP) VENTILATION: ICD-10-CM

## 2024-09-06 DIAGNOSIS — G47.33 MILD OBSTRUCTIVE SLEEP APNEA: Primary | ICD-10-CM

## 2024-09-06 PROCEDURE — 1159F MED LIST DOCD IN RCRD: CPT | Performed by: FAMILY MEDICINE

## 2024-09-06 PROCEDURE — 1160F RVW MEDS BY RX/DR IN RCRD: CPT | Performed by: FAMILY MEDICINE

## 2024-09-06 PROCEDURE — 99204 OFFICE O/P NEW MOD 45 MIN: CPT | Performed by: FAMILY MEDICINE

## 2024-09-06 PROCEDURE — G0463 HOSPITAL OUTPT CLINIC VISIT: HCPCS

## 2024-09-06 NOTE — PROGRESS NOTES
[facility]Consultation       Reason for Consultation: janice      Patient Care Team:  Anette Machado APRN as PCP - General (Nurse Practitioner)  Christopher Young MD as Consulting Physician (Otolaryngology)  Antonio Davila MD as Consulting Physician (Orthopedic Surgery)  Manny Sexton MD as Consulting Physician (Dermatology)  Víctor Polk MD as Consulting Physician (Gastroenterology)  Kori Vaughn MD as Consulting Physician (Neurosurgery)  Hermes Anne MD as Consulting Physician (Orthopedic Surgery)  Peri James APRN as Nurse Practitioner (Neurology)  Gallo Shields MD as Consulting Physician (Ophthalmology)  Yuri Lay DPM as Consulting Physician (Podiatry)  Katarina Leung MD as Consulting Physician (Cardiology)  Jeanne Davis MD as Consulting Physician (Sleep Medicine)      History of present illness:  Thank you for asking me to see your patient.  The patient is a 74 y.o. female who was last seen in the sleep lab: 4/8/2021.  New patient to me.  2020 study showed AHI 17.  At that time opted to go for oral device.  Was seen by dental sleep specialist was in the process of getting device made however then was diagnosed with trigeminal neuralgia and her neurologist advised her not to use oral mandibular device for fear of it triggering her trigeminal neuralgia.  Also had microvascular surgery in 2009.  Sent back to sleep lab in 2021 to consider CPAP.  At that time auto CPAP 6-16 cm H2O with a flex of 3 was ordered.  No follow-up since then.  Sleep NC 20 minutes sleeps around 8 hours 1 nap per day no rotating shifts.  Reports tonsillectomy at age 5.  Reports hypersomnia nonrestorative sleep snoring waking with dry mouth.  BMI 19.4.  CPAP was found to also trigger her trigeminal neuralgia.  Looking for alternative options.    Medical Conditions (PMH):   Hypertension  Allergic rhinitis  Trigeminal  neuralgia  Hyperlipidemia  Osteoarthritis  Vitamin D deficiency  History of CVA  Heartburn  Overactive bladder  IBS with constipation and diarrhea    Social history:  Do you drive a commercial vehicle:  No   Shift work:  No   Tobacco use:  No   Alcohol use: 1 per week  Caffeinated drinks: 2 per day  Occupation: Retired RN    Family History (parents and siblings) (pertaining to sleep medicine):  Negative    Allergies:  Bystolic [nebivolol hcl] and Lisinopril       Current Outpatient Medications:     acetaminophen (TYLENOL) 500 MG tablet, Take 1 tablet by mouth Every 6 (Six) Hours As Needed for Mild Pain., Disp: , Rfl:     aspirin 81 MG EC tablet, Take 1 tablet by mouth Daily., Disp: , Rfl:     cholecalciferol (VITAMIN D3) 1000 UNITS tablet, Take 2 tablets by mouth 2 (Two) Times a Day., Disp: , Rfl:     Coenzyme Q10 (COQ-10) 400 MG capsule, Take  by mouth Daily., Disp: , Rfl:     cycloSPORINE (RESTASIS) 0.05 % ophthalmic emulsion, Apply 1 drop to eye(s) as directed by provider 2 (Two) Times a Day., Disp: , Rfl:     fluticasone (FLONASE) 50 MCG/ACT nasal spray, 1 spray into the nostril(s) as directed by provider Daily., Disp: 16 mL, Rfl: 3    gabapentin (NEURONTIN) 100 MG capsule, TAKE ONE CAPSULE BY MOUTH EVERY NIGHT AT BEDTIME, Disp: 90 capsule, Rfl: 0    losartan (Cozaar) 100 MG tablet, Take 1 tablet by mouth Daily., Disp: 90 tablet, Rfl: 2    Melatonin 10 MG tablet, Take 2 tablets by mouth., Disp: , Rfl:     niacin (NIACIN SR,NIASPAN ER) 500 MG CR capsule, Take 1 capsule by mouth Every Night., Disp: 90 capsule, Rfl: 1    NON FORMULARY, Bone growth factor, Disp: , Rfl:     NON FORMULARY, Diclofenac Sodium powder 4 %, lidocaine HCl (bulk) powder 5 %, Topiramate powder 2 %, cloNIDine HCl powder 0.2 %, Disp: , Rfl:     omeprazole (priLOSEC) 20 MG capsule, TAKE ONE CAPSULE BY MOUTH 1 TO 2 TIMES A DAY, Disp: 180 capsule, Rfl: 3    Peppermint Oil (IBGARD PO), Take  by mouth., Disp: , Rfl:     Probiotic Product  "(PROBIOTIC-10 PO), Take 1 capsule by mouth Daily., Disp: , Rfl:     Vascepa 1 g capsule capsule, Take 2 g by mouth 2 (Two) Times a Day With Meals., Disp: 360 capsule, Rfl: 1    Vital Signs:    Vitals:    09/06/24 1100   Pulse: 56   SpO2: 97%   Weight: 56.2 kg (124 lb)   Height: 170.2 cm (67\")      Body mass index is 19.42 kg/m².  Neck Circumference: 13.75 inches      REVIEW OF SYSTEMS:  Pertinent positive symptoms are:  Snoring  Witnessed apnea per last sleep study  Cassandra Sleepiness Scale of Total score: 4   Fatigue  Frequent urination secondary to neurogenic bladder per patient  Nasal congestion      Physical exam:  Vitals:    09/06/24 1100   Pulse: 56   SpO2: 97%   Weight: 56.2 kg (124 lb)   Height: 170.2 cm (67\")    Body mass index is 19.42 kg/m². Neck Circumference: 13.75 inches  HEENT: Head is atraumatic, normocephalic  Eyes: pupils are round equal and reacting to light and accommodation, conjunctiva normal  Throat: tongue normal  NECK:Neck Circumference: 13.75 inches  RESPIRATORY SYSTEM: Regular respirations  CARDIOVASULAR SYSTEM: Regular rate  EXTREMITES: No cyanosis, clubbing  NEUROLOGICAL SYSTEM: Oriented x 3, no gross motor defects, gait normal      Impression:  1. Mild obstructive sleep apnea    2. Intolerance of continuous positive airway pressure (CPAP) ventilation        Plan:    Office note(s) from care team reviewed. Office note(s) reviewed: August 2021 notes to Dr. Abbott; August 2020 HST    Labs/ Test Results Reviewed:  TSH          11/7/2023    07:51 4/30/2024    08:26   TSH   TSH 2.620  2.980        TSH normal          ASSESSMENT AND PLAN:   Advised to consider bongo Rx device as this does not involve extra pressure and uses her own breathing to produce CPAP to keep airway open.  Order written for this.  Patient will wear.  She will follow-up when she is been using it for about a month so we can see how she is doing and consider HST to test the device.    Thank you for allowing me to " participate in your patient's care.    Jeanne Davis MD  Sleep Medicine  09/06/24  11:29 EDT

## 2024-09-16 ENCOUNTER — OFFICE VISIT (OUTPATIENT)
Dept: NEUROLOGY | Facility: CLINIC | Age: 74
End: 2024-09-16
Payer: MEDICARE

## 2024-09-16 VITALS
SYSTOLIC BLOOD PRESSURE: 120 MMHG | WEIGHT: 125 LBS | DIASTOLIC BLOOD PRESSURE: 62 MMHG | BODY MASS INDEX: 19.62 KG/M2 | HEIGHT: 67 IN | HEART RATE: 62 BPM | OXYGEN SATURATION: 97 %

## 2024-09-16 DIAGNOSIS — R42 DIZZINESS: Primary | ICD-10-CM

## 2024-09-16 DIAGNOSIS — Z86.73 HISTORY OF CVA (CEREBROVASCULAR ACCIDENT): ICD-10-CM

## 2024-09-16 DIAGNOSIS — G50.0 TRIGEMINAL NEURALGIA OF LEFT SIDE OF FACE: ICD-10-CM

## 2024-09-16 PROBLEM — K22.70 BARRETT'S ESOPHAGUS: Status: ACTIVE | Noted: 2019-02-21

## 2024-09-16 PROBLEM — I63.81 LACUNAR STROKE: Status: ACTIVE | Noted: 2024-09-16

## 2024-09-16 PROCEDURE — 3074F SYST BP LT 130 MM HG: CPT | Performed by: NURSE PRACTITIONER

## 2024-09-16 PROCEDURE — 99215 OFFICE O/P EST HI 40 MIN: CPT | Performed by: NURSE PRACTITIONER

## 2024-09-16 PROCEDURE — 3078F DIAST BP <80 MM HG: CPT | Performed by: NURSE PRACTITIONER

## 2024-09-16 PROCEDURE — 1160F RVW MEDS BY RX/DR IN RCRD: CPT | Performed by: NURSE PRACTITIONER

## 2024-09-16 PROCEDURE — 1159F MED LIST DOCD IN RCRD: CPT | Performed by: NURSE PRACTITIONER

## 2024-09-18 ENCOUNTER — PATIENT ROUNDING (BHMG ONLY) (OUTPATIENT)
Dept: NEUROLOGY | Facility: CLINIC | Age: 74
End: 2024-09-18
Payer: MEDICARE

## 2024-10-22 ENCOUNTER — TELEPHONE (OUTPATIENT)
Dept: INTERNAL MEDICINE | Facility: CLINIC | Age: 74
End: 2024-10-22
Payer: MEDICARE

## 2024-10-22 NOTE — TELEPHONE ENCOUNTER
Caller: Rosi Vicente    Relationship: Self    Best call back number: [phone]     What is the best time to reach you: ANY    Who are you requesting to speak with (clinical staff, provider,  specific staff member): CLINICAL STAFF    Do you know the name of the person who called:      What was the call regarding: PATIENT CALLED STATED SHE ONLY HAVE ONE BOTTLE LEFT AND DUE ON 10/22/24 BUT WHEN SHE CALLED IT HAD NO REFILL.  PATIENT NEEDS AN NEW PRESCRIPTION ORDER FROM [facility] FOR THE MEDICATION Vascepa 1 g capsule capsule  WILL NEED TO BE CALLED INTO [facility] PHONE # [phone].  PLEASE LEAVE MESSAGE IN MY CHART.      Is it okay if the provider responds through [employer]:

## 2024-10-23 RX ORDER — ICOSAPENT ETHYL 1000 MG/1
2 CAPSULE ORAL 2 TIMES DAILY WITH MEALS
Qty: 360 CAPSULE | Refills: 1 | Status: SHIPPED | OUTPATIENT
Start: 2024-10-23

## 2024-11-08 ENCOUNTER — TELEPHONE (OUTPATIENT)
Dept: INTERNAL MEDICINE | Facility: CLINIC | Age: 74
End: 2024-11-08
Payer: MEDICARE

## 2024-11-08 NOTE — TELEPHONE ENCOUNTER
----- Message from Anette Machado sent at 11/8/2024 12:48 PM EST -----  Please let Rosi know she is not due for labs until February.  If you can have them cancel her lab appointment.  We are just following up on her gabapentin.  ----- Message -----  From: Alberta Noriega MA  Sent: 11/8/2024  12:42 PM EST  To: JUAN FRANCISCO Nguyen    please advise for labs

## 2024-12-05 ENCOUNTER — OFFICE VISIT (OUTPATIENT)
Dept: INTERNAL MEDICINE | Facility: CLINIC | Age: 74
End: 2024-12-05
Payer: MEDICARE

## 2024-12-05 VITALS
TEMPERATURE: 98 F | BODY MASS INDEX: 19.74 KG/M2 | OXYGEN SATURATION: 99 % | DIASTOLIC BLOOD PRESSURE: 82 MMHG | HEIGHT: 67 IN | SYSTOLIC BLOOD PRESSURE: 122 MMHG | WEIGHT: 125.8 LBS | HEART RATE: 76 BPM

## 2024-12-05 DIAGNOSIS — G89.29 CHRONIC RIGHT HIP PAIN: ICD-10-CM

## 2024-12-05 DIAGNOSIS — G50.0 TRIGEMINAL NEURALGIA OF LEFT SIDE OF FACE: Primary | ICD-10-CM

## 2024-12-05 DIAGNOSIS — M25.551 CHRONIC RIGHT HIP PAIN: ICD-10-CM

## 2024-12-05 PROCEDURE — 1126F AMNT PAIN NOTED NONE PRSNT: CPT | Performed by: NURSE PRACTITIONER

## 2024-12-05 PROCEDURE — 1159F MED LIST DOCD IN RCRD: CPT | Performed by: NURSE PRACTITIONER

## 2024-12-05 PROCEDURE — 1160F RVW MEDS BY RX/DR IN RCRD: CPT | Performed by: NURSE PRACTITIONER

## 2024-12-05 PROCEDURE — 3079F DIAST BP 80-89 MM HG: CPT | Performed by: NURSE PRACTITIONER

## 2024-12-05 PROCEDURE — 3074F SYST BP LT 130 MM HG: CPT | Performed by: NURSE PRACTITIONER

## 2024-12-05 PROCEDURE — 99213 OFFICE O/P EST LOW 20 MIN: CPT | Performed by: NURSE PRACTITIONER

## 2024-12-05 RX ORDER — LOSARTAN POTASSIUM AND HYDROCHLOROTHIAZIDE 12.5; 1 MG/1; MG/1
TABLET ORAL
COMMUNITY
Start: 2024-10-26

## 2024-12-05 NOTE — PROGRESS NOTES
Subjective   Rosi Vicente is a 74 y.o. female.     Chief Complaint   Patient presents with    Trigeminal Neuralgia        History of Present Illness   She is here today for follow-up for high risk medication use.  She is feeling well today. She uses gabapentin 100 mg nightly as needed for trigeminal neuralgia. Patient doing well with current medication regimen, compliant with medication schedule, denies adverse effects. She has not needed the gabapentin in a few months. She thinks the flare up of the trigeminal neuralgia was related to previous fall. She has been feeling well overall, completed PT with improvement in balance.     She is scheduled to see orthopedics tomorrow morning for further evaluation of chronic right hip pain becoming worse. She is s/p left hip total arthoplasty several years ago.    The following portions of the patient's history were reviewed and updated as appropriate: allergies, current medications, past family history, past medical history, past social history, past surgical history, and problem list.    Review of Systems   Constitutional: Negative.    Respiratory: Negative.     Musculoskeletal:  Positive for arthralgias (right hip).   Neurological: Negative.    Psychiatric/Behavioral: Negative.         Objective   Physical Exam  Constitutional:       Appearance: She is well-developed.   Neck:      Thyroid: No thyroid mass, thyromegaly or thyroid tenderness.      Vascular: No carotid bruit.      Trachea: Trachea normal.   Cardiovascular:      Rate and Rhythm: Normal rate and regular rhythm.      Chest Wall: PMI is not displaced.      Pulses:           Radial pulses are 2+ on the right side and 2+ on the left side.        Dorsalis pedis pulses are 2+ on the right side and 2+ on the left side.        Posterior tibial pulses are 2+ on the right side and 2+ on the left side.      Heart sounds: S1 normal and S2 normal.   Pulmonary:      Effort: Pulmonary effort is normal.      Breath sounds:  Normal breath sounds.   Musculoskeletal:      Right lower leg: No edema.      Left lower leg: No edema.   Lymphadenopathy:      Head:      Right side of head: No submental, submandibular, tonsillar or occipital adenopathy.      Left side of head: No submental, submandibular, tonsillar or occipital adenopathy.      Cervical: No cervical adenopathy.   Skin:     General: Skin is warm and dry.      Capillary Refill: Capillary refill takes less than 2 seconds.      Nails: There is no clubbing.   Neurological:      Mental Status: She is alert and oriented to person, place, and time.   Psychiatric:         Attention and Perception: Attention normal.         Mood and Affect: Mood and affect normal.         Speech: Speech normal.         Behavior: Behavior normal.         Thought Content: Thought content normal.         Cognition and Memory: Cognition normal.         Vitals:    12/05/24 1109   BP: 122/82   Pulse: 76   Temp: 98 °F (36.7 °C)   SpO2: 99%      Body mass index is 19.7 kg/m².    Assessment & Plan   Problems Addressed this Visit       Trigeminal neuralgia of left side of face - Primary     Other Visit Diagnoses       Chronic right hip pain              Diagnoses         Codes Comments    Trigeminal neuralgia of left side of face    -  Primary ICD-10-CM: G50.0  ICD-9-CM: 350.1     Chronic right hip pain     ICD-10-CM: M25.551, G89.29  ICD-9-CM: 719.45, 338.29           Trigeminal neuralgia- well controlled. She is using gabapentin rarely. She is aware of appropriate use and adverse effects. CSA updated today and PDMP reviewed. Will plan to follow up in 6 months for high risk medication use since she uses this medication rarely. If she is needing the gabapentin more frequently then we will follow up in 3 months.  Chronic right hip pain- encouraged her to follow up with orthopedics as scheduled tomorrow.

## 2024-12-09 DIAGNOSIS — I10 PRIMARY HYPERTENSION: Primary | ICD-10-CM

## 2024-12-09 DIAGNOSIS — E78.5 HYPERLIPIDEMIA LDL GOAL <70: ICD-10-CM

## 2024-12-09 DIAGNOSIS — D50.9 IRON DEFICIENCY ANEMIA, UNSPECIFIED IRON DEFICIENCY ANEMIA TYPE: ICD-10-CM

## 2025-01-03 ENCOUNTER — LAB (OUTPATIENT)
Dept: LAB | Facility: HOSPITAL | Age: 75
End: 2025-01-03
Payer: MEDICARE

## 2025-01-03 ENCOUNTER — TRANSCRIBE ORDERS (OUTPATIENT)
Dept: ADMINISTRATIVE | Facility: HOSPITAL | Age: 75
End: 2025-01-03
Payer: MEDICARE

## 2025-01-03 DIAGNOSIS — E87.1 HYPONATREMIA: Primary | ICD-10-CM

## 2025-01-03 DIAGNOSIS — M16.11 PRIMARY OSTEOARTHRITIS OF RIGHT HIP: ICD-10-CM

## 2025-01-03 DIAGNOSIS — E78.5 HYPERLIPIDEMIA LDL GOAL <70: ICD-10-CM

## 2025-01-03 DIAGNOSIS — Z01.812 PRE-OPERATIVE LABORATORY EXAMINATION: ICD-10-CM

## 2025-01-03 DIAGNOSIS — I10 PRIMARY HYPERTENSION: ICD-10-CM

## 2025-01-03 DIAGNOSIS — D50.9 IRON DEFICIENCY ANEMIA, UNSPECIFIED IRON DEFICIENCY ANEMIA TYPE: ICD-10-CM

## 2025-01-03 DIAGNOSIS — Z01.812 PRE-OPERATIVE LABORATORY EXAMINATION: Primary | ICD-10-CM

## 2025-01-03 LAB
ALBUMIN SERPL-MCNC: 4.3 G/DL (ref 3.5–5.2)
ALBUMIN/GLOB SERPL: 1.9 G/DL
ALP SERPL-CCNC: 85 U/L (ref 39–117)
ALT SERPL W P-5'-P-CCNC: 20 U/L (ref 1–33)
ANION GAP SERPL CALCULATED.3IONS-SCNC: 8.8 MMOL/L (ref 5–15)
AST SERPL-CCNC: 21 U/L (ref 1–32)
BASOPHILS # BLD AUTO: 0.04 10*3/MM3 (ref 0–0.2)
BASOPHILS NFR BLD AUTO: 0.8 % (ref 0–1.5)
BILIRUB SERPL-MCNC: 0.5 MG/DL (ref 0–1.2)
BUN SERPL-MCNC: 15 MG/DL (ref 8–23)
BUN/CREAT SERPL: 23.8 (ref 7–25)
CALCIUM SPEC-SCNC: 9.1 MG/DL (ref 8.6–10.5)
CHLORIDE SERPL-SCNC: 91 MMOL/L (ref 98–107)
CHOLEST SERPL-MCNC: 173 MG/DL (ref 0–200)
CO2 SERPL-SCNC: 28.2 MMOL/L (ref 22–29)
CREAT SERPL-MCNC: 0.63 MG/DL (ref 0.57–1)
DEPRECATED RDW RBC AUTO: 41.3 FL (ref 37–54)
EGFRCR SERPLBLD CKD-EPI 2021: 92.6 ML/MIN/1.73
EOSINOPHIL # BLD AUTO: 0.13 10*3/MM3 (ref 0–0.4)
EOSINOPHIL NFR BLD AUTO: 2.7 % (ref 0.3–6.2)
ERYTHROCYTE [DISTWIDTH] IN BLOOD BY AUTOMATED COUNT: 13.1 % (ref 12.3–15.4)
FERRITIN SERPL-MCNC: 47.3 NG/ML (ref 13–150)
GLOBULIN UR ELPH-MCNC: 2.3 GM/DL
GLUCOSE SERPL-MCNC: 87 MG/DL (ref 65–99)
HCT VFR BLD AUTO: 40.3 % (ref 34–46.6)
HDLC SERPL QL: 3.15
HDLC SERPL-MCNC: 55 MG/DL (ref 40–60)
HGB BLD-MCNC: 13.6 G/DL (ref 12–15.9)
IMM GRANULOCYTES # BLD AUTO: 0.01 10*3/MM3 (ref 0–0.05)
IMM GRANULOCYTES NFR BLD AUTO: 0.2 % (ref 0–0.5)
IRON 24H UR-MRATE: 92 MCG/DL (ref 37–145)
IRON SATN MFR SERPL: 23 % (ref 20–50)
LDLC SERPL CALC-MCNC: 107 MG/DL (ref 0–100)
LYMPHOCYTES # BLD AUTO: 1.81 10*3/MM3 (ref 0.7–3.1)
LYMPHOCYTES NFR BLD AUTO: 37.8 % (ref 19.6–45.3)
MCH RBC QN AUTO: 29.2 PG (ref 26.6–33)
MCHC RBC AUTO-ENTMCNC: 33.7 G/DL (ref 31.5–35.7)
MCV RBC AUTO: 86.7 FL (ref 79–97)
MONOCYTES # BLD AUTO: 0.54 10*3/MM3 (ref 0.1–0.9)
MONOCYTES NFR BLD AUTO: 11.3 % (ref 5–12)
NEUTROPHILS NFR BLD AUTO: 2.26 10*3/MM3 (ref 1.7–7)
NEUTROPHILS NFR BLD AUTO: 47.2 % (ref 42.7–76)
NRBC BLD AUTO-RTO: 0 /100 WBC (ref 0–0.2)
PLATELET # BLD AUTO: 309 10*3/MM3 (ref 140–450)
PMV BLD AUTO: 8.7 FL (ref 6–12)
POTASSIUM SERPL-SCNC: 4.6 MMOL/L (ref 3.5–5.2)
PROT SERPL-MCNC: 6.6 G/DL (ref 6–8.5)
RBC # BLD AUTO: 4.65 10*6/MM3 (ref 3.77–5.28)
SODIUM SERPL-SCNC: 128 MMOL/L (ref 136–145)
TIBC SERPL-MCNC: 395 MCG/DL (ref 298–536)
TRANSFERRIN SERPL-MCNC: 265 MG/DL (ref 200–360)
TRIGL SERPL-MCNC: 55 MG/DL (ref 0–150)
VLDLC SERPL-MCNC: 11 MG/DL (ref 5–40)
WBC NRBC COR # BLD AUTO: 4.79 10*3/MM3 (ref 3.4–10.8)

## 2025-01-03 PROCEDURE — 82728 ASSAY OF FERRITIN: CPT

## 2025-01-03 PROCEDURE — 36415 COLL VENOUS BLD VENIPUNCTURE: CPT

## 2025-01-03 PROCEDURE — 80061 LIPID PANEL: CPT

## 2025-01-03 PROCEDURE — 85025 COMPLETE CBC W/AUTO DIFF WBC: CPT

## 2025-01-03 PROCEDURE — 83540 ASSAY OF IRON: CPT

## 2025-01-03 PROCEDURE — 80053 COMPREHEN METABOLIC PANEL: CPT

## 2025-01-03 PROCEDURE — 84466 ASSAY OF TRANSFERRIN: CPT

## 2025-01-07 DIAGNOSIS — E87.1 HYPONATREMIA: Primary | ICD-10-CM

## 2025-01-10 ENCOUNTER — LAB (OUTPATIENT)
Facility: HOSPITAL | Age: 75
End: 2025-01-10
Payer: MEDICARE

## 2025-01-10 LAB
ANION GAP SERPL CALCULATED.3IONS-SCNC: 10.9 MMOL/L (ref 5–15)
BUN SERPL-MCNC: 13 MG/DL (ref 8–23)
BUN/CREAT SERPL: 19.1 (ref 7–25)
CALCIUM SPEC-SCNC: 9.2 MG/DL (ref 8.6–10.5)
CHLORIDE SERPL-SCNC: 95 MMOL/L (ref 98–107)
CO2 SERPL-SCNC: 28.1 MMOL/L (ref 22–29)
CREAT SERPL-MCNC: 0.68 MG/DL (ref 0.57–1)
EGFRCR SERPLBLD CKD-EPI 2021: 91 ML/MIN/1.73
GLUCOSE SERPL-MCNC: 80 MG/DL (ref 65–99)
POTASSIUM SERPL-SCNC: 4.9 MMOL/L (ref 3.5–5.2)
SODIUM SERPL-SCNC: 134 MMOL/L (ref 136–145)

## 2025-01-10 PROCEDURE — 36415 COLL VENOUS BLD VENIPUNCTURE: CPT | Performed by: NURSE PRACTITIONER

## 2025-01-10 PROCEDURE — 80048 BASIC METABOLIC PNL TOTAL CA: CPT | Performed by: NURSE PRACTITIONER

## 2025-01-13 RX ORDER — ICOSAPENT ETHYL 1000 MG/1
2 CAPSULE ORAL 2 TIMES DAILY WITH MEALS
Qty: 360 CAPSULE | Refills: 1 | Status: SHIPPED | OUTPATIENT
Start: 2025-01-13

## 2025-01-20 RX ORDER — LOSARTAN POTASSIUM AND HYDROCHLOROTHIAZIDE 12.5; 1 MG/1; MG/1
1 TABLET ORAL DAILY
Qty: 90 TABLET | Refills: 1 | Status: SHIPPED | OUTPATIENT
Start: 2025-01-20

## 2025-01-24 RX ORDER — LOSARTAN POTASSIUM AND HYDROCHLOROTHIAZIDE 12.5; 1 MG/1; MG/1
1 TABLET ORAL DAILY
Qty: 90 TABLET | Refills: 1 | OUTPATIENT
Start: 2025-01-24

## 2025-01-28 RX ORDER — LOSARTAN POTASSIUM AND HYDROCHLOROTHIAZIDE 12.5; 1 MG/1; MG/1
1 TABLET ORAL DAILY
Qty: 90 TABLET | Refills: 1 | Status: SHIPPED | OUTPATIENT
Start: 2025-01-28 | End: 2025-01-31 | Stop reason: SDUPTHER

## 2025-01-30 RX ORDER — LOSARTAN POTASSIUM AND HYDROCHLOROTHIAZIDE 12.5; 1 MG/1; MG/1
1 TABLET ORAL DAILY
Qty: 90 TABLET | Refills: 1 | OUTPATIENT
Start: 2025-01-30

## 2025-01-31 RX ORDER — LOSARTAN POTASSIUM AND HYDROCHLOROTHIAZIDE 12.5; 1 MG/1; MG/1
1 TABLET ORAL DAILY
Qty: 90 TABLET | Refills: 1 | Status: SHIPPED | OUTPATIENT
Start: 2025-01-31

## 2025-01-31 NOTE — TELEPHONE ENCOUNTER
Caller: Rosi Vicente    Relationship: Self    Best call back number: [phone]     Requested Prescriptions:   Requested Prescriptions     Pending Prescriptions Disp Refills    losartan-hydrochlorothiazide (HYZAAR) 100-12.5 MG per tablet 90 tablet 1     Sig: Take 1 tablet by mouth Daily.        Pharmacy where request should be sent: [facility]/PHARMACY #[id number] [facility] [address] [place] [address]. AT [facility] [phone] [facility] [phone] FX     Last office visit with prescribing clinician: 12/5/2024   Last telemedicine visit with prescribing clinician: Visit date not found   Next office visit with prescribing clinician: 5/28/2025     Additional details provided by patient:     Does the patient have less than a 3 day supply:  [] Yes  [x] No    Would you like a call back once the refill request has been completed: [] Yes [x] No    If the office needs to give you a call back, can they leave a voicemail: [x] Yes [] No    Daryl Hernández Rep   01/31/25 15:53 EST

## 2025-02-11 DIAGNOSIS — I10 PRIMARY HYPERTENSION: ICD-10-CM

## 2025-02-11 RX ORDER — AMLODIPINE BESYLATE 2.5 MG/1
2.5 TABLET ORAL DAILY
Qty: 90 TABLET | Refills: 2 | OUTPATIENT
Start: 2025-02-11

## 2025-03-28 ENCOUNTER — PATIENT MESSAGE (OUTPATIENT)
Dept: GASTROENTEROLOGY | Facility: CLINIC | Age: 75
End: 2025-03-28
Payer: MEDICARE

## 2025-03-28 DIAGNOSIS — Z12.12 ENCOUNTER FOR COLORECTAL CANCER SCREENING: Primary | ICD-10-CM

## 2025-03-28 DIAGNOSIS — Z12.11 ENCOUNTER FOR COLORECTAL CANCER SCREENING: Primary | ICD-10-CM

## 2025-04-11 ENCOUNTER — TELEPHONE (OUTPATIENT)
Dept: INTERNAL MEDICINE | Facility: CLINIC | Age: 75
End: 2025-04-11
Payer: MEDICARE

## 2025-04-11 ENCOUNTER — TRANSCRIBE ORDERS (OUTPATIENT)
Dept: ADMINISTRATIVE | Facility: HOSPITAL | Age: 75
End: 2025-04-11
Payer: MEDICARE

## 2025-04-11 DIAGNOSIS — Z12.31 BREAST CANCER SCREENING BY MAMMOGRAM: Primary | ICD-10-CM

## 2025-04-11 NOTE — TELEPHONE ENCOUNTER
Caller: Rosi Vicente    Relationship: Self    Best call back number: [phone]      What orders are you requesting (i.e. lab or imaging): BONE DENSITY TEST    In what timeframe would the patient need to come in: ASAP

## 2025-04-21 ENCOUNTER — PREP FOR SURGERY (OUTPATIENT)
Dept: SURGERY | Facility: SURGERY CENTER | Age: 75
End: 2025-04-21
Payer: MEDICARE

## 2025-04-21 ENCOUNTER — TELEPHONE (OUTPATIENT)
Dept: GASTROENTEROLOGY | Facility: CLINIC | Age: 75
End: 2025-04-21

## 2025-04-21 ENCOUNTER — RESULTS FOLLOW-UP (OUTPATIENT)
Dept: GASTROENTEROLOGY | Facility: CLINIC | Age: 75
End: 2025-04-21
Payer: MEDICARE

## 2025-04-21 DIAGNOSIS — K22.70 BARRETT'S ESOPHAGUS WITHOUT DYSPLASIA: ICD-10-CM

## 2025-04-21 DIAGNOSIS — R19.5 POSITIVE COLORECTAL CANCER SCREENING USING COLOGUARD TEST: Primary | ICD-10-CM

## 2025-04-21 RX ORDER — SODIUM CHLORIDE, SODIUM LACTATE, POTASSIUM CHLORIDE, CALCIUM CHLORIDE 600; 310; 30; 20 MG/100ML; MG/100ML; MG/100ML; MG/100ML
30 INJECTION, SOLUTION INTRAVENOUS CONTINUOUS PRN
OUTPATIENT
Start: 2025-04-21 | End: 2025-04-21

## 2025-04-21 RX ORDER — SODIUM CHLORIDE 0.9 % (FLUSH) 0.9 %
3 SYRINGE (ML) INJECTION EVERY 12 HOURS SCHEDULED
OUTPATIENT
Start: 2025-04-21

## 2025-04-21 RX ORDER — SODIUM CHLORIDE 0.9 % (FLUSH) 0.9 %
10 SYRINGE (ML) INJECTION AS NEEDED
OUTPATIENT
Start: 2025-04-21

## 2025-04-21 NOTE — TELEPHONE ENCOUNTER
Provider: DR PETROS ROBERT     Caller: PASCUAL SOFIA     Relationship to Patient: SELF    Phone Number: [phone]     Reason for Call: PT IS CALLING TO GET EGD AND COLONOSCOPY SCHEDULED PLEASE ADVISE AND CALL PT BACK

## 2025-04-21 NOTE — TELEPHONE ENCOUNTER
Pt asking to repeat Cologuard prior to colonoscopy. First Cologuard is positive. Unsure of Medicare coverage for second test. Please advise. EL

## 2025-04-22 RX ORDER — SODIUM, POTASSIUM,MAG SULFATES 17.5-3.13G
1 SOLUTION, RECONSTITUTED, ORAL ORAL EVERY 12 HOURS
Qty: 354 ML | Refills: 0 | Status: SHIPPED | OUTPATIENT
Start: 2025-04-22

## 2025-04-23 ENCOUNTER — PATIENT MESSAGE (OUTPATIENT)
Dept: GASTROENTEROLOGY | Facility: CLINIC | Age: 75
End: 2025-04-23
Payer: MEDICARE

## 2025-04-23 NOTE — TELEPHONE ENCOUNTER
Patient left a Voice Message, stating that she has EGD + CLS this May 1st, and has not received the special instructions that we were going to send, along with the Bowel Prep, which she has.

## 2025-04-30 DIAGNOSIS — E78.5 HYPERLIPIDEMIA LDL GOAL <70: Primary | ICD-10-CM

## 2025-04-30 RX ORDER — ICOSAPENT ETHYL 1000 MG/1
2 CAPSULE ORAL 2 TIMES DAILY WITH MEALS
Qty: 360 CAPSULE | Refills: 1 | Status: SHIPPED | OUTPATIENT
Start: 2025-04-30

## 2025-05-01 ENCOUNTER — ANESTHESIA (OUTPATIENT)
Dept: GASTROENTEROLOGY | Facility: HOSPITAL | Age: 75
End: 2025-05-01
Payer: MEDICARE

## 2025-05-01 ENCOUNTER — ANESTHESIA EVENT (OUTPATIENT)
Dept: GASTROENTEROLOGY | Facility: HOSPITAL | Age: 75
End: 2025-05-01
Payer: MEDICARE

## 2025-05-01 ENCOUNTER — HOSPITAL ENCOUNTER (OUTPATIENT)
Facility: HOSPITAL | Age: 75
Setting detail: HOSPITAL OUTPATIENT SURGERY
Discharge: HOME | End: 2025-05-01
Attending: INTERNAL MEDICINE | Admitting: INTERNAL MEDICINE
Payer: MEDICARE

## 2025-05-01 VITALS
OXYGEN SATURATION: 100 % | BODY MASS INDEX: 19.07 KG/M2 | WEIGHT: 121.5 LBS | DIASTOLIC BLOOD PRESSURE: 84 MMHG | HEART RATE: 72 BPM | SYSTOLIC BLOOD PRESSURE: 139 MMHG | RESPIRATION RATE: 16 BRPM | HEIGHT: 67 IN

## 2025-05-01 DIAGNOSIS — R19.5 POSITIVE COLORECTAL CANCER SCREENING USING COLOGUARD TEST: ICD-10-CM

## 2025-05-01 DIAGNOSIS — K22.70 BARRETT'S ESOPHAGUS WITHOUT DYSPLASIA: ICD-10-CM

## 2025-05-01 PROCEDURE — 88305 TISSUE EXAM BY PATHOLOGIST: CPT | Performed by: INTERNAL MEDICINE

## 2025-05-01 PROCEDURE — 25010000002 LIDOCAINE 2% SOLUTION

## 2025-05-01 PROCEDURE — 25810000003 LACTATED RINGERS PER 1000 ML: Performed by: NURSE PRACTITIONER

## 2025-05-01 PROCEDURE — 25010000002 PROPOFOL 200 MG/20ML EMULSION

## 2025-05-01 PROCEDURE — 43239 EGD BIOPSY SINGLE/MULTIPLE: CPT | Performed by: INTERNAL MEDICINE

## 2025-05-01 PROCEDURE — 25010000002 GLYCOPYRROLATE 0.2 MG/ML SOLUTION

## 2025-05-01 PROCEDURE — 88342 IMHCHEM/IMCYTCHM 1ST ANTB: CPT | Performed by: INTERNAL MEDICINE

## 2025-05-01 PROCEDURE — 25010000002 PROPOFOL 1000 MG/100ML EMULSION

## 2025-05-01 PROCEDURE — G0121 COLON CA SCRN NOT HI RSK IND: HCPCS | Performed by: INTERNAL MEDICINE

## 2025-05-01 RX ORDER — LIDOCAINE HYDROCHLORIDE 20 MG/ML
INJECTION, SOLUTION INFILTRATION; PERINEURAL AS NEEDED
Status: DISCONTINUED | OUTPATIENT
Start: 2025-05-01 | End: 2025-05-01 | Stop reason: SURG

## 2025-05-01 RX ORDER — PROPOFOL 10 MG/ML
INJECTION, EMULSION INTRAVENOUS AS NEEDED
Status: DISCONTINUED | OUTPATIENT
Start: 2025-05-01 | End: 2025-05-01 | Stop reason: SURG

## 2025-05-01 RX ORDER — SODIUM CHLORIDE 0.9 % (FLUSH) 0.9 %
3 SYRINGE (ML) INJECTION EVERY 12 HOURS SCHEDULED
Status: DISCONTINUED | OUTPATIENT
Start: 2025-05-01 | End: 2025-05-01 | Stop reason: HOSPADM

## 2025-05-01 RX ORDER — PROPOFOL 10 MG/ML
INJECTION, EMULSION INTRAVENOUS CONTINUOUS PRN
Status: DISCONTINUED | OUTPATIENT
Start: 2025-05-01 | End: 2025-05-01 | Stop reason: SURG

## 2025-05-01 RX ORDER — SODIUM CHLORIDE, SODIUM LACTATE, POTASSIUM CHLORIDE, CALCIUM CHLORIDE 600; 310; 30; 20 MG/100ML; MG/100ML; MG/100ML; MG/100ML
30 INJECTION, SOLUTION INTRAVENOUS CONTINUOUS PRN
Status: DISCONTINUED | OUTPATIENT
Start: 2025-05-01 | End: 2025-05-01 | Stop reason: HOSPADM

## 2025-05-01 RX ORDER — SODIUM CHLORIDE 0.9 % (FLUSH) 0.9 %
10 SYRINGE (ML) INJECTION AS NEEDED
Status: DISCONTINUED | OUTPATIENT
Start: 2025-05-01 | End: 2025-05-01 | Stop reason: HOSPADM

## 2025-05-01 RX ORDER — GLYCOPYRROLATE 0.2 MG/ML
INJECTION INTRAMUSCULAR; INTRAVENOUS AS NEEDED
Status: DISCONTINUED | OUTPATIENT
Start: 2025-05-01 | End: 2025-05-01 | Stop reason: SURG

## 2025-05-01 RX ADMIN — PROPOFOL INJECTABLE EMULSION 70 MG: 10 INJECTION, EMULSION INTRAVENOUS at 08:46

## 2025-05-01 RX ADMIN — PROPOFOL 180 MCG/KG/MIN: 10 INJECTION, EMULSION INTRAVENOUS at 08:48

## 2025-05-01 RX ADMIN — SODIUM CHLORIDE, POTASSIUM CHLORIDE, SODIUM LACTATE AND CALCIUM CHLORIDE 30 ML/HR: 600; 310; 30; 20 INJECTION, SOLUTION INTRAVENOUS at 08:22

## 2025-05-01 RX ADMIN — LIDOCAINE HYDROCHLORIDE 60 MG: 20 INJECTION, SOLUTION INFILTRATION; PERINEURAL at 08:46

## 2025-05-01 RX ADMIN — GLYCOPYRROLATE 0.1 MG: 0.2 INJECTION INTRAMUSCULAR; INTRAVENOUS at 08:46

## 2025-05-01 NOTE — H&P
No chief complaint on file.      HPI  Barretts  Positive cologard         Problem List:    Patient Active Problem List   Diagnosis    Hypertension    Allergic rhinitis    Trigeminal neuralgia of left side of face    Hyperlipidemia LDL goal <70    Osteoarthritis of hip    Vitamin D deficiency    History of CVA (cerebrovascular accident)    Heartburn    Frequent urination    Age-related osteoporosis without current pathological fracture    Chronic fatigue    Hyponatremia    Overactive bladder    Vision disturbance    Sequelae, post-stroke    Dry eye syndrome of both eyes    Obstructive sleep apnea syndrome    Irritable bowel syndrome with both constipation and diarrhea    Dizziness    Abnormal finding on breast imaging    History of removal of implants of both breasts    Fibrocystic breast changes, bilateral    Iron deficiency anemia    Positive fecal occult blood test    Osteoarthritis of right shoulder    Fonseca's esophagus    Benign essential hypertension    Gastroesophageal reflux disease    Lacunar stroke    Piriformis syndrome    Trochanteric bursitis    Positive colorectal cancer screening using Cologuard test       Medical History:    Past Medical History:   Diagnosis Date    Allergic rhinitis     Anemia     Arthritis 2016    Bronchitis     CVA (cerebral vascular accident)     Elevated coronary artery calcium score     Fatigue     FH: colon cancer     Fibrocystic breast changes, bilateral 5/12/2021    GERD (gastroesophageal reflux disease) occasionally    H/O bone density study 2013    H/O mammogram 2013    Hyperlipidemia     Hypertension     Benign Essential    Irritable bowel syndrome with both constipation and diarrhea 9/4/2020    Malaise and fatigue     Muscle pain     Nocturia     Osteoporosis     Sleep apnea     AHI 17/h    Stroke 09/13/2017    TMJ (temporomandibular joint syndrome)     Trigeminal neuralgia     Surgery at [facility] in 2009 repeat in 2015        Social History:    Social History      Socioeconomic History    Marital status:    Tobacco Use    Smoking status: Never    Smokeless tobacco: Never    Tobacco comments:     daily caffiene   Vaping Use    Vaping status: Never Used   Substance and Sexual Activity    Alcohol use: Yes     Alcohol/week: 1.0 standard drink of alcohol     Types: 1 Glasses of wine per week     Comment: a few days a week    Drug use: No    Sexual activity: Not Currently     Partners: Male     Birth control/protection: Post-menopausal, None       Family History:   Family History   Problem Relation Age of Onset    Pancreatic cancer Mother     Hyperlipidemia Mother     Cancer Mother             Hypertension Mother     Miscarriages / Stillbirths Mother     Colon cancer Father 56    Arthritis Father     Cancer Father             Hearing loss Father         WAR INJURY    Vision loss Father         dry glaucoma    Liver cancer Brother 40    Cancer Brother             Breast cancer Maternal Grandmother     Heart disease Maternal Grandmother     Cancer Paternal Grandmother             Heart disease Paternal Grandfather     Malig Hyperthermia Neg Hx        Surgical History:   Past Surgical History:   Procedure Laterality Date    ADENOIDECTOMY      AUGMENTATION MAMMAPLASTY Bilateral 2019    removed    BRAIN SURGERY  2005    MVD    COLONOSCOPY N/A 2015    Repeat Q 5 years due to Fhx of Colon Ca-Dr. Polk    COLONOSCOPY N/A 2022    Procedure: COLONOSCOPY FOR SCREENING;  Surgeon: Víctor Polk MD;  Location: [facility];  Service: Gastroenterology;  Laterality: N/A;  hedmorrhoids, diverticulosis, adequate prep, tortuous    COSMETIC SURGERY      augmentation    ENDOSCOPY N/A 2022    Procedure: ESOPHAGOGASTRODUODENOSCOPY;  Surgeon: Víctor Polk MD;  Location: [facility];  Service: Gastroenterology;  Laterality: N/A;  hiatal hernia, irregular zline    HIP SURGERY      INNER EAR SURGERY      JOINT REPLACEMENT   10/3/16    left hip replacement    PAP SMEAR N/A 2013    Dr. Burden    RHINOPLASTY      SEPTOPLASTY  2000    SEPTO/RHINOPLASTY POST TRAUMA    SINUS SURGERY  1980'S    TONSILLECTOMY  AGE 5    US GUIDED CYST ASPIRATION BREAST N/A 5/26/2021       No current facility-administered medications for this encounter.    Current Outpatient Medications:     acetaminophen (TYLENOL) 500 MG tablet, Take 1 tablet by mouth Every 6 (Six) Hours As Needed for Mild Pain., Disp: , Rfl:     aspirin 81 MG EC tablet, Take 1 tablet by mouth Every Evening., Disp: , Rfl:     cholecalciferol (VITAMIN D3) 1000 UNITS tablet, Take 2 tablets by mouth 2 (Two) Times a Day., Disp: , Rfl:     Coenzyme Q10 (COQ-10) 400 MG capsule, Take  by mouth Daily., Disp: , Rfl:     cycloSPORINE (RESTASIS) 0.05 % ophthalmic emulsion, Apply 1 drop to eye(s) as directed by provider 2 (Two) Times a Day., Disp: , Rfl:     fluticasone (FLONASE) 50 MCG/ACT nasal spray, 1 spray into the nostril(s) as directed by provider Daily. (Patient taking differently: Administer 1 spray into the nostril(s) as directed by provider As Needed.), Disp: 16 mL, Rfl: 3    gabapentin (NEURONTIN) 100 MG capsule, TAKE ONE CAPSULE BY MOUTH EVERY NIGHT AT BEDTIME (Patient taking differently: Take 1 capsule by mouth As Needed. TAKE ONE CAPSULE BY MOUTH EVERY NIGHT AT BEDTIME), Disp: 90 capsule, Rfl: 0    losartan-hydrochlorothiazide (HYZAAR) 100-12.5 MG per tablet, Take 1 tablet by mouth Daily., Disp: 90 tablet, Rfl: 1    Melatonin 10 MG tablet, Take 2 tablets by mouth., Disp: , Rfl:     niacin (NIACIN SR,NIASPAN ER) 500 MG CR capsule, Take 1 capsule by mouth Every Night., Disp: 90 capsule, Rfl: 1    NON FORMULARY, Take 1 tablet by mouth Every Morning. calciven, Disp: , Rfl:     omeprazole (priLOSEC) 20 MG capsule, TAKE ONE CAPSULE BY MOUTH 1 TO 2 TIMES A DAY (Patient taking differently: 1 capsule. TAKE ONE CAPSULE BY MOUTH 1 TO 2 TIMES A DAY), Disp: 180 capsule, Rfl: 1    Peppermint Oil  (IBGARD PO), Take  by mouth As Needed., Disp: , Rfl:     Probiotic Product (PROBIOTIC-10 PO), Take 1 capsule by mouth Daily., Disp: , Rfl:     sodium-potassium-magnesium sulfates (Suprep Bowel Prep Kit) 17.5-3.13-1.6 GM/177ML solution oral solution, Take 1 bottle by mouth Every 12 (Twelve) Hours., Disp: 354 mL, Rfl: 0    Vascepa 1 g capsule capsule, Take 2 g by mouth 2 (Two) Times a Day With Meals., Disp: 360 capsule, Rfl: 1    Allergies:   Allergies   Allergen Reactions    Bystolic [Nebivolol Hcl] Other (See Comments)     Extreme fatigue, dizziness    Lisinopril Other (See Comments)     COUGH        The following portions of the patient's history were reviewed by me and updated as appropriate: review of systems, allergies, current medications, past family history, past medical history, past social history, past surgical history and problem list.    There were no vitals filed for this visit.    PHYSICAL EXAM:    CONSTITUTIONAL:  today's vital signs reviewed by me  GASTROINTESTINAL: abdomen is soft nontender nondistended with normal active bowel sounds, no masses are appreciated    Assessment/ Plan  Barretts  Positive cologard    Egd and colonoscopy    Risks and benefits as well as alternatives to endoscopic evaluation were explained to the patient and they voiced understanding and wish to proceed.  These risks include but are not limited to the risk of bleeding, perforation, adverse reaction to sedation, and missed lesions.  The patient was given the opportunity to ask questions prior to the endoscopic procedure.

## 2025-05-01 NOTE — ANESTHESIA PREPROCEDURE EVALUATION
Anesthesia Evaluation     Patient summary reviewed and Nursing notes reviewed                Airway   Mallampati: II  Dental      Pulmonary    (+) ,sleep apnea  Cardiovascular     ECG reviewed  Rhythm: regular  Rate: normal    (+) hypertension, CAD, hyperlipidemia      Neuro/Psych  (+) CVA, dizziness/light headedness, numbness  GI/Hepatic/Renal/Endo    (+) GERD    Musculoskeletal     Abdominal    Substance History   (+) alcohol use     OB/GYN negative ob/gyn ROS         Other   arthritis,                 Anesthesia Plan    ASA 3     MAC     intravenous induction     Anesthetic plan, risks, benefits, and alternatives have been provided, discussed and informed consent has been obtained with: patient and spouse/significant other.    CODE STATUS:

## 2025-05-01 NOTE — ANESTHESIA POSTPROCEDURE EVALUATION
Patient: Rosi Vicente    Procedure Summary       Date: 05/01/25 Room / Location:  [facility] / [facility]    Anesthesia Start: 0844 Anesthesia Stop: 0926    Procedures:       ESOPHAGOGASTRODUODENOSCOPY with cold biopsies      COLONOSCOPY to cecum Diagnosis:       Positive colorectal cancer screening using Cologuard test      Fonseca's esophagus without dysplasia      (Positive colorectal cancer screening using Cologuard test [R19.5])      (Fonseca's esophagus without dysplasia [K22.70])    Surgeons: Víctor Polk MD Provider: Haim Leal MD    Anesthesia Type: MAC ASA Status: 3            Anesthesia Type: MAC    Vitals  Vitals Value Taken Time   /84 05/01/25 09:47   Temp     Pulse 69 05/01/25 10:00   Resp 16 05/01/25 09:47   SpO2 80 % 05/01/25 09:54   Vitals shown include unfiled device data.        Post Anesthesia Care and Evaluation    Patient location during evaluation: PACU  Patient participation: complete - patient participated  Level of consciousness: awake and alert  Pain management: adequate    Airway patency: patent  Anesthetic complications: No anesthetic complications    Cardiovascular status: acceptable  Respiratory status: acceptable  Hydration status: acceptable    Comments: --------------------            05/01/25               0947     --------------------   BP:       139/84     Pulse:      72       Resp:       16       SpO2:      100%     --------------------

## 2025-05-07 LAB
CYTO UR: NORMAL
LAB AP CASE REPORT: NORMAL
LAB AP DIAGNOSIS COMMENT: NORMAL
PATH REPORT.ADDENDUM SPEC: NORMAL
PATH REPORT.FINAL DX SPEC: NORMAL
PATH REPORT.GROSS SPEC: NORMAL

## 2025-05-20 DIAGNOSIS — R53.83 FATIGUE, UNSPECIFIED TYPE: ICD-10-CM

## 2025-05-20 DIAGNOSIS — E87.1 HYPONATREMIA: ICD-10-CM

## 2025-05-20 DIAGNOSIS — E78.5 HYPERLIPIDEMIA LDL GOAL <70: ICD-10-CM

## 2025-05-20 DIAGNOSIS — Z00.00 MEDICARE ANNUAL WELLNESS VISIT, SUBSEQUENT: Primary | ICD-10-CM

## 2025-05-20 DIAGNOSIS — I10 PRIMARY HYPERTENSION: ICD-10-CM

## 2025-05-20 DIAGNOSIS — D50.9 IRON DEFICIENCY ANEMIA, UNSPECIFIED IRON DEFICIENCY ANEMIA TYPE: ICD-10-CM

## 2025-05-20 DIAGNOSIS — D50.0 IRON DEFICIENCY ANEMIA DUE TO CHRONIC BLOOD LOSS: ICD-10-CM

## 2025-05-20 DIAGNOSIS — E55.9 VITAMIN D DEFICIENCY: ICD-10-CM

## 2025-05-21 ENCOUNTER — LAB (OUTPATIENT)
Facility: HOSPITAL | Age: 75
End: 2025-05-21
Payer: MEDICARE

## 2025-05-21 LAB
ALBUMIN SERPL-MCNC: 4.4 G/DL (ref 3.5–5.2)
ALBUMIN/GLOB SERPL: 1.8 G/DL
ALP SERPL-CCNC: 101 U/L (ref 39–117)
ALT SERPL W P-5'-P-CCNC: 16 U/L (ref 1–33)
ANION GAP SERPL CALCULATED.3IONS-SCNC: 12 MMOL/L (ref 5–15)
AST SERPL-CCNC: 22 U/L (ref 1–32)
BASOPHILS # BLD AUTO: 0.05 10*3/MM3 (ref 0–0.2)
BASOPHILS NFR BLD AUTO: 0.9 % (ref 0–1.5)
BILIRUB SERPL-MCNC: 0.5 MG/DL (ref 0–1.2)
BUN SERPL-MCNC: 16 MG/DL (ref 8–23)
BUN/CREAT SERPL: 24.6 (ref 7–25)
CALCIUM SPEC-SCNC: 9.6 MG/DL (ref 8.6–10.5)
CHLORIDE SERPL-SCNC: 98 MMOL/L (ref 98–107)
CHOLEST SERPL-MCNC: 176 MG/DL (ref 0–200)
CO2 SERPL-SCNC: 27 MMOL/L (ref 22–29)
CREAT SERPL-MCNC: 0.65 MG/DL (ref 0.57–1)
DEPRECATED RDW RBC AUTO: 44.4 FL (ref 37–54)
EGFRCR SERPLBLD CKD-EPI 2021: 91.4 ML/MIN/1.73
EOSINOPHIL # BLD AUTO: 0.17 10*3/MM3 (ref 0–0.4)
EOSINOPHIL NFR BLD AUTO: 3 % (ref 0.3–6.2)
ERYTHROCYTE [DISTWIDTH] IN BLOOD BY AUTOMATED COUNT: 13.9 % (ref 12.3–15.4)
FOLATE SERPL-MCNC: >20 NG/ML (ref 4.78–24.2)
GLOBULIN UR ELPH-MCNC: 2.4 GM/DL
GLUCOSE SERPL-MCNC: 82 MG/DL (ref 65–99)
HCT VFR BLD AUTO: 42.4 % (ref 34–46.6)
HDLC SERPL QL: 3.32
HDLC SERPL-MCNC: 53 MG/DL (ref 40–60)
HGB BLD-MCNC: 13.8 G/DL (ref 12–15.9)
IMM GRANULOCYTES # BLD AUTO: 0.01 10*3/MM3 (ref 0–0.05)
IMM GRANULOCYTES NFR BLD AUTO: 0.2 % (ref 0–0.5)
IRON 24H UR-MRATE: 87 MCG/DL (ref 37–145)
LDLC SERPL CALC-MCNC: 112 MG/DL (ref 0–100)
LYMPHOCYTES # BLD AUTO: 1.93 10*3/MM3 (ref 0.7–3.1)
LYMPHOCYTES NFR BLD AUTO: 33.6 % (ref 19.6–45.3)
MCH RBC QN AUTO: 28.2 PG (ref 26.6–33)
MCHC RBC AUTO-ENTMCNC: 32.5 G/DL (ref 31.5–35.7)
MCV RBC AUTO: 86.7 FL (ref 79–97)
MONOCYTES # BLD AUTO: 0.53 10*3/MM3 (ref 0.1–0.9)
MONOCYTES NFR BLD AUTO: 9.2 % (ref 5–12)
NEUTROPHILS NFR BLD AUTO: 3.05 10*3/MM3 (ref 1.7–7)
NEUTROPHILS NFR BLD AUTO: 53.1 % (ref 42.7–76)
NRBC BLD AUTO-RTO: 0 /100 WBC (ref 0–0.2)
PLATELET # BLD AUTO: 350 10*3/MM3 (ref 140–450)
PMV BLD AUTO: 9.7 FL (ref 6–12)
POTASSIUM SERPL-SCNC: 4.8 MMOL/L (ref 3.5–5.2)
PROT SERPL-MCNC: 6.8 G/DL (ref 6–8.5)
RBC # BLD AUTO: 4.89 10*6/MM3 (ref 3.77–5.28)
SODIUM SERPL-SCNC: 137 MMOL/L (ref 136–145)
TRIGL SERPL-MCNC: 55 MG/DL (ref 0–150)
TSH SERPL DL<=0.05 MIU/L-ACNC: 2.84 UIU/ML (ref 0.27–4.2)
VIT B12 BLD-MCNC: 745 PG/ML (ref 211–946)
VLDLC SERPL-MCNC: 11 MG/DL (ref 5–40)
WBC NRBC COR # BLD AUTO: 5.74 10*3/MM3 (ref 3.4–10.8)

## 2025-05-21 PROCEDURE — 84443 ASSAY THYROID STIM HORMONE: CPT | Performed by: NURSE PRACTITIONER

## 2025-05-21 PROCEDURE — 82746 ASSAY OF FOLIC ACID SERUM: CPT | Performed by: NURSE PRACTITIONER

## 2025-05-21 PROCEDURE — 80053 COMPREHEN METABOLIC PANEL: CPT | Performed by: NURSE PRACTITIONER

## 2025-05-21 PROCEDURE — 82607 VITAMIN B-12: CPT | Performed by: NURSE PRACTITIONER

## 2025-05-21 PROCEDURE — 85025 COMPLETE CBC W/AUTO DIFF WBC: CPT | Performed by: NURSE PRACTITIONER

## 2025-05-21 PROCEDURE — 83540 ASSAY OF IRON: CPT | Performed by: NURSE PRACTITIONER

## 2025-05-21 PROCEDURE — 80061 LIPID PANEL: CPT | Performed by: NURSE PRACTITIONER

## 2025-05-21 PROCEDURE — 36415 COLL VENOUS BLD VENIPUNCTURE: CPT | Performed by: NURSE PRACTITIONER

## 2025-05-22 ENCOUNTER — HOSPITAL ENCOUNTER (OUTPATIENT)
Dept: MAMMOGRAPHY | Facility: HOSPITAL | Age: 75
Discharge: HOME | End: 2025-05-22
Admitting: NURSE PRACTITIONER
Payer: MEDICARE

## 2025-05-22 DIAGNOSIS — Z12.31 BREAST CANCER SCREENING BY MAMMOGRAM: ICD-10-CM

## 2025-05-22 PROCEDURE — 77067 SCR MAMMO BI INCL CAD: CPT

## 2025-05-22 PROCEDURE — 77063 BREAST TOMOSYNTHESIS BI: CPT

## 2025-05-28 ENCOUNTER — OFFICE VISIT (OUTPATIENT)
Dept: INTERNAL MEDICINE | Facility: CLINIC | Age: 75
End: 2025-05-28
Payer: MEDICARE

## 2025-05-28 VITALS
SYSTOLIC BLOOD PRESSURE: 110 MMHG | TEMPERATURE: 98.4 F | OXYGEN SATURATION: 98 % | DIASTOLIC BLOOD PRESSURE: 68 MMHG | HEART RATE: 67 BPM | WEIGHT: 125.1 LBS | BODY MASS INDEX: 19.63 KG/M2 | HEIGHT: 67 IN

## 2025-05-28 DIAGNOSIS — Z00.00 MEDICARE ANNUAL WELLNESS VISIT, SUBSEQUENT: Primary | ICD-10-CM

## 2025-05-28 DIAGNOSIS — I10 PRIMARY HYPERTENSION: ICD-10-CM

## 2025-05-28 DIAGNOSIS — G50.0 TRIGEMINAL NEURALGIA OF LEFT SIDE OF FACE: ICD-10-CM

## 2025-05-28 DIAGNOSIS — E78.5 HYPERLIPIDEMIA LDL GOAL <70: ICD-10-CM

## 2025-05-28 DIAGNOSIS — Z86.73 HISTORY OF CVA (CEREBROVASCULAR ACCIDENT): ICD-10-CM

## 2025-05-28 DIAGNOSIS — M81.0 AGE-RELATED OSTEOPOROSIS WITHOUT CURRENT PATHOLOGICAL FRACTURE: ICD-10-CM

## 2025-05-28 RX ORDER — LATANOPROST 50 UG/ML
SOLUTION/ DROPS OPHTHALMIC
COMMUNITY
Start: 2025-04-02

## 2025-05-28 NOTE — ASSESSMENT & PLAN NOTE
Very well-controlled.  Using gabapentin rarely.  She is aware of appropriate use and adverse effects.  CSA up-to-date and PDMP reviewed by me today.  She is not needing a refill.

## 2025-05-28 NOTE — ASSESSMENT & PLAN NOTE
Patient is intolerant to statins, Zetia and PSK 9 inhibitors.  Will continue Vascepa 2 capsules twice daily, niacin extended release 500 mg nightly and try addition of berberine 500 mg twice daily.  Continue healthy eating and regular exercise.  Monitor blood pressure after initiation of berberine.  Follow-up with labs in 6 months.

## 2025-05-28 NOTE — ASSESSMENT & PLAN NOTE
Will work on improving LDL by addition of berberine 500 mg twice daily she is intolerant to statins, Zetia and PSK 9 inhibitors.  Will continue Vascepa and niacin along with aspirin 81 milligrams daily.

## 2025-05-28 NOTE — ASSESSMENT & PLAN NOTE
Most recent DEXA showed osteopenia.  Recommend continuing calcium and vitamin D supplementation along with regular intervals of weightbearing exercise.  Recheck DEXA next year.

## 2025-05-28 NOTE — PROGRESS NOTES
Subjective   The ABCs of the Annual Wellness Visit  Medicare Wellness Visit      Rosi Vicente is a 75 y.o. patient who presents for a Medicare Wellness Visit.    The following portions of the patient's history were reviewed and   updated as appropriate: allergies, current medications, past family history, past medical history, past social history, past surgical history, and problem list.    Compared to one year ago, the patient's physical   health is the same.  Compared to one year ago, the patient's mental   health is the same.    Recent Hospitalizations:  She was not admitted to the hospital during the last year.     Current Medical Providers:  Patient Care Team:  Anette Machado APRN as PCP - General (Nurse Practitioner)  Christopher Young MD as Consulting Physician (Otolaryngology)  Antonio Davila MD as Consulting Physician (Orthopedic Surgery)  Manny Sexton MD as Consulting Physician (Dermatology)  Víctor Polk MD as Consulting Physician (Gastroenterology)  Kori Vaughn MD as Consulting Physician (Neurosurgery)  Hermes Anne MD as Consulting Physician (Orthopedic Surgery)  Peri James APRN as Nurse Practitioner (Neurology)  Gallo Shields MD as Consulting Physician (Ophthalmology)  Yuri Lay DPM as Consulting Physician (Podiatry)  Katarina Leung MD as Consulting Physician (Cardiology)    Outpatient Medications Prior to Visit   Medication Sig Dispense Refill    acetaminophen (TYLENOL) 500 MG tablet Take 1 tablet by mouth Every 6 (Six) Hours As Needed for Mild Pain.      aspirin 81 MG EC tablet Take 1 tablet by mouth Every Evening.      cholecalciferol (VITAMIN D3) 1000 UNITS tablet Take 2 tablets by mouth 2 (Two) Times a Day.      Coenzyme Q10 (COQ-10) 400 MG capsule Take  by mouth Daily.      cycloSPORINE (RESTASIS) 0.05 % ophthalmic emulsion Apply 1 drop to eye(s) as directed by provider 2 (Two) Times a Day.      fluticasone (FLONASE) 50  MCG/ACT nasal spray 1 spray into the nostril(s) as directed by provider Daily. (Patient taking differently: Administer 1 spray into the nostril(s) as directed by provider As Needed.) 16 mL 3    gabapentin (NEURONTIN) 100 MG capsule TAKE ONE CAPSULE BY MOUTH EVERY NIGHT AT BEDTIME (Patient taking differently: Take 1 capsule by mouth As Needed. TAKE ONE CAPSULE BY MOUTH EVERY NIGHT AT BEDTIME) 90 capsule 0    latanoprost (XALATAN) 0.005 % ophthalmic solution       losartan-hydrochlorothiazide (HYZAAR) 100-12.5 MG per tablet Take 1 tablet by mouth Daily. 90 tablet 1    Melatonin 10 MG tablet Take 2 tablets by mouth.      niacin (NIACIN SR,NIASPAN ER) 500 MG CR capsule Take 1 capsule by mouth Every Night. 90 capsule 1    NON FORMULARY Take 1 tablet by mouth Every Morning. calciven      omeprazole (priLOSEC) 20 MG capsule TAKE ONE CAPSULE BY MOUTH 1 TO 2 TIMES A DAY (Patient taking differently: 1 capsule. TAKE ONE CAPSULE BY MOUTH 1 TO 2 TIMES A DAY) 180 capsule 1    Peppermint Oil (IBGARD PO) Take  by mouth As Needed.      Probiotic Product (PROBIOTIC-10 PO) Take 1 capsule by mouth Daily.      Vascepa 1 g capsule capsule Take 2 g by mouth 2 (Two) Times a Day With Meals. 360 capsule 1    sodium-potassium-magnesium sulfates (Suprep Bowel Prep Kit) 17.5-3.13-1.6 GM/177ML solution oral solution Take 1 bottle by mouth Every 12 (Twelve) Hours. 354 mL 0     No facility-administered medications prior to visit.     No opioid medication identified on active medication list. I have reviewed chart for other potential  high risk medication/s and harmful drug interactions in the elderly.      Aspirin is on active medication list. Aspirin use is indicated based on review of current medical condition/s. Pros and cons of this therapy have been discussed today. Benefits of this medication outweigh potential harm.  Patient has been encouraged to continue taking this medication.  .      Patient Active Problem List   Diagnosis     "Hypertension    Allergic rhinitis    Trigeminal neuralgia of left side of face    Hyperlipidemia LDL goal <70    Osteoarthritis of hip    Vitamin D deficiency    History of CVA (cerebrovascular accident)    Heartburn    Frequent urination    Age-related osteoporosis without current pathological fracture    Chronic fatigue    Hyponatremia    Overactive bladder    Vision disturbance    Sequelae, post-stroke    Dry eye syndrome of both eyes    Obstructive sleep apnea syndrome    Irritable bowel syndrome with both constipation and diarrhea    Dizziness    Abnormal finding on breast imaging    History of removal of implants of both breasts    Fibrocystic breast changes, bilateral    Iron deficiency anemia    Positive fecal occult blood test    Osteoarthritis of right shoulder    Fonseca's esophagus    Gastroesophageal reflux disease    Lacunar stroke    Piriformis syndrome    Trochanteric bursitis    Positive colorectal cancer screening using Cologuard test     Advance Care Planning Advance Directive is not on file.  ACP discussion was held with the patient during this visit. Patient has an advance directive (not in EMR), copy requested.            Objective   Vitals:    05/28/25 1419   BP: 110/68   BP Location: Left arm   Patient Position: Sitting   Cuff Size: Adult   Pulse: 67   Temp: 98.4 °F (36.9 °C)   TempSrc: Oral   SpO2: 98%   Weight: 56.7 kg (125 lb 1.6 oz)   Height: 170.2 cm (67\")   PainSc: 0-No pain       Estimated body mass index is 19.59 kg/m² as calculated from the following:    Height as of this encounter: 170.2 cm (67\").    Weight as of this encounter: 56.7 kg (125 lb 1.6 oz).    BMI is within normal parameters. No other follow-up for BMI required.           Does the patient have evidence of cognitive impairment? No  Lab Results   Component Value Date    TRIG 55 05/21/2025    HDL 53 05/21/2025     (H) 05/21/2025    VLDL 11 05/21/2025                                                                    "                             Health  Risk Assessment    Smoking Status:  Social History     Tobacco Use   Smoking Status Never   Smokeless Tobacco Never   Tobacco Comments    daily caffiene     Alcohol Consumption:  Social History     Substance and Sexual Activity   Alcohol Use Yes    Alcohol/week: 1.0 standard drink of alcohol    Types: 1 Glasses of wine per week    Comment: a few days a week       Fall Risk Screen  RONIADI Fall Risk Assessment was completed, and patient is at MODERATE risk for falls. Assessment completed on:2025    Depression Screening   Little interest or pleasure in doing things? Not at all   Feeling down, depressed, or hopeless? Not at all   PHQ-2 Total Score 0      Health Habits and Functional and Cognitive Screenin/28/2025     2:00 PM   Functional & Cognitive Status   Do you have difficulty preparing food and eating? No   Do you have difficulty bathing yourself, getting dressed or grooming yourself? No   Do you have difficulty using the toilet? No   Do you have difficulty moving around from place to place? No   Do you have trouble with steps or getting out of a bed or a chair? No   Current Diet Well Balanced Diet   Dental Exam Up to date   Eye Exam Up to date   Exercise (times per week) 4 times per week   Current Exercises Include Walking   Do you need help using the phone?  No   Are you deaf or do you have serious difficulty hearing?  No   Do you need help to go to places out of walking distance? No   Do you need help shopping? No   Do you need help preparing meals?  No   Do you need help with housework?  Yes   Do you need help with laundry? No   Do you need help taking your medications? No   Do you need help managing money? No   Do you ever drive or ride in a car without wearing a seat belt? No   Have you felt unusual stress, anger or loneliness in the last month? No   Who do you live with? Spouse   If you need help, do you have trouble finding someone available to you? No    Have you been bothered in the last four weeks by sexual problems? No   Do you have difficulty concentrating, remembering or making decisions? No           Age-appropriate Screening Schedule:  Refer to the list below for future screening recommendations based on patient's age, sex and/or medical conditions. Orders for these recommended tests are listed in the plan section. The patient has been provided with a written plan.    Health Maintenance List  Health Maintenance   Topic Date Due    TDAP/TD VACCINES (2 - Td or Tdap) 12/17/2023    COVID-19 Vaccine (8 - 2024-25 season) 02/28/2025    ANNUAL WELLNESS VISIT  05/15/2025    INFLUENZA VACCINE  07/01/2025    DXA SCAN  04/15/2026    LIPID PANEL  05/21/2026    MAMMOGRAM  05/22/2026    GASTROSCOPY (EGD)  05/01/2028    COLORECTAL CANCER SCREENING  05/01/2035    HEPATITIS C SCREENING  Completed    RSV Vaccine - Adults  Completed    Pneumococcal Vaccine 50+  Completed    ZOSTER VACCINE  Completed                                                                                                                                                CMS Preventative Services Quick Reference  Risk Factors Identified During Encounter  Immunizations Discussed/Encouraged: Tdap  Dental Screening Recommended  Vision Screening Recommended    The above risks/problems have been discussed with the patient.  Pertinent information has been shared with the patient in the After Visit Summary.  An After Visit Summary and PPPS were made available to the patient.    Follow Up:   Next Medicare Wellness visit to be scheduled in 1 year.         Additional E&M Note during same encounter follows:  Patient has additional, significant, and separately identifiable condition(s)/problem(s) that require work above and beyond the Medicare Wellness Visit     Chief Complaint  Medicare Wellness-subsequent    Subjective   HPI  Rosi is also being seen today for lab follow-up. She is feeling well today.     HTN- Patient  "doing well with current medication regimen, compliant with medication schedule, denies adverse effects.   HLD/history of CVA- Patient doing well with current medication regimen, compliant with medication schedule, denies adverse effects. She has been intolerant to statins and zetia as well as PSK-9 inhibitors.  She is currently tolerating Vascepa and niacin.  She is doing physical therapy to work on balance.    Trigeminal neuralgia-she has not had a flareup in pain recently. She uses gabapentin rarely.     Colon cancer screening- she completed c-scope in May after positive Cologuard.     Osteoporosis- she is no longer doing Prolia. Last DEXA completed in April 2024 showing osteopenia. She is currently taking Calciven calcium supplement and vit D.   GYN-she is up-to-date on mammogram and DEXA.    Review of Systems   Constitutional: Negative.    Respiratory: Negative.     Cardiovascular: Negative.    Neurological: Negative.    Psychiatric/Behavioral: Negative.                Objective   Vital Signs:  /68 (BP Location: Left arm, Patient Position: Sitting, Cuff Size: Adult)   Pulse 67   Temp 98.4 °F (36.9 °C) (Oral)   Ht 170.2 cm (67\")   Wt 56.7 kg (125 lb 1.6 oz)   SpO2 98%   BMI 19.59 kg/m²   Physical Exam  Constitutional:       Appearance: She is well-developed.   Neck:      Thyroid: No thyroid mass, thyromegaly or thyroid tenderness.      Vascular: No carotid bruit.      Trachea: Trachea normal.   Cardiovascular:      Rate and Rhythm: Normal rate and regular rhythm.      Chest Wall: PMI is not displaced.      Pulses:           Radial pulses are 2+ on the right side and 2+ on the left side.        Dorsalis pedis pulses are 2+ on the right side and 2+ on the left side.        Posterior tibial pulses are 2+ on the right side and 2+ on the left side.      Heart sounds: S1 normal and S2 normal.   Pulmonary:      Effort: Pulmonary effort is normal.      Breath sounds: Normal breath sounds.   Musculoskeletal: "      Right lower leg: No edema.      Left lower leg: No edema.   Lymphadenopathy:      Head:      Right side of head: No submental, submandibular, tonsillar or occipital adenopathy.      Left side of head: No submental, submandibular, tonsillar or occipital adenopathy.      Cervical: No cervical adenopathy.   Skin:     General: Skin is warm and dry.      Capillary Refill: Capillary refill takes less than 2 seconds.      Nails: There is no clubbing.   Neurological:      Mental Status: She is alert and oriented to person, place, and time. Mental status is at baseline.      Cranial Nerves: Cranial nerves 2-12 are intact.      Sensory: Sensory deficit (left trigeminal nerve) present.      Motor: Motor function is intact.      Coordination: Coordination is intact.      Gait: Gait is intact.      Deep Tendon Reflexes:      Reflex Scores:       Patellar reflexes are 2+ on the right side and 2+ on the left side.  Psychiatric:         Attention and Perception: Attention normal.         Mood and Affect: Mood and affect normal.         Speech: Speech normal.         Behavior: Behavior normal.         Thought Content: Thought content normal.         Cognition and Memory: Cognition normal.                    Assessment and Plan      Primary hypertension  Blood pressure very well-controlled.  Continue losartan hydrochlorothiazide 100-12.5 mg daily.  Monitor BP at home and notify persistently elevated buffed 130/80.       Hyperlipidemia LDL goal <70  Patient is intolerant to statins, Zetia and PSK 9 inhibitors.  Will continue Vascepa 2 capsules twice daily, niacin extended release 500 mg nightly and try addition of berberine 500 mg twice daily.  Continue healthy eating and regular exercise.  Monitor blood pressure after initiation of berberine.  Follow-up with labs in 6 months.       Age-related osteoporosis without current pathological fracture  Most recent DEXA showed osteopenia.  Recommend continuing calcium and vitamin D  supplementation along with regular intervals of weightbearing exercise.  Recheck DEXA next year.       Trigeminal neuralgia of left side of face  Very well-controlled.  Using gabapentin rarely.  She is aware of appropriate use and adverse effects.  CSA up-to-date and PDMP reviewed by me today.  She is not needing a refill.       History of CVA (cerebrovascular accident)  Will work on improving LDL by addition of berberine 500 mg twice daily she is intolerant to statins, Zetia and PSK 9 inhibitors.  Will continue Vascepa and niacin along with aspirin 81 milligrams daily.       Medicare annual wellness visit, subsequent                 Follow Up   Return in about 6 months (around 11/28/2025).  Patient was given instructions and counseling regarding her condition or for health maintenance advice. Please see specific information pulled into the AVS if appropriate.

## 2025-05-28 NOTE — ASSESSMENT & PLAN NOTE
Blood pressure very well-controlled.  Continue losartan hydrochlorothiazide 100-12.5 mg daily.  Monitor BP at home and notify persistently elevated buffed 130/80.

## 2025-06-11 RX ORDER — LOSARTAN POTASSIUM AND HYDROCHLOROTHIAZIDE 12.5; 1 MG/1; MG/1
1 TABLET ORAL DAILY
Qty: 90 TABLET | Refills: 1 | Status: SHIPPED | OUTPATIENT
Start: 2025-06-11

## 2025-06-11 RX ORDER — OMEPRAZOLE 20 MG/1
CAPSULE, DELAYED RELEASE ORAL
Qty: 180 CAPSULE | Refills: 3 | Status: SHIPPED | OUTPATIENT
Start: 2025-06-11

## 2025-08-06 ENCOUNTER — TELEPHONE (OUTPATIENT)
Dept: INTERNAL MEDICINE | Facility: CLINIC | Age: 75
End: 2025-08-06
Payer: MEDICARE

## 2025-08-07 RX ORDER — OMEPRAZOLE 20 MG/1
40 CAPSULE, DELAYED RELEASE ORAL DAILY
Start: 2025-08-07

## 2025-08-26 ENCOUNTER — TELEMEDICINE (OUTPATIENT)
Dept: INTERNAL MEDICINE | Facility: CLINIC | Age: 75
End: 2025-08-26
Payer: MEDICARE

## 2025-08-26 DIAGNOSIS — U07.1 COVID: Primary | ICD-10-CM

## 2025-08-26 PROCEDURE — 99213 OFFICE O/P EST LOW 20 MIN: CPT | Performed by: NURSE PRACTITIONER

## 2025-08-26 PROCEDURE — 1160F RVW MEDS BY RX/DR IN RCRD: CPT | Performed by: NURSE PRACTITIONER

## 2025-08-26 PROCEDURE — 1159F MED LIST DOCD IN RCRD: CPT | Performed by: NURSE PRACTITIONER

## 2025-08-26 PROCEDURE — 1126F AMNT PAIN NOTED NONE PRSNT: CPT | Performed by: NURSE PRACTITIONER

## (undated) DEVICE — SINGLE-USE BIOPSY FORCEPS: Brand: RADIAL JAW 4

## (undated) DEVICE — BITEBLOCK OMNI BLOC

## (undated) DEVICE — VIAL FORMLN CAP 10PCT 20ML

## (undated) DEVICE — MSK ENDO PORT O2 POM ELITE CURAPLEX A/

## (undated) DEVICE — GOWN PROC ENDOARMOR GI LVL3 HY/SHLD UNIV

## (undated) DEVICE — FLEX ADVANTAGE 1500CC: Brand: FLEX ADVANTAGE

## (undated) DEVICE — TUBING, SUCTION, 1/4" X 10', STRAIGHT: Brand: MEDLINE

## (undated) DEVICE — Device

## (undated) DEVICE — SENSR O2 OXIMAX FNGR A/ 18IN NONSTR

## (undated) DEVICE — LN SMPL CO2 SHTRM SD STREAM W/M LUER

## (undated) DEVICE — BLCK/BITE BLOX W/DENTL/RIM W/STRAP 54F

## (undated) DEVICE — KT ORCA ORCAPOD DISP STRL

## (undated) DEVICE — ADAPT CLN BIOGUARD AIR/H2O DISP

## (undated) DEVICE — CANN NASL CO2 TRULINK W/O2 A/